# Patient Record
Sex: FEMALE | Race: WHITE | NOT HISPANIC OR LATINO | Employment: OTHER | URBAN - METROPOLITAN AREA
[De-identification: names, ages, dates, MRNs, and addresses within clinical notes are randomized per-mention and may not be internally consistent; named-entity substitution may affect disease eponyms.]

---

## 2017-01-03 ENCOUNTER — ALLSCRIPTS OFFICE VISIT (OUTPATIENT)
Dept: OTHER | Facility: OTHER | Age: 62
End: 2017-01-03

## 2017-01-09 ENCOUNTER — ALLSCRIPTS OFFICE VISIT (OUTPATIENT)
Dept: OTHER | Facility: OTHER | Age: 62
End: 2017-01-09

## 2017-01-20 RX ORDER — SODIUM CHLORIDE 9 MG/ML
20 INJECTION, SOLUTION INTRAVENOUS CONTINUOUS
Status: DISCONTINUED | OUTPATIENT
Start: 2017-01-23 | End: 2017-01-26 | Stop reason: HOSPADM

## 2017-01-20 RX ORDER — ACETAMINOPHEN 325 MG/1
650 TABLET ORAL ONCE
Status: COMPLETED | OUTPATIENT
Start: 2017-01-23 | End: 2017-01-23

## 2017-01-23 ENCOUNTER — HOSPITAL ENCOUNTER (OUTPATIENT)
Dept: INFUSION CENTER | Facility: CLINIC | Age: 62
Discharge: HOME | End: 2017-01-23
Payer: MEDICARE

## 2017-01-23 VITALS
HEART RATE: 68 BPM | TEMPERATURE: 98.3 F | SYSTOLIC BLOOD PRESSURE: 134 MMHG | RESPIRATION RATE: 16 BRPM | DIASTOLIC BLOOD PRESSURE: 63 MMHG

## 2017-01-23 LAB
ALBUMIN SERPL BCP-MCNC: 4.4 G/DL (ref 3.5–5)
ALP SERPL-CCNC: 127 U/L (ref 46–116)
ALT SERPL W P-5'-P-CCNC: 31 U/L (ref 12–78)
ANION GAP SERPL CALCULATED.3IONS-SCNC: 10 MMOL/L (ref 4–13)
AST SERPL W P-5'-P-CCNC: 13 U/L (ref 5–45)
BASOPHILS # BLD AUTO: 0.05 THOUSANDS/ΜL (ref 0–0.1)
BASOPHILS NFR BLD AUTO: 1 % (ref 0–1)
BILIRUB SERPL-MCNC: 0.2 MG/DL (ref 0.2–1)
BUN SERPL-MCNC: 20 MG/DL (ref 5–25)
CALCIUM SERPL-MCNC: 9.2 MG/DL (ref 8.3–10.1)
CHLORIDE SERPL-SCNC: 102 MMOL/L (ref 100–108)
CHOLEST SERPL-MCNC: 201 MG/DL (ref 50–200)
CO2 SERPL-SCNC: 28 MMOL/L (ref 21–32)
CREAT SERPL-MCNC: 0.58 MG/DL (ref 0.6–1.3)
EOSINOPHIL # BLD AUTO: 0.19 THOUSAND/ΜL (ref 0–0.61)
EOSINOPHIL NFR BLD AUTO: 2 % (ref 0–6)
ERYTHROCYTE [DISTWIDTH] IN BLOOD BY AUTOMATED COUNT: 12.1 % (ref 11.6–15.1)
GFR SERPL CREATININE-BSD FRML MDRD: >60 ML/MIN/1.73SQ M
GLUCOSE SERPL-MCNC: 105 MG/DL (ref 65–140)
HCT VFR BLD AUTO: 43.6 % (ref 34.8–46.1)
HDLC SERPL-MCNC: 54 MG/DL (ref 40–60)
HGB BLD-MCNC: 14.8 G/DL (ref 11.5–15.4)
LDLC SERPL CALC-MCNC: 112 MG/DL (ref 0–100)
LYMPHOCYTES # BLD AUTO: 3.3 THOUSANDS/ΜL (ref 0.6–4.47)
LYMPHOCYTES NFR BLD AUTO: 35 % (ref 14–44)
MCH RBC QN AUTO: 29.5 PG (ref 26.8–34.3)
MCHC RBC AUTO-ENTMCNC: 33.9 G/DL (ref 31.4–37.4)
MCV RBC AUTO: 87 FL (ref 82–98)
MONOCYTES # BLD AUTO: 0.56 THOUSAND/ΜL (ref 0.17–1.22)
MONOCYTES NFR BLD AUTO: 6 % (ref 4–12)
NEUTROPHILS # BLD AUTO: 5.31 THOUSANDS/ΜL (ref 1.85–7.62)
NEUTS SEG NFR BLD AUTO: 56 % (ref 43–75)
PLATELET # BLD AUTO: 333 THOUSANDS/UL (ref 149–390)
PMV BLD AUTO: 9.5 FL (ref 8.9–12.7)
POTASSIUM SERPL-SCNC: 4.2 MMOL/L (ref 3.5–5.3)
PROT SERPL-MCNC: 8.4 G/DL (ref 6.4–8.2)
RBC # BLD AUTO: 5.01 MILLION/UL (ref 3.81–5.12)
SODIUM SERPL-SCNC: 140 MMOL/L (ref 136–145)
TRIGL SERPL-MCNC: 175 MG/DL
TSH SERPL DL<=0.05 MIU/L-ACNC: 0.93 UIU/ML (ref 0.36–3.74)
WBC # BLD AUTO: 9.41 THOUSAND/UL (ref 4.31–10.16)

## 2017-01-23 PROCEDURE — 84443 ASSAY THYROID STIM HORMONE: CPT | Performed by: FAMILY MEDICINE

## 2017-01-23 PROCEDURE — 96413 CHEMO IV INFUSION 1 HR: CPT

## 2017-01-23 PROCEDURE — 80061 LIPID PANEL: CPT | Performed by: FAMILY MEDICINE

## 2017-01-23 PROCEDURE — 96367 TX/PROPH/DG ADDL SEQ IV INF: CPT

## 2017-01-23 PROCEDURE — 85025 COMPLETE CBC W/AUTO DIFF WBC: CPT | Performed by: FAMILY MEDICINE

## 2017-01-23 PROCEDURE — 80053 COMPREHEN METABOLIC PANEL: CPT | Performed by: FAMILY MEDICINE

## 2017-01-23 RX ADMIN — DIPHENHYDRAMINE HYDROCHLORIDE 25 MG: 50 INJECTION, SOLUTION INTRAMUSCULAR; INTRAVENOUS at 11:31

## 2017-01-23 RX ADMIN — ACETAMINOPHEN 650 MG: 325 TABLET ORAL at 11:27

## 2017-01-23 RX ADMIN — SODIUM CHLORIDE 20 ML/HR: 0.9 INJECTION, SOLUTION INTRAVENOUS at 11:28

## 2017-01-23 RX ADMIN — NATALIZUMAB 300 MG: 300 INJECTION INTRAVENOUS at 12:04

## 2017-01-23 NOTE — PROGRESS NOTES
Pt here today for Tys[place]ai  Pt is registered with touch program and has reviewed the pre infusion checklist   Pt denies any discomforts

## 2017-01-30 ENCOUNTER — ALLSCRIPTS OFFICE VISIT (OUTPATIENT)
Dept: OTHER | Facility: OTHER | Age: 62
End: 2017-01-30

## 2017-02-02 ENCOUNTER — APPOINTMENT (EMERGENCY)
Dept: RADIOLOGY | Facility: HOSPITAL | Age: 62
End: 2017-02-02
Payer: MEDICARE

## 2017-02-02 ENCOUNTER — HOSPITAL ENCOUNTER (OUTPATIENT)
Facility: HOSPITAL | Age: 62
Setting detail: OBSERVATION
Discharge: HOME | End: 2017-02-04
Attending: EMERGENCY MEDICINE | Admitting: INTERNAL MEDICINE
Payer: MEDICARE

## 2017-02-02 DIAGNOSIS — M79.659 THIGH PAIN: ICD-10-CM

## 2017-02-02 DIAGNOSIS — J18.9 RML PNEUMONIA: Primary | ICD-10-CM

## 2017-02-02 DIAGNOSIS — M79.643 HAND PAIN: ICD-10-CM

## 2017-02-02 DIAGNOSIS — M62.838 MUSCLE SPASM: ICD-10-CM

## 2017-02-02 PROBLEM — E78.5 HYPERLIPIDEMIA: Chronic | Status: ACTIVE | Noted: 2017-02-02

## 2017-02-02 PROBLEM — M79.606 LEG PAIN: Status: ACTIVE | Noted: 2017-02-02

## 2017-02-02 PROBLEM — G35 MULTIPLE SCLEROSIS (HCC): Chronic | Status: ACTIVE | Noted: 2017-02-02

## 2017-02-02 PROBLEM — M81.0 OSTEOPOROSIS: Chronic | Status: ACTIVE | Noted: 2017-02-02

## 2017-02-02 LAB
ALBUMIN SERPL BCP-MCNC: 4.1 G/DL (ref 3.5–5)
ALP SERPL-CCNC: 111 U/L (ref 46–116)
ALT SERPL W P-5'-P-CCNC: 22 U/L (ref 12–78)
ANION GAP SERPL CALCULATED.3IONS-SCNC: 8 MMOL/L (ref 4–13)
AST SERPL W P-5'-P-CCNC: 7 U/L (ref 5–45)
BACTERIA UR QL AUTO: ABNORMAL /HPF
BASOPHILS # BLD AUTO: 0.02 THOUSANDS/ΜL (ref 0–0.1)
BASOPHILS NFR BLD AUTO: 0 % (ref 0–1)
BILIRUB SERPL-MCNC: 0.52 MG/DL (ref 0.2–1)
BILIRUB UR QL STRIP: NEGATIVE
BUN SERPL-MCNC: 17 MG/DL (ref 5–25)
CALCIUM SERPL-MCNC: 9 MG/DL (ref 8.3–10.1)
CHLORIDE SERPL-SCNC: 105 MMOL/L (ref 100–108)
CK SERPL-CCNC: 63 U/L (ref 26–192)
CLARITY UR: CLEAR
CLARITY, POC: CLEAR
CO2 SERPL-SCNC: 26 MMOL/L (ref 21–32)
COLOR UR: YELLOW
COLOR, POC: YELLOW
CREAT SERPL-MCNC: 0.7 MG/DL (ref 0.6–1.3)
EOSINOPHIL # BLD AUTO: 0.01 THOUSAND/ΜL (ref 0–0.61)
EOSINOPHIL NFR BLD AUTO: 0 % (ref 0–6)
ERYTHROCYTE [DISTWIDTH] IN BLOOD BY AUTOMATED COUNT: 12.3 % (ref 11.6–15.1)
GFR SERPL CREATININE-BSD FRML MDRD: >60 ML/MIN/1.73SQ M
GLUCOSE SERPL-MCNC: 144 MG/DL (ref 65–140)
GLUCOSE UR STRIP-MCNC: NEGATIVE MG/DL
HCT VFR BLD AUTO: 41.8 % (ref 34.8–46.1)
HGB BLD-MCNC: 14.4 G/DL (ref 11.5–15.4)
HGB UR QL STRIP.AUTO: ABNORMAL
HYALINE CASTS #/AREA URNS LPF: ABNORMAL /LPF
KETONES UR STRIP-MCNC: NEGATIVE MG/DL
LACTATE SERPL-SCNC: 2.1 MMOL/L (ref 0.5–2)
LEUKOCYTE ESTERASE UR QL STRIP: NEGATIVE
LYMPHOCYTES # BLD AUTO: 1.48 THOUSANDS/ΜL (ref 0.6–4.47)
LYMPHOCYTES NFR BLD AUTO: 9 % (ref 14–44)
MCH RBC QN AUTO: 29.9 PG (ref 26.8–34.3)
MCHC RBC AUTO-ENTMCNC: 34.4 G/DL (ref 31.4–37.4)
MCV RBC AUTO: 87 FL (ref 82–98)
MONOCYTES # BLD AUTO: 0.94 THOUSAND/ΜL (ref 0.17–1.22)
MONOCYTES NFR BLD AUTO: 6 % (ref 4–12)
NEUTROPHILS # BLD AUTO: 14.6 THOUSANDS/ΜL (ref 1.85–7.62)
NEUTS SEG NFR BLD AUTO: 85 % (ref 43–75)
NITRITE UR QL STRIP: NEGATIVE
NON-SQ EPI CELLS URNS QL MICRO: ABNORMAL /HPF
NRBC BLD AUTO-RTO: 0 /100 WBCS
PH UR STRIP.AUTO: 6 [PH] (ref 4.5–8)
PLATELET # BLD AUTO: 288 THOUSANDS/UL (ref 149–390)
PMV BLD AUTO: 10 FL (ref 8.9–12.7)
POTASSIUM SERPL-SCNC: 4 MMOL/L (ref 3.5–5.3)
PROT SERPL-MCNC: 7.6 G/DL (ref 6.4–8.2)
PROT UR STRIP-MCNC: NEGATIVE MG/DL
RBC # BLD AUTO: 4.82 MILLION/UL (ref 3.81–5.12)
RBC #/AREA URNS AUTO: ABNORMAL /HPF
SODIUM SERPL-SCNC: 139 MMOL/L (ref 136–145)
SP GR UR STRIP.AUTO: 1.02 (ref 1–1.03)
UROBILINOGEN UR QL STRIP.AUTO: 0.2 E.U./DL
WBC # BLD AUTO: 17.09 THOUSAND/UL (ref 4.31–10.16)
WBC #/AREA URNS AUTO: ABNORMAL /HPF

## 2017-02-02 PROCEDURE — 99285 EMERGENCY DEPT VISIT HI MDM: CPT

## 2017-02-02 PROCEDURE — 85025 COMPLETE CBC W/AUTO DIFF WBC: CPT | Performed by: EMERGENCY MEDICINE

## 2017-02-02 PROCEDURE — 96365 THER/PROPH/DIAG IV INF INIT: CPT

## 2017-02-02 PROCEDURE — 82550 ASSAY OF CK (CPK): CPT | Performed by: EMERGENCY MEDICINE

## 2017-02-02 PROCEDURE — 87086 URINE CULTURE/COLONY COUNT: CPT

## 2017-02-02 PROCEDURE — 36415 COLL VENOUS BLD VENIPUNCTURE: CPT | Performed by: EMERGENCY MEDICINE

## 2017-02-02 PROCEDURE — 73110 X-RAY EXAM OF WRIST: CPT

## 2017-02-02 PROCEDURE — 81002 URINALYSIS NONAUTO W/O SCOPE: CPT | Performed by: EMERGENCY MEDICINE

## 2017-02-02 PROCEDURE — 93005 ELECTROCARDIOGRAM TRACING: CPT | Performed by: EMERGENCY MEDICINE

## 2017-02-02 PROCEDURE — 80053 COMPREHEN METABOLIC PANEL: CPT | Performed by: EMERGENCY MEDICINE

## 2017-02-02 PROCEDURE — 96376 TX/PRO/DX INJ SAME DRUG ADON: CPT

## 2017-02-02 PROCEDURE — 83605 ASSAY OF LACTIC ACID: CPT | Performed by: EMERGENCY MEDICINE

## 2017-02-02 PROCEDURE — 71020 HB CHEST X-RAY 2VW FRONTAL&LATL: CPT

## 2017-02-02 PROCEDURE — 81001 URINALYSIS AUTO W/SCOPE: CPT

## 2017-02-02 PROCEDURE — 96375 TX/PRO/DX INJ NEW DRUG ADDON: CPT

## 2017-02-02 PROCEDURE — 96361 HYDRATE IV INFUSION ADD-ON: CPT

## 2017-02-02 PROCEDURE — 73502 X-RAY EXAM HIP UNI 2-3 VIEWS: CPT

## 2017-02-02 RX ORDER — MORPHINE SULFATE 10 MG/ML
7 INJECTION, SOLUTION INTRAMUSCULAR; INTRAVENOUS ONCE
Status: COMPLETED | OUTPATIENT
Start: 2017-02-02 | End: 2017-02-02

## 2017-02-02 RX ORDER — SODIUM CHLORIDE 9 MG/ML
125 INJECTION, SOLUTION INTRAVENOUS CONTINUOUS
Status: DISCONTINUED | OUTPATIENT
Start: 2017-02-02 | End: 2017-02-04 | Stop reason: HOSPADM

## 2017-02-02 RX ORDER — BUDESONIDE AND FORMOTEROL FUMARATE DIHYDRATE 80; 4.5 UG/1; UG/1
2 AEROSOL RESPIRATORY (INHALATION) 2 TIMES DAILY
Status: DISCONTINUED | OUTPATIENT
Start: 2017-02-02 | End: 2017-02-04 | Stop reason: HOSPADM

## 2017-02-02 RX ORDER — GABAPENTIN 300 MG/1
300 CAPSULE ORAL
Status: DISCONTINUED | OUTPATIENT
Start: 2017-02-02 | End: 2017-02-04 | Stop reason: HOSPADM

## 2017-02-02 RX ORDER — ASPIRIN 81 MG/1
81 TABLET, CHEWABLE ORAL DAILY
Status: DISCONTINUED | OUTPATIENT
Start: 2017-02-03 | End: 2017-02-04 | Stop reason: HOSPADM

## 2017-02-02 RX ORDER — LIDOCAINE 50 MG/G
2 PATCH TOPICAL DAILY
Status: DISCONTINUED | OUTPATIENT
Start: 2017-02-03 | End: 2017-02-04 | Stop reason: HOSPADM

## 2017-02-02 RX ORDER — B-COMPLEX WITH VITAMIN C
1 TABLET ORAL
COMMUNITY
End: 2018-10-15

## 2017-02-02 RX ORDER — MORPHINE SULFATE 4 MG/ML
4 INJECTION, SOLUTION INTRAMUSCULAR; INTRAVENOUS ONCE
Status: COMPLETED | OUTPATIENT
Start: 2017-02-02 | End: 2017-02-02

## 2017-02-02 RX ORDER — B-COMPLEX WITH VITAMIN C
1 TABLET ORAL
Status: DISCONTINUED | OUTPATIENT
Start: 2017-02-03 | End: 2017-02-04 | Stop reason: HOSPADM

## 2017-02-02 RX ORDER — ATORVASTATIN CALCIUM 40 MG/1
40 TABLET, FILM COATED ORAL
Status: DISCONTINUED | OUTPATIENT
Start: 2017-02-02 | End: 2017-02-04 | Stop reason: HOSPADM

## 2017-02-02 RX ORDER — MORPHINE SULFATE 4 MG/ML
4 INJECTION, SOLUTION INTRAMUSCULAR; INTRAVENOUS ONCE AS NEEDED
Status: COMPLETED | OUTPATIENT
Start: 2017-02-02 | End: 2017-02-02

## 2017-02-02 RX ORDER — MELATONIN
4000 DAILY
Status: DISCONTINUED | OUTPATIENT
Start: 2017-02-03 | End: 2017-02-04 | Stop reason: HOSPADM

## 2017-02-02 RX ORDER — MONTELUKAST SODIUM 10 MG/1
10 TABLET ORAL
COMMUNITY
End: 2018-01-24 | Stop reason: SDUPTHER

## 2017-02-02 RX ORDER — EZETIMIBE 10 MG/1
10 TABLET ORAL DAILY
Status: DISCONTINUED | OUTPATIENT
Start: 2017-02-03 | End: 2017-02-04 | Stop reason: HOSPADM

## 2017-02-02 RX ORDER — AZITHROMYCIN 250 MG/1
500 TABLET, FILM COATED ORAL ONCE
Status: COMPLETED | OUTPATIENT
Start: 2017-02-02 | End: 2017-02-02

## 2017-02-02 RX ORDER — MELATONIN 10 MG
6000 TABLET, SUBLINGUAL SUBLINGUAL DAILY
COMMUNITY
End: 2018-01-24 | Stop reason: SDUPTHER

## 2017-02-02 RX ORDER — ALBUTEROL SULFATE 90 UG/1
2 AEROSOL, METERED RESPIRATORY (INHALATION) EVERY 6 HOURS PRN
Status: DISCONTINUED | OUTPATIENT
Start: 2017-02-02 | End: 2017-02-04 | Stop reason: HOSPADM

## 2017-02-02 RX ORDER — MONTELUKAST SODIUM 10 MG/1
10 TABLET ORAL
Status: DISCONTINUED | OUTPATIENT
Start: 2017-02-02 | End: 2017-02-04 | Stop reason: HOSPADM

## 2017-02-02 RX ORDER — ATORVASTATIN CALCIUM 40 MG/1
40 TABLET, FILM COATED ORAL DAILY
COMMUNITY
End: 2018-01-24 | Stop reason: SDUPTHER

## 2017-02-02 RX ORDER — DIAZEPAM 5 MG/ML
5 INJECTION, SOLUTION INTRAMUSCULAR; INTRAVENOUS ONCE
Status: COMPLETED | OUTPATIENT
Start: 2017-02-02 | End: 2017-02-02

## 2017-02-02 RX ORDER — ALBUTEROL SULFATE 90 UG/1
2 AEROSOL, METERED RESPIRATORY (INHALATION) EVERY 6 HOURS PRN
COMMUNITY
End: 2018-01-24 | Stop reason: SDUPTHER

## 2017-02-02 RX ORDER — SERTRALINE HYDROCHLORIDE 100 MG/1
100 TABLET, FILM COATED ORAL DAILY
Status: DISCONTINUED | OUTPATIENT
Start: 2017-02-03 | End: 2017-02-04 | Stop reason: HOSPADM

## 2017-02-02 RX ORDER — PANTOPRAZOLE SODIUM 40 MG/1
40 TABLET, DELAYED RELEASE ORAL
Status: DISCONTINUED | OUTPATIENT
Start: 2017-02-03 | End: 2017-02-04 | Stop reason: HOSPADM

## 2017-02-02 RX ORDER — DIAZEPAM 5 MG/ML
5 INJECTION, SOLUTION INTRAMUSCULAR; INTRAVENOUS ONCE AS NEEDED
Status: DISCONTINUED | OUTPATIENT
Start: 2017-02-02 | End: 2017-02-02

## 2017-02-02 RX ORDER — GABAPENTIN 300 MG/1
300 CAPSULE ORAL AS NEEDED
Status: DISCONTINUED | OUTPATIENT
Start: 2017-02-02 | End: 2017-02-02

## 2017-02-02 RX ORDER — VANCOMYCIN HYDROCHLORIDE 1 G/200ML
15 INJECTION, SOLUTION INTRAVENOUS EVERY 12 HOURS
Status: DISCONTINUED | OUTPATIENT
Start: 2017-02-02 | End: 2017-02-02

## 2017-02-02 RX ORDER — MAGNESIUM SULFATE HEPTAHYDRATE 40 MG/ML
2 INJECTION, SOLUTION INTRAVENOUS ONCE
Status: COMPLETED | OUTPATIENT
Start: 2017-02-02 | End: 2017-02-02

## 2017-02-02 RX ORDER — FLUTICASONE PROPIONATE 50 MCG
1 SPRAY, SUSPENSION (ML) NASAL DAILY
Status: DISCONTINUED | OUTPATIENT
Start: 2017-02-03 | End: 2017-02-04 | Stop reason: HOSPADM

## 2017-02-02 RX ADMIN — SODIUM CHLORIDE 125 ML/HR: 0.9 INJECTION, SOLUTION INTRAVENOUS at 20:31

## 2017-02-02 RX ADMIN — MAGNESIUM SULFATE HEPTAHYDRATE 2 G: 40 INJECTION, SOLUTION INTRAVENOUS at 11:48

## 2017-02-02 RX ADMIN — CEFTRIAXONE 1000 MG: 1 INJECTION, POWDER, FOR SOLUTION INTRAMUSCULAR; INTRAVENOUS at 20:20

## 2017-02-02 RX ADMIN — SODIUM CHLORIDE 1000 ML: 0.9 INJECTION, SOLUTION INTRAVENOUS at 10:21

## 2017-02-02 RX ADMIN — MONTELUKAST SODIUM 10 MG: 10 TABLET, FILM COATED ORAL at 21:55

## 2017-02-02 RX ADMIN — MORPHINE SULFATE 4 MG: 4 INJECTION, SOLUTION INTRAMUSCULAR; INTRAVENOUS at 16:09

## 2017-02-02 RX ADMIN — MORPHINE SULFATE 7 MG: 10 INJECTION, SOLUTION INTRAMUSCULAR; INTRAVENOUS at 11:13

## 2017-02-02 RX ADMIN — AZITHROMYCIN 500 MG: 250 TABLET, FILM COATED ORAL at 13:29

## 2017-02-02 RX ADMIN — MORPHINE SULFATE 4 MG: 4 INJECTION, SOLUTION INTRAMUSCULAR; INTRAVENOUS at 20:23

## 2017-02-02 RX ADMIN — GABAPENTIN 300 MG: 300 CAPSULE ORAL at 21:55

## 2017-02-02 RX ADMIN — DIAZEPAM 5 MG: 5 INJECTION, SOLUTION INTRAMUSCULAR; INTRAVENOUS at 11:48

## 2017-02-02 RX ADMIN — ATORVASTATIN CALCIUM 40 MG: 40 TABLET, FILM COATED ORAL at 20:20

## 2017-02-02 RX ADMIN — SODIUM CHLORIDE 1000 ML: 0.9 INJECTION, SOLUTION INTRAVENOUS at 13:29

## 2017-02-03 PROBLEM — D72.829 LEUKOCYTOSIS: Status: ACTIVE | Noted: 2017-02-03

## 2017-02-03 LAB
ANION GAP SERPL CALCULATED.3IONS-SCNC: 7 MMOL/L (ref 4–13)
ATRIAL RATE: 65 BPM
BASOPHILS # BLD AUTO: 0.02 THOUSANDS/ΜL (ref 0–0.1)
BASOPHILS NFR BLD AUTO: 0 % (ref 0–1)
BUN SERPL-MCNC: 8 MG/DL (ref 5–25)
CALCIUM SERPL-MCNC: 8.4 MG/DL (ref 8.3–10.1)
CHLORIDE SERPL-SCNC: 105 MMOL/L (ref 100–108)
CO2 SERPL-SCNC: 26 MMOL/L (ref 21–32)
CREAT SERPL-MCNC: 0.69 MG/DL (ref 0.6–1.3)
EOSINOPHIL # BLD AUTO: 0.06 THOUSAND/ΜL (ref 0–0.61)
EOSINOPHIL NFR BLD AUTO: 0 % (ref 0–6)
ERYTHROCYTE [DISTWIDTH] IN BLOOD BY AUTOMATED COUNT: 12.4 % (ref 11.6–15.1)
GFR SERPL CREATININE-BSD FRML MDRD: >60 ML/MIN/1.73SQ M
GLUCOSE SERPL-MCNC: 166 MG/DL (ref 65–140)
HCT VFR BLD AUTO: 36.9 % (ref 34.8–46.1)
HGB BLD-MCNC: 12.8 G/DL (ref 11.5–15.4)
LYMPHOCYTES # BLD AUTO: 2.18 THOUSANDS/ΜL (ref 0.6–4.47)
LYMPHOCYTES NFR BLD AUTO: 16 % (ref 14–44)
MCH RBC QN AUTO: 30.2 PG (ref 26.8–34.3)
MCHC RBC AUTO-ENTMCNC: 34.7 G/DL (ref 31.4–37.4)
MCV RBC AUTO: 87 FL (ref 82–98)
MONOCYTES # BLD AUTO: 1.16 THOUSAND/ΜL (ref 0.17–1.22)
MONOCYTES NFR BLD AUTO: 8 % (ref 4–12)
NEUTROPHILS # BLD AUTO: 10.36 THOUSANDS/ΜL (ref 1.85–7.62)
NEUTS SEG NFR BLD AUTO: 76 % (ref 43–75)
NRBC BLD AUTO-RTO: 0 /100 WBCS
P AXIS: 67 DEGREES
PLATELET # BLD AUTO: 244 THOUSANDS/UL (ref 149–390)
PMV BLD AUTO: 9.5 FL (ref 8.9–12.7)
POTASSIUM SERPL-SCNC: 3.8 MMOL/L (ref 3.5–5.3)
PR INTERVAL: 162 MS
QRS AXIS: 15 DEGREES
QRSD INTERVAL: 80 MS
QT INTERVAL: 394 MS
QTC INTERVAL: 409 MS
RBC # BLD AUTO: 4.24 MILLION/UL (ref 3.81–5.12)
SODIUM SERPL-SCNC: 138 MMOL/L (ref 136–145)
T WAVE AXIS: 76 DEGREES
URATE SERPL-MCNC: 3 MG/DL (ref 2–6.8)
VENTRICULAR RATE: 65 BPM
WBC # BLD AUTO: 13.82 THOUSAND/UL (ref 4.31–10.16)

## 2017-02-03 PROCEDURE — 85025 COMPLETE CBC W/AUTO DIFF WBC: CPT | Performed by: PHYSICIAN ASSISTANT

## 2017-02-03 PROCEDURE — G8988 SELF CARE GOAL STATUS: HCPCS

## 2017-02-03 PROCEDURE — G8987 SELF CARE CURRENT STATUS: HCPCS

## 2017-02-03 PROCEDURE — 97166 OT EVAL MOD COMPLEX 45 MIN: CPT

## 2017-02-03 PROCEDURE — 84550 ASSAY OF BLOOD/URIC ACID: CPT | Performed by: PHYSICIAN ASSISTANT

## 2017-02-03 PROCEDURE — 80048 BASIC METABOLIC PNL TOTAL CA: CPT | Performed by: PHYSICIAN ASSISTANT

## 2017-02-03 RX ORDER — KETOROLAC TROMETHAMINE 30 MG/ML
30 INJECTION, SOLUTION INTRAMUSCULAR; INTRAVENOUS EVERY 6 HOURS SCHEDULED
Status: DISCONTINUED | OUTPATIENT
Start: 2017-02-03 | End: 2017-02-04 | Stop reason: HOSPADM

## 2017-02-03 RX ADMIN — ATORVASTATIN CALCIUM 40 MG: 40 TABLET, FILM COATED ORAL at 17:12

## 2017-02-03 RX ADMIN — SERTRALINE HYDROCHLORIDE 100 MG: 100 TABLET, FILM COATED ORAL at 08:28

## 2017-02-03 RX ADMIN — KETOROLAC TROMETHAMINE 30 MG: 30 INJECTION, SOLUTION INTRAMUSCULAR at 11:35

## 2017-02-03 RX ADMIN — CALCIUM CARBONATE 500 MG (1,250 MG)-VITAMIN D3 200 UNIT TABLET 1 TABLET: at 08:28

## 2017-02-03 RX ADMIN — BUDESONIDE AND FORMOTEROL FUMARATE DIHYDRATE 2 PUFF: 80; 4.5 AEROSOL RESPIRATORY (INHALATION) at 17:14

## 2017-02-03 RX ADMIN — FLUTICASONE PROPIONATE 1 SPRAY: 50 SPRAY, METERED NASAL at 08:29

## 2017-02-03 RX ADMIN — ENOXAPARIN SODIUM 40 MG: 40 INJECTION SUBCUTANEOUS at 08:28

## 2017-02-03 RX ADMIN — VITAMIN D, TAB 1000IU (100/BT) 4000 UNITS: 25 TAB at 08:28

## 2017-02-03 RX ADMIN — KETOROLAC TROMETHAMINE 30 MG: 30 INJECTION, SOLUTION INTRAMUSCULAR at 17:12

## 2017-02-03 RX ADMIN — EZETIMIBE 10 MG: 10 TABLET ORAL at 08:29

## 2017-02-03 RX ADMIN — CEFTRIAXONE 1000 MG: 1 INJECTION, POWDER, FOR SOLUTION INTRAMUSCULAR; INTRAVENOUS at 20:34

## 2017-02-03 RX ADMIN — KETOROLAC TROMETHAMINE 30 MG: 30 INJECTION, SOLUTION INTRAMUSCULAR at 23:39

## 2017-02-03 RX ADMIN — ASPIRIN 81 MG 81 MG: 81 TABLET ORAL at 08:28

## 2017-02-03 RX ADMIN — MONTELUKAST SODIUM 10 MG: 10 TABLET, FILM COATED ORAL at 21:01

## 2017-02-03 RX ADMIN — BUDESONIDE AND FORMOTEROL FUMARATE DIHYDRATE 2 PUFF: 80; 4.5 AEROSOL RESPIRATORY (INHALATION) at 08:29

## 2017-02-03 RX ADMIN — SODIUM CHLORIDE 125 ML/HR: 0.9 INJECTION, SOLUTION INTRAVENOUS at 04:18

## 2017-02-03 RX ADMIN — PANTOPRAZOLE SODIUM 40 MG: 40 TABLET, DELAYED RELEASE ORAL at 05:31

## 2017-02-03 RX ADMIN — GABAPENTIN 300 MG: 300 CAPSULE ORAL at 21:01

## 2017-02-03 RX ADMIN — LIDOCAINE 2 PATCH: 50 PATCH CUTANEOUS at 08:28

## 2017-02-04 ENCOUNTER — APPOINTMENT (OUTPATIENT)
Dept: RADIOLOGY | Facility: HOSPITAL | Age: 62
End: 2017-02-04
Payer: MEDICARE

## 2017-02-04 VITALS
HEART RATE: 61 BPM | BODY MASS INDEX: 24.43 KG/M2 | SYSTOLIC BLOOD PRESSURE: 132 MMHG | WEIGHT: 152 LBS | OXYGEN SATURATION: 97 % | DIASTOLIC BLOOD PRESSURE: 78 MMHG | RESPIRATION RATE: 22 BRPM | HEIGHT: 66 IN | TEMPERATURE: 98.7 F

## 2017-02-04 LAB — BACTERIA UR CULT: NORMAL

## 2017-02-04 PROCEDURE — G8979 MOBILITY GOAL STATUS: HCPCS

## 2017-02-04 PROCEDURE — 93971 EXTREMITY STUDY: CPT

## 2017-02-04 PROCEDURE — 97163 PT EVAL HIGH COMPLEX 45 MIN: CPT

## 2017-02-04 PROCEDURE — G8978 MOBILITY CURRENT STATUS: HCPCS

## 2017-02-04 RX ORDER — LIDOCAINE 50 MG/G
2 PATCH TOPICAL DAILY
Qty: 60 PATCH | Refills: 0 | Status: SHIPPED | OUTPATIENT
Start: 2017-02-04 | End: 2017-04-20 | Stop reason: HOSPADM

## 2017-02-04 RX ORDER — NAPROXEN SODIUM 550 MG/1
550 TABLET ORAL 2 TIMES DAILY WITH MEALS
Qty: 20 TABLET | Refills: 0 | Status: SHIPPED | OUTPATIENT
Start: 2017-02-04 | End: 2017-04-20 | Stop reason: HOSPADM

## 2017-02-04 RX ADMIN — FLUTICASONE PROPIONATE 1 SPRAY: 50 SPRAY, METERED NASAL at 08:48

## 2017-02-04 RX ADMIN — KETOROLAC TROMETHAMINE 30 MG: 30 INJECTION, SOLUTION INTRAMUSCULAR at 11:43

## 2017-02-04 RX ADMIN — VITAMIN D, TAB 1000IU (100/BT) 4000 UNITS: 25 TAB at 08:48

## 2017-02-04 RX ADMIN — ASPIRIN 81 MG 81 MG: 81 TABLET ORAL at 08:49

## 2017-02-04 RX ADMIN — ENOXAPARIN SODIUM 40 MG: 40 INJECTION SUBCUTANEOUS at 08:48

## 2017-02-04 RX ADMIN — KETOROLAC TROMETHAMINE 30 MG: 30 INJECTION, SOLUTION INTRAMUSCULAR at 05:18

## 2017-02-04 RX ADMIN — CALCIUM CARBONATE 500 MG (1,250 MG)-VITAMIN D3 200 UNIT TABLET 1 TABLET: at 08:48

## 2017-02-04 RX ADMIN — PANTOPRAZOLE SODIUM 40 MG: 40 TABLET, DELAYED RELEASE ORAL at 05:18

## 2017-02-04 RX ADMIN — BUDESONIDE AND FORMOTEROL FUMARATE DIHYDRATE 2 PUFF: 80; 4.5 AEROSOL RESPIRATORY (INHALATION) at 08:48

## 2017-02-04 RX ADMIN — SERTRALINE HYDROCHLORIDE 100 MG: 100 TABLET, FILM COATED ORAL at 08:49

## 2017-02-04 RX ADMIN — LIDOCAINE 2 PATCH: 50 PATCH CUTANEOUS at 08:49

## 2017-02-04 RX ADMIN — EZETIMIBE 10 MG: 10 TABLET ORAL at 08:49

## 2017-02-10 ENCOUNTER — APPOINTMENT (OUTPATIENT)
Dept: LAB | Facility: CLINIC | Age: 62
End: 2017-02-10
Payer: MEDICARE

## 2017-02-10 ENCOUNTER — ALLSCRIPTS OFFICE VISIT (OUTPATIENT)
Dept: OTHER | Facility: OTHER | Age: 62
End: 2017-02-10

## 2017-02-10 DIAGNOSIS — M54.32 SCIATICA OF LEFT SIDE: ICD-10-CM

## 2017-02-10 DIAGNOSIS — G35 MULTIPLE SCLEROSIS (HCC): ICD-10-CM

## 2017-02-10 DIAGNOSIS — M79.669 PAIN OF LOWER LEG: ICD-10-CM

## 2017-02-10 DIAGNOSIS — E78.5 HYPERLIPIDEMIA: ICD-10-CM

## 2017-02-10 DIAGNOSIS — M79.609 PAIN IN EXTREMITY: ICD-10-CM

## 2017-02-10 DIAGNOSIS — R29.898 OTHER SYMPTOMS AND SIGNS INVOLVING THE MUSCULOSKELETAL SYSTEM: ICD-10-CM

## 2017-02-10 DIAGNOSIS — J18.1 LOBAR PNEUMONIA (HCC): ICD-10-CM

## 2017-02-10 DIAGNOSIS — M79.643 PAIN OF HAND: ICD-10-CM

## 2017-02-10 DIAGNOSIS — J44.9 CHRONIC OBSTRUCTIVE PULMONARY DISEASE (HCC): ICD-10-CM

## 2017-02-10 LAB
ALBUMIN SERPL BCP-MCNC: 4.1 G/DL (ref 3.5–5)
ALP SERPL-CCNC: 106 U/L (ref 46–116)
ALT SERPL W P-5'-P-CCNC: 29 U/L (ref 12–78)
ANION GAP SERPL CALCULATED.3IONS-SCNC: 6 MMOL/L (ref 4–13)
AST SERPL W P-5'-P-CCNC: 11 U/L (ref 5–45)
BASOPHILS # BLD AUTO: 0.06 THOUSANDS/ΜL (ref 0–0.1)
BASOPHILS NFR BLD AUTO: 1 % (ref 0–1)
BILIRUB SERPL-MCNC: 0.29 MG/DL (ref 0.2–1)
BUN SERPL-MCNC: 17 MG/DL (ref 5–25)
CALCIUM SERPL-MCNC: 9.6 MG/DL (ref 8.3–10.1)
CHLORIDE SERPL-SCNC: 106 MMOL/L (ref 100–108)
CO2 SERPL-SCNC: 28 MMOL/L (ref 21–32)
CREAT SERPL-MCNC: 0.7 MG/DL (ref 0.6–1.3)
CRP SERPL QL: <3 MG/L
EOSINOPHIL # BLD AUTO: 0.21 THOUSAND/ΜL (ref 0–0.61)
EOSINOPHIL NFR BLD AUTO: 2 % (ref 0–6)
ERYTHROCYTE [DISTWIDTH] IN BLOOD BY AUTOMATED COUNT: 12.1 % (ref 11.6–15.1)
ERYTHROCYTE [SEDIMENTATION RATE] IN BLOOD: 23 MM/HOUR (ref 0–20)
GFR SERPL CREATININE-BSD FRML MDRD: >60 ML/MIN/1.73SQ M
GLUCOSE SERPL-MCNC: 99 MG/DL (ref 65–140)
HCT VFR BLD AUTO: 43.4 % (ref 34.8–46.1)
HGB BLD-MCNC: 14.7 G/DL (ref 11.5–15.4)
LYMPHOCYTES # BLD AUTO: 3.39 THOUSANDS/ΜL (ref 0.6–4.47)
LYMPHOCYTES NFR BLD AUTO: 30 % (ref 14–44)
MCH RBC QN AUTO: 29.6 PG (ref 26.8–34.3)
MCHC RBC AUTO-ENTMCNC: 33.9 G/DL (ref 31.4–37.4)
MCV RBC AUTO: 87 FL (ref 82–98)
MONOCYTES # BLD AUTO: 0.7 THOUSAND/ΜL (ref 0.17–1.22)
MONOCYTES NFR BLD AUTO: 6 % (ref 4–12)
NEUTROPHILS # BLD AUTO: 6.93 THOUSANDS/ΜL (ref 1.85–7.62)
NEUTS SEG NFR BLD AUTO: 61 % (ref 43–75)
NRBC BLD AUTO-RTO: 0 /100 WBCS
PLATELET # BLD AUTO: 386 THOUSANDS/UL (ref 149–390)
PMV BLD AUTO: 9.4 FL (ref 8.9–12.7)
POTASSIUM SERPL-SCNC: 4.7 MMOL/L (ref 3.5–5.3)
PROT SERPL-MCNC: 8.1 G/DL (ref 6.4–8.2)
RBC # BLD AUTO: 4.97 MILLION/UL (ref 3.81–5.12)
SODIUM SERPL-SCNC: 140 MMOL/L (ref 136–145)
URATE SERPL-MCNC: 4.6 MG/DL (ref 2–6.8)
WBC # BLD AUTO: 11.32 THOUSAND/UL (ref 4.31–10.16)

## 2017-02-10 PROCEDURE — 84550 ASSAY OF BLOOD/URIC ACID: CPT

## 2017-02-10 PROCEDURE — 85025 COMPLETE CBC W/AUTO DIFF WBC: CPT

## 2017-02-10 PROCEDURE — 86430 RHEUMATOID FACTOR TEST QUAL: CPT

## 2017-02-10 PROCEDURE — 86038 ANTINUCLEAR ANTIBODIES: CPT

## 2017-02-10 PROCEDURE — 85652 RBC SED RATE AUTOMATED: CPT

## 2017-02-10 PROCEDURE — 86140 C-REACTIVE PROTEIN: CPT

## 2017-02-10 PROCEDURE — 80053 COMPREHEN METABOLIC PANEL: CPT

## 2017-02-10 PROCEDURE — 86618 LYME DISEASE ANTIBODY: CPT

## 2017-02-10 PROCEDURE — 36415 COLL VENOUS BLD VENIPUNCTURE: CPT

## 2017-02-13 LAB
B BURGDOR IGG SER IA-ACNC: 0.63
B BURGDOR IGM SER IA-ACNC: 0.17
RHEUMATOID FACT SER QL LA: NEGATIVE
RYE IGE QN: NEGATIVE

## 2017-02-17 RX ORDER — ACETAMINOPHEN 325 MG/1
650 TABLET ORAL ONCE
Status: COMPLETED | OUTPATIENT
Start: 2017-02-20 | End: 2017-02-20

## 2017-02-17 RX ORDER — SODIUM CHLORIDE 9 MG/ML
20 INJECTION, SOLUTION INTRAVENOUS CONTINUOUS
Status: DISCONTINUED | OUTPATIENT
Start: 2017-02-20 | End: 2017-02-23 | Stop reason: HOSPADM

## 2017-02-20 ENCOUNTER — HOSPITAL ENCOUNTER (OUTPATIENT)
Dept: INFUSION CENTER | Facility: CLINIC | Age: 62
Discharge: HOME | End: 2017-02-20
Payer: MEDICARE

## 2017-02-20 VITALS
RESPIRATION RATE: 20 BRPM | HEART RATE: 84 BPM | DIASTOLIC BLOOD PRESSURE: 60 MMHG | TEMPERATURE: 97.9 F | SYSTOLIC BLOOD PRESSURE: 128 MMHG

## 2017-02-20 LAB
ALBUMIN SERPL BCP-MCNC: 3.7 G/DL (ref 3.5–5)
ALP SERPL-CCNC: 95 U/L (ref 46–116)
ALT SERPL W P-5'-P-CCNC: 35 U/L (ref 12–78)
ANION GAP SERPL CALCULATED.3IONS-SCNC: 11 MMOL/L (ref 4–13)
AST SERPL W P-5'-P-CCNC: 14 U/L (ref 5–45)
BASOPHILS # BLD AUTO: 0.04 THOUSANDS/ΜL (ref 0–0.1)
BASOPHILS NFR BLD AUTO: 0 % (ref 0–1)
BILIRUB SERPL-MCNC: 0.3 MG/DL (ref 0.2–1)
BUN SERPL-MCNC: 22 MG/DL (ref 5–25)
CALCIUM SERPL-MCNC: 8.8 MG/DL (ref 8.3–10.1)
CHLORIDE SERPL-SCNC: 104 MMOL/L (ref 100–108)
CO2 SERPL-SCNC: 26 MMOL/L (ref 21–32)
CREAT SERPL-MCNC: 0.74 MG/DL (ref 0.6–1.3)
EOSINOPHIL # BLD AUTO: 0.18 THOUSAND/ΜL (ref 0–0.61)
EOSINOPHIL NFR BLD AUTO: 2 % (ref 0–6)
ERYTHROCYTE [DISTWIDTH] IN BLOOD BY AUTOMATED COUNT: 12.2 % (ref 11.6–15.1)
GFR SERPL CREATININE-BSD FRML MDRD: >60 ML/MIN/1.73SQ M
GLUCOSE SERPL-MCNC: 126 MG/DL (ref 65–140)
HCT VFR BLD AUTO: 38.8 % (ref 34.8–46.1)
HGB BLD-MCNC: 13.1 G/DL (ref 11.5–15.4)
LYMPHOCYTES # BLD AUTO: 3.45 THOUSANDS/ΜL (ref 0.6–4.47)
LYMPHOCYTES NFR BLD AUTO: 38 % (ref 14–44)
MCH RBC QN AUTO: 28.8 PG (ref 26.8–34.3)
MCHC RBC AUTO-ENTMCNC: 33.8 G/DL (ref 31.4–37.4)
MCV RBC AUTO: 85 FL (ref 82–98)
MONOCYTES # BLD AUTO: 0.53 THOUSAND/ΜL (ref 0.17–1.22)
MONOCYTES NFR BLD AUTO: 6 % (ref 4–12)
NEUTROPHILS # BLD AUTO: 4.97 THOUSANDS/ΜL (ref 1.85–7.62)
NEUTS SEG NFR BLD AUTO: 54 % (ref 43–75)
PLATELET # BLD AUTO: 317 THOUSANDS/UL (ref 149–390)
PMV BLD AUTO: 9.4 FL (ref 8.9–12.7)
POTASSIUM SERPL-SCNC: 4.1 MMOL/L (ref 3.5–5.3)
PROT SERPL-MCNC: 6.9 G/DL (ref 6.4–8.2)
RBC # BLD AUTO: 4.55 MILLION/UL (ref 3.81–5.12)
SODIUM SERPL-SCNC: 141 MMOL/L (ref 136–145)
WBC # BLD AUTO: 9.17 THOUSAND/UL (ref 4.31–10.16)

## 2017-02-20 PROCEDURE — 96367 TX/PROPH/DG ADDL SEQ IV INF: CPT

## 2017-02-20 PROCEDURE — 96413 CHEMO IV INFUSION 1 HR: CPT

## 2017-02-20 PROCEDURE — 85025 COMPLETE CBC W/AUTO DIFF WBC: CPT | Performed by: PSYCHIATRY & NEUROLOGY

## 2017-02-20 PROCEDURE — 80053 COMPREHEN METABOLIC PANEL: CPT | Performed by: PSYCHIATRY & NEUROLOGY

## 2017-02-20 RX ADMIN — ACETAMINOPHEN 650 MG: 325 TABLET ORAL at 09:56

## 2017-02-20 RX ADMIN — SODIUM CHLORIDE 20 ML/HR: 900 INJECTION, SOLUTION INTRAVENOUS at 09:53

## 2017-02-20 RX ADMIN — NATALIZUMAB 300 MG: 300 INJECTION INTRAVENOUS at 10:25

## 2017-02-20 RX ADMIN — DIPHENHYDRAMINE HYDROCHLORIDE 25 MG: 50 INJECTION, SOLUTION INTRAMUSCULAR; INTRAVENOUS at 10:03

## 2017-02-20 NOTE — PLAN OF CARE
Problem: Potential for Falls  Goal: Patient will remain free of falls  INTERVENTIONS:  - Assess patient frequently for physical needs  - Identify cognitive and physical deficits and behaviors that affect risk of falls    - [place] fall precautions as indicated by assessment   - Educate patient/family on patient safety including physical limitations  - Instruct patient to call for assistance with activity based on assessment  - Modify environment to reduce risk of injury  - Consider OT/PT consult to assist with strengthening/mobility   Outcome: Progressing    Problem: PAIN - ADULT  Goal: Verbalizes/displays adequate comfort level or baseline comfort level  Interventions:  - Encourage patient to monitor pain and request assistance  - Assess pain using appropriate pain scale  - Administer analgesics based on type and severity of pain and evaluate response  - Implement non-pharmacological measures as appropriate and evaluate response  - Consider cultural and social influences on pain and pain management  - Notify physician/advanced practitioner if interventions unsuccessful or patient reports new pain  Outcome: Progressing    Problem: INFECTION - ADULT  Goal: Absence or prevention of progression during hospitalization  INTERVENTIONS:  - Assess and monitor for signs and symptoms of infection  - Monitor lab/diagnostic results  - Monitor all insertion sites, i e  indwelling lines, tubes, and drains  - Monitor endotracheal (as able) and nasal secretions for changes in amount and color  - [place] appropriate cooling/warming therapies per order  - Administer medications as ordered  - Instruct and encourage patient and family to use good hand hygiene technique  - Identify and instruct in appropriate isolation precautions for identified infection/condition  Outcome: Progressing

## 2017-02-20 NOTE — PROGRESS NOTES
Patient tolerated treatment well, 1 hour after dosing no s/s oralia's noted  Patient offers no c/o and was discharged  Next dosing 3/20/17

## 2017-02-20 NOTE — PROGRESS NOTES
Patient here for tysabri and was recently hospitalized for right hand pain and swelling and also left leg pain/weakness  Patient given toradol in hospital, anaprox at discharge to take for several days  Currently using lidoderm patches as prescribed  Called Dr Kisha Bettencourt office and spoke with her PA Xavi Duron who stated they were aware of patient hospitalization and that we can proceed with treatment today

## 2017-02-21 ENCOUNTER — HOSPITAL ENCOUNTER (OUTPATIENT)
Dept: CT IMAGING | Facility: HOSPITAL | Age: 62
Discharge: HOME | End: 2017-02-21
Payer: MEDICARE

## 2017-02-21 ENCOUNTER — TRANSCRIBE ORDERS (OUTPATIENT)
Dept: ADMINISTRATIVE | Facility: HOSPITAL | Age: 62
End: 2017-02-21

## 2017-02-21 ENCOUNTER — HOSPITAL ENCOUNTER (OUTPATIENT)
Dept: RADIOLOGY | Facility: HOSPITAL | Age: 62
Discharge: HOME | End: 2017-02-21
Payer: MEDICARE

## 2017-02-21 DIAGNOSIS — G35 MULTIPLE SCLEROSIS (HCC): ICD-10-CM

## 2017-02-21 DIAGNOSIS — M54.32 SCIATICA OF LEFT SIDE: ICD-10-CM

## 2017-02-21 DIAGNOSIS — J18.1 LOBAR PNEUMONIA (HCC): ICD-10-CM

## 2017-02-21 DIAGNOSIS — J44.9 CHRONIC OBSTRUCTIVE PULMONARY DISEASE (HCC): ICD-10-CM

## 2017-02-21 PROCEDURE — 72110 X-RAY EXAM L-2 SPINE 4/>VWS: CPT

## 2017-02-21 PROCEDURE — 71250 CT THORAX DX C-: CPT

## 2017-02-24 DIAGNOSIS — K76.0 FATTY (CHANGE OF) LIVER, NOT ELSEWHERE CLASSIFIED: ICD-10-CM

## 2017-02-24 DIAGNOSIS — M72.2 PLANTAR FASCIAL FIBROMATOSIS: ICD-10-CM

## 2017-02-27 ENCOUNTER — ALLSCRIPTS OFFICE VISIT (OUTPATIENT)
Dept: OTHER | Facility: OTHER | Age: 62
End: 2017-02-27

## 2017-02-28 ENCOUNTER — HOSPITAL ENCOUNTER (OUTPATIENT)
Dept: RADIOLOGY | Facility: CLINIC | Age: 62
Discharge: HOME | End: 2017-02-28
Payer: MEDICARE

## 2017-02-28 DIAGNOSIS — M72.2 PLANTAR FASCIAL FIBROMATOSIS: ICD-10-CM

## 2017-02-28 PROCEDURE — 73630 X-RAY EXAM OF FOOT: CPT

## 2017-03-01 ENCOUNTER — HOSPITAL ENCOUNTER (OUTPATIENT)
Dept: NON INVASIVE DIAGNOSTICS | Facility: CLINIC | Age: 62
Discharge: HOME | End: 2017-03-01
Payer: MEDICARE

## 2017-03-01 ENCOUNTER — GENERIC CONVERSION - ENCOUNTER (OUTPATIENT)
Dept: OTHER | Facility: OTHER | Age: 62
End: 2017-03-01

## 2017-03-01 ENCOUNTER — HOSPITAL ENCOUNTER (OUTPATIENT)
Dept: MRI IMAGING | Facility: HOSPITAL | Age: 62
Discharge: HOME | End: 2017-03-01
Payer: MEDICARE

## 2017-03-01 DIAGNOSIS — E78.5 HYPERLIPIDEMIA: ICD-10-CM

## 2017-03-01 DIAGNOSIS — G35 MULTIPLE SCLEROSIS (HCC): ICD-10-CM

## 2017-03-01 DIAGNOSIS — M79.609 PAIN IN EXTREMITY: ICD-10-CM

## 2017-03-01 DIAGNOSIS — M79.669 PAIN OF LOWER LEG: ICD-10-CM

## 2017-03-01 DIAGNOSIS — R29.898 OTHER SYMPTOMS AND SIGNS INVOLVING THE MUSCULOSKELETAL SYSTEM: ICD-10-CM

## 2017-03-01 DIAGNOSIS — M79.643 PAIN OF HAND: ICD-10-CM

## 2017-03-01 PROCEDURE — 70553 MRI BRAIN STEM W/O & W/DYE: CPT

## 2017-03-01 PROCEDURE — 93923 UPR/LXTR ART STDY 3+ LVLS: CPT

## 2017-03-01 PROCEDURE — 93925 LOWER EXTREMITY STUDY: CPT

## 2017-03-01 PROCEDURE — 93930 UPPER EXTREMITY STUDY: CPT

## 2017-03-01 PROCEDURE — 93970 EXTREMITY STUDY: CPT

## 2017-03-01 PROCEDURE — A9585 GADOBUTROL INJECTION: HCPCS | Performed by: PHYSICIAN ASSISTANT

## 2017-03-01 RX ADMIN — GADOBUTROL 6 ML: 604.72 INJECTION INTRAVENOUS at 10:04

## 2017-03-06 ENCOUNTER — ALLSCRIPTS OFFICE VISIT (OUTPATIENT)
Dept: OTHER | Facility: OTHER | Age: 62
End: 2017-03-06

## 2017-03-17 RX ORDER — ACETAMINOPHEN 325 MG/1
650 TABLET ORAL ONCE
Status: COMPLETED | OUTPATIENT
Start: 2017-03-20 | End: 2017-03-20

## 2017-03-17 RX ORDER — SODIUM CHLORIDE 9 MG/ML
20 INJECTION, SOLUTION INTRAVENOUS ONCE
Status: COMPLETED | OUTPATIENT
Start: 2017-03-20 | End: 2017-03-20

## 2017-03-20 ENCOUNTER — HOSPITAL ENCOUNTER (OUTPATIENT)
Dept: INFUSION CENTER | Facility: CLINIC | Age: 62
Discharge: HOME | End: 2017-03-20
Payer: MEDICARE

## 2017-03-20 VITALS
SYSTOLIC BLOOD PRESSURE: 120 MMHG | OXYGEN SATURATION: 93 % | TEMPERATURE: 98.5 F | DIASTOLIC BLOOD PRESSURE: 69 MMHG | HEART RATE: 69 BPM | RESPIRATION RATE: 12 BRPM

## 2017-03-20 LAB
ALBUMIN SERPL BCP-MCNC: 3.7 G/DL (ref 3.5–5)
ALP SERPL-CCNC: 99 U/L (ref 46–116)
ALT SERPL W P-5'-P-CCNC: 23 U/L (ref 12–78)
ANION GAP SERPL CALCULATED.3IONS-SCNC: 11 MMOL/L (ref 4–13)
AST SERPL W P-5'-P-CCNC: 18 U/L (ref 5–45)
BASOPHILS # BLD AUTO: 0.03 THOUSANDS/ΜL (ref 0–0.1)
BASOPHILS NFR BLD AUTO: 0 % (ref 0–1)
BILIRUB SERPL-MCNC: 0.2 MG/DL (ref 0.2–1)
BUN SERPL-MCNC: 13 MG/DL (ref 5–25)
CALCIUM SERPL-MCNC: 8.8 MG/DL (ref 8.3–10.1)
CHLORIDE SERPL-SCNC: 104 MMOL/L (ref 100–108)
CO2 SERPL-SCNC: 25 MMOL/L (ref 21–32)
CREAT SERPL-MCNC: 0.63 MG/DL (ref 0.6–1.3)
EOSINOPHIL # BLD AUTO: 0.21 THOUSAND/ΜL (ref 0–0.61)
EOSINOPHIL NFR BLD AUTO: 2 % (ref 0–6)
ERYTHROCYTE [DISTWIDTH] IN BLOOD BY AUTOMATED COUNT: 12.5 % (ref 11.6–15.1)
GFR SERPL CREATININE-BSD FRML MDRD: >60 ML/MIN/1.73SQ M
GLUCOSE SERPL-MCNC: 120 MG/DL (ref 65–140)
HCT VFR BLD AUTO: 38.9 % (ref 34.8–46.1)
HGB BLD-MCNC: 13.1 G/DL (ref 11.5–15.4)
LYMPHOCYTES # BLD AUTO: 2.89 THOUSANDS/ΜL (ref 0.6–4.47)
LYMPHOCYTES NFR BLD AUTO: 32 % (ref 14–44)
MCH RBC QN AUTO: 28.7 PG (ref 26.8–34.3)
MCHC RBC AUTO-ENTMCNC: 33.7 G/DL (ref 31.4–37.4)
MCV RBC AUTO: 85 FL (ref 82–98)
MONOCYTES # BLD AUTO: 0.59 THOUSAND/ΜL (ref 0.17–1.22)
MONOCYTES NFR BLD AUTO: 7 % (ref 4–12)
NEUTROPHILS # BLD AUTO: 5.22 THOUSANDS/ΜL (ref 1.85–7.62)
NEUTS SEG NFR BLD AUTO: 59 % (ref 43–75)
PLATELET # BLD AUTO: 333 THOUSANDS/UL (ref 149–390)
PMV BLD AUTO: 9.4 FL (ref 8.9–12.7)
POTASSIUM SERPL-SCNC: 4.3 MMOL/L (ref 3.5–5.3)
PROT SERPL-MCNC: 6.8 G/DL (ref 6.4–8.2)
RBC # BLD AUTO: 4.57 MILLION/UL (ref 3.81–5.12)
SODIUM SERPL-SCNC: 140 MMOL/L (ref 136–145)
WBC # BLD AUTO: 8.94 THOUSAND/UL (ref 4.31–10.16)

## 2017-03-20 PROCEDURE — 96413 CHEMO IV INFUSION 1 HR: CPT

## 2017-03-20 PROCEDURE — 85025 COMPLETE CBC W/AUTO DIFF WBC: CPT | Performed by: PSYCHIATRY & NEUROLOGY

## 2017-03-20 PROCEDURE — 96367 TX/PROPH/DG ADDL SEQ IV INF: CPT

## 2017-03-20 PROCEDURE — 80053 COMPREHEN METABOLIC PANEL: CPT | Performed by: PSYCHIATRY & NEUROLOGY

## 2017-03-20 RX ADMIN — SODIUM CHLORIDE 20 ML/HR: 0.9 INJECTION, SOLUTION INTRAVENOUS at 09:16

## 2017-03-20 RX ADMIN — DIPHENHYDRAMINE HYDROCHLORIDE 25 MG: 50 INJECTION, SOLUTION INTRAMUSCULAR; INTRAVENOUS at 09:17

## 2017-03-20 RX ADMIN — NATALIZUMAB 300 MG: 300 INJECTION INTRAVENOUS at 09:45

## 2017-03-20 RX ADMIN — ACETAMINOPHEN 650 MG: 325 TABLET ORAL at 09:16

## 2017-03-20 NOTE — PROGRESS NOTES
Pt tolerated tysabri infusion well without any complaints, pt aware of follow up appointments  Pt is been observed x 1 hr as ordered

## 2017-04-05 ENCOUNTER — ALLSCRIPTS OFFICE VISIT (OUTPATIENT)
Dept: OTHER | Facility: OTHER | Age: 62
End: 2017-04-05

## 2017-04-14 ENCOUNTER — APPOINTMENT (EMERGENCY)
Dept: RADIOLOGY | Facility: HOSPITAL | Age: 62
DRG: 059 | End: 2017-04-14
Payer: MEDICARE

## 2017-04-14 ENCOUNTER — HOSPITAL ENCOUNTER (INPATIENT)
Facility: HOSPITAL | Age: 62
LOS: 4 days | Discharge: HOME | DRG: 059 | End: 2017-04-20
Attending: EMERGENCY MEDICINE | Admitting: INTERNAL MEDICINE
Payer: MEDICARE

## 2017-04-14 DIAGNOSIS — R26.2 AMBULATORY DYSFUNCTION: ICD-10-CM

## 2017-04-14 DIAGNOSIS — G35 MULTIPLE SCLEROSIS (HCC): Primary | Chronic | ICD-10-CM

## 2017-04-14 DIAGNOSIS — R20.0 FACIAL NUMBNESS: ICD-10-CM

## 2017-04-14 LAB
ANION GAP SERPL CALCULATED.3IONS-SCNC: 9 MMOL/L (ref 4–13)
ATRIAL RATE: 69 BPM
BACTERIA UR QL AUTO: ABNORMAL /HPF
BASOPHILS # BLD AUTO: 0.03 THOUSANDS/ΜL (ref 0–0.1)
BASOPHILS NFR BLD AUTO: 0 % (ref 0–1)
BILIRUB UR QL STRIP: NEGATIVE
BUN SERPL-MCNC: 19 MG/DL (ref 5–25)
CALCIUM SERPL-MCNC: 9.1 MG/DL (ref 8.3–10.1)
CHLORIDE SERPL-SCNC: 103 MMOL/L (ref 100–108)
CLARITY UR: CLEAR
CO2 SERPL-SCNC: 25 MMOL/L (ref 21–32)
COLOR UR: YELLOW
CREAT SERPL-MCNC: 0.57 MG/DL (ref 0.6–1.3)
EOSINOPHIL # BLD AUTO: 0.18 THOUSAND/ΜL (ref 0–0.61)
EOSINOPHIL NFR BLD AUTO: 2 % (ref 0–6)
ERYTHROCYTE [DISTWIDTH] IN BLOOD BY AUTOMATED COUNT: 12.8 % (ref 11.6–15.1)
GFR SERPL CREATININE-BSD FRML MDRD: >60 ML/MIN/1.73SQ M
GLUCOSE SERPL-MCNC: 100 MG/DL (ref 65–140)
GLUCOSE UR STRIP-MCNC: NEGATIVE MG/DL
HCT VFR BLD AUTO: 41.6 % (ref 34.8–46.1)
HGB BLD-MCNC: 14.6 G/DL (ref 11.5–15.4)
HGB UR QL STRIP.AUTO: ABNORMAL
HYALINE CASTS #/AREA URNS LPF: ABNORMAL /LPF
KETONES UR STRIP-MCNC: NEGATIVE MG/DL
LEUKOCYTE ESTERASE UR QL STRIP: NEGATIVE
LYMPHOCYTES # BLD AUTO: 3.54 THOUSANDS/ΜL (ref 0.6–4.47)
LYMPHOCYTES NFR BLD AUTO: 32 % (ref 14–44)
MCH RBC QN AUTO: 29.8 PG (ref 26.8–34.3)
MCHC RBC AUTO-ENTMCNC: 35.1 G/DL (ref 31.4–37.4)
MCV RBC AUTO: 85 FL (ref 82–98)
MONOCYTES # BLD AUTO: 0.71 THOUSAND/ΜL (ref 0.17–1.22)
MONOCYTES NFR BLD AUTO: 6 % (ref 4–12)
NEUTROPHILS # BLD AUTO: 6.64 THOUSANDS/ΜL (ref 1.85–7.62)
NEUTS SEG NFR BLD AUTO: 60 % (ref 43–75)
NITRITE UR QL STRIP: NEGATIVE
NON-SQ EPI CELLS URNS QL MICRO: ABNORMAL /HPF
NRBC BLD AUTO-RTO: 0 /100 WBCS
P AXIS: 69 DEGREES
PH UR STRIP.AUTO: 6 [PH] (ref 4.5–8)
PLATELET # BLD AUTO: 322 THOUSANDS/UL (ref 149–390)
PMV BLD AUTO: 10.3 FL (ref 8.9–12.7)
POTASSIUM SERPL-SCNC: 3.6 MMOL/L (ref 3.5–5.3)
PR INTERVAL: 160 MS
PROT UR STRIP-MCNC: NEGATIVE MG/DL
QRS AXIS: -5 DEGREES
QRSD INTERVAL: 80 MS
QT INTERVAL: 382 MS
QTC INTERVAL: 409 MS
RBC # BLD AUTO: 4.9 MILLION/UL (ref 3.81–5.12)
RBC #/AREA URNS AUTO: ABNORMAL /HPF
SODIUM SERPL-SCNC: 137 MMOL/L (ref 136–145)
SP GR UR STRIP.AUTO: 1.01 (ref 1–1.03)
T WAVE AXIS: 51 DEGREES
UROBILINOGEN UR QL STRIP.AUTO: 0.2 E.U./DL
VENTRICULAR RATE: 69 BPM
WBC # BLD AUTO: 11.12 THOUSAND/UL (ref 4.31–10.16)
WBC #/AREA URNS AUTO: ABNORMAL /HPF

## 2017-04-14 PROCEDURE — 87086 URINE CULTURE/COLONY COUNT: CPT | Performed by: EMERGENCY MEDICINE

## 2017-04-14 PROCEDURE — 93005 ELECTROCARDIOGRAM TRACING: CPT

## 2017-04-14 PROCEDURE — 70496 CT ANGIOGRAPHY HEAD: CPT

## 2017-04-14 PROCEDURE — 36415 COLL VENOUS BLD VENIPUNCTURE: CPT | Performed by: EMERGENCY MEDICINE

## 2017-04-14 PROCEDURE — 85025 COMPLETE CBC W/AUTO DIFF WBC: CPT | Performed by: EMERGENCY MEDICINE

## 2017-04-14 PROCEDURE — 70498 CT ANGIOGRAPHY NECK: CPT

## 2017-04-14 PROCEDURE — 71020 HB CHEST X-RAY 2VW FRONTAL&LATL: CPT

## 2017-04-14 PROCEDURE — 80048 BASIC METABOLIC PNL TOTAL CA: CPT | Performed by: EMERGENCY MEDICINE

## 2017-04-14 PROCEDURE — 99285 EMERGENCY DEPT VISIT HI MDM: CPT

## 2017-04-14 PROCEDURE — 81001 URINALYSIS AUTO W/SCOPE: CPT | Performed by: EMERGENCY MEDICINE

## 2017-04-14 RX ADMIN — IOHEXOL 75 ML: 350 INJECTION, SOLUTION INTRAVENOUS at 21:29

## 2017-04-15 PROBLEM — R26.2 AMBULATORY DYSFUNCTION: Status: ACTIVE | Noted: 2017-04-15

## 2017-04-15 PROBLEM — R20.0 FACIAL NUMBNESS: Status: ACTIVE | Noted: 2017-04-15

## 2017-04-15 LAB
ANION GAP SERPL CALCULATED.3IONS-SCNC: 9 MMOL/L (ref 4–13)
BUN SERPL-MCNC: 15 MG/DL (ref 5–25)
CALCIUM SERPL-MCNC: 8.6 MG/DL (ref 8.3–10.1)
CHLORIDE SERPL-SCNC: 105 MMOL/L (ref 100–108)
CO2 SERPL-SCNC: 25 MMOL/L (ref 21–32)
CREAT SERPL-MCNC: 0.54 MG/DL (ref 0.6–1.3)
ERYTHROCYTE [DISTWIDTH] IN BLOOD BY AUTOMATED COUNT: 12.8 % (ref 11.6–15.1)
GFR SERPL CREATININE-BSD FRML MDRD: >60 ML/MIN/1.73SQ M
GLUCOSE SERPL-MCNC: 109 MG/DL (ref 65–140)
HCT VFR BLD AUTO: 38.5 % (ref 34.8–46.1)
HGB BLD-MCNC: 13.3 G/DL (ref 11.5–15.4)
MCH RBC QN AUTO: 29.2 PG (ref 26.8–34.3)
MCHC RBC AUTO-ENTMCNC: 34.5 G/DL (ref 31.4–37.4)
MCV RBC AUTO: 85 FL (ref 82–98)
PLATELET # BLD AUTO: 267 THOUSANDS/UL (ref 149–390)
PMV BLD AUTO: 9.4 FL (ref 8.9–12.7)
POTASSIUM SERPL-SCNC: 3.2 MMOL/L (ref 3.5–5.3)
RBC # BLD AUTO: 4.55 MILLION/UL (ref 3.81–5.12)
SODIUM SERPL-SCNC: 139 MMOL/L (ref 136–145)
WBC # BLD AUTO: 10.03 THOUSAND/UL (ref 4.31–10.16)

## 2017-04-15 PROCEDURE — 94760 N-INVAS EAR/PLS OXIMETRY 1: CPT

## 2017-04-15 PROCEDURE — 85027 COMPLETE CBC AUTOMATED: CPT | Performed by: HOSPITALIST

## 2017-04-15 PROCEDURE — 80048 BASIC METABOLIC PNL TOTAL CA: CPT | Performed by: HOSPITALIST

## 2017-04-15 RX ORDER — NICOTINE 21 MG/24HR
1 PATCH, TRANSDERMAL 24 HOURS TRANSDERMAL DAILY
Status: DISCONTINUED | OUTPATIENT
Start: 2017-04-15 | End: 2017-04-20 | Stop reason: HOSPADM

## 2017-04-15 RX ORDER — POLYETHYLENE GLYCOL 3350 17 G/17G
17 POWDER, FOR SOLUTION ORAL DAILY PRN
Status: DISCONTINUED | OUTPATIENT
Start: 2017-04-14 | End: 2017-04-20 | Stop reason: HOSPADM

## 2017-04-15 RX ORDER — EZETIMIBE 10 MG/1
10 TABLET ORAL DAILY
Status: DISCONTINUED | OUTPATIENT
Start: 2017-04-15 | End: 2017-04-20 | Stop reason: HOSPADM

## 2017-04-15 RX ORDER — SERTRALINE HYDROCHLORIDE 100 MG/1
100 TABLET, FILM COATED ORAL DAILY
Status: DISCONTINUED | OUTPATIENT
Start: 2017-04-15 | End: 2017-04-20 | Stop reason: HOSPADM

## 2017-04-15 RX ORDER — CHOLECALCIFEROL (VITAMIN D3) 10 MCG
1 TABLET ORAL DAILY
Status: DISCONTINUED | OUTPATIENT
Start: 2017-04-15 | End: 2017-04-20 | Stop reason: HOSPADM

## 2017-04-15 RX ORDER — SENNOSIDES 8.6 MG
1 TABLET ORAL DAILY
Status: DISCONTINUED | OUTPATIENT
Start: 2017-04-15 | End: 2017-04-20 | Stop reason: HOSPADM

## 2017-04-15 RX ORDER — ACETAMINOPHEN 325 MG/1
650 TABLET ORAL EVERY 6 HOURS PRN
Status: DISCONTINUED | OUTPATIENT
Start: 2017-04-14 | End: 2017-04-20 | Stop reason: HOSPADM

## 2017-04-15 RX ORDER — FLUTICASONE PROPIONATE 50 MCG
1 SPRAY, SUSPENSION (ML) NASAL
Status: DISCONTINUED | OUTPATIENT
Start: 2017-04-15 | End: 2017-04-20 | Stop reason: HOSPADM

## 2017-04-15 RX ORDER — B-COMPLEX WITH VITAMIN C
1 TABLET ORAL
Status: DISCONTINUED | OUTPATIENT
Start: 2017-04-15 | End: 2017-04-20 | Stop reason: HOSPADM

## 2017-04-15 RX ORDER — SODIUM CHLORIDE 9 MG/ML
75 INJECTION, SOLUTION INTRAVENOUS CONTINUOUS
Status: DISPENSED | OUTPATIENT
Start: 2017-04-15 | End: 2017-04-15

## 2017-04-15 RX ORDER — CHLORAL HYDRATE 500 MG
1000 CAPSULE ORAL DAILY
Status: DISCONTINUED | OUTPATIENT
Start: 2017-04-15 | End: 2017-04-20 | Stop reason: HOSPADM

## 2017-04-15 RX ORDER — GABAPENTIN 300 MG/1
300 CAPSULE ORAL
Status: DISCONTINUED | OUTPATIENT
Start: 2017-04-15 | End: 2017-04-20 | Stop reason: HOSPADM

## 2017-04-15 RX ORDER — BUDESONIDE AND FORMOTEROL FUMARATE DIHYDRATE 80; 4.5 UG/1; UG/1
2 AEROSOL RESPIRATORY (INHALATION) 2 TIMES DAILY
Status: DISCONTINUED | OUTPATIENT
Start: 2017-04-15 | End: 2017-04-20 | Stop reason: HOSPADM

## 2017-04-15 RX ORDER — LEVALBUTEROL 1.25 MG/.5ML
1.25 SOLUTION, CONCENTRATE RESPIRATORY (INHALATION) EVERY 6 HOURS PRN
Status: DISCONTINUED | OUTPATIENT
Start: 2017-04-15 | End: 2017-04-20 | Stop reason: HOSPADM

## 2017-04-15 RX ORDER — POTASSIUM CHLORIDE 20 MEQ/1
40 TABLET, EXTENDED RELEASE ORAL ONCE
Status: COMPLETED | OUTPATIENT
Start: 2017-04-15 | End: 2017-04-15

## 2017-04-15 RX ORDER — SODIUM CHLORIDE FOR INHALATION 0.9 %
3 VIAL, NEBULIZER (ML) INHALATION EVERY 6 HOURS PRN
Status: DISCONTINUED | OUTPATIENT
Start: 2017-04-15 | End: 2017-04-20 | Stop reason: HOSPADM

## 2017-04-15 RX ORDER — MONTELUKAST SODIUM 10 MG/1
10 TABLET ORAL
Status: DISCONTINUED | OUTPATIENT
Start: 2017-04-15 | End: 2017-04-20 | Stop reason: HOSPADM

## 2017-04-15 RX ORDER — ALBUTEROL SULFATE 90 UG/1
2 AEROSOL, METERED RESPIRATORY (INHALATION) EVERY 6 HOURS PRN
Status: DISCONTINUED | OUTPATIENT
Start: 2017-04-14 | End: 2017-04-20 | Stop reason: HOSPADM

## 2017-04-15 RX ORDER — ASPIRIN 81 MG/1
81 TABLET, CHEWABLE ORAL DAILY
Status: DISCONTINUED | OUTPATIENT
Start: 2017-04-15 | End: 2017-04-20 | Stop reason: HOSPADM

## 2017-04-15 RX ORDER — PANTOPRAZOLE SODIUM 20 MG/1
20 TABLET, DELAYED RELEASE ORAL
Status: DISCONTINUED | OUTPATIENT
Start: 2017-04-15 | End: 2017-04-20 | Stop reason: HOSPADM

## 2017-04-15 RX ORDER — ATORVASTATIN CALCIUM 40 MG/1
40 TABLET, FILM COATED ORAL DAILY
Status: DISCONTINUED | OUTPATIENT
Start: 2017-04-15 | End: 2017-04-20 | Stop reason: HOSPADM

## 2017-04-15 RX ORDER — MELATONIN
4000 DAILY
Status: DISCONTINUED | OUTPATIENT
Start: 2017-04-15 | End: 2017-04-20 | Stop reason: HOSPADM

## 2017-04-15 RX ADMIN — SENNOSIDES 8.6 MG: 8.6 TABLET, FILM COATED ORAL at 08:52

## 2017-04-15 RX ADMIN — SERTRALINE HYDROCHLORIDE 100 MG: 100 TABLET, FILM COATED ORAL at 08:52

## 2017-04-15 RX ADMIN — CALCIUM CARBONATE 500 MG (1,250 MG)-VITAMIN D3 200 UNIT TABLET 1 TABLET: at 08:52

## 2017-04-15 RX ADMIN — FLUTICASONE PROPIONATE 1 SPRAY: 50 SPRAY, METERED NASAL at 08:55

## 2017-04-15 RX ADMIN — MONTELUKAST SODIUM 10 MG: 10 TABLET, FILM COATED ORAL at 21:18

## 2017-04-15 RX ADMIN — EZETIMIBE 10 MG: 10 TABLET ORAL at 08:55

## 2017-04-15 RX ADMIN — ATORVASTATIN CALCIUM 40 MG: 40 TABLET, FILM COATED ORAL at 08:52

## 2017-04-15 RX ADMIN — ASPIRIN 81 MG: 81 TABLET, CHEWABLE ORAL at 08:52

## 2017-04-15 RX ADMIN — Medication 1 CAPSULE: at 08:56

## 2017-04-15 RX ADMIN — POTASSIUM CHLORIDE 40 MEQ: 1500 TABLET, EXTENDED RELEASE ORAL at 06:09

## 2017-04-15 RX ADMIN — ENOXAPARIN SODIUM 40 MG: 40 INJECTION SUBCUTANEOUS at 08:52

## 2017-04-15 RX ADMIN — BUDESONIDE AND FORMOTEROL FUMARATE DIHYDRATE 2 PUFF: 80; 4.5 AEROSOL RESPIRATORY (INHALATION) at 08:56

## 2017-04-15 RX ADMIN — BUDESONIDE AND FORMOTEROL FUMARATE DIHYDRATE 2 PUFF: 80; 4.5 AEROSOL RESPIRATORY (INHALATION) at 17:29

## 2017-04-15 RX ADMIN — VITAMIN D, TAB 1000IU (100/BT) 4000 UNITS: 25 TAB at 08:52

## 2017-04-15 RX ADMIN — GABAPENTIN 300 MG: 300 CAPSULE ORAL at 21:18

## 2017-04-15 RX ADMIN — PANTOPRAZOLE SODIUM 20 MG: 20 TABLET, DELAYED RELEASE ORAL at 06:09

## 2017-04-15 RX ADMIN — SODIUM CHLORIDE 75 ML/HR: 0.9 INJECTION, SOLUTION INTRAVENOUS at 00:35

## 2017-04-15 RX ADMIN — Medication 1000 MG: at 08:52

## 2017-04-16 LAB — BACTERIA UR CULT: NORMAL

## 2017-04-16 PROCEDURE — 97166 OT EVAL MOD COMPLEX 45 MIN: CPT

## 2017-04-16 PROCEDURE — G8979 MOBILITY GOAL STATUS: HCPCS | Performed by: PHYSICAL THERAPIST

## 2017-04-16 PROCEDURE — G8988 SELF CARE GOAL STATUS: HCPCS

## 2017-04-16 PROCEDURE — 97162 PT EVAL MOD COMPLEX 30 MIN: CPT | Performed by: PHYSICAL THERAPIST

## 2017-04-16 PROCEDURE — G8987 SELF CARE CURRENT STATUS: HCPCS

## 2017-04-16 PROCEDURE — 94760 N-INVAS EAR/PLS OXIMETRY 1: CPT

## 2017-04-16 PROCEDURE — G8978 MOBILITY CURRENT STATUS: HCPCS | Performed by: PHYSICAL THERAPIST

## 2017-04-16 RX ORDER — MECLIZINE HCL 12.5 MG/1
12.5 TABLET ORAL EVERY 8 HOURS PRN
Status: DISCONTINUED | OUTPATIENT
Start: 2017-04-16 | End: 2017-04-20 | Stop reason: HOSPADM

## 2017-04-16 RX ORDER — LORATADINE 10 MG/1
10 TABLET ORAL DAILY
Status: DISCONTINUED | OUTPATIENT
Start: 2017-04-16 | End: 2017-04-20 | Stop reason: HOSPADM

## 2017-04-16 RX ORDER — POTASSIUM CHLORIDE 20 MEQ/1
40 TABLET, EXTENDED RELEASE ORAL ONCE
Status: COMPLETED | OUTPATIENT
Start: 2017-04-16 | End: 2017-04-16

## 2017-04-16 RX ADMIN — LORATADINE 10 MG: 10 TABLET ORAL at 11:38

## 2017-04-16 RX ADMIN — BUDESONIDE AND FORMOTEROL FUMARATE DIHYDRATE 2 PUFF: 80; 4.5 AEROSOL RESPIRATORY (INHALATION) at 09:16

## 2017-04-16 RX ADMIN — FLUTICASONE PROPIONATE 1 SPRAY: 50 SPRAY, METERED NASAL at 09:16

## 2017-04-16 RX ADMIN — MONTELUKAST SODIUM 10 MG: 10 TABLET, FILM COATED ORAL at 21:55

## 2017-04-16 RX ADMIN — SODIUM CHLORIDE 1000 MG: 0.9 INJECTION, SOLUTION INTRAVENOUS at 11:35

## 2017-04-16 RX ADMIN — VITAMIN D, TAB 1000IU (100/BT) 4000 UNITS: 25 TAB at 09:17

## 2017-04-16 RX ADMIN — SERTRALINE HYDROCHLORIDE 100 MG: 100 TABLET, FILM COATED ORAL at 09:18

## 2017-04-16 RX ADMIN — SODIUM CHLORIDE 500 ML: 0.9 INJECTION, SOLUTION INTRAVENOUS at 11:50

## 2017-04-16 RX ADMIN — BUDESONIDE AND FORMOTEROL FUMARATE DIHYDRATE 2 PUFF: 80; 4.5 AEROSOL RESPIRATORY (INHALATION) at 17:14

## 2017-04-16 RX ADMIN — POTASSIUM CHLORIDE 40 MEQ: 1500 TABLET, EXTENDED RELEASE ORAL at 21:55

## 2017-04-16 RX ADMIN — MECLIZINE HCL 12.5 MG: 12.5 TABLET ORAL at 11:40

## 2017-04-16 RX ADMIN — EZETIMIBE 10 MG: 10 TABLET ORAL at 09:17

## 2017-04-16 RX ADMIN — ASPIRIN 81 MG: 81 TABLET, CHEWABLE ORAL at 09:17

## 2017-04-16 RX ADMIN — SENNOSIDES 8.6 MG: 8.6 TABLET, FILM COATED ORAL at 09:22

## 2017-04-16 RX ADMIN — ENOXAPARIN SODIUM 40 MG: 40 INJECTION SUBCUTANEOUS at 09:21

## 2017-04-16 RX ADMIN — Medication 1 CAPSULE: at 09:17

## 2017-04-16 RX ADMIN — CALCIUM CARBONATE 500 MG (1,250 MG)-VITAMIN D3 200 UNIT TABLET 1 TABLET: at 09:18

## 2017-04-16 RX ADMIN — Medication 1000 MG: at 09:17

## 2017-04-16 RX ADMIN — GABAPENTIN 300 MG: 300 CAPSULE ORAL at 21:55

## 2017-04-16 RX ADMIN — PANTOPRAZOLE SODIUM 20 MG: 20 TABLET, DELAYED RELEASE ORAL at 06:15

## 2017-04-16 RX ADMIN — ATORVASTATIN CALCIUM 40 MG: 40 TABLET, FILM COATED ORAL at 09:17

## 2017-04-16 RX ADMIN — ACETAMINOPHEN 650 MG: 325 TABLET, FILM COATED ORAL at 06:17

## 2017-04-17 ENCOUNTER — APPOINTMENT (INPATIENT)
Dept: PHYSICAL THERAPY | Facility: HOSPITAL | Age: 62
DRG: 059 | End: 2017-04-17
Payer: MEDICARE

## 2017-04-17 ENCOUNTER — APPOINTMENT (INPATIENT)
Dept: RADIOLOGY | Facility: HOSPITAL | Age: 62
DRG: 059 | End: 2017-04-17
Payer: MEDICARE

## 2017-04-17 PROBLEM — J44.9 COPD WITHOUT EXACERBATION (HCC): Chronic | Status: ACTIVE | Noted: 2017-04-17

## 2017-04-17 PROBLEM — R42 DISEQUILIBRIUM: Status: ACTIVE | Noted: 2017-04-17

## 2017-04-17 PROBLEM — E87.6 HYPOKALEMIA: Status: ACTIVE | Noted: 2017-04-17

## 2017-04-17 PROBLEM — G35 MULTIPLE SCLEROSIS (HCC): Status: ACTIVE | Noted: 2017-02-02

## 2017-04-17 LAB
ALBUMIN SERPL BCP-MCNC: 3.7 G/DL (ref 3.5–5)
ALP SERPL-CCNC: 89 U/L (ref 46–116)
ALT SERPL W P-5'-P-CCNC: 22 U/L (ref 12–78)
ANION GAP SERPL CALCULATED.3IONS-SCNC: 8 MMOL/L (ref 4–13)
AST SERPL W P-5'-P-CCNC: <5 U/L (ref 5–45)
BILIRUB SERPL-MCNC: 0.45 MG/DL (ref 0.2–1)
BUN SERPL-MCNC: 13 MG/DL (ref 5–25)
CALCIUM SERPL-MCNC: 9.1 MG/DL (ref 8.3–10.1)
CHLORIDE SERPL-SCNC: 107 MMOL/L (ref 100–108)
CO2 SERPL-SCNC: 25 MMOL/L (ref 21–32)
CREAT SERPL-MCNC: 0.66 MG/DL (ref 0.6–1.3)
ERYTHROCYTE [DISTWIDTH] IN BLOOD BY AUTOMATED COUNT: 12.7 % (ref 11.6–15.1)
GFR SERPL CREATININE-BSD FRML MDRD: >60 ML/MIN/1.73SQ M
GLUCOSE SERPL-MCNC: 151 MG/DL (ref 65–140)
HCT VFR BLD AUTO: 39.3 % (ref 34.8–46.1)
HGB BLD-MCNC: 14 G/DL (ref 11.5–15.4)
MCH RBC QN AUTO: 30 PG (ref 26.8–34.3)
MCHC RBC AUTO-ENTMCNC: 35.6 G/DL (ref 31.4–37.4)
MCV RBC AUTO: 84 FL (ref 82–98)
PLATELET # BLD AUTO: 285 THOUSANDS/UL (ref 149–390)
PMV BLD AUTO: 9.7 FL (ref 8.9–12.7)
POTASSIUM SERPL-SCNC: 4.4 MMOL/L (ref 3.5–5.3)
PROT SERPL-MCNC: 7.1 G/DL (ref 6.4–8.2)
RBC # BLD AUTO: 4.66 MILLION/UL (ref 3.81–5.12)
SODIUM SERPL-SCNC: 140 MMOL/L (ref 136–145)
WBC # BLD AUTO: 12.99 THOUSAND/UL (ref 4.31–10.16)

## 2017-04-17 PROCEDURE — 97110 THERAPEUTIC EXERCISES: CPT

## 2017-04-17 PROCEDURE — 72156 MRI NECK SPINE W/O & W/DYE: CPT

## 2017-04-17 PROCEDURE — 80053 COMPREHEN METABOLIC PANEL: CPT | Performed by: PHYSICIAN ASSISTANT

## 2017-04-17 PROCEDURE — 94760 N-INVAS EAR/PLS OXIMETRY 1: CPT

## 2017-04-17 PROCEDURE — 97116 GAIT TRAINING THERAPY: CPT

## 2017-04-17 PROCEDURE — 97530 THERAPEUTIC ACTIVITIES: CPT

## 2017-04-17 PROCEDURE — 97535 SELF CARE MNGMENT TRAINING: CPT

## 2017-04-17 PROCEDURE — 70553 MRI BRAIN STEM W/O & W/DYE: CPT

## 2017-04-17 PROCEDURE — A9585 GADOBUTROL INJECTION: HCPCS | Performed by: INTERNAL MEDICINE

## 2017-04-17 PROCEDURE — 85027 COMPLETE CBC AUTOMATED: CPT | Performed by: PHYSICIAN ASSISTANT

## 2017-04-17 RX ORDER — ONDANSETRON 2 MG/ML
INJECTION INTRAMUSCULAR; INTRAVENOUS
Status: COMPLETED
Start: 2017-04-17 | End: 2017-04-17

## 2017-04-17 RX ORDER — ONDANSETRON 2 MG/ML
4 INJECTION INTRAMUSCULAR; INTRAVENOUS ONCE
Status: COMPLETED | OUTPATIENT
Start: 2017-04-17 | End: 2017-04-17

## 2017-04-17 RX ADMIN — VITAMIN D, TAB 1000IU (100/BT) 4000 UNITS: 25 TAB at 08:42

## 2017-04-17 RX ADMIN — Medication 1 CAPSULE: at 08:43

## 2017-04-17 RX ADMIN — MONTELUKAST SODIUM 10 MG: 10 TABLET, FILM COATED ORAL at 22:02

## 2017-04-17 RX ADMIN — Medication 1000 MG: at 08:43

## 2017-04-17 RX ADMIN — BUDESONIDE AND FORMOTEROL FUMARATE DIHYDRATE 2 PUFF: 80; 4.5 AEROSOL RESPIRATORY (INHALATION) at 17:52

## 2017-04-17 RX ADMIN — ACETAMINOPHEN 650 MG: 325 TABLET, FILM COATED ORAL at 08:42

## 2017-04-17 RX ADMIN — ONDANSETRON 4 MG: 2 INJECTION INTRAMUSCULAR; INTRAVENOUS at 20:47

## 2017-04-17 RX ADMIN — GADOBUTROL 6 ML: 604.72 INJECTION INTRAVENOUS at 22:01

## 2017-04-17 RX ADMIN — ENOXAPARIN SODIUM 40 MG: 40 INJECTION SUBCUTANEOUS at 08:43

## 2017-04-17 RX ADMIN — FLUTICASONE PROPIONATE 1 SPRAY: 50 SPRAY, METERED NASAL at 08:43

## 2017-04-17 RX ADMIN — BUDESONIDE AND FORMOTEROL FUMARATE DIHYDRATE 2 PUFF: 80; 4.5 AEROSOL RESPIRATORY (INHALATION) at 08:43

## 2017-04-17 RX ADMIN — EZETIMIBE 10 MG: 10 TABLET ORAL at 08:43

## 2017-04-17 RX ADMIN — ACETAMINOPHEN 650 MG: 325 TABLET, FILM COATED ORAL at 23:57

## 2017-04-17 RX ADMIN — SERTRALINE HYDROCHLORIDE 100 MG: 100 TABLET, FILM COATED ORAL at 08:43

## 2017-04-17 RX ADMIN — PANTOPRAZOLE SODIUM 20 MG: 20 TABLET, DELAYED RELEASE ORAL at 04:58

## 2017-04-17 RX ADMIN — SODIUM CHLORIDE 1000 MG: 0.9 INJECTION, SOLUTION INTRAVENOUS at 08:48

## 2017-04-17 RX ADMIN — LORATADINE 10 MG: 10 TABLET ORAL at 08:45

## 2017-04-17 RX ADMIN — ASPIRIN 81 MG: 81 TABLET, CHEWABLE ORAL at 08:42

## 2017-04-17 RX ADMIN — ATORVASTATIN CALCIUM 40 MG: 40 TABLET, FILM COATED ORAL at 08:42

## 2017-04-17 RX ADMIN — GABAPENTIN 300 MG: 300 CAPSULE ORAL at 22:02

## 2017-04-17 RX ADMIN — CALCIUM CARBONATE 500 MG (1,250 MG)-VITAMIN D3 200 UNIT TABLET 1 TABLET: at 08:43

## 2017-04-18 LAB
ANION GAP SERPL CALCULATED.3IONS-SCNC: 11 MMOL/L (ref 4–13)
BUN SERPL-MCNC: 21 MG/DL (ref 5–25)
CALCIUM SERPL-MCNC: 8.6 MG/DL (ref 8.3–10.1)
CHLORIDE SERPL-SCNC: 107 MMOL/L (ref 100–108)
CO2 SERPL-SCNC: 24 MMOL/L (ref 21–32)
CREAT SERPL-MCNC: 0.73 MG/DL (ref 0.6–1.3)
GFR SERPL CREATININE-BSD FRML MDRD: >60 ML/MIN/1.73SQ M
GLUCOSE SERPL-MCNC: 136 MG/DL (ref 65–140)
POTASSIUM SERPL-SCNC: 4.1 MMOL/L (ref 3.5–5.3)
SODIUM SERPL-SCNC: 142 MMOL/L (ref 136–145)

## 2017-04-18 PROCEDURE — 97116 GAIT TRAINING THERAPY: CPT

## 2017-04-18 PROCEDURE — 80048 BASIC METABOLIC PNL TOTAL CA: CPT | Performed by: NURSE PRACTITIONER

## 2017-04-18 RX ADMIN — LORATADINE 10 MG: 10 TABLET ORAL at 07:46

## 2017-04-18 RX ADMIN — Medication 1 CAPSULE: at 07:47

## 2017-04-18 RX ADMIN — EZETIMIBE 10 MG: 10 TABLET ORAL at 07:47

## 2017-04-18 RX ADMIN — ATORVASTATIN CALCIUM 40 MG: 40 TABLET, FILM COATED ORAL at 07:47

## 2017-04-18 RX ADMIN — Medication 1000 MG: at 07:47

## 2017-04-18 RX ADMIN — MONTELUKAST SODIUM 10 MG: 10 TABLET, FILM COATED ORAL at 21:25

## 2017-04-18 RX ADMIN — CALCIUM CARBONATE 500 MG (1,250 MG)-VITAMIN D3 200 UNIT TABLET 1 TABLET: at 07:47

## 2017-04-18 RX ADMIN — FLUTICASONE PROPIONATE 1 SPRAY: 50 SPRAY, METERED NASAL at 07:45

## 2017-04-18 RX ADMIN — BUDESONIDE AND FORMOTEROL FUMARATE DIHYDRATE 2 PUFF: 80; 4.5 AEROSOL RESPIRATORY (INHALATION) at 07:44

## 2017-04-18 RX ADMIN — ASPIRIN 81 MG: 81 TABLET, CHEWABLE ORAL at 07:46

## 2017-04-18 RX ADMIN — PANTOPRAZOLE SODIUM 20 MG: 20 TABLET, DELAYED RELEASE ORAL at 05:22

## 2017-04-18 RX ADMIN — SENNOSIDES 8.6 MG: 8.6 TABLET, FILM COATED ORAL at 07:46

## 2017-04-18 RX ADMIN — VITAMIN D, TAB 1000IU (100/BT) 4000 UNITS: 25 TAB at 07:47

## 2017-04-18 RX ADMIN — ENOXAPARIN SODIUM 40 MG: 40 INJECTION SUBCUTANEOUS at 07:47

## 2017-04-18 RX ADMIN — GABAPENTIN 300 MG: 300 CAPSULE ORAL at 21:25

## 2017-04-18 RX ADMIN — SERTRALINE HYDROCHLORIDE 100 MG: 100 TABLET, FILM COATED ORAL at 07:47

## 2017-04-18 RX ADMIN — BUDESONIDE AND FORMOTEROL FUMARATE DIHYDRATE 2 PUFF: 80; 4.5 AEROSOL RESPIRATORY (INHALATION) at 17:22

## 2017-04-18 RX ADMIN — SODIUM CHLORIDE 1000 MG: 0.9 INJECTION, SOLUTION INTRAVENOUS at 08:53

## 2017-04-19 ENCOUNTER — APPOINTMENT (INPATIENT)
Dept: PHYSICAL THERAPY | Facility: HOSPITAL | Age: 62
DRG: 059 | End: 2017-04-19
Payer: MEDICARE

## 2017-04-19 ENCOUNTER — APPOINTMENT (INPATIENT)
Dept: OCCUPATIONAL THERAPY | Facility: HOSPITAL | Age: 62
DRG: 059 | End: 2017-04-19
Payer: MEDICARE

## 2017-04-19 PROCEDURE — 97116 GAIT TRAINING THERAPY: CPT

## 2017-04-19 PROCEDURE — 97535 SELF CARE MNGMENT TRAINING: CPT

## 2017-04-19 PROCEDURE — 94760 N-INVAS EAR/PLS OXIMETRY 1: CPT

## 2017-04-19 PROCEDURE — 97110 THERAPEUTIC EXERCISES: CPT

## 2017-04-19 PROCEDURE — 97530 THERAPEUTIC ACTIVITIES: CPT

## 2017-04-19 RX ADMIN — Medication 1 CAPSULE: at 07:36

## 2017-04-19 RX ADMIN — CALCIUM CARBONATE 500 MG (1,250 MG)-VITAMIN D3 200 UNIT TABLET 1 TABLET: at 07:36

## 2017-04-19 RX ADMIN — FLUTICASONE PROPIONATE 1 SPRAY: 50 SPRAY, METERED NASAL at 07:37

## 2017-04-19 RX ADMIN — ASPIRIN 81 MG: 81 TABLET, CHEWABLE ORAL at 07:36

## 2017-04-19 RX ADMIN — GABAPENTIN 300 MG: 300 CAPSULE ORAL at 21:17

## 2017-04-19 RX ADMIN — NYSTATIN 500000 UNITS: 100000 SUSPENSION ORAL at 21:17

## 2017-04-19 RX ADMIN — EZETIMIBE 10 MG: 10 TABLET ORAL at 07:36

## 2017-04-19 RX ADMIN — LORATADINE 10 MG: 10 TABLET ORAL at 07:37

## 2017-04-19 RX ADMIN — ENOXAPARIN SODIUM 40 MG: 40 INJECTION SUBCUTANEOUS at 07:37

## 2017-04-19 RX ADMIN — NYSTATIN 500000 UNITS: 100000 SUSPENSION ORAL at 17:19

## 2017-04-19 RX ADMIN — ATORVASTATIN CALCIUM 40 MG: 40 TABLET, FILM COATED ORAL at 07:36

## 2017-04-19 RX ADMIN — BUDESONIDE AND FORMOTEROL FUMARATE DIHYDRATE 2 PUFF: 80; 4.5 AEROSOL RESPIRATORY (INHALATION) at 17:19

## 2017-04-19 RX ADMIN — BUDESONIDE AND FORMOTEROL FUMARATE DIHYDRATE 2 PUFF: 80; 4.5 AEROSOL RESPIRATORY (INHALATION) at 07:37

## 2017-04-19 RX ADMIN — SERTRALINE HYDROCHLORIDE 100 MG: 100 TABLET, FILM COATED ORAL at 07:37

## 2017-04-19 RX ADMIN — Medication 1000 MG: at 07:36

## 2017-04-19 RX ADMIN — SODIUM CHLORIDE 1000 MG: 0.9 INJECTION, SOLUTION INTRAVENOUS at 08:06

## 2017-04-19 RX ADMIN — PANTOPRAZOLE SODIUM 20 MG: 20 TABLET, DELAYED RELEASE ORAL at 05:35

## 2017-04-19 RX ADMIN — MONTELUKAST SODIUM 10 MG: 10 TABLET, FILM COATED ORAL at 21:17

## 2017-04-19 RX ADMIN — VITAMIN D, TAB 1000IU (100/BT) 4000 UNITS: 25 TAB at 07:36

## 2017-04-20 VITALS
HEIGHT: 64 IN | DIASTOLIC BLOOD PRESSURE: 58 MMHG | WEIGHT: 148.81 LBS | TEMPERATURE: 98 F | HEART RATE: 60 BPM | SYSTOLIC BLOOD PRESSURE: 107 MMHG | OXYGEN SATURATION: 97 % | RESPIRATION RATE: 18 BRPM | BODY MASS INDEX: 25.41 KG/M2

## 2017-04-20 DIAGNOSIS — R53.83 OTHER FATIGUE: ICD-10-CM

## 2017-04-20 DIAGNOSIS — M79.672 PAIN OF LEFT FOOT: ICD-10-CM

## 2017-04-20 DIAGNOSIS — E55.9 VITAMIN D DEFICIENCY: ICD-10-CM

## 2017-04-20 DIAGNOSIS — E78.5 HYPERLIPIDEMIA: ICD-10-CM

## 2017-04-20 DIAGNOSIS — G35 MULTIPLE SCLEROSIS (HCC): ICD-10-CM

## 2017-04-20 DIAGNOSIS — M75.100 TEAR OF ROTATOR CUFF: ICD-10-CM

## 2017-04-20 RX ORDER — NICOTINE 21 MG/24HR
1 PATCH, TRANSDERMAL 24 HOURS TRANSDERMAL DAILY
Qty: 14 PATCH | Refills: 0 | Status: SHIPPED | OUTPATIENT
Start: 2017-04-20 | End: 2017-05-04

## 2017-04-20 RX ADMIN — LORATADINE 10 MG: 10 TABLET ORAL at 09:27

## 2017-04-20 RX ADMIN — FLUTICASONE PROPIONATE 1 SPRAY: 50 SPRAY, METERED NASAL at 09:28

## 2017-04-20 RX ADMIN — Medication 1 CAPSULE: at 09:27

## 2017-04-20 RX ADMIN — VITAMIN D, TAB 1000IU (100/BT) 4000 UNITS: 25 TAB at 09:27

## 2017-04-20 RX ADMIN — EZETIMIBE 10 MG: 10 TABLET ORAL at 09:28

## 2017-04-20 RX ADMIN — PANTOPRAZOLE SODIUM 20 MG: 20 TABLET, DELAYED RELEASE ORAL at 04:42

## 2017-04-20 RX ADMIN — SODIUM CHLORIDE 1000 MG: 0.9 INJECTION, SOLUTION INTRAVENOUS at 09:37

## 2017-04-20 RX ADMIN — ATORVASTATIN CALCIUM 40 MG: 40 TABLET, FILM COATED ORAL at 09:27

## 2017-04-20 RX ADMIN — CALCIUM CARBONATE 500 MG (1,250 MG)-VITAMIN D3 200 UNIT TABLET 1 TABLET: at 09:27

## 2017-04-20 RX ADMIN — SERTRALINE HYDROCHLORIDE 100 MG: 100 TABLET, FILM COATED ORAL at 09:27

## 2017-04-20 RX ADMIN — Medication 1000 MG: at 09:27

## 2017-04-20 RX ADMIN — BUDESONIDE AND FORMOTEROL FUMARATE DIHYDRATE 2 PUFF: 80; 4.5 AEROSOL RESPIRATORY (INHALATION) at 09:28

## 2017-04-20 RX ADMIN — ASPIRIN 81 MG: 81 TABLET, CHEWABLE ORAL at 09:27

## 2017-04-20 RX ADMIN — NYSTATIN 500000 UNITS: 100000 SUSPENSION ORAL at 09:27

## 2017-04-21 ENCOUNTER — HOSPITAL ENCOUNTER (OUTPATIENT)
Dept: INFUSION CENTER | Facility: CLINIC | Age: 62
End: 2017-04-21
Payer: MEDICARE

## 2017-04-24 ENCOUNTER — ALLSCRIPTS OFFICE VISIT (OUTPATIENT)
Dept: OTHER | Facility: OTHER | Age: 62
End: 2017-04-24

## 2017-04-24 ENCOUNTER — APPOINTMENT (OUTPATIENT)
Dept: LAB | Facility: CLINIC | Age: 62
End: 2017-04-24
Payer: MEDICARE

## 2017-04-24 DIAGNOSIS — M75.100 TEAR OF ROTATOR CUFF: ICD-10-CM

## 2017-04-24 DIAGNOSIS — E55.9 VITAMIN D DEFICIENCY: ICD-10-CM

## 2017-04-24 DIAGNOSIS — R53.83 OTHER FATIGUE: ICD-10-CM

## 2017-04-24 DIAGNOSIS — G35 MULTIPLE SCLEROSIS (HCC): ICD-10-CM

## 2017-04-24 DIAGNOSIS — E78.5 HYPERLIPIDEMIA: ICD-10-CM

## 2017-04-24 LAB
25(OH)D3 SERPL-MCNC: 21.1 NG/ML (ref 30–100)
ALBUMIN SERPL BCP-MCNC: 3.6 G/DL (ref 3.5–5)
ALP SERPL-CCNC: 76 U/L (ref 46–116)
ALT SERPL W P-5'-P-CCNC: 33 U/L (ref 12–78)
ANION GAP SERPL CALCULATED.3IONS-SCNC: 8 MMOL/L (ref 4–13)
AST SERPL W P-5'-P-CCNC: 7 U/L (ref 5–45)
BILIRUB SERPL-MCNC: 0.46 MG/DL (ref 0.2–1)
BUN SERPL-MCNC: 20 MG/DL (ref 5–25)
CALCIUM SERPL-MCNC: 8.7 MG/DL (ref 8.3–10.1)
CHLORIDE SERPL-SCNC: 102 MMOL/L (ref 100–108)
CHOLEST SERPL-MCNC: 175 MG/DL (ref 50–200)
CO2 SERPL-SCNC: 29 MMOL/L (ref 21–32)
CREAT SERPL-MCNC: 0.63 MG/DL (ref 0.6–1.3)
ERYTHROCYTE [DISTWIDTH] IN BLOOD BY AUTOMATED COUNT: 13.3 % (ref 11.6–15.1)
GFR SERPL CREATININE-BSD FRML MDRD: >60 ML/MIN/1.73SQ M
GLUCOSE P FAST SERPL-MCNC: 98 MG/DL (ref 65–99)
HCT VFR BLD AUTO: 40.5 % (ref 34.8–46.1)
HDLC SERPL-MCNC: 50 MG/DL (ref 40–60)
HGB BLD-MCNC: 13.8 G/DL (ref 11.5–15.4)
LDLC SERPL CALC-MCNC: 88 MG/DL (ref 0–100)
MCH RBC QN AUTO: 29.5 PG (ref 26.8–34.3)
MCHC RBC AUTO-ENTMCNC: 34.1 G/DL (ref 31.4–37.4)
MCV RBC AUTO: 87 FL (ref 82–98)
PLATELET # BLD AUTO: 291 THOUSANDS/UL (ref 149–390)
PMV BLD AUTO: 9.7 FL (ref 8.9–12.7)
POTASSIUM SERPL-SCNC: 4.1 MMOL/L (ref 3.5–5.3)
PROT SERPL-MCNC: 7 G/DL (ref 6.4–8.2)
RBC # BLD AUTO: 4.68 MILLION/UL (ref 3.81–5.12)
SODIUM SERPL-SCNC: 139 MMOL/L (ref 136–145)
TRIGL SERPL-MCNC: 185 MG/DL
TSH SERPL DL<=0.05 MIU/L-ACNC: 0.69 UIU/ML (ref 0.36–3.74)
WBC # BLD AUTO: 12.37 THOUSAND/UL (ref 4.31–10.16)

## 2017-04-24 PROCEDURE — 80061 LIPID PANEL: CPT

## 2017-04-24 PROCEDURE — 84443 ASSAY THYROID STIM HORMONE: CPT

## 2017-04-24 PROCEDURE — 80053 COMPREHEN METABOLIC PANEL: CPT

## 2017-04-24 PROCEDURE — 36415 COLL VENOUS BLD VENIPUNCTURE: CPT

## 2017-04-24 PROCEDURE — 85027 COMPLETE CBC AUTOMATED: CPT

## 2017-04-24 PROCEDURE — 82306 VITAMIN D 25 HYDROXY: CPT

## 2017-04-26 ENCOUNTER — APPOINTMENT (OUTPATIENT)
Dept: PHYSICAL THERAPY | Facility: CLINIC | Age: 62
End: 2017-04-26
Payer: MEDICARE

## 2017-04-26 ENCOUNTER — APPOINTMENT (OUTPATIENT)
Dept: OCCUPATIONAL THERAPY | Facility: CLINIC | Age: 62
End: 2017-04-26
Payer: MEDICARE

## 2017-04-26 ENCOUNTER — LAB CONVERSION - ENCOUNTER (OUTPATIENT)
Dept: OTHER | Facility: OTHER | Age: 62
End: 2017-04-26

## 2017-04-26 LAB
INDEX VALUE (HISTORICAL): 0.11
JCV ANTIBODY (HISTORICAL): NEGATIVE

## 2017-04-26 PROCEDURE — G8985 CARRY GOAL STATUS: HCPCS

## 2017-04-26 PROCEDURE — G8984 CARRY CURRENT STATUS: HCPCS

## 2017-04-26 PROCEDURE — G8978 MOBILITY CURRENT STATUS: HCPCS

## 2017-04-26 PROCEDURE — G8979 MOBILITY GOAL STATUS: HCPCS

## 2017-04-26 PROCEDURE — 97163 PT EVAL HIGH COMPLEX 45 MIN: CPT

## 2017-04-26 PROCEDURE — 97166 OT EVAL MOD COMPLEX 45 MIN: CPT

## 2017-04-27 ENCOUNTER — TRANSCRIBE ORDERS (OUTPATIENT)
Dept: ADMINISTRATIVE | Facility: HOSPITAL | Age: 62
End: 2017-04-27

## 2017-04-27 ENCOUNTER — ALLSCRIPTS OFFICE VISIT (OUTPATIENT)
Dept: OTHER | Facility: OTHER | Age: 62
End: 2017-04-27

## 2017-04-27 DIAGNOSIS — G35 MULTIPLE SCLEROSIS (HCC): Primary | ICD-10-CM

## 2017-04-28 RX ORDER — SODIUM CHLORIDE 9 MG/ML
20 INJECTION, SOLUTION INTRAVENOUS ONCE
Status: COMPLETED | OUTPATIENT
Start: 2017-05-01 | End: 2017-05-01

## 2017-04-28 RX ORDER — ACETAMINOPHEN 325 MG/1
650 TABLET ORAL ONCE
Status: COMPLETED | OUTPATIENT
Start: 2017-05-01 | End: 2017-05-01

## 2017-05-01 ENCOUNTER — HOSPITAL ENCOUNTER (OUTPATIENT)
Dept: INFUSION CENTER | Facility: CLINIC | Age: 62
Discharge: HOME | End: 2017-05-01
Payer: MEDICARE

## 2017-05-01 VITALS
RESPIRATION RATE: 18 BRPM | TEMPERATURE: 98.3 F | DIASTOLIC BLOOD PRESSURE: 64 MMHG | SYSTOLIC BLOOD PRESSURE: 112 MMHG | HEART RATE: 71 BPM

## 2017-05-01 LAB
ALBUMIN SERPL BCP-MCNC: 3.9 G/DL (ref 3.5–5)
ALP SERPL-CCNC: 79 U/L (ref 46–116)
ALT SERPL W P-5'-P-CCNC: 35 U/L (ref 12–78)
ANION GAP SERPL CALCULATED.3IONS-SCNC: 12 MMOL/L (ref 4–13)
AST SERPL W P-5'-P-CCNC: 20 U/L (ref 5–45)
BASOPHILS # BLD AUTO: 0.03 THOUSANDS/ΜL (ref 0–0.1)
BASOPHILS NFR BLD AUTO: 0 % (ref 0–1)
BILIRUB SERPL-MCNC: 0.3 MG/DL (ref 0.2–1)
BUN SERPL-MCNC: 16 MG/DL (ref 5–25)
CALCIUM SERPL-MCNC: 9 MG/DL (ref 8.3–10.1)
CHLORIDE SERPL-SCNC: 104 MMOL/L (ref 100–108)
CO2 SERPL-SCNC: 24 MMOL/L (ref 21–32)
CREAT SERPL-MCNC: 0.68 MG/DL (ref 0.6–1.3)
EOSINOPHIL # BLD AUTO: 0.21 THOUSAND/ΜL (ref 0–0.61)
EOSINOPHIL NFR BLD AUTO: 2 % (ref 0–6)
ERYTHROCYTE [DISTWIDTH] IN BLOOD BY AUTOMATED COUNT: 13.2 % (ref 11.6–15.1)
GFR SERPL CREATININE-BSD FRML MDRD: >60 ML/MIN/1.73SQ M
GLUCOSE SERPL-MCNC: 115 MG/DL (ref 65–140)
HCT VFR BLD AUTO: 39.5 % (ref 34.8–46.1)
HGB BLD-MCNC: 13.6 G/DL (ref 11.5–15.4)
LYMPHOCYTES # BLD AUTO: 3.05 THOUSANDS/ΜL (ref 0.6–4.47)
LYMPHOCYTES NFR BLD AUTO: 30 % (ref 14–44)
MCH RBC QN AUTO: 29.5 PG (ref 26.8–34.3)
MCHC RBC AUTO-ENTMCNC: 34.4 G/DL (ref 31.4–37.4)
MCV RBC AUTO: 86 FL (ref 82–98)
MONOCYTES # BLD AUTO: 0.67 THOUSAND/ΜL (ref 0.17–1.22)
MONOCYTES NFR BLD AUTO: 7 % (ref 4–12)
NEUTROPHILS # BLD AUTO: 6.15 THOUSANDS/ΜL (ref 1.85–7.62)
NEUTS SEG NFR BLD AUTO: 61 % (ref 43–75)
PLATELET # BLD AUTO: 235 THOUSANDS/UL (ref 149–390)
PMV BLD AUTO: 9.4 FL (ref 8.9–12.7)
POTASSIUM SERPL-SCNC: 3.9 MMOL/L (ref 3.5–5.3)
PROT SERPL-MCNC: 7.1 G/DL (ref 6.4–8.2)
RBC # BLD AUTO: 4.61 MILLION/UL (ref 3.81–5.12)
SODIUM SERPL-SCNC: 140 MMOL/L (ref 136–145)
WBC # BLD AUTO: 10.11 THOUSAND/UL (ref 4.31–10.16)

## 2017-05-01 PROCEDURE — 96413 CHEMO IV INFUSION 1 HR: CPT

## 2017-05-01 PROCEDURE — 85025 COMPLETE CBC W/AUTO DIFF WBC: CPT | Performed by: PSYCHIATRY & NEUROLOGY

## 2017-05-01 PROCEDURE — 96367 TX/PROPH/DG ADDL SEQ IV INF: CPT

## 2017-05-01 PROCEDURE — 80053 COMPREHEN METABOLIC PANEL: CPT | Performed by: PSYCHIATRY & NEUROLOGY

## 2017-05-01 RX ADMIN — DIPHENHYDRAMINE HYDROCHLORIDE 25 MG: 50 INJECTION, SOLUTION INTRAMUSCULAR; INTRAVENOUS at 09:57

## 2017-05-01 RX ADMIN — SODIUM CHLORIDE 20 ML/HR: 0.9 INJECTION, SOLUTION INTRAVENOUS at 09:35

## 2017-05-01 RX ADMIN — ACETAMINOPHEN 650 MG: 325 TABLET ORAL at 09:57

## 2017-05-01 RX ADMIN — NATALIZUMAB 300 MG: 300 INJECTION INTRAVENOUS at 10:25

## 2017-05-03 ENCOUNTER — APPOINTMENT (OUTPATIENT)
Dept: OCCUPATIONAL THERAPY | Facility: CLINIC | Age: 62
End: 2017-05-03
Payer: MEDICARE

## 2017-05-03 ENCOUNTER — GENERIC CONVERSION - ENCOUNTER (OUTPATIENT)
Dept: OTHER | Facility: OTHER | Age: 62
End: 2017-05-03

## 2017-05-03 ENCOUNTER — APPOINTMENT (OUTPATIENT)
Dept: SPEECH THERAPY | Facility: CLINIC | Age: 62
End: 2017-05-03
Payer: MEDICARE

## 2017-05-03 ENCOUNTER — APPOINTMENT (OUTPATIENT)
Dept: PHYSICAL THERAPY | Facility: CLINIC | Age: 62
End: 2017-05-03
Payer: MEDICARE

## 2017-05-03 PROCEDURE — G9163 LANG EXPRESS GOAL STATUS: HCPCS

## 2017-05-03 PROCEDURE — G9162 LANG EXPRESS CURRENT STATUS: HCPCS

## 2017-05-03 PROCEDURE — 97112 NEUROMUSCULAR REEDUCATION: CPT

## 2017-05-03 PROCEDURE — 96125 COGNITIVE TEST BY HC PRO: CPT

## 2017-05-03 PROCEDURE — 97110 THERAPEUTIC EXERCISES: CPT

## 2017-05-03 PROCEDURE — 97140 MANUAL THERAPY 1/> REGIONS: CPT

## 2017-05-03 PROCEDURE — 97535 SELF CARE MNGMENT TRAINING: CPT

## 2017-05-05 ENCOUNTER — APPOINTMENT (OUTPATIENT)
Dept: PHYSICAL THERAPY | Facility: CLINIC | Age: 62
End: 2017-05-05
Payer: MEDICARE

## 2017-05-05 ENCOUNTER — ALLSCRIPTS OFFICE VISIT (OUTPATIENT)
Dept: OTHER | Facility: OTHER | Age: 62
End: 2017-05-05

## 2017-05-05 ENCOUNTER — APPOINTMENT (OUTPATIENT)
Dept: OCCUPATIONAL THERAPY | Facility: CLINIC | Age: 62
End: 2017-05-05
Payer: MEDICARE

## 2017-05-05 PROCEDURE — 97112 NEUROMUSCULAR REEDUCATION: CPT

## 2017-05-05 PROCEDURE — 97110 THERAPEUTIC EXERCISES: CPT

## 2017-05-05 PROCEDURE — 97140 MANUAL THERAPY 1/> REGIONS: CPT

## 2017-05-09 ENCOUNTER — APPOINTMENT (OUTPATIENT)
Dept: PHYSICAL THERAPY | Facility: CLINIC | Age: 62
End: 2017-05-09
Payer: MEDICARE

## 2017-05-09 ENCOUNTER — APPOINTMENT (OUTPATIENT)
Dept: SPEECH THERAPY | Facility: CLINIC | Age: 62
End: 2017-05-09
Payer: MEDICARE

## 2017-05-09 ENCOUNTER — APPOINTMENT (OUTPATIENT)
Dept: OCCUPATIONAL THERAPY | Facility: CLINIC | Age: 62
End: 2017-05-09
Payer: MEDICARE

## 2017-05-09 PROCEDURE — 97112 NEUROMUSCULAR REEDUCATION: CPT

## 2017-05-09 PROCEDURE — 92507 TX SP LANG VOICE COMM INDIV: CPT

## 2017-05-09 PROCEDURE — 97535 SELF CARE MNGMENT TRAINING: CPT

## 2017-05-09 PROCEDURE — 97150 GROUP THERAPEUTIC PROCEDURES: CPT

## 2017-05-11 ENCOUNTER — APPOINTMENT (EMERGENCY)
Dept: ULTRASOUND IMAGING | Facility: HOSPITAL | Age: 62
End: 2017-05-11
Payer: MEDICARE

## 2017-05-11 ENCOUNTER — HOSPITAL ENCOUNTER (EMERGENCY)
Facility: HOSPITAL | Age: 62
Discharge: HOME | End: 2017-05-11
Attending: EMERGENCY MEDICINE
Payer: MEDICARE

## 2017-05-11 VITALS
DIASTOLIC BLOOD PRESSURE: 55 MMHG | HEART RATE: 80 BPM | SYSTOLIC BLOOD PRESSURE: 122 MMHG | BODY MASS INDEX: 26.71 KG/M2 | WEIGHT: 155.6 LBS | OXYGEN SATURATION: 97 % | RESPIRATION RATE: 18 BRPM | TEMPERATURE: 98.9 F

## 2017-05-11 DIAGNOSIS — M25.572 ACUTE LEFT ANKLE PAIN: Primary | ICD-10-CM

## 2017-05-11 DIAGNOSIS — M79.672 LEFT FOOT PAIN: ICD-10-CM

## 2017-05-11 PROCEDURE — 93971 EXTREMITY STUDY: CPT

## 2017-05-11 PROCEDURE — 99284 EMERGENCY DEPT VISIT MOD MDM: CPT

## 2017-05-11 RX ORDER — MORPHINE SULFATE 30 MG/1
15 TABLET ORAL EVERY 4 HOURS PRN
Qty: 10 TABLET | Refills: 0 | Status: SHIPPED | OUTPATIENT
Start: 2017-05-11 | End: 2017-07-28

## 2017-05-11 RX ORDER — PREDNISONE 20 MG/1
40 TABLET ORAL ONCE
Status: COMPLETED | OUTPATIENT
Start: 2017-05-11 | End: 2017-05-11

## 2017-05-11 RX ORDER — MORPHINE SULFATE 15 MG/1
30 TABLET ORAL ONCE
Status: COMPLETED | OUTPATIENT
Start: 2017-05-11 | End: 2017-05-11

## 2017-05-11 RX ORDER — PREDNISONE 20 MG/1
20 TABLET ORAL DAILY
Qty: 10 TABLET | Refills: 0 | Status: SHIPPED | OUTPATIENT
Start: 2017-05-11 | End: 2017-05-21

## 2017-05-11 RX ADMIN — PREDNISONE 40 MG: 20 TABLET ORAL at 15:47

## 2017-05-11 RX ADMIN — MORPHINE SULFATE 30 MG: 15 TABLET ORAL at 14:35

## 2017-05-12 ENCOUNTER — APPOINTMENT (OUTPATIENT)
Dept: PHYSICAL THERAPY | Facility: CLINIC | Age: 62
End: 2017-05-12
Payer: MEDICARE

## 2017-05-12 ENCOUNTER — APPOINTMENT (OUTPATIENT)
Dept: OCCUPATIONAL THERAPY | Facility: CLINIC | Age: 62
End: 2017-05-12
Payer: MEDICARE

## 2017-05-12 PROCEDURE — 97535 SELF CARE MNGMENT TRAINING: CPT

## 2017-05-12 PROCEDURE — 97112 NEUROMUSCULAR REEDUCATION: CPT

## 2017-05-12 PROCEDURE — 97150 GROUP THERAPEUTIC PROCEDURES: CPT

## 2017-05-15 ENCOUNTER — APPOINTMENT (OUTPATIENT)
Dept: SPEECH THERAPY | Facility: CLINIC | Age: 62
End: 2017-05-15
Payer: MEDICARE

## 2017-05-15 ENCOUNTER — APPOINTMENT (OUTPATIENT)
Dept: PHYSICAL THERAPY | Facility: CLINIC | Age: 62
End: 2017-05-15
Payer: MEDICARE

## 2017-05-15 ENCOUNTER — APPOINTMENT (OUTPATIENT)
Dept: OCCUPATIONAL THERAPY | Facility: CLINIC | Age: 62
End: 2017-05-15
Payer: MEDICARE

## 2017-05-15 PROCEDURE — 97140 MANUAL THERAPY 1/> REGIONS: CPT

## 2017-05-15 PROCEDURE — 92507 TX SP LANG VOICE COMM INDIV: CPT

## 2017-05-15 PROCEDURE — 97112 NEUROMUSCULAR REEDUCATION: CPT

## 2017-05-15 PROCEDURE — 97535 SELF CARE MNGMENT TRAINING: CPT

## 2017-05-15 PROCEDURE — 97150 GROUP THERAPEUTIC PROCEDURES: CPT

## 2017-05-17 ENCOUNTER — APPOINTMENT (OUTPATIENT)
Dept: SPEECH THERAPY | Facility: CLINIC | Age: 62
End: 2017-05-17
Payer: MEDICARE

## 2017-05-17 ENCOUNTER — APPOINTMENT (OUTPATIENT)
Dept: OCCUPATIONAL THERAPY | Facility: CLINIC | Age: 62
End: 2017-05-17
Payer: MEDICARE

## 2017-05-17 ENCOUNTER — APPOINTMENT (OUTPATIENT)
Dept: PHYSICAL THERAPY | Facility: CLINIC | Age: 62
End: 2017-05-17
Payer: MEDICARE

## 2017-05-17 PROCEDURE — 97112 NEUROMUSCULAR REEDUCATION: CPT

## 2017-05-17 PROCEDURE — 97535 SELF CARE MNGMENT TRAINING: CPT

## 2017-05-17 PROCEDURE — 92507 TX SP LANG VOICE COMM INDIV: CPT

## 2017-05-17 PROCEDURE — 97110 THERAPEUTIC EXERCISES: CPT

## 2017-05-18 ENCOUNTER — ALLSCRIPTS OFFICE VISIT (OUTPATIENT)
Dept: OTHER | Facility: OTHER | Age: 62
End: 2017-05-18

## 2017-05-24 ENCOUNTER — APPOINTMENT (OUTPATIENT)
Dept: PHYSICAL THERAPY | Facility: CLINIC | Age: 62
End: 2017-05-24
Payer: MEDICARE

## 2017-05-24 ENCOUNTER — APPOINTMENT (OUTPATIENT)
Dept: OCCUPATIONAL THERAPY | Facility: CLINIC | Age: 62
End: 2017-05-24
Payer: MEDICARE

## 2017-05-24 ENCOUNTER — GENERIC CONVERSION - ENCOUNTER (OUTPATIENT)
Dept: OTHER | Facility: OTHER | Age: 62
End: 2017-05-24

## 2017-05-24 ENCOUNTER — APPOINTMENT (OUTPATIENT)
Dept: SPEECH THERAPY | Facility: CLINIC | Age: 62
End: 2017-05-24
Payer: MEDICARE

## 2017-05-24 PROCEDURE — G8984 CARRY CURRENT STATUS: HCPCS

## 2017-05-24 PROCEDURE — 97014 ELECTRIC STIMULATION THERAPY: CPT

## 2017-05-24 PROCEDURE — 97110 THERAPEUTIC EXERCISES: CPT

## 2017-05-24 PROCEDURE — 97112 NEUROMUSCULAR REEDUCATION: CPT

## 2017-05-24 PROCEDURE — 97535 SELF CARE MNGMENT TRAINING: CPT

## 2017-05-24 PROCEDURE — G8985 CARRY GOAL STATUS: HCPCS

## 2017-05-24 PROCEDURE — 92507 TX SP LANG VOICE COMM INDIV: CPT

## 2017-05-26 ENCOUNTER — GENERIC CONVERSION - ENCOUNTER (OUTPATIENT)
Dept: OTHER | Facility: OTHER | Age: 62
End: 2017-05-26

## 2017-05-26 ENCOUNTER — APPOINTMENT (OUTPATIENT)
Dept: OCCUPATIONAL THERAPY | Facility: CLINIC | Age: 62
End: 2017-05-26
Payer: MEDICARE

## 2017-05-26 ENCOUNTER — APPOINTMENT (OUTPATIENT)
Dept: PHYSICAL THERAPY | Facility: CLINIC | Age: 62
End: 2017-05-26
Payer: MEDICARE

## 2017-05-26 ENCOUNTER — APPOINTMENT (OUTPATIENT)
Dept: SPEECH THERAPY | Facility: CLINIC | Age: 62
End: 2017-05-26
Payer: MEDICARE

## 2017-05-26 PROCEDURE — 97535 SELF CARE MNGMENT TRAINING: CPT

## 2017-05-26 PROCEDURE — 97110 THERAPEUTIC EXERCISES: CPT

## 2017-05-26 PROCEDURE — 97116 GAIT TRAINING THERAPY: CPT

## 2017-05-26 PROCEDURE — 97112 NEUROMUSCULAR REEDUCATION: CPT

## 2017-05-26 PROCEDURE — 97140 MANUAL THERAPY 1/> REGIONS: CPT

## 2017-05-26 PROCEDURE — 92507 TX SP LANG VOICE COMM INDIV: CPT

## 2017-06-01 ENCOUNTER — APPOINTMENT (OUTPATIENT)
Dept: SPEECH THERAPY | Facility: CLINIC | Age: 62
End: 2017-06-01
Payer: MEDICARE

## 2017-06-01 ENCOUNTER — APPOINTMENT (OUTPATIENT)
Dept: PHYSICAL THERAPY | Facility: CLINIC | Age: 62
End: 2017-06-01
Payer: MEDICARE

## 2017-06-01 ENCOUNTER — APPOINTMENT (OUTPATIENT)
Dept: OCCUPATIONAL THERAPY | Facility: CLINIC | Age: 62
End: 2017-06-01
Payer: MEDICARE

## 2017-06-01 PROCEDURE — 97140 MANUAL THERAPY 1/> REGIONS: CPT

## 2017-06-01 PROCEDURE — 97112 NEUROMUSCULAR REEDUCATION: CPT

## 2017-06-01 PROCEDURE — 92507 TX SP LANG VOICE COMM INDIV: CPT

## 2017-06-01 PROCEDURE — 97110 THERAPEUTIC EXERCISES: CPT

## 2017-06-01 RX ORDER — SODIUM CHLORIDE 9 MG/ML
20 INJECTION, SOLUTION INTRAVENOUS CONTINUOUS
Status: DISCONTINUED | OUTPATIENT
Start: 2017-06-02 | End: 2017-06-05 | Stop reason: HOSPADM

## 2017-06-01 RX ORDER — ACETAMINOPHEN 325 MG/1
650 TABLET ORAL ONCE
Status: COMPLETED | OUTPATIENT
Start: 2017-06-02 | End: 2017-06-02

## 2017-06-02 ENCOUNTER — HOSPITAL ENCOUNTER (OUTPATIENT)
Dept: INFUSION CENTER | Facility: CLINIC | Age: 62
Discharge: HOME | End: 2017-06-02
Payer: MEDICARE

## 2017-06-02 VITALS
TEMPERATURE: 97.5 F | SYSTOLIC BLOOD PRESSURE: 102 MMHG | DIASTOLIC BLOOD PRESSURE: 64 MMHG | HEART RATE: 68 BPM | RESPIRATION RATE: 18 BRPM | OXYGEN SATURATION: 96 %

## 2017-06-02 LAB
ALBUMIN SERPL BCP-MCNC: 4 G/DL (ref 3.5–5)
ALP SERPL-CCNC: 94 U/L (ref 46–116)
ALT SERPL W P-5'-P-CCNC: 38 U/L (ref 12–78)
ANION GAP SERPL CALCULATED.3IONS-SCNC: 9 MMOL/L (ref 4–13)
AST SERPL W P-5'-P-CCNC: 17 U/L (ref 5–45)
BASOPHILS # BLD AUTO: 0.05 THOUSANDS/ΜL (ref 0–0.1)
BASOPHILS NFR BLD AUTO: 1 % (ref 0–1)
BILIRUB SERPL-MCNC: 0.4 MG/DL (ref 0.2–1)
BUN SERPL-MCNC: 15 MG/DL (ref 5–25)
CALCIUM SERPL-MCNC: 9 MG/DL (ref 8.3–10.1)
CHLORIDE SERPL-SCNC: 105 MMOL/L (ref 100–108)
CO2 SERPL-SCNC: 25 MMOL/L (ref 21–32)
CREAT SERPL-MCNC: 0.65 MG/DL (ref 0.6–1.3)
EOSINOPHIL # BLD AUTO: 0.21 THOUSAND/ΜL (ref 0–0.61)
EOSINOPHIL NFR BLD AUTO: 3 % (ref 0–6)
ERYTHROCYTE [DISTWIDTH] IN BLOOD BY AUTOMATED COUNT: 13 % (ref 11.6–15.1)
GFR SERPL CREATININE-BSD FRML MDRD: >60 ML/MIN/1.73SQ M
GLUCOSE SERPL-MCNC: 106 MG/DL (ref 65–140)
HCT VFR BLD AUTO: 40.6 % (ref 34.8–46.1)
HGB BLD-MCNC: 14.1 G/DL (ref 11.5–15.4)
LYMPHOCYTES # BLD AUTO: 2.63 THOUSANDS/ΜL (ref 0.6–4.47)
LYMPHOCYTES NFR BLD AUTO: 32 % (ref 14–44)
MCH RBC QN AUTO: 29.9 PG (ref 26.8–34.3)
MCHC RBC AUTO-ENTMCNC: 34.7 G/DL (ref 31.4–37.4)
MCV RBC AUTO: 86 FL (ref 82–98)
MONOCYTES # BLD AUTO: 0.77 THOUSAND/ΜL (ref 0.17–1.22)
MONOCYTES NFR BLD AUTO: 9 % (ref 4–12)
NEUTROPHILS # BLD AUTO: 4.61 THOUSANDS/ΜL (ref 1.85–7.62)
NEUTS SEG NFR BLD AUTO: 55 % (ref 43–75)
PLATELET # BLD AUTO: 248 THOUSANDS/UL (ref 149–390)
PMV BLD AUTO: 9.7 FL (ref 8.9–12.7)
POTASSIUM SERPL-SCNC: 4 MMOL/L (ref 3.5–5.3)
PROT SERPL-MCNC: 7.2 G/DL (ref 6.4–8.2)
RBC # BLD AUTO: 4.71 MILLION/UL (ref 3.81–5.12)
SODIUM SERPL-SCNC: 139 MMOL/L (ref 136–145)
WBC # BLD AUTO: 8.27 THOUSAND/UL (ref 4.31–10.16)

## 2017-06-02 PROCEDURE — 85025 COMPLETE CBC W/AUTO DIFF WBC: CPT | Performed by: PSYCHIATRY & NEUROLOGY

## 2017-06-02 PROCEDURE — 96367 TX/PROPH/DG ADDL SEQ IV INF: CPT

## 2017-06-02 PROCEDURE — 80053 COMPREHEN METABOLIC PANEL: CPT | Performed by: PSYCHIATRY & NEUROLOGY

## 2017-06-02 PROCEDURE — 96413 CHEMO IV INFUSION 1 HR: CPT

## 2017-06-02 RX ADMIN — NATALIZUMAB 300 MG: 300 INJECTION INTRAVENOUS at 09:08

## 2017-06-02 RX ADMIN — DIPHENHYDRAMINE HYDROCHLORIDE 25 MG: 50 INJECTION, SOLUTION INTRAMUSCULAR; INTRAVENOUS at 08:46

## 2017-06-02 RX ADMIN — ACETAMINOPHEN 650 MG: 325 TABLET, FILM COATED ORAL at 08:44

## 2017-06-02 RX ADMIN — SODIUM CHLORIDE 20 ML/HR: 0.9 INJECTION, SOLUTION INTRAVENOUS at 08:30

## 2017-06-05 ENCOUNTER — APPOINTMENT (OUTPATIENT)
Dept: PHYSICAL THERAPY | Facility: CLINIC | Age: 62
End: 2017-06-05
Payer: MEDICARE

## 2017-06-05 ENCOUNTER — APPOINTMENT (OUTPATIENT)
Dept: OCCUPATIONAL THERAPY | Facility: CLINIC | Age: 62
End: 2017-06-05
Payer: MEDICARE

## 2017-06-05 ENCOUNTER — APPOINTMENT (OUTPATIENT)
Dept: SPEECH THERAPY | Facility: CLINIC | Age: 62
End: 2017-06-05
Payer: MEDICARE

## 2017-06-05 PROCEDURE — 97140 MANUAL THERAPY 1/> REGIONS: CPT

## 2017-06-05 PROCEDURE — G9163 LANG EXPRESS GOAL STATUS: HCPCS

## 2017-06-05 PROCEDURE — 97112 NEUROMUSCULAR REEDUCATION: CPT

## 2017-06-05 PROCEDURE — G9162 LANG EXPRESS CURRENT STATUS: HCPCS

## 2017-06-05 PROCEDURE — 92507 TX SP LANG VOICE COMM INDIV: CPT

## 2017-06-05 PROCEDURE — 97110 THERAPEUTIC EXERCISES: CPT

## 2017-06-05 PROCEDURE — 97535 SELF CARE MNGMENT TRAINING: CPT

## 2017-06-07 ENCOUNTER — APPOINTMENT (OUTPATIENT)
Dept: SPEECH THERAPY | Facility: CLINIC | Age: 62
End: 2017-06-07
Payer: MEDICARE

## 2017-06-07 ENCOUNTER — APPOINTMENT (OUTPATIENT)
Dept: OCCUPATIONAL THERAPY | Facility: CLINIC | Age: 62
End: 2017-06-07
Payer: MEDICARE

## 2017-06-07 ENCOUNTER — APPOINTMENT (OUTPATIENT)
Dept: PHYSICAL THERAPY | Facility: CLINIC | Age: 62
End: 2017-06-07
Payer: MEDICARE

## 2017-06-07 PROCEDURE — 97110 THERAPEUTIC EXERCISES: CPT

## 2017-06-07 PROCEDURE — 97535 SELF CARE MNGMENT TRAINING: CPT

## 2017-06-07 PROCEDURE — 97112 NEUROMUSCULAR REEDUCATION: CPT

## 2017-06-07 PROCEDURE — 92507 TX SP LANG VOICE COMM INDIV: CPT

## 2017-06-12 ENCOUNTER — APPOINTMENT (OUTPATIENT)
Dept: OCCUPATIONAL THERAPY | Facility: CLINIC | Age: 62
End: 2017-06-12
Payer: MEDICARE

## 2017-06-12 ENCOUNTER — APPOINTMENT (OUTPATIENT)
Dept: PHYSICAL THERAPY | Facility: CLINIC | Age: 62
End: 2017-06-12
Payer: MEDICARE

## 2017-06-12 ENCOUNTER — APPOINTMENT (OUTPATIENT)
Dept: SPEECH THERAPY | Facility: CLINIC | Age: 62
End: 2017-06-12
Payer: MEDICARE

## 2017-06-14 ENCOUNTER — APPOINTMENT (OUTPATIENT)
Dept: SPEECH THERAPY | Facility: CLINIC | Age: 62
End: 2017-06-14
Payer: MEDICARE

## 2017-06-14 ENCOUNTER — APPOINTMENT (OUTPATIENT)
Dept: OCCUPATIONAL THERAPY | Facility: CLINIC | Age: 62
End: 2017-06-14
Payer: MEDICARE

## 2017-06-14 ENCOUNTER — APPOINTMENT (OUTPATIENT)
Dept: PHYSICAL THERAPY | Facility: CLINIC | Age: 62
End: 2017-06-14
Payer: MEDICARE

## 2017-06-19 ENCOUNTER — APPOINTMENT (OUTPATIENT)
Dept: SPEECH THERAPY | Facility: CLINIC | Age: 62
End: 2017-06-19
Payer: MEDICARE

## 2017-06-19 ENCOUNTER — APPOINTMENT (OUTPATIENT)
Dept: PHYSICAL THERAPY | Facility: CLINIC | Age: 62
End: 2017-06-19
Payer: MEDICARE

## 2017-06-19 ENCOUNTER — APPOINTMENT (OUTPATIENT)
Dept: OCCUPATIONAL THERAPY | Facility: CLINIC | Age: 62
End: 2017-06-19
Payer: MEDICARE

## 2017-06-21 ENCOUNTER — APPOINTMENT (OUTPATIENT)
Dept: PHYSICAL THERAPY | Facility: CLINIC | Age: 62
End: 2017-06-21
Payer: MEDICARE

## 2017-06-21 ENCOUNTER — APPOINTMENT (OUTPATIENT)
Dept: SPEECH THERAPY | Facility: CLINIC | Age: 62
End: 2017-06-21
Payer: MEDICARE

## 2017-06-21 ENCOUNTER — APPOINTMENT (OUTPATIENT)
Dept: OCCUPATIONAL THERAPY | Facility: CLINIC | Age: 62
End: 2017-06-21
Payer: MEDICARE

## 2017-06-21 PROCEDURE — 92507 TX SP LANG VOICE COMM INDIV: CPT

## 2017-06-21 PROCEDURE — 97112 NEUROMUSCULAR REEDUCATION: CPT

## 2017-06-21 PROCEDURE — 97110 THERAPEUTIC EXERCISES: CPT

## 2017-06-21 PROCEDURE — 97535 SELF CARE MNGMENT TRAINING: CPT

## 2017-06-26 ENCOUNTER — APPOINTMENT (OUTPATIENT)
Dept: PHYSICAL THERAPY | Facility: CLINIC | Age: 62
End: 2017-06-26
Payer: MEDICARE

## 2017-06-26 ENCOUNTER — APPOINTMENT (OUTPATIENT)
Dept: SPEECH THERAPY | Facility: CLINIC | Age: 62
End: 2017-06-26
Payer: MEDICARE

## 2017-06-26 ENCOUNTER — APPOINTMENT (OUTPATIENT)
Dept: OCCUPATIONAL THERAPY | Facility: CLINIC | Age: 62
End: 2017-06-26
Payer: MEDICARE

## 2017-06-26 PROCEDURE — G8984 CARRY CURRENT STATUS: HCPCS

## 2017-06-26 PROCEDURE — 97110 THERAPEUTIC EXERCISES: CPT

## 2017-06-26 PROCEDURE — 97535 SELF CARE MNGMENT TRAINING: CPT

## 2017-06-26 PROCEDURE — G8985 CARRY GOAL STATUS: HCPCS

## 2017-06-26 PROCEDURE — 97112 NEUROMUSCULAR REEDUCATION: CPT

## 2017-06-26 PROCEDURE — 92507 TX SP LANG VOICE COMM INDIV: CPT

## 2017-06-27 ENCOUNTER — GENERIC CONVERSION - ENCOUNTER (OUTPATIENT)
Dept: OTHER | Facility: OTHER | Age: 62
End: 2017-06-27

## 2017-06-28 ENCOUNTER — APPOINTMENT (OUTPATIENT)
Dept: SPEECH THERAPY | Facility: CLINIC | Age: 62
End: 2017-06-28
Payer: MEDICARE

## 2017-06-28 ENCOUNTER — APPOINTMENT (OUTPATIENT)
Dept: PHYSICAL THERAPY | Facility: CLINIC | Age: 62
End: 2017-06-28
Payer: MEDICARE

## 2017-06-28 ENCOUNTER — APPOINTMENT (OUTPATIENT)
Dept: OCCUPATIONAL THERAPY | Facility: CLINIC | Age: 62
End: 2017-06-28
Payer: MEDICARE

## 2017-06-28 ENCOUNTER — ALLSCRIPTS OFFICE VISIT (OUTPATIENT)
Dept: OTHER | Facility: OTHER | Age: 62
End: 2017-06-28

## 2017-06-29 RX ORDER — SODIUM CHLORIDE 9 MG/ML
20 INJECTION, SOLUTION INTRAVENOUS CONTINUOUS
Status: DISCONTINUED | OUTPATIENT
Start: 2017-06-30 | End: 2017-07-03 | Stop reason: HOSPADM

## 2017-06-29 RX ORDER — ACETAMINOPHEN 325 MG/1
650 TABLET ORAL ONCE
Status: COMPLETED | OUTPATIENT
Start: 2017-06-30 | End: 2017-06-30

## 2017-06-30 ENCOUNTER — LAB REQUISITION (OUTPATIENT)
Dept: LAB | Facility: HOSPITAL | Age: 62
End: 2017-06-30
Payer: MEDICARE

## 2017-06-30 ENCOUNTER — HOSPITAL ENCOUNTER (OUTPATIENT)
Dept: INFUSION CENTER | Facility: CLINIC | Age: 62
Discharge: HOME | End: 2017-06-30
Payer: MEDICARE

## 2017-06-30 VITALS
TEMPERATURE: 98.3 F | DIASTOLIC BLOOD PRESSURE: 77 MMHG | SYSTOLIC BLOOD PRESSURE: 114 MMHG | HEART RATE: 64 BPM | RESPIRATION RATE: 18 BRPM

## 2017-06-30 DIAGNOSIS — M79.672 PAIN OF LEFT FOOT: ICD-10-CM

## 2017-06-30 LAB
ALBUMIN SERPL BCP-MCNC: 3.9 G/DL (ref 3.5–5)
ALP SERPL-CCNC: 89 U/L (ref 46–116)
ALT SERPL W P-5'-P-CCNC: 30 U/L (ref 12–78)
ANION GAP SERPL CALCULATED.3IONS-SCNC: 9 MMOL/L (ref 4–13)
AST SERPL W P-5'-P-CCNC: 31 U/L (ref 5–45)
BASOPHILS # BLD AUTO: 0.05 THOUSANDS/ΜL (ref 0–0.1)
BASOPHILS NFR BLD AUTO: 1 % (ref 0–1)
BILIRUB SERPL-MCNC: 0.5 MG/DL (ref 0.2–1)
BUN SERPL-MCNC: 15 MG/DL (ref 5–25)
CALCIUM SERPL-MCNC: 8.9 MG/DL (ref 8.3–10.1)
CHLORIDE SERPL-SCNC: 103 MMOL/L (ref 100–108)
CO2 SERPL-SCNC: 26 MMOL/L (ref 21–32)
CREAT SERPL-MCNC: 0.55 MG/DL (ref 0.6–1.3)
EOSINOPHIL # BLD AUTO: 0.19 THOUSAND/ΜL (ref 0–0.61)
EOSINOPHIL NFR BLD AUTO: 2 % (ref 0–6)
ERYTHROCYTE [DISTWIDTH] IN BLOOD BY AUTOMATED COUNT: 12.9 % (ref 11.6–15.1)
GFR SERPL CREATININE-BSD FRML MDRD: >60 ML/MIN/1.73SQ M
GLUCOSE P FAST SERPL-MCNC: 100 MG/DL (ref 65–99)
GLUCOSE SERPL-MCNC: 100 MG/DL (ref 65–140)
HCT VFR BLD AUTO: 41.1 % (ref 34.8–46.1)
HGB BLD-MCNC: 13.7 G/DL (ref 11.5–15.4)
LYMPHOCYTES # BLD AUTO: 4.36 THOUSANDS/ΜL (ref 0.6–4.47)
LYMPHOCYTES NFR BLD AUTO: 40 % (ref 14–44)
MCH RBC QN AUTO: 28.8 PG (ref 26.8–34.3)
MCHC RBC AUTO-ENTMCNC: 33.3 G/DL (ref 31.4–37.4)
MCV RBC AUTO: 86 FL (ref 82–98)
MONOCYTES # BLD AUTO: 0.72 THOUSAND/ΜL (ref 0.17–1.22)
MONOCYTES NFR BLD AUTO: 7 % (ref 4–12)
NEUTROPHILS # BLD AUTO: 5.69 THOUSANDS/ΜL (ref 1.85–7.62)
NEUTS SEG NFR BLD AUTO: 50 % (ref 43–75)
PLATELET # BLD AUTO: 315 THOUSANDS/UL (ref 149–390)
PMV BLD AUTO: 9.9 FL (ref 8.9–12.7)
POTASSIUM SERPL-SCNC: 4.9 MMOL/L (ref 3.5–5.3)
PROT SERPL-MCNC: 7.4 G/DL (ref 6.4–8.2)
RBC # BLD AUTO: 4.76 MILLION/UL (ref 3.81–5.12)
SODIUM SERPL-SCNC: 138 MMOL/L (ref 136–145)
URATE SERPL-MCNC: 4.8 MG/DL (ref 2–6.8)
WBC # BLD AUTO: 11.01 THOUSAND/UL (ref 4.31–10.16)

## 2017-06-30 PROCEDURE — 96413 CHEMO IV INFUSION 1 HR: CPT

## 2017-06-30 PROCEDURE — 80053 COMPREHEN METABOLIC PANEL: CPT | Performed by: PSYCHIATRY & NEUROLOGY

## 2017-06-30 PROCEDURE — 84550 ASSAY OF BLOOD/URIC ACID: CPT | Performed by: FAMILY MEDICINE

## 2017-06-30 PROCEDURE — 96367 TX/PROPH/DG ADDL SEQ IV INF: CPT

## 2017-06-30 PROCEDURE — 85025 COMPLETE CBC W/AUTO DIFF WBC: CPT | Performed by: PSYCHIATRY & NEUROLOGY

## 2017-06-30 RX ADMIN — ACETAMINOPHEN 650 MG: 325 TABLET ORAL at 08:56

## 2017-06-30 RX ADMIN — NATALIZUMAB 300 MG: 300 INJECTION INTRAVENOUS at 09:46

## 2017-06-30 RX ADMIN — DIPHENHYDRAMINE HYDROCHLORIDE 25 MG: 50 INJECTION, SOLUTION INTRAMUSCULAR; INTRAVENOUS at 09:07

## 2017-06-30 RX ADMIN — SODIUM CHLORIDE 20 ML/HR: 0.9 INJECTION, SOLUTION INTRAVENOUS at 08:56

## 2017-07-03 ENCOUNTER — APPOINTMENT (OUTPATIENT)
Dept: SPEECH THERAPY | Facility: CLINIC | Age: 62
End: 2017-07-03
Payer: MEDICARE

## 2017-07-03 ENCOUNTER — APPOINTMENT (OUTPATIENT)
Dept: PHYSICAL THERAPY | Facility: CLINIC | Age: 62
End: 2017-07-03
Payer: MEDICARE

## 2017-07-03 PROCEDURE — 92507 TX SP LANG VOICE COMM INDIV: CPT

## 2017-07-03 PROCEDURE — 97112 NEUROMUSCULAR REEDUCATION: CPT

## 2017-07-03 PROCEDURE — 97110 THERAPEUTIC EXERCISES: CPT

## 2017-07-07 ENCOUNTER — APPOINTMENT (OUTPATIENT)
Dept: SPEECH THERAPY | Facility: CLINIC | Age: 62
End: 2017-07-07
Payer: MEDICARE

## 2017-07-07 ENCOUNTER — GENERIC CONVERSION - ENCOUNTER (OUTPATIENT)
Dept: OTHER | Facility: OTHER | Age: 62
End: 2017-07-07

## 2017-07-07 ENCOUNTER — APPOINTMENT (OUTPATIENT)
Dept: PHYSICAL THERAPY | Facility: CLINIC | Age: 62
End: 2017-07-07
Payer: MEDICARE

## 2017-07-07 ENCOUNTER — APPOINTMENT (OUTPATIENT)
Dept: OCCUPATIONAL THERAPY | Facility: CLINIC | Age: 62
End: 2017-07-07
Payer: MEDICARE

## 2017-07-07 PROCEDURE — G8978 MOBILITY CURRENT STATUS: HCPCS

## 2017-07-07 PROCEDURE — 97116 GAIT TRAINING THERAPY: CPT

## 2017-07-07 PROCEDURE — 97535 SELF CARE MNGMENT TRAINING: CPT

## 2017-07-07 PROCEDURE — G9163 LANG EXPRESS GOAL STATUS: HCPCS

## 2017-07-07 PROCEDURE — 96125 COGNITIVE TEST BY HC PRO: CPT

## 2017-07-07 PROCEDURE — 97112 NEUROMUSCULAR REEDUCATION: CPT

## 2017-07-07 PROCEDURE — G8979 MOBILITY GOAL STATUS: HCPCS

## 2017-07-07 PROCEDURE — G8980 MOBILITY D/C STATUS: HCPCS

## 2017-07-07 PROCEDURE — G9162 LANG EXPRESS CURRENT STATUS: HCPCS

## 2017-07-10 ENCOUNTER — APPOINTMENT (OUTPATIENT)
Dept: OCCUPATIONAL THERAPY | Facility: CLINIC | Age: 62
End: 2017-07-10
Payer: MEDICARE

## 2017-07-10 ENCOUNTER — APPOINTMENT (OUTPATIENT)
Dept: SPEECH THERAPY | Facility: CLINIC | Age: 62
End: 2017-07-10
Payer: MEDICARE

## 2017-07-10 ENCOUNTER — APPOINTMENT (OUTPATIENT)
Dept: PHYSICAL THERAPY | Facility: CLINIC | Age: 62
End: 2017-07-10
Payer: MEDICARE

## 2017-07-10 PROCEDURE — 97112 NEUROMUSCULAR REEDUCATION: CPT

## 2017-07-10 PROCEDURE — 92507 TX SP LANG VOICE COMM INDIV: CPT

## 2017-07-10 PROCEDURE — 97535 SELF CARE MNGMENT TRAINING: CPT

## 2017-07-11 ENCOUNTER — GENERIC CONVERSION - ENCOUNTER (OUTPATIENT)
Dept: OTHER | Facility: OTHER | Age: 62
End: 2017-07-11

## 2017-07-12 ENCOUNTER — HOSPITAL ENCOUNTER (OUTPATIENT)
Dept: MRI IMAGING | Facility: HOSPITAL | Age: 62
Discharge: HOME | End: 2017-07-12
Payer: MEDICARE

## 2017-07-12 ENCOUNTER — APPOINTMENT (OUTPATIENT)
Dept: PHYSICAL THERAPY | Facility: CLINIC | Age: 62
End: 2017-07-12
Payer: MEDICARE

## 2017-07-12 DIAGNOSIS — G35 MULTIPLE SCLEROSIS (HCC): ICD-10-CM

## 2017-07-12 PROCEDURE — A9585 GADOBUTROL INJECTION: HCPCS | Performed by: PHYSICIAN ASSISTANT

## 2017-07-12 PROCEDURE — 70553 MRI BRAIN STEM W/O & W/DYE: CPT

## 2017-07-12 RX ADMIN — GADOBUTROL 6 ML: 604.72 INJECTION INTRAVENOUS at 09:58

## 2017-07-14 ENCOUNTER — APPOINTMENT (OUTPATIENT)
Dept: SPEECH THERAPY | Facility: CLINIC | Age: 62
End: 2017-07-14
Payer: MEDICARE

## 2017-07-14 ENCOUNTER — APPOINTMENT (OUTPATIENT)
Dept: OCCUPATIONAL THERAPY | Facility: CLINIC | Age: 62
End: 2017-07-14
Payer: MEDICARE

## 2017-07-14 ENCOUNTER — APPOINTMENT (OUTPATIENT)
Dept: PHYSICAL THERAPY | Facility: CLINIC | Age: 62
End: 2017-07-14
Payer: MEDICARE

## 2017-07-14 PROCEDURE — 97112 NEUROMUSCULAR REEDUCATION: CPT

## 2017-07-14 PROCEDURE — 92507 TX SP LANG VOICE COMM INDIV: CPT

## 2017-07-14 PROCEDURE — 97535 SELF CARE MNGMENT TRAINING: CPT

## 2017-07-17 ENCOUNTER — APPOINTMENT (OUTPATIENT)
Dept: PHYSICAL THERAPY | Facility: CLINIC | Age: 62
End: 2017-07-17
Payer: MEDICARE

## 2017-07-17 ENCOUNTER — APPOINTMENT (OUTPATIENT)
Dept: SPEECH THERAPY | Facility: CLINIC | Age: 62
End: 2017-07-17
Payer: MEDICARE

## 2017-07-17 ENCOUNTER — APPOINTMENT (OUTPATIENT)
Dept: OCCUPATIONAL THERAPY | Facility: CLINIC | Age: 62
End: 2017-07-17
Payer: MEDICARE

## 2017-07-17 PROCEDURE — 92507 TX SP LANG VOICE COMM INDIV: CPT

## 2017-07-17 PROCEDURE — 97535 SELF CARE MNGMENT TRAINING: CPT

## 2017-07-19 ENCOUNTER — ALLSCRIPTS OFFICE VISIT (OUTPATIENT)
Dept: OTHER | Facility: OTHER | Age: 62
End: 2017-07-19

## 2017-07-19 ENCOUNTER — APPOINTMENT (OUTPATIENT)
Dept: PHYSICAL THERAPY | Facility: CLINIC | Age: 62
End: 2017-07-19
Payer: MEDICARE

## 2017-07-19 ENCOUNTER — TRANSCRIBE ORDERS (OUTPATIENT)
Dept: ADMINISTRATIVE | Facility: HOSPITAL | Age: 62
End: 2017-07-19

## 2017-07-19 DIAGNOSIS — G35 MULTIPLE SCLEROSIS (HCC): Primary | ICD-10-CM

## 2017-07-20 DIAGNOSIS — G35 MULTIPLE SCLEROSIS (HCC): ICD-10-CM

## 2017-07-20 DIAGNOSIS — E55.9 VITAMIN D DEFICIENCY: ICD-10-CM

## 2017-07-20 DIAGNOSIS — E78.5 HYPERLIPIDEMIA: ICD-10-CM

## 2017-07-20 DIAGNOSIS — R30.0 DYSURIA: ICD-10-CM

## 2017-07-21 ENCOUNTER — APPOINTMENT (OUTPATIENT)
Dept: OCCUPATIONAL THERAPY | Facility: CLINIC | Age: 62
End: 2017-07-21
Payer: MEDICARE

## 2017-07-21 ENCOUNTER — APPOINTMENT (OUTPATIENT)
Dept: PHYSICAL THERAPY | Facility: CLINIC | Age: 62
End: 2017-07-21
Payer: MEDICARE

## 2017-07-21 ENCOUNTER — APPOINTMENT (OUTPATIENT)
Dept: SPEECH THERAPY | Facility: CLINIC | Age: 62
End: 2017-07-21
Payer: MEDICARE

## 2017-07-21 PROCEDURE — 97112 NEUROMUSCULAR REEDUCATION: CPT

## 2017-07-21 PROCEDURE — 92507 TX SP LANG VOICE COMM INDIV: CPT

## 2017-07-24 ENCOUNTER — APPOINTMENT (OUTPATIENT)
Dept: PHYSICAL THERAPY | Facility: CLINIC | Age: 62
End: 2017-07-24
Payer: MEDICARE

## 2017-07-24 ENCOUNTER — GENERIC CONVERSION - ENCOUNTER (OUTPATIENT)
Dept: OTHER | Facility: OTHER | Age: 62
End: 2017-07-24

## 2017-07-24 ENCOUNTER — APPOINTMENT (OUTPATIENT)
Dept: OCCUPATIONAL THERAPY | Facility: CLINIC | Age: 62
End: 2017-07-24
Payer: MEDICARE

## 2017-07-24 ENCOUNTER — APPOINTMENT (OUTPATIENT)
Dept: SPEECH THERAPY | Facility: CLINIC | Age: 62
End: 2017-07-24
Payer: MEDICARE

## 2017-07-24 PROCEDURE — G8985 CARRY GOAL STATUS: HCPCS

## 2017-07-24 PROCEDURE — G9164 LANG EXPRESS D/C STATUS: HCPCS

## 2017-07-24 PROCEDURE — 92507 TX SP LANG VOICE COMM INDIV: CPT

## 2017-07-24 PROCEDURE — 97112 NEUROMUSCULAR REEDUCATION: CPT

## 2017-07-24 PROCEDURE — 97535 SELF CARE MNGMENT TRAINING: CPT

## 2017-07-24 PROCEDURE — G8984 CARRY CURRENT STATUS: HCPCS

## 2017-07-24 PROCEDURE — G9163 LANG EXPRESS GOAL STATUS: HCPCS

## 2017-07-26 ENCOUNTER — APPOINTMENT (OUTPATIENT)
Dept: PHYSICAL THERAPY | Facility: CLINIC | Age: 62
End: 2017-07-26
Payer: MEDICARE

## 2017-07-26 ENCOUNTER — LAB REQUISITION (OUTPATIENT)
Dept: LAB | Facility: HOSPITAL | Age: 62
End: 2017-07-26
Payer: MEDICARE

## 2017-07-26 ENCOUNTER — APPOINTMENT (OUTPATIENT)
Dept: SPEECH THERAPY | Facility: CLINIC | Age: 62
End: 2017-07-26
Payer: MEDICARE

## 2017-07-26 ENCOUNTER — APPOINTMENT (OUTPATIENT)
Dept: OCCUPATIONAL THERAPY | Facility: CLINIC | Age: 62
End: 2017-07-26
Payer: MEDICARE

## 2017-07-26 DIAGNOSIS — Z01.419 ENCOUNTER FOR GYNECOLOGICAL EXAMINATION WITHOUT ABNORMAL FINDING: ICD-10-CM

## 2017-07-26 PROCEDURE — 87624 HPV HI-RISK TYP POOLED RSLT: CPT | Performed by: OBSTETRICS & GYNECOLOGY

## 2017-07-26 PROCEDURE — G0145 SCR C/V CYTO,THINLAYER,RESCR: HCPCS | Performed by: OBSTETRICS & GYNECOLOGY

## 2017-07-26 RX ORDER — SODIUM CHLORIDE 9 MG/ML
20 INJECTION, SOLUTION INTRAVENOUS CONTINUOUS
Status: DISCONTINUED | OUTPATIENT
Start: 2017-07-28 | End: 2017-07-31 | Stop reason: HOSPADM

## 2017-07-26 RX ORDER — ACETAMINOPHEN 325 MG/1
650 TABLET ORAL ONCE
Status: COMPLETED | OUTPATIENT
Start: 2017-07-28 | End: 2017-07-28

## 2017-07-27 ENCOUNTER — GENERIC CONVERSION - ENCOUNTER (OUTPATIENT)
Dept: OTHER | Facility: OTHER | Age: 62
End: 2017-07-27

## 2017-07-28 ENCOUNTER — HOSPITAL ENCOUNTER (OUTPATIENT)
Dept: INFUSION CENTER | Facility: CLINIC | Age: 62
Discharge: HOME | End: 2017-07-28
Payer: MEDICARE

## 2017-07-28 VITALS
OXYGEN SATURATION: 98 % | DIASTOLIC BLOOD PRESSURE: 68 MMHG | HEART RATE: 68 BPM | TEMPERATURE: 97.2 F | SYSTOLIC BLOOD PRESSURE: 122 MMHG | RESPIRATION RATE: 18 BRPM

## 2017-07-28 LAB — HPV RRNA GENITAL QL NAA+PROBE: NORMAL

## 2017-07-28 PROCEDURE — 96367 TX/PROPH/DG ADDL SEQ IV INF: CPT

## 2017-07-28 PROCEDURE — 96413 CHEMO IV INFUSION 1 HR: CPT

## 2017-07-28 RX ADMIN — ACETAMINOPHEN 650 MG: 325 TABLET ORAL at 11:08

## 2017-07-28 RX ADMIN — NATALIZUMAB 300 MG: 300 INJECTION INTRAVENOUS at 11:35

## 2017-07-28 RX ADMIN — DIPHENHYDRAMINE HYDROCHLORIDE 25 MG: 50 INJECTION, SOLUTION INTRAMUSCULAR; INTRAVENOUS at 11:06

## 2017-07-28 RX ADMIN — SODIUM CHLORIDE 20 ML/HR: 0.9 INJECTION, SOLUTION INTRAVENOUS at 11:06

## 2017-07-28 NOTE — PROGRESS NOTES
Pt tolerated infusion well  Observed for 1 hour post infusion  No s/s of reaction  Aware of future appointments    Received AVS

## 2017-07-28 NOTE — PROGRESS NOTES
Pt here for Tysabri infusion  Offers no complaints  Verbalized understanding of medication  TOUCH checklist completed with Seth Fernando RN

## 2017-07-31 LAB
LAB AP GYN PRIMARY INTERPRETATION: NORMAL
Lab: NORMAL

## 2017-08-24 RX ORDER — SODIUM CHLORIDE 9 MG/ML
20 INJECTION, SOLUTION INTRAVENOUS CONTINUOUS
Status: DISCONTINUED | OUTPATIENT
Start: 2017-08-25 | End: 2017-08-28 | Stop reason: HOSPADM

## 2017-08-24 RX ORDER — ACETAMINOPHEN 325 MG/1
650 TABLET ORAL ONCE
Status: COMPLETED | OUTPATIENT
Start: 2017-08-25 | End: 2017-08-25

## 2017-08-25 ENCOUNTER — HOSPITAL ENCOUNTER (OUTPATIENT)
Dept: INFUSION CENTER | Facility: CLINIC | Age: 62
Discharge: HOME | End: 2017-08-25
Payer: MEDICARE

## 2017-08-25 VITALS
RESPIRATION RATE: 18 BRPM | HEART RATE: 80 BPM | TEMPERATURE: 98.1 F | DIASTOLIC BLOOD PRESSURE: 66 MMHG | SYSTOLIC BLOOD PRESSURE: 127 MMHG

## 2017-08-25 LAB
ALBUMIN SERPL BCP-MCNC: 4 G/DL (ref 3.5–5)
ALP SERPL-CCNC: 96 U/L (ref 46–116)
ALT SERPL W P-5'-P-CCNC: 39 U/L (ref 12–78)
ANION GAP SERPL CALCULATED.3IONS-SCNC: 9 MMOL/L (ref 4–13)
AST SERPL W P-5'-P-CCNC: 18 U/L (ref 5–45)
BASOPHILS # BLD AUTO: 0.04 THOUSANDS/ΜL (ref 0–0.1)
BASOPHILS NFR BLD AUTO: 1 % (ref 0–1)
BILIRUB SERPL-MCNC: 0.5 MG/DL (ref 0.2–1)
BUN SERPL-MCNC: 16 MG/DL (ref 5–25)
CALCIUM SERPL-MCNC: 9.1 MG/DL (ref 8.3–10.1)
CHLORIDE SERPL-SCNC: 104 MMOL/L (ref 100–108)
CO2 SERPL-SCNC: 25 MMOL/L (ref 21–32)
CREAT SERPL-MCNC: 0.63 MG/DL (ref 0.6–1.3)
EOSINOPHIL # BLD AUTO: 0.26 THOUSAND/ΜL (ref 0–0.61)
EOSINOPHIL NFR BLD AUTO: 4 % (ref 0–6)
ERYTHROCYTE [DISTWIDTH] IN BLOOD BY AUTOMATED COUNT: 12.7 % (ref 11.6–15.1)
GFR SERPL CREATININE-BSD FRML MDRD: 96 ML/MIN/1.73SQ M
GLUCOSE SERPL-MCNC: 116 MG/DL (ref 65–140)
HCT VFR BLD AUTO: 40.8 % (ref 34.8–46.1)
HGB BLD-MCNC: 14.1 G/DL (ref 11.5–15.4)
LYMPHOCYTES # BLD AUTO: 2.51 THOUSANDS/ΜL (ref 0.6–4.47)
LYMPHOCYTES NFR BLD AUTO: 34 % (ref 14–44)
MCH RBC QN AUTO: 29.6 PG (ref 26.8–34.3)
MCHC RBC AUTO-ENTMCNC: 34.6 G/DL (ref 31.4–37.4)
MCV RBC AUTO: 86 FL (ref 82–98)
MONOCYTES # BLD AUTO: 0.49 THOUSAND/ΜL (ref 0.17–1.22)
MONOCYTES NFR BLD AUTO: 7 % (ref 4–12)
NEUTROPHILS # BLD AUTO: 3.99 THOUSANDS/ΜL (ref 1.85–7.62)
NEUTS SEG NFR BLD AUTO: 54 % (ref 43–75)
PLATELET # BLD AUTO: 275 THOUSANDS/UL (ref 149–390)
PMV BLD AUTO: 9.5 FL (ref 8.9–12.7)
POTASSIUM SERPL-SCNC: 4 MMOL/L (ref 3.5–5.3)
PROT SERPL-MCNC: 7.2 G/DL (ref 6.4–8.2)
RBC # BLD AUTO: 4.76 MILLION/UL (ref 3.81–5.12)
SODIUM SERPL-SCNC: 138 MMOL/L (ref 136–145)
WBC # BLD AUTO: 7.29 THOUSAND/UL (ref 4.31–10.16)

## 2017-08-25 PROCEDURE — 85025 COMPLETE CBC W/AUTO DIFF WBC: CPT | Performed by: PSYCHIATRY & NEUROLOGY

## 2017-08-25 PROCEDURE — 96413 CHEMO IV INFUSION 1 HR: CPT

## 2017-08-25 PROCEDURE — 96367 TX/PROPH/DG ADDL SEQ IV INF: CPT

## 2017-08-25 PROCEDURE — 80053 COMPREHEN METABOLIC PANEL: CPT | Performed by: PSYCHIATRY & NEUROLOGY

## 2017-08-25 RX ADMIN — NATALIZUMAB 300 MG: 300 INJECTION INTRAVENOUS at 10:55

## 2017-08-25 RX ADMIN — DIPHENHYDRAMINE HYDROCHLORIDE 25 MG: 50 INJECTION, SOLUTION INTRAMUSCULAR; INTRAVENOUS at 10:23

## 2017-08-25 RX ADMIN — ACETAMINOPHEN 650 MG: 325 TABLET ORAL at 10:22

## 2017-08-25 RX ADMIN — SODIUM CHLORIDE 20 ML/HR: 0.9 INJECTION, SOLUTION INTRAVENOUS at 10:11

## 2017-08-25 NOTE — PROGRESS NOTES
Pt is here for Tysabri, She offers no complaints at this time  Tysbari checklist completed and she meets parameters for tysabri   Labs drawn peripherally per 's orders

## 2017-08-27 ENCOUNTER — LAB CONVERSION - ENCOUNTER (OUTPATIENT)
Dept: OTHER | Facility: OTHER | Age: 62
End: 2017-08-27

## 2017-08-27 LAB
INDEX VALUE (HISTORICAL): 0.08
JCV ANTIBODY (HISTORICAL): NEGATIVE

## 2017-08-28 ENCOUNTER — GENERIC CONVERSION - ENCOUNTER (OUTPATIENT)
Dept: OTHER | Facility: OTHER | Age: 62
End: 2017-08-28

## 2017-09-08 ENCOUNTER — TRANSCRIBE ORDERS (OUTPATIENT)
Dept: ADMINISTRATIVE | Facility: HOSPITAL | Age: 62
End: 2017-09-08

## 2017-09-08 ENCOUNTER — GENERIC CONVERSION - ENCOUNTER (OUTPATIENT)
Dept: OTHER | Facility: OTHER | Age: 62
End: 2017-09-08

## 2017-09-08 DIAGNOSIS — Z12.31 VISIT FOR SCREENING MAMMOGRAM: Primary | ICD-10-CM

## 2017-09-08 DIAGNOSIS — M79.669 PAIN OF LOWER LEG: ICD-10-CM

## 2017-09-08 DIAGNOSIS — E78.5 HYPERLIPIDEMIA: ICD-10-CM

## 2017-09-08 DIAGNOSIS — E55.9 VITAMIN D DEFICIENCY: ICD-10-CM

## 2017-09-21 RX ORDER — ACETAMINOPHEN 325 MG/1
650 TABLET ORAL ONCE
Status: COMPLETED | OUTPATIENT
Start: 2017-09-22 | End: 2017-09-22

## 2017-09-21 RX ORDER — SODIUM CHLORIDE 9 MG/ML
20 INJECTION, SOLUTION INTRAVENOUS CONTINUOUS
Status: DISCONTINUED | OUTPATIENT
Start: 2017-09-22 | End: 2017-09-25 | Stop reason: HOSPADM

## 2017-09-22 ENCOUNTER — HOSPITAL ENCOUNTER (OUTPATIENT)
Dept: INFUSION CENTER | Facility: CLINIC | Age: 62
Discharge: HOME | End: 2017-09-22
Payer: MEDICARE

## 2017-09-22 ENCOUNTER — LAB REQUISITION (OUTPATIENT)
Dept: LAB | Facility: HOSPITAL | Age: 62
End: 2017-09-22
Payer: MEDICARE

## 2017-09-22 VITALS
RESPIRATION RATE: 20 BRPM | DIASTOLIC BLOOD PRESSURE: 70 MMHG | TEMPERATURE: 97.9 F | SYSTOLIC BLOOD PRESSURE: 120 MMHG | HEART RATE: 63 BPM

## 2017-09-22 DIAGNOSIS — M79.669 PAIN OF LOWER LEG: ICD-10-CM

## 2017-09-22 DIAGNOSIS — E55.9 VITAMIN D DEFICIENCY: ICD-10-CM

## 2017-09-22 DIAGNOSIS — E78.5 HYPERLIPIDEMIA: ICD-10-CM

## 2017-09-22 LAB
25(OH)D3 SERPL-MCNC: 26.2 NG/ML (ref 30–100)
ALBUMIN SERPL BCP-MCNC: 4.1 G/DL (ref 3.5–5)
ALBUMIN SERPL BCP-MCNC: 4.2 G/DL (ref 3.5–5)
ALP SERPL-CCNC: 102 U/L (ref 46–116)
ALP SERPL-CCNC: 109 U/L (ref 46–116)
ALT SERPL W P-5'-P-CCNC: 31 U/L (ref 12–78)
ALT SERPL W P-5'-P-CCNC: 32 U/L (ref 12–78)
ANION GAP SERPL CALCULATED.3IONS-SCNC: 10 MMOL/L (ref 4–13)
ANION GAP SERPL CALCULATED.3IONS-SCNC: 11 MMOL/L (ref 4–13)
AST SERPL W P-5'-P-CCNC: 18 U/L (ref 5–45)
AST SERPL W P-5'-P-CCNC: 27 U/L (ref 5–45)
BASOPHILS # BLD AUTO: 0.04 THOUSANDS/ΜL (ref 0–0.1)
BASOPHILS NFR BLD AUTO: 1 % (ref 0–1)
BILIRUB SERPL-MCNC: 0.5 MG/DL (ref 0.2–1)
BILIRUB SERPL-MCNC: 0.6 MG/DL (ref 0.2–1)
BUN SERPL-MCNC: 16 MG/DL (ref 5–25)
BUN SERPL-MCNC: 18 MG/DL (ref 5–25)
CALCIUM SERPL-MCNC: 8.7 MG/DL (ref 8.3–10.1)
CALCIUM SERPL-MCNC: 9.1 MG/DL (ref 8.3–10.1)
CHLORIDE SERPL-SCNC: 104 MMOL/L (ref 100–108)
CHLORIDE SERPL-SCNC: 104 MMOL/L (ref 100–108)
CHOLEST SERPL-MCNC: 279 MG/DL (ref 50–200)
CK SERPL-CCNC: 102 U/L (ref 26–192)
CO2 SERPL-SCNC: 23 MMOL/L (ref 21–32)
CO2 SERPL-SCNC: 23 MMOL/L (ref 21–32)
CREAT SERPL-MCNC: 0.55 MG/DL (ref 0.6–1.3)
CREAT SERPL-MCNC: 0.63 MG/DL (ref 0.6–1.3)
EOSINOPHIL # BLD AUTO: 0.17 THOUSAND/ΜL (ref 0–0.61)
EOSINOPHIL NFR BLD AUTO: 2 % (ref 0–6)
ERYTHROCYTE [DISTWIDTH] IN BLOOD BY AUTOMATED COUNT: 12.8 % (ref 11.6–15.1)
GFR SERPL CREATININE-BSD FRML MDRD: 101 ML/MIN/1.73SQ M
GFR SERPL CREATININE-BSD FRML MDRD: 96 ML/MIN/1.73SQ M
GLUCOSE P FAST SERPL-MCNC: 105 MG/DL (ref 65–99)
GLUCOSE SERPL-MCNC: 106 MG/DL (ref 65–140)
HCT VFR BLD AUTO: 42.3 % (ref 34.8–46.1)
HDLC SERPL-MCNC: 43 MG/DL (ref 40–60)
HGB BLD-MCNC: 14.8 G/DL (ref 11.5–15.4)
LDLC SERPL CALC-MCNC: 187 MG/DL (ref 0–100)
LYMPHOCYTES # BLD AUTO: 2.87 THOUSANDS/ΜL (ref 0.6–4.47)
LYMPHOCYTES NFR BLD AUTO: 36 % (ref 14–44)
MCH RBC QN AUTO: 29.6 PG (ref 26.8–34.3)
MCHC RBC AUTO-ENTMCNC: 35 G/DL (ref 31.4–37.4)
MCV RBC AUTO: 85 FL (ref 82–98)
MONOCYTES # BLD AUTO: 0.55 THOUSAND/ΜL (ref 0.17–1.22)
MONOCYTES NFR BLD AUTO: 7 % (ref 4–12)
NEUTROPHILS # BLD AUTO: 4.35 THOUSANDS/ΜL (ref 1.85–7.62)
NEUTS SEG NFR BLD AUTO: 54 % (ref 43–75)
PLATELET # BLD AUTO: 278 THOUSANDS/UL (ref 149–390)
PMV BLD AUTO: 9.4 FL (ref 8.9–12.7)
POTASSIUM SERPL-SCNC: 4.4 MMOL/L (ref 3.5–5.3)
POTASSIUM SERPL-SCNC: 4.6 MMOL/L (ref 3.5–5.3)
PROT SERPL-MCNC: 7 G/DL (ref 6.4–8.2)
PROT SERPL-MCNC: 7.1 G/DL (ref 6.4–8.2)
RBC # BLD AUTO: 5 MILLION/UL (ref 3.81–5.12)
SODIUM SERPL-SCNC: 137 MMOL/L (ref 136–145)
SODIUM SERPL-SCNC: 138 MMOL/L (ref 136–145)
TRIGL SERPL-MCNC: 245 MG/DL
TSH SERPL DL<=0.05 MIU/L-ACNC: 0.82 UIU/ML (ref 0.36–3.74)
WBC # BLD AUTO: 7.98 THOUSAND/UL (ref 4.31–10.16)

## 2017-09-22 PROCEDURE — 82550 ASSAY OF CK (CPK): CPT | Performed by: FAMILY MEDICINE

## 2017-09-22 PROCEDURE — 80053 COMPREHEN METABOLIC PANEL: CPT | Performed by: FAMILY MEDICINE

## 2017-09-22 PROCEDURE — 84443 ASSAY THYROID STIM HORMONE: CPT | Performed by: FAMILY MEDICINE

## 2017-09-22 PROCEDURE — 85025 COMPLETE CBC W/AUTO DIFF WBC: CPT | Performed by: PSYCHIATRY & NEUROLOGY

## 2017-09-22 PROCEDURE — 96367 TX/PROPH/DG ADDL SEQ IV INF: CPT

## 2017-09-22 PROCEDURE — 80061 LIPID PANEL: CPT | Performed by: FAMILY MEDICINE

## 2017-09-22 PROCEDURE — 96413 CHEMO IV INFUSION 1 HR: CPT

## 2017-09-22 PROCEDURE — 82306 VITAMIN D 25 HYDROXY: CPT | Performed by: FAMILY MEDICINE

## 2017-09-22 RX ADMIN — NATALIZUMAB 300 MG: 300 INJECTION INTRAVENOUS at 12:22

## 2017-09-22 RX ADMIN — DIPHENHYDRAMINE HYDROCHLORIDE 25 MG: 50 INJECTION, SOLUTION INTRAMUSCULAR; INTRAVENOUS at 12:03

## 2017-09-22 RX ADMIN — ACETAMINOPHEN 650 MG: 325 TABLET ORAL at 12:03

## 2017-09-22 RX ADMIN — SODIUM CHLORIDE 20 ML/HR: 0.9 INJECTION, SOLUTION INTRAVENOUS at 11:58

## 2017-09-22 NOTE — PLAN OF CARE

## 2017-09-22 NOTE — PROGRESS NOTES
Patient here for labs and tysabri and is doing well, no c/o offered  Tolerated lab draw as ordered via IV site  Benadryl infusing at this time pre tysabri

## 2017-09-22 NOTE — PLAN OF CARE
Problem: Potential for Falls  Goal: Patient will remain free of falls  INTERVENTIONS:  - Assess patient frequently for physical needs  -  Identify cognitive and physical deficits and behaviors that affect risk of falls    -  [place] fall precautions as indicated by assessment   - Educate patient/family on patient safety including physical limitations  - Instruct patient to call for assistance with activity based on assessment  - Modify environment to reduce risk of injury  - Consider OT/PT consult to assist with strengthening/mobility   Outcome: Progressing

## 2017-10-02 ENCOUNTER — ALLSCRIPTS OFFICE VISIT (OUTPATIENT)
Dept: OTHER | Facility: OTHER | Age: 62
End: 2017-10-02

## 2017-10-20 ENCOUNTER — HOSPITAL ENCOUNTER (OUTPATIENT)
Dept: MAMMOGRAPHY | Facility: HOSPITAL | Age: 62
Discharge: HOME | End: 2017-10-20
Attending: OBSTETRICS & GYNECOLOGY
Payer: MEDICARE

## 2017-10-20 DIAGNOSIS — Z12.31 VISIT FOR SCREENING MAMMOGRAM: ICD-10-CM

## 2017-10-20 PROCEDURE — G0202 SCR MAMMO BI INCL CAD: HCPCS

## 2017-10-20 PROCEDURE — 77063 BREAST TOMOSYNTHESIS BI: CPT

## 2017-10-20 RX ORDER — ACETAMINOPHEN 325 MG/1
650 TABLET ORAL ONCE
Status: COMPLETED | OUTPATIENT
Start: 2017-10-23 | End: 2017-10-23

## 2017-10-20 RX ORDER — SODIUM CHLORIDE 9 MG/ML
20 INJECTION, SOLUTION INTRAVENOUS CONTINUOUS
Status: DISCONTINUED | OUTPATIENT
Start: 2017-10-23 | End: 2017-10-26 | Stop reason: HOSPADM

## 2017-10-23 ENCOUNTER — HOSPITAL ENCOUNTER (OUTPATIENT)
Dept: INFUSION CENTER | Facility: CLINIC | Age: 62
Discharge: HOME | End: 2017-10-23
Payer: MEDICARE

## 2017-10-23 VITALS
HEART RATE: 70 BPM | TEMPERATURE: 98 F | OXYGEN SATURATION: 96 % | SYSTOLIC BLOOD PRESSURE: 137 MMHG | RESPIRATION RATE: 16 BRPM | DIASTOLIC BLOOD PRESSURE: 67 MMHG

## 2017-10-23 LAB
ALBUMIN SERPL BCP-MCNC: 4 G/DL (ref 3.5–5)
ALP SERPL-CCNC: 99 U/L (ref 46–116)
ALT SERPL W P-5'-P-CCNC: 26 U/L (ref 12–78)
ANION GAP SERPL CALCULATED.3IONS-SCNC: 11 MMOL/L (ref 4–13)
AST SERPL W P-5'-P-CCNC: 12 U/L (ref 5–45)
BASOPHILS # BLD AUTO: 0.05 THOUSANDS/ΜL (ref 0–0.1)
BASOPHILS NFR BLD AUTO: 1 % (ref 0–1)
BILIRUB SERPL-MCNC: 0.3 MG/DL (ref 0.2–1)
BUN SERPL-MCNC: 28 MG/DL (ref 5–25)
CALCIUM SERPL-MCNC: 8.8 MG/DL (ref 8.3–10.1)
CHLORIDE SERPL-SCNC: 106 MMOL/L (ref 100–108)
CO2 SERPL-SCNC: 25 MMOL/L (ref 21–32)
CREAT SERPL-MCNC: 0.67 MG/DL (ref 0.6–1.3)
EOSINOPHIL # BLD AUTO: 0.2 THOUSAND/ΜL (ref 0–0.61)
EOSINOPHIL NFR BLD AUTO: 2 % (ref 0–6)
ERYTHROCYTE [DISTWIDTH] IN BLOOD BY AUTOMATED COUNT: 12.4 % (ref 11.6–15.1)
GFR SERPL CREATININE-BSD FRML MDRD: 94 ML/MIN/1.73SQ M
GLUCOSE SERPL-MCNC: 111 MG/DL (ref 65–140)
HCT VFR BLD AUTO: 40.5 % (ref 34.8–46.1)
HGB BLD-MCNC: 13.8 G/DL (ref 11.5–15.4)
LYMPHOCYTES # BLD AUTO: 2.87 THOUSANDS/ΜL (ref 0.6–4.47)
LYMPHOCYTES NFR BLD AUTO: 32 % (ref 14–44)
MCH RBC QN AUTO: 29.7 PG (ref 26.8–34.3)
MCHC RBC AUTO-ENTMCNC: 34.1 G/DL (ref 31.4–37.4)
MCV RBC AUTO: 87 FL (ref 82–98)
MONOCYTES # BLD AUTO: 0.67 THOUSAND/ΜL (ref 0.17–1.22)
MONOCYTES NFR BLD AUTO: 7 % (ref 4–12)
NEUTROPHILS # BLD AUTO: 5.32 THOUSANDS/ΜL (ref 1.85–7.62)
NEUTS SEG NFR BLD AUTO: 58 % (ref 43–75)
PLATELET # BLD AUTO: 276 THOUSANDS/UL (ref 149–390)
PMV BLD AUTO: 9.7 FL (ref 8.9–12.7)
POTASSIUM SERPL-SCNC: 3.9 MMOL/L (ref 3.5–5.3)
PROT SERPL-MCNC: 7.2 G/DL (ref 6.4–8.2)
RBC # BLD AUTO: 4.65 MILLION/UL (ref 3.81–5.12)
SODIUM SERPL-SCNC: 142 MMOL/L (ref 136–145)
WBC # BLD AUTO: 9.11 THOUSAND/UL (ref 4.31–10.16)

## 2017-10-23 PROCEDURE — 96375 TX/PRO/DX INJ NEW DRUG ADDON: CPT

## 2017-10-23 PROCEDURE — 85025 COMPLETE CBC W/AUTO DIFF WBC: CPT | Performed by: PSYCHIATRY & NEUROLOGY

## 2017-10-23 PROCEDURE — 96413 CHEMO IV INFUSION 1 HR: CPT

## 2017-10-23 PROCEDURE — 80053 COMPREHEN METABOLIC PANEL: CPT | Performed by: PSYCHIATRY & NEUROLOGY

## 2017-10-23 RX ADMIN — SODIUM CHLORIDE 20 ML/HR: 0.9 INJECTION, SOLUTION INTRAVENOUS at 09:50

## 2017-10-23 RX ADMIN — DIPHENHYDRAMINE HYDROCHLORIDE 25 MG: 50 INJECTION, SOLUTION INTRAMUSCULAR; INTRAVENOUS at 09:55

## 2017-10-23 RX ADMIN — NATALIZUMAB 300 MG: 300 INJECTION INTRAVENOUS at 10:26

## 2017-10-23 RX ADMIN — ACETAMINOPHEN 650 MG: 325 TABLET ORAL at 09:52

## 2017-10-27 NOTE — PROGRESS NOTES
Assessment  1  Multiple sclerosis (340) (G35)   2  Fatigue (780 79) (R53 83)   3  Dysarthria (784 51) (R47 1)   4  Vitamin D deficiency (268 9) (E55 9)    Plan   Vitamin D deficiency    · Vitamin D (Ergocalciferol) 58832 UNIT Oral Capsule; 1 CAPSULE WEEKLY FOR  THE NEXT 8 WEEKS   Rx By: Agustina Avery; Dispense: 30 Days ; #:8 Capsule; Refill: 0;For: Vitamin D deficiency; DIEGO = N; Verified Transmission to [address]; Last Updated By: System, [employer]; 10/2/2017 10:12:36 AM    Follow-up visit in 3 months Evaluation and Treatment  Follow-up  Status: Hold For - Scheduling  Requested for: 56XFG1112  Ordered; For: Multiple sclerosis; Ordered By: Agustina Avery  Performed:   Due: 23NWF4796     Discussion/Summary  Discussion Summary:   PT HERE FOR MS FOLLOWUP, DOING WELL ON TYSABRIMUCH BETTER FROM LAST VISITAND COORDINATION MUCH IMPROVEDHAD UPDATED LABS SEPT NL EXCEPT FOR DECREASED VIT DREPLACE WITH ERGOHAD JCV DONE IN AUG, NEGATIVEJUST HAD UPDATED MRI HEAD AND STABLE COMP TO APRIL 2017WOULD BE NEXT DUE IN NOV 2017 (OF NOTE, PT WITH INCREASED NAUSEA WITH THE LANE DYENOT DO LANE ON NEXT STUDY IN NOV)USES ZOFRAN AS A PREMED WHEN NEEDEDWITH OPTHO, WILL OBTAIN RECENT REPORTS  Counseling Documentation With Imm: The patient was counseled regarding diagnostic results,-- instructions for management,-- risk factor reductions,-- prognosis,-- patient and family education,-- risks and benefits of treatment options  total time of encounter was 30 minutes-- and-- >50% minutes was spent counseling  Goals and Barriers: The patient has the current Goals: PT OVERALL DOING [address] UPDATED NON CONTRASTED MRI  Patient's Capacity to Self-Care: Patient is able to Self-Care  Patient Education: Educational resources provided: Rev pml data with tysabrion med for 6 yrs  Medication SE Review and Pt Understands Tx: Possible side effects of new medications were reviewed with the patient/guardian today   The treatment plan was reviewed with the patient/guardian  The patient/guardian understands and agrees with the treatment plan   Patient Guardian understands agrees: The treatment plan was reviewed with the patient/guardian  The patient/guardian understands and agrees with the treatment plan      Chief Complaint  Chief Complaint Free Text Note Form: Patient presents for neurology follow-up for MS      History of Present Illness  HPI: Patient is a 60year old female with PMH of MS diagnosed in 2000, last seen in July 2017  Patient has been on Tysabri since 2011 and has been doing well  She has missed occasional infusions due to illnesses  MRI c and t spine were updated on June 21 2016 and comp to Nov 2015 and stable  Patient had been having increased LBP in Sept 2016, but was off gabapentin  She also had some weakness and was given IV steroids in Sept 2016  She had MRI lumbar spine 9/29/16 with disc disease and arthrosis  Patient was sent to pain management  She had restarted her gabapentin and symptoms completely resolved    Patient saw Dr Stanton Joy in August 2016 and all was stable regarding her vision   Back in February 2017 she was having right hand pain and swelling and left thigh pain  She was seen In the hospital, they did not think any of this was MS related  She was not given any steroids  There was also question of a pneumonia  at that time, labs done included mildly elevated sed rate at 23, normal CK, normal CRP  Neg CASEY, RF, Lyme  The symptoms resolved within 4-5 days  MRI brain was updated 3/1/17 and all stable, no new or enhancing lesions  pt had another hospitalization 4/14/17-4/20/17 c/o some numbness in the cheek  She reported she was being treated for a sinus infection and on Levaquin  No other symptoms reported at that time   however over the next few days and after finishing the course of antibiotics, symptoms did not resolve and she also developed difficulty ambulating, feeling like she was continuously leaning towards the right side, along with the right facial numbness  CT with no acute findings  She was admitted and given a total of 5 days of IV steroids  MRI brain 4/17/17 New enhancing white matter lesion in the right middle cerebellar peduncle, and moderate chronic demyelinating disease throughout the supratentorial brain and to lesser degree posterior fossa t MRI c-spine 4/17/17 Multilevel white matter lesions are stable,  No evidence of progressive or active demyelination in the visualized spinal cord  She was discharged home to continue outpatient therapy  Her Tysabri was held back 2 weeks after steroid completion  She had P  T/OT which she felt was very helpful in combination with the steroids She feels her right side is improving  She has some dysarthria, this had improved as well      last seen in July  Today she is doing well  She remains on Tysabri  Recent JCA from August was negative  She is due for updated imaging in November  no ha or fever or chills  pt wishes to cont with her Tysabri  pt aware of risk of PML  Denies vertigo currently  She was seen by Dr Kerri Rebolledo and according to her the OCT was worse, those notes were unavailable today  She does note floaters on the left, no c/o diplopia  She overall is feeling better, she has some ongoing dysarthria no issues with swallowing  She was seen by her PCP she had labs done, her Vit D was low, she is not on any replacement therapy, LFT were normal  FH, SH, Meds were reviewed      Review of Systems  Neurological ROS:   Constitutional: no fever, no chills, no recent weight gain, no recent weight loss, no complaints of feeling tired, no changes in appetite  HEENT: sinus problems-- and-- feeling congested  Cardiovascular:  no chest pain or pressure, no palpitations present, the heart rate was not rapid or irregular, no swelling in the arms or legs, no poor circulation     Respiratory:  no unusual or persistant cough, no shortness of breath with or without exertion  Gastrointestinal:  no nausea, no vomiting, no diarrhea, no abdominal pain, no changes in bowel habits, no melena, no loss of bowel control  Genitourinary: feelings of urinary urgency  Musculoskeletal: head/neck/back pain  Integumentary  no masses, no rash, no skin lesions, no livedo reticularis  Psychiatric:  no anxiety, no depression, no mood swings, no psychiatric hospitalizations, no sleep problems  Endocrine  no unusual weight loss or gain, no excessive urination, no excessive thirst, no hair loss or gain, no hot or cold intolerance, no menstrual period change or irregularity, no loss of sexual ability or drive, no erection difficulty, no nipple discharge  Hematologic/Lymphatic:  no unusual bleeding, no tendency for easy bruising, no clotting skin or lumps  Neurological General:  no headache, no nausea or vomiting, no lightheadedness, no convulsions, no blackouts, no syncope, no trauma, no photopsia, no increased sleepiness, no trouble falling asleep, no snoring, no awakening at night  Neurological Mental Status: memory problems  Neurological Cranial Nerves:  no blurry or double vision, no loss of vision, no face drooping, no facial numbness or weakness, no taste or smell loss/changes, no hearing loss or ringing, no vertigo or dizziness, no dysphagia, no slurred speech  Neurological Motor findings include:  no tremor, no twitching, no cramping(pre/post exercise), no atrophy  Neurological Coordination: balance difficulties-- and-- clumsiness  Neurological Sensory:  no numbness, no pain, no tingling, does not fall when eyes closed or taking a shower  Neurological Gait:  no difficulty walking, not falling to one side, no sensation of being pushed, has not had falls  Active Problems  1  Abdominal pain (789 00) (R10 9)   2  Abnormal EEG (794 02) (R94 01)   3  Acid reflux disease (530 81) (K21 9)   4  Acute foot pain, left (729 5) (M43 672)   5   Acute sinusitis (461 9) (J01 90)   6  Allergic rhinitis (477 9) (J30 9)   7  Anxiety (300 00) (F41 9)   8  Arm weakness (729 89) (R29 898)   9  Arthralgia (719 40) (M25 50)   10  Arthritis (716 90) (M19 90)   11  Attention and concentration deficit (799 51) (R41 840)   12  Blepharitis, unspecified laterality   13  Blurry vision (368 8) (H53 8)   14  Cervical radiculopathy (723 4) (M54 12)   15  Cervical radiculopathy (723 4) (M54 12)   16  Cervical spinal stenosis (723 0) (M48 02)   17  Cervical spinal stenosis (723 0) (M48 02)   18  Cervicalgia (723 1) (M54 2)   19  Chronic obstructive pulmonary disease (496) (J44 9)   20  Closed Dislocation Of The Patella (836 3)   21  Colitis (558 9) (K52 9)   22  Complaints of memory disturbance (780 93) (R41 3)   23  Contact dermatitis (692 9) (L25 9)   24  Contusion of coccyx (922 32) (S30 0XXA)   25  De Quervain's tenosynovitis, right (727 04) (M65 4)   26  Degeneration of cervical intervertebral disc (722 4) (M50 90)   27  Disc degeneration, lumbar (722 52) (M51 36)   28  Disorder of nose or nasal sinus (478 19) (J34 9)   29  Disturbance Of Coordination (781 99)   30  Dizziness and giddiness (780 4) (R42)   31  Dysarthria (784 51) (R47 1)   32  Dyspepsia (536 8) (K30)   33  Dysuria (788 1) (R30 0)   34  Fatigue (780 79) (R53 83)   35  Fatty liver (571 8) (K76 0)   36  Fecal incontinence (787 60) (R15 9)   37  Gastro-esophageal reflux disease with esophagitis (530 11) (K21 0)   38  Hand pain, not arthralgia (729 5) (M79 643)   39  Hip pain, unspecified laterality   40  Hyperkalemia (276 7) (E87 5)   41  Hyperlipidemia (272 4) (E78 5)   42  Impacted cerumen of right ear (380 4) (H61 21)   43  Insect bite (919 4,E906 4) (W57 XXXA)   44  Intervertebral disc disorders with radiculopathy, lumbar region (724 4) (M51 16)   45  Lack of coordination (781 3) (R27 9)   46  Left sided sciatica (724 3) (M54 32)   47  Leg weakness, bilateral (729 89) (R29 898)   48  Limb pain (729 5) (M79 609)   49   Lower leg pain (729 5) (M79 669)   50  Major depression, recurrent, chronic (296 30) (F33 9)   51  Migraine headache (346 90) (G43 909)   52  Multiple sclerosis (340) (G35)   53  Myofascial pain syndrome (729 1) (M79 1)   54  Neck strain (847 0) (S16 1XXA)   55  Need for 23-polyvalent pneumococcal polysaccharide vaccine (V03 82) (Z23)   56  Need for prophylactic vaccination and inoculation against influenza (V04 81) (Z23)   57  Need for TD vaccine (V06 5) (Z23)   58  Optic neuritis (377 30) (H46 9)   59  Oral thrush (112 0) (B37 0)   60  Osteoporosis (733 00) (M81 0)   61  Pain, joint, shoulder (719 41) (M25 519)   62  Plantar fasciitis (728 71) (M72 2)   63  Pre-op exam (V72 84) (Z01 818)   64  Primary osteoarthritis of left knee (715 16) (M17 12)   65  Raynaud's syndrome without gangrene (443 0) (I73 00)   66  Right knee pain (719 46) (M25 561)   67  RML pneumonia (5) (J18 1)   68  Rotator cuff tear (840 4) (M75 100)   69  Sinus pain (478 19) (J34 89)   70  Spondylosis of cervical region without myelopathy or radiculopathy (721 0) (M47 812)   71  Strain Of Right Biceps Tendon (840 8)   72  Subacromial or subdeltoid bursitis, unspecified laterality   73  Sunburn, blistering (692 76) (L55 1)   74  Tinnitus of right ear (388 30) (H93 11)   75  Urge incontinence of urine (788 31) (N39 41)   76  URI, acute (465 9) (J06 9)   77  Vitamin D deficiency (268 9) (E55 9)    Past Medical History  1  History of Asthma (493 90) (J45 909)   2  History of Depression (311) (F32 9)   3  History of Encounter for screening mammogram for malignant neoplasm of breast   (V76 12) (Z12 31)   4  History of Generalized anxiety disorder (300 02) (F41 1)   5  History of hepatitis (V12 09) (Z86 19)   6  History of herpes zoster (V12 09) (Z86 19)   7  History of hypercholesterolemia (V12 29) (Z86 39)   8  History of Multiple sclerosis (340) (G35)    Surgical History  1  History of Breast Surgery   2  History of Complete Colonoscopy   3   History of Septoplasty   4  History of Shoulder Surgery    Family History  Mother    1  Family history of Arthritis (V17 7)   2  Family history of Family Health Status Siblings 5  Living   3  Family history of Maternal Grandfather Is    4  Family history of Maternal Grandmother Is    5  Family history of Osteoporosis (V17 81)   6  Family history of Paternal Grandfather Is    9  Family history of Paternal Grandmother Is   Father    6  Family history of Chronic Obstructive Pulmonary Disease  Family History    9  Family history of Arthritis (V17 7)   10  Family history of Chronic Obstructive Pulmonary Disease   11  Family history of Emphysema   12  Family history of Family Health Status Of Father -    15  Family history of Family Health Status Of Mother - Alive   15  Family history of Heart Disease (V17 49)   15  Family history of Hyperthyroidism   12  Family history of Multiple Sclerosis   17  Family history of Osteoarthritis (V17 7)    Social History   · Being A Social Drinker   · Current Every Day Smoker (305 1)   · Current Smoker (305 1)   · Denied: History of Drug Use (305 90)   · Eye Doctor - Last Seen   · Marital History - Currently    · Never Drank Alcohol   · Occupation: Retired   · Seeing A Dentist - Last Seen   · Sexually Active    Current Meds   1  Atorvastatin Calcium 40 MG Oral Tablet; take 1 tablet by mouth daily; Therapy: 39DDA1868 to (Evaluate:87Itv0289)  Requested for: 2017; Last   Rx:2017 Ordered   2  B Complex CAPS; Therapy: (Recorded:41Ykr2324) to Recorded   3  Ecotrin Low Strength 81 MG Oral Tablet Delayed Release; TAKE ONE (1) TABLET(S)   ONCE DAILY; Therapy: 47HLB8712 to (Last Rx:91Xnm3666) Ordered   4  Evening Primrose Oil CAPS; Therapy: (Recorded:50Whf3333) to Recorded   5  Ezetimibe 10 MG Oral Tablet; TAKE ONE (1) TABLET(S) DAILY; Therapy: 19WOI3309 to (Last Rx:2017)  Requested for: 2017 Ordered   6   Flaxseed Oil Oral Oil; take 1 capsule daily; Therapy: (QBKCGQKX:41GTD2200) to Recorded   7  Fluticasone Propionate 50 MCG/ACT Nasal Suspension; instill 2 sprays into each nostril   once daily; Therapy: 70GBT9417 to (Evaluate:65Xeq3616)  Requested for: 05Apr2017; Last   Rx:05Apr2017 Ordered   8  Gabapentin 300 MG Oral Capsule; take 1 capsule by mouth twice a day; Therapy: 33XTM1495 to (Evaluate:30Oct2017)  Requested for: 62Kzv2178; Last   Rx:50Pce1937 Ordered   9  Montelukast Sodium 10 MG Oral Tablet; TAKE 1 TABLET EVERY NIGHT AT BEDTIME; Therapy: 97QPP3005 to (Evaluate:86Tuq5856)  Requested for: 56Gnd0944; Last   Rx:47Xuv2109 Ordered   10  Sertraline HCl - 100 MG Oral Tablet; take 1 tablet by mouth once daily; Therapy: 18XXN8396 to (Evaluate:10Nov2017)  Requested for: 85HAQ5750; Last    Rx:35Kpe8404 Ordered   11  Symbicort 160-4 5 MCG/ACT Inhalation Aerosol; USE 2 PUFFS TWICE DAILY; Therapy: 09OHH0715 to (Lsia Hamilton)  Requested for: 09YUD2730; Last    Rx:06Jan2014 Ordered   12  Tysabri CONC; Therapy: (Recorded:12Nji6680) to Recorded   13  Ventolin  (90 Base) MCG/ACT Inhalation Aerosol Solution; 2 PUFFS EVERY 4 TO    6 HOURS AS NEEDED FOR COUGH/WHEEZE;    Therapy: 20JDE6096 to (Last Rx:09Nov2016)  Requested for: 94YML6951 Ordered   14  Vitamin D (Ergocalciferol) 16753 UNIT Oral Capsule; TAKE 1 CAPSULE WEEKLY; Therapy: 47Xsf6742 to (Last Rx:25Apr2017)  Requested for: 00Epn4055 Ordered    Allergies  1  Cefzil TABS   2  Bactrim TABS   3  Betadine SOLN   4  Pamelor CAPS   5  Prempro TABS   6  Provera TABS   7  Zyban TBCR    Vitals  Signs   Recorded: 26ULH3950 09:36AM   Heart Rate: 76  Respiration: 16  Systolic: 999  Diastolic: 64  Height: 5 ft 5 in  Weight: 151 lb   BMI Calculated: 25 13  BSA Calculated: 1 76  O2 Saturation: 99    Physical Exam    Constitutional   General appearance: No acute distress, well appearing and well nourished      Eyes   Ophthalmoscopic examination: Vision is grossly normal  Gross visual field testing by confrontation shows no abnormalities  EOMI in both eyes  Conjunctivae clear  Eyelids normal palpebral fissures equal  Orbits exhibit normal position  No discharge from the eyes  PERRL  Musculoskeletal   Gait and station: Normal gait, stance and balance  -- amb independently  Muscle strength: Normal strength throughout  Motor Strength:  the patient is left hand dominant  Strength examination:   Biceps strength was 5/5 on the right side-- and-- 5/5 on the left side  Triceps strength was 5/5 on the right side-- and-- 5/5 on the left side  Shoulder flexion was 5/5 on the right side-- and-- 5/5 on the left side  Foot Plantar Flexion: 5/5 on the right side and 5/5 on the left side  Foot Dorsiflexion: 5/5 on the right side and 5/5 on the left side  Ankle Inversion: 5/5 on the right side and 5/5 on the left side  Ankle Eversion: 5/5 on the right side and 5/5 on the left side  Hip Flexion: 5/5 on the right side and 5/5 on the left side  Muscle tone: No atrophy, abnormal movements, flaccidity, cogwheeling or spasticity  Involuntary movements: None observed  Neurologic   Orientation to person, place, and time: Normal     Recent and remote memory: Abnormal     Attention span and concentration: Abnormal     Language: Abnormal  -- mild dysarthria  Fund of knowledge: Normal vocabulary with appropriate knowledge of current events and past history  2nd cranial nerve: Normal   Visual Fields: peripheral vision was full to confrontation bilaterally  3rd, 4th, and 6th cranial nerve: Abnormal  -- diminished smooth pursuit in right eye  5th cranial nerve: Normal   normal facial sensation  7th cranial nerve: Normal   normal facial muscle strength  8th cranial nerve: Normal   no hearing loss  Nystagmus no nystagmus seen  11th cranial nerve: Normal   no weakness of shoulder elevation-- and-- no sternocleidomastoid weakness     Sensation: Normal     Reflexes: Normal  Coordination: Abnormal  -- past pointing on right, improved from last visit  Judgment and insight: Normal     Mood and affect: Normal   Mood and Affect: appropriate mood-- and-- appropriate affect  Results/Data  Diagnostic Studies Reviewed: I personally reviewed the films/images/results in the office today  My interpretation follows  (1) VITAMIN D 25-HYDROXY 80Scw2490 04:12PM Zachariah Hopkins     Test Name Result Flag Reference   VIT D 25-HYDROX 26 2 ng/mL L 30 0-100 0   This assay is a certified procedure of the CDC Vitamin D Standardization Certification Program (VDSCP)     Deficiency <20ng/ml   Insufficiency 20-30ng/ml   Sufficient  ng/ml     *Patients undergoing fluorescein dye angiography may retain small amounts of fluorescein in the body for 48-72 hours post procedure  Samples containing fluorescein can produce falsely elevated Vitamin D values  If the patient had this procedure, a specimen should be resubmitted post fluorescein clearance  (1) COMPREHENSIVE METABOLIC PANEL 57CDT8750 66:41PT Zachariah Hopkins     Test Name Result Flag Reference   SODIUM 137 mmol/L  136-145   POTASSIUM 4 6 mmol/L  3 5-5 3   Slightly Hemolyzed; Results May be Affected   CHLORIDE 104 mmol/L  100-108   CARBON DIOXIDE 23 mmol/L  21-32   ANION GAP (CALC) 10 mmol/L  4-13   BLOOD UREA NITROGEN 18 mg/dL  5-25   CREATININE 0 63 mg/dL  0 60-1 30   Standardized to IDMS reference method   CALCIUM 8 7 mg/dL  8 3-10 1   BILI, TOTAL 0 60 mg/dL  0 20-1 00   ALK PHOSPHATAS 102 U/L     ALT (SGPT) 31 U/L  12-78   Specimen collection should occur prior to Sulfasalazine administration due to the potential for falsely depressed results  AST(SGOT) 27 U/L  5-45   Slightly Hemolyzed; Results May be Affected  Specimen collection should occur prior to Sulfasalazine administration due to the potential for falsely depressed results     ALBUMIN 4 1 g/dL  3 5-5 0   TOTAL PROTEIN 7 1 g/dL  6 4-8 2   eGFR 96 ml/min/1 73sq m National Kidney Disease Education Program recommendations are as follows:  GFR calculation is accurate only with a steady state creatinine  Chronic Kidney disease less than 60 ml/min/1 73 sq  meters  Kidney failure less than 15 ml/min/1 73 sq  meters  GLUCOSE FASTING 105 mg/dL H 65-99   Specimen collection should occur prior to Sulfasalazine administration due to the potential for falsely depressed results  Specimen collection should occur prior to Sulfapyridine administration due to the potential for falsely elevated results  (1) CBC/PLT/DIFF 96BBR0642 12:12PM [facility]     Test Name Result Flag Reference   WBC COUNT 7 98 Thousand/uL  4 31-10 16   RBC COUNT 5 00 Million/uL  3 81-5 12   HEMOGLOBIN 14 8 g/dL  11 5-15 4   HEMATOCRIT 42 3 %  34 8-46  1   MCV 85 fL  82-98   MCH 29 6 pg  26 8-34 3   MCHC 35 0 g/dL  31 4-37 4   RDW 12 8 %  11 6-15 1   MPV 9 4 fL  8 9-12 7   PLATELET COUNT 610 Thousands/uL  149-390   NEUTROPHILS RELATIVE PERCENT 54 %  43-75   LYMPHOCYTES RELATIVE PERCENT 36 %  14-44   MONOCYTES RELATIVE PERCENT 7 %  4-12   EOSINOPHILS RELATIVE PERCENT 2 %  0-6   BASOPHILS RELATIVE PERCENT 1 %  0-1   NEUTROPHILS ABSOLUTE COUNT 4 35 Thousands/? ??L  1 85-7 62   LYMPHOCYTES ABSOLUTE COUNT 2 87 Thousands/? ??L  0 60-4 47   MONOCYTES ABSOLUTE COUNT 0 55 Thousand/? ??L  0 17-1 22   EOSINOPHILS ABSOLUTE COUNT 0 17 Thousand/? ??L  0 00-0 61   BASOPHILS ABSOLUTE COUNT 0 04 Thousands/? ??L  0 00-0 10     STRATIFY JCV(TM) AB (WITH INDEX) W/RFL INHIBITION 82Wvw8222 09:47AM [facility]   REPORT COMMENT:  FASTING:NO     Test Name Result Flag Reference   INDEX VALUE 0 08     JCV ANTIBODY NEGATIVE     Index interpretive criteria:       <0 20 negative       0 20-0 40 indeterminate       >0 40 positive     INTERPRETATION       Negative: Antibodies to JCV not detected         Indeterminate: Low level reactivity detected, see                      Inhibition Assay result to follow                      for the final antibody result  Positive: Antibodies to YAIR virus (JCV) detected                 indicating the patient has been exposed                 to JCV at an undetermined time  The STRATIFY JCV Antibody Test is an enzyme-linked  immunosorbent assay (DESI) designed to detect JCV  antibodies to help identify individuals who have  been exposed to the virus  Samples with low level  reactivity in the detection assay are retested in  a confirmation (inhibition) assay to confirm  presence or absence of JCV-specific antibodies  Attending Note  Collaborating Physician Note: Collaborating Note: I agree with the Advanced Practitioner note  Future Appointments    Date/Time Provider Specialty Site   03/09/2018 09:00 AM DON Escobar  Family Medicine [address] [place] [address]   01/24/2018 10:00 AM DON Choe   Neurology [place] [id number]     Signatures   Electronically signed by : Johanny Hoyos; Oct  2 2017  1:28PM EST                       (Author)    Electronically signed by : DON Rodriguez ; Oct  2 2017  4:46PM EST                       (Co-author)

## 2017-11-03 DIAGNOSIS — E78.5 HYPERLIPIDEMIA: ICD-10-CM

## 2017-11-06 ENCOUNTER — HOSPITAL ENCOUNTER (OUTPATIENT)
Dept: MRI IMAGING | Facility: HOSPITAL | Age: 62
Discharge: HOME | End: 2017-11-06
Attending: PSYCHIATRY & NEUROLOGY
Payer: MEDICARE

## 2017-11-06 DIAGNOSIS — G35 MULTIPLE SCLEROSIS (HCC): ICD-10-CM

## 2017-11-06 PROCEDURE — 70551 MRI BRAIN STEM W/O DYE: CPT

## 2017-11-22 RX ORDER — ACETAMINOPHEN 325 MG/1
650 TABLET ORAL ONCE
Status: COMPLETED | OUTPATIENT
Start: 2017-11-24 | End: 2017-11-24

## 2017-11-22 RX ORDER — SODIUM CHLORIDE 9 MG/ML
20 INJECTION, SOLUTION INTRAVENOUS CONTINUOUS
Status: DISCONTINUED | OUTPATIENT
Start: 2017-11-24 | End: 2017-11-27 | Stop reason: HOSPADM

## 2017-11-24 ENCOUNTER — HOSPITAL ENCOUNTER (OUTPATIENT)
Dept: INFUSION CENTER | Facility: CLINIC | Age: 62
Discharge: HOME | End: 2017-11-24
Payer: MEDICARE

## 2017-11-24 VITALS
SYSTOLIC BLOOD PRESSURE: 124 MMHG | HEART RATE: 77 BPM | TEMPERATURE: 98.4 F | OXYGEN SATURATION: 97 % | RESPIRATION RATE: 18 BRPM | DIASTOLIC BLOOD PRESSURE: 82 MMHG

## 2017-11-24 LAB
ALBUMIN SERPL BCP-MCNC: 4.5 G/DL (ref 3.5–5)
ALP SERPL-CCNC: 113 U/L (ref 46–116)
ALT SERPL W P-5'-P-CCNC: 34 U/L (ref 12–78)
ANION GAP SERPL CALCULATED.3IONS-SCNC: 9 MMOL/L (ref 4–13)
AST SERPL W P-5'-P-CCNC: 14 U/L (ref 5–45)
BASOPHILS # BLD AUTO: 0.05 THOUSANDS/ΜL (ref 0–0.1)
BASOPHILS NFR BLD AUTO: 1 % (ref 0–1)
BILIRUB SERPL-MCNC: 0.4 MG/DL (ref 0.2–1)
BUN SERPL-MCNC: 15 MG/DL (ref 5–25)
CALCIUM SERPL-MCNC: 9.8 MG/DL (ref 8.3–10.1)
CHLORIDE SERPL-SCNC: 102 MMOL/L (ref 100–108)
CO2 SERPL-SCNC: 27 MMOL/L (ref 21–32)
CREAT SERPL-MCNC: 0.68 MG/DL (ref 0.6–1.3)
EOSINOPHIL # BLD AUTO: 0.18 THOUSAND/ΜL (ref 0–0.61)
EOSINOPHIL NFR BLD AUTO: 2 % (ref 0–6)
ERYTHROCYTE [DISTWIDTH] IN BLOOD BY AUTOMATED COUNT: 12.1 % (ref 11.6–15.1)
GFR SERPL CREATININE-BSD FRML MDRD: 93 ML/MIN/1.73SQ M
GLUCOSE SERPL-MCNC: 111 MG/DL (ref 65–140)
HCT VFR BLD AUTO: 44.9 % (ref 34.8–46.1)
HGB BLD-MCNC: 15.4 G/DL (ref 11.5–15.4)
LYMPHOCYTES # BLD AUTO: 2.94 THOUSANDS/ΜL (ref 0.6–4.47)
LYMPHOCYTES NFR BLD AUTO: 31 % (ref 14–44)
MCH RBC QN AUTO: 29.3 PG (ref 26.8–34.3)
MCHC RBC AUTO-ENTMCNC: 34.3 G/DL (ref 31.4–37.4)
MCV RBC AUTO: 86 FL (ref 82–98)
MONOCYTES # BLD AUTO: 0.75 THOUSAND/ΜL (ref 0.17–1.22)
MONOCYTES NFR BLD AUTO: 8 % (ref 4–12)
NEUTROPHILS # BLD AUTO: 5.61 THOUSANDS/ΜL (ref 1.85–7.62)
NEUTS SEG NFR BLD AUTO: 58 % (ref 43–75)
PLATELET # BLD AUTO: 295 THOUSANDS/UL (ref 149–390)
PMV BLD AUTO: 9.4 FL (ref 8.9–12.7)
POTASSIUM SERPL-SCNC: 4.1 MMOL/L (ref 3.5–5.3)
PROT SERPL-MCNC: 8.2 G/DL (ref 6.4–8.2)
RBC # BLD AUTO: 5.25 MILLION/UL (ref 3.81–5.12)
SODIUM SERPL-SCNC: 138 MMOL/L (ref 136–145)
WBC # BLD AUTO: 9.53 THOUSAND/UL (ref 4.31–10.16)

## 2017-11-24 PROCEDURE — 80053 COMPREHEN METABOLIC PANEL: CPT | Performed by: PSYCHIATRY & NEUROLOGY

## 2017-11-24 PROCEDURE — 85025 COMPLETE CBC W/AUTO DIFF WBC: CPT | Performed by: PSYCHIATRY & NEUROLOGY

## 2017-11-24 PROCEDURE — 96367 TX/PROPH/DG ADDL SEQ IV INF: CPT

## 2017-11-24 PROCEDURE — 96413 CHEMO IV INFUSION 1 HR: CPT

## 2017-11-24 RX ADMIN — ACETAMINOPHEN 650 MG: 325 TABLET ORAL at 13:44

## 2017-11-24 RX ADMIN — DIPHENHYDRAMINE HYDROCHLORIDE 25 MG: 50 INJECTION, SOLUTION INTRAMUSCULAR; INTRAVENOUS at 13:44

## 2017-11-24 RX ADMIN — NATALIZUMAB 300 MG: 300 INJECTION INTRAVENOUS at 14:18

## 2017-11-24 RX ADMIN — SODIUM CHLORIDE 20 ML/HR: 0.9 INJECTION, SOLUTION INTRAVENOUS at 13:00

## 2017-11-24 NOTE — PROGRESS NOTES
Pt not authorized for tysabri, office notified, authorization obtained, medication guide and pre-infusion checklist completed

## 2017-11-24 NOTE — PROGRESS NOTES
Treatment completed and pr observed for 1 hr post without adverse event, pt to return as scheduled, declined AVS

## 2017-12-21 RX ORDER — ACETAMINOPHEN 325 MG/1
650 TABLET ORAL ONCE
Status: COMPLETED | OUTPATIENT
Start: 2017-12-22 | End: 2017-12-22

## 2017-12-21 RX ORDER — SODIUM CHLORIDE 9 MG/ML
20 INJECTION, SOLUTION INTRAVENOUS CONTINUOUS
Status: DISCONTINUED | OUTPATIENT
Start: 2017-12-22 | End: 2017-12-25 | Stop reason: HOSPADM

## 2017-12-22 ENCOUNTER — HOSPITAL ENCOUNTER (OUTPATIENT)
Dept: INFUSION CENTER | Facility: CLINIC | Age: 62
Discharge: HOME | End: 2017-12-24
Payer: MEDICARE

## 2017-12-22 VITALS
HEART RATE: 80 BPM | SYSTOLIC BLOOD PRESSURE: 134 MMHG | TEMPERATURE: 97.4 F | DIASTOLIC BLOOD PRESSURE: 70 MMHG | RESPIRATION RATE: 16 BRPM

## 2017-12-22 LAB
ALBUMIN SERPL BCP-MCNC: 3.9 G/DL (ref 3.5–5)
ALBUMIN SERPL BCP-MCNC: 4 G/DL (ref 3.5–5)
ALP SERPL-CCNC: 105 U/L (ref 46–116)
ALP SERPL-CCNC: 108 U/L (ref 46–116)
ALT SERPL W P-5'-P-CCNC: 31 U/L (ref 12–78)
ALT SERPL W P-5'-P-CCNC: 34 U/L (ref 12–78)
ANION GAP SERPL CALCULATED.3IONS-SCNC: 9 MMOL/L (ref 4–13)
AST SERPL W P-5'-P-CCNC: 11 U/L (ref 5–45)
AST SERPL W P-5'-P-CCNC: 42 U/L (ref 5–45)
BASOPHILS # BLD AUTO: 0.03 THOUSANDS/ΜL (ref 0–0.1)
BASOPHILS NFR BLD AUTO: 0 % (ref 0–1)
BILIRUB DIRECT SERPL-MCNC: 0.03 MG/DL (ref 0–0.2)
BILIRUB SERPL-MCNC: 0.4 MG/DL (ref 0.2–1)
BILIRUB SERPL-MCNC: 0.6 MG/DL (ref 0.2–1)
BUN SERPL-MCNC: 16 MG/DL (ref 5–25)
CALCIUM SERPL-MCNC: 8.9 MG/DL (ref 8.3–10.1)
CHLORIDE SERPL-SCNC: 104 MMOL/L (ref 100–108)
CHOLEST SERPL-MCNC: 193 MG/DL (ref 50–200)
CO2 SERPL-SCNC: 27 MMOL/L (ref 21–32)
CREAT SERPL-MCNC: 0.68 MG/DL (ref 0.6–1.3)
EOSINOPHIL # BLD AUTO: 0.2 THOUSAND/ΜL (ref 0–0.61)
EOSINOPHIL NFR BLD AUTO: 2 % (ref 0–6)
ERYTHROCYTE [DISTWIDTH] IN BLOOD BY AUTOMATED COUNT: 12.1 % (ref 11.6–15.1)
GFR SERPL CREATININE-BSD FRML MDRD: 93 ML/MIN/1.73SQ M
GLUCOSE SERPL-MCNC: 118 MG/DL (ref 65–140)
HCT VFR BLD AUTO: 40.3 % (ref 34.8–46.1)
HDLC SERPL-MCNC: 52 MG/DL (ref 40–60)
HGB BLD-MCNC: 13.9 G/DL (ref 11.5–15.4)
LDLC SERPL CALC-MCNC: 101 MG/DL (ref 0–100)
LYMPHOCYTES # BLD AUTO: 2.86 THOUSANDS/ΜL (ref 0.6–4.47)
LYMPHOCYTES NFR BLD AUTO: 30 % (ref 14–44)
MCH RBC QN AUTO: 29.9 PG (ref 26.8–34.3)
MCHC RBC AUTO-ENTMCNC: 34.5 G/DL (ref 31.4–37.4)
MCV RBC AUTO: 87 FL (ref 82–98)
MONOCYTES # BLD AUTO: 0.68 THOUSAND/ΜL (ref 0.17–1.22)
MONOCYTES NFR BLD AUTO: 7 % (ref 4–12)
NEUTROPHILS # BLD AUTO: 5.7 THOUSANDS/ΜL (ref 1.85–7.62)
NEUTS SEG NFR BLD AUTO: 61 % (ref 43–75)
PLATELET # BLD AUTO: 273 THOUSANDS/UL (ref 149–390)
PMV BLD AUTO: 9.7 FL (ref 8.9–12.7)
POTASSIUM SERPL-SCNC: 3.9 MMOL/L (ref 3.5–5.3)
PROT SERPL-MCNC: 7 G/DL (ref 6.4–8.2)
PROT SERPL-MCNC: 7.7 G/DL (ref 6.4–8.2)
RBC # BLD AUTO: 4.65 MILLION/UL (ref 3.81–5.12)
SODIUM SERPL-SCNC: 140 MMOL/L (ref 136–145)
TRIGL SERPL-MCNC: 202 MG/DL
WBC # BLD AUTO: 9.47 THOUSAND/UL (ref 4.31–10.16)

## 2017-12-22 PROCEDURE — 80053 COMPREHEN METABOLIC PANEL: CPT | Performed by: PSYCHIATRY & NEUROLOGY

## 2017-12-22 PROCEDURE — 96413 CHEMO IV INFUSION 1 HR: CPT

## 2017-12-22 PROCEDURE — 85025 COMPLETE CBC W/AUTO DIFF WBC: CPT | Performed by: PSYCHIATRY & NEUROLOGY

## 2017-12-22 PROCEDURE — 80076 HEPATIC FUNCTION PANEL: CPT | Performed by: FAMILY MEDICINE

## 2017-12-22 PROCEDURE — 96375 TX/PRO/DX INJ NEW DRUG ADDON: CPT

## 2017-12-22 PROCEDURE — 80061 LIPID PANEL: CPT | Performed by: FAMILY MEDICINE

## 2017-12-22 RX ADMIN — SODIUM CHLORIDE 20 ML/HR: 0.9 INJECTION, SOLUTION INTRAVENOUS at 09:10

## 2017-12-22 RX ADMIN — DIPHENHYDRAMINE HYDROCHLORIDE 25 MG: 50 INJECTION, SOLUTION INTRAMUSCULAR; INTRAVENOUS at 09:19

## 2017-12-22 RX ADMIN — ACETAMINOPHEN 650 MG: 325 TABLET, FILM COATED ORAL at 09:19

## 2017-12-22 RX ADMIN — NATALIZUMAB 300 MG: 300 INJECTION INTRAVENOUS at 09:42

## 2017-12-22 NOTE — PROGRESS NOTES
Pt resting with no complaints  Labs drawn with IV insertion  Vitals stable  Callbell within reach; will continue to monitor

## 2017-12-31 ENCOUNTER — LAB CONVERSION - ENCOUNTER (OUTPATIENT)
Dept: OTHER | Facility: OTHER | Age: 62
End: 2017-12-31

## 2017-12-31 LAB
INDEX VALUE (HISTORICAL): 0.09
JCV ANTIBODY (HISTORICAL): NEGATIVE

## 2018-01-08 ENCOUNTER — GENERIC CONVERSION - ENCOUNTER (OUTPATIENT)
Dept: NEUROLOGY | Facility: CLINIC | Age: 63
End: 2018-01-08

## 2018-01-10 NOTE — MISCELLANEOUS
Assessment    1  RML pneumonia (5) (J18 1)   2  Lower leg pain (729 5) (M79 669)   3  Hand pain, not arthralgia (729 5) (M79 643)   4  Chronic obstructive pulmonary disease (496) (J44 9)   5  Disc degeneration, lumbar (722 52) (M51 36)    Plan  Chronic obstructive pulmonary disease, Multiple sclerosis, RML pneumonia    · * CT CHEST WO CONTRAST; Status:Hold For - Scheduling; Requested for:10Feb2017;    Perform:[facility]; Due:10Feb2018; Ordered; For:Chronic obstructive pulmonary disease, Multiple sclerosis, RML pneumonia; Ordered By:Gail Almeida; Hand pain, not arthralgia, Hyperlipidemia, Multiple sclerosis    · VAS UPPER LIMB ARTERIAL DUPLEX, COMPLETE BILATERAL, GRAFT; SIDE:Bilateral;  Status:Hold For - Scheduling; Requested for:10Feb2017;    Perform:[facility]; Due:10Feb2018; Ordered;  For:Hand pain, not arthralgia, Hyperlipidemia, Multiple sclerosis; Ordered By:Gail Almeida; Hyperlipidemia, Leg weakness, bilateral, Limb pain, Lower leg pain, Multiple sclerosis    · VAS LOWER LIMB VENOUS DUPLEX STUDY, COMPLETE BILATERAL; Status:Hold For -  Scheduling; Requested for:10Feb2017;    Perform:[facility]; Due:10Feb2018; Ordered;  For:Hyperlipidemia, Leg weakness, bilateral, Limb pain, Lower leg pain, Multiple sclerosis; Ordered By:Gail Almeida; Left sided sciatica    · * XR SPINE LUMBAR MINIMUM 4 VIEWS NON INJURY; Status:Active; Requested  for:10Feb2017;    Perform:[facility]; BDP:84KDP0215; Ordered; For:Left sided sciatica; Ordered By:Gail Almeida; Limb Weakness (Paresis), Multiple sclerosis    · (1) CASEY SCREEN W/REFLEX TO TITER/PATTERN; Status:Active; Requested  for:10Feb2017;    Perform:[facility]; OAN:40HGU7977; Ordered; For:Limb Weakness (Paresis), Multiple sclerosis; Ordered By:Gail Almeida;   · (1) CBC/PLT/DIFF; Status:Resulted - Requires Verification;   Done: 62OWU3919 11:38AM   Due:97Vfg8095; Ordered;   For:Limb Weakness (Paresis), Multiple sclerosis; Ordered By:Letha Almeida;   · (1) COMPREHENSIVE METABOLIC PANEL; Status:Resulted - Requires Verification;    Done: 87CHO0009 11:38AM   Due:07Yna0279; Ordered; For:Limb Weakness (Paresis), Multiple sclerosis; Ordered By:Maria E Almeida;   · (1) C-REACTIVE PROTEIN; Status:Resulted - Requires Verification;   Done: 63CFJ7193  11:38AM   Due:02Vit8862; Ordered; For:Limb Weakness (Paresis), Multiple sclerosis; Ordered By:Maria E Almeida;   · (1) LYME ANTIBODY PROFILE W/REFLEX TO WESTERN BLOT; Status:Active; Requested  for:10Feb2017;    Perform:[facility]; LBB:19GNJ1437; Ordered; For:Limb Weakness (Paresis), Multiple sclerosis; Ordered By:Letha Almeida;   · (1) RHEUMATOID FACTOR SCREEN; Status:Active; Requested for:10Feb2017;    Perform:[facility]; SGT:08IWG0873; Ordered; For:Limb Weakness (Paresis), Multiple sclerosis; Ordered By:Letha Almeida;   · (1) SED RATE; Status:Resulted - Requires Verification;   Done: 25RQA9553 11:38AM   Due:47Bnw8870; Ordered; For:Limb Weakness (Paresis), Multiple sclerosis; Ordered By:Maria E Almeida;   · (1) URIC ACID; Status:Resulted - Requires Verification;   Done: 35CCT6988 11:38AM   Due:49Kxg3191; Ordered; For:Limb Weakness (Paresis), Multiple sclerosis; Ordered By:Letha Almeida; Lower leg pain    · VAS LOWER LIMB ARTERIAL DUPLEX, COMPLETE BILATERAL/GRAFTS; SIDE:Bilateral;  Status:Hold For - Scheduling; Requested for:10Feb2017;    Perform:[facility]; Due:10Feb2018; Ordered; For:Lower leg pain; Ordered By:Letha Almeida; Discussion/Summary  Discussion Summary:   Patient presents for follow-up after recent hospitalization  She presented with severe low back pain, left sciatica, left lower extremity weakness and right hand edema  Original concern of right middle lobe pneumonia, not stated in final diagnosis in patient's discharge summary    Nevertheless, given the fact of her heavy tobacco use we will proceed with CT chest for further evaluation  We will contact Dr Tommie Manning regarding recent admission  We need to rule out possibility of PVD in this patient with risk factors including age, menopause, tobacco use, and hyperlipidemia  We will check x-ray of lumbar sacral spine along with arterial and venous Dopplers of lower extremities and arterial Dopplers of upper extremities   We will proceed with blood work as outlined above  Patient's Capacity to Self-Care: Patient is able to Self-Care  Patient agrees and allows to involve family/caregiver in development of care plan:   Medication SE Review and Pt Understands Tx: Possible side effects of new medications were reviewed with the patient/guardian today  The treatment plan was reviewed with the patient/guardian  The patient/guardian understands and agrees with the treatment plan      Chief Complaint  Chief Complaint Free Text Note Form: LIU: PT was admitted to [facility] from 02/02/2017 through 02/04/2017  Dx: Right hand and hip pain  Spoke with pt who reports her pain scale is 2/10 and right hand swelling has decreased  However, her right hand and leg feel weak  Denies N/T  Follow up with pcp scheduled for tomorrow, 02/08/2017 at 9am       History of Present Illness  TCM Communication [facility]: The patient is being contacted for follow-up after hospitalization and 02/07/2017  She was hospitalized at [facility]  The date of admission: 02/02/2017, date of discharge: 02/04/2017  Diagnosis: Right hand and hip pain  She was discharged to home  Medications were not reviewed today  She scheduled a follow up appointment  right hand and hip pain Counseling was provided to the patient  Topics counseled included importance of compliance with treatment     Communication performed and completed by Jie Reid       HPI: Patient presents after recent hospitalization at [facility]   She reportedly woke up with severe pain lower back radiating to left buttock and lower leg /left , severely affecting her ability to walk  Patient reportedly also developed significant dorsal right hand swelling  No other arthralgias, myalgias or edema   Patient was evaluated in the emergency room and was admitted to [facility] [place]  Chest x-ray performed in emergency room raise concern about right middle lobe pneumonia, patient was treated with IV antibiotics but was not discharged home on any oral antibiotics  She is still unfortunately smoking  She denies cough, wheezing, fever or cold symptoms at present time  Carotid doppler - normal as per patient  Orthopedic consult, unremarkable x-rays of left hip and right hand  Neurology consult, no evidence of MS flare  Patient was discharged home without no definitive diagnosis  She is feeling better, she still complaining of difficulty ambulating on the left  Her both calves feel "tight"  Right hand edema have resolved  No unusual headaches or visual changes , patient admits feeling " foggy" overall  No new bladder symptoms  Patient denies neck pain  Patient is compliant with gabapentin and uses it as directed  She was discharged home on the same daily medications  Pending appointment with neurology in early April  Review of Systems  Complete-Female:   Constitutional: feeling tired  Eyes: No complaints of eye pain, no red eyes, no eyesight problems, no discharge, no dry eyes, no itching of eyes  ENT: no complaints of earache, no loss of hearing, no nose bleeds, no nasal discharge, no sore throat, no hoarseness  Cardiovascular: No complaints of slow heart rate, no fast heart rate, no chest pain, no palpitations, no leg claudication, no lower extremity edema  Respiratory: No complaints of shortness of breath, no wheezing, no cough, no SOB on exertion, no orthopnea, no PND  Gastrointestinal: No complaints of abdominal pain, no constipation, no nausea or vomiting, no diarrhea, no bloody stools  Genitourinary: No complaints of dysuria, no incontinence, no pelvic pain, no dysmenorrhea, no vaginal discharge or bleeding  Musculoskeletal: hand pain, localized swelling and limb pain  Neurological: limb weakness and difficulty walking  Psychiatric: Not suicidal, no sleep disturbance, no anxiety or depression, no change in personality, no emotional problems  Endocrine: No complaints of proptosis, no hot flashes, no muscle weakness, no deepening of the voice, no feelings of weakness  Hematologic/Lymphatic: No complaints of swollen glands, no swollen glands in the neck, does not bleed easily, does not bruise easily  Active Problems    1  Abdominal pain (789 00) (R10 9)   2  Abnormal EEG (794 02) (R94 01)   3  Acid reflux disease (530 81) (K21 9)   4  Acute sinusitis (461 9) (J01 90)   5  Allergic rhinitis (477 9) (J30 9)   6  Anxiety (300 00) (F41 9)   7  Arm weakness (729 89) (R29 898)   8  Arthralgia (719 40) (M25 50)   9  Arthritis (716 90) (M19 90)   10  Attention and concentration deficit (799 51) (R41 840)   11  Blepharitis, unspecified laterality   12  Blurry vision (368 8) (H53 8)   13  Cervical radiculopathy (723 4) (M54 12)   14  Cervical radiculopathy (723 4) (M54 12)   15  Cervical spinal stenosis (723 0) (M48 02)   16  Cervical spinal stenosis (723 0) (M48 02)   17  Cervicalgia (723 1) (M54 2)   18  Chronic obstructive pulmonary disease (496) (J44 9)   19  Closed Dislocation Of The Patella (836 3)   20  Colitis (558 9) (K52 9)   21  Complaints of memory disturbance (780 93) (R41 3)   22  Contact dermatitis (692 9) (L25 9)   23  Contusion of coccyx (922 32) (S30 0XXA)   24  Degeneration of cervical intervertebral disc (722 4) (M50 90)   25  Depression with anxiety (300 4) (F41 8)   26  Disc degeneration, lumbar (722 52) (M51 36)   27  Disorder of nose or nasal sinus (478 19) (J34 9)   28  Disturbance Of Coordination (781 99)   29  Dizziness and giddiness (780 4) (R42)   30   Dyspepsia (536  8) (K30)   31  Dysuria (788 1) (R30 0)   32  Fatigue (780 79) (R53 83)   33  Fatty liver (571 8) (K76 0)   34  Fecal incontinence (787 60) (R15 9)   35  Gastro-esophageal reflux disease with esophagitis (530 11) (K21 0)   36  Hip pain, unspecified laterality   37  Hyperkalemia (276 7) (E87 5)   38  Hyperlipidemia (272 4) (E78 5)   39  Impacted cerumen of right ear (380 4) (H61 21)   40  Insect bite (919 4,E906 4) (W57 XXXA)   41  Intervertebral disc disorders with radiculopathy, lumbar region (724 4) (M51 16)   42  Lack of coordination (781 3) (R27 9)   43  Left sided sciatica (724 3) (M54 32)   44  Leg weakness, bilateral (729 89) (R29 898)   45  Limb pain (729 5) (M79 609)   46  Migraine headache (346 90) (G43 909)   47  Multiple sclerosis (340) (G35)   48  Myofascial pain syndrome (729 1) (M79 1)   49  Neck strain (847 0) (S16 1XXA)   50  Need for 23-polyvalent pneumococcal polysaccharide vaccine (V03 82) (Z23)   51  Need for prophylactic vaccination and inoculation against influenza (V04 81) (Z23)   52  Need for TD vaccine (V06 5) (Z23)   53  Optic neuritis (377 30) (H46 9)   54  Osteoporosis (733 00) (M81 0)   55  Pain, joint, shoulder (719 41) (M25 519)   56  Pre-op exam (V72 84) (Z01 818)   57  Primary osteoarthritis of left knee (715 16) (M17 12)   58  Raynaud's syndrome without gangrene (443 0) (I73 00)   59  Right knee pain (719 46) (M25 561)   60  Rotator cuff tear (840 4) (M75 100)   61  Sinus pain (478 19) (J34 89)   62  Spondylosis of cervical region without myelopathy or radiculopathy (721 0) (M47 812)   63  Strain Of Right Biceps Tendon (840 8)   64  Subacromial or subdeltoid bursitis, unspecified laterality   72  Sunburn, blistering (692 76) (L55 1)   66  Urge incontinence of urine (788 31) (N39 41)   67  URI, acute (465 9) (J06 9)   68  Vitamin D deficiency (268 9) (E55 9)    Past Medical History    1  History of Asthma (493 90) (J45 909)   2  History of Depression (311) (F32 9)   3   History of Encounter for screening mammogram for malignant neoplasm of breast   (V76 12) (Z12 31)   4  History of Generalized anxiety disorder (300 02) (F41 1)   5  History of hepatitis (V12 09) (Z86 19)   6  History of herpes zoster (V12 09) (Z86 19)   7  History of hypercholesterolemia (V12 29) (Z86 39)   8  History of Multiple sclerosis (340) (G35)    Surgical History    1  History of Breast Surgery   2  History of Complete Colonoscopy   3  History of Septoplasty   4  History of Shoulder Surgery  Surgical History Reviewed: The surgical history was reviewed and updated today  Family History  Mother    1  Family history of Arthritis (V17 7)   2  Family history of Family Health Status Siblings 5  Living   3  Family history of Maternal Grandfather Is    4  Family history of Maternal Grandmother Is    5  Family history of Osteoporosis (V17 81)   6  Family history of Paternal Grandfather Is    9  Family history of Paternal Grandmother Is   Father    6  Family history of Chronic Obstructive Pulmonary Disease  Family History    9  Family history of Arthritis (V17 7)   10  Family history of Chronic Obstructive Pulmonary Disease   11  Family history of Emphysema   12  Family history of Family Health Status Of Father -    15  Family history of Family Health Status Of Mother - Alive   15  Family history of Heart Disease (V17 49)   15  Family history of Hyperthyroidism   12  Family history of Multiple Sclerosis   17  Family history of Osteoarthritis (V17 7)  Family History Reviewed: The family history was reviewed and updated today  Social History    · Being A Social Drinker   · Current Every Day Smoker (305 1)   · Current Smoker (305 1)   · Denied: History of Drug Use (305 90)   · Eye Doctor - Last Seen   · Marital History - Currently    · Never Drank Alcohol   · Occupation: Retired   · Seeing A Dentist - Last Seen   · Sexually Active  Social History Reviewed:  The social history was reviewed and updated today  Current Meds   1  Atorvastatin Calcium 40 MG Oral Tablet; take 1 tablet by mouth daily; Therapy: 10KMM9056 to (Evaluate:37Eur0419)  Requested for: 72HPL3525; Last   Rx:09Jan2017 Ordered   2  B Complex CAPS; Therapy: (Recorded:82Kqw6821) to Recorded   3  Dexilant 60 MG Oral Capsule Delayed Release; take 1 capsule daily; Therapy: 03Sep2013 to (Evaluate:22Kwc2162)  Requested for: 40Edb3545; Last   Rx:75Kfr0310 Ordered   4  Ecotrin Low Strength 81 MG Oral Tablet Delayed Release; TAKE ONE (1) TABLET(S)   ONCE DAILY; Therapy: 91LSJ1863 to (Last Rx:23Sep2014) Ordered   5  Evening Primrose Oil CAPS; Therapy: (Recorded:52Fqz8469) to Recorded   6  Ezetimibe 10 MG Oral Tablet; TAKE ONE (1) TABLET(S) DAILY; Therapy: 52HVR3652 to (Last Rx:30Jan2017)  Requested for: 30Jan2017 Ordered   7  Flaxseed Oil Oral Oil; take 1 capsule daily; Therapy: (RQOLNZUL:64KVN0379) to Recorded   8  Fluticasone Propionate 50 MCG/ACT Nasal Suspension; instill 2 sprays into each nostril   once daily; Therapy: 41UMF7786 to (Evaluate:19Mar2017)  Requested for: 15RBW8338; Last   Rx:03Jan2017 Ordered   9  Gabapentin 300 MG Oral Capsule; Take 1 capsule by mouth  twice a day; Therapy: 53CSW1838 to (Evaluate:29Jul2017)  Requested for: 77ZCI1966; Last   Rx:30Jan2017 Ordered   10  Montelukast Sodium 10 MG Oral Tablet; TAKE 1 TABLET EVERY DAY; Therapy: 97XGP4895 to (Evaluate:38Eib0166); Last Rx:23Pim9489 Ordered   11  Sertraline HCl - 100 MG Oral Tablet; take 1 tablet by mouth once daily; Therapy: 85HNH8313 to (Evaluate:10Nov2017)  Requested for: 21IFK5913; Last    Rx:15Nov2016 Ordered   12  Symbicort 160-4 5 MCG/ACT Inhalation Aerosol; USE 2 PUFFS TWICE DAILY; Therapy: 03RCA7115 to (Dominique Luis)  Requested for: 30OWK3082; Last    Rx:06Jan2014 Ordered   13  Tysabri CONC; Therapy: (Recorded:46Igt4468) to Recorded   14   Ventolin  (90 Base) MCG/ACT Inhalation Aerosol Solution; 2 PUFFS EVERY 4 TO    6 HOURS AS NEEDED FOR COUGH/WHEEZE;    Therapy: 45TAV1755 to (Last Rx:09Nov2016)  Requested for: 85JOZ0661 Ordered   15  Vitamin D3 2000 UNIT Oral Capsule; take 2 capsule by mouth once daily; Therapy: 23RZE1957 to (Last Rx:30Jan2017) Ordered  Medication List Reviewed: The medication list was reviewed and updated today  Allergies    1  Cefzil TABS   2  Bactrim TABS   3  Betadine SOLN   4  Pamelor CAPS   5  Prempro TABS   6  Provera TABS   7  Zyban TBCR    Vitals  Signs   Recorded: 33JLC1829 10:57AM   Temperature: 98 F  Heart Rate: 72  Systolic: 502  Diastolic: 60  Height: 5 ft 5 in  Weight: 147 lb 6 08 oz  BMI Calculated: 24 53  BSA Calculated: 1 74    Physical Exam    Constitutional   General appearance: Abnormal   well developed and chronically ill  Pulmonary   Respiratory effort: No increased work of breathing or signs of respiratory distress  Auscultation of lungs: Clear to auscultation  Cardiovascular   Auscultation of heart: Normal rate and rhythm, normal S1 and S2, without murmurs  Examination of extremities for edema and/or varicosities: Normal     Carotid pulses: Normal     Musculoskeletal   Gait and station: Abnormal   Gait evaluation demonstrated spasticity bilaterally and limping on the left  Negative straight leg rising bilaterally, paraspinal spasm at L4-L5 and L5-S1 left more than right  Neurologic   Cranial nerves: Cranial nerves 2-12 intact  Psychiatric   Orientation to person, place, and time: Normal     Mood and affect: Normal     Additional Exam:  no hand edema, discoloration ; no calf tenderness or discoloration on exam         Results/Data  (1) CBC/PLT/DIFF 61EUO9615 11:38AM Audra Deluca    Order Number: [id number]     Test Name Result Flag Reference   WBC COUNT 11 32 Thousand/uL H 4 31-10 16   RBC COUNT 4 97 Million/uL  3 81-5 12   HEMOGLOBIN 14 7 g/dL  11 5-15 4   HEMATOCRIT 43 4 %  34 8-46  1   MCV 87 fL  82-98   MCH 29 6 pg 26 8-34 3   MCHC 33 9 g/dL  31 4-37 4   RDW 12 1 %  11 6-15 1   MPV 9 4 fL  8 9-12 7   PLATELET COUNT 113 Thousands/uL  149-390   nRBC AUTOMATED 0 /100 WBCs     NEUTROPHILS RELATIVE PERCENT 61 %  43-75   LYMPHOCYTES RELATIVE PERCENT 30 %  14-44   MONOCYTES RELATIVE PERCENT 6 %  4-12   EOSINOPHILS RELATIVE PERCENT 2 %  0-6   BASOPHILS RELATIVE PERCENT 1 %  0-1   NEUTROPHILS ABSOLUTE COUNT 6 93 Thousands/?L  1 85-7 62   LYMPHOCYTES ABSOLUTE COUNT 3 39 Thousands/?L  0 60-4 47   MONOCYTES ABSOLUTE COUNT 0 70 Thousand/?L  0 17-1 22   EOSINOPHILS ABSOLUTE COUNT 0 21 Thousand/?L  0 00-0 61   BASOPHILS ABSOLUTE COUNT 0 06 Thousands/?L  0 00-0 10   - Patient Instructions: This bloodwork is non-fasting  Please drink two glasses of water morning of bloodwork  - Patient Instructions: This bloodwork is non-fasting  Please drink two glasses of water morning of bloodwork  (1) COMPREHENSIVE METABOLIC PANEL 62WLY7717 33:17XT Elsie Hunt Order Number: [id number]     Test Name Result Flag Reference   GLUCOSE,RANDM 99 mg/dL     If the patient is fasting, the ADA then defines impaired fasting glucose as > 100 mg/dL and diabetes as > or equal to 123 mg/dL     SODIUM 140 mmol/L  136-145   POTASSIUM 4 7 mmol/L  3 5-5 3   CHLORIDE 106 mmol/L  100-108   CARBON DIOXIDE 28 mmol/L  21-32   ANION GAP (CALC) 6 mmol/L  4-13   BLOOD UREA NITROGEN 17 mg/dL  5-25   CREATININE 0 70 mg/dL  0 60-1 30   Standardized to IDMS reference method   CALCIUM 9 6 mg/dL  8 3-10 1   BILI, TOTAL 0 29 mg/dL  0 20-1 00   ALK PHOSPHATAS 106 U/L     ALT (SGPT) 29 U/L  12-78   AST(SGOT) 11 U/L  5-45   ALBUMIN 4 1 g/dL  3 5-5 0   TOTAL PROTEIN 8 1 g/dL  6 4-8 2   eGFR Non-African American      >60 0 ml/min/1 73sq m   [facility] Disease Education Program recommendations are as follows:  GFR calculation is accurate only with a steady state creatinine  Chronic Kidney disease less than 60 ml/min/1 73 sq  meters  Kidney failure less than 15 ml/min/1 73 sq  meters  (1) C-REACTIVE PROTEIN 74YBW4224 11:38AM Lili Saravia    Order Number: [id number]     Test Name Result Flag Reference   C-REACT PROTEIN <3 0 mg/L  <3 0     (1) SED RATE 34PRI4335 11:38AM Sharita Abdul Order Number: [id number]     Test Name Result Flag Reference   SED RATE 23 mm/hour H 0-20     (1) URIC ACID 10IDK7593 11:38AM Sharita Future Domainks Order Number: [id number]     Test Name Result Flag Reference   URIC ACID 4 6 mg/dL  2 0-6 8   Specimen collection should occur prior to Metamizole administration due to the potential for falsely depressed results  Health Management  Health Maintenance   Medicare Annual Wellness Visit; every 1 year; Next Due: 26VJX6636; Active    Future Appointments    Date/Time Provider Specialty Site   05/05/2017 09:30 AM DON Jane  [facility] Medicine [address] [place] [address]   04/04/2017 10:30 AM Chapis Alfred, [facility] Neurology  [address]   07/19/2017 09:30 AM DON Lester  Neurology [address]     Signatures   Electronically signed by :  DON Santos ; Feb 12 2017  2:38PM EST                       (Author)

## 2018-01-10 NOTE — PROGRESS NOTES
Assessment    1  Multiple sclerosis (340) (G35)   2  Vitamin D deficiency (268 9) (E55 9)   3  Urge incontinence of urine (788 31) (N39 41)    Plan  Multiple sclerosis    · Modafinil 200 MG Oral Tablet (Provigil); TAKE 1 TABLET DAILY AS DIRECTED   Rx By: Guadalupe Nunez; Dispense: 90 Days ; #:90 Tablet; Refill: 1; For: Multiple sclerosis; DIEGO = N; Print Rx   · * MRI BRAIN MS WO AND W CONTRAST; Status:Active; Requested for:13Apr2016;    Perform:[facility]; Due:13Apr2017; Last Updated By:Nate Shannon Portal; 4/13/2016 10:35:29 AM;Ordered; For:Multiple sclerosis; Ordered By:Krista Lanza;  Multiple sclerosis, Urge incontinence of urine    · 2 Jean Carlos Prieto MD, Michelle Calvo  (Obstetrics/Gynecology) Physician Referral  Consult  Status: Active   Requested for: 13Apr2016   Ordered; For: Multiple sclerosis, Urge incontinence of urine; Ordered By: Guadalupe Nunez Performed:  Due: 07RVY3681; Last Updated By: Heraclio Barron; 4/13/2016 10:38:03 AM  Care Summary provided  : Yes  Vitamin D deficiency    · (1) VITAMIN D 25-HYDROXY; Status:Active; Requested for:13Apr2016;    Perform:[facility]; Due:13Apr2017;Ordered; For:Vitamin D deficiency; Ordered By:Scarlett Lanza;    Discussion/Summary  Discussion Summary:   Patient here for MS follow up  Remains on Tysabri since 2011  JCV negative in Nov 2015 with index of 0 10   -will update again at this time  Will update MRI brain in May for 6 month surveillance   Check vit D at this time, has been deficient in the past  -currently taking 2,000IU daily  Patient having urge incontinence, will refer to Dr Catalino Simons  Follow up in 4 months  Call for any new symptoms  Medication Side Effects Reviewed: Possible side effects of new medications were reviewed with the patient/guardian today  Patient Guardian understands agrees: The treatment plan was reviewed with the patient/guardian   The patient/guardian understands and agrees with the treatment plan   Counseling Documentation With Imm: The patient was counseled regarding diagnostic results, instructions for management, risk factor reductions, prognosis, patient and family education, impressions, risks and benefits of treatment options, importance of compliance with treatment  total time of encounter was 30 minutes and >50% minutes was spent counseling  Chief Complaint  Chief Complaint Free Text Note Form: patient present for follow up  History of Present Illness  HPI: Patient is a 63year old female with PMH of MS diagnosed in 2000, last seen in October 2015  Patient has been on Tysabri since 2011 and has been doing well  She has missed occasional infusions due to illnesses  Patient tested for JCV antibodies in November 2015, negative at 0 10  Last MRI brain 11/1/2015 numerous T2 and FLAIR hyperintense foci in the supratentorial WM, brainstem and cerebellum  Corpus callosal involvement also noted  Post-contrast imaging reveals a new enhancing lesion measuring 5mm involving the right frontal temporal subcutaneous insular WM  Patient was notified of this, but no steroids were given at the time  MRI c-spine 11/1/2015 stable, scattered cord lesions, no new or enhancing lesions  MRI t-spine 11/2/2015 a few scattered cord lesions are re-demonstrated  No new or enhancing lesions  Since last visit, patient reports she is overall doing well  At last visit she reported some trouble with her bladder  Her PCP referred her to someone in [facility] who could do Botox for her, but she thinks that is too far to travel  She is wondering if I can refer to a urogynecologist that does Botox  I will refer her Cayden Salem, although I told her I am not sure if she does Botox, but will likely be able to help her  She denies any trips or falls, legs are doing ok  She recently did a lot of walking on a trip to [address] [place] [address] and had pain in her feet  She reports this was due to wear poor footwear and bought some running shoes while there   Denies changes in vision, up to date with her eye exam  She denies vertigo or changes in speech or swallowing  Patient admits to fatigue at times, but mostly right before she is due for her Tysabri infusion  She is not taking the Provigil every day, typically will take before the infusion  She occasionally will take it BID if very tired  She has a script to get her JCV testing done this month already  Will update her vitamin D level, is currently taking 2,000IU per month  May not be enough, but will check the level before advising a higher amount  Will also order 6 month surveillance MRI of the brain for May  Review of Systems  Neurological ROS:   Constitutional: fatigue  HEENT: sinus problems and feeling congested  Cardiovascular:  no chest pain or pressure, no palpitations present, the heart rate was not rapid or irregular, no swelling in the arms or legs, no poor circulation  Respiratory:  no unusual or persistant cough, no shortness of breath with or without exertion  Gastrointestinal:  no nausea, no vomiting, no diarrhea, no abdominal pain, no changes in bowel habits, no melena, no loss of bowel control  Genitourinary: incontinence  Musculoskeletal: head/neck/back pain  Integumentary  no masses, no rash, no skin lesions, no livedo reticularis  Psychiatric:  no anxiety, no depression, no mood swings, no psychiatric hospitalizations, no sleep problems  Endocrine  no unusual weight loss or gain, no excessive urination, no excessive thirst, no hair loss or gain, no hot or cold intolerance, no menstrual period change or irregularity, no loss of sexual ability or drive, no erection difficulty, no nipple discharge  Hematologic/Lymphatic:  no unusual bleeding, no tendency for easy bruising, no clotting skin or lumps  Neurological General: lightheadedness  Neurological Mental Status: memory problems     Neurological Cranial Nerves:  no blurry or double vision, no loss of vision, no face drooping, no facial numbness or weakness, no taste or smell loss/changes, no hearing loss or ringing, no vertigo or dizziness, no dysphagia, no slurred speech  Neurological Motor findings include:  no tremor, no twitching, no cramping(pre/post exercise), no atrophy  Neurological Coordination: balance difficulties  Neurological Sensory:  no numbness, no pain, no tingling, does not fall when eyes closed or taking a shower  Neurological Gait:  no difficulty walking, not falling to one side, no sensation of being pushed, has not had falls  ROS Reviewed:   ROS reviewed  Active Problems    1  Abdominal pain (789 00) (R10 9)   2  Abnormal EEG (794 02) (R94 01)   3  Acute sinusitis (461 9) (J01 90)   4  Allergic rhinitis (477 9) (J30 9)   5  Anxiety (300 00) (F41 9)   6  Arthralgia (719 40) (M25 50)   7  Arthritis (716 90) (M19 90)   8  Attention and concentration deficit (799 51) (R41 840)   9  Blepharitis, unspecified laterality (373 00) (H01 009)   10  Blurry vision (368 8) (H53 8)   11  Cervical radiculopathy (723 4) (M54 12)   12  Cervical radiculopathy (723 4) (M54 12)   13  Cervical spinal stenosis (723 0) (M48 02)   14  Cervical spinal stenosis (723 0) (M48 02)   15  Cervicalgia (723 1) (M54 2)   16  Chronic obstructive pulmonary disease (496) (J44 9)   17  Closed Dislocation Of The Patella (836 3)   18  Colitis (558 9) (K52 9)   19  Complaints of memory disturbance (780 93) (R41 3)   20  Degeneration of cervical intervertebral disc (722 4) (M50 90)   21  Depression with anxiety (300 4) (F41 8)   22  Disc degeneration, lumbar (722 52) (M51 36)   23  Disorder of nose or nasal sinus (478 19) (J34 9)   24  Disturbance Of Coordination (781 99)   25  Dizziness and giddiness (780 4) (R42)   26  Dyspepsia (536 8) (K30)   27  Dysuria (788 1) (R30 0)   28  Esophageal reflux (530 81) (K21 9)   29  Fatigue (780 79) (R53 83)   30  Fatty liver (571 8) (K76 0)   31   Gastro-esophageal reflux disease with esophagitis (530 11) (K21 0)   32  Hip pain, unspecified laterality   33  Hyperkalemia (276 7) (E87 5)   34  Hyperlipidemia (272 4) (E78 5)   35  Insect bite (919 4,E906 4) (W57 XXXA)   36  Lack of coordination (781 3) (R27 9)   37  Left sided sciatica (724 3) (M54 32)   38  Limb pain (729 5) (M79 609)   39  Limb Weakness (Paresis) (728 87)   40  Lower back pain (724 2) (M54 5)   41  Migraine headache (346 90) (G43 909)   42  Multiple sclerosis (340) (G35)   43  Myofascial pain syndrome (729 1) (M79 1)   44  Need for 23-polyvalent pneumococcal polysaccharide vaccine (V03 82) (Z23)   45  Need for prophylactic vaccination and inoculation against influenza (V04 81) (Z23)   46  Optic neuritis (377 30) (H46 9)   47  Osteoporosis (733 00) (M81 0)   48  Pain, joint, shoulder (719 41) (M25 519)   49  Pre-op exam (V72 84) (Z01 818)   50  Primary osteoarthritis of left knee (715 16) (M17 12)   51  Raynaud's syndrome without gangrene (443 0) (I73 00)   52  Right knee pain (719 46) (M25 561)   53  Rotator cuff tear (840 4) (M75 100)   54  Sinus pain (478 19) (J34 89)   55  Spondylosis of cervical region without myelopathy or radiculopathy (721 0) (M47 812)   56  Strain Of Right Biceps Tendon (840 8)   57  Subacromial or subdeltoid bursitis, unspecified laterality (726 19) (M75 50)   58  Sunburn, blistering (692 76) (L55 1)   59  Urge incontinence of urine (788 31) (N39 41)   60  URI, acute (465 9) (J06 9)   61  Vitamin D deficiency (268 9) (E55 9)    Past Medical History    1  History of Asthma (493 90) (J45 909)   2  History of Depression (311) (F32 9)   3  History of Encounter for screening mammogram for malignant neoplasm of breast   (V76 12) (Z12 31)   4  History of Generalized anxiety disorder (300 02) (F41 1)   5  History of hepatitis (V12 09) (Z86 19)   6  History of herpes zoster (V12 09) (Z86 19)   7  History of hypercholesterolemia (V12 29) (Z86 39)   8   History of Multiple sclerosis (340) (G35)  Active Problems And Past Medical History Reviewed: The active problems and past medical history were reviewed and updated today  Surgical History    1  History of Breast Surgery   2  History of Complete Colonoscopy   3  History of Septoplasty   4  History of Shoulder Surgery  Surgical History Reviewed: The surgical history was reviewed and updated today  Family History    1  Family history of Arthritis (V17 7)   2  Family history of Family Health Status Siblings 5  Living   3  Family history of Maternal Grandfather Is    4  Family history of Maternal Grandmother Is    5  Family history of Osteoporosis (V17 81)   6  Family history of Paternal Grandfather Is    9  Family history of Paternal Grandmother Is     6  Family history of Chronic Obstructive Pulmonary Disease    9  Family history of Arthritis (V17 7)   10  Family history of Chronic Obstructive Pulmonary Disease   11  Family history of Emphysema   12  Family history of Family Health Status Of Father -    15  Family history of Family Health Status Of Mother - Alive   15  Family history of Heart Disease (V17 49)   15  Family history of Hyperthyroidism   12  Family history of Multiple Sclerosis   17  Family history of Osteoarthritis (V17 7)  Family History Reviewed: The family history was reviewed and updated today  Social History    · Being A Social Drinker   · Current Every Day Smoker (305 1)   · Current Smoker (305 1)   · Denied: History of Drug Use (305 90)   · Eye Doctor - Last Seen   · Marital History - Currently    · Never Drank Alcohol   · Occupation: Retired   · Seeing A Dentist - Last Seen   · Sexually Active  Social History Reviewed: The social history was reviewed and updated today  Current Meds   1  Atorvastatin Calcium 40 MG Oral Tablet; take 1 tablet by mouth once daily; Therapy: 09JWE1604 to (Evaluate:06Ifn8164)  Requested for: 82VRI3902; Last   Rx:2016 Ordered   2  B Complex CAPS;    Therapy: (Recorded:20Wdr5281) to Recorded   3  Benzonatate 100 MG Oral Capsule; TAKE ONE CAPSULE BY MOUTH 3 TIMES A DAY AS   NEEDED FOR COUGH; Therapy: 04EJB6744 to (Last Rx:04Mar2016)  Requested for: 51FGI1748 Ordered   4  Dexilant 60 MG Oral Capsule Delayed Release; take 1 capsule daily; Therapy: 91Zug8923 to (Evaluate:50Tdv5697)  Requested for: 99Cwo3894; Last   Rx:66Ikr4068 Ordered   5  Ecotrin Low Strength 81 MG Oral Tablet Delayed Release; TAKE ONE (1) TABLET(S)   ONCE DAILY; Therapy: 02FRP7084 to (Last Rx:83Zub1896) Ordered   6  Evening Primrose Oil CAPS; Therapy: (Recorded:61Xjj0093) to Recorded   7  Flaxseed Oil Oral Oil Recorded   8  Fluticasone Propionate 50 MCG/ACT Nasal Suspension; TWO (2) SPRAY(S) INTO EACH   NOSTRIL DAILY; Therapy: 39AEP6731 to (Last Rx:02Yxi7383)  Requested for: 94Zfh3238 Ordered   9  Gabapentin 100 MG Oral Capsule; TAKE 1 CAPSULE BY MOUTH AT BEDTIME FOR 5   DAYS, THEN 2 CAPSULES AT BEDTIME FOR 5 DAYS,THEN 3 CAPSULES AT BEDTIME; Therapy: 20WRC1957 to (Evaluate:90Rkx1540)  Requested for: 28Mar2016; Last   Rx:28Mar2016 Ordered   10  Gabapentin 300 MG Oral Capsule; TAKE 1 CAPSULE AT BEDTIME; Therapy: 17NAK2365 to (Dion Trinidad)  Requested for: 52EUL2289; Last    Rx:20Uqy1444 Ordered   11  Modafinil 200 MG Oral Tablet; TAKE 1 TABLET DAILY AS DIRECTED; Therapy: 93RXV6693 to (Evaluate:21Jan2015); Last Rx:59Bix9093 Ordered   12  Montelukast Sodium 10 MG Oral Tablet; TAKE 1 TABLET EVERY DAY; Therapy: 34ALT6876 to (Evaluate:64Cbq1377); Last Rx:43Yqw4849 Ordered   13  ProAir  (90 Base) MCG/ACT Inhalation Aerosol Solution; 2 PUFFS EVERY 4 TO 6    HOURS AS NEEDED FOR COUGH/WHEEZE;    Therapy: 58Otl5172 to (Last Rx:29Sep2015) Ordered   14  Sertraline HCl - 100 MG Oral Tablet; take 1 tablet every day; Therapy: 41DTC2820 to (Evaluate:23Sep2016)  Requested for: 86SCD0458; Last    Rx:29Sep2015 Ordered   15   Symbicort 160-4 5 MCG/ACT Inhalation Aerosol; USE 2 PUFFS TWICE DAILY; Therapy: 62DXP4500 to (Angel Carrillo)  Requested for: 37PIN4826; Last    Rx:06Jan2014 Ordered   16  Tysabri CONC; Therapy: (Recorded:03Eik5195) to Recorded   17  Zetia 10 MG Oral Tablet; take 1 tablet by mouth once daily; Therapy: 01KMY7668 to (Evaluate:22Jun2016)  Requested for: 16BRS5865; Last    Rx:05Ffo4711 Ordered  Medication List Reviewed: The medication list was reviewed and updated today  Allergies    1  Cefzil TABS   2  Bactrim TABS   3  Betadine SOLN   4  Pamelor CAPS   5  Prempro TABS   6  Provera TABS   7  Zyban TBCR    Vitals  Signs [Data Includes: Current Encounter]   Recorded: 13Apr2016 10:16AM   Heart Rate: 76  Respiration: 14  Systolic: 756, RUE, Sitting  Diastolic: 64, RUE, Sitting  Height: 5 ft 4 in  Weight: 151 lb   BMI Calculated: 25 92  BSA Calculated: 1 74    Physical Exam    Constitutional   General appearance: No acute distress, well appearing and well nourished  Musculoskeletal   Gait and station: Normal gait, stance and balance  Muscle strength: Normal strength throughout  Motor Strength:  the patient is left hand dominant  Strength examination:   Biceps strength was 5/5 on the right side and 5/5 on the left side  Triceps strength was 5/5 on the right side and 5/5 on the left side  Shoulder flexion was 5/5 on the right side and 5/5 on the left side  Foot Plantar Flexion: 5/5 on the right side and 5/5 on the left side  Foot Dorsiflexion: 5/5 on the right side and 5/5 on the left side  Ankle Inversion: 5/5 on the right side and 5/5 on the left side  Ankle Eversion: 5/5 on the right side and 5/5 on the left side  Hip Flexion: 5/5 on the right side and 5/5 on the left side  Muscle tone: No atrophy, abnormal movements, flaccidity, cogwheeling or spasticity  Involuntary movements: None observed  Neurologic   Orientation to person, place, and time: Normal     Recent and remote memory: Demonstrates normal memory      Attention span and concentration: Normal thought process and attention span  Language: Names objects, able to repeat phrases and speaks spontaneously  Fund of knowledge: Normal vocabulary with appropriate knowledge of current events and past history  2nd cranial nerve: Normal   Visual Fields: peripheral vision was full to confrontation bilaterally  3rd, 4th, and 6th cranial nerves: Normal   extraocular movements intact   5th cranial nerve: Normal   normal facial sensation  7th cranial nerve: Normal   normal facial muscle strength  8th cranial nerve: Normal   no hearing loss  Nystagmus no nystagmus seen  9th cranial nerve: Normal     11th cranial nerve: Normal   no weakness of shoulder elevation and no sternocleidomastoid weakness  12th cranial nerve: Normal     Sensation: Normal   MILD DECREASED TEMPERATURE LEFT SIDE   Reflexes: Normal     Coordination: Normal   Coordination: no past-pointing  Mood and affect: Normal   Mood and Affect: appropriate mood and appropriate affect  Results/Data  Diagnostic Studies Reviewed: I personally reviewed the films/images/results in the office today  My interpretation follows  Results   (1) CBC/PLT/DIFF 50HGD2993 09:15AM Alyx Reed     Test Name Result Flag Reference   WBC COUNT 10 35 Thousand/uL H 4 31-10 16   RBC COUNT 5 06 Million/uL  3 81-5 12   HEMOGLOBIN 15 0 g/dL  11 5-15 4   HEMATOCRIT 43 4 %  34 8-46  1   MCV 86 fL  82-98   MCH 29 6 pg  26 8-34 3   MCHC 34 6 g/dL  31 4-37 4   RDW 12 1 %  11 6-15 1   MPV 9 5 fL  8 9-12 7   PLATELET COUNT 146 Thousands/uL  149-390   NEUTROPHILS RELATIVE PERCENT 54 %  43-75   LYMPHOCYTES RELATIVE PERCENT 36 %  14-44   MONOCYTES RELATIVE PERCENT 7 %  4-12   EOSINOPHILS RELATIVE PERCENT 3 %  0-6   BASOPHILS RELATIVE PERCENT 0 %  0-1   NEUTROPHILS ABSOLUTE COUNT 5 59 Thousands/µL  1 85-7 62   LYMPHOCYTES ABSOLUTE COUNT 3 74 Thousands/µL  0 60-4 47   MONOCYTES ABSOLUTE COUNT 0 70 Thousand/µL  0 17-1 22   EOSINOPHILS ABSOLUTE COUNT 0 28 Thousand/µL  0 00-0 61   BASOPHILS ABSOLUTE COUNT 0 04 Thousands/µL  0 00-0 10     (1) COMPREHENSIVE METABOLIC PANEL 78GFV4153 14:10TP Dorita Vora   National Kidney Disease Education Program recommendations are as follows:  GFR calculation is accurate only with a steady state creatinine  Chronic Kidney disease less than 60 ml/min/1 73 sq  meters  Kidney failure less than 15 ml/min/1 73 sq  meters  Test Name Result Flag Reference   GLUCOSE,RANDM 101 mg/dL     If the patient is fasting, the ADA then defines impaired fasting glucose as > 100 mg/dL and diabetes as > or equal to 123 mg/dL  SODIUM 138 mmol/L  136-145   POTASSIUM 4 1 mmol/L  3 5-5 3   CHLORIDE 102 mmol/L  100-108   CARBON DIOXIDE 27 mmol/L  21-32   ANION GAP (CALC) 9 mmol/L  4-13   BLOOD UREA NITROGEN 17 mg/dL  5-25   CREATININE 0 69 mg/dL  0 60-1 30   Standardized to IDMS reference method   CALCIUM 9 0 mg/dL  8 3-10 1   BILI, TOTAL 0 49 mg/dL  0 20-1 00   ALK PHOSPHATAS 114 U/L     ALT (SGPT) 35 U/L  12-78   AST(SGOT) 17 U/L  5-45   ALBUMIN 4 1 g/dL  3 5-5 0   TOTAL PROTEIN 7 5 g/dL  6 4-8 2   eGFR Non-African American      >60 0 ml/min/1 73sq m     (1) CBC/PLT/DIFF 63VIF0556 10:26AM Dorita Vora     Test Name Result Flag Reference   WBC COUNT 9 67 Thousand/uL  4 31-10 16   RBC COUNT 4 91 Million/uL  3 81-5 12   HEMOGLOBIN 14 7 g/dL  11 5-15 4   HEMATOCRIT 42 4 %  34 8-46  1   MCV 86 fL  82-98   MCH 29 9 pg  26 8-34 3   MCHC 34 7 g/dL  31 4-37 4   RDW 12 0 %  11 6-15 1   MPV 9 6 fL  8 9-12 7   PLATELET COUNT 303 Thousands/uL  149-390   NEUTROPHILS RELATIVE PERCENT 55 %  43-75   LYMPHOCYTES RELATIVE PERCENT 36 %  14-44   MONOCYTES RELATIVE PERCENT 7 %  4-12   EOSINOPHILS RELATIVE PERCENT 2 %  0-6   BASOPHILS RELATIVE PERCENT 0 %  0-1   NEUTROPHILS ABSOLUTE COUNT 5 29 Thousands/µL  1 85-7 62   LYMPHOCYTES ABSOLUTE COUNT 3 48 Thousands/µL  0 60-4 47   MONOCYTES ABSOLUTE COUNT 0 63 Thousand/µL  0 17-1 22   EOSINOPHILS ABSOLUTE COUNT 0 23 Thousand/µL  0 00-0 61   BASOPHILS ABSOLUTE COUNT 0 04 Thousands/µL  0 00-0 10     (1) COMPREHENSIVE METABOLIC PANEL [id number] 99:16ZI Padmaja, [address] [facility] Kidney Disease Education Program recommendations are as follows:  GFR calculation is accurate only with a steady state creatinine  Chronic Kidney disease less than 60 ml/min/1 73 sq  meters  Kidney failure less than 15 ml/min/1 73 sq  meters  Test Name Result Flag Reference   GLUCOSE,RANDM 101 mg/dL     If the patient is fasting, the ADA then defines impaired fasting glucose as > 100 mg/dL and diabetes as > or equal to 123 mg/dL  SODIUM 138 mmol/L  136-145   POTASSIUM 4 5 mmol/L  3 5-5 3   CHLORIDE 106 mmol/L  100-108   CARBON DIOXIDE 25 mmol/L  21-32   ANION GAP (CALC) 7 mmol/L  4-13   BLOOD UREA NITROGEN 19 mg/dL  5-25   CREATININE 0 65 mg/dL  0 60-1 30   Standardized to IDMS reference method   CALCIUM 8 6 mg/dL  8 3-10 1   BILI, TOTAL 0 54 mg/dL  0 20-1 00   ALK PHOSPHATAS 120 U/L H    ALT (SGPT) 27 U/L  12-78   AST(SGOT) 23 U/L  5-45   ALBUMIN 4 1 g/dL  3 5-5 0   TOTAL PROTEIN 7 5 g/dL  6 4-8 2   eGFR Non-African American      >60 0 ml/min/1 73sq m     (1) COMPREHENSIVE METABOLIC PANEL [id number] 40:80AR Dorita Vora   National Kidney Disease Education Program recommendations are as follows:  GFR calculation is accurate only with a steady state creatinine  Chronic Kidney disease less than 60 ml/min/1 73 sq  meters  Kidney failure less than 15 ml/min/1 73 sq  meters  Test Name Result Flag Reference   GLUCOSE,RANDM 108 mg/dL     If the patient is fasting, the ADA then defines impaired fasting glucose as > 100 mg/dL and diabetes as > or equal to 123 mg/dL     SODIUM 140 mmol/L  136-145   POTASSIUM 4 3 mmol/L  3 5-5 3   CHLORIDE 104 mmol/L  100-108   CARBON DIOXIDE 28 mmol/L  21-32   ANION GAP (CALC) 8 mmol/L  4-13   BLOOD UREA NITROGEN 19 mg/dL  5-25   CREATININE 0 56 mg/dL L 0 60-1 30   Standardized to IDMS reference method CALCIUM 8 7 mg/dL  8 3-10 1   BILI, TOTAL 0 45 mg/dL  0 20-1 00   ALK PHOSPHATAS 112 U/L     ALT (SGPT) 34 U/L  12-78   AST(SGOT) 18 U/L  5-45   ALBUMIN 4 3 g/dL  3 5-5 0   TOTAL PROTEIN 7 7 g/dL  6 4-8 2   eGFR Non-African American      >60 0 ml/min/1 73sq m     (1) LIPID PANEL, FASTING [id number] 11:41AM Coleman Emerson   Triglyceride:         Normal              <150 mg/dl       Borderline High    150-199 mg/dl       High               200-499 mg/dl       Very High          >499 mg/dl  Cholesterol:         Desirable        <200 mg/dl      Borderline High  200-239 mg/dl      High             >239 mg/dl  HDL Cholesterol:        High    >59 mg/dL      Low     <41 mg/dL  LDL CALCULATED:    This screening LDL is a calculated result  It does not have the accuracy of the Direct Measured LDL in the monitoring of patients with hyperlipidemia and/or statin therapy  Direct Measure LDL (UYA493) must be ordered separately in these patients  Test Name Result Flag Reference   CHOLESTEROL 193 mg/dL     HDL,DIRECT 49 mg/dL  40-60   LDL CHOLESTEROL CALCULATED 118 mg/dL H 0-100   TRIGLYCERIDES 131 mg/dL  <=150     (1) CBC/PLT/DIFF [id number] 10:40AM Santana Rivas     Test Name Result Flag Reference   WBC COUNT 8 59 Thousand/uL  4 31-10 16   RBC COUNT 4 69 Million/uL  3 81-5 12   HEMOGLOBIN 14 1 g/dL  11 5-15 4   HEMATOCRIT 40 6 %  34 8-46  1   MCV 86 6 fL  82 0-98 0   MCH 30 1 pg  26 8-34 3   MCHC 34 7 g/dL  31 4-37 4   RDW 11 7 %  11 6-15 1   MPV 9 7 fL  8 9-12 7   PLATELET COUNT 121 Thousands/uL  149-390   NEUTROPHILS RELATIVE PERCENT 58 %  43-75   LYMPHOCYTES RELATIVE PERCENT 32 %  14-44   MONOCYTES RELATIVE PERCENT 7 %  4-12   EOSINOPHILS RELATIVE PERCENT 2 %  0-6   BASOPHILS RELATIVE PERCENT 1 %  0-1   NEUTROPHILS ABSOLUTE COUNT 5 09 Thousands/µL  1 85-7 62   LYMPHOCYTES ABSOLUTE COUNT 2 71 Thousands/µL  0 60-4 47   MONOCYTES ABSOLUTE COUNT 0 56 Thousand/µL  0 17-1 22   EOSINOPHILS ABSOLUTE COUNT 0 17 Thousand/µL  0 00-0 61   BASOPHILS ABSOLUTE COUNT 0 06 Thousands/µL  0 00-0 10     (1) CBC/PLT/DIFF [id number] 09:31AM Ana Gordon     Test Name Result Flag Reference   DIFFERENTIAL METHOD Automated     WBC COUNT 7 88 Thousand/uL  4 31-10 16   RBC COUNT 4 57 Million/uL  3 81-5 12   HEMOGLOBIN 13 8 g/dL  11 5-15 4   HEMATOCRIT 39 9 %  34 8-46  1   MCV 87 fL  82-98   MCH 30 2 pg  26 8-34 3   MCHC 34 6 g/dL  31 4-37 4   RDW 12 0 %  11 6-15 1   MPV 9 5 fL  8 9-12 7   PLATELET COUNT 248 Thousand/uL  149-390   NEUTROPHILS RELATIVE PERCENT 53 %  43-75   LYMPHOCYTES RELATIVE PERCENT 35 %  14-44   MONOCYTES RELATIVE PERCENT 8 %  4-12   EOSINOPHILS RELATIVE PERCENT 3 %  0-6   BASOPHILS RELATIVE PERCENT 1 %  0-1   NEUTROPHILS ABSOLUTE COUNT 4 18 Thousand/uL  1 85-7 62   LYMPHOCYTES ABSOLUTE COUNT 2 76 Thousand/uL  0 60-4 47   MONOCYTES ABSOLUTE COUNT 0 63 Thousand/uL  0 17-1 22   EOSINOPHILS ABSOLUTE COUNT 0 24 Thousand/uL  0 00-0 61   BASOPHILS ABSOLUTE COUNT 0 08 Thousand/uL  0 00-0 10     (1) COMPREHENSIVE METABOLIC PANEL [id number] 06:35TI Ana Gordon     Test Name Result Flag Reference   SODIUM 141 mmol/L  136-145   POTASSIUM 3 9 mmol/L  3 5-5 3   CHLORIDE 107 mmol/L     CARBON DIOXIDE 26 5 mmol/L  21 0-32 0   ANION GAP (CALC) 8 mmol/L  4-13   BILI, TOTAL 0 30 mg/dL  0 20-1 00   TOTAL PROTEIN 7 2 g/dL  6 4-8 2   ALK PHOSPHATAS 111 U/L     ALT (SGPT) 24 U/L  12-78   AST(SGOT) 12 U/L  5-45   GLUCOSE,RANDM 107 mg/dL     ALBUMIN 3 9 g/dL  3 5-5 0   BLOOD UREA NITROGEN 17 mg/dL  5-25   CALCIUM 8 5 mg/dL  8 3-10 1   CREATININE 0 61 mg/dL  0 60-1 30   eGFR African American >60 ml/min/1 73sq m     eGFR Non-African American >60 ml/min/1 73sq m       STRATIFY JCV(TM) AB (WITH INDEX) W/RFL INHIBITION [id number] 10:23AM Ana Gordon   REPORT COMMENT:  FASTING:NO     Test Name Result Flag Reference   INDEX VALUE 0 10     JCV ANTIBODY NEGATIVE     Index interpretive criteria:       <0 20 negative       0 20-0 40 indeterminate       >0 40 positive     INTERPRETATION       Negative: Antibodies to JCV not detected  Indeterminate: Low level reactivity detected,                 see Inhibition Assay result to                 follow for the final antibody                 result  Positive: Antibodies to YAIR virus (JCV)                 detected indicating the patient has                 been exposed to JCV at an                 undetermined time  The STRATIFY JCV Antibody Test is an enzyme-linked  immunosorbent assay (DESI) designed to detect JCV  antibodies to help identify individuals who have  been exposed to the virus  Samples with low level  reactivity in the detection assay are retested in  a confirmation (inhibition) assay to confirm  presence or absence of JCV-specific antibodies  * MRI Spine Thoracic With and Without Contrast [id number] 04:54PM [facility]     Test Name Result Flag Reference   MRI TSpine W/ & W/O (Report)     [address] [place];;[place];[id number]   11/02/2015 1715   11/02/2015 1809   N/A     MRI THORACIC SPINE WITH AND WITHOUT CONTRAST     INDICATION- Bilateral lower extremity numbness and paresthesias  G35    demyelinating disease  COMPARISON- 11/17/2014  TECHNIQUE- Sagittal T1, sagittal T2, sagittal inversion recovery,   axial T2 axial 2D merge  Sagittal and axial T1 postcontrast    7 mL of Gadavist was injected intravenously  Patient became nauseous   post injection without vomiting or evidence of allergic reaction  IMAGE QUALITY- Diagnostic  FINDINGS-     ALIGNMENT- Normal alignment of the thoracic spine  No compression   fracture  No subluxation  No evidence of scoliosis  MARROW SIGNAL- Normal marrow signal is identified within the   visualized bony structures  No discrete marrow lesion       THORACIC CORD- A few small cord lesions are noted which appears   similar to the previous examination, the largest of which is seen anteriorly and centrally within the lower thoracic spine, series 9   image 30  No cord expansion  No abnormal enhancement  No change   compared to the previous examination  PREVERTEBRAL AND PARASPINAL SOFT TISSUES- Prevertebral and paraspinal   soft tissues are unremarkable  THORACIC DEGENERATIVE CHANGE- No disc herniation, canal stenosis or   foraminal narrowing  No degenerative changes  POSTCONTRAST- No abnormal enhancement  IMPRESSION-     Stable MRI of the thoracic spine  Mild chronic demyelinating disease   noted, stable  Transcribed on- [facility], RAD DO   [place] Radiologist- [address] , YOSEPH DO   Electronically [address] [facility], [facility] DO   Released Date Time- 11/03/15 1023   ------------------------------------------------------------------------------   9381^GENE A ALISON   9381^GENE A ALISON     * MRI Brain With and Without Contrast [id number] 01:22PM Matt Lorenz     Test Name Result Flag Reference    W Ave D W/O (Report)     [address] [place];;[place];[id number]   11/01/2015 1340   11/01/2015 1500   N/A     MRI BRAIN WITH AND WITHOUT CONTRAST     INDICATION- 79-year-old female, MS, follow-up   COMPARISON- 5/17/2015 MRI     TECHNIQUE-   Sagittal T1, axial T2, axial FLAIR, axial T1, axial gradient imaging,   axial diffusion  Sagittal, axial and coronal T1 weighted images were   obtained  Exam was performed pre- and postintravenous administration of   6 cc Gadavist      IMAGE QUALITY-  Diagnostic  FINDINGS-     BRAIN PARENCHYMA-    Numerous T2 and FLAIR hyperintense foci involve supratentorial white   matter, brainstem and cerebellum  Corpus callosal involvement is also   present  These findings are stable  There is no discrete mass effect or midline shift  Brainstem and   cerebellum demonstrate normal signal  Diffusion imaging is   unremarkable        VENTRICLES- Normal      POSTCONTRAST IMAGING-    A new enhancing lesion measuring approximately 5 mm has developed   involving the right frontal temporal subcutaneous insular white matter  SELLA AND PITUITARY GLAND- Normal      ORBITS- Normal      PARANASAL SINUSES- The paranasal sinuses are clear  VASCULATURE-    Evaluation of the major intracranial vasculature demonstrates   appropriate flow voids  CALVARIUM AND SKULL BASE- Normal      EXTRACRANIAL SOFT TISSUES- Normal      IMPRESSION-   Numerous MS lesions involving supratentorial white matter, brainstem   and bilateral cerebellar hemispheres which are unchanged     Interval development of new 6 mm right lateral frontal temporal   subinsular white matter enhancing lesion, likely representing active MS   plaque     3 month MRI follow-up recommended due to this interval change unless   clinically indicated sooner      ##sigslh##sigslh                 Transcribed on- [id number]     - YOSEPH Aragon MD   [place] Radiologist- YOSEPH Aragon MD   Electronically Signed- YOSEPH Aragon MD   Released Date Time- 11/02/15 1256   ------------------------------------------------------------------------------   [id number]^JESSICA TELLO   [id number]^JESSICA TELLO     * MRI Spine Cervical With and Without Contrast [id number] 01:22PM Tiff Holloway     Test Name Result Flag Reference   MRI CSpine  W/ & W/O (Report)     [address] [place];;[place];[id number]   11/01/2015 1340   11/01/2015 1500   N/A     MRI CERVICAL SPINE WITH AND WITHOUT CONTRAST     INDICATION- Annual evaluation of multiple sclerosis  COMPARISON- 1/12/2015     TECHNIQUE- Sagittal T1, sagittal T2, sagittal inversion recovery,   axial 2D merge and axial T2  Sagittal T1 and axial T1   postcontrast  6 mL of Gadavist was administered without immediate   consequence  IMAGE QUALITY- Diagnostic  FINDINGS-     ALIGNMENT- Normal alignment of the cervical spine   No compression fracture  No subluxation  No scoliosis  MARROW SIGNAL- Scattered multilevel degenerative endplate changes   redemonstrated  CERVICAL AND VISUALIZED UPPER THORACIC CORD- Scattered cord lesions   redemonstrated  No new signal abnormality  No abnormal enhancement  PREVERTEBRAL AND PARASPINAL SOFT TISSUES- Prevertebral and paraspinal   soft tissues are unremarkable  VISUALIZED POSTERIOR FOSSA- White matter lesions of the visualized   brainstem again noted as well  CERVICAL DISC SPACES-        C2-C3- There is a disc osteophyte complex eccentrically on the left  Mild left neural foraminal narrowing  Central canal and right neural   foramen patent  This level is similar to the prior study  C3-C4- Normal      C4-C5- There is a disc osteophyte complex  There is moderate   tricompartmental narrowing  This level is similar to the prior study  C5-C6- There is a disc osteophyte complex with a superimposed left   neural foraminal protrusion  Mild to moderate central canal narrowing  Moderate to severe left and moderate right neural foraminal narrowing  This level is similar to the prior study  C6-C7- There is a disc osteophyte complex with a superimposed right   paracentral disc protrusion  Mild central canal and right neural   foraminal narrowing  Mild to moderate left neural foraminal narrowing  C7-T1- Normal      UPPER THORACIC DISC SPACES- Normal      POSTCONTRAST IMAGING- Normal      IMPRESSION-       1  Multilevel cord signal abnormality as well as brainstem signal   abnormality similar to the previous study, compatible with   demyelinating disease/multiple sclerosis  No new signal abnormality or   abnormal enhancement to implicate active demyelination  2  Multilevel cervical spondylosis is stable           Transcribed on- YOSEPH Miranda MD   [place] Radiologist- YOSEPH Webb MD   Electronically YOSEPH Chang MD Released Date Time- 11/02/15 1400   ------------------------------------------------------------------------------   [id number]^DAVE Cheek     Attending Note  Collaborating Physician Note: Collaborating Note: I agree with the Advanced Practitioner note  Future Appointments    Date/Time Provider Specialty Site   06/14/2016 11:00 AM DON Hsieh    08/15/2016 09:30 AM DON Murphy  Neurology [facility]     Signatures   Electronically signed by : Poncho Pereira, [facility];  Apr 13 2016  1:27PM EST                       (Author)    Electronically signed by : DON Owens ; Apr 19 2016  6:17AM EST                       (Co-author)

## 2018-01-10 NOTE — RESULT NOTES
Verified Results  * MRI LUMBAR SPINE [address] [id number] 02:32PM Joanna Trent     Test Name Result Flag Reference   MRI LUMBAR SPINE [address] (Report)     This is a summary report  The complete report is available in the patient's medical record  If you cannot access the medical record, please contact the sending organization for a detailed fax or copy  MRI LUMBAR SPINE WITHOUT CONTRAST     INDICATION: Right-sided arm and leg weakness, history of multiple sclerosis and spinal stenosis     COMPARISON: MR 1/26/2013     TECHNIQUE: Sagittal T1, sagittal T2, sagittal inversion recovery, axial T1 and axial T2, coronal T2       IMAGE QUALITY: Diagnostic     FINDINGS:     ALIGNMENT: Degenerative grade 1 L4-L5 anterolisthesis reidentified perhaps progressed slightly since prior study  Very minor right convex L4-L5 apex scoliosis with asymmetric loss of disc height to the left, similar to prior study  Minor rightward    translation of L4  MARROW SIGNAL: Normal marrow signal is identified within the visualized bony structures  No discrete marrow lesion  DISTAL CORD AND CONUS: Distal CORD normal in appearance  No compressive disease  Conus terminates at the L1-L2 level  PARASPINAL SOFT TISSUES: Paraspinal soft tissues are unremarkable  SACRUM: Normal signal within the sacrum  No evidence of insufficiency or stress fracture  LOWER THORACIC DISC SPACES: Normal disc height and signal  No disc herniation, canal stenosis or foraminal narrowing  LUMBAR DISC SPACES:        L1-L2: Minor bilateral facet arthrosis     L2-L3: Minor facet arthrosis, minor endplate changes  L3-L4: Circumferential bulge, minor facet arthrosis     L4-L5: Circumferential bulging of the disc, bilateral facet arthrosis, asymmetric left lateral recess stenosis  Interestingly small left posterolateral disc is diminished with commensurate reduction in the potential mass effect upon the left L5 root       L5-S1: Advanced bilateral facet arthrosis, redundancy ligamenta flava, circumferential bulge and asymmetric right lateral recess stenosis in part, related to what most probably represents a right-sided synovial facet cyst  Correlate for left S1    radiculitis  There is also stenosis on the left but, this is less striking  Distortion of the neural foramen without evidence for L5 root compression  IMPRESSION:     Probable significant stenosis left lateral recess L5-S1  Correlate for left S1 radiculitis  Interval reduction in volume of small left L4-5 protrusion, no residual significant lateral recess stenosis  Distal cord is normal in appearance  Workstation performed: [id number]     Signed by: Kaylie Worthy MD   9/29/16       Plan  Disc degeneration, lumbar    · Gabapentin 100 MG Oral Capsule; TAKE 1 CAPSULE 3 times per day with  breakfast, lunch, and dinner  Disc degeneration, lumbar, Lumbar disc herniation    · 1 Krystina Marquez MD, Damaris PERALTA (Pain Management) Physician Referral  Consult  Status: Active   Requested for: [id number]  Care Summary provided  : Yes    Discussion/Summary   Discussed results with patient  Referral to [address] [place] [address]'s pain management  Patient remains under care of neurology  I did forward copy to Dr Nanci Mckay  Patient did notice some improvement with gabapentin, she uses 300 mg at bedtime  We will increase dose to 100 mg 3 times a day and keep 300 mg at bedtime  Patient understands instructions and agrees  Signatures   Electronically signed by :  DON Colindres ; Sep 29 2016  6:33PM EST                       (Author)

## 2018-01-11 NOTE — RESULT NOTES
Verified Results  STRATIFY JCV(TM) AB (WITH INDEX) W/RFL INHIBITION [id number] 09:47AM Hubert Lobo   REPORT COMMENT:  FASTING:NO     Test Name Result Flag Reference   INDEX VALUE 0 08     JCV ANTIBODY NEGATIVE     Index interpretive criteria:       <0 20 negative       0 20-0 40 indeterminate       >0 40 positive     INTERPRETATION       Negative: Antibodies to JCV not detected  Indeterminate: Low level reactivity detected, see                      Inhibition Assay result to follow                      for the final antibody result  Positive: Antibodies to YAIR virus (JCV) detected                 indicating the patient has been exposed                 to JCV at an undetermined time  The STRATIFY JCV Antibody Test is an enzyme-linked  immunosorbent assay (DESI) designed to detect JCV  antibodies to help identify individuals who have  been exposed to the virus  Samples with low level  reactivity in the detection assay are retested in  a confirmation (inhibition) assay to confirm  presence or absence of JCV-specific antibodies

## 2018-01-12 VITALS
HEIGHT: 65 IN | WEIGHT: 154 LBS | BODY MASS INDEX: 25.66 KG/M2 | SYSTOLIC BLOOD PRESSURE: 128 MMHG | TEMPERATURE: 96.2 F | DIASTOLIC BLOOD PRESSURE: 70 MMHG | HEART RATE: 80 BPM

## 2018-01-12 VITALS
TEMPERATURE: 96.7 F | DIASTOLIC BLOOD PRESSURE: 72 MMHG | HEART RATE: 68 BPM | WEIGHT: 152 LBS | BODY MASS INDEX: 25.33 KG/M2 | HEIGHT: 65 IN | SYSTOLIC BLOOD PRESSURE: 126 MMHG | RESPIRATION RATE: 16 BRPM

## 2018-01-12 VITALS
HEIGHT: 65 IN | DIASTOLIC BLOOD PRESSURE: 72 MMHG | WEIGHT: 149.25 LBS | BODY MASS INDEX: 24.87 KG/M2 | HEART RATE: 89 BPM | SYSTOLIC BLOOD PRESSURE: 123 MMHG

## 2018-01-12 VITALS
HEART RATE: 72 BPM | TEMPERATURE: 98 F | DIASTOLIC BLOOD PRESSURE: 60 MMHG | HEIGHT: 65 IN | BODY MASS INDEX: 24.55 KG/M2 | WEIGHT: 147.38 LBS | SYSTOLIC BLOOD PRESSURE: 118 MMHG

## 2018-01-12 NOTE — RESULT NOTES
Message   let pt know cbc ok except for slighlty elevated wbc of 10 35  any recent infections, fever, uti or cold like sxs? Verified Results  (1) CBC/PLT/DIFF [id number] 09:15AM Ailyn Underwood     Test Name Result Flag Reference   WBC COUNT 10 35 Thousand/uL H 4 31-10 16   RBC COUNT 5 06 Million/uL  3 81-5 12   HEMOGLOBIN 15 0 g/dL  11 5-15 4   HEMATOCRIT 43 4 %  34 8-46  1   MCV 86 fL  82-98   MCH 29 6 pg  26 8-34 3   MCHC 34 6 g/dL  31 4-37 4   RDW 12 1 %  11 6-15 1   MPV 9 5 fL  8 9-12 7   PLATELET COUNT 150 Thousands/uL  149-390   NEUTROPHILS RELATIVE PERCENT 54 %  43-75   LYMPHOCYTES RELATIVE PERCENT 36 %  14-44   MONOCYTES RELATIVE PERCENT 7 %  4-12   EOSINOPHILS RELATIVE PERCENT 3 %  0-6   BASOPHILS RELATIVE PERCENT 0 %  0-1   NEUTROPHILS ABSOLUTE COUNT 5 59 Thousands/µL  1 85-7 62   LYMPHOCYTES ABSOLUTE COUNT 3 74 Thousands/µL  0 60-4 47   MONOCYTES ABSOLUTE COUNT 0 70 Thousand/µL  0 17-1 22   EOSINOPHILS ABSOLUTE COUNT 0 28 Thousand/µL  0 00-0 61   BASOPHILS ABSOLUTE COUNT 0 04 Thousands/µL  0 00-0 10

## 2018-01-13 VITALS
BODY MASS INDEX: 25.03 KG/M2 | SYSTOLIC BLOOD PRESSURE: 132 MMHG | TEMPERATURE: 97.9 F | RESPIRATION RATE: 16 BRPM | WEIGHT: 150.25 LBS | HEART RATE: 78 BPM | DIASTOLIC BLOOD PRESSURE: 68 MMHG | HEIGHT: 65 IN

## 2018-01-13 VITALS
HEIGHT: 65 IN | DIASTOLIC BLOOD PRESSURE: 87 MMHG | WEIGHT: 151.5 LBS | BODY MASS INDEX: 25.24 KG/M2 | SYSTOLIC BLOOD PRESSURE: 124 MMHG | HEART RATE: 73 BPM | RESPIRATION RATE: 14 BRPM

## 2018-01-13 NOTE — MISCELLANEOUS
Message  PT NO SHOWED TODAYS APPOINTMENT ON 10/28/16   Return to work or school:   Earnest Dsouza is under my professional care  She was seen in my office on 10/28/16             Signatures   Electronically signed by :  DON Lynch ; Oct 28 2016  4:50PM EST                       (Author)

## 2018-01-13 NOTE — PROCEDURES
Chief Complaint  Patient is here to have ear wax removed from her ears  Some cloudiness and discomfort in the right ear, some discomfort and left  Patient was diagnosed with cerumen impaction at last office visit  Cold symptoms have improved  Patient completed Cipro      Current Meds   1  Atorvastatin Calcium 40 MG Oral Tablet; take 1 tablet by mouth once daily; Therapy: 11ECC7489 to (Evaluate:15Eqq4623)  Requested for: 86SVN3247; Last   Rx:29Jan2016 Ordered   2  B Complex CAPS; Therapy: (Recorded:78Kpr9009) to Recorded   3  Dexilant 60 MG Oral Capsule Delayed Release; take 1 capsule daily; Therapy: 04Ysw4563 to (Evaluate:76Msz3510)  Requested for: 71Ouy8397; Last   Rx:91Hus3054 Ordered   4  DrRx Tobrex Ophthalmic Solution 0 3% 5 ML; 2 drops 4 times per day to B/L eyesfor 3-5   days; Therapy: 35HLG0311 to (Last Rx:03Jan2017) Ordered   5  Ecotrin Low Strength 81 MG Oral Tablet Delayed Release; TAKE ONE (1) TABLET(S)   ONCE DAILY; Therapy: 66UIM6983 to (Last Rx:23Sep2014) Ordered   6  Evening Primrose Oil CAPS; Therapy: (Recorded:71Wvs9687) to Recorded   7  Flaxseed Oil Oral Oil; take 1 capsule daily; Therapy: (CIGVUFVJ:36HMF1235) to Recorded   8  Fluticasone Propionate 50 MCG/ACT Nasal Suspension; instill 2 sprays into each nostril   once daily; Therapy: 31PZE2866 to (Evaluate:19Mar2017)  Requested for: 69BHY9335; Last   Rx:03Jan2017 Ordered   9  Fluticasone Propionate 50 MCG/ACT Nasal Suspension; TWO (2) SPRAY(S) INTO EACH   NOSTRIL DAILY; Therapy: 75TCB0322 to (Last Rx:20Apr2015)  Requested for: 70Obz2280 Ordered   10  Gabapentin 300 MG Oral Capsule; take 1 capsule by mouth at bedtime; Therapy: 06ZCK8734 to (Bola Leal)  Requested for: 78YDR3709; Last    Rx:04Nov2016 Ordered   11  Montelukast Sodium 10 MG Oral Tablet; TAKE 1 TABLET EVERY DAY; Therapy: 75FMF6924 to (Evaluate:71Frh5696); Last Rx:76Tbf0196 Ordered   12   Sertraline HCl - 100 MG Oral Tablet; take 1 tablet by mouth once daily; Therapy: 41VKI7216 to (Evaluate:10Nov2017)  Requested for: 93GGH9953; Last    Rx:79Jkn5479 Ordered   13  Symbicort 160-4 5 MCG/ACT Inhalation Aerosol; USE 2 PUFFS TWICE DAILY; Therapy: 30SXC1264 to (Jessica Bermeo)  Requested for: 63NJW2746; Last    Rx:06Jan2014 Ordered   14  Tysabri CONC; Therapy: (Recorded:41Bns9679) to Recorded   15  Ventolin  (90 Base) MCG/ACT Inhalation Aerosol Solution; 2 PUFFS EVERY 4 TO    6 HOURS AS NEEDED FOR COUGH/WHEEZE;    Therapy: 78THY5164 to (Last Rx:09Nov2016)  Requested for: 97HNG5302 Ordered   16  Zetia 10 MG Oral Tablet; take 1 tablet by mouth once daily; Therapy: 21VXS1298 to (Evaluate:02Jan2017)  Requested for: 35OWI1775; Last    Rx:73Rmc6718 Ordered    Allergies    1  Cefzil TABS   2  Bactrim TABS   3  Betadine SOLN   4  Pamelor CAPS   5  Prempro TABS   6  Provera TABS   7  Zyban TBCR    Vitals  Signs    Temperature: 96 3 F  Heart Rate: 80  Respiration: 14  Systolic: 793  Diastolic: 72  Height: 5 ft 5 in  Weight: 150 lb 6 08 oz  BMI Calculated: 25 02  BSA Calculated: 1 76    Procedure    Procedure: cerumen removal    Indication: tympanic membrane(s) could not be visualized and cerumen impaction in the right ear  Procedure Note: The procedure was performed by the Provider   The procedure required significant time and effort to remove the cerumen  A otoscope was placed in the ear canal(s) to visualize the ear canal debris  The procedure was successful  Post-Procedure: Follow-up as needed  Assessment    1  Impacted cerumen of right ear (380 4) (H61 21)    Plan  Impacted cerumen of right ear    · Ofloxacin 0 3 % Otic Solution; INSTILL 5  DROPS INTO RIGHT EAR TWICE DAILY    Discussion/Summary    Successful cerumen removal right   Mild pink discoloration of right TM, good light reflex, no effusion; and erythematous ear canal  We'll prescribe Floxin eardrops    Follow-up as needed     Future Appointments    Date/Time Provider Specialty Site   01/30/2017 10:00 AM DON Baum  Family Medicine [address] [place] [address]   06/16/2017 10:30 AM DON Baum  Family Medicine [address] [place] [address]   05/01/2017 08:15 AM [facility] Neurology [address]   07/19/2017 09:30 AM DON Moreno  Neurology [address]     Signatures   Electronically signed by :  DON Heredia ; Jan 9 2017  8:36AM EST                       (Author)

## 2018-01-14 VITALS
WEIGHT: 152.25 LBS | BODY MASS INDEX: 25.37 KG/M2 | TEMPERATURE: 97.4 F | HEIGHT: 65 IN | SYSTOLIC BLOOD PRESSURE: 122 MMHG | DIASTOLIC BLOOD PRESSURE: 64 MMHG | HEART RATE: 74 BPM

## 2018-01-14 VITALS
SYSTOLIC BLOOD PRESSURE: 133 MMHG | RESPIRATION RATE: 14 BRPM | HEART RATE: 71 BPM | WEIGHT: 151.88 LBS | DIASTOLIC BLOOD PRESSURE: 61 MMHG | HEIGHT: 65 IN | BODY MASS INDEX: 25.3 KG/M2

## 2018-01-14 VITALS
DIASTOLIC BLOOD PRESSURE: 70 MMHG | TEMPERATURE: 97.4 F | WEIGHT: 151 LBS | SYSTOLIC BLOOD PRESSURE: 126 MMHG | BODY MASS INDEX: 25.13 KG/M2 | RESPIRATION RATE: 14 BRPM | HEART RATE: 84 BPM

## 2018-01-15 VITALS
HEIGHT: 65 IN | SYSTOLIC BLOOD PRESSURE: 122 MMHG | DIASTOLIC BLOOD PRESSURE: 62 MMHG | WEIGHT: 154.38 LBS | HEART RATE: 84 BPM | TEMPERATURE: 98.3 F | BODY MASS INDEX: 25.72 KG/M2

## 2018-01-15 VITALS
BODY MASS INDEX: 25.16 KG/M2 | WEIGHT: 151 LBS | DIASTOLIC BLOOD PRESSURE: 64 MMHG | SYSTOLIC BLOOD PRESSURE: 122 MMHG | HEIGHT: 65 IN | RESPIRATION RATE: 16 BRPM | OXYGEN SATURATION: 99 % | HEART RATE: 76 BPM

## 2018-01-16 NOTE — MISCELLANEOUS
Assessment    1  Multiple sclerosis (340) (G35)   2  Tinnitus of right ear (388 30) (H93 11)   3  Oral thrush (112 0) (B37 0)    Plan  Oral thrush    · Clotrimazole 10 MG Mouth/Throat Ana; ALLOW 1 ANA TO DISSOLVE  SLOWLY IN MOUTH 4 TIMES DAILY   Rx By: Yuli Lambert; Dispense: 0 Days ; #:40 Ana; Refill: 0; For: Oral thrush; DIEGO = N; [facility] RANJ[facility]YR[facility]ENK[facility]  Tinnitus of right ear    · 3 - [place] DOFidel  (Otolaryngology) Co-Management  *  Status: Hold For -  Scheduling  Requested for: 24Apr2017   Ordered; For: Tinnitus of right ear; Ordered By: Yuli Lambert Performed:  Due: 29Apr2017  are Referring to a non- Preferred Provider : Quality  Care Summary provided  : Yes    Discussion/Summary  Discussion Summary:   Patient presents for follow-up after recent hospitalization for flare up of multiple sclerosis  She reports persistent fatigue, unsteady ambulation, residual right facial and tongue numbness and tingling  Inpatient reports reviewed with patient and  today  Patient has completed 5 day IV steroid therapy, no new medications at present time  She ambulates slowly and steadily with a walker  Pending start a physical therapy and occupational therapy later this week  Pending neurology follow-up, I will discuss with Dr Yoseph Gutierrez-we will discontinue nystatin and treat patient with clotrimazole lozenges  I had long discussion with patient and  today urging them to quit smoking, I did explain significant risks of ongoing tobacco exposure to patient's health  Pending blood work, full lab panel, today  Right-sided tinnitus, subjective hearing loss on the right  Referral to ENT     Chief Complaint  Chief Complaint Free Text Note Form: LIU: Pt was admitted to [facility] from 04/14/2017 through 04/20/2017  Dx: Facial numbness, MS relapsing/remitting   Spoke with pt who reports she still has the facial numbness on right side with difficulty hearing from right ear  Pt denies SOB, CP, Dizziness, HA, N/V  Follow up with pcp scheduled for Mon , 04/24/2017  History of Present Illness  TCM Communication [facility]: The patient is being contacted for follow-up after hospitalization and 04/20/2017  She was hospitalized at [facility]  The date of admission: 04/14/2017, date of discharge: 04/20/2017  Diagnosis: Facial numbness, MS relapsing/remitting  She was discharged to home  Medications were not reviewed today  She scheduled a follow up appointment  Follow-up appointments with other specialists:  Neurology-04/27/2017  right facial numbness Counseling was provided to the patient  Topics counseled included importance of compliance with treatment  Communication performed and completed by Alfonso Tay       HPI: Follow-up after recent hospitalization for flare up of multiple sclerosis  Patient presented to [facility] with right facial and hemicranial numbness   She felt wobbly with ambulation  very tired  felt dizzy   Prior hospitalization in feb 2017, with left leg and right hand pain   prior MRI brain performed on 3/1/17 -stable, no new findings  New MRI brain in April - shows progression of MS , new cerebellum lesions  Patient was hospitalized with flare up of MS on Good Friday   Treated with IV steroids x 5 days, inpatient neurology consult reviewed  D/C home on the same medications   Starting PT, OT this week  Uses walker at home  on Nystatin for thrush , patient is complaining of persistent tongue numbness and pain  She is on a regular diet, no choking episodes  appetite is not so good  no SOB   Patient did not smoke while inpt , back to smoking now  pending labs today, patient will be proceeding with complete panel  Right ear tinnitus and decreased hearing  Discharge summary from [facility] reviewed  Patient was hospitalized for facial numbness, ambulatory dysfunction and relapsing/remitting multiple sclerosis     Diagnostic workup included CT of head and neck on April 14 with no evidence of critical stenosis, dissection or occlusion involving cervical carotid or vertebral segments  Chest x-ray on April 15-no active pulmonary disease  Right middle lobe pulmonary infiltrate that was present in February has now resolved  MRI of brain and cervical spine on April 18 revealed new enhancing white matter lesion in the right middle cerebellar peduncle indicative of an element of active demyelination superimposed on moderate chronic demyelinating disease throughout the supratentorial brain and to a lesser degree the posterior fossa  The lesion correlates with the patient's history of multiple sclerosis  Multilevel white matter lesions are seen in the cervical spine and are stable when compared to prior imaging from June 2016  No new disc herniations and cervical spine  Review of Systems  Complete-Female:   Constitutional: feeling tired  Eyes: No complaints of eye pain, no red eyes, no eyesight problems, no discharge, no dry eyes, no itching of eyes  ENT: Tongue numbness, pain, thrush, but as noted in HPI  Cardiovascular: No complaints of slow heart rate, no fast heart rate, no chest pain, no palpitations, no leg claudication, no lower extremity edema  Respiratory: No complaints of shortness of breath, no wheezing, no cough, no SOB on exertion, no orthopnea, no PND  Gastrointestinal: No complaints of abdominal pain, no constipation, no nausea or vomiting, no diarrhea, no bloody stools  Genitourinary: No complaints of dysuria, no incontinence, no pelvic pain, no dysmenorrhea, no vaginal discharge or bleeding  Musculoskeletal: arthralgias and myalgias  Integumentary: No complaints of skin rash or lesions, no itching, no skin wounds, no breast pain or lump  Neurological: numbness, dizziness and difficulty walking, but as noted in HPI     Psychiatric: Not suicidal, no sleep disturbance, no anxiety or depression, no change in personality, no emotional problems  Endocrine: No complaints of proptosis, no hot flashes, no muscle weakness, no deepening of the voice, no feelings of weakness  Hematologic/Lymphatic: No complaints of swollen glands, no swollen glands in the neck, does not bleed easily, does not bruise easily  Active Problems    1  Abdominal pain (789 00) (R10 9)   2  Abnormal EEG (794 02) (R94 01)   3  Acid reflux disease (530 81) (K21 9)   4  Acute sinusitis (461 9) (J01 90)   5  Allergic rhinitis (477 9) (J30 9)   6  Anxiety (300 00) (F41 9)   7  Arm weakness (729 89) (R29 898)   8  Arthralgia (719 40) (M25 50)   9  Arthritis (716 90) (M19 90)   10  Attention and concentration deficit (799 51) (R41 840)   11  Blepharitis, unspecified laterality   12  Blurry vision (368 8) (H53 8)   13  Cervical radiculopathy (723 4) (M54 12)   14  Cervical radiculopathy (723 4) (M54 12)   15  Cervical spinal stenosis (723 0) (M48 02)   16  Cervical spinal stenosis (723 0) (M48 02)   17  Cervicalgia (723 1) (M54 2)   18  Chronic obstructive pulmonary disease (496) (J44 9)   19  Closed Dislocation Of The Patella (836 3)   20  Colitis (558 9) (K52 9)   21  Complaints of memory disturbance (780 93) (R41 3)   22  Contact dermatitis (692 9) (L25 9)   23  Contusion of coccyx (922 32) (S30 0XXA)   24  Degeneration of cervical intervertebral disc (722 4) (M50 90)   25  Depression with anxiety (300 4) (F41 8)   26  Disc degeneration, lumbar (722 52) (M51 36)   27  Disorder of nose or nasal sinus (478 19) (J34 9)   28  Disturbance Of Coordination (781 99)   29  Dizziness and giddiness (780 4) (R42)   30  Dyspepsia (536 8) (K30)   31  Dysuria (788 1) (R30 0)   32  Fatigue (780 79) (R53 83)   33  Fatty liver (571 8) (K76 0)   34  Fecal incontinence (787 60) (R15 9)   35  Gastro-esophageal reflux disease with esophagitis (530 11) (K21 0)   36  Hand pain, not arthralgia (729 5) (M79 643)   37  Hip pain, unspecified laterality   38  Hyperkalemia (276 7) (E87 5)   39  Hyperlipidemia (272 4) (E78 5)   40  Impacted cerumen of right ear (380 4) (H61 21)   41  Insect bite (919 4,E906 4) (W57 XXXA)   42  Intervertebral disc disorders with radiculopathy, lumbar region (724 4) (M51 16)   43  Lack of coordination (781 3) (R27 9)   44  Left sided sciatica (724 3) (M54 32)   45  Leg weakness, bilateral (729 89) (R29 898)   46  Limb pain (729 5) (M79 609)   47  Lower leg pain (729 5) (M79 669)   48  Migraine headache (346 90) (G43 909)   49  Multiple sclerosis (340) (G35)   50  Myofascial pain syndrome (729 1) (M79 1)   51  Neck strain (847 0) (S16 1XXA)   52  Need for 23-polyvalent pneumococcal polysaccharide vaccine (V03 82) (Z23)   53  Need for prophylactic vaccination and inoculation against influenza (V04 81) (Z23)   54  Need for TD vaccine (V06 5) (Z23)   55  Optic neuritis (377 30) (H46 9)   56  Osteoporosis (733 00) (M81 0)   57  Pain, joint, shoulder (719 41) (M25 519)   58  Plantar fasciitis (728 71) (M72 2)   59  Pre-op exam (V72 84) (Z01 818)   60  Primary osteoarthritis of left knee (715 16) (M17 12)   61  Raynaud's syndrome without gangrene (443 0) (I73 00)   62  Right knee pain (719 46) (M25 561)   63  RML pneumonia (5) (J18 1)   64  Rotator cuff tear (840 4) (M75 100)   65  Sinus pain (478 19) (J34 89)   66  Spondylosis of cervical region without myelopathy or radiculopathy (721 0) (M47 812)   67  Strain Of Right Biceps Tendon (840 8)   68  Subacromial or subdeltoid bursitis, unspecified laterality   71  Sunburn, blistering (692 76) (L55 1)   70  Urge incontinence of urine (788 31) (N39 41)   71  URI, acute (465 9) (J06 9)   72  Vitamin D deficiency (268 9) (E55 9)    Past Medical History    1  History of Asthma (493 90) (J45 909)   2  History of Depression (311) (F32 9)   3  History of Encounter for screening mammogram for malignant neoplasm of breast   (V76 12) (Z12 31)   4  History of Generalized anxiety disorder (300 02) (F41 1)   5   History of hepatitis (V12 09) (Z86 19)   6  History of herpes zoster (V12 09) (Z86 19)   7  History of hypercholesterolemia (V12 29) (Z86 39)   8  History of Multiple sclerosis (340) (G35)    Surgical History    1  History of Breast Surgery   2  History of Complete Colonoscopy   3  History of Septoplasty   4  History of Shoulder Surgery  Surgical History Reviewed: The surgical history was reviewed and updated today  Family History  Mother    1  Family history of Arthritis (V17 7)   2  Family history of Family Health Status Siblings 5  Living   3  Family history of Maternal Grandfather Is    4  Family history of Maternal Grandmother Is    5  Family history of Osteoporosis (V17 81)   6  Family history of Paternal Grandfather Is    9  Family history of Paternal Grandmother Is   Father    6  Family history of Chronic Obstructive Pulmonary Disease  Family History    9  Family history of Arthritis (V17 7)   10  Family history of Chronic Obstructive Pulmonary Disease   11  Family history of Emphysema   12  Family history of Family Health Status Of Father -    15  Family history of Family Health Status Of Mother - Alive   15  Family history of Heart Disease (V17 49)   15  Family history of Hyperthyroidism   12  Family history of Multiple Sclerosis   17  Family history of Osteoarthritis (V17 7)  Family History Reviewed: The family history was reviewed and updated today  Social History    · Being A Social Drinker   · Current Every Day Smoker (305 1)   · Current Smoker (305 1)   · Denied: History of Drug Use (305 90)   · Eye Doctor - Last Seen   · Marital History - Currently    · Never Drank Alcohol   · Occupation: Retired   · Seeing A Dentist - Last Seen   · Sexually Active  Social History Reviewed: The social history was reviewed and updated today  Current Meds   1  Atorvastatin Calcium 40 MG Oral Tablet; take 1 tablet by mouth daily;    Therapy: 63YPY4481 to (Evaluate:97Ejf8053) Requested for: 83QVY1624; Last   Rx:09Jan2017 Ordered   2  B Complex CAPS; Therapy: (Recorded:82Izy3013) to Recorded   3  Ecotrin Low Strength 81 MG Oral Tablet Delayed Release; TAKE ONE (1) TABLET(S)   ONCE DAILY; Therapy: 40VZD5549 to (Last Rx:47Vaw7352) Ordered   4  Evening Primrose Oil CAPS; Therapy: (Recorded:29Eok4398) to Recorded   5  Ezetimibe 10 MG Oral Tablet; TAKE ONE (1) TABLET(S) DAILY; Therapy: 71UNO5023 to (Last Rx:30Jan2017)  Requested for: 30Jan2017 Ordered   6  Flaxseed Oil Oral Oil; take 1 capsule daily; Therapy: (CZNDNTOU:82MJR8315) to Recorded   7  Fluticasone Propionate 50 MCG/ACT Nasal Suspension; instill 2 sprays into each nostril   once daily; Therapy: 25RXA8391 to (Evaluate:63Gud4273)  Requested for: 05Apr2017; Last   Rx:05Apr2017 Ordered   8  Gabapentin 300 MG Oral Capsule; Take 1 capsule by mouth  twice a day; Therapy: 84JIP9457 to (Evaluate:76Tfh9192)  Requested for: 77UIS3657; Last   Rx:30Jan2017 Ordered   9  Montelukast Sodium 10 MG Oral Tablet; TAKE 1 TABLET EVERY NIGHT AT BEDTIME; Therapy: 71UBW7776 to (Evaluate:31Mar2018)  Requested for: 05Apr2017; Last   Rx:05Apr2017 Ordered   10  Nystatin 761563 UNIT/ML Mouth/Throat Suspension; PLACE 1ML TO INSIDE OF EACH    CHEEK 4 TIMES DAILY; Therapy: 24Apr2017 to Recorded   11  Sertraline HCl - 100 MG Oral Tablet; take 1 tablet by mouth once daily; Therapy: 67DZM6214 to (Evaluate:10Nov2017)  Requested for: 58NAG4709; Last    Rx:74Xqx7958 Ordered   12  Symbicort 160-4 5 MCG/ACT Inhalation Aerosol; USE 2 PUFFS TWICE DAILY; Therapy: 68IRS0495 to (Scarlett Martin)  Requested for: 54ONF8038; Last    Rx:06Jan2014 Ordered   13  Tysabri CONC; Therapy: (Recorded:70Bzk4822) to Recorded   14  Ventolin  (90 Base) MCG/ACT Inhalation Aerosol Solution; 2 PUFFS EVERY 4 TO    6 HOURS AS NEEDED FOR COUGH/WHEEZE;    Therapy: 17PSS8137 to (Last Rx:09Nov2016)  Requested for: 58NQV9401 Ordered   15   Vitamin D3 2000 UNIT Oral Capsule; take 2 capsule by mouth once daily; Therapy: 22HOE6198 to (Last Rx:30Jan2017) Ordered  Medication List Reviewed: The medication list was reviewed and updated today  Allergies    1  Cefzil TABS   2  Bactrim TABS   3  Betadine SOLN   4  Pamelor CAPS   5  Prempro TABS   6  Provera TABS   7  Zyban TBCR    Vitals  Signs   Recorded: 24Apr2017 09:28AM   Temperature: 95 9 F  Heart Rate: 72  Respiration: 16  Systolic: 625  Diastolic: 64  Height: 5 ft 5 in  Weight: 152 lb   BMI Calculated: 25 29  BSA Calculated: 1 76    Physical Exam    Constitutional   General appearance: No acute distress, well appearing and well nourished  well developed, chronically ill and appears tired  Ears, Nose, Mouth, and Throat   Otoscopic examination: Tympanic membranes translucent with normal light reflex  Canals patent without erythema  Oropharynx: Abnormal   Tongue, thick white coating consistent with thrush  Pulmonary   Respiratory effort: No increased work of breathing or signs of respiratory distress  Auscultation of lungs: Clear to auscultation  Cardiovascular   Auscultation of heart: Normal rate and rhythm, normal S1 and S2, without murmurs  Examination of extremities for edema and/or varicosities: Normal     Carotid pulses: Normal     Musculoskeletal   Gait and station: Abnormal   Gait evaluation demonstrated antalgia bilaterally and Ambulates slowly with walker  Neurologic   Cranial nerves: Cranial nerves 2-12 intact  Psychiatric   Orientation to person, place, and time: Normal     Mood and affect: Normal          Health Management  Health Maintenance   Medicare Annual Wellness Visit; every 1 year; Next Due: 48TOL0691; Active    Future Appointments    Date/Time Provider Specialty Site   05/05/2017 09:30 AM DON Aguilar   Family Medicine [address] [place] [address]   04/27/2017 08:45 AM Ivy Crawford [facility]   07/19/2017 09:30 AM Oliverio Koo DON Rios  Neurology [address]     Signatures   Electronically signed by :  DON Hearn ; Apr 25 2017  8:52AM EST                       (Author)

## 2018-01-17 NOTE — RESULT NOTES
Message   PLEASE OBTAIN FULL REPORT     Verified Results  [facility] BARIUM Doralee Uriah SPEECH [id number] 08:08AM Issac Ansaridict     Test Name Result Flag Reference   FL BARIUM SWALLOW VIDEO W SPEECH (Report)     2 3 minutes of fluoroscopy time were provided for the Department of Speech Pathology in performing a video barium swallow  Please refer to their report for the official interpretation  A copy of the video tape is on file  Signed by:  Christoph Solis DO   [8/31/2016 10:39:19 AM - BROOKS Aguero]   75   2 5   50

## 2018-01-18 RX ORDER — ACETAMINOPHEN 325 MG/1
650 TABLET ORAL ONCE
Status: COMPLETED | OUTPATIENT
Start: 2018-01-19 | End: 2018-01-19

## 2018-01-18 RX ORDER — SODIUM CHLORIDE 9 MG/ML
20 INJECTION, SOLUTION INTRAVENOUS CONTINUOUS
Status: DISCONTINUED | OUTPATIENT
Start: 2018-01-19 | End: 2018-01-22 | Stop reason: HOSPADM

## 2018-01-19 ENCOUNTER — HOSPITAL ENCOUNTER (OUTPATIENT)
Dept: INFUSION CENTER | Facility: CLINIC | Age: 63
Discharge: HOME | End: 2018-01-19
Payer: MEDICARE

## 2018-01-19 VITALS
OXYGEN SATURATION: 98 % | SYSTOLIC BLOOD PRESSURE: 114 MMHG | RESPIRATION RATE: 20 BRPM | HEART RATE: 73 BPM | DIASTOLIC BLOOD PRESSURE: 74 MMHG | TEMPERATURE: 98.1 F

## 2018-01-19 LAB
ALBUMIN SERPL BCP-MCNC: 3.5 G/DL (ref 3.5–5)
ALP SERPL-CCNC: 96 U/L (ref 46–116)
ALT SERPL W P-5'-P-CCNC: 34 U/L (ref 12–78)
ANION GAP SERPL CALCULATED.3IONS-SCNC: 10 MMOL/L (ref 4–13)
AST SERPL W P-5'-P-CCNC: 27 U/L (ref 5–45)
BASOPHILS # BLD AUTO: 0.03 THOUSANDS/ΜL (ref 0–0.1)
BASOPHILS NFR BLD AUTO: 0 % (ref 0–1)
BILIRUB SERPL-MCNC: 0.3 MG/DL (ref 0.2–1)
BUN SERPL-MCNC: 21 MG/DL (ref 5–25)
CALCIUM SERPL-MCNC: 8.3 MG/DL (ref 8.3–10.1)
CHLORIDE SERPL-SCNC: 107 MMOL/L (ref 100–108)
CO2 SERPL-SCNC: 23 MMOL/L (ref 21–32)
CREAT SERPL-MCNC: 0.63 MG/DL (ref 0.6–1.3)
EOSINOPHIL # BLD AUTO: 0.18 THOUSAND/ΜL (ref 0–0.61)
EOSINOPHIL NFR BLD AUTO: 2 % (ref 0–6)
ERYTHROCYTE [DISTWIDTH] IN BLOOD BY AUTOMATED COUNT: 12.1 % (ref 11.6–15.1)
GFR SERPL CREATININE-BSD FRML MDRD: 96 ML/MIN/1.73SQ M
GLUCOSE SERPL-MCNC: 103 MG/DL (ref 65–140)
HCT VFR BLD AUTO: 38.5 % (ref 34.8–46.1)
HGB BLD-MCNC: 13.5 G/DL (ref 11.5–15.4)
LYMPHOCYTES # BLD AUTO: 2.93 THOUSANDS/ΜL (ref 0.6–4.47)
LYMPHOCYTES NFR BLD AUTO: 34 % (ref 14–44)
MCH RBC QN AUTO: 30.1 PG (ref 26.8–34.3)
MCHC RBC AUTO-ENTMCNC: 35.1 G/DL (ref 31.4–37.4)
MCV RBC AUTO: 86 FL (ref 82–98)
MONOCYTES # BLD AUTO: 0.74 THOUSAND/ΜL (ref 0.17–1.22)
MONOCYTES NFR BLD AUTO: 9 % (ref 4–12)
NEUTROPHILS # BLD AUTO: 4.79 THOUSANDS/ΜL (ref 1.85–7.62)
NEUTS SEG NFR BLD AUTO: 55 % (ref 43–75)
PLATELET # BLD AUTO: 264 THOUSANDS/UL (ref 149–390)
PMV BLD AUTO: 9.4 FL (ref 8.9–12.7)
POTASSIUM SERPL-SCNC: 4.5 MMOL/L (ref 3.5–5.3)
PROT SERPL-MCNC: 6.7 G/DL (ref 6.4–8.2)
RBC # BLD AUTO: 4.49 MILLION/UL (ref 3.81–5.12)
SODIUM SERPL-SCNC: 140 MMOL/L (ref 136–145)
WBC # BLD AUTO: 8.67 THOUSAND/UL (ref 4.31–10.16)

## 2018-01-19 PROCEDURE — 96375 TX/PRO/DX INJ NEW DRUG ADDON: CPT

## 2018-01-19 PROCEDURE — 96413 CHEMO IV INFUSION 1 HR: CPT

## 2018-01-19 PROCEDURE — 80053 COMPREHEN METABOLIC PANEL: CPT | Performed by: PSYCHIATRY & NEUROLOGY

## 2018-01-19 PROCEDURE — 85025 COMPLETE CBC W/AUTO DIFF WBC: CPT | Performed by: PSYCHIATRY & NEUROLOGY

## 2018-01-19 RX ADMIN — DIPHENHYDRAMINE HYDROCHLORIDE 25 MG: 50 INJECTION, SOLUTION INTRAMUSCULAR; INTRAVENOUS at 10:24

## 2018-01-19 RX ADMIN — ACETAMINOPHEN 650 MG: 325 TABLET, FILM COATED ORAL at 10:09

## 2018-01-19 RX ADMIN — SODIUM CHLORIDE 20 ML/HR: 0.9 INJECTION, SOLUTION INTRAVENOUS at 10:09

## 2018-01-19 RX ADMIN — NATALIZUMAB 300 MG: 300 INJECTION INTRAVENOUS at 10:42

## 2018-01-19 NOTE — PROGRESS NOTES
Pt resting with no complaints  Vitals stable  Online Tysabri Touch questionaire answered, insurance authorized and pt appropriate to receive treatment today  Callbell within reach; will continue to monitor

## 2018-01-22 VITALS
SYSTOLIC BLOOD PRESSURE: 102 MMHG | TEMPERATURE: 97.7 F | WEIGHT: 151.25 LBS | RESPIRATION RATE: 16 BRPM | HEART RATE: 76 BPM | DIASTOLIC BLOOD PRESSURE: 60 MMHG | HEIGHT: 65 IN | BODY MASS INDEX: 25.2 KG/M2

## 2018-01-22 VITALS
HEART RATE: 80 BPM | WEIGHT: 150.38 LBS | TEMPERATURE: 96.3 F | DIASTOLIC BLOOD PRESSURE: 72 MMHG | HEIGHT: 65 IN | RESPIRATION RATE: 14 BRPM | BODY MASS INDEX: 25.05 KG/M2 | SYSTOLIC BLOOD PRESSURE: 126 MMHG

## 2018-01-22 VITALS
SYSTOLIC BLOOD PRESSURE: 118 MMHG | HEART RATE: 72 BPM | RESPIRATION RATE: 16 BRPM | WEIGHT: 152 LBS | DIASTOLIC BLOOD PRESSURE: 64 MMHG | BODY MASS INDEX: 25.33 KG/M2 | TEMPERATURE: 95.9 F | HEIGHT: 65 IN

## 2018-01-24 ENCOUNTER — OFFICE VISIT (OUTPATIENT)
Dept: NEUROLOGY | Facility: CLINIC | Age: 63
End: 2018-01-24
Payer: MEDICARE

## 2018-01-24 VITALS
DIASTOLIC BLOOD PRESSURE: 70 MMHG | WEIGHT: 156.6 LBS | HEART RATE: 69 BPM | RESPIRATION RATE: 16 BRPM | BODY MASS INDEX: 25.17 KG/M2 | HEIGHT: 66 IN | SYSTOLIC BLOOD PRESSURE: 130 MMHG

## 2018-01-24 DIAGNOSIS — R26.2 AMBULATORY DYSFUNCTION: ICD-10-CM

## 2018-01-24 DIAGNOSIS — G35 MULTIPLE SCLEROSIS (HCC): Primary | ICD-10-CM

## 2018-01-24 DIAGNOSIS — E55.9 VITAMIN D DEFICIENCY: ICD-10-CM

## 2018-01-24 PROCEDURE — 99214 OFFICE O/P EST MOD 30 MIN: CPT | Performed by: PSYCHIATRY & NEUROLOGY

## 2018-01-24 RX ORDER — ASPIRIN 81 MG/1
1 TABLET ORAL DAILY
COMMUNITY
Start: 2014-09-23

## 2018-01-24 RX ORDER — MONTELUKAST SODIUM 10 MG/1
1 TABLET ORAL
COMMUNITY
Start: 2015-05-12 | End: 2018-09-13 | Stop reason: SDUPTHER

## 2018-01-24 RX ORDER — BUDESONIDE AND FORMOTEROL FUMARATE DIHYDRATE 160; 4.5 UG/1; UG/1
2 AEROSOL RESPIRATORY (INHALATION) 2 TIMES DAILY PRN
COMMUNITY
Start: 2014-01-06 | End: 2018-03-09 | Stop reason: ALTCHOICE

## 2018-01-24 RX ORDER — SERTRALINE HYDROCHLORIDE 100 MG/1
100 TABLET, FILM COATED ORAL DAILY
Refills: 0 | COMMUNITY
Start: 2017-12-10 | End: 2018-01-24 | Stop reason: SDUPTHER

## 2018-01-24 RX ORDER — EZETIMIBE 10 MG/1
1 TABLET ORAL DAILY
COMMUNITY
Start: 2015-05-12 | End: 2018-02-02 | Stop reason: SDUPTHER

## 2018-01-24 RX ORDER — CHOLECALCIFEROL (VITAMIN D3) 1250 MCG
1 CAPSULE ORAL WEEKLY
COMMUNITY
Start: 2017-10-02 | End: 2018-03-09 | Stop reason: ALTCHOICE

## 2018-01-24 RX ORDER — FLUTICASONE PROPIONATE 50 MCG
2 SPRAY, SUSPENSION (ML) NASAL DAILY
COMMUNITY
Start: 2017-01-03 | End: 2018-10-04 | Stop reason: SDUPTHER

## 2018-01-24 RX ORDER — SERTRALINE HYDROCHLORIDE 100 MG/1
100 TABLET, FILM COATED ORAL DAILY
COMMUNITY
Start: 2017-12-10 | End: 2018-09-13 | Stop reason: SDUPTHER

## 2018-01-24 RX ORDER — B-COMPLEX WITH VITAMIN C
CAPSULE ORAL
COMMUNITY
End: 2021-01-19 | Stop reason: ALTCHOICE

## 2018-01-24 RX ORDER — ERGOCALCIFEROL 1.25 MG/1
CAPSULE ORAL
Refills: 0 | COMMUNITY
Start: 2017-10-30 | End: 2018-03-09 | Stop reason: SDUPTHER

## 2018-01-24 RX ORDER — ALBUTEROL SULFATE 90 UG/1
2 AEROSOL, METERED RESPIRATORY (INHALATION)
COMMUNITY
Start: 2016-11-09 | End: 2018-03-09 | Stop reason: SDUPTHER

## 2018-01-24 RX ORDER — ATORVASTATIN CALCIUM 40 MG/1
1 TABLET, FILM COATED ORAL DAILY
COMMUNITY
Start: 2015-02-04 | End: 2018-09-13 | Stop reason: SDUPTHER

## 2018-01-24 RX ORDER — GABAPENTIN 300 MG/1
1 CAPSULE ORAL 2 TIMES DAILY
COMMUNITY
Start: 2016-03-28 | End: 2018-09-13 | Stop reason: SDUPTHER

## 2018-01-24 NOTE — PATIENT INSTRUCTIONS
PT TO RETN IN 4 MONTHS  PT REMAINS ON TYSABRI MONTHLY  PT TO HAVE UPDATED MRI HEAD IN April 2018  PT TO HAVE UPDATED JCV AB DONE IN April 2018  CALL FOR ANY NEW SXS

## 2018-01-24 NOTE — PROGRESS NOTES
Patient ID: Rosalba Pace is a 60 y o  female  Assessment/Plan:        No problem-specific Assessment & Plan notes found for this encounter  Pt here for neuro follow up  Pt last seen in Oct  She remains on Tysabri, going monthly  Pt started med in 2011  Recent JCA from dec 2017 was negative  She had updated  imaging in November Rev updated mri head with pt  Impression 11/6/17:Stable MR appearance of    the brain consistent with demyelinating disease  There is no new pathology  Contrast was not administered  Right brachium pontis lesion may be slightly smaller  Pt is next due for her mri head in  April 2018  Pt next due for jcv in April 2018 as well  pt wishes to cont with her Tysabri  pt aware of risk of PML  Subjective:    HPI  HPI: Patient is a 60year old female with PMH of MS diagnosed in 2000, last seen in July 2017  Patient has been on Tysabri since 2011 and has been doing well  She has missed occasional infusions due to illnesses  MRI c and t spine were updated on June 21 2016 and comp to Nov 2015 and stable  Patient had been having increased LBP in Sept 2016, but was off gabapentin  She also had some weakness and was given IV steroids in Sept 2016  She had MRI lumbar spine 9/29/16 with disc disease and arthrosis  Patient was sent to pain management  She had restarted her gabapentin and symptoms completely resolved    Patient follows with Dr Lg Francis from [facility]  About one yr ago,  February 2017 she was having right hand pain and swelling and left thigh pain  She was seen In the hospital, they did not think any of this was MS related  She was not given any steroids  There was also question of a pneumonia  at that time, labs done included mildly elevated sed rate at 23, normal CK, normal CRP  Neg CASEY, RF, Lyme  The symptoms resolved within 4-5 days  Re rev MRI brain was updated 3/1/17 and all stable, no new or enhancing lesions   pt had another hospitalization 4/14/17-4/20/17 c/o some numbness in the cheek  She reported she was being treated for a sinus infection and on Levaquin  No other symptoms reported at that time  however over the next few days and after finishing the course of antibiotics, symptoms did not resolve and she also developed difficulty ambulating, feeling like she was continuously leaning towards the right side, along with the right facial numbness  CT with no acute findings  She was admitted and given a total of 5 days of IV steroids  MRI brain 4/17/17 New enhancing white matter lesion in the right middle cerebellar peduncle, and moderate chronic demyelinating disease throughout the supratentorial brain and to lesser degree posterior fossa t re rev MRI c-spine 4/17/17 Multilevel white matter lesions are stable,  No evidence of progressive or active demyelination in the visualized spinal cord  She was discharged home to continue outpatient therapy  At that time, her Tysabri was held back 2 weeks after steroid completion  She had P  T/OT which she felt was very helpful in combination with the steroids    last seen in July  Today she is doing well  She remains on Tysabri  Recent JCA from dec 2017 was negative  She had updated  imaging in November  Rev updated mri head with pt  Impression 11/6/17:Stable MR appearance of    the brain consistent with demyelinating disease  There is no new pathology  Contrast was not administered  Right brachium pontis lesion may be               slightly smaller  Study rev at appt with pt  Pt is next due for her mri head in      April 2018  Pt next due for jcv in April 2018 as well  Pt notes she had good     holidays  Unfortunately her mom passed away in dec  Pt also keeping busy     socially as well as with her sewing in her new outdoor enclosed room  no        ha or fever or chills  No recent infections  pt wishes to cont with her        Tysabri  pt aware of risk of PML  Denies vertigo currently  No c/o diplopia   She overall is feeling better, she has some ongoing dysarthria but no issues with swallowing  Mri rev at [facility]   tysabri labs from dec and jan also rev at [facility]  The following portions of the patient's history were reviewed and updated as appropriate: allergies, current medications, past family history, past medical history, past social history, past surgical history and problem list          Objective:    not currently breastfeeding  Physical Exam   Constitutional: She is oriented to person, place, and time  She appears well-developed  Eyes: EOM are normal    Neurological: She is alert and oriented to person, place, and time  She has normal strength and normal reflexes  She displays normal reflexes  No cranial nerve deficit  She exhibits normal muscle tone  Neurological Exam    Mental Status  The patient is alert and oriented to person, place, time, and situation  She recalls 3 of 3 objects immediately and recalls 2 of 3 objects at five minutes  She has no visuospatial neglect  She has dysarthria of mild severity  She has normal attention span and concentration  She follows one step commands  Cranial Nerves    CN II: The patient's visual acuity and visual fields are normal  Funduscopic exam performed  Right: The right disc is normal   Left: The left disc is normal   CN III, IV, VI: The patient's ocular movements are normal   The patient's pupils are equal   CN V: The patient has normal facial sensation  CN VII:  The patient has symmetric facial movement  CN VIII:  The patient's hearing is normal   CN IX, X: The patient has symmetric palate movement and normal gag reflex  CN XI: The patient's shoulder shrug strength is normal   CN XII: The patient's tongue is midline without atrophy or fasciculations  Motor  The patient has normal muscle bulk throughout  Her overall muscle tone is normal throughout  Her strength is 5/5 throughout all four extremities        Reflexes  Deep tendon reflexes are 2+ and symmetric in all four extremities with downgoing toes bilaterally  Gait and Coordination   She has a wide stance  She has abnormal tandem gait  She has abnormal right finger to nose and normal left finger to nose coordination  ROS:    Review of Systems   HENT: Positive for congestion and sinus pressure  Eyes: Negative  Respiratory: Negative  Cardiovascular: Negative  Gastrointestinal: Negative  Endocrine: Negative  Genitourinary: Positive for urgency  Musculoskeletal: Positive for gait problem  Negative for back pain  Clumsiness and balance problems    Neurological: Positive for numbness  Psychiatric/Behavioral: The patient is nervous/anxious

## 2018-02-02 DIAGNOSIS — E78.5 HYPERLIPIDEMIA, UNSPECIFIED HYPERLIPIDEMIA TYPE: Primary | ICD-10-CM

## 2018-02-02 RX ORDER — EZETIMIBE 10 MG/1
TABLET ORAL
Qty: 90 TABLET | Refills: 3 | Status: SHIPPED | OUTPATIENT
Start: 2018-02-02 | End: 2018-09-13 | Stop reason: SDUPTHER

## 2018-02-15 RX ORDER — ACETAMINOPHEN 325 MG/1
650 TABLET ORAL ONCE
Status: COMPLETED | OUTPATIENT
Start: 2018-02-16 | End: 2018-02-16

## 2018-02-15 RX ORDER — SODIUM CHLORIDE 9 MG/ML
20 INJECTION, SOLUTION INTRAVENOUS CONTINUOUS
Status: DISCONTINUED | OUTPATIENT
Start: 2018-02-16 | End: 2018-02-19 | Stop reason: HOSPADM

## 2018-02-16 ENCOUNTER — HOSPITAL ENCOUNTER (OUTPATIENT)
Dept: INFUSION CENTER | Facility: CLINIC | Age: 63
Discharge: HOME | End: 2018-02-16
Payer: MEDICARE

## 2018-02-16 VITALS
HEART RATE: 76 BPM | TEMPERATURE: 98.2 F | SYSTOLIC BLOOD PRESSURE: 144 MMHG | DIASTOLIC BLOOD PRESSURE: 77 MMHG | RESPIRATION RATE: 20 BRPM

## 2018-02-16 LAB
ALBUMIN SERPL BCP-MCNC: 3.8 G/DL (ref 3.5–5)
ALP SERPL-CCNC: 99 U/L (ref 46–116)
ALT SERPL W P-5'-P-CCNC: 27 U/L (ref 12–78)
ANION GAP SERPL CALCULATED.3IONS-SCNC: 11 MMOL/L (ref 4–13)
AST SERPL W P-5'-P-CCNC: 14 U/L (ref 5–45)
BASOPHILS # BLD AUTO: 0.02 THOUSANDS/ΜL (ref 0–0.1)
BASOPHILS NFR BLD AUTO: 0 % (ref 0–1)
BILIRUB SERPL-MCNC: 0.5 MG/DL (ref 0.2–1)
BUN SERPL-MCNC: 18 MG/DL (ref 5–25)
CALCIUM SERPL-MCNC: 9.1 MG/DL (ref 8.3–10.1)
CHLORIDE SERPL-SCNC: 105 MMOL/L (ref 100–108)
CO2 SERPL-SCNC: 24 MMOL/L (ref 21–32)
CREAT SERPL-MCNC: 0.71 MG/DL (ref 0.6–1.3)
EOSINOPHIL # BLD AUTO: 0.2 THOUSAND/ΜL (ref 0–0.61)
EOSINOPHIL NFR BLD AUTO: 2 % (ref 0–6)
ERYTHROCYTE [DISTWIDTH] IN BLOOD BY AUTOMATED COUNT: 12.1 % (ref 11.6–15.1)
GFR SERPL CREATININE-BSD FRML MDRD: 91 ML/MIN/1.73SQ M
GLUCOSE SERPL-MCNC: 113 MG/DL (ref 65–140)
HCT VFR BLD AUTO: 38.6 % (ref 34.8–46.1)
HGB BLD-MCNC: 13.6 G/DL (ref 11.5–15.4)
LYMPHOCYTES # BLD AUTO: 3.13 THOUSANDS/ΜL (ref 0.6–4.47)
LYMPHOCYTES NFR BLD AUTO: 33 % (ref 14–44)
MCH RBC QN AUTO: 30 PG (ref 26.8–34.3)
MCHC RBC AUTO-ENTMCNC: 35.2 G/DL (ref 31.4–37.4)
MCV RBC AUTO: 85 FL (ref 82–98)
MONOCYTES # BLD AUTO: 0.72 THOUSAND/ΜL (ref 0.17–1.22)
MONOCYTES NFR BLD AUTO: 8 % (ref 4–12)
NEUTROPHILS # BLD AUTO: 5.29 THOUSANDS/ΜL (ref 1.85–7.62)
NEUTS SEG NFR BLD AUTO: 57 % (ref 43–75)
PLATELET # BLD AUTO: 255 THOUSANDS/UL (ref 149–390)
PMV BLD AUTO: 9.6 FL (ref 8.9–12.7)
POTASSIUM SERPL-SCNC: 4.1 MMOL/L (ref 3.5–5.3)
PROT SERPL-MCNC: 7 G/DL (ref 6.4–8.2)
RBC # BLD AUTO: 4.54 MILLION/UL (ref 3.81–5.12)
SODIUM SERPL-SCNC: 140 MMOL/L (ref 136–145)
WBC # BLD AUTO: 9.36 THOUSAND/UL (ref 4.31–10.16)

## 2018-02-16 PROCEDURE — 96413 CHEMO IV INFUSION 1 HR: CPT

## 2018-02-16 PROCEDURE — 85025 COMPLETE CBC W/AUTO DIFF WBC: CPT | Performed by: PSYCHIATRY & NEUROLOGY

## 2018-02-16 PROCEDURE — 80053 COMPREHEN METABOLIC PANEL: CPT | Performed by: PSYCHIATRY & NEUROLOGY

## 2018-02-16 PROCEDURE — 96367 TX/PROPH/DG ADDL SEQ IV INF: CPT

## 2018-02-16 RX ADMIN — ACETAMINOPHEN 650 MG: 325 TABLET, FILM COATED ORAL at 09:19

## 2018-02-16 RX ADMIN — DIPHENHYDRAMINE HYDROCHLORIDE 25 MG: 50 INJECTION, SOLUTION INTRAMUSCULAR; INTRAVENOUS at 09:21

## 2018-02-16 RX ADMIN — SODIUM CHLORIDE 20 ML/HR: 0.9 INJECTION, SOLUTION INTRAVENOUS at 09:00

## 2018-02-16 RX ADMIN — NATALIZUMAB 300 MG: 300 INJECTION INTRAVENOUS at 09:48

## 2018-02-16 NOTE — PLAN OF CARE
Problem: Potential for Falls  Goal: Patient will remain free of falls  INTERVENTIONS:  - Assess patient frequently for physical needs  -  Identify cognitive and physical deficits and behaviors that affect risk of falls    -  [place] fall precautions as indicated by assessment   - Educate patient/family on patient safety including physical limitations  - Instruct patient to call for assistance with activity based on assessment  - Modify environment to reduce risk of injury  - Consider OT/PT consult to assist with strengthening/mobility   Outcome: Progressing

## 2018-02-16 NOTE — PROGRESS NOTES
Pt  Tolerated treatment & observed x 1 hr post infusion w/out adverse reaction  Confirmed pts  Next appt   Pt  Declined AVS

## 2018-03-09 ENCOUNTER — OFFICE VISIT (OUTPATIENT)
Dept: FAMILY MEDICINE CLINIC | Facility: CLINIC | Age: 63
End: 2018-03-09
Payer: MEDICARE

## 2018-03-09 ENCOUNTER — TELEPHONE (OUTPATIENT)
Dept: NEUROLOGY | Facility: CLINIC | Age: 63
End: 2018-03-09

## 2018-03-09 ENCOUNTER — TELEPHONE (OUTPATIENT)
Dept: FAMILY MEDICINE CLINIC | Facility: CLINIC | Age: 63
End: 2018-03-09

## 2018-03-09 VITALS
WEIGHT: 154 LBS | RESPIRATION RATE: 16 BRPM | SYSTOLIC BLOOD PRESSURE: 130 MMHG | HEIGHT: 64 IN | HEART RATE: 76 BPM | DIASTOLIC BLOOD PRESSURE: 60 MMHG | BODY MASS INDEX: 26.29 KG/M2 | TEMPERATURE: 96.1 F

## 2018-03-09 DIAGNOSIS — E55.9 VITAMIN D DEFICIENCY: ICD-10-CM

## 2018-03-09 DIAGNOSIS — Z00.00 ENCOUNTER FOR MEDICARE ANNUAL WELLNESS EXAM: ICD-10-CM

## 2018-03-09 DIAGNOSIS — E78.5 HYPERLIPIDEMIA, UNSPECIFIED HYPERLIPIDEMIA TYPE: Chronic | ICD-10-CM

## 2018-03-09 DIAGNOSIS — G35 MULTIPLE SCLEROSIS (HCC): ICD-10-CM

## 2018-03-09 DIAGNOSIS — J30.9 CHRONIC ALLERGIC RHINITIS, UNSPECIFIED SEASONALITY, UNSPECIFIED TRIGGER: ICD-10-CM

## 2018-03-09 DIAGNOSIS — F33.9 MAJOR DEPRESSION, RECURRENT, CHRONIC (HCC): ICD-10-CM

## 2018-03-09 DIAGNOSIS — G35 MULTIPLE SCLEROSIS (HCC): Primary | ICD-10-CM

## 2018-03-09 DIAGNOSIS — J44.9 COPD WITHOUT EXACERBATION (HCC): Primary | Chronic | ICD-10-CM

## 2018-03-09 PROBLEM — M79.643 HAND PAIN: Status: RESOLVED | Noted: 2017-02-02 | Resolved: 2018-03-09

## 2018-03-09 PROBLEM — R42 DISEQUILIBRIUM: Status: RESOLVED | Noted: 2017-04-17 | Resolved: 2018-03-09

## 2018-03-09 PROBLEM — R20.0 FACIAL NUMBNESS: Status: RESOLVED | Noted: 2017-04-15 | Resolved: 2018-03-09

## 2018-03-09 PROBLEM — M79.659 THIGH PAIN: Status: RESOLVED | Noted: 2017-02-02 | Resolved: 2018-03-09

## 2018-03-09 PROBLEM — E87.6 HYPOKALEMIA: Status: RESOLVED | Noted: 2017-04-17 | Resolved: 2018-03-09

## 2018-03-09 PROBLEM — D72.829 LEUKOCYTOSIS: Status: RESOLVED | Noted: 2017-02-03 | Resolved: 2018-03-09

## 2018-03-09 PROCEDURE — 99214 OFFICE O/P EST MOD 30 MIN: CPT | Performed by: FAMILY MEDICINE

## 2018-03-09 PROCEDURE — G0439 PPPS, SUBSEQ VISIT: HCPCS | Performed by: FAMILY MEDICINE

## 2018-03-09 RX ORDER — FLUTICASONE FUROATE AND VILANTEROL 100; 25 UG/1; UG/1
1 POWDER RESPIRATORY (INHALATION) DAILY
Qty: 1 EACH | Refills: 11 | Status: SHIPPED | OUTPATIENT
Start: 2018-03-09 | End: 2018-10-15

## 2018-03-09 RX ORDER — ALBUTEROL SULFATE 90 UG/1
2 AEROSOL, METERED RESPIRATORY (INHALATION) 2 TIMES DAILY
Qty: 1 INHALER | Refills: 2 | Status: CANCELLED | OUTPATIENT
Start: 2018-03-09

## 2018-03-09 RX ORDER — ALBUTEROL SULFATE 90 UG/1
2 AEROSOL, METERED RESPIRATORY (INHALATION) EVERY 4 HOURS PRN
Qty: 1 INHALER | Refills: 3 | Status: SHIPPED | OUTPATIENT
Start: 2018-03-09 | End: 2018-03-12 | Stop reason: CLARIF

## 2018-03-09 NOTE — PROGRESS NOTES
HPI:  Dixie Navarrete is a 60 y o  female here for her Subsequent Wellness Visit      Patient Active Problem List   Diagnosis    Multiple sclerosis, relapsing/remitting    Muscle spasm    Osteoporosis    Ambulatory dysfunction    COPD without exacerbation ([facility] [address]    Vitamin D deficiency    Disc degeneration, lumbar    Major depression, recurrent, chronic (HCC)    Allergic rhinitis     Past Medical History:   Diagnosis Date    Asthma     Degeneration of intervertebral disc at L5-S1 level     Depression     Generalized anxiety disorder     Hepatitis     at age 4 yrs   Earna Lesia Herpes zoster     last assessed: 6/20/13    Hypercholesterolemia     Hyperlipidemia     MS (multiple sclerosis) ([facility] [address] )     Osteoporosis     Seasonal allergies     Tumor of breast     benign, right breast     Past Surgical History:   Procedure Laterality Date    BREAST SURGERY      right breast fibroid tumor resection    COLONOSCOPY      11/2013 - due in 2018 - [address]      Bilateral    ROTATOR CUFF REPAIR Bilateral     SEPTOPLASTY       Family History   Problem Relation Age of Onset    Arthritis Mother     Osteoporosis Mother     COPD Father     Arthritis Family     COPD Family     Emphysema Family     Heart disease Family     Hyperthyroidism Family     Multiple sclerosis Family     Osteoarthritis Family      History   Smoking Status    Current Every Day Smoker    Packs/day: 1 00    Years: 40 00    Types: Cigarettes   Smokeless Tobacco    Never Used     History   Alcohol Use    Yes     Comment: occasional; Allscripts also states Never drank alcohol      History   Drug Use No     /60   Pulse 76   Temp (!) 96 1 °F (35 6 °C) (Tympanic)   Resp 16   Ht 5' 4 25" (1 632 m)   Wt 69 9 kg (154 lb)   LMP  (LMP Unknown)   BMI 26 23 kg/m²       Current Outpatient Prescriptions   Medication Sig Dispense Refill    albuterol (VENTOLIN HFA) 90 mcg/act inhaler Inhale 2 puffs every 4 (four) hours as needed for wheezing or shortness of breath 1 Inhaler 3    aspirin (ECOTRIN LOW STRENGTH) 81 mg EC tablet Take 1 tablet by mouth daily      atorvastatin (LIPITOR) 40 mg tablet Take 1 tablet by mouth daily      B Complex-C CAPS Take by mouth      BL EVENING PRIMROSE OIL PO Take by mouth      calcium carbonate-vitamin D (OSCAL-D) 500 mg-200 units per tablet Take 1 tablet by mouth daily with breakfast      ezetimibe (ZETIA) 10 mg tablet take 1 tablet by mouth once daily 90 tablet 3    Flaxseed, Linseed, (FLAXSEED OIL PO) Take 2 capsules by mouth daily        fluticasone (FLONASE) 50 mcg/act nasal spray 2 sprays into each nostril daily      gabapentin (NEURONTIN) 300 mg capsule Take 1 capsule by mouth 2 (two) times a day      montelukast (SINGULAIR) 10 mg tablet Take 1 tablet by mouth      natalizumab (TYSABRI) 300 mg/15 mL Infuse into a venous catheter      sertraline (ZOLOFT) 100 mg tablet Take 100 mg by mouth daily        fluticasone furoate-vilanterol (BREO ELLIPTA) Inhale 1 puff daily 1 each 11     No current facility-administered medications for this visit        Allergies   Allergen Reactions    Bactrim [Sulfamethoxazole-Trimethoprim] Other (See Comments)     Reaction Date: 11Aug2011;     Betadine [Povidone Iodine]     Cefzil [Cefprozil]     Gadobutrol GI Intolerance    Medroxyprogesterone Other (See Comments)     Reaction Date: 11Aug2011;     Nortriptyline Other (See Comments)    Pamelor [Nortriptyline Hcl]     Prempro [Conj Estrog-Medroxyprogest Ace] Other (See Comments)    Provera [Medroxyprogesterone Acetate]     Zyban [Bupropion]      Immunization History   Administered Date(s) Administered    Influenza 09/26/2011    Influenza Quadrivalent Preservative Free 3 years and older IM 09/29/2015, 09/22/2016, 09/08/2017    Influenza TIV (IM) 01/17/2013, 09/03/2013, 09/23/2014    Pneumococcal Polysaccharide PPV23 10/01/2007, 09/29/2015    Td (adult), adsorbed 06/02/2016       Patient Care Team:  Yashira Patton MD as PCP - General (Family Medicine)  Yashira Patton MD as PCP - DO Ivette Redman MD Durwood Dingwall, MD Lem Blacksmith, CRNP Monico Simple, MD Pearletha Check, MD Lavern Jury, MD    Medicare Screening Tests and Risk Assessments:  AWV Clinical     ISAR:   Previous hospitalizations?:  Yes   How many hospitalizations have you had in the last year?:  1-2   Additional Comments:  April 2017       Once in a Lifetime Medicare Screening:   EKG performed?:  No    AAA screening performed? (if performed, please add date to Health Maintenance):  No       Medicare Screening Tests and Risk Assessment:   AAA Risk Assessment     Family history of AAA:  No   Osteoporosis Risk Assessment     Female:  Yes   :  Yes :  No   Age over 48:  Yes Low body weight (<127lbs):  No   Tobacco use:   Yes Alcohol use:  Yes   Low calcium diet:  No PMHX of fractures:  Yes   FHX of fractures:  Yes    HIV Risk Assessment    None indicated:  Yes        Drug and Alcohol Use:   Tobacco use    Cigarettes:  current smoker    Smokeless:  never used smokeless tobacco    Tobacco use duration    Years:  30    Packs per day:  1    Tobacco Cessation Readiness    Readiness to quit:  not ready    Alcohol use    Alcohol use:  occasional use    Concern about alcohol use:  No    Alcohol Treatment Readiness   Illicit Drug Use    Drug use:  never    Drug type:  no sedative use       Diet & Exercise:   Diet   How many servings a day of the following:   Fruits and Vegetables:  3-4 Meat:  1-2   Whole Grains:  2 Simple Carbs:  1   Dairy:  2 Soda:  0   Coffee:  2 Tea:  0   Exercise    Do you currently exercise?:  yes    Frequency:  occasional    Minutes per day:  30    Type of exercise:  walking       Cognitive Impairment Screening:   Depression screening preformed:  Yes     PHQ-9 Depression scale score:  0   Depression screening results:  negative for symptoms   Cognitive Impairment Screening Do you have difficulty learning or retaining new information?:  No Do you have difficulty handling new tasks?:  No   Do you have difficulty with reasoning?:  No Do you have difficulty with spatial ability and orientation?:  No   Do you have difficulty with language?:  No Do you have difficulty with behavior?:  No       Functional Ability/Level of Safety:   Hearing    Hearing difficulties:  No Bilateral:  normal   Hearing Impairment Assessment    Hearing status:  No impairment   Current Activities    Status:  unlimited ADL's, unlimited IADL's, unlimited social activities, unlimited driving   Help needed with the folllowing:    Using the phone:  No Transportation:  No   Shopping:  No Preparing Meals:  No   Doing Housework:  No Doing Laundry:  No   Managing Medications:  No Managing Money:  No   ADL    Feeding:  Independant   Oral hygiene and Facial grooming:  Independant   Bathing:  Independant   Upper Body Dressing:  Independant   Lower Body Dressing:  Independant   Toileting:  Independant   Bed Mobility:  Independant   Fall Risk   Have you fallen in the last 12 months?:  Yes Are you unsteady on your feet?:  Yes   How many times?:  2 Are you taking any medications that may cause fatigue or dizziness?:  Yes   Do you have any chronic conditions that may contribute to a fall?:  Neurological diseases, Visual Disturbances Do you rush to the bathroom potentially risking a fall?:  No   Injury History   Polypharmacy:  Yes Antidepressant Use:  Yes   Sedative Use:  No Antihypertensive Use:  No   Previous Fall:  Yes Alcohol Use:  Yes   Deconditioning:  Yes Visual Impairment:  Yes   Cogitive Impairment:  No Mmobility Impairment:  Yes   Postural Hypotension:  No Urinary Incontinence:  Yes       Home Safety:   Are there hazards in your environment?:  No   Home Safety Risk Factors   Unfamilar with surroundings:  No Uneven floors:  No   Stairs or handrail saftey risk:  No Loose rugs:  No   Household clutter:  No Poor household lighting:  No   No grab bars in bathroom:  No Further evaluation needed:  No       Advanced Directives:   Advanced Directives    Living Will:  No Durable POA for healthcare:  No   Advanced directive:  No    Patient's End of Life Decisions    End of life decisions comments:  Papers given and explained to pt  Urinary Incontinence:   Do you have urinary incontinence?:  Yes        Glaucoma:            Provider Screening     Preventative Screening/Counseling:   Cardiovascular Screening/Counseling:   (Labs Q5 years, EKG optional one-time)   General:  Risks and Benefits Discussed Counseling:  Healthy Diet, Healthy Weight          Diabetes Screening/Counseling:   (2 tests/year if Pre-Diabetes or 1 test/year if no Diabetes)   General:  Risks and Benefits Discussed, Screening Not Indicated           Colorectal Cancer Screening/Counseling:   (FOBT Q1 yr; Flex Sig Q4 yrs or Q10 yrs after Screening Colonoscopy; Screening Colonoscpy Q2 yrs High Risk or Q10 yrs Low Risk; Barium Enema Q2 yrs High Risk or Q4 yrs Low Risk)   General:  Screening Current           Prostate Cancer Screening/Counseling:   (Annual)          Breast Cancer Screening/Counseling:   (Baseline Age 28 - 43; Annual Age 36+)   General:  Risks and Benefits Discussed, Screening Current          Cervical Cancer Screening/Counseling:   (Annual for High Risk or Childbearing Age with Abnormal Pap in Last 3 yrs; Every 2 all others)   General:  Risks and Benefits Discussed, Screening Current           Osteoporosis Screening/Counseling:   (Every 2 Yrs if at risk or more if medically necessary)   General:  Screening Current Counseling:  Calcium and Vitamin D Intake, Regular Weightbearing Exercise          AAA Screening/Counseling:   (Once per Lifetime with risk factors)    Family History of AAA:  No           Glaucoma Screening/Counseling:   (Annual)   General:  Screening Current          HIV Screening/Counseling:   (Voluntary;  Once annually for high risk OR 3 times for Pregnancy at diagnosis of IUP; 3rd trimester; and at Labor         Hepatitis C Screening:             Immunizations:   Influenza (annual): Influenza Recommended Annually, Influenza UTD This Year   Pneumococcal (Once in a Lifetime):  Risks & Benefits Discussed       Other Preventative Couseling (Non-Medicare Wellness Visit Required):   nutrition counseling performed       Referrals (Non-Medicare Wellness Visit Required):   neurology consult       Medical Equipment/Suppliers:           No exam data present    Physical Exam :  Physical Exam    Reviewed Updated [facility] Prior Wellness Visits:   Last Medicare wellness visit information was reviewed, patient interviewed , no change since last AWVyes  Last Medicare wellness visit information was reviewed, patient interviewed and updates made to the record today yes  Ready to quit: No  Counseling given: Yes    Assessment and Plan:  1  COPD without exacerbation (HCC)  albuterol (VENTOLIN HFA) 90 mcg/act inhaler    fluticasone furoate-vilanterol (BREO ELLIPTA)   2  Vitamin D deficiency  Vitamin D 25 hydroxy   3  Hyperlipidemia, unspecified hyperlipidemia type  Lipid panel    TSH, 3rd generation   4  Multiple sclerosis, relapsing/remitting  TSH, 3rd generation   5  Major depression, recurrent, chronic ([facility] [address] )     6  Encounter for Medicare annual wellness exam     7   Chronic allergic rhinitis, unspecified seasonality, unspecified trigger         Health Maintenance Due   Topic Date Due    HIV SCREENING  1955    Hepatitis C Screening  1955    SLP PLAN OF CARE  1955    COLONOSCOPY  1955    Depression Screening PHQ-9  09/28/1966    Lung Cancer Screening  09/28/2009    DTaP,Tdap,and Td Vaccines (1 - Tdap) 06/03/2016

## 2018-03-09 NOTE — PROGRESS NOTES
FAMILY PRACTICE OFFICE VISIT       NAME: En Kaufman  AGE: 60 y o  SEX: female       : 1955        MRN: [id number]    DATE: 3/9/2018  TIME: 8:52 PM    Assessment and Plan     Problem List Items Addressed This Visit     Multiple sclerosis, relapsing/remitting    Relevant Orders    TSH, 3rd generation    RESOLVED: Hyperlipidemia (Chronic)    Relevant Orders    Lipid panel    TSH, 3rd generation    COPD without exacerbation (HCC) - Primary (Chronic)    Relevant Medications    albuterol (VENTOLIN HFA) 90 mcg/act inhaler    fluticasone furoate-vilanterol (BREO ELLIPTA)    Vitamin D deficiency    Relevant Orders    Vitamin D 25 hydroxy    Major depression, recurrent, chronic (HCC)    Allergic rhinitis      Other Visit Diagnoses     Encounter for Medicare annual wellness exam           Patient presents for follow-up of chronic medical conditions  Multiple sclerosis-she remains on Tysabri infusions, she is under care of [facility], symptoms are stable  Hyperlipidemia-well controlled, continue atorvastatin and Zetia  CBC and CMP are followed on a monthly basis by Neurology  Will proceed with additional blood work including lipid panel, TSH and vitamin-D   COPD  I strongly advised patient to quit tobacco   She acknowledges significant health risks but is not ready to quit yet  I advised her to start daily control inhaler, Breo, 100/5, 1 puff daily  She will use albuterol on a as needed basis  Lung exam is clear today  Depression  Generalized anxiety disorder  Symptoms are well controlled on Zoloft 100 mg daily  Health maintenance-patient is up-to-date with routine immunizations, mammography and gynecological exam   She will be due for colonoscopy in 2018  Follow up pending labs, updates and in 6 months  There are no Patient Instructions on file for this visit          Chief Complaint     Chief Complaint   Patient presents with    Follow-up    Medicare Wellness Visit Subsequent       History of Present Illness     Patient presents for follow-up  She remains under care of [facility], Dr Yoseph Courtney, for treatment of multiple sclerosis  Hyperlipidemia-she is compliant with Lipitor and Zetia  Vitamin-D deficiency-patient is compliant with vitamin D3 5000 units daily  UTD with DR Stallworth  and [employer] -  Due 11/2018   UTD with immunizations  On vitamin D 5000   COPD  Allergic rhinitis  Patient is still unfortunately smoking, less than pack daily  She complains of intermittent episodes of wheezing  She denies any cold symptoms of fever  She has been using albuterol as needed  She is not on any daily inhalers as she ran out of samples of Symbicort  Review of Systems   Review of Systems   Constitutional: Negative  HENT: Negative  Respiratory: Positive for wheezing  Cardiovascular: Negative  Gastrointestinal: Negative  Endocrine: Negative  Genitourinary: Negative  Musculoskeletal: Positive for arthralgias and myalgias  Allergic/Immunologic: Negative  Neurological: Negative for numbness  As noted in HPI   Hematological: Negative  Psychiatric/Behavioral: The patient is nervous/anxious          Active Problem List     Patient Active Problem List   Diagnosis    Multiple sclerosis, relapsing/remitting    Muscle spasm    Osteoporosis    Ambulatory dysfunction    COPD without exacerbation ([facility] [address]    Vitamin D deficiency    Disc degeneration, lumbar    Major depression, recurrent, chronic (HCC)    Allergic rhinitis       Past Medical History:  Past Medical History:   Diagnosis Date    Asthma     Degeneration of intervertebral disc at L5-S1 level     Depression     Generalized anxiety disorder     Hepatitis     at age 4 yrs    Herpes zoster     last assessed: 6/20/13    Hypercholesterolemia     Hyperlipidemia     MS (multiple sclerosis) ([address] )     Osteoporosis     Seasonal allergies     Tumor of breast     benign, right breast       Past Surgical History:  Past Surgical History:   Procedure Laterality Date    BREAST SURGERY      right breast fibroid tumor resection    COLONOSCOPY      11/2013 - due in 2018 - Dr Crisostomo    ROTATOR CUFF REPAIR      Bilateral    ROTATOR CUFF REPAIR Bilateral     SEPTOPLASTY         Family History:  Family History   Problem Relation Age of Onset    Arthritis Mother     Osteoporosis Mother     COPD Father     Arthritis Family     COPD Family     Emphysema Family     Heart disease Family     Hyperthyroidism Family     Multiple sclerosis Family     Osteoarthritis Family        Social History:  Social History     Social History    Marital status: /Civil Union     Spouse name: N/A    Number of children: N/A    Years of education: N/A     Occupational History    Retired      Social History Main Topics    Smoking status: Current Every Day Smoker     Packs/day: 1 00     Years: 40 00     Types: Cigarettes    Smokeless tobacco: Never Used    Alcohol use Yes      Comment: occasional; Allscripts also states Never drank alcohol    Drug use: No    Sexual activity: Yes     Partners: Male      Comment: denied any risk of AIDS     Other Topics Concern    Not on file     Social History Narrative    Eye Doctor - Last Seen: resolved: Aug 2012    Seeing a dentist - last seen: resolved 4/8/12               Objective     Vitals:    03/09/18 0915   BP: 130/60   Pulse: 76   Resp: 16   Temp: (!) 96 1 °F (35 6 °C)     Wt Readings from Last 3 Encounters:   03/09/18 69 9 kg (154 lb)   01/24/18 71 kg (156 lb 9 6 oz)   10/02/17 68 5 kg (151 lb)       Physical Exam   Constitutional: She is oriented to person, place, and time  She appears well-developed and well-nourished  HENT:   Head: Normocephalic and atraumatic  Eyes: Conjunctivae are normal    Neck: Neck supple  Carotid bruit is not present  No thyromegaly present  Cardiovascular: Normal rate, regular rhythm and normal heart sounds      No murmur heard  Pulmonary/Chest: Effort normal and breath sounds normal  No respiratory distress  She has no wheezes  She has no rales  Musculoskeletal: Normal range of motion  Neurological: She is alert and oriented to person, place, and time  No cranial nerve deficit  Psychiatric: She has a normal mood and affect  Her behavior is normal    Nursing note and vitals reviewed        Pertinent Laboratory/Diagnostic Studies:  Lab Results   Component Value Date    GLUCOSE 113 02/16/2018    BUN 18 02/16/2018    CREATININE 0 71 02/16/2018    CALCIUM 9 1 02/16/2018     02/16/2018    K 4 1 02/16/2018    CO2 24 02/16/2018     02/16/2018     Lab Results   Component Value Date    ALT 27 02/16/2018    AST 14 02/16/2018    ALKPHOS 99 02/16/2018    BILITOT 0 50 02/16/2018       Lab Results   Component Value Date    WBC 9 36 02/16/2018    HGB 13 6 02/16/2018    HCT 38 6 02/16/2018    MCV 85 02/16/2018     02/16/2018       No results found for: TSH    Lab Results   Component Value Date    CHOL 193 12/22/2017     Lab Results   Component Value Date    TRIG 202 (H) 12/22/2017     Lab Results   Component Value Date    HDL 52 12/22/2017     Lab Results   Component Value Date    LDLCALC 101 (H) 12/22/2017     Lab Results   Component Value Date    HGBA1C 5 8 (H) 10/20/2015       Results for orders placed or performed during the hospital encounter of 02/16/18   CBC and differential   Result Value Ref Range    WBC 9 36 4 31 - 10 16 Thousand/uL    RBC 4 54 3 81 - 5 12 Million/uL    Hemoglobin 13 6 11 5 - 15 4 g/dL    Hematocrit 38 6 34 8 - 46 1 %    MCV 85 82 - 98 fL    MCH 30 0 26 8 - 34 3 pg    MCHC 35 2 31 4 - 37 4 g/dL    RDW 12 1 11 6 - 15 1 %    MPV 9 6 8 9 - 12 7 fL    Platelets 752 375 - 358 Thousands/uL    Neutrophils Relative 57 43 - 75 %    Lymphocytes Relative 33 14 - 44 %    Monocytes Relative 8 4 - 12 %    Eosinophils Relative 2 0 - 6 %    Basophils Relative 0 0 - 1 %    Neutrophils Absolute 5 29 1 85 - 7 62 Thousands/µL    Lymphocytes Absolute 3 13 0 60 - 4 47 Thousands/µL    Monocytes Absolute 0 72 0 17 - 1 22 Thousand/µL    Eosinophils Absolute 0 20 0 00 - 0 61 Thousand/µL    Basophils Absolute 0 02 0 00 - 0 10 Thousands/µL   Comprehensive metabolic panel   Result Value Ref Range    Sodium 140 136 - 145 mmol/L    Potassium 4 1 3 5 - 5 3 mmol/L    Chloride 105 100 - 108 mmol/L    CO2 24 21 - 32 mmol/L    Anion Gap 11 4 - 13 mmol/L    BUN 18 5 - 25 mg/dL    Creatinine 0 71 0 60 - 1 30 mg/dL    Glucose 113 65 - 140 mg/dL    Calcium 9 1 8 3 - 10 1 mg/dL    AST 14 5 - 45 U/L    ALT 27 12 - 78 U/L    Alkaline Phosphatase 99 46 - 116 U/L    Total Protein 7 0 6 4 - 8 2 g/dL    Albumin 3 8 3 5 - 5 0 g/dL    Total Bilirubin 0 50 0 20 - 1 00 mg/dL    eGFR 91 ml/min/1 73sq m       Orders Placed This Encounter   Procedures    Lipid panel    Vitamin D 25 hydroxy    TSH, 3rd generation       ALLERGIES:  Allergies   Allergen Reactions    Bactrim [Sulfamethoxazole-Trimethoprim] Other (See Comments)     Reaction Date: 11Aug2011;     Betadine [Povidone Iodine]     Cefzil [Cefprozil]     Gadobutrol GI Intolerance    Medroxyprogesterone Other (See Comments)     Reaction Date: 11Aug2011;     Nortriptyline Other (See Comments)    Pamelor [Nortriptyline Hcl]     Prempro [Conj Estrog-Medroxyprogest Ace] Other (See Comments)    Provera [Medroxyprogesterone Acetate]     Zyban [Bupropion]        Current Medications     Current Outpatient Prescriptions   Medication Sig Dispense Refill    albuterol (VENTOLIN HFA) 90 mcg/act inhaler Inhale 2 puffs every 4 (four) hours as needed for wheezing or shortness of breath 1 Inhaler 3    aspirin (ECOTRIN LOW STRENGTH) 81 mg EC tablet Take 1 tablet by mouth daily      atorvastatin (LIPITOR) 40 mg tablet Take 1 tablet by mouth daily      B Complex-C CAPS Take by mouth      BL EVENING PRIMROSE OIL PO Take by mouth      calcium carbonate-vitamin D (OSCAL-D) 500 mg-200 units per tablet Take 1 tablet by mouth daily with breakfast      ezetimibe (ZETIA) 10 mg tablet take 1 tablet by mouth once daily 90 tablet 3    Flaxseed, Linseed, (FLAXSEED OIL PO) Take 2 capsules by mouth daily        fluticasone (FLONASE) 50 mcg/act nasal spray 2 sprays into each nostril daily      gabapentin (NEURONTIN) 300 mg capsule Take 1 capsule by mouth 2 (two) times a day      montelukast (SINGULAIR) 10 mg tablet Take 1 tablet by mouth      natalizumab (TYSABRI) 300 mg/15 mL Infuse into a venous catheter      sertraline (ZOLOFT) 100 mg tablet Take 100 mg by mouth daily        fluticasone furoate-vilanterol (BREO ELLIPTA) Inhale 1 puff daily 1 each 11     No current facility-administered medications for this visit            Health Maintenance     Health Maintenance   Topic Date Due    HIV SCREENING  1955    Hepatitis C Screening  1955    SLP PLAN OF CARE  1955    COLONOSCOPY  1955    Depression Screening PHQ-9  09/28/1966    Lung Cancer Screening  09/28/2009    DTaP,Tdap,and Td Vaccines (1 - Tdap) 06/03/2016    INFLUENZA VACCINE  Completed     Immunization History   Administered Date(s) Administered    Influenza 09/26/2011    Influenza Quadrivalent Preservative Free 3 years and older IM 09/29/2015, 09/22/2016, 09/08/2017    Influenza TIV (IM) 01/17/2013, 09/03/2013, 09/23/2014    Pneumococcal Polysaccharide PPV23 10/01/2007, 09/29/2015    Td (adult), adsorbed 06/02/2016       Elin Felix MD

## 2018-03-09 NOTE — TELEPHONE ENCOUNTER
[address] on [place]  Called stating patient's Ventolin HFA inhaler is not covered by patient's insurance, he wants to know if we can prescribe ProAir Repik  Please  send another electronic Rx

## 2018-03-12 DIAGNOSIS — J44.9 CHRONIC OBSTRUCTIVE PULMONARY DISEASE, UNSPECIFIED COPD TYPE (HCC): Primary | ICD-10-CM

## 2018-03-12 RX ORDER — ALBUTEROL SULFATE 90 UG/1
2 AEROSOL, METERED RESPIRATORY (INHALATION) EVERY 4 HOURS PRN
Qty: 3 INHALER | Refills: 1 | Status: SHIPPED | OUTPATIENT
Start: 2018-03-12 | End: 2020-11-11 | Stop reason: SDUPTHER

## 2018-03-14 NOTE — TELEPHONE ENCOUNTER
Violet Memos from central scheduling calls to clarify MRI order, do you want w & w/out contrast or just w/out- there is an order for both        Please advise    Nursing can update order w/central scheduling @[phone]

## 2018-03-15 RX ORDER — ACETAMINOPHEN 325 MG/1
650 TABLET ORAL ONCE
Status: COMPLETED | OUTPATIENT
Start: 2018-03-16 | End: 2018-03-16

## 2018-03-15 RX ORDER — SODIUM CHLORIDE 9 MG/ML
20 INJECTION, SOLUTION INTRAVENOUS CONTINUOUS
Status: DISCONTINUED | OUTPATIENT
Start: 2018-03-16 | End: 2018-03-19 | Stop reason: HOSPADM

## 2018-03-16 ENCOUNTER — HOSPITAL ENCOUNTER (OUTPATIENT)
Dept: INFUSION CENTER | Facility: CLINIC | Age: 63
Discharge: HOME | End: 2018-03-16
Payer: MEDICARE

## 2018-03-16 VITALS
SYSTOLIC BLOOD PRESSURE: 116 MMHG | HEART RATE: 76 BPM | DIASTOLIC BLOOD PRESSURE: 76 MMHG | RESPIRATION RATE: 18 BRPM | TEMPERATURE: 98 F

## 2018-03-16 LAB
ALBUMIN SERPL BCP-MCNC: 4.1 G/DL (ref 3.5–5)
ALP SERPL-CCNC: 102 U/L (ref 46–116)
ALT SERPL W P-5'-P-CCNC: 33 U/L (ref 12–78)
ANION GAP SERPL CALCULATED.3IONS-SCNC: 9 MMOL/L (ref 4–13)
AST SERPL W P-5'-P-CCNC: 20 U/L (ref 5–45)
BASOPHILS # BLD AUTO: 0.03 THOUSANDS/ΜL (ref 0–0.1)
BASOPHILS NFR BLD AUTO: 0 % (ref 0–1)
BILIRUB SERPL-MCNC: 0.4 MG/DL (ref 0.2–1)
BUN SERPL-MCNC: 15 MG/DL (ref 5–25)
CALCIUM SERPL-MCNC: 9.4 MG/DL (ref 8.3–10.1)
CHLORIDE SERPL-SCNC: 103 MMOL/L (ref 100–108)
CO2 SERPL-SCNC: 27 MMOL/L (ref 21–32)
CREAT SERPL-MCNC: 0.72 MG/DL (ref 0.6–1.3)
EOSINOPHIL # BLD AUTO: 0.22 THOUSAND/ΜL (ref 0–0.61)
EOSINOPHIL NFR BLD AUTO: 2 % (ref 0–6)
ERYTHROCYTE [DISTWIDTH] IN BLOOD BY AUTOMATED COUNT: 12.3 % (ref 11.6–15.1)
GFR SERPL CREATININE-BSD FRML MDRD: 89 ML/MIN/1.73SQ M
GLUCOSE SERPL-MCNC: 104 MG/DL (ref 65–140)
HCT VFR BLD AUTO: 41 % (ref 34.8–46.1)
HGB BLD-MCNC: 14.1 G/DL (ref 11.5–15.4)
LYMPHOCYTES # BLD AUTO: 3.59 THOUSANDS/ΜL (ref 0.6–4.47)
LYMPHOCYTES NFR BLD AUTO: 35 % (ref 14–44)
MCH RBC QN AUTO: 29.4 PG (ref 26.8–34.3)
MCHC RBC AUTO-ENTMCNC: 34.4 G/DL (ref 31.4–37.4)
MCV RBC AUTO: 85 FL (ref 82–98)
MONOCYTES # BLD AUTO: 0.71 THOUSAND/ΜL (ref 0.17–1.22)
MONOCYTES NFR BLD AUTO: 7 % (ref 4–12)
NEUTROPHILS # BLD AUTO: 5.64 THOUSANDS/ΜL (ref 1.85–7.62)
NEUTS SEG NFR BLD AUTO: 56 % (ref 43–75)
PLATELET # BLD AUTO: 267 THOUSANDS/UL (ref 149–390)
PMV BLD AUTO: 9.3 FL (ref 8.9–12.7)
POTASSIUM SERPL-SCNC: 4.4 MMOL/L (ref 3.5–5.3)
PROT SERPL-MCNC: 7.4 G/DL (ref 6.4–8.2)
RBC # BLD AUTO: 4.8 MILLION/UL (ref 3.81–5.12)
SODIUM SERPL-SCNC: 139 MMOL/L (ref 136–145)
WBC # BLD AUTO: 10.19 THOUSAND/UL (ref 4.31–10.16)

## 2018-03-16 PROCEDURE — 80053 COMPREHEN METABOLIC PANEL: CPT | Performed by: PSYCHIATRY & NEUROLOGY

## 2018-03-16 PROCEDURE — 96367 TX/PROPH/DG ADDL SEQ IV INF: CPT

## 2018-03-16 PROCEDURE — 85025 COMPLETE CBC W/AUTO DIFF WBC: CPT | Performed by: PSYCHIATRY & NEUROLOGY

## 2018-03-16 PROCEDURE — 96413 CHEMO IV INFUSION 1 HR: CPT

## 2018-03-16 RX ADMIN — DIPHENHYDRAMINE HYDROCHLORIDE 25 MG: 50 INJECTION, SOLUTION INTRAMUSCULAR; INTRAVENOUS at 08:32

## 2018-03-16 RX ADMIN — NATALIZUMAB 300 MG: 300 INJECTION INTRAVENOUS at 09:09

## 2018-03-16 RX ADMIN — ACETAMINOPHEN 650 MG: 325 TABLET, FILM COATED ORAL at 08:28

## 2018-03-16 RX ADMIN — SODIUM CHLORIDE 20 ML/HR: 0.9 INJECTION, SOLUTION INTRAVENOUS at 08:23

## 2018-04-04 ENCOUNTER — HOSPITAL ENCOUNTER (OUTPATIENT)
Dept: MRI IMAGING | Facility: HOSPITAL | Age: 63
Discharge: HOME | End: 2018-04-04
Attending: PSYCHIATRY & NEUROLOGY
Payer: MEDICARE

## 2018-04-04 DIAGNOSIS — G35 MULTIPLE SCLEROSIS (HCC): ICD-10-CM

## 2018-04-04 PROCEDURE — A9585 GADOBUTROL INJECTION: HCPCS | Performed by: PSYCHIATRY & NEUROLOGY

## 2018-04-04 PROCEDURE — 70553 MRI BRAIN STEM W/O & W/DYE: CPT

## 2018-04-04 RX ADMIN — GADOBUTROL 6 ML: 604.72 INJECTION INTRAVENOUS at 10:00

## 2018-04-12 RX ORDER — ACETAMINOPHEN 325 MG/1
650 TABLET ORAL ONCE
Status: COMPLETED | OUTPATIENT
Start: 2018-04-13 | End: 2018-04-13

## 2018-04-12 RX ORDER — SODIUM CHLORIDE 9 MG/ML
20 INJECTION, SOLUTION INTRAVENOUS CONTINUOUS
Status: DISCONTINUED | OUTPATIENT
Start: 2018-04-13 | End: 2018-04-16 | Stop reason: HOSPADM

## 2018-04-13 ENCOUNTER — HOSPITAL ENCOUNTER (OUTPATIENT)
Dept: INFUSION CENTER | Facility: CLINIC | Age: 63
Discharge: HOME | End: 2018-04-13
Payer: MEDICARE

## 2018-04-13 ENCOUNTER — LAB REQUISITION (OUTPATIENT)
Dept: LAB | Facility: HOSPITAL | Age: 63
End: 2018-04-13
Payer: MEDICARE

## 2018-04-13 VITALS
RESPIRATION RATE: 18 BRPM | SYSTOLIC BLOOD PRESSURE: 114 MMHG | TEMPERATURE: 97.8 F | HEART RATE: 74 BPM | OXYGEN SATURATION: 96 % | DIASTOLIC BLOOD PRESSURE: 65 MMHG

## 2018-04-13 DIAGNOSIS — G35 MULTIPLE SCLEROSIS (HCC): ICD-10-CM

## 2018-04-13 DIAGNOSIS — E78.5 HYPERLIPIDEMIA: ICD-10-CM

## 2018-04-13 DIAGNOSIS — E55.9 VITAMIN D DEFICIENCY: ICD-10-CM

## 2018-04-13 LAB
25(OH)D3 SERPL-MCNC: 13.8 NG/ML (ref 30–100)
ALBUMIN SERPL BCP-MCNC: 3.8 G/DL (ref 3.5–5)
ALP SERPL-CCNC: 96 U/L (ref 46–116)
ALT SERPL W P-5'-P-CCNC: 39 U/L (ref 12–78)
ANION GAP SERPL CALCULATED.3IONS-SCNC: 12 MMOL/L (ref 4–13)
AST SERPL W P-5'-P-CCNC: 16 U/L (ref 5–45)
BASOPHILS # BLD AUTO: 0.02 THOUSANDS/ΜL (ref 0–0.1)
BASOPHILS NFR BLD AUTO: 0 % (ref 0–1)
BILIRUB SERPL-MCNC: 0.3 MG/DL (ref 0.2–1)
BUN SERPL-MCNC: 17 MG/DL (ref 5–25)
CALCIUM SERPL-MCNC: 9 MG/DL
CHLORIDE SERPL-SCNC: 105 MMOL/L (ref 100–108)
CHOLEST SERPL-MCNC: 224 MG/DL (ref 50–200)
CO2 SERPL-SCNC: 24 MMOL/L (ref 21–32)
CREAT SERPL-MCNC: 0.63 MG/DL (ref 0.6–1.3)
EOSINOPHIL # BLD AUTO: 0.18 THOUSAND/ΜL (ref 0–0.61)
EOSINOPHIL NFR BLD AUTO: 2 % (ref 0–6)
ERYTHROCYTE [DISTWIDTH] IN BLOOD BY AUTOMATED COUNT: 12.5 % (ref 11.6–15.1)
GFR SERPL CREATININE-BSD FRML MDRD: 96 ML/MIN/1.73SQ M
GLUCOSE P FAST SERPL-MCNC: 108 MG/DL (ref 65–99)
GLUCOSE SERPL-MCNC: 108 MG/DL (ref 65–140)
HCT VFR BLD AUTO: 40.3 % (ref 34.8–46.1)
HDLC SERPL-MCNC: 47 MG/DL (ref 40–60)
HGB BLD-MCNC: 13.8 G/DL (ref 11.5–15.4)
LDLC SERPL CALC-MCNC: 130 MG/DL (ref 0–100)
LYMPHOCYTES # BLD AUTO: 2.88 THOUSANDS/ΜL (ref 0.6–4.47)
LYMPHOCYTES NFR BLD AUTO: 36 % (ref 14–44)
MCH RBC QN AUTO: 29.3 PG (ref 26.8–34.3)
MCHC RBC AUTO-ENTMCNC: 34.2 G/DL (ref 31.4–37.4)
MCV RBC AUTO: 86 FL (ref 82–98)
MONOCYTES # BLD AUTO: 0.56 THOUSAND/ΜL (ref 0.17–1.22)
MONOCYTES NFR BLD AUTO: 7 % (ref 4–12)
NEUTROPHILS # BLD AUTO: 4.37 THOUSANDS/ΜL (ref 1.85–7.62)
NEUTS SEG NFR BLD AUTO: 55 % (ref 43–75)
NONHDLC SERPL-MCNC: 177 MG/DL
PLATELET # BLD AUTO: 275 THOUSANDS/UL (ref 149–390)
PMV BLD AUTO: 9.4 FL (ref 8.9–12.7)
POTASSIUM SERPL-SCNC: 4 MMOL/L (ref 3.5–5.3)
PROT SERPL-MCNC: 6.9 G/DL (ref 6.4–8.2)
RBC # BLD AUTO: 4.71 MILLION/UL (ref 3.81–5.12)
SODIUM SERPL-SCNC: 141 MMOL/L (ref 136–145)
TRIGL SERPL-MCNC: 237 MG/DL
TSH SERPL DL<=0.05 MIU/L-ACNC: 1.13 UIU/ML (ref 0.36–3.74)
WBC # BLD AUTO: 8.01 THOUSAND/UL (ref 4.31–10.16)

## 2018-04-13 PROCEDURE — 80053 COMPREHEN METABOLIC PANEL: CPT | Performed by: PSYCHIATRY & NEUROLOGY

## 2018-04-13 PROCEDURE — 96367 TX/PROPH/DG ADDL SEQ IV INF: CPT

## 2018-04-13 PROCEDURE — 84443 ASSAY THYROID STIM HORMONE: CPT | Performed by: FAMILY MEDICINE

## 2018-04-13 PROCEDURE — 85025 COMPLETE CBC W/AUTO DIFF WBC: CPT | Performed by: PSYCHIATRY & NEUROLOGY

## 2018-04-13 PROCEDURE — 80061 LIPID PANEL: CPT | Performed by: FAMILY MEDICINE

## 2018-04-13 PROCEDURE — 96413 CHEMO IV INFUSION 1 HR: CPT

## 2018-04-13 PROCEDURE — 82306 VITAMIN D 25 HYDROXY: CPT | Performed by: FAMILY MEDICINE

## 2018-04-13 RX ADMIN — NATALIZUMAB 300 MG: 300 INJECTION INTRAVENOUS at 10:16

## 2018-04-13 RX ADMIN — SODIUM CHLORIDE 20 ML/HR: 0.9 INJECTION, SOLUTION INTRAVENOUS at 09:47

## 2018-04-13 RX ADMIN — DIPHENHYDRAMINE HYDROCHLORIDE 25 MG: 50 INJECTION, SOLUTION INTRAMUSCULAR; INTRAVENOUS at 09:45

## 2018-04-13 RX ADMIN — ACETAMINOPHEN 650 MG: 325 TABLET, FILM COATED ORAL at 09:45

## 2018-04-15 ENCOUNTER — TELEPHONE (OUTPATIENT)
Dept: FAMILY MEDICINE CLINIC | Facility: CLINIC | Age: 63
End: 2018-04-15

## 2018-04-15 DIAGNOSIS — E55.9 VITAMIN D DEFICIENCY: Primary | ICD-10-CM

## 2018-04-15 RX ORDER — ERGOCALCIFEROL 1.25 MG/1
50000 CAPSULE ORAL WEEKLY
Qty: 12 CAPSULE | Refills: 0 | Status: SHIPPED | OUTPATIENT
Start: 2018-04-15 | End: 2018-09-13 | Stop reason: ALTCHOICE

## 2018-04-16 NOTE — TELEPHONE ENCOUNTER
Please contact patient  I reviewed her blood work  Her cholesterol is slightly higher than before but overall acceptable  Please remind her to take her medications daily as directed  Vitamin-D level is very low at 13 8  I advised her to start vitamin D2 once a week for 12 weeks  Will repeat blood work in 3 months  Orders placed, medication send to the local pharmacy    Thank you

## 2018-04-21 LAB
JCPYV AB SERPL QL IA: NEGATIVE
SL AMB INDEX VALUE: 0.08

## 2018-05-10 ENCOUNTER — TELEPHONE (OUTPATIENT)
Dept: NEUROLOGY | Facility: CLINIC | Age: 63
End: 2018-05-10

## 2018-05-10 RX ORDER — SODIUM CHLORIDE 9 MG/ML
20 INJECTION, SOLUTION INTRAVENOUS CONTINUOUS
Status: DISCONTINUED | OUTPATIENT
Start: 2018-05-11 | End: 2018-05-14 | Stop reason: HOSPADM

## 2018-05-10 RX ORDER — ACETAMINOPHEN 325 MG/1
650 TABLET ORAL ONCE
Status: COMPLETED | OUTPATIENT
Start: 2018-05-11 | End: 2018-05-11

## 2018-05-11 ENCOUNTER — HOSPITAL ENCOUNTER (OUTPATIENT)
Dept: INFUSION CENTER | Facility: CLINIC | Age: 63
Discharge: HOME | End: 2018-05-11
Payer: MEDICARE

## 2018-05-11 VITALS
RESPIRATION RATE: 80 BRPM | SYSTOLIC BLOOD PRESSURE: 139 MMHG | HEART RATE: 80 BPM | DIASTOLIC BLOOD PRESSURE: 67 MMHG | TEMPERATURE: 98.4 F

## 2018-05-11 LAB
ALBUMIN SERPL BCP-MCNC: 4 G/DL (ref 3.5–5)
ALP SERPL-CCNC: 103 U/L (ref 46–116)
ALT SERPL W P-5'-P-CCNC: 30 U/L (ref 12–78)
ANION GAP SERPL CALCULATED.3IONS-SCNC: 9 MMOL/L (ref 4–13)
AST SERPL W P-5'-P-CCNC: 13 U/L (ref 5–45)
BASOPHILS # BLD AUTO: 0.04 THOUSANDS/ΜL (ref 0–0.1)
BASOPHILS NFR BLD AUTO: 1 % (ref 0–1)
BILIRUB SERPL-MCNC: 0.4 MG/DL (ref 0.2–1)
BUN SERPL-MCNC: 17 MG/DL (ref 5–25)
CALCIUM SERPL-MCNC: 9.3 MG/DL (ref 8.3–10.1)
CHLORIDE SERPL-SCNC: 106 MMOL/L (ref 100–108)
CO2 SERPL-SCNC: 26 MMOL/L (ref 21–32)
CREAT SERPL-MCNC: 0.64 MG/DL (ref 0.6–1.3)
EOSINOPHIL # BLD AUTO: 0.2 THOUSAND/ΜL (ref 0–0.61)
EOSINOPHIL NFR BLD AUTO: 3 % (ref 0–6)
ERYTHROCYTE [DISTWIDTH] IN BLOOD BY AUTOMATED COUNT: 12.6 % (ref 11.6–15.1)
GFR SERPL CREATININE-BSD FRML MDRD: 95 ML/MIN/1.73SQ M
GLUCOSE SERPL-MCNC: 116 MG/DL (ref 65–140)
HCT VFR BLD AUTO: 40.7 % (ref 34.8–46.1)
HGB BLD-MCNC: 14.3 G/DL (ref 11.5–15.4)
LYMPHOCYTES # BLD AUTO: 3.07 THOUSANDS/ΜL (ref 0.6–4.47)
LYMPHOCYTES NFR BLD AUTO: 39 % (ref 14–44)
MCH RBC QN AUTO: 29.8 PG (ref 26.8–34.3)
MCHC RBC AUTO-ENTMCNC: 35.1 G/DL (ref 31.4–37.4)
MCV RBC AUTO: 85 FL (ref 82–98)
MONOCYTES # BLD AUTO: 0.55 THOUSAND/ΜL (ref 0.17–1.22)
MONOCYTES NFR BLD AUTO: 7 % (ref 4–12)
NEUTROPHILS # BLD AUTO: 4.05 THOUSANDS/ΜL (ref 1.85–7.62)
NEUTS SEG NFR BLD AUTO: 50 % (ref 43–75)
PLATELET # BLD AUTO: 309 THOUSANDS/UL (ref 149–390)
PMV BLD AUTO: 9.3 FL (ref 8.9–12.7)
POTASSIUM SERPL-SCNC: 4.1 MMOL/L (ref 3.5–5.3)
PROT SERPL-MCNC: 7.4 G/DL (ref 6.4–8.2)
RBC # BLD AUTO: 4.8 MILLION/UL (ref 3.81–5.12)
SODIUM SERPL-SCNC: 141 MMOL/L (ref 136–145)
WBC # BLD AUTO: 7.91 THOUSAND/UL (ref 4.31–10.16)

## 2018-05-11 PROCEDURE — 96367 TX/PROPH/DG ADDL SEQ IV INF: CPT

## 2018-05-11 PROCEDURE — 80053 COMPREHEN METABOLIC PANEL: CPT | Performed by: PSYCHIATRY & NEUROLOGY

## 2018-05-11 PROCEDURE — 85025 COMPLETE CBC W/AUTO DIFF WBC: CPT | Performed by: PSYCHIATRY & NEUROLOGY

## 2018-05-11 PROCEDURE — 96413 CHEMO IV INFUSION 1 HR: CPT

## 2018-05-11 RX ADMIN — SODIUM CHLORIDE 20 ML/HR: 0.9 INJECTION, SOLUTION INTRAVENOUS at 09:10

## 2018-05-11 RX ADMIN — NATALIZUMAB 300 MG: 300 INJECTION INTRAVENOUS at 09:56

## 2018-05-11 RX ADMIN — DIPHENHYDRAMINE HYDROCHLORIDE 25 MG: 50 INJECTION, SOLUTION INTRAMUSCULAR; INTRAVENOUS at 09:17

## 2018-05-11 RX ADMIN — ACETAMINOPHEN 650 MG: 325 TABLET, FILM COATED ORAL at 09:17

## 2018-05-11 NOTE — PLAN OF CARE
Problem: Potential for Falls  Goal: Patient will remain free of falls  INTERVENTIONS:  - Assess patient frequently for physical needs  -  Identify cognitive and physical deficits and behaviors that affect risk of falls    -  [place] fall precautions as indicated by assessment   - Educate patient/family on patient safety including physical limitations  - Instruct patient to call for assistance with activity based on assessment  - Modify environment to reduce risk of injury  - Consider OT/PT consult to assist with strengthening/mobility   Outcome: Progressing

## 2018-05-24 ENCOUNTER — OFFICE VISIT (OUTPATIENT)
Dept: NEUROLOGY | Facility: CLINIC | Age: 63
End: 2018-05-24
Payer: MEDICARE

## 2018-05-24 VITALS
RESPIRATION RATE: 15 BRPM | BODY MASS INDEX: 26.78 KG/M2 | SYSTOLIC BLOOD PRESSURE: 110 MMHG | HEART RATE: 76 BPM | DIASTOLIC BLOOD PRESSURE: 70 MMHG | WEIGHT: 156 LBS

## 2018-05-24 DIAGNOSIS — R41.3 AMNESIA/MEMORY DISORDER: ICD-10-CM

## 2018-05-24 DIAGNOSIS — G35 MULTIPLE SCLEROSIS (HCC): Primary | ICD-10-CM

## 2018-05-24 PROCEDURE — 99214 OFFICE O/P EST MOD 30 MIN: CPT | Performed by: PSYCHIATRY & NEUROLOGY

## 2018-05-24 NOTE — PATIENT INSTRUCTIONS
Multiple sclerosis, relapsing/remitting  Pt here for neuro follow up  Overall pt is doing well  Pt with stress at home due to cancer diagnosis with her father in law  Pt cont on monthly tysabri and kasandra med well  Pt has had a few bouts of bladder incontinence  Pt notes increased urgency of urination and of bowels  Pt had updated mri head done in April 2018 and stable  Due to bowel and bladder changes, pt needs updated mri c and t spine without contrast  Pt had updated labs in May and nl lfts and cbc with diff  Pt has had GI sensitivity to the gadolinium in the past and we will not do mary with current study   Pt had updated mri head and jcv done in April  jcv titer negative at 0 08  Pt on tysabri since 2011  Pt did do trial of every 6 weeks and had exacerbation of ms and has remained now on monthly status  Due to urinary sxs, will also update urinanalysis   Pt will be next due for mri head and jcv in august 2018

## 2018-05-24 NOTE — PROGRESS NOTES
Patient ID: Shimon Neumann is a 60 y o  female  Assessment/Plan:    Multiple sclerosis, relapsing/remitting  Pt here for neuro follow up  Overall pt is doing well  Pt with stress at home due to cancer diagnosis with her father in law  Pt cont on monthly tysabri and kasandra med well  Pt has had a few bouts of bladder incontinence  Pt notes increased urgency of urination and of bowels  Pt had updated mri head done in April 2018 and stable  Due to bowel and bladder changes, pt needs updated mri c and t spine without contrast  Pt had updated labs in May and nl lfts and cbc with diff  Pt has had GI sensitivity to the gadolinium in the past and we will not do mary with current study   Pt had updated mri head and jcv done in April  jcv titer negative at 0 08  Pt on tysabri since 2011  Pt did do trial of every 6 weeks and had exacerbation of ms and has remained now on monthly status  Due to urinary sxs, will also update urinanalysis   Pt will be next due for mri head and jcv in august 2018       Diagnoses and all orders for this visit:    Multiple sclerosis ([facility] [address] )  -     MRI cervical spine wo contrast; Future  -     MRI thoracic spine wo contrast; Future  -     MRI brain MS wo contrast; Future  -     UA/M w/rflx Culture, Comp    Amnesia/memory disorder  -     Vitamin B12; Future  -     Methylmalonic acid, serum; Future           Subjective:    HPI    HPI: Patient is a 60year old female with PMH of MS diagnosed in 2000, last seen in Jan 2018  Patient has been on Tysabri since 2011 and has been doing well  She has missed occasional infusions due to illnesses  Re rev last  MRI c and t spine were updated on June 21 2016 and comp to Nov 2015 and stable  Patient had been having increased LBP in Sept 2016, but was off gabapentin  She also had some weakness and was given IV steroids in Sept 2016  She had MRI lumbar spine 9/29/16 with disc disease and arthrosis  Patient was sent to pain management    Patient follows with [facility] Angel Moreira [facility]   pt had  hospitalization 4/14/17-4/20/17 c/o some numbness in the cheek  She reported she was being treated for a sinus infection and on Levaquin  No other symptoms reported at that time  however over the next few days and after finishing the course of antibiotics, symptoms did not resolve and she also developed difficulty ambulating, feeling like she was continuously leaning towards the right side, along with the right facial numbness  CT with no acute findings  She was admitted and given a total of 5 days of IV steroids  MRI brain 4/17/17 New enhancing white matter lesion in the right middle cerebellar peduncle, and moderate chronic demyelinating disease throughout the supratentorial brain and to lesser degree posterior fossa t re rev MRI c-spine 4/17/17 Multilevel white matter lesions are stable,  No evidence of progressive or active demyelination in the visualized spinal cord  She was discharged home to continue outpatient therapy  At that time, her Tysabri was held back 2 weeks after steroid completion  She had P  T/OT which she felt was very helpful in combination with the steroids     Today she is doing well  She remains on Tysabri  Pt with stress at home due to cancer diagnosis with her father in law  Pt cont on monthly tysabri and kasandra med well  Pt has had a few bouts of bladder incontinence  Pt notes increased urgency of urination and of bowels  Pt had updated mri head done in April 2018 and stable  Due to bowel and bladder changes, pt needs updated mri c and t spine without contrast   Pt had updated labs in May and nl lfts and cbc with diff  Pt had bladder accident yesterday while running into the grocery store  Pt has had GI sensitivity to the gadolinium in the past and we will not do mary with current study   Pt had updated mri head and jcv done in April 2018   jcv titer negative at 0 08  Pt on tysabri since 2011    Pt did do trial of every 6 weeks and had exacerbation of ms and has remained now on monthly status  Due to urinary sxs, will also update urinanalysis   Pt will be next due for mri head and jcv in august 2018  No other new sxs  No falls or trips  No change in vision  No change in speech or swallowing  No recent infections  No vertigo or n or v     pt wishes to cont with her Tysabri  pt aware of risk of PML             The following portions of the patient's history were reviewed and updated as appropriate: allergies, current medications, past family history, past medical history, past social history, past surgical history and problem list          Objective:    Blood pressure 110/70, pulse 76, resp  rate 15, weight 70 8 kg (156 lb), not currently breastfeeding  Physical Exam   Eyes: EOM are normal  Pupils are equal, round, and reactive to light  Neurological: She has normal reflexes  Psychiatric: Her speech is normal        Neurological Exam    Mental Status  The patient is alert and oriented to person, place, time, and situation  Her speech is normal  Her language is fluent with no aphasia  She has normal attention span and concentration  She has a normal fund of knowledge  Cranial Nerves    CN II: The patient's visual acuity and visual fields are normal   CN III, IV, VI: The patient's pupils are equally round and reactive to light and ocular movements are normal   CN V: The patient has normal facial sensation  CN VII:  The patient has symmetric facial movement  CN VIII:  The patient's hearing is normal   CN IX, X: The patient has symmetric palate movement and normal gag reflex  CN XI: The patient's shoulder shrug strength is normal   CN XII: The patient's tongue is midline without atrophy or fasciculations  Motor  The patient has normal muscle bulk throughout  Her overall muscle tone is normal throughout  Her strength is 5/5 in all four extremities except as noted    Left iliopsoas 4+/5     Sensory    Dec vib levy lowers     Reflexes  Deep tendon reflexes are 2+ and symmetric in all four extremities with downgoing toes bilaterally  Gait and Coordination   She has a wide stance  She has normal right finger to nose and normal left finger to nose coordination  No pronator drift           ROS:    Review of Systems   Constitutional: Positive for fatigue  Negative for appetite change and fever  HENT: Positive for congestion, sinus pain and sinus pressure  Negative for hearing loss, tinnitus, trouble swallowing and voice change  Eyes: Negative  Negative for photophobia and pain  Respiratory: Negative  Negative for shortness of breath  Cardiovascular: Negative  Negative for palpitations  Gastrointestinal: Negative  Negative for nausea and vomiting  Loss of bowel control   Endocrine: Negative  Negative for cold intolerance and heat intolerance  Genitourinary: Positive for urgency  Negative for dysuria and frequency  Loss of bladder control    Musculoskeletal: Positive for back pain and gait problem (balance problems and clumsiness)  Negative for myalgias and neck pain  Skin: Negative  Negative for rash  Neurological: Negative for dizziness, tremors, seizures, syncope, facial asymmetry, speech difficulty, weakness, light-headedness, numbness and headaches  Hematological: Negative  Does not bruise/bleed easily  Psychiatric/Behavioral: Negative for confusion, hallucinations and sleep disturbance  The patient is nervous/anxious

## 2018-05-24 NOTE — ASSESSMENT & PLAN NOTE
Pt here for neuro follow up  Overall pt is doing well  Pt with stress at home due to cancer diagnosis with her father in law  Pt cont on monthly tysabri and kasandra med well  Pt has had a few bouts of bladder incontinence  Pt notes increased urgency of urination and of bowels  Pt had updated mri head done in April 2018 and stable  Due to bowel and bladder changes, pt needs updated mri c and t spine without contrast  Pt had updated labs in May and nl lfts and cbc with diff  Pt has had GI sensitivity to the gadolinium in the past and we will not do mary with current study   Pt had updated mri head and jcv done in April  jcv titer negative at 0 08  Pt on tysabri since 2011  Pt did do trial of every 6 weeks and had exacerbation of ms and has remained now on monthly status  Due to urinary sxs, will also update urinanalysis   Pt will be next due for mri head and jcv in august 2018

## 2018-06-07 RX ORDER — ACETAMINOPHEN 325 MG/1
650 TABLET ORAL ONCE
Status: COMPLETED | OUTPATIENT
Start: 2018-06-08 | End: 2018-06-08

## 2018-06-07 RX ORDER — SODIUM CHLORIDE 9 MG/ML
20 INJECTION, SOLUTION INTRAVENOUS CONTINUOUS
Status: DISCONTINUED | OUTPATIENT
Start: 2018-06-08 | End: 2018-06-11 | Stop reason: HOSPADM

## 2018-06-08 ENCOUNTER — HOSPITAL ENCOUNTER (OUTPATIENT)
Dept: INFUSION CENTER | Facility: CLINIC | Age: 63
Discharge: HOME | End: 2018-06-08
Payer: MEDICARE

## 2018-06-08 VITALS
RESPIRATION RATE: 18 BRPM | OXYGEN SATURATION: 97 % | HEART RATE: 75 BPM | DIASTOLIC BLOOD PRESSURE: 60 MMHG | SYSTOLIC BLOOD PRESSURE: 102 MMHG | TEMPERATURE: 97.2 F

## 2018-06-08 LAB
ALBUMIN SERPL BCP-MCNC: 4.1 G/DL (ref 3.5–5)
ALP SERPL-CCNC: 102 U/L (ref 46–116)
ALT SERPL W P-5'-P-CCNC: 38 U/L (ref 12–78)
ANION GAP SERPL CALCULATED.3IONS-SCNC: 7 MMOL/L (ref 4–13)
AST SERPL W P-5'-P-CCNC: 20 U/L (ref 5–45)
BASOPHILS # BLD AUTO: 0.03 THOUSANDS/ΜL (ref 0–0.1)
BASOPHILS NFR BLD AUTO: 0 % (ref 0–1)
BILIRUB SERPL-MCNC: 0.4 MG/DL (ref 0.2–1)
BUN SERPL-MCNC: 16 MG/DL (ref 5–25)
CALCIUM SERPL-MCNC: 9.1 MG/DL (ref 8.3–10.1)
CHLORIDE SERPL-SCNC: 106 MMOL/L (ref 100–108)
CO2 SERPL-SCNC: 27 MMOL/L (ref 21–32)
CREAT SERPL-MCNC: 0.62 MG/DL (ref 0.6–1.3)
EOSINOPHIL # BLD AUTO: 0.25 THOUSAND/ΜL (ref 0–0.61)
EOSINOPHIL NFR BLD AUTO: 3 % (ref 0–6)
ERYTHROCYTE [DISTWIDTH] IN BLOOD BY AUTOMATED COUNT: 12.7 % (ref 11.6–15.1)
GFR SERPL CREATININE-BSD FRML MDRD: 96 ML/MIN/1.73SQ M
GLUCOSE SERPL-MCNC: 115 MG/DL (ref 65–140)
HCT VFR BLD AUTO: 40.3 % (ref 34.8–46.1)
HGB BLD-MCNC: 14 G/DL (ref 11.5–15.4)
LYMPHOCYTES # BLD AUTO: 2.82 THOUSANDS/ΜL (ref 0.6–4.47)
LYMPHOCYTES NFR BLD AUTO: 31 % (ref 14–44)
MCH RBC QN AUTO: 29.8 PG (ref 26.8–34.3)
MCHC RBC AUTO-ENTMCNC: 34.7 G/DL (ref 31.4–37.4)
MCV RBC AUTO: 86 FL (ref 82–98)
MONOCYTES # BLD AUTO: 0.57 THOUSAND/ΜL (ref 0.17–1.22)
MONOCYTES NFR BLD AUTO: 6 % (ref 4–12)
NEUTROPHILS # BLD AUTO: 5.46 THOUSANDS/ΜL (ref 1.85–7.62)
NEUTS SEG NFR BLD AUTO: 60 % (ref 43–75)
PLATELET # BLD AUTO: 290 THOUSANDS/UL (ref 149–390)
PMV BLD AUTO: 9.5 FL (ref 8.9–12.7)
POTASSIUM SERPL-SCNC: 4.1 MMOL/L (ref 3.5–5.3)
PROT SERPL-MCNC: 7.4 G/DL (ref 6.4–8.2)
RBC # BLD AUTO: 4.7 MILLION/UL (ref 3.81–5.12)
SODIUM SERPL-SCNC: 140 MMOL/L (ref 136–145)
VIT B12 SERPL-MCNC: 639 PG/ML (ref 100–900)
WBC # BLD AUTO: 9.13 THOUSAND/UL (ref 4.31–10.16)

## 2018-06-08 PROCEDURE — 85025 COMPLETE CBC W/AUTO DIFF WBC: CPT | Performed by: PSYCHIATRY & NEUROLOGY

## 2018-06-08 PROCEDURE — 80053 COMPREHEN METABOLIC PANEL: CPT | Performed by: PSYCHIATRY & NEUROLOGY

## 2018-06-08 PROCEDURE — 96413 CHEMO IV INFUSION 1 HR: CPT

## 2018-06-08 PROCEDURE — 96367 TX/PROPH/DG ADDL SEQ IV INF: CPT

## 2018-06-08 PROCEDURE — 83918 ORGANIC ACIDS TOTAL QUANT: CPT | Performed by: PSYCHIATRY & NEUROLOGY

## 2018-06-08 PROCEDURE — 82607 VITAMIN B-12: CPT | Performed by: PSYCHIATRY & NEUROLOGY

## 2018-06-08 PROCEDURE — 96415 CHEMO IV INFUSION ADDL HR: CPT

## 2018-06-08 RX ADMIN — NATALIZUMAB 300 MG: 300 INJECTION INTRAVENOUS at 09:46

## 2018-06-08 RX ADMIN — ACETAMINOPHEN 650 MG: 325 TABLET ORAL at 09:11

## 2018-06-08 RX ADMIN — SODIUM CHLORIDE 20 ML/HR: 0.9 INJECTION, SOLUTION INTRAVENOUS at 09:02

## 2018-06-08 RX ADMIN — DIPHENHYDRAMINE HYDROCHLORIDE 25 MG: 50 INJECTION, SOLUTION INTRAMUSCULAR; INTRAVENOUS at 09:12

## 2018-06-08 NOTE — PROGRESS NOTES
Pt  tolerated treatment without incident  No negative effects noted after 60 minute observation  AVS declined, next appt  confirmed

## 2018-06-12 LAB
METHYLMALONATE SERPL-SCNC: 139 NMOL/L (ref 0–378)
SL AMB DISCLAIMER: NORMAL

## 2018-06-27 ENCOUNTER — DOCUMENTATION (OUTPATIENT)
Dept: NEUROLOGY | Facility: CLINIC | Age: 63
End: 2018-06-27

## 2018-06-28 ENCOUNTER — HOSPITAL ENCOUNTER (OUTPATIENT)
Dept: MRI IMAGING | Facility: HOSPITAL | Age: 63
Discharge: HOME | End: 2018-06-28
Attending: PSYCHIATRY & NEUROLOGY
Payer: MEDICARE

## 2018-06-28 ENCOUNTER — TELEPHONE (OUTPATIENT)
Dept: NEUROLOGY | Facility: CLINIC | Age: 63
End: 2018-06-28

## 2018-06-28 DIAGNOSIS — G35 MULTIPLE SCLEROSIS (HCC): ICD-10-CM

## 2018-06-28 PROCEDURE — 72141 MRI NECK SPINE W/O DYE: CPT

## 2018-06-28 PROCEDURE — 72146 MRI CHEST SPINE W/O DYE: CPT

## 2018-06-28 NOTE — TELEPHONE ENCOUNTER
Doing tysabri orders  Remind pt she needs both mri head in aug ( already sched) but also needs updated jcv in aug  Please mail her out quest jcv form    thanks

## 2018-07-05 RX ORDER — ACETAMINOPHEN 325 MG/1
650 TABLET ORAL ONCE
Status: COMPLETED | OUTPATIENT
Start: 2018-07-06 | End: 2018-07-06

## 2018-07-05 RX ORDER — SODIUM CHLORIDE 9 MG/ML
20 INJECTION, SOLUTION INTRAVENOUS CONTINUOUS
Status: DISCONTINUED | OUTPATIENT
Start: 2018-07-06 | End: 2018-07-09 | Stop reason: HOSPADM

## 2018-07-06 ENCOUNTER — HOSPITAL ENCOUNTER (OUTPATIENT)
Dept: INFUSION CENTER | Facility: CLINIC | Age: 63
Discharge: HOME | End: 2018-07-06
Payer: MEDICARE

## 2018-07-06 VITALS
RESPIRATION RATE: 18 BRPM | TEMPERATURE: 97.9 F | SYSTOLIC BLOOD PRESSURE: 140 MMHG | HEART RATE: 66 BPM | DIASTOLIC BLOOD PRESSURE: 63 MMHG

## 2018-07-06 LAB
ALBUMIN SERPL BCP-MCNC: 3.8 G/DL (ref 3.5–5)
ALP SERPL-CCNC: 88 U/L (ref 46–116)
ALT SERPL W P-5'-P-CCNC: 28 U/L (ref 12–78)
ANION GAP SERPL CALCULATED.3IONS-SCNC: 6 MMOL/L (ref 4–13)
AST SERPL W P-5'-P-CCNC: 12 U/L (ref 5–45)
BASOPHILS # BLD AUTO: 0.04 THOUSANDS/ΜL (ref 0–0.1)
BASOPHILS NFR BLD AUTO: 1 % (ref 0–1)
BILIRUB SERPL-MCNC: 0.3 MG/DL (ref 0.2–1)
BUN SERPL-MCNC: 17 MG/DL (ref 5–25)
CALCIUM SERPL-MCNC: 8.6 MG/DL (ref 8.3–10.1)
CHLORIDE SERPL-SCNC: 105 MMOL/L (ref 100–108)
CO2 SERPL-SCNC: 29 MMOL/L (ref 21–32)
CREAT SERPL-MCNC: 0.61 MG/DL (ref 0.6–1.3)
EOSINOPHIL # BLD AUTO: 0.19 THOUSAND/ΜL (ref 0–0.61)
EOSINOPHIL NFR BLD AUTO: 2 % (ref 0–6)
ERYTHROCYTE [DISTWIDTH] IN BLOOD BY AUTOMATED COUNT: 12.7 % (ref 11.6–15.1)
GFR SERPL CREATININE-BSD FRML MDRD: 97 ML/MIN/1.73SQ M
GLUCOSE SERPL-MCNC: 110 MG/DL (ref 65–140)
HCT VFR BLD AUTO: 38.5 % (ref 34.8–46.1)
HGB BLD-MCNC: 13.4 G/DL (ref 11.5–15.4)
LYMPHOCYTES # BLD AUTO: 2.99 THOUSANDS/ΜL (ref 0.6–4.47)
LYMPHOCYTES NFR BLD AUTO: 36 % (ref 14–44)
MCH RBC QN AUTO: 30.4 PG (ref 26.8–34.3)
MCHC RBC AUTO-ENTMCNC: 34.8 G/DL (ref 31.4–37.4)
MCV RBC AUTO: 87 FL (ref 82–98)
MONOCYTES # BLD AUTO: 0.56 THOUSAND/ΜL (ref 0.17–1.22)
MONOCYTES NFR BLD AUTO: 7 % (ref 4–12)
NEUTROPHILS # BLD AUTO: 4.65 THOUSANDS/ΜL (ref 1.85–7.62)
NEUTS SEG NFR BLD AUTO: 55 % (ref 43–75)
PLATELET # BLD AUTO: 271 THOUSANDS/UL (ref 149–390)
PMV BLD AUTO: 9.7 FL (ref 8.9–12.7)
POTASSIUM SERPL-SCNC: 3.9 MMOL/L (ref 3.5–5.3)
PROT SERPL-MCNC: 7 G/DL (ref 6.4–8.2)
RBC # BLD AUTO: 4.41 MILLION/UL (ref 3.81–5.12)
SODIUM SERPL-SCNC: 140 MMOL/L (ref 136–145)
WBC # BLD AUTO: 8.43 THOUSAND/UL (ref 4.31–10.16)

## 2018-07-06 PROCEDURE — 96375 TX/PRO/DX INJ NEW DRUG ADDON: CPT

## 2018-07-06 PROCEDURE — 80053 COMPREHEN METABOLIC PANEL: CPT | Performed by: PSYCHIATRY & NEUROLOGY

## 2018-07-06 PROCEDURE — 85025 COMPLETE CBC W/AUTO DIFF WBC: CPT | Performed by: PSYCHIATRY & NEUROLOGY

## 2018-07-06 PROCEDURE — 96413 CHEMO IV INFUSION 1 HR: CPT

## 2018-07-06 RX ADMIN — SODIUM CHLORIDE 20 ML/HR: 0.9 INJECTION, SOLUTION INTRAVENOUS at 08:57

## 2018-07-06 RX ADMIN — NATALIZUMAB 300 MG: 300 INJECTION INTRAVENOUS at 09:26

## 2018-07-06 RX ADMIN — ACETAMINOPHEN 650 MG: 325 TABLET, FILM COATED ORAL at 08:50

## 2018-07-06 RX ADMIN — DIPHENHYDRAMINE HYDROCHLORIDE 25 MG: 50 INJECTION, SOLUTION INTRAMUSCULAR; INTRAVENOUS at 09:02

## 2018-07-06 NOTE — PROGRESS NOTES
IV accessed for lab testing & Tysabri infusion  Observed 1 hr post tx w/out any adverse effects   Offers no complaints

## 2018-07-17 ENCOUNTER — DOCUMENTATION (OUTPATIENT)
Dept: NEUROLOGY | Facility: CLINIC | Age: 63
End: 2018-07-17

## 2018-07-19 ENCOUNTER — TELEPHONE (OUTPATIENT)
Dept: NEUROLOGY | Facility: CLINIC | Age: 63
End: 2018-07-19

## 2018-08-02 RX ORDER — SODIUM CHLORIDE 9 MG/ML
20 INJECTION, SOLUTION INTRAVENOUS CONTINUOUS
Status: DISCONTINUED | OUTPATIENT
Start: 2018-08-03 | End: 2018-08-06 | Stop reason: HOSPADM

## 2018-08-02 RX ORDER — ACETAMINOPHEN 325 MG/1
650 TABLET ORAL ONCE
Status: COMPLETED | OUTPATIENT
Start: 2018-08-03 | End: 2018-08-03

## 2018-08-03 ENCOUNTER — HOSPITAL ENCOUNTER (OUTPATIENT)
Dept: INFUSION CENTER | Facility: CLINIC | Age: 63
Discharge: HOME | End: 2018-08-03
Payer: MEDICARE

## 2018-08-03 VITALS
TEMPERATURE: 97.7 F | RESPIRATION RATE: 16 BRPM | HEART RATE: 68 BPM | DIASTOLIC BLOOD PRESSURE: 63 MMHG | SYSTOLIC BLOOD PRESSURE: 132 MMHG

## 2018-08-03 LAB
ALBUMIN SERPL BCP-MCNC: 3.9 G/DL (ref 3.5–5)
ALP SERPL-CCNC: 102 U/L (ref 46–116)
ALT SERPL W P-5'-P-CCNC: 34 U/L (ref 12–78)
ANION GAP SERPL CALCULATED.3IONS-SCNC: 11 MMOL/L (ref 4–13)
AST SERPL W P-5'-P-CCNC: 15 U/L (ref 5–45)
BASOPHILS # BLD AUTO: 0.05 THOUSANDS/ΜL (ref 0–0.1)
BASOPHILS NFR BLD AUTO: 1 % (ref 0–1)
BILIRUB SERPL-MCNC: 0.3 MG/DL (ref 0.2–1)
BUN SERPL-MCNC: 17 MG/DL (ref 5–25)
CALCIUM SERPL-MCNC: 8.9 MG/DL (ref 8.3–10.1)
CHLORIDE SERPL-SCNC: 106 MMOL/L (ref 100–108)
CO2 SERPL-SCNC: 22 MMOL/L (ref 21–32)
CREAT SERPL-MCNC: 0.69 MG/DL (ref 0.6–1.3)
EOSINOPHIL # BLD AUTO: 0.25 THOUSAND/ΜL (ref 0–0.61)
EOSINOPHIL NFR BLD AUTO: 3 % (ref 0–6)
ERYTHROCYTE [DISTWIDTH] IN BLOOD BY AUTOMATED COUNT: 12 % (ref 11.6–15.1)
GFR SERPL CREATININE-BSD FRML MDRD: 93 ML/MIN/1.73SQ M
GLUCOSE SERPL-MCNC: 112 MG/DL (ref 65–140)
HCT VFR BLD AUTO: 39.7 % (ref 34.8–46.1)
HGB BLD-MCNC: 14 G/DL (ref 11.5–15.4)
IMM GRANULOCYTES # BLD AUTO: 0.02 THOUSAND/UL (ref 0–0.2)
IMM GRANULOCYTES NFR BLD AUTO: 0 % (ref 0–2)
LYMPHOCYTES # BLD AUTO: 2.89 THOUSANDS/ΜL (ref 0.6–4.47)
LYMPHOCYTES NFR BLD AUTO: 34 % (ref 14–44)
MCH RBC QN AUTO: 30.9 PG (ref 26.8–34.3)
MCHC RBC AUTO-ENTMCNC: 35.3 G/DL (ref 31.4–37.4)
MCV RBC AUTO: 88 FL (ref 82–98)
MONOCYTES # BLD AUTO: 0.54 THOUSAND/ΜL (ref 0.17–1.22)
MONOCYTES NFR BLD AUTO: 6 % (ref 4–12)
NEUTROPHILS # BLD AUTO: 4.7 THOUSANDS/ΜL (ref 1.85–7.62)
NEUTS SEG NFR BLD AUTO: 56 % (ref 43–75)
NRBC BLD AUTO-RTO: 0 /100 WBCS
PLATELET # BLD AUTO: 259 THOUSANDS/UL (ref 149–390)
PMV BLD AUTO: 9.3 FL (ref 8.9–12.7)
POTASSIUM SERPL-SCNC: 4.1 MMOL/L (ref 3.5–5.3)
PROT SERPL-MCNC: 7.1 G/DL (ref 6.4–8.2)
RBC # BLD AUTO: 4.53 MILLION/UL (ref 3.81–5.12)
SODIUM SERPL-SCNC: 139 MMOL/L (ref 136–145)
WBC # BLD AUTO: 8.45 THOUSAND/UL (ref 4.31–10.16)

## 2018-08-03 PROCEDURE — 85025 COMPLETE CBC W/AUTO DIFF WBC: CPT | Performed by: PSYCHIATRY & NEUROLOGY

## 2018-08-03 PROCEDURE — 96375 TX/PRO/DX INJ NEW DRUG ADDON: CPT

## 2018-08-03 PROCEDURE — 96413 CHEMO IV INFUSION 1 HR: CPT

## 2018-08-03 PROCEDURE — 80053 COMPREHEN METABOLIC PANEL: CPT | Performed by: PSYCHIATRY & NEUROLOGY

## 2018-08-03 RX ADMIN — SODIUM CHLORIDE 20 ML/HR: 0.9 INJECTION, SOLUTION INTRAVENOUS at 08:45

## 2018-08-03 RX ADMIN — ACETAMINOPHEN 650 MG: 325 TABLET ORAL at 09:05

## 2018-08-03 RX ADMIN — NATALIZUMAB 300 MG: 300 INJECTION INTRAVENOUS at 09:22

## 2018-08-03 RX ADMIN — DIPHENHYDRAMINE HYDROCHLORIDE 25 MG: 50 INJECTION, SOLUTION INTRAMUSCULAR; INTRAVENOUS at 09:05

## 2018-08-03 NOTE — PROGRESS NOTES
Pt tolerated treatment well  Pt declined AVS  Online Tysabri checklist completed  Aware of next appt

## 2018-08-06 ENCOUNTER — HOSPITAL ENCOUNTER (OUTPATIENT)
Dept: MRI IMAGING | Facility: HOSPITAL | Age: 63
Discharge: HOME | End: 2018-08-06
Attending: PSYCHIATRY & NEUROLOGY
Payer: MEDICARE

## 2018-08-06 DIAGNOSIS — G35 MULTIPLE SCLEROSIS (HCC): ICD-10-CM

## 2018-08-06 PROCEDURE — 70551 MRI BRAIN STEM W/O DYE: CPT

## 2018-08-07 ENCOUNTER — TELEPHONE (OUTPATIENT)
Dept: NEUROLOGY | Facility: CLINIC | Age: 63
End: 2018-08-07

## 2018-08-07 DIAGNOSIS — G35 MULTIPLE SCLEROSIS (HCC): Primary | ICD-10-CM

## 2018-08-07 NOTE — TELEPHONE ENCOUNTER
Need mri head with contrast asap  Pt just had study done without contrast and new lesion on tysabri  Pt on tysabri since 2011  Pt needs study done asap  Please help to expedite if any pre auth needed  [facility] please direct scheduling and pre auth that this is a priority pt  Wan Hung, please coordinate with [facility] the orders for csf dna jcv pcr  Please see assay number on desk for study to be done at [facility]  May need to enter as miscellaneous study  Wanrosa Hung I also want to order tysabri antibodies in serum   I cannot locate thru epic  Can you see how we can order these as well

## 2018-08-08 ENCOUNTER — PREP FOR PROCEDURE (OUTPATIENT)
Dept: NEUROLOGY | Facility: CLINIC | Age: 63
End: 2018-08-08

## 2018-08-08 DIAGNOSIS — G35 MULTIPLE SCLEROSIS (HCC): Primary | ICD-10-CM

## 2018-08-08 NOTE — TELEPHONE ENCOUNTER
I called and spoke to pt and scheduled LP for 8/14 @ 1pm arrival at 11:30am at [facility]  Pt will bring lab slip for Tysabri antibodies to LP to have drawn same day   Lab slip mailed home

## 2018-08-08 NOTE — TELEPHONE ENCOUNTER
I entered the Tysabri antibody test under misc  Lab test   LP ordered (in a separate encounter) with specific instructions to send JCV-DNA PCR to [facility]  Please schedule patient for LP, prefer within a week  If any issues, please let Dr Marissa Carranza know, as Dr Jeannine Hermosillo from [facility] is aware as well and could maybe help if there is an issue with scheduling her in a timely manner

## 2018-08-08 NOTE — TELEPHONE ENCOUNTER
Pls see notes  Scheduled for MRI with contrast tomorrow (8/9) and scheduled for LP on 8/14    Will be having Tysabri antibodies serum drawn on day of LP

## 2018-08-08 NOTE — TELEPHONE ENCOUNTER
Called pt and conferenced central scheduling, scheduled MRI for 8/9/18 @ 10:00am  No PA is needed since she has Medicare  Please place LP orders and I will contact pt to schedule ASA  Please enter order for csf dna JCV PCR as miscellaneous test with comments to send to [facility]  Tysabri antibodies in serum should also be ordered as a misc  Test as well

## 2018-08-09 ENCOUNTER — HOSPITAL ENCOUNTER (OUTPATIENT)
Dept: RADIOLOGY | Facility: HOSPITAL | Age: 63
Discharge: HOME | End: 2018-08-09
Attending: PSYCHIATRY & NEUROLOGY
Payer: MEDICARE

## 2018-08-09 ENCOUNTER — TELEPHONE (OUTPATIENT)
Dept: NEUROLOGY | Facility: CLINIC | Age: 63
End: 2018-08-09

## 2018-08-09 DIAGNOSIS — G35 MULTIPLE SCLEROSIS (HCC): ICD-10-CM

## 2018-08-09 PROCEDURE — A9585 GADOBUTROL INJECTION: HCPCS | Performed by: PSYCHIATRY & NEUROLOGY

## 2018-08-09 PROCEDURE — 70553 MRI BRAIN STEM W/O & W/DYE: CPT

## 2018-08-09 RX ADMIN — GADOBUTROL 5 ML: 604.72 INJECTION INTRAVENOUS at 11:19

## 2018-08-09 NOTE — TELEPHONE ENCOUNTER
Leopoldo Zavala called [facility] today and spoke with dr Scott Esquivel  Pt had mri head with contrast this am   It was not read yet ,so I asked him to look at it  Prelim review, the new lesion is enhancing and therefore not likely to be pml;  but rather new ms lesion  Please let pt know  We will need her jcv as well as the tysabri ab testing done  Please have her get the tysabri ab studies this week  Make sure no new sxs  I would cancel the lp as this looks like breakthru ms lesion arising from the ventricular/ ependymal surface  If jcv negative, we will continue with tysabri until we get the tysabri ab results  If tysabri ab positive we may need to come up with new plan for her

## 2018-08-10 ENCOUNTER — TELEPHONE (OUTPATIENT)
Dept: NEUROLOGY | Facility: CLINIC | Age: 63
End: 2018-08-10

## 2018-08-10 NOTE — TELEPHONE ENCOUNTER
Spoke with patient  She got JCV done yesterday at [facility]   She has not had the Tysabri antibodies done yet, as they were just going to draw that at her LP next week  I let her know we are going to cancel her LP, so now she will have to go to the lab and get the Tysabri antibody test done  She will go to [facility] to get this done    She does not need a script, as the order is in the system under MISCELLANEOUS TEST

## 2018-08-10 NOTE — TELEPHONE ENCOUNTER
Stephany De Jesus, can you please call and CANCEL this patient's LP that is scheduled for next week on 8/14  We no longer need to do this  Please also ask if I need to somehow cancel the order in EPIC as well    Thanks!!

## 2018-08-13 LAB
JCPYV AB SERPL QL IA: NEGATIVE
SL AMB INDEX VALUE: 0.12

## 2018-08-14 ENCOUNTER — APPOINTMENT (OUTPATIENT)
Dept: LAB | Facility: CLINIC | Age: 63
End: 2018-08-14
Payer: MEDICARE

## 2018-08-14 DIAGNOSIS — G35 MULTIPLE SCLEROSIS (HCC): ICD-10-CM

## 2018-08-14 PROCEDURE — 83516 IMMUNOASSAY NONANTIBODY: CPT

## 2018-08-14 PROCEDURE — 36415 COLL VENOUS BLD VENIPUNCTURE: CPT

## 2018-08-20 ENCOUNTER — DOCUMENTATION (OUTPATIENT)
Dept: NEUROLOGY | Facility: CLINIC | Age: 63
End: 2018-08-20

## 2018-08-21 ENCOUNTER — TELEPHONE (OUTPATIENT)
Dept: NEUROLOGY | Facility: CLINIC | Age: 63
End: 2018-08-21

## 2018-08-21 DIAGNOSIS — G35 MULTIPLE SCLEROSIS (HCC): Primary | ICD-10-CM

## 2018-08-21 DIAGNOSIS — G31.84 MILD COGNITIVE IMPAIRMENT: ICD-10-CM

## 2018-08-21 NOTE — TELEPHONE ENCOUNTER
[facility], I have tysabri orders to sign  I am still awaiting tysabri ab labs  These are showing up still in process  [facility], please call lab to see what is the delay  Need these labs to be reviewed prior to signing orders for her 8/31 tysabri infusion  Holding on signing until we get labs    Shiraz March    Please be on look out as well  jude Akbar

## 2018-08-21 NOTE — TELEPHONE ENCOUNTER
Spoke to [facility]  States Tysabri antibody takes over a week  Advised to call back tomorrow and they should have a result

## 2018-08-28 LAB — MISCELLANEOUS LAB TEST RESULT: NORMAL

## 2018-08-28 NOTE — TELEPHONE ENCOUNTER
Oretha Kehr  Please let pt know about her positive ab  This may be the cause of her breakthru lesion despite her tysabri  I would let her go for this months infusion  I would recommend an appt down at [facility] for her to see about other options as she has done very well with this med to date  Please make sure she has an updated jcv as well    See if we can get her into [facility] over the next 2 months

## 2018-08-28 NOTE — TELEPHONE ENCOUNTER
Spoke with Oneil Florez at [facility] lab regarding pending result  States she will have to look further into it since it has been 2 weeks  Will call me back at [phone] with more information

## 2018-08-28 NOTE — TELEPHONE ENCOUNTER
Alice Grey from [facility] called asking for tysabri infusion order  Scheduled on Friday 8/31/18  She was made aware of the below             Fax [phone]

## 2018-08-28 NOTE — TELEPHONE ENCOUNTER
Pt calls back [facility]  Please give pt a call when you are able      [phone]

## 2018-08-28 NOTE — TELEPHONE ENCOUNTER
Kelby Craft from [facility] called back, results have been submitted into EPIC  Please review  Patient has Tysabri scheduled for 8/31/18

## 2018-08-29 ENCOUNTER — TRANSCRIBE ORDERS (OUTPATIENT)
Dept: ADMINISTRATIVE | Facility: HOSPITAL | Age: 63
End: 2018-08-29

## 2018-08-29 DIAGNOSIS — Z12.31 ENCOUNTER FOR SCREENING MAMMOGRAM FOR MALIGNANT NEOPLASM OF BREAST: Primary | ICD-10-CM

## 2018-08-29 NOTE — TELEPHONE ENCOUNTER
Great! Hopefully she can make the appt  I placed the referral   I had to place an "internal referral" because if I choose "external referral", it makes me choose a provider before signing the referral and this doctor is not on this list (and it is interesting because most of the doctors listed for external referral are with [facility])  So I wrote his name in the comments

## 2018-08-29 NOTE — TELEPHONE ENCOUNTER
Spoke with patient  Made her aware of positive antibodies  She will go for her infusion this week  She just had JCV updated 8/8/18 and negative with index 0 12  She is requesting a referral to speech therapy to help with cognition and word finding difficulty  This has helped her in the past   Referral placed and will mail home  [place]--can you see if patient can be seen at [facility] with one of the [facility] specialists down there? Patient says she would be agreeable to see someone there    Thanks

## 2018-08-29 NOTE — TELEPHONE ENCOUNTER
Referral and clinical information faxed  Awaiting call back from patient to confirm appointment date and time  Wednesday Sept 26 @ 2pm with Dr Dasha Newton at [facility] AT [place] second floor [address] [place] [address] blvd  Needs to bring insurance card, photo ID and discs to office    Arrive at 1:45pm

## 2018-08-29 NOTE — TELEPHONE ENCOUNTER
[facility] department of neurology [phone]  There was a cancellation for this month, able to schedule for Wednesday Sept 26 @ 2pm with Dr Daniel Donald at [facility] AT [place] second floor [address]  Needs to bring insurance card, photo ID and discs to office  Arrive at 1:45pm   LMOM for patient to call back to provide appointment time information  Please place referral and then I can fax along with office notes, labs, and imaging      Fax: [phone]

## 2018-08-30 RX ORDER — SODIUM CHLORIDE 9 MG/ML
20 INJECTION, SOLUTION INTRAVENOUS CONTINUOUS
Status: DISCONTINUED | OUTPATIENT
Start: 2018-08-31 | End: 2018-09-03 | Stop reason: HOSPADM

## 2018-08-30 RX ORDER — ACETAMINOPHEN 325 MG/1
650 TABLET ORAL ONCE
Status: COMPLETED | OUTPATIENT
Start: 2018-08-31 | End: 2018-08-31

## 2018-08-30 NOTE — TELEPHONE ENCOUNTER
Spoke with patient, made her aware of date and time of appointment as well as what she will need to bring with her  Patient verbalizes understanding and will get her discs tomorrow

## 2018-08-31 ENCOUNTER — LAB REQUISITION (OUTPATIENT)
Dept: LAB | Facility: HOSPITAL | Age: 63
End: 2018-08-31
Payer: MEDICARE

## 2018-08-31 ENCOUNTER — HOSPITAL ENCOUNTER (OUTPATIENT)
Dept: INFUSION CENTER | Facility: CLINIC | Age: 63
Discharge: HOME | End: 2018-08-31
Payer: MEDICARE

## 2018-08-31 VITALS
WEIGHT: 158.31 LBS | DIASTOLIC BLOOD PRESSURE: 65 MMHG | TEMPERATURE: 97.7 F | HEART RATE: 65 BPM | RESPIRATION RATE: 16 BRPM | BODY MASS INDEX: 25.94 KG/M2 | SYSTOLIC BLOOD PRESSURE: 129 MMHG

## 2018-08-31 DIAGNOSIS — E55.9 VITAMIN D DEFICIENCY: ICD-10-CM

## 2018-08-31 LAB
25(OH)D3 SERPL-MCNC: 21.5 NG/ML (ref 30–100)
ALBUMIN SERPL BCP-MCNC: 3.8 G/DL (ref 3.5–5)
ALP SERPL-CCNC: 91 U/L (ref 46–116)
ALT SERPL W P-5'-P-CCNC: 30 U/L (ref 12–78)
ANION GAP SERPL CALCULATED.3IONS-SCNC: 11 MMOL/L (ref 4–13)
AST SERPL W P-5'-P-CCNC: 14 U/L (ref 5–45)
BASOPHILS # BLD AUTO: 0.03 THOUSANDS/ΜL (ref 0–0.1)
BASOPHILS NFR BLD AUTO: 0 % (ref 0–1)
BILIRUB SERPL-MCNC: 0.4 MG/DL (ref 0.2–1)
BUN SERPL-MCNC: 16 MG/DL (ref 5–25)
CALCIUM SERPL-MCNC: 8.8 MG/DL (ref 8.3–10.1)
CHLORIDE SERPL-SCNC: 106 MMOL/L (ref 100–108)
CO2 SERPL-SCNC: 26 MMOL/L (ref 21–32)
CREAT SERPL-MCNC: 0.67 MG/DL (ref 0.6–1.3)
EOSINOPHIL # BLD AUTO: 0.24 THOUSAND/ΜL (ref 0–0.61)
EOSINOPHIL NFR BLD AUTO: 3 % (ref 0–6)
ERYTHROCYTE [DISTWIDTH] IN BLOOD BY AUTOMATED COUNT: 11.9 % (ref 11.6–15.1)
GFR SERPL CREATININE-BSD FRML MDRD: 94 ML/MIN/1.73SQ M
GLUCOSE SERPL-MCNC: 113 MG/DL (ref 65–140)
HCT VFR BLD AUTO: 40.3 % (ref 34.8–46.1)
HGB BLD-MCNC: 14 G/DL (ref 11.5–15.4)
IMM GRANULOCYTES # BLD AUTO: 0.02 THOUSAND/UL (ref 0–0.2)
IMM GRANULOCYTES NFR BLD AUTO: 0 % (ref 0–2)
LYMPHOCYTES # BLD AUTO: 2.69 THOUSANDS/ΜL (ref 0.6–4.47)
LYMPHOCYTES NFR BLD AUTO: 34 % (ref 14–44)
MCH RBC QN AUTO: 30.4 PG (ref 26.8–34.3)
MCHC RBC AUTO-ENTMCNC: 34.7 G/DL (ref 31.4–37.4)
MCV RBC AUTO: 88 FL (ref 82–98)
MONOCYTES # BLD AUTO: 0.54 THOUSAND/ΜL (ref 0.17–1.22)
MONOCYTES NFR BLD AUTO: 7 % (ref 4–12)
NEUTROPHILS # BLD AUTO: 4.44 THOUSANDS/ΜL (ref 1.85–7.62)
NEUTS SEG NFR BLD AUTO: 56 % (ref 43–75)
NRBC BLD AUTO-RTO: 0 /100 WBCS
PLATELET # BLD AUTO: 271 THOUSANDS/UL (ref 149–390)
PMV BLD AUTO: 9.2 FL (ref 8.9–12.7)
POTASSIUM SERPL-SCNC: 4.1 MMOL/L (ref 3.5–5.3)
PROT SERPL-MCNC: 7 G/DL (ref 6.4–8.2)
RBC # BLD AUTO: 4.6 MILLION/UL (ref 3.81–5.12)
SODIUM SERPL-SCNC: 143 MMOL/L (ref 136–145)
WBC # BLD AUTO: 7.96 THOUSAND/UL (ref 4.31–10.16)

## 2018-08-31 PROCEDURE — 96413 CHEMO IV INFUSION 1 HR: CPT

## 2018-08-31 PROCEDURE — 82306 VITAMIN D 25 HYDROXY: CPT | Performed by: FAMILY MEDICINE

## 2018-08-31 PROCEDURE — 96375 TX/PRO/DX INJ NEW DRUG ADDON: CPT

## 2018-08-31 PROCEDURE — 85025 COMPLETE CBC W/AUTO DIFF WBC: CPT | Performed by: PSYCHIATRY & NEUROLOGY

## 2018-08-31 PROCEDURE — 80053 COMPREHEN METABOLIC PANEL: CPT | Performed by: PSYCHIATRY & NEUROLOGY

## 2018-08-31 RX ADMIN — SODIUM CHLORIDE 20 ML/HR: 0.9 INJECTION, SOLUTION INTRAVENOUS at 08:47

## 2018-08-31 RX ADMIN — NATALIZUMAB 300 MG: 300 INJECTION INTRAVENOUS at 09:30

## 2018-08-31 RX ADMIN — DIPHENHYDRAMINE HYDROCHLORIDE 25 MG: 50 INJECTION, SOLUTION INTRAMUSCULAR; INTRAVENOUS at 08:56

## 2018-08-31 RX ADMIN — ACETAMINOPHEN 650 MG: 325 TABLET, FILM COATED ORAL at 08:55

## 2018-08-31 NOTE — PLAN OF CARE
Problem: Potential for Falls  Goal: Patient will remain free of falls  INTERVENTIONS:  - Assess patient frequently for physical needs  -  Identify cognitive and physical deficits and behaviors that affect risk of falls    -  [place] fall precautions as indicated by assessment   - Educate patient/family on patient safety including physical limitations  - Instruct patient to call for assistance with activity based on assessment  - Modify environment to reduce risk of injury  - Consider OT/PT consult to assist with strengthening/mobility   Outcome: Progressing

## 2018-08-31 NOTE — PROGRESS NOTES
LATE NOTE:  Tysabri infused without incident  Pt observed x1 hour and released from clinic  Touch Program input completed

## 2018-08-31 NOTE — PROGRESS NOTES
To clinic for IV tysabri for MS  Pt states that this may be her last dose because she has a new lesion  She will be seeing a Doctor at [facility] in September to determine her next course of treatment  But double confirmed with patient that Dr Cynthia Swartz wants her to continue with today's Tysabri as ordered

## 2018-09-10 ENCOUNTER — EVALUATION (OUTPATIENT)
Dept: SPEECH THERAPY | Facility: CLINIC | Age: 63
End: 2018-09-10
Payer: MEDICARE

## 2018-09-10 DIAGNOSIS — G35 MULTIPLE SCLEROSIS (HCC): ICD-10-CM

## 2018-09-10 DIAGNOSIS — R48.8 OTHER SYMBOLIC DYSFUNCTIONS: Primary | ICD-10-CM

## 2018-09-10 PROCEDURE — 96125 COGNITIVE TEST BY HC PRO: CPT

## 2018-09-10 PROCEDURE — G9166 ATTEN GOAL STATUS: HCPCS

## 2018-09-10 PROCEDURE — G9165 ATTEN CURRENT STATUS: HCPCS

## 2018-09-10 NOTE — PROGRESS NOTES
Speech-Language Pathology Initial Evaluation    Today's date: 9/10/2018  Patients name: Yanet Gomez  : 1955  MRN: [id number]  Safety measures: MS, fall risk  Referring provider: Beverly English PA-C    Subjective comments: "My mother in law will be moving in and I'm having a hard time getting the house organized" " I get spacey" "I have a tendency to phase out"    Patient's goal(s): "Make talking better"     Reason for referral: Change in cognitive status and Decreased language skills  Prior functional status: Communication effective and appropriate in all situations  Clinically complex situations: N/A    History: Patient is a 60year old female with MS who was referred for speech threapy due to reported difficulty with cognition and word finding  Recent MRI results from 2018 are as follows: Long-standing demyelinating disease  New, 14 mm left periventricular white matter lesion, described on recent MRI does enhance following contrast administration, corroborating acute demyelination  Patient is known to this department as she was treated for similar concerns last year- results of her testing scores are compared below  Upon her arrival today she reports difficulty with word finding, language processing, recall, and multi-tasking  She also reports getting overwhelmed with organization tasks and occasionally present with slurred speech  She does admit to not remembering the strategies which were previously taught to her when she was seen last year  She denies dysphagia, weight loss, and recent PNAs  She does report losing her mother last December and her father in law on  of this year- which she reports is what she attributed her difficulties to, but now feels it may in fact be a true decline as there has been no improvement since her mothers death or difference with her father in laws death in addition to her recent MRI results   She reports hesitations and breakdowns during conversation is her primary concern  She also reports some concern for falls/decreased balance  Hearing: WFL ( not formally assessed)  Vision: WFL +glasses (no formally assessed)    Home environment/lifestyle: Lives home with her - mother in law will be moving in  Highest level of education: Certification for [employer]  Vocational status: retired     Mental status: Alert  Behavior status: Cooperative  Communication modalities: Verbal  Rehabilitation prognosis: Excellent rehab potential to reach the established goals    Assessments    The Repeatable Battery for the Assessment of Neuropsychological Status (RBANS) is a brief, individually-administered assessment which measures attention, language, visuospatial/constructional abilities, and immediate & delayed memory  The RBANS is intended for use with adolescents to adults, ages 15 to 80 years  The following results were obtained during the administration of the assessment  Form: d    Cognitive Domain/Subtest: Index Score: Percentile Rank: Classification:   IMMEDIATE MEMORY 76 6%ile Borderline        1  List Learning (21/40)  Previous score 22- no significant change        2  Story Memory ( 14/24) Previous score 10- slight improvement       VISUOSPATIAL/  CONSTRUCTIONAL 96 38%ile Average        3  Figure Copy (16/20) Previous score 16- no change        4  Line Orientation (19/20) Previous score 18- no significant change       LANGUAGE 85 16%ile Low Average        5  Picture Naming (10/10) Previous score 10- no change        6  Semantic Fluency (12/40) Previous score 14- Decline       ATTENTION 72 4%ile Borderline        7  Digit Span (8/16) Previous score 8- no change        8  Coding (27/89) Previous score 43- Significant decline       DELAYED MEMORY 92 27%ile Average        9  List Recall (3/10) Previous score- 6- Decline        10  List Recognition (20/20) Previous score 19- No significant change        11   Story Recall (5/12) Previous score 6- No significant change        12  Figure Recall (8/20) Previous score 14- Decline         Sum of Index Scores:  421   Total Score:  80   Percentile: 9%ile   Classification: Borderline     Previous Score 440 with total score of 83 and percentage of 13 and previous classification of low average indicates an overall decline in function  *Previous scores information from last POC is indicated    Informal assessment of spoken language during conversational tasks revealed mild-moderate word finding difficulty in conversation which is notable to the listener as well as decreased language processing/thought organization for speech negatively impacting fluency and speech clarity at times  The some burden is placed on the listener but the patient shares the burden of communication  Goals  Short-term goals:  1  Patient will be educated on word finding strategies (i e , circumlocution) for improved generative naming and verbal expression skills, to be achieved in 4-6 weeks    2  Patient will complete concrete and abstract categorization tasks to 80% accuracy to facilitate improved generative naming skills and working memory, to be achieved in 4-6 weeks  3  Patient will define idioms with 80% accuracy to facilitate improved semantic language skills, to be achieved in 4-6 weeks  4  Patient will demonstrate alternating attention by being able to shift focus of attention between two tasks with min cues in a distracting environment with 80% accuracy, to be achieved in 4-6 weeks  5  Patient will demonstrate divided attention by responding to multiple tasks or details within tasks at the same time with min cues in a distracting environment with 80% accuracy, to be achieved in 4-6 weeks      6  Patient will be educated on the use of internal and external memory aids and compensatory strategies with 80% accuracy to facilitate increased recall of routine, personal information, and recent events, to be achieved in 4-6 weeks     7  Patient will demonstrate functional use of external memory across 3 sessions with 80% accuracy to facilitate carryover of strategies in functional living environment, to be achieved in 4-6 weeks  Long-term goals:    1  Patient will complete higher-level expressive language tasks (e g , word definitions, idioms, synonym/antonyms, etc) with 80% accuracy to improve functional communication skills by discharge  2   Patient will demonstrate cognitive-communication skills consistent with age and education given use of compensatory strategies when needed to resume baseline activities and responsibilities   in home, community, and work/school settings by discharge  Functional Limitations Reporting (G-codes):   Flowsheet Rows      Most Recent Value   SLP G-Codes   FOTO information reviewed  N/A   Assessment Type  Evaluation   Functional Limitations  Spoken language expressive   Attention Current Status ()  CJ   Attention Goal Status ()  CH            Impressions/Recommendations    Impressions: Patient presents with mild-moderate cognitive-linguistic deficits characterized by decreased language processing, word finding, thought organization, attention, and delayed recall  Suspect new onset of anomic aphasia as well- although formal assessment was not completed in this area today  She also presents with mild neurogenic stuttering and decreased speech clarity which appear to be a result of decreased language processing, word finding, and thought organization       Recommendations:  -Patient would benefit from outpatient skilled Speech Therapy services : Cognitive-Linguistic therapy    -Frequency: 2x weekly  -Duration: 4-6 weeks    -Intervention certification from: 6/81/6801  -Intervention certification to: 51/47/5797    -Intervention comments: Test Administration completed from 10am- 11am; Scoring and Interpretation completed from 12pm-1pm    Visit Tracking:  -Referring provider: Epic  -Billing guidelines: CMS  -Visit #1/10   -Medicare   [address] due 10/10/2018

## 2018-09-12 ENCOUNTER — EVALUATION (OUTPATIENT)
Dept: PHYSICAL THERAPY | Facility: CLINIC | Age: 63
End: 2018-09-12
Payer: MEDICARE

## 2018-09-12 DIAGNOSIS — G35 MULTIPLE SCLEROSIS (HCC): Primary | ICD-10-CM

## 2018-09-12 PROCEDURE — 97162 PT EVAL MOD COMPLEX 30 MIN: CPT | Performed by: PHYSICAL THERAPIST

## 2018-09-12 PROCEDURE — G8978 MOBILITY CURRENT STATUS: HCPCS | Performed by: PHYSICAL THERAPIST

## 2018-09-12 PROCEDURE — G8979 MOBILITY GOAL STATUS: HCPCS | Performed by: PHYSICAL THERAPIST

## 2018-09-12 NOTE — PROGRESS NOTES
PT Evaluation     Today's date: 2018  Patient name: Regan Colunga  : 1955  MRN: [id number]  Referring provider: Juan Francisco Julio PA-C  Dx: No diagnosis found  Assessment    Assessment details: Pt presets to physical therapy with a diagnosis of multiple sclerosis with difficulty with balance  At this time patient has decreased lower extremity strength in bilateral hips, decreased balance per suarez blance testing nad 6 mwt test, and decreased endurance and gait efficiency per 6 mwt with some reports of increased time required and increased rest needed when completing IADLs and community activity  Goals  STG:  Pt will complete 5x sit to  11 seconds within 4 weeks  Pt will complete 6 MWT in 1550 ft within 4 weeks  Pt will be independent with HEP wihtin 4 weeks    LTG:  Pt will repor tno balance or walking difficulty within 6 weeks    Plan  Patient would benefit from: skilled physical therapy  Planned therapy interventions: home exercise program, strengthening, neuromuscular re-education and balance  Frequency: 2x week  Duration in weeks: 4  Plan of Care beginning date: 2018  Plan of Care expiration date: 2018  Treatment plan discussed with: patient        Subjective Evaluation    History of Present Illness  Mechanism of injury: Patient reports getting a new lesion on the left side of her brain  Lately she has been having more difficulty with thinking but felt like getting screened by physical therapy would be helpful  She has been working hard to clean and organize her house lately so she hasn't been doing exercise  Pt denies dizziness, no loss of sensation  Pt reports "wooziness" when she first goes to stand up     Pain  Current pain ratin  At best pain ratin  At worst pain ratin  Location: low back  Relieving factors: medications    Social Support  Lives in: multiple-level home  Lives with: parents and spouse    Employment status: not working  Exercise history: none      Diagnostic Tests  MRI studies: abnormal        Objective    Balance Test    6 Minute Walk Test (ft): 1425 ft no ad   Sales Balance: 53/56       5x Sit To Stand (s): 13 sec                  Coordination Left Right   Heel To Shin wnl wnl       Sensation Left Right   Kinesthesia WNL WNL    Light Touch WNL WNL       Muscle Tone Left Right   Modified Tonya Scale     Hamstring wnl wnl   Gastroc clonus wnl   Quad wnl wnl       Manual Muscle Testing - Hip Left Right   Flexion 4 4   Extension 4 4   Abduction 4 4   External Rotation 4 4     Manual Muscle Testing - Knee Left Right   Flexion 4 4   Extension 4 4     Manual Muscle Testing - Ankle Left Right   Doriflexion 5 55   Plantarflexion 4 4        Transfers    Sit To Stand Independent   W/C To Bed Independent   Sit To Supine Independent   Roll Independent         Gait Assessment:        PHYSICAL FINDINGS:  Oculomotor ROM :WNL  Resting nystagmus: No  Gaze holding nystagmus No   Smooth pursuit Normal    Vertical Saccades:Normal  Horizontal Saccades:Normal  Convergence: Normal      Head thrust (room light): Normal        Precautions: COPD    Daily Treatment Diary     Manual                                                                                   Exercise Diary  9/12            Sit to stand 2x10            SL heel raise 20x ea            Standing hp abduction 2x10 ea                                                                                                                                                                                                                                             Modalities

## 2018-09-13 ENCOUNTER — OFFICE VISIT (OUTPATIENT)
Dept: FAMILY MEDICINE CLINIC | Facility: CLINIC | Age: 63
End: 2018-09-13
Payer: MEDICARE

## 2018-09-13 VITALS
HEART RATE: 72 BPM | TEMPERATURE: 98.1 F | DIASTOLIC BLOOD PRESSURE: 82 MMHG | BODY MASS INDEX: 26.16 KG/M2 | HEIGHT: 65 IN | RESPIRATION RATE: 16 BRPM | WEIGHT: 157 LBS | SYSTOLIC BLOOD PRESSURE: 134 MMHG

## 2018-09-13 DIAGNOSIS — J30.9 ALLERGIC RHINITIS, UNSPECIFIED SEASONALITY, UNSPECIFIED TRIGGER: ICD-10-CM

## 2018-09-13 DIAGNOSIS — G35 MULTIPLE SCLEROSIS (HCC): ICD-10-CM

## 2018-09-13 DIAGNOSIS — J44.9 COPD WITHOUT EXACERBATION (HCC): Chronic | ICD-10-CM

## 2018-09-13 DIAGNOSIS — R30.0 DYSURIA: ICD-10-CM

## 2018-09-13 DIAGNOSIS — Z23 NEED FOR INFLUENZA VACCINATION: Primary | ICD-10-CM

## 2018-09-13 DIAGNOSIS — F33.9 MAJOR DEPRESSION, RECURRENT, CHRONIC (HCC): ICD-10-CM

## 2018-09-13 DIAGNOSIS — E78.5 HYPERLIPIDEMIA, UNSPECIFIED HYPERLIPIDEMIA TYPE: Chronic | ICD-10-CM

## 2018-09-13 PROCEDURE — 90682 RIV4 VACC RECOMBINANT DNA IM: CPT

## 2018-09-13 PROCEDURE — 99214 OFFICE O/P EST MOD 30 MIN: CPT | Performed by: FAMILY MEDICINE

## 2018-09-13 PROCEDURE — G0008 ADMIN INFLUENZA VIRUS VAC: HCPCS

## 2018-09-13 RX ORDER — GABAPENTIN 300 MG/1
300 CAPSULE ORAL 2 TIMES DAILY
Qty: 180 CAPSULE | Refills: 3 | Status: SHIPPED | OUTPATIENT
Start: 2018-09-13 | End: 2020-03-16 | Stop reason: SDUPTHER

## 2018-09-13 RX ORDER — SERTRALINE HYDROCHLORIDE 100 MG/1
100 TABLET, FILM COATED ORAL DAILY
Qty: 90 TABLET | Refills: 3 | Status: SHIPPED | OUTPATIENT
Start: 2018-09-13 | End: 2019-06-26 | Stop reason: SDUPTHER

## 2018-09-13 RX ORDER — MONTELUKAST SODIUM 10 MG/1
10 TABLET ORAL
Qty: 90 TABLET | Refills: 3 | Status: SHIPPED | OUTPATIENT
Start: 2018-09-13 | End: 2019-06-26 | Stop reason: SDUPTHER

## 2018-09-13 RX ORDER — EZETIMIBE 10 MG/1
10 TABLET ORAL DAILY
Qty: 90 TABLET | Refills: 3 | Status: SHIPPED | OUTPATIENT
Start: 2018-09-13 | End: 2020-09-22 | Stop reason: ALTCHOICE

## 2018-09-13 RX ORDER — ATORVASTATIN CALCIUM 40 MG/1
40 TABLET, FILM COATED ORAL DAILY
Qty: 90 TABLET | Refills: 3 | Status: SHIPPED | OUTPATIENT
Start: 2018-09-13 | End: 2020-01-22 | Stop reason: SDUPTHER

## 2018-09-13 NOTE — PROGRESS NOTES
FAMILY PRACTICE OFFICE VISIT       NAME: Elmer Kaufman  AGE: 60 y o  SEX: female       : 1955        MRN: [id number]        Assessment and Plan     Problem List Items Addressed This Visit     Multiple sclerosis, relapsing/remitting    Relevant Medications    gabapentin (NEURONTIN) 300 mg capsule    Other Relevant Orders    Lipid panel    Hyperlipidemia (Chronic)    Relevant Medications    atorvastatin (LIPITOR) 40 mg tablet    ezetimibe (ZETIA) 10 mg tablet    Other Relevant Orders    Lipid panel    TSH, 3rd generation    COPD without exacerbation (HCC) (Chronic)    Relevant Medications    montelukast (SINGULAIR) 10 mg tablet    Other Relevant Orders    CT chest wo contrast    Major depression, recurrent, chronic (HCC)    Relevant Medications    sertraline (ZOLOFT) 100 mg tablet    Allergic rhinitis    Relevant Medications    montelukast (SINGULAIR) 10 mg tablet      Other Visit Diagnoses     Need for influenza vaccination    -  Primary    Relevant Orders    influenza vaccine, [id number], quadrivalent, recombinant, PF, 0 5 mL, for patients 18 yr+ (FLUBLOK) (Completed)    Dysuria        Relevant Orders    Urinalysis with microscopic    Urine culture      Multiple sclerosis  Patient remains under care of [facility]'s Neurology, Dr Silvia Rowe, most recent Tysabri infusion 2018  MRI 2018 reveals progressive lesions of multiple sclerosis  Patient is proceeding with consultation at [address] [place] [address]  Patient reports increased short-term memory and trouble word finding  She remains in speech therapy which helps  Chronic urinary leakage  Patient is concerned of occasional symptoms of urinary frequency and urgency  Will proceed with urine culture and UA  COPD  Seasonal allergies  Patient is still unfortunately smoking  She is well aware of significant health risks  I strongly advised her to proceed with CT chest/screening protocol  Depression  Anxiety    Zoloft 100 mg daily works well  Hyperlipidemia:  Continue Lipitor and Zetia  Patient remains on ecotrin  81 mg daily Pending lipid panel  Vitamin-D deficiency  Patient should start vitamin D3 5000 units daily  Health maintenance:  Flu vaccine administered today  Patient is under care of gyn, she is up-to-date with mammography, Severino Mcallister follows  Most recent colonoscopy was performed in November 2013 by Sandhya Batista, finding of few colon polyps  Patient Instructions   Please start vitamin D3 5000 IU daily    Please proceed with additional  Blood work            Chief Complaint     Chief Complaint   Patient presents with    Follow-up     Pt is here for 6 month follow up        History of Present Illness     Patient presents for followup of chronic medical conditions  She remains under care of [facility]rology for treatment of multiple sclerosis  Patient and her  report  worsening of memory   lately  Trouble word finding     Short term memory trouble  Last Tysabri  infusion late August   Recent MRI brain 8/2018:  Long-standing demyelinating disease    New, 14 mm left periventricular white matter lesion, described on recent MR does enhance following contrast administration, corroborating acute demyelination  Patient  Is scheduled  to see neurologist at [facility] as per DR Giang's recommendation  In speech   thearpy   Patient still smokes- she has never proceeded with CT chest that I have recommended in the past     [facility] is pending - Dr Stallworth  follows   GERD s/o are well controled- off Rx  -  Used papaya and follows healthy diet  Patient is experiencing occasional urinary frequency and dysuria  Occasional leakage  She denies any fever, flank pain or hematuria  Hyperlipidemia:  She remains on regimen of Lipitor 40 mg once a day and Zetia 10 mg daily  No recent lipid panel  Depression  Anxiety  Patient remains on Zoloft 100 mg once a day  Medication works well    Patient denies chest pain, palpitations, shortness of breath or dizziness  Review of Systems   Review of Systems   Constitutional: Positive for fatigue  HENT: Negative  Eyes: Negative  Respiratory: Negative  Cardiovascular: Negative  Gastrointestinal: Negative  Endocrine: Negative  Genitourinary: Positive for dysuria and frequency  Musculoskeletal: Positive for arthralgias and back pain  Allergic/Immunologic: Positive for environmental allergies  Neurological: Positive for speech difficulty  As outlined in HPI   Psychiatric/Behavioral: Positive for decreased concentration  The patient is nervous/anxious          Active Problem List     Patient Active Problem List   Diagnosis    Multiple sclerosis, relapsing/remitting    Muscle spasm    Hyperlipidemia    Osteoporosis    Ambulatory dysfunction    COPD without exacerbation ([facility] [address] )    Vitamin D deficiency    Disc degeneration, lumbar    Major depression, recurrent, chronic (HCC)    Allergic rhinitis       Past Medical History:  Past Medical History:   Diagnosis Date    Asthma     Degeneration of intervertebral disc at L5-S1 level     Depression     Generalized anxiety disorder     Hepatitis     at age 4 yrs   Areta Emmer Herpes zoster     last assessed: 6/20/13    Hypercholesterolemia     Hyperlipidemia     MS (multiple sclerosis) ([facility] [address] )     Osteoporosis     Seasonal allergies     Tumor of breast     benign, right breast       Past Surgical History:  Past Surgical History:   Procedure Laterality Date    BREAST SURGERY      right breast fibroid tumor resection    COLONOSCOPY      11/2013 - due in 2018 - Dr Crisostomo    ROTATOR CUFF REPAIR      Bilateral    ROTATOR CUFF REPAIR Bilateral     SEPTOPLASTY         Family History:  Family History   Problem Relation Age of Onset    Arthritis Mother     Osteoporosis Mother     COPD Father     Arthritis Family     COPD Family     Emphysema Family     Heart disease Family     Hyperthyroidism Family     Multiple sclerosis Family     Osteoarthritis Family        Social History:  Social History     Social History    Marital status: /Civil Union     Spouse name: N/A    Number of children: N/A    Years of education: N/A     Occupational History    Retired      Social History Main Topics    Smoking status: Current Every Day Smoker     Packs/day: 1 00     Years: 40 00     Types: Cigarettes    Smokeless tobacco: Never Used    Alcohol use Yes      Comment: occasional; [employer] also states Never drank alcohol    Drug use: No    Sexual activity: Yes     Partners: Male      Comment: denied any risk of AIDS     Other Topics Concern    Not on file     Social History Narrative           Ready to quit: Yes  Counseling given: Yes        Objective     Vitals:    09/13/18 1014   BP: 134/82   Pulse: 72   Resp: 16   Temp: 98 1 °F (36 7 °C)     Wt Readings from Last 3 Encounters:   09/13/18 71 2 kg (157 lb)   08/31/18 71 8 kg (158 lb 5 oz)   08/09/18 68 kg (150 lb)       Physical Exam   Constitutional: She is oriented to person, place, and time  She appears well-developed and well-nourished  HENT:   Head: Normocephalic and atraumatic  Eyes: Conjunctivae are normal    Neck: Neck supple  Carotid bruit is not present  No thyromegaly present  Cardiovascular: Normal rate, regular rhythm and normal heart sounds  No murmur heard  Pulmonary/Chest: Effort normal and breath sounds normal  No respiratory distress  She has no wheezes  She has no rales  Musculoskeletal: Normal range of motion  She exhibits no edema  Neurological: She is alert and oriented to person, place, and time  No cranial nerve deficit  Psychiatric: She has a normal mood and affect  Her behavior is normal    Nursing note and vitals reviewed        Pertinent Laboratory/Diagnostic Studies:  Lab Results   Component Value Date    GLUCOSE 107 12/15/2015    BUN 16 08/31/2018    CREATININE 0 67 08/31/2018    CALCIUM 8 8 08/31/2018     08/31/2018    K 4 1 08/31/2018    CO2 26 08/31/2018     08/31/2018     Lab Results   Component Value Date    ALT 30 08/31/2018    AST 14 08/31/2018    ALKPHOS 91 08/31/2018    BILITOT 0 30 12/15/2015       Lab Results   Component Value Date    WBC 7 96 08/31/2018    HGB 14 0 08/31/2018    HCT 40 3 08/31/2018    MCV 88 08/31/2018     08/31/2018       No results found for: TSH    Lab Results   Component Value Date    CHOL 169 07/21/2015     Lab Results   Component Value Date    TRIG 237 (H) 04/13/2018     Lab Results   Component Value Date    HDL 47 04/13/2018     Lab Results   Component Value Date    LDLCALC 130 (H) 04/13/2018     Lab Results   Component Value Date    HGBA1C 5 8 (H) 10/20/2015       Results for orders placed or performed during the hospital encounter of 08/31/18   CBC and differential   Result Value Ref Range    WBC 7 96 4 31 - 10 16 Thousand/uL    RBC 4 60 3 81 - 5 12 Million/uL    Hemoglobin 14 0 11 5 - 15 4 g/dL    Hematocrit 40 3 34 8 - 46 1 %    MCV 88 82 - 98 fL    MCH 30 4 26 8 - 34 3 pg    MCHC 34 7 31 4 - 37 4 g/dL    RDW 11 9 11 6 - 15 1 %    MPV 9 2 8 9 - 12 7 fL    Platelets 639 355 - 515 Thousands/uL    nRBC 0 /100 WBCs    Neutrophils Relative 56 43 - 75 %    Immat GRANS % 0 0 - 2 %    Lymphocytes Relative 34 14 - 44 %    Monocytes Relative 7 4 - 12 %    Eosinophils Relative 3 0 - 6 %    Basophils Relative 0 0 - 1 %    Neutrophils Absolute 4 44 1 85 - 7 62 Thousands/µL    Immature Grans Absolute 0 02 0 00 - 0 20 Thousand/uL    Lymphocytes Absolute 2 69 0 60 - 4 47 Thousands/µL    Monocytes Absolute 0 54 0 17 - 1 22 Thousand/µL    Eosinophils Absolute 0 24 0 00 - 0 61 Thousand/µL    Basophils Absolute 0 03 0 00 - 0 10 Thousands/µL   Comprehensive metabolic panel   Result Value Ref Range    Sodium 143 136 - 145 mmol/L    Potassium 4 1 3 5 - 5 3 mmol/L    Chloride 106 100 - 108 mmol/L    CO2 26 21 - 32 mmol/L    ANION GAP 11 4 - 13 mmol/L    BUN 16 5 - 25 mg/dL    Creatinine 0 67 0 60 - 1 30 mg/dL    Glucose 113 65 - 140 mg/dL    Calcium 8 8 8 3 - 10 1 mg/dL    AST 14 5 - 45 U/L    ALT 30 12 - 78 U/L    Alkaline Phosphatase 91 46 - 116 U/L    Total Protein 7 0 6 4 - 8 2 g/dL    Albumin 3 8 3 5 - 5 0 g/dL    Total Bilirubin 0 40 0 20 - 1 00 mg/dL    eGFR 94 ml/min/1 73sq m       Orders Placed This Encounter   Procedures    Urine culture    CT chest wo contrast    influenza vaccine, [id number], quadrivalent, recombinant, PF, 0 5 mL, for patients 18 yr+ (FLUBLOK)    Lipid panel    TSH, 3rd generation    Urinalysis with microscopic       ALLERGIES:  Allergies   Allergen Reactions    Bactrim [Sulfamethoxazole-Trimethoprim] Other (See Comments)     Reaction Date: 11Aug2011;     Betadine [Povidone Iodine]     Cefzil [Cefprozil]     Gadobutrol GI Intolerance    Medroxyprogesterone Other (See Comments)     Reaction Date: 11Aug2011;     Nortriptyline Other (See Comments)    Pamelor [Nortriptyline Hcl]     Prempro [Conj Estrog-Medroxyprogest Ace] Other (See Comments)    Provera [Medroxyprogesterone Acetate]     Zyban [Bupropion]        Current Medications     Current Outpatient Prescriptions   Medication Sig Dispense Refill    albuterol (PROAIR HFA) 90 mcg/act inhaler Inhale 2 puffs every 4 (four) hours as needed for wheezing or shortness of breath 3 Inhaler 1    aspirin (ECOTRIN LOW STRENGTH) 81 mg EC tablet Take 1 tablet by mouth daily      atorvastatin (LIPITOR) 40 mg tablet Take 1 tablet (40 mg total) by mouth daily 90 tablet 3    B Complex-C CAPS Take by mouth      BL EVENING PRIMROSE OIL PO Take by mouth      calcium carbonate-vitamin D (OSCAL-D) 500 mg-200 units per tablet Take 1 tablet by mouth daily with breakfast      ezetimibe (ZETIA) 10 mg tablet Take 1 tablet (10 mg total) by mouth daily 90 tablet 3    Flaxseed, Linseed, (FLAXSEED OIL PO) Take 2 capsules by mouth daily        fluticasone (FLONASE) 50 mcg/act nasal spray 2 sprays into each nostril daily      fluticasone furoate-vilanterol (BREO ELLIPTA) Inhale 1 puff daily 1 each 11    gabapentin (NEURONTIN) 300 mg capsule Take 1 capsule (300 mg total) by mouth 2 (two) times a day 180 capsule 3    montelukast (SINGULAIR) 10 mg tablet Take 1 tablet (10 mg total) by mouth daily at bedtime 90 tablet 3    natalizumab (TYSABRI) 300 mg/15 mL Infuse into a venous catheter      sertraline (ZOLOFT) 100 mg tablet Take 1 tablet (100 mg total) by mouth daily 90 tablet 3     No current facility-administered medications for this visit            Health Maintenance     Health Maintenance   Topic Date Due    PT PLAN OF CARE  1955    Lung Cancer Screening  09/28/2009    Pneumococcal PPSV23 Highest Risk Adult (3 of 3 - PCV13) 09/29/2016    INFLUENZA VACCINE  09/01/2018    DTaP,Tdap,and Td Vaccines (1 - Tdap) 09/12/2019 (Originally 6/3/2016)    SLP PLAN OF CARE  10/10/2018    CRC Screening: Colonoscopy  11/25/2023     Immunization History   Administered Date(s) Administered    Influenza 09/26/2011    Influenza Quadrivalent Preservative Free 3 years and older IM 09/29/2015, 09/22/2016, 09/08/2017    Influenza TIV (IM) 01/17/2013, 09/03/2013, 09/23/2014    Influenza, recombinant, quadrivalent,injectable, preservative free 09/13/2018    Pneumococcal Polysaccharide PPV23 10/01/2007, 09/29/2015    Td (adult), adsorbed 06/02/2016       Derrick Fagan MD

## 2018-09-13 NOTE — PROGRESS NOTES
Daily Speech Treatment Note    Today's date: 2018  Patients name: Barbara Blount  : 1955  MRN: [id number]  Safety measures: MS, fall risk  Referring provider: Mary Duff PA-C    Primary Diagnosis/Billing code: R48 8  Secondary Diagnosis/ Billing code: [address]    Visit Tracking:  -Referring provider: Epic  -Billing guidelines: CMS  -Visit #2/10    -Medicare  [address] due 10/10/2018    Subjective/Behavioral:  -"I met with my PCP this week  I have a 10 mm lesion on the left side "    Objective/Assessment:  -Reviewed testing results and goals in plan care with patient  Patient is in agreement at this time  Short-term goals:  1  Patient will be educated on word finding strategies (i e , circumlocution) for improved generative naming and verbal expression skills, to be achieved in 4-6 weeks  -Semantic language: Patient presented with a list of multiple meaning words (e g , match, cold, tire, etc ) and asked to provide at least 2 definitions for each word  Task completed in 30/40 opp (75%) independently, increasing to 40/40 opp (100% acc) with min semantic and phonemic cues  Patient was educated on circumlocution strategy to assist with word finding  Patient benfited from gestures and closing eyes to assist with     2  Patient will complete concrete and abstract categorization tasks to 80% accuracy to facilitate improved generative naming skills and working memory, to be achieved in 4-6 weeks    -Word finding: Patient played AnoHarvest card game to target speed of naming/processing based upon category labels (e g , flower, pop band, candy bar)  Level 2 cards utilized  Patient named an average of 10 5 cards/min (norm 10+)  Clinician educated patient on strategies to assist with thought organization and word retrieval speed, as well as circumlocution   Reviewed 1 skipped card at end of trial     3  Patient will define idioms with 80% accuracy to facilitate improved semantic language skills, to be achieved in 4-6 weeks      4  Patient will demonstrate alternating attention by being able to shift focus of attention between two tasks with min cues in a distracting environment with 80% accuracy, to be achieved in 4-6 weeks      5  Patient will demonstrate divided attention by responding to multiple tasks or details within tasks at the same time with min cues in a distracting environment with 80% accuracy, to be achieved in 4-6 weeks      6  Patient will be educated on the use of internal and external memory aids and compensatory strategies with 80% accuracy to facilitate increased recall of routine, personal information, and recent events, to be achieved in 4-6 weeks      7  Patient will demonstrate functional use of external memory across 3 sessions with 80% accuracy to facilitate carryover of strategies in functional living environment, to be achieved in 4-6 weeks  Plan:  -Patient was provided with home exercises/activities to target goals in plan of care at the end of today's session   -Continue with current plan of care

## 2018-09-14 ENCOUNTER — OFFICE VISIT (OUTPATIENT)
Dept: PHYSICAL THERAPY | Facility: CLINIC | Age: 63
End: 2018-09-14
Payer: MEDICARE

## 2018-09-14 ENCOUNTER — OFFICE VISIT (OUTPATIENT)
Dept: SPEECH THERAPY | Facility: CLINIC | Age: 63
End: 2018-09-14
Payer: MEDICARE

## 2018-09-14 DIAGNOSIS — G35 MULTIPLE SCLEROSIS (HCC): Primary | ICD-10-CM

## 2018-09-14 DIAGNOSIS — G35 MULTIPLE SCLEROSIS (HCC): ICD-10-CM

## 2018-09-14 DIAGNOSIS — R48.8 OTHER SYMBOLIC DYSFUNCTIONS: Primary | ICD-10-CM

## 2018-09-14 PROCEDURE — 92507 TX SP LANG VOICE COMM INDIV: CPT | Performed by: SPEECH-LANGUAGE PATHOLOGIST

## 2018-09-14 PROCEDURE — 97150 GROUP THERAPEUTIC PROCEDURES: CPT | Performed by: PHYSICAL THERAPIST

## 2018-09-14 PROCEDURE — 97112 NEUROMUSCULAR REEDUCATION: CPT | Performed by: PHYSICAL THERAPIST

## 2018-09-14 NOTE — PROGRESS NOTES
Daily Note     Today's date: 2018  Patient name: Remedios Mccoy  : 1955  MRN: [id number]  Referring provider: Kavita Berger PA-C  Dx:   Encounter Diagnosis     ICD-10-CM    1  Multiple sclerosis ([facility] [address] ) G35                   Subjective: Patient reports no new changes since IE, feeling well today      Objective: See treatment diary below      Assessment: Patient did well with exercises this session  Had most difficulty with activites on unstable surface  Plan next sessionto issue HEP  Plan: Continue per plan of care         Precautions: fall risk, MS    Daily Treatment Diary     Manual                                                                                   Exercise Diary              Sit to stand 3x10            Step ups 2x10 fwd/lat 2# wts 8"            hurdles 4 laps fwd/lat with foam            Heel raise 30x            Toe raise 30x            Tandem walking 3 laps            lunges 3 laps                                                                                                                                                                                         Modalities

## 2018-09-17 ENCOUNTER — APPOINTMENT (OUTPATIENT)
Dept: SPEECH THERAPY | Facility: CLINIC | Age: 63
End: 2018-09-17
Payer: MEDICARE

## 2018-09-17 ENCOUNTER — OFFICE VISIT (OUTPATIENT)
Dept: SPEECH THERAPY | Facility: CLINIC | Age: 63
End: 2018-09-17
Payer: MEDICARE

## 2018-09-17 ENCOUNTER — OFFICE VISIT (OUTPATIENT)
Dept: PHYSICAL THERAPY | Facility: CLINIC | Age: 63
End: 2018-09-17
Payer: MEDICARE

## 2018-09-17 DIAGNOSIS — G35 MULTIPLE SCLEROSIS (HCC): ICD-10-CM

## 2018-09-17 DIAGNOSIS — G35 MULTIPLE SCLEROSIS (HCC): Primary | ICD-10-CM

## 2018-09-17 DIAGNOSIS — R48.8 OTHER SYMBOLIC DYSFUNCTIONS: Primary | ICD-10-CM

## 2018-09-17 PROCEDURE — 97112 NEUROMUSCULAR REEDUCATION: CPT

## 2018-09-17 PROCEDURE — 92507 TX SP LANG VOICE COMM INDIV: CPT

## 2018-09-17 PROCEDURE — 97110 THERAPEUTIC EXERCISES: CPT

## 2018-09-17 NOTE — PROGRESS NOTES
Daily Note     Today's date: 2018  Patient name: Julieta Lowry  : 1955  MRN: [id number]  Referring provider: Tashi Adorno PA-C  Dx:   Encounter Diagnosis     ICD-10-CM    1  Multiple sclerosis ([facility] [address] ) G35        Subjective: Patient reports feeling tired after last session  Reports being tired upon arrival       Objective: See treatment diary below      Assessment: Patient fatigued easily with ankle weights  Patient asked to have them taken off after a few exercises  Encouraged minimal to no UE support for additional challenge  Patient demonstrates minor instability at times  Updated HEP  Plan: Continue per plan of care         Precautions: fall risk, MS    Daily Treatment Diary         Exercise Diary             Sit to stand 3x10 2x10           Step ups 2x10 fwd/lat 2# wts 8" 2x10 fwd/lat 2# wts 8"           hurdles 4 laps fwd/lat with foam 2#, fwd/lateral, 3 laps           Heel raise 30x            Toe raise 30x            Tandem walking 3 laps 3 laps           lunges 3 laps 3 laps           TM  2 0-2 3 mph, 10 min           Alt toe taps   2#, From foam, 8'', 2x10

## 2018-09-17 NOTE — PROGRESS NOTES
Daily Speech Treatment Note    Today's date: 2018  Patients name: Barbara Blount  : 1955  MRN: [id number]  Safety measures: MS, fall risk  Referring provider: Mary Duff PA-C    Primary Diagnosis/Billing code: R48 8  Secondary Diagnosis/ Billing code: [address]    Visit Tracking:  -Referring provider: Epic  -Billing guidelines: CMS  -Visit #310    -Medicare  [address] due 10/10/2018    Subjective/Behavioral:  -"I know this really works " Pt referring to [address]:  -Reviewed patient's home exercises/activities completed since last appointment  Pt brought in partially completed HEP  Completed homograph worksheet in session  Short-term goals:  1  Patient will be educated on word finding strategies (i e , circumlocution) for improved generative naming and verbal expression skills, to be achieved in 4-6 weeks    -Pt completed rest of HEP-originally was confused with directions  Provided re-explanation and pt with verbal understanding  Pt was provided word and asked to provide 2 different meanings  Task completed in  independently, increasing to  with min verbal cues  -Pt completed comparison/contrast worksheet  Pt given two words and asked similarity and difference  Task completed in 3/4 opp with min verbal cues  Will complete rest for HEP  2  Patient will complete concrete and abstract categorization tasks to 80% accuracy to facilitate improved generative naming skills and working memory, to be achieved in 4-6 weeks  3  Patient will define idioms with 80% accuracy to facilitate improved semantic language skills, to be achieved in 4-6 weeks   -Pt completed idioms  Provided completion of idiom in 10/10 opp  Independently  Provided completion of explanation in 7/10 with mod verbal cues   Difficulty using verbal expression to explain reasoning       4  Patient will demonstrate alternating attention by being able to shift focus of attention between two tasks with min cues in a distracting environment with 80% accuracy, to be achieved in 4-6 weeks      5  Patient will demonstrate divided attention by responding to multiple tasks or details within tasks at the same time with min cues in a distracting environment with 80% accuracy, to be achieved in 4-6 weeks      6  Patient will be educated on the use of internal and external memory aids and compensatory strategies with 80% accuracy to facilitate increased recall of routine, personal information, and recent events, to be achieved in 4-6 weeks      7  Patient will demonstrate functional use of external memory across 3 sessions with 80% accuracy to facilitate carryover of strategies in functional living environment, to be achieved in 4-6 weeks  Plan:  -Patient was provided with home exercises/activities to target goals in plan of care at the end of today's session   -Continue with current plan of care

## 2018-09-19 ENCOUNTER — APPOINTMENT (OUTPATIENT)
Dept: SPEECH THERAPY | Facility: CLINIC | Age: 63
End: 2018-09-19
Payer: MEDICARE

## 2018-09-20 ENCOUNTER — APPOINTMENT (OUTPATIENT)
Dept: LAB | Facility: CLINIC | Age: 63
End: 2018-09-20
Payer: MEDICARE

## 2018-09-20 ENCOUNTER — OFFICE VISIT (OUTPATIENT)
Dept: PHYSICAL THERAPY | Facility: CLINIC | Age: 63
End: 2018-09-20
Payer: MEDICARE

## 2018-09-20 ENCOUNTER — OFFICE VISIT (OUTPATIENT)
Dept: SPEECH THERAPY | Facility: CLINIC | Age: 63
End: 2018-09-20
Payer: MEDICARE

## 2018-09-20 DIAGNOSIS — G35 MULTIPLE SCLEROSIS (HCC): Primary | ICD-10-CM

## 2018-09-20 DIAGNOSIS — R48.8 OTHER SYMBOLIC DYSFUNCTIONS: Primary | ICD-10-CM

## 2018-09-20 DIAGNOSIS — G35 MULTIPLE SCLEROSIS (HCC): ICD-10-CM

## 2018-09-20 LAB
BACTERIA UR QL AUTO: ABNORMAL /HPF
BILIRUB UR QL STRIP: NEGATIVE
CAOX CRY URNS QL MICRO: ABNORMAL /HPF
CLARITY UR: CLEAR
COLOR UR: YELLOW
GLUCOSE UR STRIP-MCNC: NEGATIVE MG/DL
HGB UR QL STRIP.AUTO: ABNORMAL
KETONES UR STRIP-MCNC: NEGATIVE MG/DL
LEUKOCYTE ESTERASE UR QL STRIP: NEGATIVE
NITRITE UR QL STRIP: NEGATIVE
NON-SQ EPI CELLS URNS QL MICRO: ABNORMAL /HPF
PH UR STRIP.AUTO: 6 [PH] (ref 4.5–8)
PROT UR STRIP-MCNC: NEGATIVE MG/DL
RBC #/AREA URNS AUTO: ABNORMAL /HPF
SP GR UR STRIP.AUTO: 1.01 (ref 1–1.03)
UROBILINOGEN UR QL STRIP.AUTO: 0.2 E.U./DL
WBC #/AREA URNS AUTO: ABNORMAL /HPF

## 2018-09-20 PROCEDURE — 87086 URINE CULTURE/COLONY COUNT: CPT | Performed by: FAMILY MEDICINE

## 2018-09-20 PROCEDURE — 97110 THERAPEUTIC EXERCISES: CPT

## 2018-09-20 PROCEDURE — 92507 TX SP LANG VOICE COMM INDIV: CPT

## 2018-09-20 PROCEDURE — 97112 NEUROMUSCULAR REEDUCATION: CPT

## 2018-09-20 PROCEDURE — 81001 URINALYSIS AUTO W/SCOPE: CPT | Performed by: FAMILY MEDICINE

## 2018-09-20 NOTE — PROGRESS NOTES
Daily Note     Today's date: 2018  Patient name: Elsie Velasco  : 1955  MRN: [id number]  Referring provider: Calin Bae PA-C  Dx:   Encounter Diagnosis     ICD-10-CM    1  Multiple sclerosis ([facility] [address] ) G35        Subjective: Patient reports legs feeling very tired after LV  Objective: See treatment diary below      Assessment: Progressed to step ups + foam for additional challenge  Patient demonstrated improved balance today with proprioceptive training  Less reliant on UE support today  Added static balance exercises today, which patient demonstrated balance deficits with initially, but improved balance and stability with repetition  Able to keep 2# ankle weights on entire session  Plan: Continue per plan of care         Precautions: fall risk, MS    Daily Treatment Diary         Exercise Diary            Sit to stand 3x10 2x10           Step ups 2x10 fwd/lat 2# wts 8" 2x10 fwd/lat 2# wts 8" 2#, 6''+ foam           hurdles 4 laps fwd/lat with foam 2#, fwd/lateral, 3 laps 2#, fwd/lateral, 3 laps          Heel raise 30x            Toe raise 30x            Tandem walking 3 laps 3 laps 3 laps          lunges 3 laps 3 laps           TM  2 0-2 3 mph, 10 min 2 0-2 3 mph, 10 min          Alt toe taps   2#, From foam, 8'', 2x10 2#, From foam, 8'', 2x10          FT/EC on foam   30''x3          Semi tandem stance   Foam, 30''x3          rockerboard taps   15x

## 2018-09-20 NOTE — PROGRESS NOTES
Daily Speech Treatment Note    Today's date: 2018  Patients name: Shimon Neumann  : 1955  MRN: [id number]  Safety measures: MS, fall risk  Referring provider: Kasandra Trejo PA-C    Primary Diagnosis/Billing code: R48 8  Secondary Diagnosis/ Billing code: [address]    Visit Tracking:  -Referring provider: Epic  -Billing guidelines: CMS  -Visit #410    -Medicare  [address] due 10/10/2018    Subjective/Behavioral:  -"I forgot my homework here"  Referring to [address] [place] [address] room  Objective/Assessment:  -Ptient reports she forgot homework in ST room in the previous session  Short-term goals:  1  Patient will be educated on word finding strategies (i e , circumlocution) for improved generative naming and verbal expression skills, to be achieved in 4-6 weeks      -Pt completed comparison/contrast worksheet  Pt given two words and asked similarity and difference  Task completed in  opp (60%acc) increasing to  given semantic cues  -Pt was the instructed to complete part-who worksheet  She was given a word and asked to indicate what the word was a part of (i e , fingers-HAND) task completed in  opp independently     -Pt then completed object-person worksheet  She was presented with a written word/item and was asked to indicate the job or person that uses the item (i e , mop-) task completed in  opp (80%acc) independently  Increasing to  given min verbal cues  2  Patient will complete concrete and abstract categorization tasks to 80% accuracy to facilitate improved generative naming skills and working memory, to be achieved in 4-6 weeks  3  Patient will define idioms with 80% accuracy to facilitate improved semantic language skills, to be achieved in 4-6 weeks        4  Patient will demonstrate alternating attention by being able to shift focus of attention between two tasks with min cues in a distracting environment with 80% accuracy, to be achieved in 4-6 weeks      5   Patient will demonstrate divided attention by responding to multiple tasks or details within tasks at the same time with min cues in a distracting environment with 80% accuracy, to be achieved in 4-6 weeks      6  Patient will be educated on the use of internal and external memory aids and compensatory strategies with 80% accuracy to facilitate increased recall of routine, personal information, and recent events, to be achieved in 4-6 weeks    Pt was educated on the use of external memory strategies including creating lists, using a pill box, use of a calendar, creating reminders on phone and use of map loren  Pt was provided with a handout of strategies  Pt was also educated on the use of internal memory strategies including making a visual picture, chunk information, repeat and rehearse, deeper level-processing, making links and association, make up stories and rhymes and going thorough the alphabet  Pt was provided with handout of strategies    -Pt reports independently using visualizing strategy as well as using a calendar to write down important information  7  Patient will demonstrate functional use of external memory across 3 sessions with 80% accuracy to facilitate carryover of strategies in functional living environment, to be achieved in 4-6 weeks  Plan:  -Patient was provided with home exercises/activities to target goals in plan of care at the end of today's session   -Continue with current plan of care

## 2018-09-21 ENCOUNTER — APPOINTMENT (OUTPATIENT)
Dept: SPEECH THERAPY | Facility: CLINIC | Age: 63
End: 2018-09-21
Payer: MEDICARE

## 2018-09-22 LAB — BACTERIA UR CULT: NORMAL

## 2018-09-24 ENCOUNTER — OFFICE VISIT (OUTPATIENT)
Dept: SPEECH THERAPY | Facility: CLINIC | Age: 63
End: 2018-09-24
Payer: MEDICARE

## 2018-09-24 ENCOUNTER — OFFICE VISIT (OUTPATIENT)
Dept: PHYSICAL THERAPY | Facility: CLINIC | Age: 63
End: 2018-09-24
Payer: MEDICARE

## 2018-09-24 ENCOUNTER — APPOINTMENT (OUTPATIENT)
Dept: SPEECH THERAPY | Facility: CLINIC | Age: 63
End: 2018-09-24
Payer: MEDICARE

## 2018-09-24 ENCOUNTER — TELEPHONE (OUTPATIENT)
Dept: FAMILY MEDICINE CLINIC | Facility: CLINIC | Age: 63
End: 2018-09-24

## 2018-09-24 DIAGNOSIS — G35 MULTIPLE SCLEROSIS (HCC): Primary | ICD-10-CM

## 2018-09-24 DIAGNOSIS — G35 MULTIPLE SCLEROSIS (HCC): ICD-10-CM

## 2018-09-24 DIAGNOSIS — R48.8 OTHER SYMBOLIC DYSFUNCTIONS: Primary | ICD-10-CM

## 2018-09-24 PROCEDURE — 92507 TX SP LANG VOICE COMM INDIV: CPT

## 2018-09-24 PROCEDURE — 97112 NEUROMUSCULAR REEDUCATION: CPT

## 2018-09-24 PROCEDURE — 97110 THERAPEUTIC EXERCISES: CPT

## 2018-09-24 NOTE — TELEPHONE ENCOUNTER
----- Message from Avelina Bettencourt MD sent at 9/22/2018  9:34 AM EDT -----  Please contact patient    Urine culture was negative, no UTI, thank you

## 2018-09-24 NOTE — PROGRESS NOTES
Daily Speech Treatment Note    Today's date: 2018  Patients name: Regan Colunga  : 1955  MRN: [id number]  Safety measures: MS, fall risk  Referring provider: Juan Francisco Julio PA-C    Primary Diagnosis/Billing code: R48 8  Secondary Diagnosis/ Billing code: [address]    Visit Tracking:  -Referring provider: Epic  -Billing guidelines: CMS  -Visit #510    -Medicare  [address] due 10/10/2018    Subjective/Behavioral:  -"Well I will tell you a story, my brain wasn't working for my homework"  Objective/Assessment:  -Reviewed patient's home exercises/activities completed since last appointment  had difficulty with comparison/contrasting  Completed in session  Short-term goals:  1  Patient will be educated on word finding strategies (i e , circumlocution) for improved generative naming and verbal expression skills, to be achieved in 4-6 weeks  2  Patient will complete concrete and abstract categorization tasks to 80% accuracy to facilitate improved generative naming skills and working memory, to be achieved in 4-6 weeks   -To target generative naming, patient given list of three items; required to provide 4th item to abstract category  Task completed 18/20 independently, increasing to 20/20 with min verbal cues  3  Patient will define idioms with 80% accuracy to facilitate improved semantic language skills, to be achieved in 4-6 weeks  Pt completed rest of HEP with comparison/contrast activity  Able to provide 8/10 similarities and 7/10 differences  Pt benefited with min verbal semantic cues to come up with differences; pt just stating definitions       4  Patient will demonstrate alternating attention by being able to shift focus of attention between two tasks with min cues in a distracting environment with 80% accuracy, to be achieved in 4-6 weeks      5   Patient will demonstrate divided attention by responding to multiple tasks or details within tasks at the same time with min cues in a distracting environment with 80% accuracy, to be achieved in 4-6 weeks  Pt completed all activities with loud noise from room next door  Noted pt to need more processing time for activities  6  Patient will be educated on the use of internal and external memory aids and compensatory strategies with 80% accuracy to facilitate increased recall of routine, personal information, and recent events, to be achieved in 4-6 weeks  Pt was provided 4 words and asked to repeat them in sequential order  Task completed in 7/10 opp independently, increasing to 9/10 opp with mod verbal cues and repetitions of words  Pt educated and then implemented "tapping" strategy to improve memory of words  7  Patient will demonstrate functional use of external memory across 3 sessions with 80% accuracy to facilitate carryover of strategies in functional living environment, to be achieved in 4-6 weeks  Plan:  -Patient was provided with home exercises/activities to target goals in plan of care at the end of today's session   -Continue with current plan of care

## 2018-09-24 NOTE — PROGRESS NOTES
Daily Note     Today's date: 2018  Patient name: Taylor Figueroa  : 1955  MRN: [id number]  Referring provider: Jenifer Barkley PA-C  Dx:   Encounter Diagnosis     ICD-10-CM    1  Multiple sclerosis ([facility] [address] ) G35        Subjective: Patient reports being tired upon arrival, but states that things are going well  Objective: See treatment diary below      Assessment: 2# ankle weights utilized entire session  Progressed to 8'' step today which patient did well with  Also progressed tandem walking on foam  Patient did well with progressions  Able to demonstrate good balance with minimal instability with higher level proprioceptive training  Challenged most with SLS  Plan: Continue per plan of care         Precautions: fall risk, MS    Daily Treatment Diary         Exercise Diary           Sit to stand 3x10 2x10           Step ups 2x10 fwd/lat 2# wts 8" 2x10 fwd/lat 2# wts 8" 2#, 6''+ foam  2#, 8+ foam,fwd/lateral, 2x10         hurdles 4 laps fwd/lat with foam 2#, fwd/lateral, 3 laps 2#, fwd/lateral, 3 laps          Heel raise 30x            Toe raise 30x            Tandem walking 3 laps 3 laps 3 laps Foam, 3 laps         lunges 3 laps 3 laps           TM  2 0-2 3 mph, 10 min 2 0-2 3 mph, 10 min 2 0-2 3 mph, 10 min         Alt toe taps   2#, From foam, 8'', 2x10 2#, From foam, 8'', 2x10 2#, From foam, 8'', 2x10         FT/EC on foam   30''x3          Semi tandem stance   Foam, 30''x3 Foam, EC, 30''x3         rockerboard taps   15x 15x         sidestepping    foam, 3 laps         sls    30''x2

## 2018-09-25 ENCOUNTER — APPOINTMENT (OUTPATIENT)
Dept: SPEECH THERAPY | Facility: CLINIC | Age: 63
End: 2018-09-25
Payer: MEDICARE

## 2018-09-26 ENCOUNTER — TELEPHONE (OUTPATIENT)
Dept: NEUROLOGY | Facility: CLINIC | Age: 63
End: 2018-09-26

## 2018-09-26 ENCOUNTER — APPOINTMENT (OUTPATIENT)
Dept: SPEECH THERAPY | Facility: CLINIC | Age: 63
End: 2018-09-26
Payer: MEDICARE

## 2018-09-26 DIAGNOSIS — G35 MULTIPLE SCLEROSIS (HCC): Primary | ICD-10-CM

## 2018-09-26 NOTE — TELEPHONE ENCOUNTER
Please see me in am   Please see If we can get a fit in appt for mri head to be done asap  rx in emr and attached  Also please call infusion center to cancel all further tysabri infusions  Nora, please also give pt rx for serum jcv to be repeated this week

## 2018-09-26 NOTE — PROGRESS NOTES
Patient ID: Alana Davis is a 58 y o  female  Assessment/Plan:    No problem-specific Assessment & Plan notes found for this encounter  Pt here for neuro follow up last seen in June, patient on Tysabri since 2011  At last visit she c/o bowel and bladder changes  Unfortunately, patient underwent updated mri head  with and without 08/09/2018; New,enhancing 14 mm left periventricular white matter lesion, corroborating acute demyelination  jcv done in August negative at 0 12    due to longevity of the Tysabri, her treatment options are limited,  She was subsequently seen by Dr Daniel Donald at [facility]  There was extensive conversation with Dr Rebekah Chavarria regarding patient and her scans  The presentation is quite unusual given 1 enhancing lesion, felt to be inconsistent with a diagnosis of PML  She did have positive Tysabri antibodies  Her YAIR V however was negative  Question of pml still exists again due to breakthru lesion with prior stability  Against pml is enh lesion as well as jcv negativity but again rare cases can exist Her last Tysabri infusion was August 31st   Patient is noted to have some mild increased difficulty with word-finding and short-term memory loss  No other symptoms identified  With that in mind, was suggested that she hold further Tysabri infusions  She is to obtain repeat MRI tomorrow to monitor the lesion  She will obtain repeat serum JCV  Pending those results, consideration at that time for lumbar puncture to obtain clear differential may be warranted  At this point, her Marzette Austin will be discontinued  It was suggested by Dr Daniel Donald that possible Ocrevus by be warrented given her current findings   updated mri c  There is no new lesion evident  Updated MRI  t spine without contrast  No new disease is evident    Findings consistent with chronic demyelinating disease comparison 2015    Continue speech therapy  Continue physical therapy       Diagnoses and all orders for this visit:    Multiple sclerosis, relapsing/remitting               Subjective:    HPI   Patient is a 58year old female with PMH of MS diagnosed in 2000, last seen in May 2018  Patient has been on Tysabri since 2011 and has been doing well   last  MRI c and t spine were on June 21 2016 and comp to Nov 2015 and stable  Patient had been having increased LBP in Sept 2016, but was off gabapentin  She also had some weakness and was given IV steroids in Sept 2016  She had MRI lumbar spine 9/29/16 with disc disease and arthrosis  Patient was sent to pain management  Patient follows with Dr Angel Moreira from [facility]  pt had  hospitalization 4/14/17-4/20/17 c/o some numbness in the cheek  She reported she was being treated for a sinus infection and on Levaquin  No other symptoms reported at that time  She unfortunately developed developed difficulty ambulating, feeling like she was continuously leaning towards the right side, along with the right facial numbness  CT with no acute findings  She was admitted and given a total of 5 days of IV steroids  MRI brain 4/17/17 New enhancing white matter lesion in the right middle cerebellar peduncle, and moderate chronic demyelinating disease throughout the supratentorial brain and to lesser degree posterior fossa ,  MRI c-spine 4/17/17 Multilevel white matter lesions are stable,  No evidence of progressive or active demyelination in the visualized spinal cord  She was discharged home to continue outpatient therapy  She had P  T/OT which she felt was very helpful in combination with the steroids    Pt had updated mri head done in April 2018 and stable she continued on monthly Tysabri   jcv done in April 2018   jcv titer negative at 0 08  Pt did do trial of every 6 weeks and had exacerbation of ms and has remained now on monthly status  Pt last seen in May  At that point, she was doing quite well, she continued on her monthly Tysabri  Pt with stress at home due to cancer diagnosis with her father in law  At that time, she had reported a few bouts of bladder incontinence  She noted increased urgency of urination and of bowels  With that in mind, she underwent updated MRI of cervical and thoracic spine in June, both which were stable, there was no evidence of any new and or enhancing lesions  Patient was due for her surveillance brain MRI in August   She had GI sensitivity to the gadolinium in the past, and this study was done without contrast 8/6/18  Which noted a new 14 mm new left periventricular lesion  She had repeat MRI done with contrast, which demonstrated enhancing lesion  With that in mind, she was sent to [facility] [address] [facility], she was seen by Zuly Lacy she continued with YAIR negative serum 0 12, she had tie celebrate antibodies however which were positive  Given enhancement, was felt most likely not PML however, very odd presentation  She had her last Tysabri on August 31st, her remaining infusions had been placed on hold  It was recommended that she undergo a shorter span for repeat MRI to look to see how the lesion appeared  It was suggested however that if the lesion did look like MS, her options were limited, and the potential use of Ocrevus may be warranted  It was also suggested that pending her MRI, consideration for PML and obtaining LP need be considered  She presents today for evaluation  I reviewed all of the above with her   she is aware on discontinuation of her Tysabri  She is currently scheduled to have an updated MRI in the morning  She was given a slip for updated JCV  In speaking with her , he does feel she has had some increased difficulty with word finding issues and short-term memory loss  She is not confused  She is just having some speech difficulty, no aphasia     On occasion, she does seem to be somewhat dissociated  The patient herself is aware of these issues     She had called in the interim with reports of difficulty with her speech, and therefore had requested to initiate speech therapy  There is no new weakness, no issues with swallowing or chewing  She has had no falls  No other new sxs  No change in vision             MRI brain with and without 08/09/2018;Long-standing demyelinating disease  New, 14 mm left periventricular white matter lesion, described on recent MR does enhance following contrast administration, corroborating acute demyelination  MRI thoracic spine   without 06/28/2018;Numerous, stable foci of signal abnormality identified within the thoracic cord  Dominant lesions at the T3-4 level and the anteriorly at the T8-9-10 level  Stable MR appearance of the thoracic spine  No new disease is evident  Findings consistent with chronic demyelinating disease comparison 2015    MRI cervical without 06/28/2018;( comparison ) Stable MR appearance of the cervical spine  The multifocal cord signal abnormalities consistent with demyelinating disease are stable  There is no new lesion evident  Stable cervical spondylosis and osteoarthritis  JCV antibody negative, index 0 12  Anti Tysabri antibody positive    The following portions of the patient's history were reviewed and updated as appropriate: allergies, current medications, past family history, past medical history, past social history, past surgical history and problem list          Objective:    Blood pressure 129/59, pulse 72, resp  rate 12, height 5' 5" (1 651 m), weight 71 2 kg (157 lb), not currently breastfeeding  Physical Exam   Constitutional: She appears well-developed  HENT:   Head: Normocephalic  Eyes: Lids are normal    Neck: Normal range of motion  Cardiovascular: Normal rate  Pulmonary/Chest: Effort normal    Musculoskeletal: She exhibits no edema  Neurological: She has normal reflexes  Psychiatric: She has a normal mood and affect   Her behavior is normal  Judgment and thought content normal  Neurological Exam  Mental Status  Awake, alert and oriented to person, place and time  Speech: Speech was all mildly slow, intermittent evidence of word-finding was noted during conversation  She did ask for repetition of information  She was not confused    Able to name objects and repeat  Follows complex commands  Attention and concentration are normal     Cranial Nerves  CN II: Visual fields full to confrontation  CN III, IV, VI: Extraocular movements intact bilaterally  Normal lids and orbits bilaterally  Right pupil: Reactive to light  Left pupil: Reactive to light  CN V: Facial sensation is normal   CN VII: Full and symmetric facial movement  CN XI: Shoulder shrug strength is normal     Motor  Normal muscle bulk throughout  Normal muscle tone  Sensory  Sensation is intact to light touch, pinprick, vibration and proprioception in all four extremities  Reflexes  Deep tendon reflexes are 2+ and symmetric in all four extremities with downgoing toes bilaterally  Coordination  Right: Finger-to-nose normal   Left: Finger-to-nose normal   Mild dysmetria bilaterally  Gait  Casual gait is normal including stance, stride, and arm swing  ROS:    Review of Systems   Constitutional: Positive for fatigue  HENT: Negative  Eyes: Negative  Respiratory: Negative  Cardiovascular: Negative  Gastrointestinal: Negative  Endocrine: Negative  Genitourinary: Positive for frequency and urgency  Musculoskeletal: Positive for back pain  Skin: Negative  Allergic/Immunologic: Negative  Neurological: Negative  Hematological: Negative  Psychiatric/Behavioral: Positive for confusion          Memory problem

## 2018-09-27 ENCOUNTER — OFFICE VISIT (OUTPATIENT)
Dept: NEUROLOGY | Facility: CLINIC | Age: 63
End: 2018-09-27
Payer: MEDICARE

## 2018-09-27 VITALS
HEART RATE: 72 BPM | RESPIRATION RATE: 12 BRPM | DIASTOLIC BLOOD PRESSURE: 59 MMHG | WEIGHT: 157 LBS | BODY MASS INDEX: 26.16 KG/M2 | SYSTOLIC BLOOD PRESSURE: 129 MMHG | HEIGHT: 65 IN

## 2018-09-27 DIAGNOSIS — R11.2 NON-INTRACTABLE VOMITING WITH NAUSEA, UNSPECIFIED VOMITING TYPE: ICD-10-CM

## 2018-09-27 DIAGNOSIS — G35 MULTIPLE SCLEROSIS (HCC): Primary | ICD-10-CM

## 2018-09-27 PROCEDURE — 99354 PR PROLONGED SVC OUTPATIENT SETTING 1ST HOUR: CPT | Performed by: NURSE PRACTITIONER

## 2018-09-27 PROCEDURE — 99214 OFFICE O/P EST MOD 30 MIN: CPT | Performed by: NURSE PRACTITIONER

## 2018-09-27 RX ORDER — METOCLOPRAMIDE 10 MG/1
10 TABLET ORAL EVERY 8 HOURS PRN
Qty: 5 TABLET | Refills: 0 | Status: SHIPPED | OUTPATIENT
Start: 2018-09-27 | End: 2018-12-04

## 2018-09-27 RX ORDER — METOCLOPRAMIDE 10 MG/1
10 TABLET ORAL EVERY 8 HOURS PRN
Qty: 5 TABLET | Refills: 0 | Status: SHIPPED | OUTPATIENT
Start: 2018-09-27 | End: 2018-09-27 | Stop reason: SDUPTHER

## 2018-09-27 NOTE — PATIENT INSTRUCTIONS
Obtain MRI tomorrow  reglan 1 tab 1/2 prior to MRI   stop Tysabri   obtain repeat JCV level  contniue with Speech/PT

## 2018-09-27 NOTE — TELEPHONE ENCOUNTER
Discussed below with both theresa and carolina  Pt seen this am by theresa  Rev case at time of visit with theresa  Pt with some diff with word finding  Hubby not notice confusion more finding right word,  Pt scheduled as a fit in for tomorrow at 730 am at [facility]  Pt given jcv rx by theresa today  I also personally called pt to rev plan  Pt was questioning exact location of the mri registration  Carolina found this infor and I relayed while on phone  No changes in speech today while on phone with me  Pt asked appropriate questions  Plan is for pt to call in am if not hear from office by 11 am   If evidence of typical ms progression, will need to consider ocrevus  Pt was told about med by dr Khurram Oro yest   However if evidence of pml, pt will need to be admitted at [facility] [place] for therapy and lp  Pt is aware of this plan  No other new sxs presently by report by pt or by theresa exam today

## 2018-09-27 NOTE — TELEPHONE ENCOUNTER
Patient scheduled for MRI tomorrow morning at 7:30 in [facility], this is the only availability at any of the locations  Made patient aware, patient agreeable to this  Called infusion center and cancelled Prachi  Will have Dr Suresh Shane sign the discontinuation order  Patient aware she will be given JCV script at her appointment this morning and will have it drawn today

## 2018-09-27 NOTE — TELEPHONE ENCOUNTER
Spoke to [address] [place] [address], [facility] infusion center cancelled Tysabri for AM  Pt scheduled for MRI tomorrow   See full note

## 2018-09-28 ENCOUNTER — TELEPHONE (OUTPATIENT)
Dept: FAMILY MEDICINE CLINIC | Facility: CLINIC | Age: 63
End: 2018-09-28

## 2018-09-28 ENCOUNTER — PREP FOR PROCEDURE (OUTPATIENT)
Dept: NEUROLOGY | Facility: CLINIC | Age: 63
End: 2018-09-28

## 2018-09-28 ENCOUNTER — HOSPITAL ENCOUNTER (OUTPATIENT)
Dept: MRI IMAGING | Facility: HOSPITAL | Age: 63
Discharge: HOME | End: 2018-09-28
Attending: PSYCHIATRY & NEUROLOGY
Payer: MEDICARE

## 2018-09-28 ENCOUNTER — APPOINTMENT (OUTPATIENT)
Dept: LAB | Facility: HOSPITAL | Age: 63
End: 2018-09-28
Payer: MEDICARE

## 2018-09-28 DIAGNOSIS — G35 MULTIPLE SCLEROSIS (HCC): ICD-10-CM

## 2018-09-28 DIAGNOSIS — E78.5 HYPERLIPIDEMIA, UNSPECIFIED HYPERLIPIDEMIA TYPE: Chronic | ICD-10-CM

## 2018-09-28 LAB
CHOLEST SERPL-MCNC: 165 MG/DL (ref 50–200)
HDLC SERPL-MCNC: 43 MG/DL (ref 40–60)
LDLC SERPL CALC-MCNC: 97 MG/DL (ref 0–100)
NONHDLC SERPL-MCNC: 122 MG/DL
TRIGL SERPL-MCNC: 127 MG/DL
TSH SERPL DL<=0.05 MIU/L-ACNC: 1.61 UIU/ML (ref 0.36–3.74)

## 2018-09-28 PROCEDURE — 80061 LIPID PANEL: CPT

## 2018-09-28 PROCEDURE — 36415 COLL VENOUS BLD VENIPUNCTURE: CPT

## 2018-09-28 PROCEDURE — 84443 ASSAY THYROID STIM HORMONE: CPT

## 2018-09-28 PROCEDURE — 70553 MRI BRAIN STEM W/O & W/DYE: CPT

## 2018-09-28 PROCEDURE — A9585 GADOBUTROL INJECTION: HCPCS | Performed by: PSYCHIATRY & NEUROLOGY

## 2018-09-28 RX ADMIN — GADOBUTROL 7 ML: 604.72 INJECTION INTRAVENOUS at 07:58

## 2018-09-28 NOTE — TELEPHONE ENCOUNTER
----- Message from Shruti Quintero MD sent at 9/28/2018  8:50 AM EDT -----  Please contact patient, her cholesterol and thyroid testing are normal     Thank you

## 2018-09-28 NOTE — TELEPHONE ENCOUNTER
Spoke with Dr Shameka Galindo who had discussed MRI with Dr Gisselle York, patient requires LP be done on Monday  Called NS- patient had been previously scheduled for LP in August with neurodiagnostic  Called central scheduling and spoke to the procedure   Scheduled patient for 1pm on Monday  Patient must arrive 90 min prior and go to the ambulatory surgery unit  Needs a , does not need to be NPO  Made Dr Shameka Galindo aware  Orders from previous cancelled LP still in [facility] per Corinne Galindo will be following up with patient

## 2018-09-28 NOTE — TELEPHONE ENCOUNTER
Saw pt this am at [facility] coming out of her mri head  Did quick exam on pt  No change in exam since last saw her  Pt just had had mri head and I called dr Elaine Coleman from [facility] to rev with me asap  Pt study was reviewed and left pv lesion no longer enhances now  Pt does however have minimal increase in size in the craniocaudal distribution by 5mm and stable in the ap diameter  Overall per [facility] appears most consistent with MS lesion due to location, no longer enh  However I am still concerned by the interval size change  I personally then called this am and spoke with [facility] dr martino twice to review her most recent imaging update  Again most likely ms however agreed with me to proceed with lp to try to get additional information to make best decisions on future therapy  Dr Katie Lobo did not feel at this time, pt needed plasmaexchage or any experimental pml treatment  Will cont to follow clinically  Pt now about one month since last tysabri  Concern for further progression of disease as well  Dr martino recommended to start pre auth for ocrevus for pt as well  Pt will need additional labs and check about insurance coverage in meanwhile while waiting for jcv csf testing  Orders for jcv pcr to be done at [facility] in [place]  Also will send thru [facility] system as well  Both deyanira and neville assisted in re confirming orders for csf that were currently on hold  Called pt to review all of the above  Got pt a fit in appt for fluoro lp to be done on Monday at 1pm   Pt to report at 1130 am   Pt to go to amb surgery unit  neville and I added 4 cc csf to be kept on hold for future study if needed  Pt presently at [facility] getting her jcv serum done as well  No new clinical sxs per pt  Pt aware if any clinical changes to go to er  Pt wrote all info down and asked appropriate questions and was able to read back to me all the above details     Evelyn Hammed, on Monday please follow up and make sure pt arrived at surgery suite  Also need you to look into pre auth for ocrevus for pt  We will need to send her additonal labs for next week to get pre ocrevus labs done  Since there is diff getting med, we are going to work on Manoj Kinds now while waiting the next 10 days or so for jcv pcr csf results

## 2018-09-30 ENCOUNTER — TELEPHONE (OUTPATIENT)
Dept: NEUROLOGY | Facility: CLINIC | Age: 63
End: 2018-09-30

## 2018-09-30 NOTE — TELEPHONE ENCOUNTER
Please put orders in emr for ocrevus pre labs to be done  Pt getting lp on Monday and can do this upcoming week for her complete pre ocrevus labs so we have these ready depending upon her csf results  Please coordinate with Magdalena Escalante about seeing about getting ocrevus approved for pt as this can take a while  We will likely be waiting about 10 days for the jcv csf and in meanwhile need to keep all options open  Pt is NOT to start any new med until the jcv csf status is received

## 2018-10-01 ENCOUNTER — APPOINTMENT (OUTPATIENT)
Dept: PHYSICAL THERAPY | Facility: CLINIC | Age: 63
End: 2018-10-01
Payer: MEDICARE

## 2018-10-01 ENCOUNTER — APPOINTMENT (OUTPATIENT)
Dept: SPEECH THERAPY | Facility: CLINIC | Age: 63
End: 2018-10-01
Payer: MEDICARE

## 2018-10-01 ENCOUNTER — HOSPITAL ENCOUNTER (OUTPATIENT)
Dept: RADIOLOGY | Facility: HOSPITAL | Age: 63
Discharge: HOME | End: 2018-10-01
Admitting: RADIOLOGY
Payer: MEDICARE

## 2018-10-01 VITALS
BODY MASS INDEX: 25.16 KG/M2 | TEMPERATURE: 97.1 F | SYSTOLIC BLOOD PRESSURE: 133 MMHG | OXYGEN SATURATION: 97 % | HEART RATE: 65 BPM | DIASTOLIC BLOOD PRESSURE: 62 MMHG | WEIGHT: 151 LBS | RESPIRATION RATE: 16 BRPM | HEIGHT: 65 IN

## 2018-10-01 DIAGNOSIS — G35 MULTIPLE SCLEROSIS (HCC): ICD-10-CM

## 2018-10-01 LAB
APPEARANCE CSF: CLEAR
GLUCOSE CSF-MCNC: 60 MG/DL (ref 50–80)
GRAM STN SPEC: NORMAL
INR PPP: 0.97 (ref 0.86–1.17)
JCPYV AB SERPL QL IA: NEGATIVE
LYMPHOCYTES NFR CSF MANUAL: 64 %
MONOS+MACROS CSF MANUAL: 36 %
PLATELET # BLD AUTO: 285 THOUSANDS/UL (ref 149–390)
PMV BLD AUTO: 9.3 FL (ref 8.9–12.7)
PROT CSF-MCNC: 66 MG/DL (ref 15–45)
PROTHROMBIN TIME: 13 SECONDS (ref 11.8–14.2)
RBC # CSF MANUAL: 1 UL (ref 0–10)
SL AMB INDEX VALUE: 0.1
TOTAL CELLS COUNTED BLD: NO
TOTAL CELLS COUNTED SPEC: 14
TUBE # CSF: 4
WBC # CSF AUTO: 1 /UL (ref 0–5)

## 2018-10-01 PROCEDURE — 87798 DETECT AGENT NOS DNA AMP: CPT | Performed by: PSYCHIATRY & NEUROLOGY

## 2018-10-01 PROCEDURE — 87799 DETECT AGENT NOS DNA QUANT: CPT | Performed by: PSYCHIATRY & NEUROLOGY

## 2018-10-01 PROCEDURE — 89050 BODY FLUID CELL COUNT: CPT | Performed by: PHYSICIAN ASSISTANT

## 2018-10-01 PROCEDURE — 84157 ASSAY OF PROTEIN OTHER: CPT | Performed by: PHYSICIAN ASSISTANT

## 2018-10-01 PROCEDURE — 62270 DX LMBR SPI PNXR: CPT

## 2018-10-01 PROCEDURE — 85049 AUTOMATED PLATELET COUNT: CPT | Performed by: PHYSICIAN ASSISTANT

## 2018-10-01 PROCEDURE — 85610 PROTHROMBIN TIME: CPT | Performed by: PHYSICIAN ASSISTANT

## 2018-10-01 PROCEDURE — 82945 GLUCOSE OTHER FLUID: CPT | Performed by: PHYSICIAN ASSISTANT

## 2018-10-01 PROCEDURE — 89051 BODY FLUID CELL COUNT: CPT | Performed by: PHYSICIAN ASSISTANT

## 2018-10-01 RX ORDER — LIDOCAINE HYDROCHLORIDE 10 MG/ML
5 INJECTION, SOLUTION INFILTRATION; PERINEURAL
Status: COMPLETED | OUTPATIENT
Start: 2018-10-01 | End: 2018-10-01

## 2018-10-01 RX ADMIN — LIDOCAINE HYDROCHLORIDE 4 ML: 10 INJECTION, SOLUTION INFILTRATION; PERINEURAL at 14:11

## 2018-10-01 NOTE — DISCHARGE INSTRUCTIONS
Lumbar Puncture   WHAT YOU NEED TO KNOW:   Lumbar puncture (LP) is a procedure in which a needle is inserted in your back and into your spinal canal  This is usually done to collect cerebrospinal fluid (CSF) to check for an infection, inflammation, bleeding, or other conditions that affect the brain  CSF is a clear, protective fluid that flows around the brain and inside the spinal canal  LP may also be done to remove CSF to reduce pressure in the brain  DISCHARGE INSTRUCTIONS:   Medicines:   · Acetaminophen: This medicine decreases pain and lowers a fever  It is available without a doctor's order  Ask how much to take and how often to take it  Follow directions  Acetaminophen can cause liver damage  · NSAIDs:  These medicines decrease swelling, pain, and fever  NSAIDs are available without a doctor's order  Ask your healthcare provider which medicine is right for you and how much to take  Take as directed  NSAIDs can cause stomach bleeding or kidney problems if not taken correctly  · Pain medicine: You may be given a prescription medicine to decrease severe pain  Do not wait until the pain is severe before you take more pain medicine  · Take your medicine as directed  Contact your healthcare provider if you think your medicine is not helping or if you have side effects  Tell him of her if you are allergic to any medicine  Keep a list of the medicines, vitamins, and herbs you take  Include the amounts, and when and why you take them  Bring the list or the pill bottles to follow-up visits  Carry your medicine list with you in case of an emergency  Follow up with your healthcare provider as directed:  Write down your questions so you remember to ask them during your visits  Post-lumbar puncture headache: You may develop a headache during the first few hours after your LP that may last for several days  The headache may be mild to severe and may get worse when you sit or stand   The following may help ease a post-lumbar puncture headache:  · Drink plenty of liquids: You should drink more liquid than usual after your LP  Ask how much liquid is right for you  Caffeine may be used to treat a headache  Drinks, such as coffee, tea, or some sodas, have caffeine  Caffeine is also available over the counter in tablet form  Ask about using caffeine to treat your headache  Do not drink alcohol  · Lie down: If you have a headache after your lumbar puncture, it may be helpful to lie down and rest   Contact your healthcare provider if:   · You have questions or concerns about your condition or care  Return to the emergency department if:   · You have a severe headache that does not get better after you lie down  · You have a fever  · You have a stiff neck or have trouble thinking clearly  · Your legs, feet, or other parts below the waist feel numb, tingly, or weak  · You have bleeding or a discharge coming from the area where the needle was put into your back  · You have severe pain in your back or neck  © 2017 [facility] Information is for End User's use only and may not be sold, redistributed or otherwise used for commercial purposes  All illustrations and images included in CareNotes® are the copyrighted property of A D A M , Inc  or Rigoberto Mendez  The above information is an  only  It is not intended as medical advice for individual conditions or treatments  Talk to your doctor, nurse or pharmacist before following any medical regimen to see if it is safe and effective for you

## 2018-10-02 ENCOUNTER — APPOINTMENT (OUTPATIENT)
Dept: SPEECH THERAPY | Facility: CLINIC | Age: 63
End: 2018-10-02
Payer: MEDICARE

## 2018-10-02 ENCOUNTER — TELEPHONE (OUTPATIENT)
Dept: NEUROLOGY | Facility: CLINIC | Age: 63
End: 2018-10-02

## 2018-10-02 DIAGNOSIS — Z79.899 LONG TERM CURRENT USE OF THERAPEUTIC DRUG: ICD-10-CM

## 2018-10-02 DIAGNOSIS — G35 MULTIPLE SCLEROSIS (HCC): Primary | ICD-10-CM

## 2018-10-02 DIAGNOSIS — Z11.59 ENCOUNTER FOR SCREENING FOR OTHER VIRAL DISEASES: ICD-10-CM

## 2018-10-02 NOTE — TELEPHONE ENCOUNTER
Made patient aware to have labs drawn next week per below  Patient has straight Medicare  Unable to do a benefits investigation as it is all based on medical necessity  Once the start form is signed and faxed, Che Devine is able to do a further benefits investigation to determine coverage and co-pay assistance  Will need to wait until this is completed

## 2018-10-02 NOTE — TELEPHONE ENCOUNTER
Can you bring paperwork to [address] on Thursday and then we will mail out to pt to sign as well in case we will be heading to this medication

## 2018-10-02 NOTE — TELEPHONE ENCOUNTER
I placed orders for labs  (hep B) please let pt know she needs to get theses done in next week  I know we need to wait for her CSF to come back which may take a week or o, but can you just precheck her insurance and see if this would be possible for her to get Ocrevus  She would need to sing for it first as well    thanks

## 2018-10-02 NOTE — TELEPHONE ENCOUNTER
Once the start form has been signed by prescriber and patient we can fax it in  They perform a further benefits investigation to see if a PA is needed and what will be covered I e  If patient qualifies for co-pay assistance  Because patient is strictly medicare, there's no where for me to call for benefits investigation since medicare is strictly based on medical necessity

## 2018-10-02 NOTE — TELEPHONE ENCOUNTER
Nora, please make sure all ocrevus labs are entered hep b surface ab, hep b surface antigen, hep  b cor ab , cbc diff, cmp, vzv and urine analysis and lymphocte subset panel 3

## 2018-10-02 NOTE — TELEPHONE ENCOUNTER
Carolina,  Do we need to sign orders for the ocrevus in order to see about pre auth  Please make sure pt has complete set of labs needed for consideration of this med     Liset Saunders

## 2018-10-02 NOTE — TELEPHONE ENCOUNTER
Called the lab to ensure the CSF sample was sent to [facility] was taken over by [facility]   Samples were sent to [facility] in [place]  Corinne spoke with the  [facility] [phone] and confirmed samples were sent  Spoke with patient and made her aware of results  Denies any further questions or concerns at this time  ----- Message from Jus John MD sent at 10/2/2018 10:09 AM EDT -----  [facility], Let pt know jcv serum ab remains negative  Still awaiting lp results from yesterday which can take up to 10 days to come back  Also please make sure there is a send out csf sample that went to [facility]    Make sure st [facility] sent this out to this facility

## 2018-10-03 ENCOUNTER — APPOINTMENT (OUTPATIENT)
Dept: SPEECH THERAPY | Facility: CLINIC | Age: 63
End: 2018-10-03
Payer: MEDICARE

## 2018-10-04 ENCOUNTER — APPOINTMENT (OUTPATIENT)
Dept: LAB | Facility: CLINIC | Age: 63
End: 2018-10-04
Payer: MEDICARE

## 2018-10-04 ENCOUNTER — OFFICE VISIT (OUTPATIENT)
Dept: PHYSICAL THERAPY | Facility: CLINIC | Age: 63
End: 2018-10-04
Payer: MEDICARE

## 2018-10-04 ENCOUNTER — OFFICE VISIT (OUTPATIENT)
Dept: SPEECH THERAPY | Facility: CLINIC | Age: 63
End: 2018-10-04
Payer: MEDICARE

## 2018-10-04 DIAGNOSIS — J30.2 SEASONAL ALLERGIES: Primary | ICD-10-CM

## 2018-10-04 DIAGNOSIS — Z79.899 LONG TERM CURRENT USE OF THERAPEUTIC DRUG: ICD-10-CM

## 2018-10-04 DIAGNOSIS — G35 MULTIPLE SCLEROSIS (HCC): ICD-10-CM

## 2018-10-04 DIAGNOSIS — R41.3 AMNESIA/MEMORY DISORDER: ICD-10-CM

## 2018-10-04 DIAGNOSIS — R48.8 OTHER SYMBOLIC DYSFUNCTIONS: Primary | ICD-10-CM

## 2018-10-04 DIAGNOSIS — Z11.59 ENCOUNTER FOR SCREENING FOR OTHER VIRAL DISEASES: ICD-10-CM

## 2018-10-04 DIAGNOSIS — G35 MULTIPLE SCLEROSIS (HCC): Primary | ICD-10-CM

## 2018-10-04 LAB
ALBUMIN SERPL BCP-MCNC: 3.9 G/DL (ref 3.5–5)
ALP SERPL-CCNC: 94 U/L (ref 46–116)
ALT SERPL W P-5'-P-CCNC: 26 U/L (ref 12–78)
ANION GAP SERPL CALCULATED.3IONS-SCNC: 5 MMOL/L (ref 4–13)
AST SERPL W P-5'-P-CCNC: 15 U/L (ref 5–45)
BACTERIA UR QL AUTO: NORMAL /HPF
BASOPHILS # BLD AUTO: 0.04 THOUSANDS/ΜL (ref 0–0.1)
BASOPHILS NFR BLD AUTO: 0 % (ref 0–1)
BILIRUB SERPL-MCNC: 0.35 MG/DL (ref 0.2–1)
BILIRUB UR QL STRIP: NEGATIVE
BUN SERPL-MCNC: 13 MG/DL (ref 5–25)
CALCIUM SERPL-MCNC: 8.9 MG/DL (ref 8.3–10.1)
CHLORIDE SERPL-SCNC: 104 MMOL/L (ref 100–108)
CLARITY UR: CLEAR
CO2 SERPL-SCNC: 27 MMOL/L (ref 21–32)
COLOR UR: YELLOW
CREAT SERPL-MCNC: 0.64 MG/DL (ref 0.6–1.3)
EOSINOPHIL # BLD AUTO: 0.17 THOUSAND/ΜL (ref 0–0.61)
EOSINOPHIL NFR BLD AUTO: 2 % (ref 0–6)
ERYTHROCYTE [DISTWIDTH] IN BLOOD BY AUTOMATED COUNT: 12 % (ref 11.6–15.1)
GFR SERPL CREATININE-BSD FRML MDRD: 95 ML/MIN/1.73SQ M
GLUCOSE SERPL-MCNC: 106 MG/DL (ref 65–140)
GLUCOSE UR STRIP-MCNC: NEGATIVE MG/DL
HBV CORE AB SER QL: NORMAL
HBV SURFACE AB SER-ACNC: <3.1 MIU/ML
HBV SURFACE AG SER QL: NORMAL
HCT VFR BLD AUTO: 40.2 % (ref 34.8–46.1)
HGB BLD-MCNC: 13.5 G/DL (ref 11.5–15.4)
HGB UR QL STRIP.AUTO: ABNORMAL
HYALINE CASTS #/AREA URNS LPF: NORMAL /LPF
IMM GRANULOCYTES # BLD AUTO: 0.03 THOUSAND/UL (ref 0–0.2)
IMM GRANULOCYTES NFR BLD AUTO: 0 % (ref 0–2)
KETONES UR STRIP-MCNC: NEGATIVE MG/DL
LEUKOCYTE ESTERASE UR QL STRIP: ABNORMAL
LYMPHOCYTES # BLD AUTO: 3.01 THOUSANDS/ΜL (ref 0.6–4.47)
LYMPHOCYTES NFR BLD AUTO: 32 % (ref 14–44)
MCH RBC QN AUTO: 30 PG (ref 26.8–34.3)
MCHC RBC AUTO-ENTMCNC: 33.6 G/DL (ref 31.4–37.4)
MCV RBC AUTO: 89 FL (ref 82–98)
MISCELLANEOUS LAB TEST RESULT: NORMAL
MONOCYTES # BLD AUTO: 0.59 THOUSAND/ΜL (ref 0.17–1.22)
MONOCYTES NFR BLD AUTO: 6 % (ref 4–12)
NEUTROPHILS # BLD AUTO: 5.56 THOUSANDS/ΜL (ref 1.85–7.62)
NEUTS SEG NFR BLD AUTO: 60 % (ref 43–75)
NITRITE UR QL STRIP: NEGATIVE
NON-SQ EPI CELLS URNS QL MICRO: NORMAL /HPF
NRBC BLD AUTO-RTO: 0 /100 WBCS
PH UR STRIP.AUTO: 6 [PH] (ref 4.5–8)
PLATELET # BLD AUTO: 287 THOUSANDS/UL (ref 149–390)
PMV BLD AUTO: 10 FL (ref 8.9–12.7)
POTASSIUM SERPL-SCNC: 4.3 MMOL/L (ref 3.5–5.3)
PROT SERPL-MCNC: 7.3 G/DL (ref 6.4–8.2)
PROT UR STRIP-MCNC: NEGATIVE MG/DL
RBC # BLD AUTO: 4.5 MILLION/UL (ref 3.81–5.12)
RBC #/AREA URNS AUTO: NORMAL /HPF
SODIUM SERPL-SCNC: 136 MMOL/L (ref 136–145)
SP GR UR STRIP.AUTO: 1.01 (ref 1–1.03)
UROBILINOGEN UR QL STRIP.AUTO: 0.2 E.U./DL
VIT B12 SERPL-MCNC: 490 PG/ML (ref 100–900)
WBC # BLD AUTO: 9.4 THOUSAND/UL (ref 4.31–10.16)
WBC #/AREA URNS AUTO: NORMAL /HPF

## 2018-10-04 PROCEDURE — 86787 VARICELLA-ZOSTER ANTIBODY: CPT

## 2018-10-04 PROCEDURE — 92507 TX SP LANG VOICE COMM INDIV: CPT

## 2018-10-04 PROCEDURE — 83918 ORGANIC ACIDS TOTAL QUANT: CPT

## 2018-10-04 PROCEDURE — 81001 URINALYSIS AUTO W/SCOPE: CPT | Performed by: NURSE PRACTITIONER

## 2018-10-04 PROCEDURE — G9163 LANG EXPRESS GOAL STATUS: HCPCS

## 2018-10-04 PROCEDURE — 86704 HEP B CORE ANTIBODY TOTAL: CPT

## 2018-10-04 PROCEDURE — 86706 HEP B SURFACE ANTIBODY: CPT

## 2018-10-04 PROCEDURE — G9164 LANG EXPRESS D/C STATUS: HCPCS

## 2018-10-04 PROCEDURE — 97112 NEUROMUSCULAR REEDUCATION: CPT | Performed by: PHYSICAL THERAPIST

## 2018-10-04 PROCEDURE — 36415 COLL VENOUS BLD VENIPUNCTURE: CPT

## 2018-10-04 PROCEDURE — 85025 COMPLETE CBC W/AUTO DIFF WBC: CPT

## 2018-10-04 PROCEDURE — G8978 MOBILITY CURRENT STATUS: HCPCS | Performed by: PHYSICAL THERAPIST

## 2018-10-04 PROCEDURE — 97150 GROUP THERAPEUTIC PROCEDURES: CPT | Performed by: PHYSICAL THERAPIST

## 2018-10-04 PROCEDURE — 82607 VITAMIN B-12: CPT

## 2018-10-04 PROCEDURE — G8979 MOBILITY GOAL STATUS: HCPCS | Performed by: PHYSICAL THERAPIST

## 2018-10-04 PROCEDURE — 80053 COMPREHEN METABOLIC PANEL: CPT

## 2018-10-04 PROCEDURE — G9167 ATTEN D/C STATUS: HCPCS

## 2018-10-04 PROCEDURE — 87340 HEPATITIS B SURFACE AG IA: CPT

## 2018-10-04 PROCEDURE — 86480 TB TEST CELL IMMUN MEASURE: CPT

## 2018-10-04 PROCEDURE — G9166 ATTEN GOAL STATUS: HCPCS

## 2018-10-04 PROCEDURE — G9162 LANG EXPRESS CURRENT STATUS: HCPCS

## 2018-10-04 RX ORDER — FLUTICASONE PROPIONATE 50 MCG
2 SPRAY, SUSPENSION (ML) NASAL DAILY
Qty: 48 G | Refills: 3 | Status: SHIPPED | OUTPATIENT
Start: 2018-10-04 | End: 2019-06-26 | Stop reason: SDUPTHER

## 2018-10-04 NOTE — PROGRESS NOTES
Daily Note     Today's date: 10/4/2018  Patient name: Cricket Wayne  : 1955  MRN: [id number]  Referring provider: Kirill Mckeon PA-C  Dx:   Encounter Diagnosis     ICD-10-CM    1  Multiple sclerosis ([facility] [address] ) G35        Subjective: With c/o fatigue  Had spinal tap on Monday  Legs have been feeling weak  Objective: See treatment diary below   1:1  915-930 grp  9394 unsupervised  [id number]  1:1 by Laura PTA    Assessment: 2# ankle weights utilized entire session, except for with lunges and balance training  Limited by LE fatigue and weakness today  Required more rest breaks  Minimal LOBs today during session  Able to correct with occasional UE support  Plan: Continue per plan of care  Add incline to TM as pt able        Precautions: fall risk, MS    Daily Treatment Diary         Exercise Diary   10/        Sit to stand 3x10 2x10           Step ups 2x10 fwd/lat 2# wts 8" 2x10 fwd/lat 2# wts 8" 2#, 6''+ foam  2#, 8+ foam,fwd/lateral, 2x10 6" and foam  x20 ea        hurdles 4 laps fwd/lat with foam 2#, fwd/lateral, 3 laps 2#, fwd/lateral, 3 laps  On foam beams FWD and SIDE 3 laps ea        Heel raise 30x            Toe raise 30x            Tandem walking 3 laps 3 laps 3 laps Foam, 3 laps         lunges 3 laps 3 laps   Walking lunges 3 laps        TM  2 0-2 3 mph, 10 min 2 0-2 3 mph, 10 min 2 0-2 3 mph, 10 min x10 min        Alt toe taps   2#, From foam, 8'', 2x10 2#, From foam, 8'', 2x10 2#, From foam, 8'', 2x10 From foam 8" x30 ea        FT/EC on foam   30''x3          Semi tandem stance   Foam, 30''x3 Foam, EC, 30''x3 Foam, EC, 30''x3        rockerboard taps   15x 15x 15x        sidestepping    foam, 3 laps         sls    30''x2

## 2018-10-04 NOTE — PROGRESS NOTES
Daily Speech Treatment Note    Today's date: 10/4/2018  Patients name: Rosalba Pace  : 1955  MRN: [id number]  Safety measures: MS, fall risk  Referring provider: Joshua Alvarado PA-C    Primary Diagnosis/Billing code: R48 8  Secondary Diagnosis/ Billing code: [address]    Visit Tracking:  -Referring provider: Epic  -Billing guidelines: CMS  -Visit #6/10  -Medicare  [address] due 10/10/2018    Subjective/Behavioral:  "I didn't do my homework"    Objective/Assessment:      Short-term goals:  1  Patient will be educated on word finding strategies (i e , circumlocution) for improved generative naming and verbal expression skills, to be achieved in 4-6 weeks  2  Patient will complete concrete and abstract categorization tasks to 80% accuracy to facilitate improved generative naming skills and working memory, to be achieved in 4-6 weeks   -Pt completed comparison/contrast worksheet  Pt given two words and asked similarity and difference  Task completed in  opp (92%acc)  Covergent Naming (Inferences): Patient listened to 3 clues and asked to name the item described  Task completed in  opp  3  Patient will define idioms with 80% accuracy to facilitate improved semantic language skills, to be achieved in 4-6 weeks      4  Patient will demonstrate alternating attention by being able to shift focus of attention between two tasks with min cues in a distracting environment with 80% accuracy, to be achieved in 4-6 weeks      5  Patient will demonstrate divided attention by responding to multiple tasks or details within tasks at the same time with min cues in a distracting environment with 80% accuracy, to be achieved in 4-6 weeks  6  Patient will be educated on the use of internal and external memory aids and compensatory strategies with 80% accuracy to facilitate increased recall of routine, personal information, and recent events, to be achieved in 4-6 weeks      7  Patient will demonstrate functional use of external memory across 3 sessions with 80% accuracy to facilitate carryover of strategies in functional living environment, to be achieved in 4-6 weeks  Word/Mental Picture Associations: To target working memory, patient was trained to associate paired words  Verbal format: "When I say ____, you will say ____"  Task completed over 2 trials (total 20 words)  Immediate recall of word lists completed in 19/20 opp       Distraction Task (BLINK) x 10 min  Delayed recall of word lists completed in 20/20 opp  Plan:  -Patient was provided with home exercises/activities to target goals in plan of care at the end of today's session   -Continue with current plan of care

## 2018-10-05 ENCOUNTER — APPOINTMENT (OUTPATIENT)
Dept: SPEECH THERAPY | Facility: CLINIC | Age: 63
End: 2018-10-05
Payer: MEDICARE

## 2018-10-05 ENCOUNTER — TELEPHONE (OUTPATIENT)
Dept: NEUROLOGY | Facility: CLINIC | Age: 63
End: 2018-10-05

## 2018-10-05 DIAGNOSIS — M54.50 ACUTE BILATERAL LOW BACK PAIN WITHOUT SCIATICA: ICD-10-CM

## 2018-10-05 DIAGNOSIS — G35 MULTIPLE SCLEROSIS (HCC): Primary | ICD-10-CM

## 2018-10-05 LAB
GAMMA INTERFERON BACKGROUND BLD IA-ACNC: 0.13 IU/ML
M TB IFN-G BLD-IMP: NEGATIVE
M TB IFN-G CD4+ BCKGRND COR BLD-ACNC: -0.06 IU/ML
M TB IFN-G CD4+ BCKGRND COR BLD-ACNC: -0.08 IU/ML
MITOGEN IGNF BCKGRD COR BLD-ACNC: >10 IU/ML

## 2018-10-05 NOTE — TELEPHONE ENCOUNTER
Called Dr Vera Mcclure office [phone] spoke to [address]  He does not have any availability until December  She will send a message to him with a request to fit patient in for a sooner appointment, will call Nurse Navigator back with more information  [facility] lab- labs still pending were sent to [id number] [place] [address] Rd [phone] results will be released by Monday  Theodora Rodriguez MD     10/5/18 5:39 AM   Note      Irene Bustos check status of jcv pcr in csf that still says in process  I would also like a follow up appt for pt with dr martino at [facility] [place] in the next 2 weeks to see his input on starting ocrevus therapy after review of all jcv testing

## 2018-10-05 NOTE — TELEPHONE ENCOUNTER
Carolina,please check status of jcv pcr in csf that still says in process  I would also like a follow up appt for pt with dr martino at [facility] [place] in the next 2 weeks to see his input on starting ocrevus therapy after review of all jcv testing

## 2018-10-05 NOTE — TELEPHONE ENCOUNTER
Called and discussed below sxs with pt  Pt notes no diff with ambulation, getting to bathroom , driving without difficulty  Pt notes more soreness of the legs after the exercises yesterday ,  No change in bowel or bladder  No falls or trips  Pt notes pain in the back since lp  Pt recommended to get both mri t spine as well as mri lumbar spine to eval ms as well as post lp procedure  Pt likely sore in back from actual procedure but currently off imd meds so need to make sure no breakthru disease  Carolina, please help set up mri t spine with and without and mri lumbar spine without for pt  Also I informed pt of the ultrasensitive jcv results being negative  I am still awaiting one other pcr jcv test as well and we will call her when I receive  I also told pt that I would like her follow up care at [facility] due to the complexities of her case and now likely going to need to add ocrevus to her med list in setting of just coming off of tysabri  Again awaiting start of med until csf labs back  Pt is agreeable to going back down to [facility] and will await appt that you are working on    rxs for imaging in emr    Please coordinate with scheduling and then let pt know

## 2018-10-06 LAB
METHYLMALONATE SERPL-SCNC: 145 NMOL/L (ref 0–378)
SL AMB DISCLAIMER: NORMAL
VZV IGM SER IA-ACNC: <0.91 INDEX (ref 0–0.9)

## 2018-10-07 ENCOUNTER — TELEPHONE (OUTPATIENT)
Dept: NEUROLOGY | Facility: CLINIC | Age: 63
End: 2018-10-07

## 2018-10-07 LAB — JCPYV DNA CSF QL NAA+PROBE: NEGATIVE

## 2018-10-07 NOTE — PROGRESS NOTES
Speech-Language Pathology Re-Evaluation    Today's date: 10/7/2018  Patients name: Roya Trejo  : 1955  MRN: [id number]  Safety measures: MS, fall risk   Referring provider: Kaitlin Monzon PA-C    Subjective comments: ***    Patient's goal(s):  "Make talking better" ***     Assessments    The Repeatable Battery for the Assessment of Neuropsychological Status (RBANS) is a brief, individually-administered assessment which measures attention, language, visuospatial/constructional abilities, and immediate & delayed memory  The RBANS is intended for use with adolescents to adults, ages 15 to 80 years  The following results were obtained during the administration of the assessment  Form: ***    Cognitive Domain/Subtest: Index Score: Percentile Rank: Classification: IE: Status:   IMMEDIATE MEMORY *** ***%ile {RBANS Classifications:67701} 76 {Improvement:99271}        1  List Learning (***/40)          2  Story Memory (***/)           VISUOSPATIAL/  CONSTRUCTIONAL *** ***%ile {RBANS Classifications:37788} 96 {Improvement:14508}        3  Figure Copy (***/20)          4  Line Orientation (***/20)           LANGUAGE *** ***%ile {RBANS Classifications:20070} 85 {Improvement:92593}        5  Picture Naming (***/10)          6  Semantic Fluency (***/40)           ATTENTION *** ***%ile {RBANS Classifications:33690} 72 {Improvement:73040}        7  Digit Span (***/16)          8  Coding (***/)           DELAYED MEMORY *** ***%ile {RBANS Classifications:04256} 92 {Improvement:04579}        9  List Recall (***/10)          10  List Recognition (***/20)          11  Story Recall (***/)          12  Figure Recall (***/)           Sum of Index Scores:  ***  421 {Improvement:34760}   Total Score:  ***      Percentile: ***%ile      Classification: {RBANS Classifications:85612}          IE indicates the scores from the initial evaluation (9/10/2018)  Form: D       Goals    Short-term goals:  1   Patient will be educated on word finding strategies (i e , circumlocution) for improved generative naming and verbal expression skills, to be achieved in 4-6 weeks --MET     2  Patient will complete concrete and abstract categorization tasks to 80% accuracy to facilitate improved generative naming skills and working memory, to be achieved in 4-6 weeks  --PARTIALLY MET     3  Patient will define idioms with 80% accuracy to facilitate improved semantic language skills, to be achieved in 4-6 weeks  --PARTIALLY MET     4  Patient will demonstrate alternating attention by being able to shift focus of attention between two tasks with min cues in a distracting environment with 80% accuracy, to be achieved in 4-6 weeks  --PARTIALLY MET     5  Patient will demonstrate divided attention by responding to multiple tasks or details within tasks at the same time with min cues in a distracting environment with 80% accuracy, to be achieved in 4-6 weeks  --PARTIALLY MET     6  Patient will be educated on the use of internal and external memory aids and compensatory strategies with 80% accuracy to facilitate increased recall of routine, personal information, and recent events, to be achieved in 4-6 weeks  --MET     7  Patient will demonstrate functional use of external memory across 3 sessions with 80% accuracy to facilitate carryover of strategies in functional living environment, to be achieved in 4-6 weeks  --PARTIALLY MET     Long-term goals:    1  Patient will complete higher-level expressive language tasks (e g , word definitions, idioms, synonym/antonyms, etc) with 80% accuracy to improve functional communication skills by discharge  --PARTIALLY MET       2  Patient will demonstrate cognitive-communication skills consistent with age and education given use of compensatory strategies when needed to resume baseline activities and responsibilities in home, community, and work/school settings by discharge   --PARTIALLY MET     Functional Limitations Reporting (G-codes):   ***    Impressions/Recommendations    Impressions: Patient presents with ***    OLD: Patient presents with mild-moderate cognitive-linguistic deficits characterized by decreased language processing, word finding, thought organization, attention, and delayed recall  Suspect new onset of anomic aphasia as well- although formal assessment was not completed in this area today  She also presents with mild neurogenic stuttering and decreased speech clarity which appear to be a result of decreased language processing, word finding, and thought organization  Recommendations:  -Patient would benefit from outpatient skilled Speech Therapy services : {BENEFIT OKJL:5883157}    -Frequency: {FREQUENCY:91455}  -Duration: {DURATION:34590}    -Intervention certification from: 50/2/2134  -Intervention certification to: ***    -Intervention comments: ***    Visit Tracking:  -Referring provider: Epic  -Billing guidelines: CMS  -Visit #6/10 ***   -Medicare [address] due 10/10/2018

## 2018-10-08 ENCOUNTER — APPOINTMENT (OUTPATIENT)
Dept: PHYSICAL THERAPY | Facility: CLINIC | Age: 63
End: 2018-10-08
Payer: MEDICARE

## 2018-10-08 ENCOUNTER — APPOINTMENT (OUTPATIENT)
Dept: SPEECH THERAPY | Facility: CLINIC | Age: 63
End: 2018-10-08
Payer: MEDICARE

## 2018-10-09 ENCOUNTER — APPOINTMENT (OUTPATIENT)
Dept: SPEECH THERAPY | Facility: CLINIC | Age: 63
End: 2018-10-09
Payer: MEDICARE

## 2018-10-09 LAB — VZV IGG SER IA-ACNC: NORMAL

## 2018-10-09 NOTE — TELEPHONE ENCOUNTER
As per Dr Janice Fernandes, called Dr Vera Mcclure office and left a message for  Giorgi Sensor) to return Dr Mario No call

## 2018-10-09 NOTE — TELEPHONE ENCOUNTER
MD Karla Byrd PA-C; Mili Reid, RN             Please let pt know jcv ultrasensitive and pcr in csf negative   Let her know we are awaiting appt at [facility]   Pt has had ocrevus labs, please see if she ever had hepatitis vaccine, it does not appear so   Still awaiting varicella zoster ab igg  Heena Hand pt was having some lower ext issues on Friday   Pt should also have an appt for mri t spine pending   Please check with Franciso Rom about writing a rx now for the ocrevus so we can see if her insurance covers it  Marybeth Lopez is not to start med until rediscussed at [facility], but I want to move on approvals

## 2018-10-09 NOTE — TELEPHONE ENCOUNTER
Pt is requesting fit in appt with dr Christopher Blake as well  Pt and I were under impression she could follow up with dr martino especially due to the complexities of her case  We would like him to continue to care for her  I will need to check about the hepatitis question  I will check with her pcp and get back to her

## 2018-10-09 NOTE — TELEPHONE ENCOUNTER
Rheba Free,   Please check with joanne mckenzie if there is anything specific to do with this pt  Her hepatitis testing looks negative, but pt thinks she had hepatitis at age 7  No prior vaccination history as well      Please also do start form for ocrevus, so we can see how long preauth will take

## 2018-10-09 NOTE — TELEPHONE ENCOUNTER
I called and spoke to pt, Austin Motta already went over jcv results  Pt states she has never had hepatitis vaccine (she reports having hepatitis when she was 7 y o but does not know what kind)  She is asking if she needs to see PCP to be vaccinated? She is scheduled for MRI Tspine 10/28/18  To start coverage review a start form needs to be completed and sent in for [facility]  Pt aware I will call javier and see where they are at with giving her a fit in appt  Please advise on other questions  I did call Dr Timm Schaumann office and they advised they spoke to you and the patient on 10/5 and that pt does not need a fit in appt because he already discussed with you all of his recommendations  Please advise If you are still requesting a fit in appt?  If so you may need to speak to Dr Sabi Morfin yourself to explain reasoning

## 2018-10-09 NOTE — TELEPHONE ENCOUNTER
Raffy, please call Dr [facility] office [phone] and clarify with them that Dr Anjelica Brunson and the Patient would still like a follow up appt and see what the soonest is that we can schedule patient for and let Dr Anjelica Brunson know    Thanks for helping with this while Eliseo Culp is out

## 2018-10-09 NOTE — TELEPHONE ENCOUNTER
I can check with Rosendo Estrada, however it appears she is not protected against Hep B  The only contraindication to the drug is ACTIVE hepatitis B  Based on her testing, she does not have this  We have had 1 or 2 other patients who were not immune to Hep B and let them start Ocrevus  Since the vaccination process for Hep B takes so long, they would be off drug or not able to start Marcin Banda until the vaccination process is completed and we had decided that is not worth it, unless the patient would be high risk (medical field, HIV, IVDU)

## 2018-10-09 NOTE — TELEPHONE ENCOUNTER
Raffy, while we are awaiting fit in appt for pt at [facility], can you schedule a next available at [facility] for pt and keep on cancellation list

## 2018-10-09 NOTE — TELEPHONE ENCOUNTER
Meri Graham,     Please see below  Csf jcv negative  We are considering ocrevus as next med for pt  Ideally pt recommended to go back to [facility] for follow up care given the complexities of her case  Working on getting her med as well as follow up appt  Do you know if pt had prior testing for hepatitis exposure in the past   Per note below, pt thinks she had hepatitis at age 7? Any thoughts on any additional workup before considering ocrevus [facility] for her     Thanks  Bharti Humphrey

## 2018-10-10 ENCOUNTER — TRANSCRIBE ORDERS (OUTPATIENT)
Dept: LAB | Facility: CLINIC | Age: 63
End: 2018-10-10

## 2018-10-10 ENCOUNTER — APPOINTMENT (OUTPATIENT)
Dept: LAB | Facility: CLINIC | Age: 63
End: 2018-10-10
Payer: MEDICARE

## 2018-10-10 ENCOUNTER — TELEPHONE (OUTPATIENT)
Dept: NEUROLOGY | Facility: CLINIC | Age: 63
End: 2018-10-10

## 2018-10-10 ENCOUNTER — APPOINTMENT (OUTPATIENT)
Dept: SPEECH THERAPY | Facility: CLINIC | Age: 63
End: 2018-10-10
Payer: MEDICARE

## 2018-10-10 DIAGNOSIS — G35 MULTIPLE SCLEROSIS (HCC): Primary | ICD-10-CM

## 2018-10-10 DIAGNOSIS — G35 MULTIPLE SCLEROSIS (HCC): ICD-10-CM

## 2018-10-10 LAB
HBV CORE AB SER QL: NORMAL
HBV CORE IGM SER QL: NORMAL
HBV SURFACE AG SER QL: NORMAL
HCV AB SER QL: NORMAL

## 2018-10-10 PROCEDURE — 86803 HEPATITIS C AB TEST: CPT

## 2018-10-10 PROCEDURE — 36415 COLL VENOUS BLD VENIPUNCTURE: CPT

## 2018-10-10 PROCEDURE — 86705 HEP B CORE ANTIBODY IGM: CPT

## 2018-10-10 PROCEDURE — 86704 HEP B CORE ANTIBODY TOTAL: CPT

## 2018-10-10 PROCEDURE — 87340 HEPATITIS B SURFACE AG IA: CPT

## 2018-10-10 NOTE — TELEPHONE ENCOUNTER
Tc to Dr Juan Francisco Arreguin office, spoke with Carin Espinoza  Pt has been scheduled Nov 14th at 3:30pm     Tc to pt, spoke with pt  I informed pt of the scheduled appointment with Dr Enrique Chowdhury  Pt verified date and time and agreed with appointment date

## 2018-10-10 NOTE — TELEPHONE ENCOUNTER
Called Dr Umer Matthews office and spoke with Yung Sethi  He is currently seeing patients and will send him a message  She said he will likely call me back today or tomorrow AM     Patient would have to sign a start form for Quin Bush in order for it to be submitted    I can fill out the start form and then tomorrow when Maria E Dumont is back she can reach out to the patient to get her to sign it

## 2018-10-10 NOTE — TELEPHONE ENCOUNTER
Jose Elias Garcia  Discussed case with dr Lydia Miramontes this am as well  Holding at this time on vaccination for pneumonia and hepatitis B  Presently no active hep B  However, pt recalls hepatitis in youth   pcp checking if any other type in the past ie hep A or C  Pt will have labs done this week  At present holding on vaccinations due to timing of ocrevus  Ideally we want to start ocrebus as soon as possible, but awaiting info from [facility] regarding transition from tysabri to ocrevus due to total of 6 cases of pml to date, one from transition from [facility] and others from transition from [place]  Do not want to delay giving med due to vaccination unless overall benefit  Awaiting input from dr Nieto Client calling office today for input and then we will coordinate with pt and update pcp  Also in process of authorization  Nursing team helping as well    Pt now with follow up appt at [facility] in nov as arranged this am

## 2018-10-10 NOTE — TELEPHONE ENCOUNTER
Isabel Miller, please call dr martino office today to see if we can speak to his nurse or someone clinical from his team   I would like guidance on timeframe of start of ocrevus for pt  Pt last tysabri at end of aug  Please make sure 's office has received or has access to pt's csf results ie jcv studies as well as her most recent updated mri head  Please see if there is guidance on when to start pt on the ocrevus  We are still in the approval phase  Make sure that you submitted the actual rx for her  Jay Brown is still out today and just want to make sure we are moving on the authorization  HannahPiximjohnny Whoisus would also help us if needed today  Just want guidance due to most recent ie 6th case of pml just recent reported as number 5 from tysabri to ocrevus  Does javier have any other guide lines for this particular pt in light of imaging and csf as well as last tysabri dosing

## 2018-10-10 NOTE — TELEPHONE ENCOUNTER
Silvino Resendiz,    Hepatitis panel was drawn today  Last immunization given and our office was flu vaccine on September 13th    No other vaccines given today till cleared by Neurology! I strongly advised patient to proceed with screening CT chest that I have ordered on multiple occasions, I believe it is important since she is still smoking heavily especially prior to the start of the new therapy  This CT will be done without contrast so it should not be a problem  I spoke with Valencia Tracy today, she appears in her usual state of health and cognition    No red flags    THANK YOU

## 2018-10-10 NOTE — TELEPHONE ENCOUNTER
Sharda Sheppard, last vaccination was sept 13 for the flu vaccination  Please give this date to [place] team to again help with best  timing of first ocrevus infusion

## 2018-10-10 NOTE — TELEPHONE ENCOUNTER
Dr Huy Christianson called me back  He says there really is no good answer for how long patients have to wait to go from [facility] to [address] [place] [address], and no specific timeframe they are using there  He says pretty much they are going right from one to another  For Robyn Renteria, he says really no washout should be needed since she has the Tysabri autoantibodies and her immune system is basically doing the washout  He was just concerned about the PML initially until he heard about her autoantibodies and that her JCV in the CSF is negative and that her MRI is "behaving" like MS and not PML  He says we should not hesitate to get her started on it  I made him aware of the flu shot on 9/13 and he said 4-6 weeks after flu shot is ok to start Ocrevus and shouldn't really worry about the flu shot  If other vaccines are needed, as per package insert, should be done 6 weeks before Ocrevus initiation

## 2018-10-11 NOTE — TELEPHONE ENCOUNTER
Pastor Wu and Collins Jimenez  As we discussed this am as a team, pt needs to come in to sign her ocrevus orders that are ready for her  Pt has completed most of the pre ocrevus labs,  Awaiting hep a and c testing from pcp  Need for her to sign in person at a visit with us to review risk/ benefit of new med ocrevus janie coming off of [facility]   Need to review no clear time frame of best way to transition, as discussed with javier as well  Also need to do time line for med if pt agreeable and aware of risk of pml  The time line will need to take into account flu vaccine and see if pt wants to get her pneumo vaccine  Any pneumonia history  If pt wants pneumo vax, then it will be 4-6 weeks till start of med  Oct 25 will be 6 weeks from the flu vaccine  Also need to remind pt of appt at [place] on nov 14th as well as dr martino recommendations from yesterday  Pt accepted appt to come into sign her ocrevus on Monday 10/15  at 945 am   I also have an email out to joanne mckenzie from [employer] for any other vaccination recommendations  I rev the power point he sent earlier this week  Ulysees Field, is there any specific medical concern for this pt based on her age and history of need for pneumonia vaccine this year,  before we start drug therapy?

## 2018-10-12 ENCOUNTER — APPOINTMENT (OUTPATIENT)
Dept: SPEECH THERAPY | Facility: CLINIC | Age: 63
End: 2018-10-12
Payer: MEDICARE

## 2018-10-12 NOTE — TELEPHONE ENCOUNTER
Good evening,    1  Hepatitis C and B testing was negative  I do not believe there is a reason to do immunity testing for hepatitis A, it is food/water born self-limited infection  Most likely that what patient had as a child  Her LFTs have been normal   No signs of hepatitis  2    Patient  has received 2 Pneumovax vaccinations already, latest in 2015  I wanted to give her Prevnar 15 which is and extra  " bonus" protection  I do not believe she had pneumonia  Ssince start of this therapy for MS is so important, I am comfortable holding off  Prevnar 13, I can administer it to patient within next 6-9 months, once cleared by you, no problem    3  Patient is a longstanding smoker  I did ask her again to proceed with screening CT lung protocol that is recommended for all smokers over 55 with 1ppd h/o       Thanks!!

## 2018-10-12 NOTE — TELEPHONE ENCOUNTER
Please see email from [address] from this am   We can discuss with pt pros/ cons of getting the pneumonia vaccination prior to start of medication on Monday when she comes for visit and then further discuss with pcp once we update pt on all information  Also need to try to get best timing of authorization for the ocrevus which could help guide this decision as well

## 2018-10-12 NOTE — TELEPHONE ENCOUNTER
Collins Jimenez,  See below  Please check with joanne if better to do the pneumo prevnar 13 vaccination before ocrevus start for better efficacy  We can then discuss with pt on Monday her time line options when she comes in for visit  We will need to check with insurance timeline for approval as well which may also help with this decision

## 2018-10-15 ENCOUNTER — DOCUMENTATION (OUTPATIENT)
Dept: NEUROLOGY | Facility: CLINIC | Age: 63
End: 2018-10-15

## 2018-10-15 ENCOUNTER — APPOINTMENT (OUTPATIENT)
Dept: PHYSICAL THERAPY | Facility: CLINIC | Age: 63
End: 2018-10-15
Payer: MEDICARE

## 2018-10-15 ENCOUNTER — TELEPHONE (OUTPATIENT)
Dept: FAMILY MEDICINE CLINIC | Facility: CLINIC | Age: 63
End: 2018-10-15

## 2018-10-15 ENCOUNTER — APPOINTMENT (OUTPATIENT)
Dept: SPEECH THERAPY | Facility: CLINIC | Age: 63
End: 2018-10-15
Payer: MEDICARE

## 2018-10-15 ENCOUNTER — OFFICE VISIT (OUTPATIENT)
Dept: FAMILY MEDICINE CLINIC | Facility: CLINIC | Age: 63
End: 2018-10-15
Payer: MEDICARE

## 2018-10-15 ENCOUNTER — TELEPHONE (OUTPATIENT)
Dept: NEUROLOGY | Facility: CLINIC | Age: 63
End: 2018-10-15

## 2018-10-15 ENCOUNTER — OFFICE VISIT (OUTPATIENT)
Dept: NEUROLOGY | Facility: CLINIC | Age: 63
End: 2018-10-15
Payer: MEDICARE

## 2018-10-15 VITALS
HEART RATE: 72 BPM | BODY MASS INDEX: 25.49 KG/M2 | DIASTOLIC BLOOD PRESSURE: 64 MMHG | SYSTOLIC BLOOD PRESSURE: 115 MMHG | HEIGHT: 65 IN | WEIGHT: 153 LBS

## 2018-10-15 VITALS
HEIGHT: 65 IN | TEMPERATURE: 96.5 F | HEART RATE: 72 BPM | SYSTOLIC BLOOD PRESSURE: 120 MMHG | RESPIRATION RATE: 16 BRPM | WEIGHT: 154.4 LBS | BODY MASS INDEX: 25.72 KG/M2 | DIASTOLIC BLOOD PRESSURE: 68 MMHG

## 2018-10-15 DIAGNOSIS — J06.9 ACUTE URI: Primary | ICD-10-CM

## 2018-10-15 DIAGNOSIS — J44.9 COPD WITHOUT EXACERBATION (HCC): Chronic | ICD-10-CM

## 2018-10-15 DIAGNOSIS — J30.2 SEASONAL ALLERGIES: ICD-10-CM

## 2018-10-15 DIAGNOSIS — G35 MULTIPLE SCLEROSIS (HCC): Primary | ICD-10-CM

## 2018-10-15 DIAGNOSIS — J44.9 COPD WITHOUT EXACERBATION (HCC): Primary | Chronic | ICD-10-CM

## 2018-10-15 PROCEDURE — 99215 OFFICE O/P EST HI 40 MIN: CPT | Performed by: PHYSICIAN ASSISTANT

## 2018-10-15 PROCEDURE — 99213 OFFICE O/P EST LOW 20 MIN: CPT | Performed by: FAMILY MEDICINE

## 2018-10-15 RX ORDER — LEVOFLOXACIN 500 MG/1
500 TABLET, FILM COATED ORAL DAILY
Qty: 7 TABLET | Refills: 0 | Status: SHIPPED | OUTPATIENT
Start: 2018-10-15 | End: 2018-10-23

## 2018-10-15 RX ORDER — BUDESONIDE 0.5 MG/2ML
INHALANT ORAL
Qty: 30 VIAL | Refills: 0 | Status: SHIPPED | OUTPATIENT
Start: 2018-10-15 | End: 2018-10-15 | Stop reason: CLARIF

## 2018-10-15 RX ORDER — FLUTICASONE FUROATE AND VILANTEROL 100; 25 UG/1; UG/1
POWDER RESPIRATORY (INHALATION)
Qty: 1 INHALER | Refills: 4 | Status: SHIPPED | OUTPATIENT
Start: 2018-10-15 | End: 2018-10-15 | Stop reason: SDUPTHER

## 2018-10-15 RX ORDER — FLUTICASONE FUROATE AND VILANTEROL 100; 25 UG/1; UG/1
POWDER RESPIRATORY (INHALATION)
Qty: 1 INHALER | Refills: 3 | Status: SHIPPED | OUTPATIENT
Start: 2018-10-15 | End: 2019-05-21

## 2018-10-15 NOTE — TELEPHONE ENCOUNTER
Rheba Free,     Please check with pt if any malignancy( ie female cancers, derm,etc)history for her or her family  Please see last time at gyn , derm and last mamogram  She should have appt with gyn prior to her start of the ocrevus as we are awaiting preauth  Also get a lymphoctye subset 3 panel done prior to have for future reference  Pt getting ct chest from pcp as well this week

## 2018-10-15 NOTE — ASSESSMENT & PLAN NOTE
Patient had MRI brain in April 2018 and stable  She then had another MRI brain WO contrast on 8/6/18 for surveillance that showed a new 14 mm focus of FLAIR abnormality with corresponding diffusion abnormality in the left periventricular white matter suggestive of active demyelination  She was put back in with contrast on 8/9/18 and this lesion demonstrated enhancement  Patient has been on Tysabri since 2011, on monthly  Due to a new lesion, Tysabri antibodies were checked  These came back positive on 8/14/18  Patient did get her Tysbari infusion on 8/31/18  Patient was seen at [facility] by Dr Baldo Rosado on 9/26/18  He agreed that since she has Tysabri autoantibodies, that she should not continue the med  He agreed with updated MRI and LP for JCV in CSF  Patient had updated MRI brain 9/28/18  The new lesion in the left PV region no longer was enhancing  It did appear larger than prior, although this was though to represent new MS lesion rather than PML  LP completed 10/1/18  Patient negative for JCV PCR and ultrasensitive JCV in CSF  Serum JCV negative on 8/8/18 and 9/28/18  Patient had pre-labs done for Ocrevus  No indication of active Hep B or C, however no immunity detected for Hep B  Patient noted she may have had hepatitis as a child, although unsure of the type  She did not recall specific treatment  After discussion with PCP, thought to be Hep A possibly  Many discussions had between our team, Dr Baldo Rosado at [facility] the MSL at [facility] and PCP  She has appt with Dr Baldo Rosado at [facility] again on 9/14  I did speak with him last week and he felt it was ok to proceed with Ocrevus  No timeline specifically for a washout between Tysabri and Ocrevus  Patient likely already washing out the Tysabri due to the antibodies  PCP mentioned giving her Prevnar 15  Patient had flu shot on 9/13/18    Patient needs to have all vaccines 6 weeks prior to starting Ocrevus  Patient,  and I discussed all of this information today  Would not suggest Hep B vaccine  This vaccine is a 3 shot series over 6 months and patient not high risk for Hep B  If getting Prevnar, which is fine, she would need to wait 6 weeks to start [address] [place] [address]  She sees PCP later today  Would be great to get the vaccine at this time so we would not further delay the start of Ocrevus  If getting today, would be able to start Ocrevus the week of Nov 26th  Discussed the 6 cases of PML with patients on Ocrevus, with crossover from Tysabri (5 cases) and Damari (1 case)  Patient aware there is still a remote chance of the new lesion seen in August being PML, although much higher suspicion for new MS lesion given her JCV negativity in CSF x 2 and serum, as well as Tysbari autoantibodies  Patient understands and agreeable to Ocrevus  Re-reviewed this medication including safety, side effects, monitoring  If we start her on Ocrevus the week of Nov 26th, that would be 3 months from her last Tysabri infusion  Start form signed for [address] [place] [address] today  Will get this submitted  Patient will return in 2 months  She was advised to call for any new or worsening symptoms

## 2018-10-15 NOTE — TELEPHONE ENCOUNTER
I just saw George Rhoades  She does have cold symptoms and  some wheezing in her lungs  We will  treating her with Levaquin and she remains on albuterol /  Budesonide via nebulizer  I did schedule Prevnar 13 with the nurse in 1 week  By the way, she has scheduled CT lungs this coming Thursday, I will keep you posted on those results as well      Thank you!!

## 2018-10-15 NOTE — TELEPHONE ENCOUNTER
Called and spoke to pt pcp  Pt is currently in her schedule for possible uri  pcp wll address and see if ok to give pneumonia vaccine depending upon any acute sxs  pcp will be in touch about timing of vaccination after visit

## 2018-10-15 NOTE — TELEPHONE ENCOUNTER
Kevin Myers,    I just saw [facility] this AM   We have decided that she can go ahead with the Prevnar 13 vaccine for added protection  She would then have to wait 6 weeks after the vaccine to start the [address] [place] [address] says she has an appt with you later this afternoon  It would be great to get the vaccine done today so that we do not delay the start of Ocrevus any longer  If it is done today, then she can get the [address] [place] [address] starting the week of Nov 26th  We are submitting the Rx for the [address] [place] [address] for authorization now  Thanks so much!   Please let me know if you have any questions     -Bob Oneal

## 2018-10-15 NOTE — PROGRESS NOTES
Patient ID: Nicholas Temple is a 58 y o  female  Assessment/Plan:    Multiple sclerosis, relapsing/remitting  Patient had MRI brain in April 2018 and stable  She then had another MRI brain WO contrast on 8/6/18 for surveillance that showed a new 14 mm focus of FLAIR abnormality with corresponding diffusion abnormality in the left periventricular white matter suggestive of active demyelination  She was put back in with contrast on 8/9/18 and this lesion demonstrated enhancement  Patient has been on Tysabri since 2011, on monthly  Due to a new lesion, Tysabri antibodies were checked  These came back positive on 8/14/18  Patient did get her Tysbari infusion on 8/31/18  Patient was seen at [place] by Dr Dorene Perez on 9/26/18  He agreed that since she has Tysabri autoantibodies, that she should not continue the med  He agreed with updated MRI and LP for JCV in CSF  Patient had updated MRI brain 9/28/18  The new lesion in the left PV region no longer was enhancing  It did appear larger than prior, although this was though to represent new MS lesion rather than PML  LP completed 10/1/18  Patient negative for JCV PCR and ultrasensitive JCV in CSF  Serum JCV negative on 8/8/18 and 9/28/18  Patient had pre-labs done for Ocrevus  No indication of active Hep B or C, however no immunity detected for Hep B  Patient noted she may have had hepatitis as a child, although unsure of the type  She did not recall specific treatment  After discussion with PCP, thought to be Hep A possibly  Many discussions had between our team, Dr Dorene Perez at [facility] the MSL at [facility] and PCP  She has appt with Dr Dorene Perez at [place] again on 9/14  I did speak with him last week and he felt it was ok to proceed with Ocrevus  No timeline specifically for a washout between Tysabri and Ocrevus  Patient likely already washing out the Tysabri due to the antibodies  PCP mentioned giving her Prevnar 15  Patient had flu shot on 9/13/18  Patient needs to have all vaccines 6 weeks prior to starting Ocrevus  Patient,  and I discussed all of this information today  Would not suggest Hep B vaccine  This vaccine is a 3 shot series over 6 months and patient not high risk for Hep B  If getting Prevnar, which is fine, she would need to wait 6 weeks to start [address]  She sees PCP later today  Would be great to get the vaccine at this time so we would not further delay the start of Ocrevus  If getting today, would be able to start Ocrevus the week of Nov 26th  Discussed the 6 cases of PML with patients on Ocrevus, with crossover from Tysabri (5 cases) and Gilenya (1 case)  Patient aware there is still a remote chance of the new lesion seen in August being PML, although much higher suspicion for new MS lesion given her JCV negativity in CSF x 2 and serum, as well as Tysbari autoantibodies  Patient understands and agreeable to Ocrevus  Re-reviewed this medication including safety, side effects, monitoring  If we start her on Ocrevus the week of Nov 26th, that would be 3 months from her last Tysabri infusion  Start form signed for [address] [facility] today  Will get this submitted  Patient will return in 2 months  She was advised to call for any new or worsening symptoms  Diagnoses and all orders for this visit:    Multiple sclerosis, relapsing/remitting           Subjective:    HPI    Patient is a 58year old female with PMH of MS diagnosed in 2000, last seen in Sept 2018  Patient has been on Tysabri since 2011  She had missed occasional infusions due to illnesses  Patient had been having increased LBP in Sept 2016, but was off gabapentin  She also had some weakness and was given IV steroids in Sept 2016  She had MRI lumbar spine 9/29/16 with disc disease and arthrosis  Patient was sent to pain management  Patient follows with Dr Yvonne Oneil [facility]    Patient had hospitalization 4/14/17-4/20/17 c/o some numbness in the cheek  She reported she was being treated for a sinus infection and on Levaquin  No other symptoms reported at that time  However over the next few days and after finishing the course of antibiotics, symptoms did not resolve and she also developed difficulty ambulating, feeling like she was continuously leaning towards the right side, along with the right facial numbness  CT with no acute findings  She was admitted and given a total of 5 days of IV steroids  MRI brain 4/17/17 New enhancing white matter lesion in the right middle cerebellar peduncle, and moderate chronic demyelinating disease throughout the supratentorial brain and to lesser degree posterior fossa  MRI c-spine 4/17/17 Multilevel white matter lesions are stable  No evidence of progressive or active demyelination in the visualized spinal cord  She was discharged home to continue outpatient therapy  At that time, her Tysabri was held back 2 weeks after steroid completion  She had PT/OT which she felt was very helpful in combination with the steroids      Patient had updated MRI brain in April 2018 and stable  Patient then had updated MRI brain 8/6/18 done for surveillance while on Tysabri  This was done without contrast initially  This demonstrated a new 14 mm focus of FLAIR abnormality with corresponding diffusion abnormality in the left periventricular white matter suggestive of active demyelination  Otherwise, moderate burden of subacute and chronic plaque is identified elsewhere in the supratentorial brain as well as brainstem  Patient was called and then put back in for MRI brain with contrast on 8/9/18  The 14mm left periventricular lesion, described on recent MR does enhance following contrast administration, corroborating acute demyelination  JCV updated 8/8/18 and negative with index 0 12  Patient had Tysabri autoantibodies checked and these came back positive on 8/14/18    She did get her last Tysabri infusion on 8/31/18  Patient went to see Dr Aye Franz at [facility] on 9/26/18  He felt that in the setting of Tysabri antibodies, the lesion was likely MS breakthrough lesion, although there was some concern for PML  He agreed with repeat MRI brain and LP for JCV in CSF and if negative, would proceed with change to Ocrevus  MRI brain was updated 9/28/18 and noted that the recently detected left periventricular lesion no longer enhances  It did minimally increase in size however  Per rads, most consistent with new MS lesion  Patient had LP on 10/1/18  Two different tests for JCV in CSF were completed; JCV PRC and ultrasensitive JCV  Both of these tests were negative  Patient then had all her pre-labs done for Ocrevus  Hepatitis testing for C and B were negative  Patient mentioned to us that she may have had hepatitis when she was a child, around age 7  No specific treatment and did not know what kind  We reached out to her PCP about this and it was felt that likely patient had Hep A, maybe food-bourne  It does not appear she has immunity against Hep B  She may have had the vaccine in the past, but no longer has immunity  Varicella was immune  Many conversations had between Charles Hi, MSL for [facility], Dr Aye Franz from [facility], PCP  PCP wanting to give her Prevnar vaccine  Patient had the flu shot on 9/13/18  Today, patient reports she is feeling well  She denies any new symptoms at this time   agrees she has been stable  The following portions of the patient's history were reviewed and updated as appropriate: allergies, past family history, past medical history, past social history, past surgical history and problem list          Objective:    Blood pressure 115/64, pulse 72, height 5' 5" (1 651 m), weight 69 4 kg (153 lb)    Physical Exam   Constitutional: She appears well-developed and well-nourished  HENT:   Head: Normocephalic and atraumatic     Eyes: Pupils are equal, round, and reactive to light  EOM are normal    Cardiovascular: Intact distal pulses  Neurological: She has normal strength and normal reflexes  Coordination normal    Skin: Skin is warm and dry  Psychiatric: She has a normal mood and affect  Her speech is normal        Neurological Exam  Mental Status   Oriented to person, place, time and situation  Recent and remote memory are intact  Speech is normal  Language is fluent with no aphasia  Attention and concentration are normal     Cranial Nerves  CN II: Visual fields full to confrontation  CN III, IV, VI: Extraocular movements intact bilaterally  Pupils equal round and reactive to light bilaterally  CN V: Facial sensation is normal   CN VII: Full and symmetric facial movement  CN VIII: Hearing is normal   CN IX, X: Palate elevates symmetrically  Normal gag reflex  CN XI: Shoulder shrug strength is normal   CN XII: Tongue midline without atrophy or fasciculations  Motor   Normal muscle tone  Strength is 5/5 throughout all four extremities  Sensory  Decreased vibratory sensation in lower extremities bilaterally   Reflexes  Deep tendon reflexes are 2+ and symmetric in all four extremities with downgoing toes bilaterally  Coordination  Finger-to-nose, rapid alternating movements and heel-to-shin normal bilaterally without dysmetria  ROS:    Review of Systems   Constitutional: Positive for fatigue  Negative for appetite change and fever  HENT: Positive for sinus pressure  Negative for hearing loss, tinnitus, trouble swallowing and voice change  Eyes: Negative  Negative for photophobia and pain  Respiratory: Positive for shortness of breath  Cardiovascular: Negative  Negative for palpitations  Gastrointestinal: Negative  Negative for nausea and vomiting  Endocrine: Negative  Negative for cold intolerance and heat intolerance  Genitourinary: Positive for frequency  Negative for dysuria and urgency  Musculoskeletal: Negative  Negative for myalgias and neck pain  Muscle pain   Skin: Negative  Negative for rash  Allergic/Immunologic: Negative  Neurological: Positive for weakness  Negative for dizziness, tremors, seizures, syncope, facial asymmetry, speech difficulty, light-headedness, numbness and headaches  Balance problems, pain while walking   Hematological: Negative  Does not bruise/bleed easily  Psychiatric/Behavioral: Negative  Negative for confusion, hallucinations and sleep disturbance       I personally reviewed the ROS

## 2018-10-15 NOTE — PATIENT INSTRUCTIONS
Please use albuterol and budesonide in the nebulizer together twice a day for the next 7-10 days      Once he feels better, you can discontinue nebulizer     will schedule pneumonia vaccine with the nurse in 1 week

## 2018-10-15 NOTE — PROGRESS NOTES
FAMILY PRACTICE OFFICE VISIT       NAME: Shanice Kaufman  AGE: 58 y o  SEX: female       : 1955        MRN: [id number]        Assessment and Plan     Problem List Items Addressed This Visit     COPD without exacerbation (HCC) (Chronic)    Relevant Medications    budesonide (PULMICORT) 0 5 mg/2 mL nebulizer solution      Other Visit Diagnoses     Acute URI    -  Primary    Relevant Medications    levofloxacin (LEVAQUIN) 500 mg tablet       patient presents for evaluation of upper respiratory infection / COPD exacerbation  She will continue on albuterol nebulizer twice a day with addition of budesonide 0 5 mg b i d       Will start her on Levaquin 500 mg daily for 7 days  Patient will contact me in a few days if her symptoms are not improving significantly  She remains on regimen of Allegra and Flonase  Pending CT chest later this week  Will schedule Prevnar 13 with approval by Neurology in 1 week  Patient Instructions    Please use albuterol and budesonide in the nebulizer together twice a day for the next 7-10 days  Once he feels better, you can discontinue nebulizer     will schedule pneumonia vaccine with the nurse in 1 week          Chief Complaint     Chief Complaint   Patient presents with    Cold Like Symptoms     pt is here for possible upper respitory infection  1 + wk       History of Present Illness     Dry cough  X 1 week    No fever    Chest tightness     Some wheezing-  Worse at night    Sleeps OK    No ST    Sinus pressure    Patient felt  Worse today  - especially with rain / weather changes    Allegra, Flonase, Albuterol via nebulizer  Patient is still unfortunately smoking, CT chest is scheduled later this week  Review of Systems   Review of Systems   Constitutional: Positive for fatigue  Negative for fever  HENT: Positive for congestion and postnasal drip  Negative for sore throat  Eyes: Negative      Respiratory: Positive for cough, chest tightness and wheezing  Negative for shortness of breath  Cardiovascular: Negative  Neurological: Negative  Psychiatric/Behavioral: Negative          Active Problem List     Patient Active Problem List   Diagnosis    Multiple sclerosis, relapsing/remitting    Muscle spasm    Hyperlipidemia    Osteoporosis    Ambulatory dysfunction    COPD without exacerbation ([facility] )    Vitamin D deficiency    Disc degeneration, lumbar    Major depression, recurrent, chronic (HCC)    Allergic rhinitis       Past Medical History:  Past Medical History:   Diagnosis Date    Asthma     Degeneration of intervertebral disc at L5-S1 level     Depression     Generalized anxiety disorder     Hepatitis     at age 4 yrs   Raul Polio Herpes zoster     last assessed: 6/20/13    Hypercholesterolemia     Hyperlipidemia     MS (multiple sclerosis) ([facility] )     Osteoporosis     Seasonal allergies     Tumor of breast     benign, right breast       Past Surgical History:  Past Surgical History:   Procedure Laterality Date    BREAST SURGERY      right breast fibroid tumor resection    COLONOSCOPY      11/2013 - due in 2018 - Dr Crisostomo    FL LUMBAR PUNCTURE  10/1/2018    ROTATOR CUFF REPAIR      Bilateral    ROTATOR CUFF REPAIR Bilateral     SEPTOPLASTY         Family History:  Family History   Problem Relation Age of Onset    Arthritis Mother     Osteoporosis Mother     COPD Father     Arthritis Family     COPD Family     Emphysema Family     Heart disease Family     Hyperthyroidism Family     Multiple sclerosis Family     Osteoarthritis Family        Social History:  Social History     Social History    Marital status: /Civil Union     Spouse name: N/A    Number of children: N/A    Years of education: N/A     Occupational History    Retired      Social History Main Topics    Smoking status: Current Every Day Smoker     Packs/day: 1 00     Years: 40 00     Types: Cigarettes    Smokeless tobacco: Never Used    Alcohol use Yes      Comment: occasional; [employer] also states Never drank alcohol    Drug use: No    Sexual activity: Yes     Partners: Male      Comment: denied any risk of AIDS     Other Topics Concern    Not on file     Social History Narrative               Objective     Vitals:    10/15/18 1256   BP: 120/68   Pulse: 72   Resp: 16   Temp: (!) 96 5 °F (35 8 °C)   TempSrc: Tympanic   Weight: 70 kg (154 lb 6 4 oz)   Height: 5' 5" (1 651 m)     Wt Readings from Last 3 Encounters:   10/15/18 70 kg (154 lb 6 4 oz)   10/15/18 69 4 kg (153 lb)   10/01/18 68 5 kg (151 lb)       Physical Exam   Constitutional: She is oriented to person, place, and time  She appears well-developed and well-nourished  HENT:   Head: Normocephalic and atraumatic  Right Ear: Tympanic membrane normal    Left Ear: Tympanic membrane normal    Nose: Mucosal edema present  Mouth/Throat: Posterior oropharyngeal erythema present  No oropharyngeal exudate (  Mild erythema of oropharynx and uvula, postnasal drip)  Eyes: Conjunctivae are normal    Neck: Neck supple  Cardiovascular: Normal rate, regular rhythm and normal heart sounds  No murmur heard  Pulmonary/Chest: Effort normal  No respiratory distress  She has wheezes ( few scattered and expiratory wheezes right base more than left and anterior chest wall)  She has no rales  Good air entry bilaterally   Neurological: She is alert and oriented to person, place, and time  She has normal reflexes  No cranial nerve deficit  Psychiatric: She has a normal mood and affect  Her behavior is normal    Nursing note and vitals reviewed        Pertinent Laboratory/Diagnostic Studies:  Lab Results   Component Value Date    GLUCOSE 107 12/15/2015    BUN 13 10/04/2018    CREATININE 0 64 10/04/2018    CALCIUM 8 9 10/04/2018     10/04/2018    K 4 3 10/04/2018    CO2 27 10/04/2018     10/04/2018     Lab Results   Component Value Date    ALT 26 10/04/2018    AST 15 10/04/2018    ALKPHOS 94 10/04/2018    BILITOT 0 30 12/15/2015       Lab Results   Component Value Date    WBC 9 40 10/04/2018    HGB 13 5 10/04/2018    HCT 40 2 10/04/2018    MCV 89 10/04/2018     10/04/2018       No results found for: TSH    Lab Results   Component Value Date    CHOL 169 07/21/2015     Lab Results   Component Value Date    TRIG 127 09/28/2018     Lab Results   Component Value Date    HDL 43 09/28/2018     Lab Results   Component Value Date    LDLCALC 97 09/28/2018     Lab Results   Component Value Date    HGBA1C 5 8 (H) 10/20/2015       Results for orders placed or performed in visit on 10/10/18   Chronic Hepatitis Panel   Result Value Ref Range    Hepatitis B Surface Ag Non-reactive Non-reactive, NonReactive - Confirmed    Hepatitis C Ab Non-reactive Non-reactive    Hep B C IgM Non-reactive Non-reactive    Hep B Core Total Ab Non-reactive Non-reactive       No orders of the defined types were placed in this encounter        ALLERGIES:  Allergies   Allergen Reactions    Bactrim [Sulfamethoxazole-Trimethoprim] Other (See Comments)     Reaction Date: 11Aug2011;     Betadine [Povidone Iodine]     Cefzil [Cefprozil]     Gadobutrol GI Intolerance    Medroxyprogesterone Other (See Comments)     Reaction Date: 11Aug2011;     Nortriptyline Other (See Comments)    Pamelor [Nortriptyline Hcl]     Prempro [Conj Estrog-Medroxyprogest Ace] Other (See Comments)    Provera [Medroxyprogesterone Acetate]     Zyban [Bupropion]        Current Medications     Current Outpatient Prescriptions   Medication Sig Dispense Refill    albuterol (PROAIR HFA) 90 mcg/act inhaler Inhale 2 puffs every 4 (four) hours as needed for wheezing or shortness of breath 3 Inhaler 1    aspirin (ECOTRIN LOW STRENGTH) 81 mg EC tablet Take 1 tablet by mouth daily      atorvastatin (LIPITOR) 40 mg tablet Take 1 tablet (40 mg total) by mouth daily 90 tablet 3    B Complex-C CAPS Take by mouth      BL EVENING PRIMROSE OIL PO Take by mouth      ezetimibe (ZETIA) 10 mg tablet Take 1 tablet (10 mg total) by mouth daily 90 tablet 3    Flaxseed, Linseed, (FLAXSEED OIL PO) Take 2 capsules by mouth daily        fluticasone (FLONASE) 50 mcg/act nasal spray 2 sprays into each nostril daily 48 g 3    gabapentin (NEURONTIN) 300 mg capsule Take 1 capsule (300 mg total) by mouth 2 (two) times a day 180 capsule 3    metoclopramide (REGLAN) 10 mg tablet Take 1 tablet (10 mg total) by mouth every 8 (eight) hours as needed (nausea) 5 tablet 0    montelukast (SINGULAIR) 10 mg tablet Take 1 tablet (10 mg total) by mouth daily at bedtime 90 tablet 3    sertraline (ZOLOFT) 100 mg tablet Take 1 tablet (100 mg total) by mouth daily 90 tablet 3    budesonide (PULMICORT) 0 5 mg/2 mL nebulizer solution Take 1 vial twice a day via nebulizer 30 vial 0    levofloxacin (LEVAQUIN) 500 mg tablet Take 1 tablet (500 mg total) by mouth daily for 7 days 7 tablet 0     No current facility-administered medications for this visit            Health Maintenance     Health Maintenance   Topic Date Due    Lung Cancer Screening  09/28/2009    Pneumococcal PPSV23 Highest Risk Adult (3 of 3 - PCV13) 09/29/2016    SLP PLAN OF CARE  10/10/2018    DTaP,Tdap,and Td Vaccines (1 - Tdap) 09/12/2019 (Originally 6/3/2016)    PAP SMEAR  07/26/2020    CRC Screening: Colonoscopy  11/25/2023    INFLUENZA VACCINE  Completed     Immunization History   Administered Date(s) Administered    Influenza 09/26/2011    Influenza Quadrivalent Preservative Free 3 years and older IM 09/29/2015, 09/22/2016, 09/08/2017    Influenza TIV (IM) 01/17/2013, 09/03/2013, 09/23/2014    Influenza, recombinant, quadrivalent,injectable, preservative free 09/13/2018    Pneumococcal Polysaccharide PPV23 10/01/2007, 09/29/2015    Td (adult), adsorbed 06/02/2016       An Collins MD

## 2018-10-15 NOTE — TELEPHONE ENCOUNTER
I will send prescription for Breo, 1 puff daily    Thank you Patient calling regarding: if she can obtain a sample of the Advair medication.    Patient said that she would like the sample of Advair until the mail order has been approved and mailed to her.     Patient asked if she could  a sample at the [facility] location.    Patient said that she can be reached at [phone].

## 2018-10-15 NOTE — PATIENT INSTRUCTIONS
Will sign paperwork today for Ocrevus and get it submitted  If you get the pneumonia vaccine today, then we would wait 6 weeks for the first dose of Ocrevus  Please keep appt with Dr Jeremy Hernandez on Nov 14th  You will need labs done 2 days prior to the Ocrevus infusion, which we will give you once its scheduled   Please get the MRI t-spine and l-spine on 10/28 as scheduled  Follow up in 2 months or sooner if needed    Call for any new or worsening symptoms

## 2018-10-15 NOTE — TELEPHONE ENCOUNTER
Patient called and stated that her insurance does not cover her pulmicort,  Is there something else that can be sent over to the pharmacy?   Please call to advise

## 2018-10-16 NOTE — TELEPHONE ENCOUNTER
Carolina,Please see below  Please let me know when pt is pre auth for her ocrevus  Hailee Infield will be checking about malignancy history as well as gyn eval in meanwhile with pt and any need for any further testing  Silvino Euceda, We will need exact date of prevnar vaccination and then plan when pt can get her first 2 ocrevus injections from there  Remind pt to call us when she gets her vaccination  Also remind her of appt with [facility] martino [facility] [place] in nov

## 2018-10-17 NOTE — TELEPHONE ENCOUNTER
She is scheduled for Prevnar on October 22nd, this coming Monday, I will confirm once vaccine is done  Thank you!

## 2018-10-17 NOTE — TELEPHONE ENCOUNTER
No PA is needed as patient has medicare and it is based on medical necessity  Received confirmation of this from benefit investigation done by [employer] that no PA required as coverage is determined by medical necessity and medication can be [employer]  Called [place] Villanueva and advised her to call office after vaccine has been given and also reminded her of her appointment with Dr Imra Martienz

## 2018-10-17 NOTE — TELEPHONE ENCOUNTER
Great job, Carolina  We will then need to coordinate the 2 dates ie 2 weeks apart for her ocrevus once we know when next week she is going for vaccination  Letha Bazzi, let us know when you give the prevnar and we will set up the ocrevus based on that date     Thanks so much

## 2018-10-18 ENCOUNTER — APPOINTMENT (OUTPATIENT)
Dept: SPEECH THERAPY | Facility: CLINIC | Age: 63
End: 2018-10-18
Payer: MEDICARE

## 2018-10-18 ENCOUNTER — HOSPITAL ENCOUNTER (OUTPATIENT)
Dept: CT IMAGING | Facility: HOSPITAL | Age: 63
Discharge: HOME | End: 2018-10-18
Payer: MEDICARE

## 2018-10-18 ENCOUNTER — TELEPHONE (OUTPATIENT)
Dept: FAMILY MEDICINE CLINIC | Facility: CLINIC | Age: 63
End: 2018-10-18

## 2018-10-18 ENCOUNTER — TELEPHONE (OUTPATIENT)
Dept: NEUROLOGY | Facility: CLINIC | Age: 63
End: 2018-10-18

## 2018-10-18 ENCOUNTER — APPOINTMENT (OUTPATIENT)
Dept: PHYSICAL THERAPY | Facility: CLINIC | Age: 63
End: 2018-10-18
Payer: MEDICARE

## 2018-10-18 DIAGNOSIS — J44.9 COPD WITHOUT EXACERBATION (HCC): Chronic | ICD-10-CM

## 2018-10-18 DIAGNOSIS — G35 MULTIPLE SCLEROSIS (HCC): Primary | ICD-10-CM

## 2018-10-18 DIAGNOSIS — R41.3 AMNESIA/MEMORY DISORDER: ICD-10-CM

## 2018-10-18 PROCEDURE — 71250 CT THORAX DX C-: CPT

## 2018-10-18 NOTE — TELEPHONE ENCOUNTER
Pt reports L/arm & shoulder pain, also some back pain  Pain started tuesday  Described as achy   Pain 7/10  Pt taking advil w/good relief  Pt feels that her thinking is off, "feels sad, does not understand her feelings", denies depression; ongoing for months, not getting worse, "i just feel distant"    Pt denies CP/SOB  No N/T  Denies injury    Current meds:   None @this time    Last tysabri inf 8/31, pt feels her emotions have been off since then  Pt getting pneumonia shot on mon 10/22, she will call us once completed    Pt aware that if she starts to feels worse (mentally) or not ok, she needs to let somebody know, pt not on antidepressant  Please advise re symptoms

## 2018-10-18 NOTE — TELEPHONE ENCOUNTER
Pls see note from nursing on Barbara Mendoza  I saw her on Monday, as well as PCP, and she seemed ok to me at the time  Likely the shoulder and back pain are unrelated  Apparently having mood changes "ongoing for months, not getting worse"

## 2018-10-18 NOTE — PROGRESS NOTES
Discharge: Patient's medication has changed, and she has not been feeling herself  She reports she gets injections in 6 weeks that should help, and she prefers to wait until then to resume therapy  Patient will be discharged at this time from speech therapy, and she was instructed to obtain a new script to return when she is back on a proper medication regimen

## 2018-10-18 NOTE — TELEPHONE ENCOUNTER
The arm sxs are not ms related  Tell her to call if any further changes  Pt just seen this week as well as by her pcp as well  Please tell her to call us early next week as well to see how she is doing mood wise  We could update an eeg on her as well  Please make sure she checks her temperature for any fever    Also check if any urinary infection sxs

## 2018-10-18 NOTE — TELEPHONE ENCOUNTER
pls let patient know Dr So Lackey response    I wanted to run it by her since we have been keeping a close eye on Daniela Dunn

## 2018-10-22 ENCOUNTER — CLINICAL SUPPORT (OUTPATIENT)
Dept: FAMILY MEDICINE CLINIC | Facility: CLINIC | Age: 63
End: 2018-10-22
Payer: MEDICARE

## 2018-10-22 ENCOUNTER — APPOINTMENT (OUTPATIENT)
Dept: SPEECH THERAPY | Facility: CLINIC | Age: 63
End: 2018-10-22
Payer: MEDICARE

## 2018-10-22 ENCOUNTER — APPOINTMENT (OUTPATIENT)
Dept: PHYSICAL THERAPY | Facility: CLINIC | Age: 63
End: 2018-10-22
Payer: MEDICARE

## 2018-10-22 VITALS — TEMPERATURE: 96.3 F

## 2018-10-22 DIAGNOSIS — Z23 NEED FOR PNEUMOCOCCAL VACCINE: Primary | ICD-10-CM

## 2018-10-22 PROCEDURE — G0009 ADMIN PNEUMOCOCCAL VACCINE: HCPCS

## 2018-10-22 PROCEDURE — 90670 PCV13 VACCINE IM: CPT

## 2018-10-23 ENCOUNTER — DOCUMENTATION (OUTPATIENT)
Dept: NEUROLOGY | Facility: CLINIC | Age: 63
End: 2018-10-23

## 2018-10-23 ENCOUNTER — OFFICE VISIT (OUTPATIENT)
Dept: FAMILY MEDICINE CLINIC | Facility: CLINIC | Age: 63
End: 2018-10-23
Payer: MEDICARE

## 2018-10-23 ENCOUNTER — TELEPHONE (OUTPATIENT)
Dept: FAMILY MEDICINE CLINIC | Facility: CLINIC | Age: 63
End: 2018-10-23

## 2018-10-23 VITALS
SYSTOLIC BLOOD PRESSURE: 110 MMHG | HEART RATE: 72 BPM | WEIGHT: 153.6 LBS | DIASTOLIC BLOOD PRESSURE: 66 MMHG | RESPIRATION RATE: 14 BRPM | BODY MASS INDEX: 25.59 KG/M2 | TEMPERATURE: 97.8 F | HEIGHT: 65 IN

## 2018-10-23 DIAGNOSIS — G35 MULTIPLE SCLEROSIS (HCC): ICD-10-CM

## 2018-10-23 DIAGNOSIS — M25.512 ACUTE PAIN OF LEFT SHOULDER: Primary | ICD-10-CM

## 2018-10-23 DIAGNOSIS — E78.5 HYPERLIPIDEMIA, UNSPECIFIED HYPERLIPIDEMIA TYPE: Chronic | ICD-10-CM

## 2018-10-23 PROCEDURE — 99213 OFFICE O/P EST LOW 20 MIN: CPT | Performed by: FAMILY MEDICINE

## 2018-10-23 RX ORDER — MELOXICAM 15 MG/1
TABLET ORAL
Qty: 30 TABLET | Refills: 1 | Status: SHIPPED | OUTPATIENT
Start: 2018-10-23 | End: 2018-11-08 | Stop reason: ALTCHOICE

## 2018-10-23 RX ORDER — KETOROLAC TROMETHAMINE 30 MG/ML
30 INJECTION, SOLUTION INTRAMUSCULAR; INTRAVENOUS ONCE
Status: COMPLETED | OUTPATIENT
Start: 2018-10-23 | End: 2018-10-23

## 2018-10-23 RX ORDER — BUDESONIDE 0.5 MG/2ML
INHALANT ORAL
Refills: 0 | COMMUNITY
Start: 2018-10-18 | End: 2021-07-06

## 2018-10-23 RX ADMIN — KETOROLAC TROMETHAMINE 30 MG: 30 INJECTION, SOLUTION INTRAMUSCULAR; INTRAVENOUS at 20:12

## 2018-10-23 NOTE — PROGRESS NOTES
FAMILY PRACTICE OFFICE VISIT       NAME: Margarito Kaufman  AGE: 58 y o  SEX: female       : 1955        MRN: [id number]        Assessment and Plan     Problem List Items Addressed This Visit     Multiple sclerosis, relapsing/remitting    Relevant Medications    ketorolac (TORADOL) injection 30 mg (Completed)    Other Relevant Orders    POCT ECG    Hyperlipidemia (Chronic)      Other Visit Diagnoses     Acute pain of left shoulder    -  Primary    Relevant Medications    ketorolac (TORADOL) injection 30 mg (Completed)    meloxicam (MOBIC) 15 mg tablet    Other Relevant Orders    POCT ECG       Patient presents for evaluation of left shoulder pain  EKG performed in the office today is normal   She denies chest pain, dyspnea, exam is considered with decreased range of motion of left shoulder  Her symptoms likely represent bursitis/possible frozen shoulder  Injection of Toradol administered today  Patient will start meloxicam 15 mg daily  I strongly advised her to contact me in a few days if her symptoms are not improving  I assured patient that her symptoms do not represent symptoms of multiple sclerosis and she will continue with treatment as per [facility] otherwise  COPD  I strongly advised patient to quit smoking  Results of recent CT chest reviewed  Will be planning on repeating study in 6 months  Chief Complaint     Chief Complaint   Patient presents with    Shoulder Pain     Patient is here c/o left shoulder and upper pain and weakness  History of Present Illness     Patient presents for evaluation of left shoulder and left upper arm pain that have started 3-4 days ago  Reportedly, patient has noticed pain after sewing on Saturday night  No injury or fall  Pain radiates to the left upper arm and left shoulder blade  Patient denies any neck pain or radiation down to the left forearm or hand  No numbness, tingling or paresthesias    Patient denies chest pain or dyspnea  Patient also experienced transient discomfort in her lower back that has resolved  She is concerned that her symptoms could represent worsening of multiple sclerosis  Patient states that shoulder hurts with any range of motion  She was not able to sleep on the left shoulder at night due to pain  Patient has not used any medications  Results of recent CT chest discussed with patient    Impression:No evidence of COPD/emphysema   Ami Reap are a few very small nodular opacities in the right lung as detailed above  A couple of these appear to have been present on the study from 2/21/2017 and a couple may be new  It would be reasonable to obtain another CT of the chest in one year is   continued surveillance  Shoulder Pain          Review of Systems   Review of Systems   Constitutional: Negative  HENT: Negative  Respiratory: Negative  Cardiovascular: Negative  Musculoskeletal: Positive for arthralgias ( left shoulder pain) and back pain (Resolved low back pain)  Negative for neck pain  Neurological: Negative  Psychiatric/Behavioral: The patient is nervous/anxious          Active Problem List     Patient Active Problem List   Diagnosis    Multiple sclerosis, relapsing/remitting    Muscle spasm    Hyperlipidemia    Osteoporosis    Ambulatory dysfunction    COPD without exacerbation ([address] )    Vitamin D deficiency    Disc degeneration, lumbar    Major depression, recurrent, chronic (HCC)    Allergic rhinitis       Past Medical History:  Past Medical History:   Diagnosis Date    Asthma     Degeneration of intervertebral disc at L5-S1 level     Depression     Generalized anxiety disorder     Hepatitis     at age 4 yrs    Herpes zoster     last assessed: 6/20/13    Hypercholesterolemia     Hyperlipidemia     MS (multiple sclerosis) ([address] )     Osteoporosis     Seasonal allergies     Tumor of breast     benign, right breast       Past Surgical History:  Past Surgical History:   Procedure Laterality Date    BREAST SURGERY      right breast fibroid tumor resection    COLONOSCOPY      11/2013 - due in 2018 - Dr Crisostomo    FL LUMBAR PUNCTURE  10/1/2018    ROTATOR CUFF REPAIR      Bilateral    ROTATOR CUFF REPAIR Bilateral     SEPTOPLASTY         Family History:  Family History   Problem Relation Age of Onset    Arthritis Mother     Osteoporosis Mother     COPD Father     Arthritis Family     COPD Family     Emphysema Family     Heart disease Family     Hyperthyroidism Family     Multiple sclerosis Family     Osteoarthritis Family        Social History:  Social History     Social History    Marital status: /Civil Union     Spouse name: N/A    Number of children: N/A    Years of education: N/A     Occupational History    Retired      Social History Main Topics    Smoking status: Current Every Day Smoker     Packs/day: 1 00     Years: 40 00     Types: Cigarettes    Smokeless tobacco: Never Used    Alcohol use Yes      Comment: occasional; [employer] also states Never drank alcohol    Drug use: No    Sexual activity: Yes     Partners: Male      Comment: denied any risk of AIDS     Other Topics Concern    Not on file     Social History Narrative           Ready to quit: Yes  Counseling given: Yes        Objective     Vitals:    10/23/18 1142   BP: 110/66   Pulse: 72   Resp: 14   Temp: 97 8 °F (36 6 °C)   TempSrc: Tympanic   Weight: 69 7 kg (153 lb 9 6 oz)   Height: 5' 5" (1 651 m)     Wt Readings from Last 3 Encounters:   10/23/18 69 7 kg (153 lb 9 6 oz)   10/15/18 70 kg (154 lb 6 4 oz)   10/15/18 69 4 kg (153 lb)       Physical Exam   Constitutional: She is oriented to person, place, and time  She appears well-developed and well-nourished  HENT:   Head: Normocephalic and atraumatic  Eyes: Conjunctivae are normal    Neck: Neck supple  Carotid bruit is not present  No thyromegaly present     Cardiovascular: Normal rate, regular rhythm and normal heart sounds  No murmur heard  Pulmonary/Chest: Effort normal and breath sounds normal  No respiratory distress  She has no wheezes  Abdominal: She exhibits no abdominal bruit  Musculoskeletal: She exhibits no edema  Left shoulder:  Patient is able to abduct and extend for almost 180° with mild hesitancy  Significantly decreased overhead and behind back range of motion with reproducible left shoulder pain  Neck:  Full range of motion, mild paraspinal spasm   Neurological: She is alert and oriented to person, place, and time  No cranial nerve deficit  Coordination normal    Psychiatric: She has a normal mood and affect  Her behavior is normal    Nursing note and vitals reviewed      EKG:  Normal sinus rhythm, no ischemic changes  Pertinent Laboratory/Diagnostic Studies:  Lab Results   Component Value Date    GLUCOSE 107 12/15/2015    BUN 13 10/04/2018    CREATININE 0 64 10/04/2018    CALCIUM 8 9 10/04/2018     10/04/2018    K 4 3 10/04/2018    CO2 27 10/04/2018     10/04/2018     Lab Results   Component Value Date    ALT 26 10/04/2018    AST 15 10/04/2018    ALKPHOS 94 10/04/2018    BILITOT 0 30 12/15/2015       Lab Results   Component Value Date    WBC 9 40 10/04/2018    HGB 13 5 10/04/2018    HCT 40 2 10/04/2018    MCV 89 10/04/2018     10/04/2018       No results found for: TSH    Lab Results   Component Value Date    CHOL 169 07/21/2015     Lab Results   Component Value Date    TRIG 127 09/28/2018     Lab Results   Component Value Date    HDL 43 09/28/2018     Lab Results   Component Value Date    LDLCALC 97 09/28/2018     Lab Results   Component Value Date    HGBA1C 5 8 (H) 10/20/2015       Results for orders placed or performed in visit on 10/10/18   Chronic Hepatitis Panel   Result Value Ref Range    Hepatitis B Surface Ag Non-reactive Non-reactive, NonReactive - Confirmed    Hepatitis C Ab Non-reactive Non-reactive    Hep B C IgM Non-reactive Non-reactive    Hep B Core Total Ab Non-reactive Non-reactive       Orders Placed This Encounter   Procedures    POCT ECG       ALLERGIES:  Allergies   Allergen Reactions    Bactrim [Sulfamethoxazole-Trimethoprim] Other (See Comments)     Reaction Date: 11Aug2011;     Betadine [Povidone Iodine]     Cefzil [Cefprozil]     Gadobutrol GI Intolerance    Medroxyprogesterone Other (See Comments)     Reaction Date: 11Aug2011;     Nortriptyline Other (See Comments)    Pamelor [Nortriptyline Hcl]     Prempro [Conj Estrog-Medroxyprogest Ace] Other (See Comments)    Provera [Medroxyprogesterone Acetate]     Zyban [Bupropion]        Current Medications     Current Outpatient Prescriptions   Medication Sig Dispense Refill    albuterol (PROAIR HFA) 90 mcg/act inhaler Inhale 2 puffs every 4 (four) hours as needed for wheezing or shortness of breath 3 Inhaler 1    aspirin (ECOTRIN LOW STRENGTH) 81 mg EC tablet Take 1 tablet by mouth daily      atorvastatin (LIPITOR) 40 mg tablet Take 1 tablet (40 mg total) by mouth daily 90 tablet 3    B Complex-C CAPS Take by mouth      BL EVENING PRIMROSE OIL PO Take by mouth      budesonide (PULMICORT) 0 5 mg/2 mL nebulizer solution inhale contents of 1 vial in nebulizer twice a day  0    ezetimibe (ZETIA) 10 mg tablet Take 1 tablet (10 mg total) by mouth daily 90 tablet 3    Flaxseed, Linseed, (FLAXSEED OIL PO) Take 2 capsules by mouth daily        fluticasone (FLONASE) 50 mcg/act nasal spray 2 sprays into each nostril daily 48 g 3    fluticasone-vilanterol (BREO ELLIPTA) 100-25 mcg/inh inhaler One puff once a day, rinse mouth after use   1 Inhaler 3    gabapentin (NEURONTIN) 300 mg capsule Take 1 capsule (300 mg total) by mouth 2 (two) times a day 180 capsule 3    metoclopramide (REGLAN) 10 mg tablet Take 1 tablet (10 mg total) by mouth every 8 (eight) hours as needed (nausea) 5 tablet 0    montelukast (SINGULAIR) 10 mg tablet Take 1 tablet (10 mg total) by mouth daily at bedtime 90 tablet 3    sertraline (ZOLOFT) 100 mg tablet Take 1 tablet (100 mg total) by mouth daily 90 tablet 3    meloxicam (MOBIC) 15 mg tablet Take 1 tablet once a day after food as needed for shoulder pain 30 tablet 1     No current facility-administered medications for this visit            Health Maintenance     Health Maintenance   Topic Date Due    Lung Cancer Screening  09/28/2009    SLP PLAN OF CARE  10/10/2018    DTaP,Tdap,and Td Vaccines (1 - Tdap) 09/12/2019 (Originally 6/3/2016)    PAP SMEAR  07/26/2020    CRC Screening: Colonoscopy  11/25/2023    INFLUENZA VACCINE  Completed    Pneumococcal PPSV23 Highest Risk Adult  Completed     Immunization History   Administered Date(s) Administered    Influenza 09/26/2011    Influenza Quadrivalent Preservative Free 3 years and older IM 09/29/2015, 09/22/2016, 09/08/2017    Influenza TIV (IM) 01/17/2013, 09/03/2013, 09/23/2014    Influenza, recombinant, quadrivalent,injectable, preservative free 09/13/2018    Pneumococcal Conjugate 13-Valent 10/22/2018    Pneumococcal Polysaccharide PPV23 10/01/2007, 09/29/2015    Td (adult), adsorbed 06/02/2016       Theresa Diaz MD

## 2018-10-23 NOTE — TELEPHONE ENCOUNTER
Please contact patient  I spoke with her neurologist, Dr Lucas White  Will start her on anti-inflammatory medication for shoulder pain  I did send prescription for medication Mobic 15 mg once daily  Patient can started tomorrow  She should use 1 tablet once a day after food for the next 5-10 days  Once shoulder pain resolve-she can stop medication  If her shoulder pain will not improve by next week-please advise patient to contact me with an update    Thank you

## 2018-10-24 ENCOUNTER — TELEPHONE (OUTPATIENT)
Dept: NEUROLOGY | Facility: CLINIC | Age: 63
End: 2018-10-24

## 2018-10-24 ENCOUNTER — HOSPITAL ENCOUNTER (OUTPATIENT)
Dept: MAMMOGRAPHY | Facility: HOSPITAL | Age: 63
Discharge: HOME | End: 2018-10-24
Attending: OBSTETRICS & GYNECOLOGY
Payer: MEDICARE

## 2018-10-24 DIAGNOSIS — Z12.31 ENCOUNTER FOR SCREENING MAMMOGRAM FOR MALIGNANT NEOPLASM OF BREAST: ICD-10-CM

## 2018-10-24 DIAGNOSIS — G35 MULTIPLE SCLEROSIS (HCC): Primary | ICD-10-CM

## 2018-10-24 PROCEDURE — 77067 SCR MAMMO BI INCL CAD: CPT

## 2018-10-24 PROCEDURE — 77063 BREAST TOMOSYNTHESIS BI: CPT

## 2018-10-24 NOTE — TELEPHONE ENCOUNTER
Called infusion center and scheduled patient for Dec  6 and Dec  20 @ 7:30am   Made patient aware of dates and times for infusion  She will have her bloodwork done 3 days prior to each infusion  Labs submitted     ----- Message from Beryl Barron MD sent at 10/23/2018  4:32 PM EDT -----  JUST SPOKE TO PCP  PT WAS THERE TODAY FOR PAIN IN SHOULDER AND BACK  PT GIVEN TORADOL INJECTION  PCP GAVE VACCINATION YESTERDAY  PCP HOLDING ON MEDROL AND WILL TRY NSAIDS TO SEE IF HELP WITH  HER PAIN  PLEASE SEE BELOW TO GET OCREVUS SCHEDULED IN 6 WEEKS    MD Jeni Aleln, RN             PLEASE CHECK WITH PT IF SHE GOT HER PNEUMONIA VACCINE ON Monday   PLEASE THAN CALCULATE ABOUT 6 WEEKS FROM THAT DATE TO GET HER FIRST SPLIT INFUSION FOR OCREVUS SET UP AND THEN SECOND ONE 2 WEEKS LATER   LET ME KNOW WHEN SCHEDULED

## 2018-10-24 NOTE — TELEPHONE ENCOUNTER
Patient seen by PCP yesterday, had a toradol inj, denies any sxs of UTI, no fevers  She is aware her arm sxs are not ms related  She is requesting we place an order for EEG  Requesting a call back once ordered so she can schedule

## 2018-10-25 ENCOUNTER — APPOINTMENT (OUTPATIENT)
Dept: SPEECH THERAPY | Facility: CLINIC | Age: 63
End: 2018-10-25
Payer: MEDICARE

## 2018-10-25 ENCOUNTER — APPOINTMENT (OUTPATIENT)
Dept: PHYSICAL THERAPY | Facility: CLINIC | Age: 63
End: 2018-10-25
Payer: MEDICARE

## 2018-10-25 PROCEDURE — 93000 ELECTROCARDIOGRAM COMPLETE: CPT | Performed by: FAMILY MEDICINE

## 2018-10-28 ENCOUNTER — HOSPITAL ENCOUNTER (OUTPATIENT)
Dept: MRI IMAGING | Facility: HOSPITAL | Age: 63
Discharge: HOME | End: 2018-10-28
Attending: PSYCHIATRY & NEUROLOGY
Payer: MEDICARE

## 2018-10-28 DIAGNOSIS — M54.50 ACUTE BILATERAL LOW BACK PAIN WITHOUT SCIATICA: ICD-10-CM

## 2018-10-28 DIAGNOSIS — G35 MULTIPLE SCLEROSIS (HCC): ICD-10-CM

## 2018-10-28 PROCEDURE — 72148 MRI LUMBAR SPINE W/O DYE: CPT

## 2018-10-28 PROCEDURE — 72157 MRI CHEST SPINE W/O & W/DYE: CPT

## 2018-10-28 PROCEDURE — A9585 GADOBUTROL INJECTION: HCPCS | Performed by: PSYCHIATRY & NEUROLOGY

## 2018-10-28 RX ADMIN — GADOBUTROL 6 ML: 604.72 INJECTION INTRAVENOUS at 11:28

## 2018-10-29 ENCOUNTER — APPOINTMENT (OUTPATIENT)
Dept: PHYSICAL THERAPY | Facility: CLINIC | Age: 63
End: 2018-10-29
Payer: MEDICARE

## 2018-10-29 ENCOUNTER — APPOINTMENT (OUTPATIENT)
Dept: SPEECH THERAPY | Facility: CLINIC | Age: 63
End: 2018-10-29
Payer: MEDICARE

## 2018-11-08 ENCOUNTER — TRANSCRIBE ORDERS (OUTPATIENT)
Dept: RADIOLOGY | Facility: HOSPITAL | Age: 63
End: 2018-11-08

## 2018-11-08 ENCOUNTER — TELEPHONE (OUTPATIENT)
Dept: FAMILY MEDICINE CLINIC | Facility: CLINIC | Age: 63
End: 2018-11-08

## 2018-11-08 ENCOUNTER — OFFICE VISIT (OUTPATIENT)
Dept: FAMILY MEDICINE CLINIC | Facility: CLINIC | Age: 63
End: 2018-11-08
Payer: MEDICARE

## 2018-11-08 ENCOUNTER — HOSPITAL ENCOUNTER (OUTPATIENT)
Dept: RADIOLOGY | Facility: HOSPITAL | Age: 63
Discharge: HOME | End: 2018-11-08
Payer: MEDICARE

## 2018-11-08 VITALS
BODY MASS INDEX: 25.76 KG/M2 | WEIGHT: 154.6 LBS | HEIGHT: 65 IN | DIASTOLIC BLOOD PRESSURE: 60 MMHG | TEMPERATURE: 97.7 F | RESPIRATION RATE: 16 BRPM | HEART RATE: 68 BPM | SYSTOLIC BLOOD PRESSURE: 122 MMHG

## 2018-11-08 DIAGNOSIS — M75.42 SHOULDER IMPINGEMENT SYNDROME, LEFT: Primary | ICD-10-CM

## 2018-11-08 DIAGNOSIS — M75.42 SHOULDER IMPINGEMENT SYNDROME, LEFT: ICD-10-CM

## 2018-11-08 DIAGNOSIS — G35 MULTIPLE SCLEROSIS (HCC): ICD-10-CM

## 2018-11-08 PROCEDURE — 99213 OFFICE O/P EST LOW 20 MIN: CPT | Performed by: FAMILY MEDICINE

## 2018-11-08 PROCEDURE — 73030 X-RAY EXAM OF SHOULDER: CPT

## 2018-11-08 PROCEDURE — 73050 X-RAY EXAM OF SHOULDERS: CPT

## 2018-11-08 RX ORDER — KETOROLAC TROMETHAMINE 30 MG/ML
30 INJECTION, SOLUTION INTRAMUSCULAR; INTRAVENOUS ONCE
Status: COMPLETED | OUTPATIENT
Start: 2018-11-08 | End: 2018-11-08

## 2018-11-08 RX ADMIN — KETOROLAC TROMETHAMINE 30 MG: 30 INJECTION, SOLUTION INTRAMUSCULAR; INTRAVENOUS at 13:36

## 2018-11-08 NOTE — PROGRESS NOTES
FAMILY PRACTICE OFFICE VISIT       NAME: Declan Kaufman  AGE: 58 y o  SEX: female       : 1955        MRN: [id number]        Assessment and Plan     Problem List Items Addressed This Visit     Multiple sclerosis, relapsing/remitting      Other Visit Diagnoses     Shoulder impingement syndrome, left    -  Primary    Relevant Medications    ketorolac (TORADOL) injection 30 mg (Completed)    Other Relevant Orders    XR shoulder 2+ vw left (Completed)    XR acromioclavicular joints bilateral w wo weights (Completed)    Ambulatory referral to Orthopedic Surgery       Patient presents for re-evaluation of persistent left shoulder pain  Her exam is concerning for possible left impingement syndrome, possible rotator cuff tear/strain  Will proceed with x-rays of left shoulder and AC joint  Referral to [facility] surgery for further evaluation  I spoke with patient's neurologist, Dr Monica Bettencourt  Pending start of Ocrevus infusions  There is no objection for corticosteroid shoulder injection if needed  Patient will be contacting [facility] group to set up consultation  Injection of Toradol was administered today  Will discontinue meloxicam due to lack of efficacy    There are no Patient Instructions on file for this visit  M*Experts 911 software was used to dictate this note  It may contain errors with dictating incorrect words/spelling  Please contact provider directly with any questions  Chief Complaint     Chief Complaint   Patient presents with    Arm Pain     Pt is here w/ c/o left arm pain        History of Present Illness     Patient presents for re-evaluation of persistent left shoulder pain  She was seen with similar complaint 2 weeks ago  Patient denies any unusual neck pain or left upper extremity radiculopathy symptoms  She is complaining of pain in the left shoulder radiating down to the left mid upper arm    She has been using meloxicam 15 mg daily to relieve her symptoms without success  Patient states the Toradol injection that was administered 2 weeks ago was very helpful  Patient recalls having rotator cuff surgery done a while ago  Chronic neck pain has not changed  MRI of cervical spine was performed in June of 2018  Stable multilevel disc disease  Patient is in the process of starting new therapy for multiple sclerosis, 1st infusion is scheduled for December 6  Patient's neurologist, Dr Yovany Alarcon, prefers to avoid any oral steroids if possible  Arm Pain          Review of Systems   Review of Systems   Constitutional: Negative  HENT: Negative  Respiratory: Negative  Cardiovascular: Negative  Gastrointestinal: Negative  Musculoskeletal: Positive for arthralgias (Left shoulder pain)  Psychiatric/Behavioral: Negative          Active Problem List     Patient Active Problem List   Diagnosis    Multiple sclerosis, relapsing/remitting    Muscle spasm    Hyperlipidemia    Osteoporosis    Ambulatory dysfunction    COPD without exacerbation ([address] )    Vitamin D deficiency    Disc degeneration, lumbar    Major depression, recurrent, chronic (HCC)    Allergic rhinitis       Past Medical History:  Past Medical History:   Diagnosis Date    Asthma     Degeneration of intervertebral disc at L5-S1 level     Depression     Generalized anxiety disorder     Hepatitis     at age 4 yrs    Herpes zoster     last assessed: 6/20/13    Hypercholesterolemia     Hyperlipidemia     MS (multiple sclerosis) ([address] )     Osteoporosis     Seasonal allergies     Tumor of breast     benign, right breast       Past Surgical History:  Past Surgical History:   Procedure Laterality Date    BREAST SURGERY      right breast fibroid tumor resection    COLONOSCOPY      11/2013 - due in 2018 - Dr Crisostomo    FL LUMBAR PUNCTURE  10/1/2018    ROTATOR CUFF REPAIR      Bilateral    ROTATOR CUFF REPAIR Bilateral     SEPTOPLASTY         Family History:  Family History   Problem Relation Age of Onset    Arthritis Mother     Osteoporosis Mother     COPD Father     Arthritis Family     COPD Family     Emphysema Family     Heart disease Family     Hyperthyroidism Family     Multiple sclerosis Family     Osteoarthritis Family        Social History:  Social History     Social History    Marital status: /Civil Union     Spouse name: N/A    Number of children: N/A    Years of education: N/A     Occupational History    Retired      Social History Main Topics    Smoking status: Current Every Day Smoker     Packs/day: 1 00     Years: 40 00     Types: Cigarettes    Smokeless tobacco: Never Used    Alcohol use Yes      Comment: occasional; Allscripts also states Never drank alcohol    Drug use: No    Sexual activity: Yes     Partners: Male      Comment: denied any risk of AIDS     Other Topics Concern    Not on file     Social History Narrative               Objective     Vitals:    11/08/18 1205   BP: 122/60   BP Location: Left arm   Patient Position: Sitting   Cuff Size: Adult   Pulse: 68   Resp: 16   Temp: 97 7 °F (36 5 °C)   TempSrc: Tympanic   Weight: 70 1 kg (154 lb 9 6 oz)   Height: 5' 5" (1 651 m)     Wt Readings from Last 3 Encounters:   11/08/18 70 1 kg (154 lb 9 6 oz)   10/23/18 69 7 kg (153 lb 9 6 oz)   10/15/18 70 kg (154 lb 6 4 oz)       Physical Exam   Constitutional: She is oriented to person, place, and time  She appears well-developed and well-nourished  Cardiovascular: Normal rate  No murmur heard  Pulmonary/Chest: Effort normal and breath sounds normal  No respiratory distress  She has no wheezes  She has no rales  Musculoskeletal:   Neck:  Mild paraspinal spasm, slightly decreased range of motion all directions, no pain  Left shoulder:  Decreased range of motion due to pain  Patient is able to extend and abduct up to  degrees, further range of motion is quite painful  Significantly decreased overhead and behind back range of motion  Neurological: She is alert and oriented to person, place, and time  No cranial nerve deficit  Psychiatric: She has a normal mood and affect  Her behavior is normal    Nursing note and vitals reviewed        Pertinent Laboratory/Diagnostic Studies:  Lab Results   Component Value Date    GLUCOSE 107 12/15/2015    BUN 13 10/04/2018    CREATININE 0 64 10/04/2018    CALCIUM 8 9 10/04/2018     12/15/2015    K 4 3 10/04/2018    CO2 27 10/04/2018     10/04/2018     Lab Results   Component Value Date    ALT 26 10/04/2018    AST 15 10/04/2018    ALKPHOS 94 10/04/2018    BILITOT 0 30 12/15/2015       Lab Results   Component Value Date    WBC 9 40 10/04/2018    HGB 13 5 10/04/2018    HCT 40 2 10/04/2018    MCV 89 10/04/2018     10/04/2018       No results found for: TSH    Lab Results   Component Value Date    CHOL 169 07/21/2015     Lab Results   Component Value Date    TRIG 127 09/28/2018     Lab Results   Component Value Date    HDL 43 09/28/2018     Lab Results   Component Value Date    LDLCALC 97 09/28/2018     Lab Results   Component Value Date    HGBA1C 5 8 (H) 10/20/2015       Results for orders placed or performed in visit on 10/10/18   Chronic Hepatitis Panel   Result Value Ref Range    Hepatitis B Surface Ag Non-reactive Non-reactive, NonReactive - Confirmed    Hepatitis C Ab Non-reactive Non-reactive    Hep B C IgM Non-reactive Non-reactive    Hep B Core Total Ab Non-reactive Non-reactive       Orders Placed This Encounter   Procedures    XR shoulder 2+ vw left    XR acromioclavicular joints bilateral w wo weights    Ambulatory referral to Orthopedic Surgery       ALLERGIES:  Allergies   Allergen Reactions    Bactrim [Sulfamethoxazole-Trimethoprim] Other (See Comments)     Reaction Date: 11Aug2011;     Betadine [Povidone Iodine]     Cefzil [Cefprozil]     Gadobutrol GI Intolerance    Medroxyprogesterone Other (See Comments)     Reaction Date: 11Aug2011;     Nortriptyline Other (See Comments)    Pamelor [Nortriptyline Hcl]     Prempro [Conj Estrog-Medroxyprogest Ace] Other (See Comments)    Provera [Medroxyprogesterone Acetate]     Zyban [Bupropion]        Current Medications     Current Outpatient Prescriptions   Medication Sig Dispense Refill    albuterol (PROAIR HFA) 90 mcg/act inhaler Inhale 2 puffs every 4 (four) hours as needed for wheezing or shortness of breath 3 Inhaler 1    aspirin (ECOTRIN LOW STRENGTH) 81 mg EC tablet Take 1 tablet by mouth daily      atorvastatin (LIPITOR) 40 mg tablet Take 1 tablet (40 mg total) by mouth daily 90 tablet 3    B Complex-C CAPS Take by mouth      BL EVENING PRIMROSE OIL PO Take by mouth      budesonide (PULMICORT) 0 5 mg/2 mL nebulizer solution inhale contents of 1 vial in nebulizer twice a day  0    ezetimibe (ZETIA) 10 mg tablet Take 1 tablet (10 mg total) by mouth daily 90 tablet 3    Flaxseed, Linseed, (FLAXSEED OIL PO) Take 2 capsules by mouth daily        fluticasone (FLONASE) 50 mcg/act nasal spray 2 sprays into each nostril daily 48 g 3    fluticasone-vilanterol (BREO ELLIPTA) 100-25 mcg/inh inhaler One puff once a day, rinse mouth after use  1 Inhaler 3    gabapentin (NEURONTIN) 300 mg capsule Take 1 capsule (300 mg total) by mouth 2 (two) times a day 180 capsule 3    metoclopramide (REGLAN) 10 mg tablet Take 1 tablet (10 mg total) by mouth every 8 (eight) hours as needed (nausea) 5 tablet 0    montelukast (SINGULAIR) 10 mg tablet Take 1 tablet (10 mg total) by mouth daily at bedtime 90 tablet 3    sertraline (ZOLOFT) 100 mg tablet Take 1 tablet (100 mg total) by mouth daily 90 tablet 3     No current facility-administered medications for this visit            Health Maintenance     Health Maintenance   Topic Date Due    SLP PLAN OF CARE  10/10/2018    DTaP,Tdap,and Td Vaccines (1 - Tdap) 09/12/2019 (Originally 6/3/2016)    Lung Cancer Screening  10/18/2019    PAP SMEAR  07/26/2020    CRC Screening: Colonoscopy 11/25/2023    INFLUENZA VACCINE  Completed    Pneumococcal PPSV23 Highest Risk Adult  Completed     Immunization History   Administered Date(s) Administered    Influenza 09/26/2011    Influenza Quadrivalent Preservative Free 3 years and older IM 09/29/2015, 09/22/2016, 09/08/2017    Influenza TIV (IM) 01/17/2013, 09/03/2013, 09/23/2014    Influenza, recombinant, quadrivalent,injectable, preservative free 09/13/2018    Pneumococcal Conjugate 13-Valent 10/22/2018    Pneumococcal Polysaccharide PPV23 10/01/2007, 09/29/2015    Td (adult), adsorbed 06/02/2016       Fernando Bolivar MD

## 2018-11-08 NOTE — TELEPHONE ENCOUNTER
----- Message from Crystal Glass MD sent at 11/8/2018  5:44 PM EST -----  Please contact patient  I received results of her shoulder x-ray  It reveals arthritic changes and changes of tendinitis and associated with rotator cuff  Please let her know that I spoke with her neurologist, Dr Derik Syed  She has no objection for steroid injection in her left shoulder  Please let patient know that I will contact Dr Edwards and will  Discuss it with him   Please ask patient to contact his office for an appointment tomorrow    Thank you

## 2018-11-09 ENCOUNTER — TELEPHONE (OUTPATIENT)
Dept: FAMILY MEDICINE CLINIC | Facility: CLINIC | Age: 63
End: 2018-11-09

## 2018-11-09 NOTE — TELEPHONE ENCOUNTER
Please contact [facility] at [facility]  I referred this patient to be evaluated by Dr Tamara Maldonado for left shoulder pain, I am concerned about rotator cuff tear  This patient is being treated actively for multiple sclerosis with a new medication  infusion scheduled in early December  Her neurologist would prefer to avoid any oral corticosteroids but has no objection for shoulder corticosteroid injection if needed  Given her complex medical history and pending MS treatment in early December it would be prudent to take care of the shoulder ASAP  I would appreciate if they could facilitate appointment with Dr Tamara Maldonado at earliest possibility    Thank you

## 2018-11-09 NOTE — TELEPHONE ENCOUNTER
Called [facility] and scheduled appt for 11/14/2018   Spoke w/ pt and she is unable make that day but will call back to r/s today

## 2018-11-13 ENCOUNTER — HOSPITAL ENCOUNTER (OUTPATIENT)
Dept: NEUROLOGY | Facility: AMBULATORY SURGERY CENTER | Age: 63
Discharge: HOME | End: 2018-11-13
Payer: MEDICARE

## 2018-11-13 DIAGNOSIS — G35 MULTIPLE SCLEROSIS (HCC): ICD-10-CM

## 2018-11-13 DIAGNOSIS — R41.3 AMNESIA/MEMORY DISORDER: ICD-10-CM

## 2018-11-13 PROCEDURE — 95816 EEG AWAKE AND DROWSY: CPT

## 2018-11-14 ENCOUNTER — TELEPHONE (OUTPATIENT)
Dept: NEUROLOGY | Facility: CLINIC | Age: 63
End: 2018-11-14

## 2018-11-14 DIAGNOSIS — G35 MULTIPLE SCLEROSIS (HCC): Primary | ICD-10-CM

## 2018-11-14 PROCEDURE — 95813 EEG EXTND MNTR 61-119 MIN: CPT | Performed by: PSYCHIATRY & NEUROLOGY

## 2018-11-14 NOTE — PROGRESS NOTES
G-codes required for month of October since pt has been on 30 day hold since this visit  G-codes determined based off of clinical judgement of pt's last session and having minimal LOBs that she was able to self-correct

## 2018-11-14 NOTE — PROCEDURES
Procedures by WAQAR Ghotra at 8/30/2016   9:00 AM      Author:  WAQAR Ghotra Service:  (none) Author Type:  Speech and Language Pathologist     Filed:  8/30/2016  9:59 AM Date of Service:  8/30/2016  9:00 AM Status:  Signed     :  WAQAR Thornton (Speech and Language Pathologist)         Pre-procedure Diagnoses:       1  Multiple sclerosis [G35]       2  Pharyngeal dysphagia [R13 13]                Procedures:       1  FL BARIUM Venus Tracy SPEECH [KWV564 (Custom)]                                                      Video Swallow Study      Patient Name: Kendall MENDEZ Date: 8/30/2016  par  par  Past Medical History  Past Medical History      Diagnosis   Date    Degeneration of intervertebral disc at L5-S1 level      Hyperlipidemia      MS (multiple sclerosis)      Osteoporosis      Seasonal allergies      Tumor of breast       benign, right breast      par  Past Surgical History  Past Surgical History       Procedure   Laterality Date    Rotator cuff repair        Left           General Information:    64 yo female referred to [facility]  for a VBS by Dr Kenroy Stovall for dysphagia w/  c/o food getting stuck in throat, most recently this past Sunday  Pt attributes symptoms to heat  when her MS symptoms are also worse  Cognition: cooperative, interactive  Speech/Swallow Mech: Oral motor movements appeared  WNL; Dentition was  adequate  Respiratory Status: WFL on room air;   Current diet: regular w/ thin liquids  Prior VBS none    Pt was seen in radiology for a Video Barium Swallow Study, seated in the upright position and viewed laterally with the following consistencies: puree, soft, solid, HTL, NTL, thin liquids and whole pill w/ water  Results are as follows:           Oral Stage:   Bolus retrieval, mastication, manipulation and transfer appear WNL  No oral residue noted  Good oral control of liquids by cup and straw               Pharyngeal Stage:   Delayed Rosemary Oconnor Frailing   2018    S: pt very uncomfortable with contractions.  Right > left side    O:/62  Pulse 100  Temp 100.8  F (38.2  C) (Temporal)  Resp 16  Ht 1.524 m (5')  Wt 88.5 kg (195 lb)  SpO2 97%  BMI 38.08 kg/m2   Pitocin off, previously at 12 units  Previously had IUPC that was removed due to inadequate read.  Cervix 9/100/0  Tried pushing with contraction and no descent or reduction of cervix    A/p: at 39w6d   Arrest of dilation, failure to progress  Plan for PLTCS. R/b/a were reviewed including bleeding, infection, and injury to surrounding structures.  All questions were answered.    Geoffrey Trujillo      swallow initiation w/ bolus reaching pyriform sinuses prior to swallow initiation  However epiglottic inversion, airway closure and pharyngeal constriction were WNL w/ no pharyngeal retention  Whole pill passed through phayrnx w/o difficulty  Esophageal Stage:   briefly assessed, no overt abnormality  Assessment Summary:   Mild pharyngeal dysphagia due to delayed swallow initiation, but no evidence of penetration or aspiration  No pharyngeal retention noted       Diagnosis/Prognosis:            Recommendations:   cont regular diet w/ thin liquids   no f/u tx warranted                              Received for:Provider  [facility]   Aug 30 2016  9[facility]59AM [facility]

## 2018-11-14 NOTE — TELEPHONE ENCOUNTER
----- Message from Rosangela Patel PA-C sent at 11/14/2018 11:31 AM EST -----  Please let patient know there were some rare sharp waves on her routine EEG  Dr Jen Archer and I would like to get a 48hr EEG to better evaluate this over a longer time period    Order in chart

## 2018-11-16 ENCOUNTER — OFFICE VISIT (OUTPATIENT)
Dept: OBGYN CLINIC | Facility: CLINIC | Age: 63
End: 2018-11-16
Payer: MEDICARE

## 2018-11-16 VITALS
HEIGHT: 64 IN | SYSTOLIC BLOOD PRESSURE: 129 MMHG | HEART RATE: 76 BPM | DIASTOLIC BLOOD PRESSURE: 80 MMHG | BODY MASS INDEX: 26.8 KG/M2 | WEIGHT: 157 LBS

## 2018-11-16 DIAGNOSIS — M75.42 SHOULDER IMPINGEMENT SYNDROME, LEFT: ICD-10-CM

## 2018-11-16 PROCEDURE — 99214 OFFICE O/P EST MOD 30 MIN: CPT | Performed by: ORTHOPAEDIC SURGERY

## 2018-11-16 PROCEDURE — 20610 DRAIN/INJ JOINT/BURSA W/O US: CPT | Performed by: ORTHOPAEDIC SURGERY

## 2018-11-16 RX ORDER — BUPIVACAINE HYDROCHLORIDE 2.5 MG/ML
2 INJECTION, SOLUTION INFILTRATION; PERINEURAL
Status: COMPLETED | OUTPATIENT
Start: 2018-11-16 | End: 2018-11-16

## 2018-11-16 RX ORDER — LIDOCAINE HYDROCHLORIDE 10 MG/ML
1 INJECTION, SOLUTION INFILTRATION; PERINEURAL
Status: COMPLETED | OUTPATIENT
Start: 2018-11-16 | End: 2018-11-16

## 2018-11-16 RX ORDER — MELOXICAM 7.5 MG/1
7.5 TABLET ORAL DAILY
Qty: 60 TABLET | Refills: 0 | Status: SHIPPED | OUTPATIENT
Start: 2018-11-16 | End: 2018-11-16 | Stop reason: SDUPTHER

## 2018-11-16 RX ORDER — BETAMETHASONE SODIUM PHOSPHATE AND BETAMETHASONE ACETATE 3; 3 MG/ML; MG/ML
6 INJECTION, SUSPENSION INTRA-ARTICULAR; INTRALESIONAL; INTRAMUSCULAR; SOFT TISSUE
Status: COMPLETED | OUTPATIENT
Start: 2018-11-16 | End: 2018-11-16

## 2018-11-16 RX ORDER — MELOXICAM 7.5 MG/1
7.5 TABLET ORAL DAILY
Qty: 60 TABLET | Refills: 0 | Status: SHIPPED | OUTPATIENT
Start: 2018-11-16 | End: 2018-12-04

## 2018-11-16 RX ADMIN — BETAMETHASONE SODIUM PHOSPHATE AND BETAMETHASONE ACETATE 6 MG: 3; 3 INJECTION, SUSPENSION INTRA-ARTICULAR; INTRALESIONAL; INTRAMUSCULAR; SOFT TISSUE at 14:23

## 2018-11-16 RX ADMIN — BUPIVACAINE HYDROCHLORIDE 2 ML: 2.5 INJECTION, SOLUTION INFILTRATION; PERINEURAL at 14:23

## 2018-11-16 RX ADMIN — LIDOCAINE HYDROCHLORIDE 1 ML: 10 INJECTION, SOLUTION INFILTRATION; PERINEURAL at 14:23

## 2018-11-16 NOTE — PROGRESS NOTES
Assessment:  1  Shoulder impingement syndrome, left  Ambulatory referral to Orthopedic Surgery     Patient Active Problem List   Diagnosis    Multiple sclerosis, relapsing/remitting    Muscle spasm    Hyperlipidemia    Osteoporosis    Ambulatory dysfunction    COPD without exacerbation ([facility] [address] )    Vitamin D deficiency    Disc degeneration, lumbar    Major depression, recurrent, chronic (HCC)    Allergic rhinitis    Amnesia/memory disorder           Plan       injection into the subacromial space today  Follow up next week for injection in the acromioclavicular joint  Follow-up in 2 weeks to see if she needs an injection in the biceps or the glenohumeral joint the seem to be less irritated of the area and may actually improve with other localized injections  We will also refer her to physical therapy for a program that she may be able to do on her own  Subjective:     Patient ID:    Chief Complaint:Machelle OCHOA Gusdavid 58 y o  female      HPI    Patient comes in today with regards to Left shoulder  The patient reports that the pain is in the  Posterior aspect of the shoulder as well as lateral aspect of the upper arm and has been going on for  4 weeks  The pain is rated at2 at its best and10 at its worst   The pain is described as  achy  It is worsened with  Internal rotation reaching behind her back and lying on the left side, and is made better with  uncertain  The patient has taken  Ibuprofen, Advil for treatment no help  No previous injury  No neck pain  No paresthesias  Patient does report that at the time 4 weeks ago she was walking into [facility]  And pulled on a door and had pain ever since then it has been aggravated         The following portions of the patient's history were reviewed and updated as appropriate: allergies, current medications, past family history, past social history, past surgical history and problem list         Social History     Social History    Marital status: /Civil [employer]     Spouse name: N/A    Number of children: N/A    Years of education: N/A     Occupational History    Retired      Social History Main Topics    Smoking status: Current Every Day Smoker     Packs/day: 1 00     Years: 40 00     Types: Cigarettes    Smokeless tobacco: Never Used    Alcohol use Yes      Comment: occasional; Allscripts also states Never drank alcohol    Drug use: No    Sexual activity: Yes     Partners: Male      Comment: denied any risk of AIDS     Other Topics Concern    Not on file     Social History Narrative             Past Medical History:   Diagnosis Date    Asthma     Degeneration of intervertebral disc at L5-S1 level     Depression     Generalized anxiety disorder     Hepatitis     at age 4 yrs    Herpes zoster     last assessed: 6/20/13    Hypercholesterolemia     Hyperlipidemia     MS (multiple sclerosis) [facility] [address] [facility]     Osteoporosis     Seasonal allergies     Tumor of breast     benign, right breast     Past Surgical History:   Procedure Laterality Date    BREAST SURGERY      right breast fibroid tumor resection    COLONOSCOPY      11/2013 - due in 2018 - Dr Crisostomo    FL LUMBAR PUNCTURE  10/1/2018    ROTATOR CUFF REPAIR      Bilateral    ROTATOR CUFF REPAIR Bilateral     SEPTOPLASTY       Allergies   Allergen Reactions    Bactrim [Sulfamethoxazole-Trimethoprim] Other (See Comments)     Reaction Date: 11Aug2011;     Betadine [Povidone Iodine]     Cefzil [Cefprozil]     Gadobutrol GI Intolerance    Medroxyprogesterone Other (See Comments)     Reaction Date: 11Aug2011;     Nortriptyline Other (See Comments)    Pamelor [Nortriptyline Hcl]     Prempro [Conj Estrog-Medroxyprogest Ace] Other (See Comments)    Provera [Medroxyprogesterone Acetate]     Zyban [Bupropion]      Current Outpatient Prescriptions on File Prior to Visit   Medication Sig Dispense Refill    albuterol (PROAIR HFA) 90 mcg/act inhaler Inhale 2 puffs every 4 (four) hours as needed for wheezing or shortness of breath 3 Inhaler 1    aspirin (ECOTRIN LOW STRENGTH) 81 mg EC tablet Take 1 tablet by mouth daily      atorvastatin (LIPITOR) 40 mg tablet Take 1 tablet (40 mg total) by mouth daily 90 tablet 3    B Complex-C CAPS Take by mouth      BL EVENING PRIMROSE OIL PO Take by mouth      budesonide (PULMICORT) 0 5 mg/2 mL nebulizer solution inhale contents of 1 vial in nebulizer twice a day  0    ezetimibe (ZETIA) 10 mg tablet Take 1 tablet (10 mg total) by mouth daily 90 tablet 3    Flaxseed, Linseed, (FLAXSEED OIL PO) Take 2 capsules by mouth daily        fluticasone (FLONASE) 50 mcg/act nasal spray 2 sprays into each nostril daily 48 g 3    fluticasone-vilanterol (BREO ELLIPTA) 100-25 mcg/inh inhaler One puff once a day, rinse mouth after use  1 Inhaler 3    gabapentin (NEURONTIN) 300 mg capsule Take 1 capsule (300 mg total) by mouth 2 (two) times a day 180 capsule 3    metoclopramide (REGLAN) 10 mg tablet Take 1 tablet (10 mg total) by mouth every 8 (eight) hours as needed (nausea) 5 tablet 0    montelukast (SINGULAIR) 10 mg tablet Take 1 tablet (10 mg total) by mouth daily at bedtime 90 tablet 3    sertraline (ZOLOFT) 100 mg tablet Take 1 tablet (100 mg total) by mouth daily 90 tablet 3     No current facility-administered medications on file prior to visit  Objective:    Review of Systems   Constitutional: Negative  HENT: Negative  Eyes: Negative  Respiratory: Negative  Cardiovascular: Negative  Gastrointestinal: Negative  Endocrine: Negative  Genitourinary: Negative  Musculoskeletal:        See HPI   Skin: Negative  Allergic/Immunologic: Negative  Neurological: Negative  Hematological: Negative  Psychiatric/Behavioral: Positive for decreased concentration  Right Shoulder Exam   Right shoulder exam is normal     Range of Motion   The patient has normal right shoulder ROM      Muscle Strength   The patient has normal right shoulder strength  Left Shoulder Exam     Range of Motion   The patient has normal left shoulder ROM  Active Abduction: normal     Muscle Strength   Left shoulder normal muscle strength: Flexion 4/5  Abduction: 5/5   Internal Rotation: 5/5   External Rotation: 4/5   Supraspinatus: 4/5   Subscapularis: 5/5   Biceps: 5/5     Tests   Apprehension: negative  Cross Arm: positive  Drop Arm: negative  Hawkin's test: positive  Impingement: positive    Other   Sensation: normal  Pulse: present     Comments:    Tenderness to palpation over the biceps tendon the grea  Greater tuberosity and [facility]            Physical Exam   Constitutional: She is oriented to person, place, and time  She appears well-developed  HENT:   Head: Normocephalic  Eyes: Pupils are equal, round, and reactive to light  Neck: Normal range of motion  Cardiovascular: Normal rate  Pulmonary/Chest: Effort normal    Abdominal: She exhibits no distension  Neurological: She is alert and oriented to person, place, and time  Skin: Skin is warm  Psychiatric: She has a normal mood and affect  Nursing note and vitals reviewed  Large joint arthrocentesis  Date/Time: 11/16/2018 2:23 PM  Supporting Documentation  Indications: pain   Procedure Details  Location: shoulder - L subacromial bursa  Needle size: 22 G  Approach: posterior  Medications administered: 2 mL bupivacaine 0 25 %; 1 mL lidocaine 1 %; 6 mg betamethasone acetate-betamethasone sodium phosphate 6 (3-3) mg/mL    Patient tolerance: patient tolerated the procedure well with no immediate complications               I have personally reviewed pertinent films in PACS and my interpretation is X-ray show subchondral cyst that is consistent with previous rotator cuff surgery  mild arthritis in the glenohumeral joint and moderate arthritis in the acromioclavicular joint        Portions of the record may have been created with voice recognition software   Occasional wrong word or "sound a like" substitutions may have occurred due to the inherent limitations of voice recognition software   Read the chart carefully and recognize, using context, where substitutions have occurred

## 2018-11-25 ENCOUNTER — TELEPHONE (OUTPATIENT)
Dept: NEUROLOGY | Facility: CLINIC | Age: 63
End: 2018-11-25

## 2018-11-25 NOTE — TELEPHONE ENCOUNTER
[place], please send mri head from sept and jcv testing from sept to dr salena tomas at [facility] [place] for him to have for his reference

## 2018-11-26 ENCOUNTER — TELEPHONE (OUTPATIENT)
Dept: NEUROLOGY | Facility: CLINIC | Age: 63
End: 2018-11-26

## 2018-11-26 ENCOUNTER — OFFICE VISIT (OUTPATIENT)
Dept: OBGYN CLINIC | Facility: CLINIC | Age: 63
End: 2018-11-26
Payer: MEDICARE

## 2018-11-26 VITALS
HEIGHT: 64 IN | SYSTOLIC BLOOD PRESSURE: 116 MMHG | DIASTOLIC BLOOD PRESSURE: 73 MMHG | HEART RATE: 85 BPM | BODY MASS INDEX: 26.95 KG/M2

## 2018-11-26 DIAGNOSIS — G35 MULTIPLE SCLEROSIS (HCC): Primary | ICD-10-CM

## 2018-11-26 DIAGNOSIS — M19.012 ARTHRITIS OF LEFT ACROMIOCLAVICULAR JOINT: Primary | ICD-10-CM

## 2018-11-26 DIAGNOSIS — M75.42 SHOULDER IMPINGEMENT SYNDROME, LEFT: ICD-10-CM

## 2018-11-26 DIAGNOSIS — M25.512 LEFT SHOULDER PAIN, UNSPECIFIED CHRONICITY: ICD-10-CM

## 2018-11-26 PROCEDURE — 99213 OFFICE O/P EST LOW 20 MIN: CPT | Performed by: PHYSICIAN ASSISTANT

## 2018-11-26 PROCEDURE — 20605 DRAIN/INJ JOINT/BURSA W/O US: CPT | Performed by: PHYSICIAN ASSISTANT

## 2018-11-26 RX ORDER — BETAMETHASONE SODIUM PHOSPHATE AND BETAMETHASONE ACETATE 3; 3 MG/ML; MG/ML
6 INJECTION, SUSPENSION INTRA-ARTICULAR; INTRALESIONAL; INTRAMUSCULAR; SOFT TISSUE
Status: COMPLETED | OUTPATIENT
Start: 2018-11-26 | End: 2018-11-26

## 2018-11-26 RX ORDER — LIDOCAINE HYDROCHLORIDE 10 MG/ML
1 INJECTION, SOLUTION INFILTRATION; PERINEURAL
Status: COMPLETED | OUTPATIENT
Start: 2018-11-26 | End: 2018-11-26

## 2018-11-26 RX ADMIN — BETAMETHASONE SODIUM PHOSPHATE AND BETAMETHASONE ACETATE 6 MG: 3; 3 INJECTION, SUSPENSION INTRA-ARTICULAR; INTRALESIONAL; INTRAMUSCULAR; SOFT TISSUE at 11:42

## 2018-11-26 RX ADMIN — LIDOCAINE HYDROCHLORIDE 1 ML: 10 INJECTION, SOLUTION INFILTRATION; PERINEURAL at 11:42

## 2018-11-26 NOTE — TELEPHONE ENCOUNTER
Patient going for first Ocrevus infusion on 12/6/18  Do you want to order the B&T Lymphocytes as well as CBC and CMP?

## 2018-11-26 NOTE — TELEPHONE ENCOUNTER
Patient called back and states she had a cortisone shot on Nov 16 and today for her shoulder  States she had been having pain in her arm and saw Dr Lawson Black who states the pain is due to her shoulder  Is asking if she can have a third shot on Dec  3   Her ocrevus infusion is on 12/6  Is this ok?

## 2018-11-26 NOTE — TELEPHONE ENCOUNTER
LMOM for patient to call back  Reminding patient to have her labwork completed for her first Ocrevus infusion on 12/3/18 (no sooner)

## 2018-11-26 NOTE — PROGRESS NOTES
Assessment/Plan:  1  Arthritis of left acromioclavicular joint  Medium joint arthrocentesis   2  Shoulder impingement syndrome, left     3  Left shoulder pain, unspecified chronicity  Medium joint arthrocentesis     Patient Active Problem List   Diagnosis    Multiple sclerosis, relapsing/remitting    Muscle spasm    Hyperlipidemia    Osteoporosis    Ambulatory dysfunction    COPD without exacerbation ([facility] [address]    Vitamin D deficiency    Disc degeneration, lumbar    Major depression, recurrent, chronic (HCC)    Allergic rhinitis    Amnesia/memory disorder    Shoulder impingement syndrome, left       Discussion/Summary:    58 y o  female  With left shoulder pain secondary to subacromial impingement, acromioclavicular arthritis, likely biceps tendinitis  She received corticosteroid injection of the [facility] joint today  She will discuss with her neurologist if continuing corticosteroid  Injections will interfere with her  Infusion therapy for her MS  She will continue with therapy exercises at home  She will follow up here in 1 week, we might consider  Biceps tendon sheath injection or glenohumeral injection  The assessment and plan were formulated by Dr Patricia Gillespie and I assisted in carrying it out  Follow Up:  1  week(s)      Subjective:   Patient ID: Noemy Estrella is a 58 y o  female   HPI    Patient presents to the office for follow up of   Left shoulder pain with subacromial impingement AC arthritis and biceps tendinitis  Since the last visit, the patient reports  Significant improvement in pain and range of motion  She says the injection in subacromial space has worked very well and  No issues with the injections  Itself  She did go to physical therapy and is now able to perform better range of motion  Still has some pain left shoulder radiates into the biceps region  Patient  denies any new injuries to the  Left shoulder since the last visit       The following portions of the patient's history were reviewed and updated as appropriate: allergies, current medications, past family history, past social history, past surgical history and problem list     Social History     Social History    Marital status: /Civil Union     Spouse name: N/A    Number of children: N/A    Years of education: N/A     Occupational History    Retired      Social History Main Topics    Smoking status: Current Every Day Smoker     Packs/day: 1 00     Years: 40 00     Types: Cigarettes    Smokeless tobacco: Never Used    Alcohol use Yes      Comment: occasional; Allscripts also states Never drank alcohol    Drug use: No    Sexual activity: Yes     Partners: Male      Comment: denied any risk of AIDS     Other Topics Concern    Not on file     Social History Narrative             Past Medical History:   Diagnosis Date    Asthma     Degeneration of intervertebral disc at L5-S1 level     Depression     Generalized anxiety disorder     Hepatitis     at age 4 yrs    Herpes zoster     last assessed: 6/20/13    Hypercholesterolemia     Hyperlipidemia     MS (multiple sclerosis) ([facility] [address] [facility]     Osteoporosis     Seasonal allergies     Tumor of breast     benign, right breast     Past Surgical History:   Procedure Laterality Date    BREAST SURGERY      right breast fibroid tumor resection    COLONOSCOPY      11/2013 - due in 2018 - Dr Crisostomo    FL LUMBAR PUNCTURE  10/1/2018    ROTATOR CUFF REPAIR      Bilateral    ROTATOR CUFF REPAIR Bilateral     SEPTOPLASTY       Allergies   Allergen Reactions    Bactrim [Sulfamethoxazole-Trimethoprim] Other (See Comments)     Reaction Date: 11Aug2011;     Betadine [Povidone Iodine]     Cefzil [Cefprozil]     Gadobutrol GI Intolerance    Medroxyprogesterone Other (See Comments)     Reaction Date: 11Aug2011;     Nortriptyline Other (See Comments)    Pamelor [Nortriptyline Hcl]     Prempro [Conj Estrog-Medroxyprogest Ace] Other (See Comments)    Provera [Medroxyprogesterone Acetate]     Zyban [Bupropion]      Current Outpatient Prescriptions on File Prior to Visit   Medication Sig Dispense Refill    albuterol (PROAIR HFA) 90 mcg/act inhaler Inhale 2 puffs every 4 (four) hours as needed for wheezing or shortness of breath 3 Inhaler 1    aspirin (ECOTRIN LOW STRENGTH) 81 mg EC tablet Take 1 tablet by mouth daily      atorvastatin (LIPITOR) 40 mg tablet Take 1 tablet (40 mg total) by mouth daily 90 tablet 3    B Complex-C CAPS Take by mouth      BL EVENING PRIMROSE OIL PO Take by mouth      budesonide (PULMICORT) 0 5 mg/2 mL nebulizer solution inhale contents of 1 vial in nebulizer twice a day  0    ezetimibe (ZETIA) 10 mg tablet Take 1 tablet (10 mg total) by mouth daily 90 tablet 3    Flaxseed, Linseed, (FLAXSEED OIL PO) Take 2 capsules by mouth daily        fluticasone (FLONASE) 50 mcg/act nasal spray 2 sprays into each nostril daily 48 g 3    fluticasone-vilanterol (BREO ELLIPTA) 100-25 mcg/inh inhaler One puff once a day, rinse mouth after use  1 Inhaler 3    gabapentin (NEURONTIN) 300 mg capsule Take 1 capsule (300 mg total) by mouth 2 (two) times a day 180 capsule 3    meloxicam (MOBIC) 7 5 mg tablet Take 1 tablet (7 5 mg total) by mouth daily 60 tablet 0    metoclopramide (REGLAN) 10 mg tablet Take 1 tablet (10 mg total) by mouth every 8 (eight) hours as needed (nausea) 5 tablet 0    montelukast (SINGULAIR) 10 mg tablet Take 1 tablet (10 mg total) by mouth daily at bedtime 90 tablet 3    sertraline (ZOLOFT) 100 mg tablet Take 1 tablet (100 mg total) by mouth daily 90 tablet 3     No current facility-administered medications on file prior to visit  Review of Systems - Negative except as noted in HPI        Objective:    Vitals:    11/26/18 1102   BP: 116/73   Pulse: 85       Physical Exam   Constitutional: She is oriented to person, place, and time  She appears well-developed and well-nourished  HENT:   Head: Normocephalic and atraumatic     Eyes: Conjunctivae are normal  No scleral icterus  Neck: Neck supple  Pulmonary/Chest: Effort normal  No stridor  No respiratory distress  Abdominal: Soft  She exhibits no distension  Neurological: She is alert and oriented to person, place, and time  Skin: Skin is warm and dry  No erythema  Psychiatric: She has a normal mood and affect  Her behavior is normal        Left Shoulder Exam     Tenderness   The patient is experiencing tenderness in the acromioclavicular joint and biceps tendon  Range of Motion   Active Abduction: normal   Passive Abduction: normal   Extension: normal   Forward Flexion: normal   External Rotation: normal   Internal Rotation 0 degrees:  Mid thoracic abnormal     Tests   Cross Arm: positive    Other   Erythema: absent  Scars: absent  Left shoulder sensation:  sensation to light touch intact in axillary, musculocutaneous, radial, median, and ulnar nerve distribution  Left shoulder pulse absent: 2+ Radial pulse  Comments:    Positive Speed's test              I have personally reviewed pertinent films in PACS      Medium joint arthrocentesis  Date/Time: 11/26/2018 11:42 AM  Consent given by: patient  Site marked: site marked  Timeout: Immediately prior to procedure a time out was called to verify the correct patient, procedure, equipment, support staff and site/side marked as required   Supporting Documentation  Indications: pain   Procedure Details  Location: shoulder - L acromioclavicular  Preparation: Patient was prepped and draped in the usual sterile fashion  Needle size: 25 G  Medications administered: 1 mL lidocaine 1 %; 6 mg betamethasone acetate-betamethasone sodium phosphate 6 (3-3) mg/mL    Patient tolerance: patient tolerated the procedure well with no immediate complications  Dressing:  Sterile dressing applied           Portions of the record may have been created with voice recognition software   Occasional wrong word or "sound a like" substitutions may have occurred due to the inherent limitations of voice recognition software   Read the chart carefully and recognize, using context, where substitutions have occurred

## 2018-11-26 NOTE — TELEPHONE ENCOUNTER
Made patient aware that cortisone shot on 12/3 would be too close to her infusion  Verbalizes clear understanding        Made patient aware to have labs drawn on Monday Dec  3 for infusion on Dec  6

## 2018-12-03 ENCOUNTER — APPOINTMENT (OUTPATIENT)
Dept: LAB | Facility: CLINIC | Age: 63
End: 2018-12-03
Payer: MEDICARE

## 2018-12-03 ENCOUNTER — TELEPHONE (OUTPATIENT)
Dept: NEUROLOGY | Facility: CLINIC | Age: 63
End: 2018-12-03

## 2018-12-03 DIAGNOSIS — G35 MULTIPLE SCLEROSIS (HCC): ICD-10-CM

## 2018-12-03 LAB
ALBUMIN SERPL BCP-MCNC: 4 G/DL (ref 3.5–5)
ALP SERPL-CCNC: 99 U/L (ref 46–116)
ALT SERPL W P-5'-P-CCNC: 33 U/L (ref 12–78)
ANION GAP SERPL CALCULATED.3IONS-SCNC: 11 MMOL/L (ref 4–13)
AST SERPL W P-5'-P-CCNC: 14 U/L (ref 5–45)
BACTERIA UR QL AUTO: ABNORMAL /HPF
BASOPHILS # BLD AUTO: 0.02 THOUSANDS/ΜL (ref 0–0.1)
BASOPHILS NFR BLD AUTO: 0 % (ref 0–1)
BILIRUB SERPL-MCNC: 0.5 MG/DL (ref 0.2–1)
BILIRUB UR QL STRIP: NEGATIVE
BUN SERPL-MCNC: 17 MG/DL (ref 5–25)
CALCIUM SERPL-MCNC: 9.5 MG/DL (ref 8.3–10.1)
CHLORIDE SERPL-SCNC: 102 MMOL/L (ref 100–108)
CLARITY UR: CLEAR
CO2 SERPL-SCNC: 25 MMOL/L (ref 21–32)
COLOR UR: YELLOW
CREAT SERPL-MCNC: 0.69 MG/DL (ref 0.6–1.3)
EOSINOPHIL # BLD AUTO: 0.17 THOUSAND/ΜL (ref 0–0.61)
EOSINOPHIL NFR BLD AUTO: 2 % (ref 0–6)
ERYTHROCYTE [DISTWIDTH] IN BLOOD BY AUTOMATED COUNT: 11.7 % (ref 11.6–15.1)
GFR SERPL CREATININE-BSD FRML MDRD: 92 ML/MIN/1.73SQ M
GLUCOSE SERPL-MCNC: 105 MG/DL (ref 65–140)
GLUCOSE UR STRIP-MCNC: NEGATIVE MG/DL
HCT VFR BLD AUTO: 41.2 % (ref 34.8–46.1)
HGB BLD-MCNC: 14.2 G/DL (ref 11.5–15.4)
HGB UR QL STRIP.AUTO: ABNORMAL
IMM GRANULOCYTES # BLD AUTO: 0.02 THOUSAND/UL (ref 0–0.2)
IMM GRANULOCYTES NFR BLD AUTO: 0 % (ref 0–2)
KETONES UR STRIP-MCNC: NEGATIVE MG/DL
LEUKOCYTE ESTERASE UR QL STRIP: NEGATIVE
LYMPHOCYTES # BLD AUTO: 3.18 THOUSANDS/ΜL (ref 0.6–4.47)
LYMPHOCYTES NFR BLD AUTO: 29 % (ref 14–44)
MCH RBC QN AUTO: 30.1 PG (ref 26.8–34.3)
MCHC RBC AUTO-ENTMCNC: 34.5 G/DL (ref 31.4–37.4)
MCV RBC AUTO: 88 FL (ref 82–98)
MONOCYTES # BLD AUTO: 0.54 THOUSAND/ΜL (ref 0.17–1.22)
MONOCYTES NFR BLD AUTO: 5 % (ref 4–12)
NEUTROPHILS # BLD AUTO: 6.92 THOUSANDS/ΜL (ref 1.85–7.62)
NEUTS SEG NFR BLD AUTO: 64 % (ref 43–75)
NITRITE UR QL STRIP: NEGATIVE
NON-SQ EPI CELLS URNS QL MICRO: ABNORMAL /HPF
NRBC BLD AUTO-RTO: 0 /100 WBCS
PH UR STRIP.AUTO: 6 [PH] (ref 4.5–8)
PLATELET # BLD AUTO: 295 THOUSANDS/UL (ref 149–390)
PMV BLD AUTO: 9.3 FL (ref 8.9–12.7)
POTASSIUM SERPL-SCNC: 3.8 MMOL/L (ref 3.5–5.3)
PROT SERPL-MCNC: 7.4 G/DL (ref 6.4–8.2)
PROT UR STRIP-MCNC: NEGATIVE MG/DL
RBC # BLD AUTO: 4.71 MILLION/UL (ref 3.81–5.12)
RBC #/AREA URNS AUTO: ABNORMAL /HPF
SODIUM SERPL-SCNC: 138 MMOL/L (ref 136–145)
SP GR UR STRIP.AUTO: <=1.005 (ref 1–1.03)
UROBILINOGEN UR QL STRIP.AUTO: 0.2 E.U./DL
WBC # BLD AUTO: 10.85 THOUSAND/UL (ref 4.31–10.16)
WBC #/AREA URNS AUTO: ABNORMAL /HPF

## 2018-12-03 PROCEDURE — 81001 URINALYSIS AUTO W/SCOPE: CPT

## 2018-12-03 PROCEDURE — 86355 B CELLS TOTAL COUNT: CPT

## 2018-12-03 PROCEDURE — 86359 T CELLS TOTAL COUNT: CPT

## 2018-12-03 PROCEDURE — 86360 T CELL ABSOLUTE COUNT/RATIO: CPT

## 2018-12-03 PROCEDURE — 85025 COMPLETE CBC W/AUTO DIFF WBC: CPT

## 2018-12-03 PROCEDURE — 36415 COLL VENOUS BLD VENIPUNCTURE: CPT

## 2018-12-03 PROCEDURE — 80053 COMPREHEN METABOLIC PANEL: CPT

## 2018-12-03 NOTE — TELEPHONE ENCOUNTER
Patient is scheduled with our nurse practitioner tomorrow at 8:30 a m    I will be out of the office till Wednesday morning, thank you

## 2018-12-03 NOTE — TELEPHONE ENCOUNTER
Spoke with patient  States she has been, "fighting a cold for the past week or so "  Denies any fever or chills  No pain with urination  Post nasal drip which, "makes coughing worse"- improves after taking Allegra  Mucous is clear  Only other complaint is shoulder pain which she has received 2 cortisone shots for on Nov 16 and 26 (discussed in previous encounter on 11/26/18 )  Patient calling PCP today to be evaluated for potential sinus infection vs allergies  [address] [place] [address] scheduled for 12/6/18  Will call patient back tomorrow to see status of PCP visit for recommendations     ----- Message from Marissa Art MD sent at 12/3/2018  3:44 PM EST -----  Inocencio Hdz, Let pt know small amount of blood in her urine analysis and also minimally elevated wbc in her blood  Please check if any recent infections, fever or chills,etc  When is pt going for her first dose of ocrevus?   MER, I also cced her pcp on labs as well

## 2018-12-04 ENCOUNTER — OFFICE VISIT (OUTPATIENT)
Dept: FAMILY MEDICINE CLINIC | Facility: CLINIC | Age: 63
End: 2018-12-04
Payer: MEDICARE

## 2018-12-04 VITALS
WEIGHT: 154.6 LBS | HEART RATE: 64 BPM | BODY MASS INDEX: 26.4 KG/M2 | DIASTOLIC BLOOD PRESSURE: 62 MMHG | SYSTOLIC BLOOD PRESSURE: 110 MMHG | RESPIRATION RATE: 16 BRPM | HEIGHT: 64 IN | TEMPERATURE: 95.9 F

## 2018-12-04 DIAGNOSIS — J30.89 ALLERGIC RHINITIS DUE TO FUNGAL SPORES, UNSPECIFIED SEASONALITY: Primary | ICD-10-CM

## 2018-12-04 DIAGNOSIS — H61.21 IMPACTED CERUMEN OF RIGHT EAR: ICD-10-CM

## 2018-12-04 DIAGNOSIS — R31.29 MICROSCOPIC HEMATURIA: ICD-10-CM

## 2018-12-04 LAB
BASOPHILS # BLD AUTO: 0 X10E3/UL (ref 0–0.2)
BASOPHILS NFR BLD AUTO: 0 %
CD19 CELLS # BLD: 657 /UL (ref 12–645)
CD19 CELLS NFR BLD: 21.9 % (ref 3.3–25.4)
CD3 CELLS # BLD: 2193 /UL (ref 622–2402)
CD3 CELLS NFR BLD: 73.1 % (ref 57.5–86.2)
CD3+CD4+ CELLS # BLD: 1896 /UL (ref 359–1519)
CD3+CD4+ CELLS NFR BLD: 63.2 % (ref 30.8–58.5)
CD3+CD4+ CELLS/CD3+CD8+ CLL BLD: 6.26 % (ref 0.92–3.72)
CD3+CD8+ CELLS # BLD: 303 /UL (ref 109–897)
CD3+CD8+ CELLS NFR BLD: 10.1 % (ref 12–35.5)
EOSINOPHIL # BLD AUTO: 0.2 X10E3/UL (ref 0–0.4)
EOSINOPHIL NFR BLD AUTO: 2 %
ERYTHROCYTE [DISTWIDTH] IN BLOOD BY AUTOMATED COUNT: 13.3 % (ref 12.3–15.4)
HCT VFR BLD AUTO: 41.7 % (ref 34–46.6)
HGB BLD-MCNC: 14.5 G/DL (ref 11.1–15.9)
IMM GRANULOCYTES # BLD: 0 X10E3/UL (ref 0–0.1)
IMM GRANULOCYTES NFR BLD: 0 %
LYMPHOCYTES # BLD AUTO: 3 X10E3/UL (ref 0.7–3.1)
LYMPHOCYTES NFR BLD AUTO: 28 %
MCH RBC QN AUTO: 31 PG (ref 26.6–33)
MCHC RBC AUTO-ENTMCNC: 34.8 G/DL (ref 31.5–35.7)
MCV RBC AUTO: 89 FL (ref 79–97)
MONOCYTES # BLD AUTO: 0.6 X10E3/UL (ref 0.1–0.9)
MONOCYTES NFR BLD AUTO: 6 %
NEUTROPHILS # BLD AUTO: 7 X10E3/UL (ref 1.4–7)
NEUTROPHILS NFR BLD AUTO: 64 %
PLATELET # BLD AUTO: 270 X10E3/UL (ref 150–379)
RBC # BLD AUTO: 4.67 X10E6/UL (ref 3.77–5.28)
SL AMB  POCT GLUCOSE, UA: ABNORMAL
SL AMB LEUKOCYTE ESTERASE,UA: ABNORMAL
SL AMB POCT BILIRUBIN,UA: ABNORMAL
SL AMB POCT BLOOD,UA: ABNORMAL
SL AMB POCT CLARITY,UA: CLEAR
SL AMB POCT COLOR,UA: YELLOW
SL AMB POCT KETONES,UA: ABNORMAL
SL AMB POCT NITRITE,UA: ABNORMAL
SL AMB POCT PH,UA: 5
SL AMB POCT SPECIFIC GRAVITY,UA: 1.1
SL AMB POCT URINE PROTEIN: ABNORMAL
SL AMB POCT UROBILINOGEN: 0.2
WBC # BLD AUTO: 10.9 X10E3/UL (ref 3.4–10.8)

## 2018-12-04 PROCEDURE — 81002 URINALYSIS NONAUTO W/O SCOPE: CPT | Performed by: NURSE PRACTITIONER

## 2018-12-04 PROCEDURE — 81001 URINALYSIS AUTO W/SCOPE: CPT | Performed by: NURSE PRACTITIONER

## 2018-12-04 PROCEDURE — 87086 URINE CULTURE/COLONY COUNT: CPT | Performed by: NURSE PRACTITIONER

## 2018-12-04 PROCEDURE — 99213 OFFICE O/P EST LOW 20 MIN: CPT | Performed by: NURSE PRACTITIONER

## 2018-12-04 RX ORDER — FEXOFENADINE HCL 180 MG/1
180 TABLET ORAL DAILY
COMMUNITY
End: 2019-09-16

## 2018-12-04 NOTE — TELEPHONE ENCOUNTER
Office note available in [facility]  Patient had urine culture completed today, results still pending  Please advise on Ocrevus infusion for Thursday  Assume we are waiting for urine culture results?

## 2018-12-04 NOTE — TELEPHONE ENCOUNTER
Rev note from family practice  Appears allergy related sxs  I would say plan for infusion, but ideally need to wait for repeat urine results    Please let pt know we will be on look out and to also call us if pcp office calls about her urine as well

## 2018-12-04 NOTE — PROGRESS NOTES
FAMILY PRACTICE OFFICE VISIT       NAME: Reynolds Boeck Romig  AGE: 58 y o  SEX: female       : 1955        MRN: [id number]    DATE: 2018  TIME: 10:37 AM    Assessment and Plan     Problem List Items Addressed This Visit     Allergic rhinitis - Primary      Other Visit Diagnoses     Microscopic hematuria        Relevant Orders    POCT urine dip auto non-scope (Completed)    UA (URINE) with reflex to Microscopic    Urine culture    Impacted cerumen of right ear              1  Allergic rhinitis due to fungal spores, unspecified seasonality     2  Microscopic hematuria  POCT urine dip auto non-scope    UA (URINE) with reflex to Microscopic    Urine culture   3  Impacted cerumen of right ear       Unknown Jose presents today for evaluation and review of lab work prior to starting Ocrevus infusions on  for MS  Current symptoms of post nasal drip and occasional cough, appear to be allergic in origin  Exam reveals no signs of infection  She denies feeling ill or fatigued  She has been taking Allegra and Flonase nasal spray for the past 4-5 days, which has improved sneezing and rhinorrhea  Some post nasal drip and occasional cough are still present  Recommend continuing Allegra and Flonase  Blood work is normal except for mildly elevated WBC, at 10 85, differential is normal  Urinalysis with mircroscopic is positive for small amount of blood  Will repeat urinalysis and will complete a culture, to be sure she does not have a UTI  Appears to be in stable in health for beginning [address], pending urinalysis and culture  Right ear cerumen impaction  She will use Debrox ear drops to soften cerumen for 3-4 days, prior to scheduling an appointment for irrigation  Chief Complaint     Chief Complaint   Patient presents with    Follow-up     Pt is here to f/u on blood work and urine       History of Present Illness     Kyung Garcia is a 58year old female presenting today for review of blood work and urinalysis  She is scheduled to start Ocrevus infusions for MS on 12/6  She has been having intermittent sneezing, runny nose, and post nasal drip, which is common for her  She has been allergy tested in the past, and is allergic to mold  Symptoms typically resolve with Allegra and Flonase  She just restarted these medications 4-5 days ago  Currently, denies sneezing, and rhinorrhea  Still has some post nasal drip and occasional cough  She does not feel fatigued  She does not feel ill  No fevers, chills, or sweats  Urinalysis shows small amount of blood, with microscopic showing 1-2 RBC's, and 0-1 WBC's  She denies dysuria, nausea, vomiting, and flank pain  She does have urinary frequency, as she drinks a lot of fluids , this is unchanged from baseline  She does have urinary hesitancy at times, which is not abnormal for her  Review of Systems   Review of Systems   Constitutional: Negative for chills, diaphoresis, fatigue and fever  HENT: Positive for postnasal drip  Negative for congestion, ear pain, nosebleeds, rhinorrhea, sinus pain, sinus pressure, sneezing, sore throat and voice change  Respiratory: Positive for cough (occasional)  Negative for chest tightness, shortness of breath and wheezing  Cardiovascular: Negative for chest pain, palpitations and leg swelling  Gastrointestinal: Negative for abdominal pain, constipation, diarrhea, nausea and vomiting  Genitourinary: Positive for frequency (As noted in HPI)  Negative for difficulty urinating, dysuria, flank pain, hematuria, pelvic pain and urgency  Skin: Negative for rash  Neurological: Negative for dizziness and headaches         Active Problem List     Patient Active Problem List   Diagnosis    Multiple sclerosis, relapsing/remitting    Muscle spasm    Hyperlipidemia    Osteoporosis    Ambulatory dysfunction    COPD without exacerbation [facility]    Vitamin D deficiency    Disc degeneration, lumbar    Major depression, recurrent, chronic ([facility] )    Allergic rhinitis    Amnesia/memory disorder    Shoulder impingement syndrome, left       Past Medical History:  Past Medical History:   Diagnosis Date    Asthma     Degeneration of intervertebral disc at L5-S1 level     Depression     Generalized anxiety disorder     Hepatitis     at age 4 yrs    Herpes zoster     last assessed: 6/20/13    Hypercholesterolemia     Hyperlipidemia     [facility]     Osteoporosis     Seasonal allergies     Tumor of breast     benign, right breast       Past Surgical History:  Past Surgical History:   Procedure Laterality Date    BREAST SURGERY      right breast fibroid tumor resection    COLONOSCOPY      11/2013 - due in 2018 - Dr Crisostomo    FL LUMBAR PUNCTURE  10/1/2018    ROTATOR CUFF REPAIR      Bilateral    ROTATOR CUFF REPAIR Bilateral     SEPTOPLASTY         Family History:  Family History   Problem Relation Age of Onset    Arthritis Mother     Osteoporosis Mother     COPD Father     Arthritis Family     COPD Family     Emphysema Family     Heart disease Family     Hyperthyroidism Family     Multiple sclerosis Family     Osteoarthritis Family        Social History:  Social History     Social History    Marital status: /Civil Union     Spouse name: N/A    Number of children: N/A    Years of education: N/A     Occupational History    Retired      Social History Main Topics    Smoking status: Current Every Day Smoker     Packs/day: 1 00     Years: 40 00     Types: Cigarettes    Smokeless tobacco: Never Used    Alcohol use Yes      Comment: occasional; [employer] also states Never drank alcohol    Drug use: No    Sexual activity: Yes     Partners: Male      Comment: denied any risk of AIDS     Other Topics Concern    Not on file     Social History Narrative               I have reviewed the patient's medical history in detail; there are no changes to the history as noted in the electronic medical record  Objective     Vitals:    12/04/18 0834   BP: 110/62   Pulse: 64   Resp: 16   Temp: (!) 95 9 °F (35 5 °C)   TempSrc: Tympanic   Weight: 70 1 kg (154 lb 9 6 oz)   Height: 5' 4" (1 626 m)     Wt Readings from Last 3 Encounters:   12/04/18 70 1 kg (154 lb 9 6 oz)   11/16/18 71 2 kg (157 lb)   11/08/18 70 1 kg (154 lb 9 6 oz)     Body mass index is 26 54 kg/m²  PHQ-9 Depression Screening    PHQ-9:    Frequency of the following problems over the past two weeks:            Physical Exam   Constitutional: She is oriented to person, place, and time  She appears well-developed and well-nourished  She does not appear ill  No distress  HENT:   Head: Normocephalic and atraumatic  Left Ear: Tympanic membrane and ear canal normal    Nose: Nose normal    Mouth/Throat: No oropharyngeal exudate, posterior oropharyngeal edema or posterior oropharyngeal erythema ( Small amount of clear postnasal drip)  Unable to visualize right TM secondary to cerumen impaction   Eyes: Conjunctivae are normal    Neck: Normal range of motion  Neck supple  Cardiovascular: Normal rate, regular rhythm and normal heart sounds  No murmur heard  Pulmonary/Chest: Effort normal and breath sounds normal  She has no wheezes  She has no rales  Abdominal: Soft  Bowel sounds are normal  Tenderness: Mild epigastric tenderness  There is no guarding and no CVA tenderness  Musculoskeletal: She exhibits no edema  Lymphadenopathy:     She has no cervical adenopathy  Neurological: She is alert and oriented to person, place, and time  Skin: Skin is warm and dry  No rash noted  Psychiatric: She has a normal mood and affect  Nursing note and vitals reviewed        ALLERGIES:  Allergies   Allergen Reactions    Bactrim [Sulfamethoxazole-Trimethoprim] Other (See Comments)     Reaction Date: 11Aug2011;     Betadine [Povidone Iodine]     Cefzil [Cefprozil]     Gadobutrol GI Intolerance    Medroxyprogesterone Other (See Comments) Reaction Date: 11Aug2011;     Nortriptyline Other (See Comments)    Pamelor [Nortriptyline Hcl]     Prempro [Conj Estrog-Medroxyprogest Ace] Other (See Comments)    Provera [Medroxyprogesterone Acetate]     Zyban [Bupropion]        Current Medications     Current Outpatient Prescriptions   Medication Sig Dispense Refill    albuterol (PROAIR HFA) 90 mcg/act inhaler Inhale 2 puffs every 4 (four) hours as needed for wheezing or shortness of breath 3 Inhaler 1    aspirin (ECOTRIN LOW STRENGTH) 81 mg EC tablet Take 1 tablet by mouth daily      atorvastatin (LIPITOR) 40 mg tablet Take 1 tablet (40 mg total) by mouth daily 90 tablet 3    B Complex-C CAPS Take by mouth      BL EVENING PRIMROSE OIL PO Take by mouth      budesonide (PULMICORT) 0 5 mg/2 mL nebulizer solution inhale contents of 1 vial in nebulizer twice a day  0    ezetimibe (ZETIA) 10 mg tablet Take 1 tablet (10 mg total) by mouth daily 90 tablet 3    fexofenadine (ALLEGRA) 180 MG tablet Take 180 mg by mouth daily      fluticasone (FLONASE) 50 mcg/act nasal spray 2 sprays into each nostril daily 48 g 3    fluticasone-vilanterol (BREO ELLIPTA) 100-25 mcg/inh inhaler One puff once a day, rinse mouth after use  1 Inhaler 3    gabapentin (NEURONTIN) 300 mg capsule Take 1 capsule (300 mg total) by mouth 2 (two) times a day 180 capsule 3    montelukast (SINGULAIR) 10 mg tablet Take 1 tablet (10 mg total) by mouth daily at bedtime 90 tablet 3    sertraline (ZOLOFT) 100 mg tablet Take 1 tablet (100 mg total) by mouth daily 90 tablet 3     No current facility-administered medications for this visit            Health Maintenance     Health Maintenance   Topic Date Due    PT PLAN OF CARE  1955    SLP PLAN OF CARE  10/10/2018    DTaP,Tdap,and Td Vaccines (1 - Tdap) 09/12/2019 (Originally 6/3/2016)    Lung Cancer Screening  10/18/2019    MAMMOGRAM  10/24/2019    PAP SMEAR  07/26/2020    CRC Screening: Colonoscopy  11/25/2023    Hepatitis C Screening  Completed    INFLUENZA VACCINE  Completed    Pneumococcal PPSV23 Highest Risk Adult  Completed     Immunization History   Administered Date(s) Administered    Influenza 09/26/2011    Influenza Quadrivalent Preservative Free 3 years and older IM 09/29/2015, 09/22/2016, 09/08/2017    Influenza TIV (IM) 01/17/2013, 09/03/2013, 09/23/2014    Influenza, recombinant, quadrivalent,injectable, preservative free 09/13/2018    Pneumococcal Conjugate 13-Valent 10/22/2018    Pneumococcal Polysaccharide PPV23 10/01/2007, 09/29/2015    Td (adult), adsorbed 06/02/2016       CHATO OatesNP

## 2018-12-05 ENCOUNTER — TELEPHONE (OUTPATIENT)
Dept: FAMILY MEDICINE CLINIC | Facility: CLINIC | Age: 63
End: 2018-12-05

## 2018-12-05 LAB
BACTERIA UR QL AUTO: NORMAL /HPF
BILIRUB UR QL STRIP: NEGATIVE
CLARITY UR: ABNORMAL
COLOR UR: YELLOW
GLUCOSE UR STRIP-MCNC: NEGATIVE MG/DL
HGB UR QL STRIP.AUTO: ABNORMAL
HYALINE CASTS #/AREA URNS LPF: NORMAL /LPF
KETONES UR STRIP-MCNC: NEGATIVE MG/DL
LEUKOCYTE ESTERASE UR QL STRIP: ABNORMAL
NITRITE UR QL STRIP: NEGATIVE
NON-SQ EPI CELLS URNS QL MICRO: NORMAL /HPF
PH UR STRIP.AUTO: 5.5 [PH] (ref 4.5–8)
PROT UR STRIP-MCNC: NEGATIVE MG/DL
RBC #/AREA URNS AUTO: NORMAL /HPF
SP GR UR STRIP.AUTO: 1.01 (ref 1–1.03)
UROBILINOGEN UR QL STRIP.AUTO: 0.2 E.U./DL
WBC #/AREA URNS AUTO: NORMAL /HPF

## 2018-12-05 RX ORDER — LORATADINE 10 MG/1
10 TABLET ORAL ONCE
Status: COMPLETED | OUTPATIENT
Start: 2018-12-06 | End: 2018-12-06

## 2018-12-05 RX ORDER — METHYLPREDNISOLONE SODIUM SUCCINATE 125 MG/2ML
125 INJECTION, POWDER, LYOPHILIZED, FOR SOLUTION INTRAMUSCULAR; INTRAVENOUS ONCE
Status: COMPLETED | OUTPATIENT
Start: 2018-12-06 | End: 2018-12-06

## 2018-12-05 RX ORDER — SODIUM CHLORIDE 9 MG/ML
20 INJECTION, SOLUTION INTRAVENOUS ONCE
Status: COMPLETED | OUTPATIENT
Start: 2018-12-06 | End: 2018-12-06

## 2018-12-05 RX ORDER — ACETAMINOPHEN 325 MG/1
650 TABLET ORAL ONCE
Status: COMPLETED | OUTPATIENT
Start: 2018-12-06 | End: 2018-12-06

## 2018-12-05 NOTE — PROGRESS NOTES
Please let patient know her urinalysis sent yesterday showed trace amounts of blood and white blood cells, but the microscopic test, we discussed yesterday, is clear, showing no blood and no bacteria in her urine  Her urine culture is still pending  Will call when those results are available

## 2018-12-05 NOTE — TELEPHONE ENCOUNTER
Called lab, will not have a preliminary result for urine culture until 11pm tonight  States the turn around time is 24 hours from when it was received in the lab  Lab received specimen at 10:47pm last night  All other labs have been resulted and located in [facility]

## 2018-12-05 NOTE — TELEPHONE ENCOUNTER
Is pt going tomorrow for ocrevus? Repeat urine with trace leukoctyes  At this juncture, we need to make decision for pt to get her med  I do not want to delay her therapy any longer and set up for these infusion very time sensitive  Candice Quiros for pt to get infusion, provided no fever or any acute sxs overnight   Let pt know she needs to follow up with her pcp for the urine culture results in case any need for abx or further work up

## 2018-12-05 NOTE — TELEPHONE ENCOUNTER
----- [id number] at 12/5/2018  9:03 AM EST -----  Please let patient know her urinalysis sent yesterday showed trace amounts of blood and white blood cells, but the microscopic test, we discussed yesterday, is clear, showing no blood and no bacteria in her urine  Her urine culture is still pending  Will call when those results are available

## 2018-12-05 NOTE — TELEPHONE ENCOUNTER
Patient going for her infusion tomorrow  Denies any fever or other new/worsening symptoms  Will call tomorrow morning if there are any changes  Will fax order to infusion center

## 2018-12-06 ENCOUNTER — HOSPITAL ENCOUNTER (OUTPATIENT)
Dept: INFUSION CENTER | Facility: HOSPITAL | Age: 63
Discharge: HOME | End: 2018-12-06
Payer: MEDICARE

## 2018-12-06 ENCOUNTER — TELEPHONE (OUTPATIENT)
Dept: NEUROLOGY | Facility: CLINIC | Age: 63
End: 2018-12-06

## 2018-12-06 VITALS
SYSTOLIC BLOOD PRESSURE: 113 MMHG | HEART RATE: 69 BPM | TEMPERATURE: 97.8 F | RESPIRATION RATE: 18 BRPM | DIASTOLIC BLOOD PRESSURE: 55 MMHG

## 2018-12-06 LAB — BACTERIA UR CULT: NORMAL

## 2018-12-06 PROCEDURE — 96367 TX/PROPH/DG ADDL SEQ IV INF: CPT

## 2018-12-06 PROCEDURE — 96415 CHEMO IV INFUSION ADDL HR: CPT

## 2018-12-06 PROCEDURE — 96413 CHEMO IV INFUSION 1 HR: CPT

## 2018-12-06 PROCEDURE — 96375 TX/PRO/DX INJ NEW DRUG ADDON: CPT

## 2018-12-06 RX ADMIN — SODIUM CHLORIDE 20 ML/HR: 0.9 INJECTION, SOLUTION INTRAVENOUS at 08:53

## 2018-12-06 RX ADMIN — ACETAMINOPHEN 650 MG: 325 TABLET, FILM COATED ORAL at 09:46

## 2018-12-06 RX ADMIN — METHYLPREDNISOLONE SODIUM SUCCINATE 125 MG: 125 INJECTION, POWDER, FOR SOLUTION INTRAMUSCULAR; INTRAVENOUS at 09:45

## 2018-12-06 RX ADMIN — DIPHENHYDRAMINE HYDROCHLORIDE 50 MG: 50 INJECTION, SOLUTION INTRAMUSCULAR; INTRAVENOUS at 09:46

## 2018-12-06 RX ADMIN — OCRELIZUMAB 300 MG: 300 INJECTION INTRAVENOUS at 10:39

## 2018-12-06 RX ADMIN — LORATADINE 10 MG: 10 TABLET ORAL at 08:53

## 2018-12-06 RX ADMIN — FAMOTIDINE 20 MG: 10 INJECTION, SOLUTION INTRAVENOUS at 08:53

## 2018-12-06 RX ADMIN — HYDROCORTISONE SODIUM SUCCINATE 100 MG: 100 INJECTION, POWDER, FOR SOLUTION INTRAMUSCULAR; INTRAVENOUS at 09:45

## 2018-12-06 NOTE — TELEPHONE ENCOUNTER
Loulou from [facility] infusion is calling to state that pt is there for her first Ocrevus infusion and has a cold sore in her mouth  Made Carolina aware she will get in touch with Sallie aC or Dr Yovany Alarcon and call infusion back

## 2018-12-06 NOTE — PROGRESS NOTES
Received call from manuel Gonzalez to proceed as long as pt does not have any other symptoms of infection

## 2018-12-06 NOTE — PROGRESS NOTES
Pt here for 1st time Ocrevus, stated that she woke up this am with a cold sore  Called and spoke to Valeri Alejandro, [facility] call me back after speaking with Dr Mulu Lira and call me back

## 2018-12-06 NOTE — TELEPHONE ENCOUNTER
Spoke with Dr Anjelica Brunson, ok to proceed with Kody Ruiz as long as no other symptoms  Called infusion center and spoke with Ace Hung, patient afebrile no other complaints, more of a canker sore inside of her mouth  Based on Dr Willis Ludwig recommendations, ok to proceed with infusion

## 2018-12-06 NOTE — PLAN OF CARE
Problem: SAFETY ADULT  Goal: Patient will remain free of falls  INTERVENTIONS:  - Assess patient frequently for physical needs  -  Identify cognitive and physical deficits and behaviors that affect risk of falls    -  [place] fall precautions as indicated by assessment   - Educate patient/family on patient safety including physical limitations  - Instruct patient to call for assistance with activity based on assessment  - Modify environment to reduce risk of injury  - Consider OT/PT consult to assist with strengthening/mobility  Outcome: Progressing

## 2018-12-06 NOTE — PROGRESS NOTES
Pt tolerated treatment today without incident  Pt observed 1 hour post infusion, vital signs stable    Pt declined AVS but is aware of future appts

## 2018-12-07 ENCOUNTER — TELEPHONE (OUTPATIENT)
Dept: FAMILY MEDICINE CLINIC | Facility: CLINIC | Age: 63
End: 2018-12-07

## 2018-12-07 NOTE — PROGRESS NOTES
Please let patient know her urine culture is negative for infection  No need to repeat, unless she develops any urinary symptoms

## 2018-12-07 NOTE — TELEPHONE ENCOUNTER
----- Message from [id number] [facility] sent at 12/6/2018 10:09 PM EST -----  Please let patient know her urine culture is negative for infection  No need to repeat, unless she develops any urinary symptoms

## 2018-12-10 ENCOUNTER — TELEPHONE (OUTPATIENT)
Dept: NEUROLOGY | Facility: CLINIC | Age: 63
End: 2018-12-10

## 2018-12-10 NOTE — TELEPHONE ENCOUNTER
Infusion order completed for second Ocrevus scheduled on 12/20  Will have Dr Yoseph Courtney sign on Thursday in [facility]  Will call patient on Monday to remind her to have blood work completed

## 2018-12-16 NOTE — PROGRESS NOTES
Patient ID: Rashad Quevedo is a 58 y o  female  Assessment/Plan:    Multiple sclerosis, relapsing/remitting  Patient had MRI brain in April 2018 and stable  She then had another MRI brain  in August for surveillance that showed a new 14 mm focus of FLAIR abnormality with enhancement in the left periventricular white matter suggestive of active demyelination  Patient had been on Tysabri since 2011 monthly Tysabri antibodies were checked and these came back positive on 8/14/18  Last infusion was on 8/31/18    Patient was seen at [facility] by Dr Ander Hinojosa on 9/26/18  He agreed that since she has Tysabri autoantibodies, that she should not continue the med       Patient had updated MRI brain 9/28/18  The new lesion in the left PV region no longer was enhancing  It did appear larger than prior, although this was though to represent new MS lesion rather than PML     LP completed 10/1/18,  negative for JCV PCR and ultrasensitive JCV in CSF  Serum JCV negative on 8/8/18 and 9/28/18      Decision was made to initiate Belkis Duque,, reviewed medication safety, side effects, monitoring  there was no washout  Patient had pre-labs done for Ocrevus  Patient had flu shot on 9/13/18  She had her Previnar 13 on  10/25  Patient had her 1st Ocrevus infusion on December 6th  she tolerated the infusion well, no reported side effects  She is due to undergo her 2nd half on December 20th      Patient states since initiating Ocrevus, overall she feels much better, her speech is better, her ambulation is better  --patient instructed to obtain pre 0crevus infusion labs today  --we will obtain updated MRI in March  -- patient aware of the 6 cases of PML with patients on Ocrevus, with crossover from Tysabri (5 cases) and Gilenya (1 case)  Patient understands and agreeable to Ocrevus  Re-reviewed this medication including safety, side effects, monitoring  Patient with prior episodes of dissociation, difficulty with speech, balance     She underwent routine EEG  There is evidence of rare sharp activity emanating from the left temporal region were predominantly in the drowsy state with phase reversal noted at T3  Hugo Barber No prior history of seizures  --she is due to undergo a extended 48 hr ambulatory EEG to further characterized potential seizure  --overall, these episodes seem to also have improved with herOcrevus         No problem-specific Assessment & Plan notes found for this encounter  Diagnoses and all orders for this visit:    Multiple sclerosis, relapsing/remitting  -     CBC and differential; Future  -     Comprehensive metabolic panel; Future  -     MRI brain MS wo and w contrast; Future    Amnesia/memory disorder           Subjective:    HPI   Patient is a 58year old female with PMH of MS diagnosed in 2000, last seen in October 2018  Patient has been on Tysabri since 2011  She had missed occasional infusions due to illnesses  Patient had been having increased LBP in Sept 2016, but was off gabapentin  She also had some weakness and was given IV steroids in Sept 2016  She had MRI lumbar spine 9/29/16 with disc disease and arthrosis  Patient was sent to pain management  Patient follows with Dr Ruth Hallman [facility] [place]  Patient had hospitalization 4/14/17-4/20/17 c/o some numbness in the cheek, she was being treated for a sinus infection and on Levaquin  However symptoms did not resolve and she also developed difficulty ambulating, feeling like she was continuously leaning towards the right side, along with the right facial numbness  Hugo Barber She was admitted and given a total of 5 days of IV steroids  MRI brain 4/17/17 New enhancing white matter lesion in the right middle cerebellar peduncle, and moderate chronic demyelinating disease throughout the supratentorial brain and to lesser degree posterior fossa  MRI c-spine 4/17/17 Multilevel white matter lesions are stable  At that time, her Tysabri was held back 2 weeks after steroid completion   She had PT/OT which she felt was very helpful in combination with the steroids       Patient had updated MRI brain in April 2018 and stable  Patient then had updated MRI brain 8/6/18 done for surveillance while on Tysabri  which  demonstrated a new  Enhancing 14 mm focus of FLAIR abnormality in the left periventricular white matter suggestive of active demyelination  Otherwise, moderate burden of subacute and chronic plaque is identified elsewhere in the supratentorial brain as well as brainstem  JCV updated 8/8/18 and negative with index 0 12  Patient had Tysabri autoantibodies checked which were positive on 8/14/18  She did get her last Tysabri infusion on 8/31/18  Patient went to see Dr Chris Contreras at [place] [id number] on 9/26/18  He felt that in the setting of Tysabri antibodies, the lesion was likely MS breakthrough lesion, although there was some concern for PML  It was felt she should however stop her Tysabri  MRI brain was updated 9/28/18  prior detected left periventricular lesion no longer enhanced  It did minimally increase in size however  Per rads, most consistent with new MS lesion  Patient had LP on 10/1/18  JCV PRC and ultrasensitive JCV  both negative  Patient then had all her pre-labs done for Ocrevus  Hepatitis testing for C and B were negative      There was a question that she may have had hep A as a child  It does not appear she has immunity against Hep B  She may have had the vaccine in the past, but no longer has immunity  Varicella was immune      It was felt that the have vaccine at this point, given it would take several months to complete vaccinations would most likely be of little benefit  Many conversations had between KALYAN Ardon for [facility], Dr Chris Contreras from [place] [id number], PCP  PCP wanting to give her Prevnar vaccine  Patient had the flu shot on 9/13/18        Patient presents today for follow-up  She did receive her Pneumovax on October 25th      Given completion of all of her pre [facility] labs, she received her 1st infusion on December 6th  She tolerated it well, no side effects other than some mild sleepiness, most likely secondary to the Benadryl      In having her 1st infusion, she states that she noticed improvement right away  she is not dragging her feet as much  She was having some issues with her shoulder which have since subsided as well  Cognitively, she feels she is more alert, is having less problems with her speech, left dissociated   She did undergo recent routine EEG  There is evidence of rare sharp activity emanating from the left temporal region were predominantly in the drowsy state with phase reversal noted at T3  At this point, she is due to have a ambulatory 48 hr EEG  As she also underwent updated MRI of the thoracic spine, no new or enhancing lesions were noted  MRI lumbar showed  Spondylosis and osteoarthritis with grade 1 anterolisthesis of L5-S1   Patient currently denies any radicular complaints  She had been complaining of some left shoulder pain,  She was seen by PCP given Toradol , She was then seen by ortho DX with subacromial impingement AC arthritis, bicep tendinitis, she underwent cortisone injections  11/16 and again 11/26  At present, this also seems to have dissipated      She denies any new weakness, no issues with swallowing or chewing, no changes in bowel or bladder  Overall, she is feeling much better, no reported falls  She is due to have her 2nd half infusion on December 20th  As instructed, and per protocol, she is to have her pre infusion lab work done today  The following portions of the patient's history were reviewed and updated as appropriate: allergies, current medications, past family history, past medical history, past social history, past surgical history and problem list          Objective:    Blood pressure 130/62, pulse 72, resp   rate 12, height 5' 4" (1 626 m), weight 70 8 kg (156 lb), not currently breastfeeding  Physical Exam   Constitutional: She appears well-developed  HENT:   Head: Normocephalic  Eyes: Pupils are equal, round, and reactive to light  Neck: Normal range of motion  Cardiovascular: Normal rate  Pulmonary/Chest: Effort normal    Musculoskeletal: She exhibits no edema  Neurological: She has normal strength and normal reflexes  Psychiatric: She has a normal mood and affect  Her speech is normal and behavior is normal  Judgment and thought content normal        Neurological Exam  Mental Status  Awake, alert and oriented to person, place and time  Speech is normal  Language is fluent with no aphasia  Language: Occasional subtle word-finding difficulty noted  Cranial Nerves  CN II: Visual fields full to confrontation  CN III, IV, VI: Extraocular movements intact bilaterally  Pupils equal round and reactive to light bilaterally  CN V: Facial sensation is normal   CN VII: Full and symmetric facial movement  CN XI: Shoulder shrug strength is normal  Subtle left decreased SCM strength  Motor  Normal muscle bulk throughout  Normal muscle tone  Strength is 5/5 throughout all four extremities  No drift  Sensory  Sensation is intact to light touch, pinprick, vibration and proprioception in all four extremities  Reflexes  Deep tendon reflexes are 2+ and symmetric in all four extremities with downgoing toes bilaterally  Coordination  Right: Finger-to-nose normal   Left: Finger-to-nose normal   Slower on left  Gait  Casual gait:  Ambulates independently, mild imbalance noted  ROS:    Review of Systems   Constitutional: Negative for appetite change and fever  HENT: Negative for hearing loss, tinnitus, trouble swallowing and voice change  Sinus problem     Eyes: Negative for visual disturbance  Respiratory: Negative for shortness of breath  Cardiovascular: Negative for palpitations  Gastrointestinal: Negative for nausea and vomiting     Endocrine: Negative  Genitourinary: Positive for urgency  Negative for frequency  Musculoskeletal: Negative for arthralgias, gait problem, myalgias and neck pain  Skin: Negative for rash  Allergic/Immunologic: Negative  Neurological: Negative for dizziness, tremors, seizures, syncope, facial asymmetry, speech difficulty, weakness, light-headedness, numbness and headaches  Hematological: Negative  Psychiatric/Behavioral: Negative for confusion and sleep disturbance  The patient is not nervous/anxious  Memory problem           EEG routine 11/13/18 1  There is evidence of rare sharp activity emanating from the left temporal region were predominantly in the drowsy state with phase reversal noted at T3  This could suggest a potential neuronal dysfunction  2  There is evidence of bilateral theta slowing seen predominantly in the temporal regions during the drowsy state  3  Otherwise the background is normal     MRI thoracic 10/28/18:   Stable MR appearance of the thoracic spine consistent with demyelinating disease  No new lesions or enhancement to suggest acute inflammation    MRI [facility] 10/28/18:Distal cord is normal, no indication of demyelinating disease  Spondylosis and osteoarthritis with grade 1 anterolisthesis of L5-S1 and right greater than left lateral recess stenosis  Correlate for right S1 radiculitis      Labs 12/3/18: , CD19 Dbb=351,CD4 YBW=3828, CD4%=63 2, SY0Onxjk%=10 1, WBC=10 85, MP normal

## 2018-12-17 ENCOUNTER — OFFICE VISIT (OUTPATIENT)
Dept: NEUROLOGY | Facility: CLINIC | Age: 63
End: 2018-12-17
Payer: MEDICARE

## 2018-12-17 ENCOUNTER — HOSPITAL ENCOUNTER (OUTPATIENT)
Dept: NEUROLOGY | Facility: AMBULATORY SURGERY CENTER | Age: 63
Discharge: HOME | End: 2018-12-17

## 2018-12-17 VITALS
DIASTOLIC BLOOD PRESSURE: 62 MMHG | BODY MASS INDEX: 26.63 KG/M2 | RESPIRATION RATE: 12 BRPM | WEIGHT: 156 LBS | SYSTOLIC BLOOD PRESSURE: 130 MMHG | HEIGHT: 64 IN | HEART RATE: 72 BPM

## 2018-12-17 DIAGNOSIS — G35 MULTIPLE SCLEROSIS (HCC): ICD-10-CM

## 2018-12-17 DIAGNOSIS — R41.3 AMNESIA/MEMORY DISORDER: ICD-10-CM

## 2018-12-17 DIAGNOSIS — G35 MULTIPLE SCLEROSIS (HCC): Primary | ICD-10-CM

## 2018-12-17 PROCEDURE — 99354 PR PROLONGED SVC OUTPATIENT SETTING 1ST HOUR: CPT | Performed by: NURSE PRACTITIONER

## 2018-12-17 PROCEDURE — 99215 OFFICE O/P EST HI 40 MIN: CPT | Performed by: NURSE PRACTITIONER

## 2018-12-17 NOTE — PATIENT INSTRUCTIONS
Doing well on Ocrevus, next infusion 12/20   need labs done today  Continue following with PCP  MRI Brain in March  For 48hr EEG

## 2018-12-18 ENCOUNTER — TRANSCRIBE ORDERS (OUTPATIENT)
Dept: LAB | Facility: CLINIC | Age: 63
End: 2018-12-18

## 2018-12-18 ENCOUNTER — APPOINTMENT (OUTPATIENT)
Dept: LAB | Facility: CLINIC | Age: 63
End: 2018-12-18
Payer: MEDICARE

## 2018-12-18 ENCOUNTER — HOSPITAL ENCOUNTER (OUTPATIENT)
Dept: NEUROLOGY | Facility: AMBULATORY SURGERY CENTER | Age: 63
Discharge: HOME | End: 2018-12-18
Payer: MEDICARE

## 2018-12-18 DIAGNOSIS — G35 MULTIPLE SCLEROSIS (HCC): ICD-10-CM

## 2018-12-18 LAB
ALBUMIN SERPL BCP-MCNC: 4.1 G/DL (ref 3.5–5)
ALP SERPL-CCNC: 103 U/L (ref 46–116)
ALT SERPL W P-5'-P-CCNC: 40 U/L (ref 12–78)
ANION GAP SERPL CALCULATED.3IONS-SCNC: 6 MMOL/L (ref 4–13)
AST SERPL W P-5'-P-CCNC: 19 U/L (ref 5–45)
BASOPHILS # BLD AUTO: 0.05 THOUSANDS/ΜL (ref 0–0.1)
BASOPHILS NFR BLD AUTO: 1 % (ref 0–1)
BILIRUB SERPL-MCNC: 0.4 MG/DL (ref 0.2–1)
BUN SERPL-MCNC: 12 MG/DL (ref 5–25)
CALCIUM SERPL-MCNC: 9.1 MG/DL (ref 8.3–10.1)
CHLORIDE SERPL-SCNC: 104 MMOL/L (ref 100–108)
CO2 SERPL-SCNC: 31 MMOL/L (ref 21–32)
CREAT SERPL-MCNC: 0.65 MG/DL (ref 0.6–1.3)
EOSINOPHIL # BLD AUTO: 0.2 THOUSAND/ΜL (ref 0–0.61)
EOSINOPHIL NFR BLD AUTO: 2 % (ref 0–6)
ERYTHROCYTE [DISTWIDTH] IN BLOOD BY AUTOMATED COUNT: 12 % (ref 11.6–15.1)
GFR SERPL CREATININE-BSD FRML MDRD: 94 ML/MIN/1.73SQ M
GLUCOSE P FAST SERPL-MCNC: 91 MG/DL (ref 65–99)
HCT VFR BLD AUTO: 43.3 % (ref 34.8–46.1)
HGB BLD-MCNC: 14.6 G/DL (ref 11.5–15.4)
IMM GRANULOCYTES # BLD AUTO: 0.03 THOUSAND/UL (ref 0–0.2)
IMM GRANULOCYTES NFR BLD AUTO: 0 % (ref 0–2)
LYMPHOCYTES # BLD AUTO: 2.39 THOUSANDS/ΜL (ref 0.6–4.47)
LYMPHOCYTES NFR BLD AUTO: 25 % (ref 14–44)
MCH RBC QN AUTO: 30.2 PG (ref 26.8–34.3)
MCHC RBC AUTO-ENTMCNC: 33.7 G/DL (ref 31.4–37.4)
MCV RBC AUTO: 90 FL (ref 82–98)
MONOCYTES # BLD AUTO: 0.62 THOUSAND/ΜL (ref 0.17–1.22)
MONOCYTES NFR BLD AUTO: 6 % (ref 4–12)
NEUTROPHILS # BLD AUTO: 6.43 THOUSANDS/ΜL (ref 1.85–7.62)
NEUTS SEG NFR BLD AUTO: 66 % (ref 43–75)
NRBC BLD AUTO-RTO: 0 /100 WBCS
PLATELET # BLD AUTO: 316 THOUSANDS/UL (ref 149–390)
PMV BLD AUTO: 9.9 FL (ref 8.9–12.7)
POTASSIUM SERPL-SCNC: 3.8 MMOL/L (ref 3.5–5.3)
PROT SERPL-MCNC: 7.5 G/DL (ref 6.4–8.2)
RBC # BLD AUTO: 4.83 MILLION/UL (ref 3.81–5.12)
SODIUM SERPL-SCNC: 141 MMOL/L (ref 136–145)
WBC # BLD AUTO: 9.72 THOUSAND/UL (ref 4.31–10.16)

## 2018-12-18 PROCEDURE — 95953 HB EEG MONITORING/COMPUTER: CPT

## 2018-12-18 PROCEDURE — 36415 COLL VENOUS BLD VENIPUNCTURE: CPT

## 2018-12-18 PROCEDURE — 95953 PR EEG MONITORING/COMPUTER, EA 24 HOURS, UNATTENDED: CPT | Performed by: PSYCHIATRY & NEUROLOGY

## 2018-12-18 PROCEDURE — 80053 COMPREHEN METABOLIC PANEL: CPT

## 2018-12-18 PROCEDURE — 85025 COMPLETE CBC W/AUTO DIFF WBC: CPT

## 2018-12-19 ENCOUNTER — HOSPITAL ENCOUNTER (OUTPATIENT)
Dept: NEUROLOGY | Facility: AMBULATORY SURGERY CENTER | Age: 63
Discharge: HOME | End: 2018-12-19

## 2018-12-19 ENCOUNTER — HOSPITAL ENCOUNTER (OUTPATIENT)
Dept: NEUROLOGY | Facility: AMBULATORY SURGERY CENTER | Age: 63
Discharge: HOME | End: 2018-12-19
Payer: MEDICARE

## 2018-12-19 ENCOUNTER — TELEPHONE (OUTPATIENT)
Dept: NEUROLOGY | Facility: CLINIC | Age: 63
End: 2018-12-19

## 2018-12-19 DIAGNOSIS — G35 MULTIPLE SCLEROSIS (HCC): ICD-10-CM

## 2018-12-19 PROCEDURE — 95953 PR EEG MONITORING/COMPUTER, EA 24 HOURS, UNATTENDED: CPT | Performed by: PSYCHIATRY & NEUROLOGY

## 2018-12-19 PROCEDURE — 95953 HB EEG MONITORING/COMPUTER: CPT

## 2018-12-19 RX ORDER — SODIUM CHLORIDE 9 MG/ML
20 INJECTION, SOLUTION INTRAVENOUS ONCE
Status: COMPLETED | OUTPATIENT
Start: 2018-12-20 | End: 2018-12-20

## 2018-12-19 RX ORDER — METHYLPREDNISOLONE SODIUM SUCCINATE 125 MG/2ML
125 INJECTION, POWDER, LYOPHILIZED, FOR SOLUTION INTRAMUSCULAR; INTRAVENOUS ONCE
Status: COMPLETED | OUTPATIENT
Start: 2018-12-20 | End: 2018-12-20

## 2018-12-19 RX ORDER — LORATADINE 10 MG/1
10 TABLET ORAL ONCE
Status: COMPLETED | OUTPATIENT
Start: 2018-12-20 | End: 2018-12-20

## 2018-12-19 RX ORDER — ACETAMINOPHEN 325 MG/1
650 TABLET ORAL ONCE
Status: COMPLETED | OUTPATIENT
Start: 2018-12-20 | End: 2018-12-20

## 2018-12-19 NOTE — TELEPHONE ENCOUNTER
CBC/CMP results in EPIC for your review  Patient scheduled for second Ocrevus infusion tomorrow  Ok to fax signed order?

## 2018-12-20 ENCOUNTER — TELEPHONE (OUTPATIENT)
Dept: NEUROLOGY | Facility: CLINIC | Age: 63
End: 2018-12-20

## 2018-12-20 ENCOUNTER — HOSPITAL ENCOUNTER (OUTPATIENT)
Dept: INFUSION CENTER | Facility: HOSPITAL | Age: 63
Discharge: HOME | End: 2018-12-20
Payer: MEDICARE

## 2018-12-20 VITALS
TEMPERATURE: 98.7 F | RESPIRATION RATE: 18 BRPM | SYSTOLIC BLOOD PRESSURE: 115 MMHG | HEART RATE: 86 BPM | DIASTOLIC BLOOD PRESSURE: 79 MMHG

## 2018-12-20 PROCEDURE — 96413 CHEMO IV INFUSION 1 HR: CPT

## 2018-12-20 PROCEDURE — 96415 CHEMO IV INFUSION ADDL HR: CPT

## 2018-12-20 PROCEDURE — 96367 TX/PROPH/DG ADDL SEQ IV INF: CPT

## 2018-12-20 PROCEDURE — 96375 TX/PRO/DX INJ NEW DRUG ADDON: CPT

## 2018-12-20 RX ADMIN — HYDROCORTISONE SODIUM SUCCINATE 100 MG: 100 INJECTION, POWDER, FOR SOLUTION INTRAMUSCULAR; INTRAVENOUS at 08:26

## 2018-12-20 RX ADMIN — METHYLPREDNISOLONE SODIUM SUCCINATE 125 MG: 125 INJECTION, POWDER, FOR SOLUTION INTRAMUSCULAR; INTRAVENOUS at 08:24

## 2018-12-20 RX ADMIN — ACETAMINOPHEN 650 MG: 325 TABLET, FILM COATED ORAL at 08:22

## 2018-12-20 RX ADMIN — SODIUM CHLORIDE 20 ML/HR: 0.9 INJECTION, SOLUTION INTRAVENOUS at 07:51

## 2018-12-20 RX ADMIN — LORATADINE 10 MG: 10 TABLET ORAL at 07:51

## 2018-12-20 RX ADMIN — FAMOTIDINE 20 MG: 10 INJECTION, SOLUTION INTRAVENOUS at 07:52

## 2018-12-20 RX ADMIN — OCRELIZUMAB 300 MG: 300 INJECTION INTRAVENOUS at 09:07

## 2018-12-20 RX ADMIN — DIPHENHYDRAMINE HYDROCHLORIDE 50 MG: 50 INJECTION, SOLUTION INTRAMUSCULAR; INTRAVENOUS at 08:23

## 2018-12-20 NOTE — PLAN OF CARE
Problem: Potential for Falls  Goal: Patient will remain free of falls  INTERVENTIONS:  - Assess patient frequently for physical needs  -  Identify cognitive and physical deficits and behaviors that affect risk of falls    -  [place] fall precautions as indicated by assessment   - Educate patient/family on patient safety including physical limitations  - Instruct patient to call for assistance with activity based on assessment  - Modify environment to reduce risk of injury  - Consider OT/PT consult to assist with strengthening/mobility   Outcome: Progressing

## 2018-12-20 NOTE — TELEPHONE ENCOUNTER
Sallie Ca,     I put in call to dr cortez at [place] to discuss aeeg  He is in with pt and will call back  I also just tried to get pt on phone to see how she is feeling  She also was getting her second infusion of ocrevus today as well  Please let her know her results of eeg and that we are awaiting discussion with one of the sz docs re possible continuous video recording  I called to see if any new clinical issues  Nora just saw her and overall pt was feeling better

## 2018-12-20 NOTE — TELEPHONE ENCOUNTER
Patient returned call stating she was to speak with Spencer Johnston regarding EEG results  I spoke with Spencer Johnston who asked that I relay to the patient that her most recent EEG was still abnormal and was still showing some abnormal waves and that they have a call out to one of the epileptologist to discuss if any further testing should be done  Patient made aware, verbalized clear instruction  Patient also reports she is feeling "great" and has no complaints after receiving second dose of ocrevus today  I informed Adrian Akhtar that we would follow up with her regarding any further recommendations

## 2018-12-20 NOTE — TELEPHONE ENCOUNTER
[facility], please let pt know I spoke with dr cortez and the prolonged study is not overtly eleptogenic  It could be related to some of her long standing cognitive issues  Without discrete episodes of zoning or passing out, hard to characterize further  He went through both days of study with me  For now, we will hold on continuous eeg unless any new sxs  Tell her to call for any thing new  Glad infusion went well today

## 2019-01-21 ENCOUNTER — OFFICE VISIT (OUTPATIENT)
Dept: FAMILY MEDICINE CLINIC | Facility: CLINIC | Age: 64
End: 2019-01-21
Payer: MEDICARE

## 2019-01-21 VITALS
WEIGHT: 156.8 LBS | TEMPERATURE: 95 F | HEIGHT: 64 IN | DIASTOLIC BLOOD PRESSURE: 78 MMHG | RESPIRATION RATE: 16 BRPM | HEART RATE: 72 BPM | BODY MASS INDEX: 26.77 KG/M2 | SYSTOLIC BLOOD PRESSURE: 130 MMHG

## 2019-01-21 DIAGNOSIS — H10.31 ACUTE BACTERIAL CONJUNCTIVITIS OF RIGHT EYE: Primary | ICD-10-CM

## 2019-01-21 PROCEDURE — 99213 OFFICE O/P EST LOW 20 MIN: CPT | Performed by: NURSE PRACTITIONER

## 2019-01-21 RX ORDER — TOBRAMYCIN 3 MG/ML
1 SOLUTION/ DROPS OPHTHALMIC
Qty: 1 BOTTLE | Refills: 0 | Status: SHIPPED | OUTPATIENT
Start: 2019-01-21 | End: 2019-01-26

## 2019-01-21 NOTE — PATIENT INSTRUCTIONS

## 2019-01-22 NOTE — PROGRESS NOTES
FAMILY PRACTICE OFFICE VISIT       NAME: Isaebl Kaufman  AGE: 58 y o  SEX: female       : 1955        MRN: [id number]    DATE: 2019    Assessment and Plan     Problem List Items Addressed This Visit     None      Visit Diagnoses     Acute bacterial conjunctivitis of right eye    -  Primary    Relevant Medications    tobramycin (TOBREX) 0 3 % SOLN            Patient Instructions     Conjunctivitis   WHAT YOU SHOULD KNOW:   Conjunctivitis, or pink eye, is inflammation of your conjunctiva  The conjunctiva is a thin tissue that covers the front of your eye and the back of your eyelids  The conjunctiva helps protect your eye and keep it moist         INSTRUCTIONS:   Medicines:   · Allergy medicine: This medicine helps decrease itchy, red, swollen eyes caused by allergies  It may be given as a pill, eye drops, or nasal spray  · Antibiotics:  You will need antibiotics if your conjunctivitis is caused by bacteria  This medicine may be given as eye drops or eye ointment  · Steroid medicine: This medicine helps decrease inflammation  It may be given as a pill, eye drops, or nasal spray  · Take your medicine as directed  Call your healthcare provider if you think your medicine is not helping or if you have side effects  Tell him if you are allergic to any medicine  Keep a list of the medicines, vitamins, and herbs you take  Include the amounts, and when and why you take them  Bring the list or the pill bottles to follow-up visits  Carry your medicine list with you in case of an emergency  Follow up with your primary healthcare provider as directed: You may need to return for more tests on your eyes  These will help your primary healthcare provider check for eye damage  Write down your questions so you remember to ask them during your visits  Avoid the spread of conjunctivitis:   · Wash your hands often:  Wash your hands before you touch your eyes   Also wash your hands before you prepare or eat food and after you use the bathroom or change a diaper  · Avoid allergens:  Try to avoid the things that cause your allergies, such as pets, dust, or grass  · Avoid contact:  Do not share towels or washcloths  Try to stay away from others as much as possible  Ask when you can return to work or school  · Throw away eye makeup:  Throw away mascara and other eye makeup  Manage your symptoms:  · Apply a cool compress:  Wet a washcloth with cold water and place it on your eye  This will help decrease swelling  · Use eye drops:  Eye drops, or artificial tears, can be bought without a doctor's order  They help keep your eye moist     · Do not wear contact lenses: They can irritate your eye  Throw away the pair you are using and ask when you can wear them again  Use a new pair of lenses when your primary healthcare provider says it is okay  · Flush your eye:  You may need to flush your eye with saline to help decrease your symptoms  Ask for more information on how to flush your eye  Contact your primary healthcare provider if:   · Your eyesight becomes blurry  · You have tiny bumps or spots of blood on your eye  · You have questions or concerns about your condition or care  Return to the emergency department if:   · The swelling in your eye gets worse, even after treatment  · Your vision suddenly becomes worse or you cannot see at all  · Your eye begins to bleed  © 2014 [address] is for End User's use only and may not be sold, redistributed or otherwise used for commercial purposes  All illustrations and images included in CareNotes® are the copyrighted property of [employer] , [employer]  or Rigoberto Mendez  The above information is an  only  It is not intended as medical advice for individual conditions or treatments   Talk to your doctor, nurse or pharmacist before following any medical regimen to see if it is safe and effective for you         1  Acute bacterial conjunctivitis of right eye  tobramycin (TOBREX) 0 3 % SOLN     This 76-year-old female presents today with bacterial conjunctivitis of the right eye  Will treat with tobramycin ophthalmic solution 0 3%, 1 drop to right eye every 4 hr while awake  Continue this for 5 days  Warm compresses 15-20 minutes, 4 times per day  She will call if symptoms are not significantly improved over the next 24 hr   Reviewed prevention of spread of infection  Chief Complaint     Chief Complaint   Patient presents with    Conjunctivitis     Pt is here for right eye irritation 3 + days       History of Present Illness     Yanick Carbone is a 58year old female presenting today for right eye redness, drainage, and irritation starting on 1/19  No visual changes, no eye pain  Eye feels itchy and irritated  Draining yellow drainage  Crusted shut in the morning  No recent cold or allergy symptoms  Review of Systems   Review of Systems   Constitutional: Negative  HENT: Negative  Eyes: Positive for discharge, redness and itching  Negative for photophobia, pain and visual disturbance  Respiratory: Negative  Cardiovascular: Negative          Active Problem List     Patient Active Problem List   Diagnosis    Multiple sclerosis, relapsing/remitting    Muscle spasm    Hyperlipidemia    Osteoporosis    Ambulatory dysfunction    COPD without exacerbation ([address] )    Vitamin D deficiency    Disc degeneration, lumbar    Major depression, recurrent, chronic (HCC)    Allergic rhinitis    Amnesia/memory disorder    Shoulder impingement syndrome, left       Past Medical History:  Past Medical History:   Diagnosis Date    Asthma     Degeneration of intervertebral disc at L5-S1 level     Depression     Generalized anxiety disorder     Hepatitis     at age 4 yrs    Herpes zoster     last assessed: 6/20/13    Hypercholesterolemia     Hyperlipidemia     MS (multiple sclerosis) ([address] )     Osteoporosis     Seasonal allergies     Tumor of breast     benign, right breast       Past Surgical History:  Past Surgical History:   Procedure Laterality Date    BREAST SURGERY      right breast fibroid tumor resection    COLONOSCOPY      11/2013 - due in 2018 - Dr Crsiostomo    FL LUMBAR PUNCTURE  10/1/2018    ROTATOR CUFF REPAIR      Bilateral    ROTATOR CUFF REPAIR Bilateral     SEPTOPLASTY         Family History:  Family History   Problem Relation Age of Onset    Arthritis Mother     Osteoporosis Mother     COPD Father     Arthritis Family     COPD Family     Emphysema Family     Heart disease Family     Hyperthyroidism Family     Multiple sclerosis Family     Osteoarthritis Family        Social History:  Social History     Social History    Marital status: /Civil Union     Spouse name: N/A    Number of children: N/A    Years of education: N/A     Occupational History    Retired      Social History Main Topics    Smoking status: Current Every Day Smoker     Packs/day: 1 00     Years: 40 00     Types: Cigarettes    Smokeless tobacco: Never Used    Alcohol use Yes      Comment: occasional; Allscripts also states Never drank alcohol    Drug use: No    Sexual activity: Yes     Partners: Male      Comment: denied any risk of AIDS     Other Topics Concern    Not on file     Social History Narrative               I have reviewed the patient's medical history in detail; there are no changes to the history as noted in the electronic medical record  Objective     Vitals:    01/21/19 1057   BP: 130/78   Pulse: 72   Resp: 16   Temp: (!) 95 °F (35 °C)   TempSrc: Tympanic   Weight: 71 1 kg (156 lb 12 8 oz)   Height: 5' 4" (1 626 m)     Wt Readings from Last 3 Encounters:   01/21/19 71 1 kg (156 lb 12 8 oz)   12/17/18 70 8 kg (156 lb)   12/04/18 70 1 kg (154 lb 9 6 oz)     Body mass index is 26 91 kg/m²    PHQ-9 Depression Screening    PHQ-9:    Frequency of the following problems over the past two weeks:            Physical Exam   Constitutional: She appears well-developed and well-nourished  No distress  HENT:   Head: Normocephalic and atraumatic  Eyes: Pupils are equal, round, and reactive to light  Right eye exhibits discharge (Right upper and lower eyelids mildly edematous with mild erythema)  Left eye exhibits no discharge  Right conjunctiva is injected  Left conjunctiva is not injected  Cardiovascular: Normal rate and regular rhythm  Pulmonary/Chest: Effort normal and breath sounds normal    Psychiatric: She has a normal mood and affect  Nursing note and vitals reviewed        ALLERGIES:  Allergies   Allergen Reactions    Bactrim [Sulfamethoxazole-Trimethoprim] Other (See Comments)     Reaction Date: 11Aug2011;     Betadine [Povidone Iodine]     Cefzil [Cefprozil]     Gadobutrol GI Intolerance    Medroxyprogesterone Other (See Comments)     Reaction Date: 11Aug2011;     Nortriptyline Other (See Comments)    Pamelor [Nortriptyline Hcl]     Prempro [Conj Estrog-Medroxyprogest Ace] Other (See Comments)    Provera [Medroxyprogesterone Acetate]     Zyban [Bupropion]        Current Medications     Current Outpatient Prescriptions   Medication Sig Dispense Refill    albuterol (PROAIR HFA) 90 mcg/act inhaler Inhale 2 puffs every 4 (four) hours as needed for wheezing or shortness of breath 3 Inhaler 1    aspirin (ECOTRIN LOW STRENGTH) 81 mg EC tablet Take 1 tablet by mouth daily      atorvastatin (LIPITOR) 40 mg tablet Take 1 tablet (40 mg total) by mouth daily 90 tablet 3    B Complex-C CAPS Take by mouth      BL EVENING PRIMROSE OIL PO Take by mouth      budesonide (PULMICORT) 0 5 mg/2 mL nebulizer solution inhale contents of 1 vial in nebulizer twice a day  0    ezetimibe (ZETIA) 10 mg tablet Take 1 tablet (10 mg total) by mouth daily 90 tablet 3    fexofenadine (ALLEGRA) 180 MG tablet Take 180 mg by mouth daily      fluticasone (FLONASE) 50 mcg/act nasal spray 2 sprays into each nostril daily 48 g 3    fluticasone-vilanterol (BREO ELLIPTA) 100-25 mcg/inh inhaler One puff once a day, rinse mouth after use  1 Inhaler 3    gabapentin (NEURONTIN) 300 mg capsule Take 1 capsule (300 mg total) by mouth 2 (two) times a day 180 capsule 3    montelukast (SINGULAIR) 10 mg tablet Take 1 tablet (10 mg total) by mouth daily at bedtime 90 tablet 3    sertraline (ZOLOFT) 100 mg tablet Take 1 tablet (100 mg total) by mouth daily 90 tablet 3    tobramycin (TOBREX) 0 3 % SOLN Administer 1 drop to both eyes every 4 (four) hours while awake for 5 days 1 Bottle 0     No current facility-administered medications for this visit            Health Maintenance     Health Maintenance   Topic Date Due    Medicare Annual Wellness Visit (AWV)  1955    SLP PLAN OF CARE  10/10/2018    DTaP,Tdap,and Td Vaccines (1 - Tdap) 09/12/2019 (Originally 6/3/2016)    Lung Cancer Screening  10/18/2019    MAMMOGRAM  10/24/2019    PAP SMEAR  07/26/2020    CRC Screening: Colonoscopy  11/25/2023    Hepatitis C Screening  Completed    INFLUENZA VACCINE  Completed    Pneumococcal PPSV23 Highest Risk Adult  Completed     Immunization History   Administered Date(s) Administered    Influenza 09/26/2011    Influenza Quadrivalent Preservative Free 3 years and older IM 09/29/2015, 09/22/2016, 09/08/2017    Influenza TIV (IM) 01/17/2013, 09/03/2013, 09/23/2014    Influenza, recombinant, quadrivalent,injectable, preservative free 09/13/2018    Pneumococcal Conjugate 13-Valent 10/22/2018    Pneumococcal Polysaccharide PPV23 10/01/2007, 09/29/2015    Td (adult), adsorbed 06/02/2016       JUSTINE Daniel

## 2019-02-18 ENCOUNTER — TRANSCRIBE ORDERS (OUTPATIENT)
Dept: LAB | Facility: CLINIC | Age: 64
End: 2019-02-18

## 2019-02-18 ENCOUNTER — APPOINTMENT (OUTPATIENT)
Dept: LAB | Facility: CLINIC | Age: 64
End: 2019-02-18
Payer: MEDICARE

## 2019-02-18 ENCOUNTER — TELEPHONE (OUTPATIENT)
Dept: NEUROLOGY | Facility: CLINIC | Age: 64
End: 2019-02-18

## 2019-02-18 DIAGNOSIS — G35 MULTIPLE SCLEROSIS (HCC): Primary | ICD-10-CM

## 2019-02-18 DIAGNOSIS — G35 MULTIPLE SCLEROSIS (HCC): ICD-10-CM

## 2019-02-18 LAB
BUN SERPL-MCNC: 16 MG/DL (ref 5–25)
CREAT SERPL-MCNC: 0.66 MG/DL (ref 0.6–1.3)
GFR SERPL CREATININE-BSD FRML MDRD: 94 ML/MIN/1.73SQ M

## 2019-02-18 PROCEDURE — 82565 ASSAY OF CREATININE: CPT

## 2019-02-18 PROCEDURE — 84520 ASSAY OF UREA NITROGEN: CPT

## 2019-02-18 PROCEDURE — 36415 COLL VENOUS BLD VENIPUNCTURE: CPT

## 2019-03-06 ENCOUNTER — HOSPITAL ENCOUNTER (OUTPATIENT)
Dept: MRI IMAGING | Facility: HOSPITAL | Age: 64
Discharge: HOME | End: 2019-03-06
Payer: MEDICARE

## 2019-03-06 DIAGNOSIS — G35 MULTIPLE SCLEROSIS (HCC): ICD-10-CM

## 2019-03-06 PROCEDURE — A9585 GADOBUTROL INJECTION: HCPCS | Performed by: NURSE PRACTITIONER

## 2019-03-06 PROCEDURE — 70553 MRI BRAIN STEM W/O & W/DYE: CPT

## 2019-03-06 RX ADMIN — GADOBUTROL 7 ML: 604.72 INJECTION INTRAVENOUS at 09:47

## 2019-03-07 ENCOUNTER — TELEPHONE (OUTPATIENT)
Dept: NEUROLOGY | Facility: CLINIC | Age: 64
End: 2019-03-07

## 2019-03-07 NOTE — TELEPHONE ENCOUNTER
----- Message from Melissa Strickland RN sent at 3/7/2019  2:16 PM EST -----      ----- Message -----  From: Janelle Mullins  Sent: 3/7/2019   2:08 PM  To: [facility]    Please let pt know her MRI is stable, no new or enh lesions   will continue to monitor,

## 2019-03-13 RX ORDER — B-COMPLEX WITH VITAMIN C
1 TABLET ORAL
COMMUNITY
End: 2019-08-21 | Stop reason: ALTCHOICE

## 2019-03-14 ENCOUNTER — OFFICE VISIT (OUTPATIENT)
Dept: FAMILY MEDICINE CLINIC | Facility: CLINIC | Age: 64
End: 2019-03-14
Payer: MEDICARE

## 2019-03-14 VITALS
RESPIRATION RATE: 16 BRPM | DIASTOLIC BLOOD PRESSURE: 72 MMHG | TEMPERATURE: 98.5 F | WEIGHT: 158.6 LBS | SYSTOLIC BLOOD PRESSURE: 130 MMHG | BODY MASS INDEX: 27.08 KG/M2 | HEIGHT: 64 IN | HEART RATE: 84 BPM

## 2019-03-14 DIAGNOSIS — G35 MULTIPLE SCLEROSIS (HCC): Primary | ICD-10-CM

## 2019-03-14 DIAGNOSIS — Z12.11 SCREENING FOR COLON CANCER: ICD-10-CM

## 2019-03-14 DIAGNOSIS — E55.9 VITAMIN D DEFICIENCY: ICD-10-CM

## 2019-03-14 DIAGNOSIS — F33.9 MAJOR DEPRESSION, RECURRENT, CHRONIC (HCC): ICD-10-CM

## 2019-03-14 DIAGNOSIS — J44.9 COPD WITHOUT EXACERBATION (HCC): ICD-10-CM

## 2019-03-14 DIAGNOSIS — R93.89 ABNORMAL CT OF THE CHEST: ICD-10-CM

## 2019-03-14 DIAGNOSIS — E78.5 HYPERLIPIDEMIA, UNSPECIFIED HYPERLIPIDEMIA TYPE: Chronic | ICD-10-CM

## 2019-03-14 DIAGNOSIS — Z00.00 ENCOUNTER FOR MEDICARE ANNUAL WELLNESS EXAM: ICD-10-CM

## 2019-03-14 DIAGNOSIS — R41.3 AMNESIA/MEMORY DISORDER: ICD-10-CM

## 2019-03-14 PROCEDURE — 99214 OFFICE O/P EST MOD 30 MIN: CPT | Performed by: FAMILY MEDICINE

## 2019-03-14 PROCEDURE — G0439 PPPS, SUBSEQ VISIT: HCPCS | Performed by: FAMILY MEDICINE

## 2019-03-14 NOTE — PROGRESS NOTES
FAMILY PRACTICE OFFICE VISIT       NAME: Shanice Kaufman  AGE: 58 y o  SEX: female       : 1955        MRN: [id number]        Assessment and Plan     Problem List Items Addressed This Visit        Respiratory    COPD without exacerbation (HCC) (Chronic)       Nervous and Auditory    Multiple sclerosis, relapsing/remitting - Primary    Relevant Orders    CBC and differential    Lipid panel    TSH, 3rd generation    Vitamin D 25 hydroxy       Other    Hyperlipidemia (Chronic)    Relevant Orders    Comprehensive metabolic panel    Lipid panel    TSH, 3rd generation    Vitamin D deficiency    Relevant Orders    Vitamin D 25 hydroxy    Major depression, recurrent, chronic (HCC)     Continue Zoloft 100 mg daily         Amnesia/memory disorder    Relevant Orders    TSH, 3rd generation      Other Visit Diagnoses     Screening for colon cancer        Relevant Orders    Ambulatory referral to Colorectal Surgery         Patient presents for follow-up of chronic medical conditions  She remains under ongoing care with [facility] for treatment of multiple sclerosis  She started her first Ocrevus infusion in 2018  So far her symptoms have been stable  Patient is still unfortunately smoking, 1 pack daily  Results of recent CT chest discussed  Will repeat study on 6 months oliver  Hyperlipidemia:  Continue Lipitor and Zetia  Depression anxiety:  Symptoms are well controlled  Patient remains under care of Ophthalmology, Dr Katie Diane  Referral for colonoscopy  Routine follow-up 6 months  There are no Patient Instructions on file for this visit  [employer] software was used to dictate this note  It may contain errors with dictating incorrect words/spelling  Please contact provider directly with any questions  Chief Complaint     Chief Complaint   Patient presents with   [facility] Wellness Visit     subsequent    Follow-up       History of Present Illness     Patient presents for follow-up    She is accompanied by her   She is under care of [facility] for treatment of multiple sclerosis  She is receiving new therapy with Ocrevus Infusions every  6 months, next infusion is due in    Blood work is pending  Medications updated  Patient is still unfortunately smoking 1 pack daily  Results of recent CT chest performed in  discussed with patient and   Will repeat study in 6 months due to nodular opacities described in right lung, overall stable as per Radiology  Patient is   UTD with gynecological exam, Dr Stallworth, and mammo    Last colonoscopy in , patient was supposed to repeated in  due to finding of adenoma   Ongoing smokin ppd   Symptoms of depression and anxiety are well controlled on Zoloft 100 mg daily  Patient remains on Lipitor and Zetia for hyperlipidemia  Review of Systems   Review of Systems   Constitutional: Positive for fatigue  HENT: Negative  Respiratory: Negative  Cardiovascular: Negative  Gastrointestinal: Negative  Endocrine: Negative  Genitourinary: Negative  Musculoskeletal: Positive for arthralgias and back pain  Allergic/Immunologic: Negative  Neurological: Negative for dizziness and headaches  As outlined in HPI   Hematological: Negative  Psychiatric/Behavioral: Positive for decreased concentration  The patient is nervous/anxious          Active Problem List     Patient Active Problem List   Diagnosis    Multiple sclerosis, relapsing/remitting    Muscle spasm    Hyperlipidemia    Osteoporosis    Ambulatory dysfunction    COPD without exacerbation [address] [address]    Vitamin D deficiency    Disc degeneration, lumbar    Major depression, recurrent, chronic (HCC)    Allergic rhinitis    Amnesia/memory disorder    Shoulder impingement syndrome, left       Past Medical History:  Past Medical History:   Diagnosis Date    Asthma     Degeneration of intervertebral disc at L5-S1 level     Depression  Generalized anxiety disorder     Hepatitis     at age 4 yrs   Primitivo Villafuerte Herpes zoster     last assessed: 6/20/13    Hypercholesterolemia     Hyperlipidemia     MS (multiple sclerosis) [address] [address]     Osteoporosis     Seasonal allergies     Tumor of breast     benign, right breast       Past Surgical History:  Past Surgical History:   Procedure Laterality Date    BREAST SURGERY      right breast fibroid tumor resection    COLONOSCOPY      11/2013 - due in 2018 - Dr Crisostomo    FL LUMBAR PUNCTURE  10/1/2018    ROTATOR CUFF REPAIR      Bilateral    ROTATOR CUFF REPAIR Bilateral     SEPTOPLASTY         Family History:  Family History   Problem Relation Age of Onset    Arthritis Mother     Osteoporosis Mother     COPD Father     Arthritis Family     COPD Family     Emphysema Family     Heart disease Family     Hyperthyroidism Family     Multiple sclerosis Family     Osteoarthritis Family        Social History:  Social History     Socioeconomic History    Marital status: /Civil Union     Spouse name: Not on file    Number of children: Not on file    Years of education: Not on file    Highest education level: Not on file   Occupational History    Occupation: Retired   Social Needs    Financial resource strain: Not on file    Food insecurity:     Worry: Not on file     Inability: Not on file   [employer] needs:     Medical: Not on file     Non-medical: Not on file   Tobacco Use    Smoking status: Current Every Day Smoker     Packs/day: 1 00     Years: 40 00     Pack years: 40 00     Types: Cigarettes    Smokeless tobacco: Never Used   Substance and Sexual Activity    Alcohol use: Yes     Comment: occasional; Allscripts also states Never drank alcohol    Drug use: No    Sexual activity: Yes     Partners: Male     Comment: denied any risk of AIDS   Lifestyle    Physical activity:     Days per week: Not on file     Minutes per session: Not on file    Stress: Not on file   Relationships    Social connections:     Talks on phone: Not on file     Gets together: Not on file     Attends [employer] service: Not on file     Active member of club or organization: Not on file     Attends meetings of clubs or organizations: Not on file     Relationship status: Not on file    Intimate partner violence:     Fear of current or ex partner: Not on file     Emotionally abused: Not on file     Physically abused: Not on file     Forced sexual activity: Not on file   Other Topics Concern    Not on file   Social History Narrative           Objective     Vitals:    03/14/19 1011   BP: 130/72   Pulse: 84   Resp: 16   Temp: 98 5 °F (36 9 °C)   TempSrc: Tympanic   Weight: 71 9 kg (158 lb 9 6 oz)   Height: 5' 4" (1 626 m)     Wt Readings from Last 3 Encounters:   03/14/19 71 9 kg (158 lb 9 6 oz)   01/21/19 71 1 kg (156 lb 12 8 oz)   12/17/18 70 8 kg (156 lb)       Physical Exam   Constitutional: She is oriented to person, place, and time  She appears well-developed and well-nourished  HENT:   Head: Normocephalic and atraumatic  Eyes: Conjunctivae are normal    Neck: Neck supple  No JVD present  Carotid bruit is not present  Cardiovascular: Normal rate, regular rhythm and normal heart sounds  No murmur heard  Pulmonary/Chest: Effort normal and breath sounds normal  No respiratory distress  She has no wheezes  She has no rales  Abdominal: She exhibits no abdominal bruit  Musculoskeletal: Normal range of motion  She exhibits no edema  Neurological: She is alert and oriented to person, place, and time  No cranial nerve deficit  Coordination normal    Psychiatric: She has a normal mood and affect  Her behavior is normal    Nursing note and vitals reviewed        Pertinent Laboratory/Diagnostic Studies:  Lab Results   Component Value Date    GLUCOSE 107 12/15/2015    BUN 16 02/18/2019    CREATININE 0 66 02/18/2019    CALCIUM 9 1 12/18/2018     12/15/2015    K 3 8 12/18/2018    CO2 31 12/18/2018     12/18/2018     Lab Results   Component Value Date    ALT 40 12/18/2018    AST 19 12/18/2018    ALKPHOS 103 12/18/2018    BILITOT 0 30 12/15/2015       Lab Results   Component Value Date    WBC 9 72 12/18/2018    HGB 14 6 12/18/2018    HCT 43 3 12/18/2018    MCV 90 12/18/2018     12/18/2018       No results found for: TSH    Lab Results   Component Value Date    CHOL 169 07/21/2015     Lab Results   Component Value Date    TRIG 127 09/28/2018     Lab Results   Component Value Date    HDL 43 09/28/2018     Lab Results   Component Value Date    LDLCALC 97 09/28/2018     Lab Results   Component Value Date    HGBA1C 5 8 (H) 10/20/2015       Results for orders placed or performed in visit on 02/18/19   BUN   Result Value Ref Range    BUN 16 5 - 25 mg/dL   Creatinine, serum   Result Value Ref Range    Creatinine 0 66 0 60 - 1 30 mg/dL    eGFR 94 ml/min/1 73sq m       Orders Placed This Encounter   Procedures    CBC and differential    Comprehensive metabolic panel    Lipid panel    TSH, 3rd generation    Vitamin D 25 hydroxy    Ambulatory referral to Colorectal Surgery       ALLERGIES:  Allergies   Allergen Reactions    Bactrim [Sulfamethoxazole-Trimethoprim] Other (See Comments)     Reaction Date: 11Aug2011;     Betadine [Povidone Iodine]     Cefzil [Cefprozil]     Zyban [Bupropion]     Gadobutrol GI Intolerance    Medroxyprogesterone Other (See Comments)     Reaction Date: 11Aug2011;     Nortriptyline Other (See Comments)    Pamelor [Nortriptyline Hcl]     Prempro [Conj Estrog-Medroxyprogest Ace] Other (See Comments)    Provera [Medroxyprogesterone Acetate]        Current Medications     Current Outpatient Medications   Medication Sig Dispense Refill    albuterol (PROAIR HFA) 90 mcg/act inhaler Inhale 2 puffs every 4 (four) hours as needed for wheezing or shortness of breath 3 Inhaler 1    aspirin (ECOTRIN LOW STRENGTH) 81 mg EC tablet Take 1 tablet by mouth daily      atorvastatin (LIPITOR) 40 mg tablet Take 1 tablet (40 mg total) by mouth daily 90 tablet 3    B Complex-C CAPS Take by mouth      BL EVENING PRIMROSE OIL PO Take by mouth      budesonide (PULMICORT) 0 5 mg/2 mL nebulizer solution inhale contents of 1 vial in nebulizer twice a day  0    calcium carbonate-vitamin D (OSCAL-D) 500 mg-200 units per tablet Take 1 tablet by mouth      ezetimibe (ZETIA) 10 mg tablet Take 1 tablet (10 mg total) by mouth daily 90 tablet 3    fexofenadine (ALLEGRA) 180 MG tablet Take 180 mg by mouth daily      fluticasone (FLONASE) 50 mcg/act nasal spray 2 sprays into each nostril daily 48 g 3    fluticasone-vilanterol (BREO ELLIPTA) 100-25 mcg/inh inhaler One puff once a day, rinse mouth after use  1 Inhaler 3    gabapentin (NEURONTIN) 300 mg capsule Take 1 capsule (300 mg total) by mouth 2 (two) times a day 180 capsule 3    montelukast (SINGULAIR) 10 mg tablet Take 1 tablet (10 mg total) by mouth daily at bedtime 90 tablet 3    sertraline (ZOLOFT) 100 mg tablet Take 1 tablet (100 mg total) by mouth daily 90 tablet 3     No current facility-administered medications for this visit            Health Maintenance     Health Maintenance   Topic Date Due    Medicare Annual Wellness Visit (AWV)  1955    BMI: Followup Plan  09/28/1972    [facility] PLAN OF CARE  10/10/2018    DTaP,Tdap,and Td Vaccines (1 - Tdap) 09/12/2019 (Originally 6/3/2016)    MAMMOGRAM  10/24/2019    BMI: Adult  03/14/2020    PAP SMEAR  07/26/2020    CRC Screening: Colonoscopy  11/25/2023    Hepatitis C Screening  Completed    INFLUENZA VACCINE  Completed    Pneumococcal PPSV23 Highest Risk Adult  Completed    HEPATITIS B VACCINES  Aged Out     Immunization History   Administered Date(s) Administered    INFLUENZA 09/26/2011    Influenza Quadrivalent Preservative Free 3 years and older IM 09/29/2015, 09/22/2016, 09/08/2017    Influenza TIV (IM) 01/17/2013, 09/03/2013, 09/23/2014    Influenza, recombinant, quadrivalent,injectable, preservative free 09/13/2018    Pneumococcal Conjugate 13-Valent 10/22/2018    Pneumococcal Polysaccharide PPV23 10/01/2007, 09/29/2015    Td (adult), adsorbed 06/02/2016       Malissa Bishop MD

## 2019-03-14 NOTE — PROGRESS NOTES
Assessment and Plan:    Problem List Items Addressed This Visit        Respiratory    COPD without exacerbation (HCC) (Chronic)       Nervous and Auditory    Multiple sclerosis, relapsing/remitting - Primary    Relevant Orders    CBC and differential    Lipid panel    TSH, 3rd generation    Vitamin D 25 hydroxy       Other    Hyperlipidemia (Chronic)    Relevant Orders    Comprehensive metabolic panel    Lipid panel    TSH, 3rd generation    Vitamin D deficiency    Relevant Orders    Vitamin D 25 hydroxy    Major depression, recurrent, chronic (HCC)    Amnesia/memory disorder    Relevant Orders    TSH, 3rd generation      Other Visit Diagnoses     Screening for colon cancer        Relevant Orders    Ambulatory referral to Colorectal Surgery       Tobacco Cessation Counseling: Tobacco cessation counseling and education was provided  The patient is sincerely urged to quit consumption of tobacco  She is ready to quit tobacco  The numerous health risks of tobacco consumption were discussed  If she decides to quit, there are  anumber of helpful adjunctive aids, and she can see me to discuss nicotine replacement therapy, chantix, or bupropion anytime in the future  Health Maintenance Due   Topic Date Due    Medicare Annual Wellness Visit (AWV)  1955    BMI: Followup Plan  09/28/1972    [facility] PLAN OF CARE  10/10/2018         HPI:  Zamzam Chand is a 58 y o  female here for her Subsequent Wellness Visit      Patient Active Problem List   Diagnosis    Multiple sclerosis, relapsing/remitting    Muscle spasm    Hyperlipidemia    Osteoporosis    Ambulatory dysfunction    COPD without exacerbation ([facility] [address]    Vitamin D deficiency    Disc degeneration, lumbar    Major depression, recurrent, chronic (HCC)    Allergic rhinitis    Amnesia/memory disorder    Shoulder impingement syndrome, left     Past Medical History:   Diagnosis Date    Asthma     Degeneration of intervertebral disc at L5-S1 level     Depression     Generalized anxiety disorder     Hepatitis     at age 4 yrs   Christine Nuno Herpes zoster     last assessed: 6/20/13    Hypercholesterolemia     Hyperlipidemia     MS (multiple sclerosis) ([facility] [address] )     Osteoporosis     Seasonal allergies     Tumor of breast     benign, right breast     Past Surgical History:   Procedure Laterality Date    BREAST SURGERY      right breast fibroid tumor resection    COLONOSCOPY      11/2013 - due in 2018 - Dr Crisostomo    FL LUMBAR PUNCTURE  10/1/2018    ROTATOR CUFF REPAIR      Bilateral    ROTATOR CUFF REPAIR Bilateral     SEPTOPLASTY       Family History   Problem Relation Age of Onset    Arthritis Mother     Osteoporosis Mother     COPD Father     Arthritis Family     COPD Family     Emphysema Family     Heart disease Family     Hyperthyroidism Family     Multiple sclerosis Family     Osteoarthritis Family      Social History     Tobacco Use   Smoking Status Current Every Day Smoker    Packs/day: 1 00    Years: 40 00    Pack years: 40 00    Types: Cigarettes   Smokeless Tobacco Never Used     Social History     Substance and Sexual Activity   Alcohol Use Yes    Comment: occasional; [employer] also states Never drank alcohol      Social History     Substance and Sexual Activity   Drug Use No       Current Outpatient Medications   Medication Sig Dispense Refill    albuterol (PROAIR HFA) 90 mcg/act inhaler Inhale 2 puffs every 4 (four) hours as needed for wheezing or shortness of breath 3 Inhaler 1    aspirin (ECOTRIN LOW STRENGTH) 81 mg EC tablet Take 1 tablet by mouth daily      atorvastatin (LIPITOR) 40 mg tablet Take 1 tablet (40 mg total) by mouth daily 90 tablet 3    B Complex-C CAPS Take by mouth      BL EVENING PRIMROSE OIL PO Take by mouth      budesonide (PULMICORT) 0 5 mg/2 mL nebulizer solution inhale contents of 1 vial in nebulizer twice a day  0    calcium carbonate-vitamin D (OSCAL-D) 500 mg-200 units per tablet Take 1 tablet by mouth  ezetimibe (ZETIA) 10 mg tablet Take 1 tablet (10 mg total) by mouth daily 90 tablet 3    fexofenadine (ALLEGRA) 180 MG tablet Take 180 mg by mouth daily      fluticasone (FLONASE) 50 mcg/act nasal spray 2 sprays into each nostril daily 48 g 3    fluticasone-vilanterol (BREO ELLIPTA) 100-25 mcg/inh inhaler One puff once a day, rinse mouth after use  1 Inhaler 3    gabapentin (NEURONTIN) 300 mg capsule Take 1 capsule (300 mg total) by mouth 2 (two) times a day 180 capsule 3    montelukast (SINGULAIR) 10 mg tablet Take 1 tablet (10 mg total) by mouth daily at bedtime 90 tablet 3    sertraline (ZOLOFT) 100 mg tablet Take 1 tablet (100 mg total) by mouth daily 90 tablet 3     No current facility-administered medications for this visit        Allergies   Allergen Reactions    Bactrim [Sulfamethoxazole-Trimethoprim] Other (See Comments)     Reaction Date: 11Aug2011;     Betadine [Povidone Iodine]     Cefzil [Cefprozil]     Zyban [Bupropion]     Gadobutrol GI Intolerance    Medroxyprogesterone Other (See Comments)     Reaction Date: 11Aug2011;     Nortriptyline Other (See Comments)    Pamelor [Nortriptyline Hcl]     Prempro [Conj Estrog-Medroxyprogest Ace] Other (See Comments)    Provera [Medroxyprogesterone Acetate]      Immunization History   Administered Date(s) Administered    INFLUENZA 09/26/2011    Influenza Quadrivalent Preservative Free 3 years and older IM 09/29/2015, 09/22/2016, 09/08/2017    Influenza TIV (IM) 01/17/2013, 09/03/2013, 09/23/2014    Influenza, recombinant, quadrivalent,injectable, preservative free 09/13/2018    Pneumococcal Conjugate 13-Valent 10/22/2018    Pneumococcal Polysaccharide PPV23 10/01/2007, 09/29/2015    Td (adult), adsorbed 06/02/2016       Patient Care Team:  Apurva Lowe MD as PCP - General (Family Medicine)  Apurva Lowe MD as PCP - General  Inocente Gaudy, DO Clayburn Cumins, MD Michelina Dick, MD Ivory Asai, CRNP Sherley Kanner, MD Marisel Ramirez MD    Medicare Screening Tests and Risk Assessments:  Barbara Mendoza is here for her Subsequent Wellness visit  Last Medicare Wellness visit information reviewed, patient interviewed and updates made to the record today  Health Risk Assessment:  Patient rates overall health as good  Patient feels that their physical health rating is Much better  Eyesight was rated as Same  Hearing was rated as Same  Patient feels that their emotional and mental health rating is Much better  Pain experienced by patient in the last 7 days has been Some  Patient's pain rating has been 3/10  Emotional/Mental Health:  Patient has been feeling nervous/anxious  PHQ-9 Depression Screening:    Frequency of the following problems over the past two weeks:      1  Little interest or pleasure in doing things: 0 - not at all      2  Feeling down, depressed, or hopeless: 0 - not at all  PHQ-2 Score: 0          Broken Bones/Falls: Fall Risk Assessment:    In the past year, patient has experienced: No history of falling in past year          Bladder/Bowel:  Patient has leaked urine accidently in the last six months  Patient reports loss of bowel control  Immunizations:  Patient has had a flu vaccination within the last year  Patient has received a pneumonia shot  Patient has not received a shingles shot  Patient has received tetanus/diphtheria shot  Date of tetanus/diphtheria shot: 6/2/2016    Home Safety:  Patient does not have trouble with stairs inside or outside of their home  Patient currently reports that there are no safety hazards present in home, working smoke alarms, working carbon monoxide detectors        Preventative Screenings:   Breast cancer screening performed, 10/24/2018  colon cancer screen completed, 11/25/2013  cholesterol screen completed, 9/28/2018  no glaucoma eye exam completed    Nutrition:  Current diet: Regular and Limited junk food with servings of the following:    Medications:  Patient is currently taking over-the-counter supplements  List of OTC medications includes: See updated med list  Patient is able to manage medications  Lifestyle Choices:  Patient reports current tobacco use  Patient reports alcohol use  Alcohol use per week: 2-6 per month  Patient drives a vehicle  Patient wears seat belt  Activities of Daily Living:  Can get out of bed by his or her self, able to dress self, able to make own meals, able to do own shopping, able to bathe self, can do own laundry/housekeeping, can manage own money, pay bills and track expenses    Previous Hospitalizations:  No hospitalization or ED visit in past 12 months        Advanced Directives:  Patient has not decided on power of   Patient has not completed advanced directive  Preventative Screening/Counseling:      Cardiovascular:      General: Screening Current          Diabetes:      General: Screening Current          Colorectal Cancer:      Due for studies: Colonoscopy - Low Risk      Comments: Advised patient to schedule colonoscopy        Breast Cancer:      General: Screening Current          Cervical Cancer:      General: Screening Current          Osteoporosis:      Counseling: Calcium and Vitamin D Intake and Regular Weightbearing Exercise          AAA:      General: Screening Not Indicated          Glaucoma:      General: Screening Current          HIV:      General: Screening Current          Hepatitis C:      General: Screening Current        Advanced Directives:   Patient has no living will for healthcare, does not have durable POA for healthcare, patient does not have an advanced directive  Immunizations:      Influenza: Influenza UTD This Year and Influenza Recommended Annually      Pneumococcal: Lifetime Vaccine Completed      TD: Td Vaccine UTD      Other Preventative Counseling (Non-Medicare):   Increase physical activity and Weight reduction discussed      Referrals:  Referral(s) to: Neurology

## 2019-03-17 ENCOUNTER — TELEPHONE (OUTPATIENT)
Dept: FAMILY MEDICINE CLINIC | Facility: CLINIC | Age: 64
End: 2019-03-17

## 2019-03-17 PROBLEM — R93.89 ABNORMAL CT OF THE CHEST: Status: ACTIVE | Noted: 2019-03-17

## 2019-03-17 PROBLEM — R93.89 ABNORMAL CT OF THE CHEST: Chronic | Status: ACTIVE | Noted: 2019-03-17

## 2019-03-17 PROBLEM — F33.9 MAJOR DEPRESSION, RECURRENT, CHRONIC (HCC): Chronic | Status: ACTIVE | Noted: 2017-09-12

## 2019-03-17 NOTE — TELEPHONE ENCOUNTER
Please contact patient  I reviewed her original CT chest again and I would like her to proceed with repeat CT chest in mid April ( 6 months after original study)  I would feel better if we have  CT chest rechecked prior to her next Ocrevus infusion for multiple sclerosis  Please advise her to proceed    Thank you

## 2019-03-20 ENCOUNTER — OFFICE VISIT (OUTPATIENT)
Dept: NEUROLOGY | Facility: CLINIC | Age: 64
End: 2019-03-20
Payer: MEDICARE

## 2019-03-20 VITALS
BODY MASS INDEX: 26.98 KG/M2 | HEIGHT: 64 IN | DIASTOLIC BLOOD PRESSURE: 62 MMHG | HEART RATE: 74 BPM | SYSTOLIC BLOOD PRESSURE: 127 MMHG | WEIGHT: 158 LBS

## 2019-03-20 DIAGNOSIS — M62.838 MUSCLE SPASM: ICD-10-CM

## 2019-03-20 DIAGNOSIS — G35 MULTIPLE SCLEROSIS (HCC): Primary | Chronic | ICD-10-CM

## 2019-03-20 PROCEDURE — 99214 OFFICE O/P EST MOD 30 MIN: CPT | Performed by: NURSE PRACTITIONER

## 2019-03-20 RX ORDER — BACLOFEN 10 MG/1
10 TABLET ORAL 2 TIMES DAILY PRN
Qty: 60 TABLET | Refills: 1 | Status: SHIPPED | OUTPATIENT
Start: 2019-03-20 | End: 2019-09-16

## 2019-03-22 ENCOUNTER — OFFICE VISIT (OUTPATIENT)
Dept: FAMILY MEDICINE CLINIC | Facility: CLINIC | Age: 64
End: 2019-03-22
Payer: MEDICARE

## 2019-03-22 VITALS
BODY MASS INDEX: 26.73 KG/M2 | TEMPERATURE: 96.7 F | SYSTOLIC BLOOD PRESSURE: 130 MMHG | HEART RATE: 68 BPM | DIASTOLIC BLOOD PRESSURE: 70 MMHG | RESPIRATION RATE: 16 BRPM | WEIGHT: 156.6 LBS | HEIGHT: 64 IN

## 2019-03-22 DIAGNOSIS — J32.0 MAXILLARY SINUSITIS, UNSPECIFIED CHRONICITY: Primary | ICD-10-CM

## 2019-03-22 PROCEDURE — 99213 OFFICE O/P EST LOW 20 MIN: CPT | Performed by: FAMILY MEDICINE

## 2019-03-22 RX ORDER — AMOXICILLIN AND CLAVULANATE POTASSIUM 875; 125 MG/1; MG/1
1 TABLET, FILM COATED ORAL EVERY 12 HOURS SCHEDULED
Qty: 20 TABLET | Refills: 0 | Status: SHIPPED | OUTPATIENT
Start: 2019-03-22 | End: 2019-04-01

## 2019-03-22 NOTE — PROGRESS NOTES
FAMILY PRACTICE OFFICE VISIT       NAME: Ashleigh Kaufman  AGE: 58 y o  SEX: female       : 1955        MRN: [id number]    DATE: 3/22/2019  TIME: 1:40 PM    Assessment and Plan     Problem List Items Addressed This Visit        Respiratory    Maxillary sinusitis - Primary    Relevant Medications    amoxicillin-clavulanate (AUGMENTIN) 875-125 mg per tablet        Patient given prescription for Augmentin 875 mg to use 1 b i d  With food for 10 days  Patient will continue with her current use of Allegra and Flonase  She will call if symptoms persist after medication completed    There are no Patient Instructions on file for this visit  Chief Complaint     Chief Complaint   Patient presents with   Wiliam Fried Like Symptoms     Pt is here cough, sinus congestion  1 + wk       History of Present Illness     Patient describes one-week history of significant congestion and left facial pressure  She denies any significant coughing or wheezing  She smokes 1 pack of cigarettes per day  She denies any documented fevers      Review of Systems   Review of Systems   Constitutional: Positive for fatigue  Negative for fever  HENT: Positive for congestion, postnasal drip, sinus pressure and sore throat  Respiratory: Negative  Cardiovascular: Negative  Gastrointestinal: Negative          Active Problem List     Patient Active Problem List   Diagnosis    Multiple sclerosis, relapsing/remitting    Muscle spasm    Hyperlipidemia    Osteoporosis    Ambulatory dysfunction    COPD without exacerbation [facility] [address] [facility]    Vitamin D deficiency    Disc degeneration, lumbar    Major depression, recurrent, chronic (HCC)    Allergic rhinitis    Amnesia/memory disorder    Shoulder impingement syndrome, left    Abnormal CT of the chest    Maxillary sinusitis       Past Medical History:  Past Medical History:   Diagnosis Date    Asthma     Degeneration of intervertebral disc at L5-S1 level     Depression     Generalized anxiety disorder     Hepatitis     at age 4 yrs   Alanis Mare Herpes zoster     last assessed: 6/20/13    Hypercholesterolemia     Hyperlipidemia     MS [facility] sclerosis) ([facility] [address] [facility]     Osteoporosis     Seasonal allergies     Tumor of breast     benign, right breast       Past Surgical History:  Past Surgical History:   Procedure Laterality Date    BREAST SURGERY      right breast fibroid tumor resection    COLONOSCOPY      11/2013 - due in 2018 - Dr Crisostomo    FL LUMBAR PUNCTURE  10/1/2018    ROTATOR CUFF REPAIR      Bilateral    ROTATOR CUFF REPAIR Bilateral     SEPTOPLASTY         Family History:  Family History   Problem Relation Age of Onset    Arthritis Mother     Osteoporosis Mother     COPD Father     Arthritis Family     COPD Family     Emphysema Family     Heart disease Family     Hyperthyroidism Family     Multiple sclerosis Family     Osteoarthritis Family        Social History:  Social History     Socioeconomic History    Marital status: /Civil Union     Spouse name: Not on file    Number of children: Not on file    Years of education: Not on file    Highest education level: Not on file   Occupational History    Occupation: Retired   Social Needs    Financial resource strain: Not on file    Food insecurity:     Worry: Not on file     Inability: Not on file   [employer] needs:     Medical: Not on file     Non-medical: Not on file   Tobacco Use    Smoking status: Current Every Day Smoker     Packs/day: 1 00     Years: 40 00     Pack years: 40 00     Types: Cigarettes    Smokeless tobacco: Never Used   Substance and Sexual Activity    Alcohol use: Yes     Comment: occasional; Allscripts also states Never drank alcohol    Drug use: No    Sexual activity: Yes     Partners: Male     Comment: denied any risk of AIDS   Lifestyle    Physical activity:     Days per week: Not on file     Minutes per session: Not on file    Stress: Not on file   Relationships    Social connections:     Talks on phone: Not on file     Gets together: Not on file     Attends [employer] service: Not on file     Active member of club or organization: Not on file     Attends meetings of clubs or organizations: Not on file     Relationship status: Not on file    Intimate partner violence:     Fear of current or ex partner: Not on file     Emotionally abused: Not on file     Physically abused: Not on file     Forced sexual activity: Not on file   Other Topics Concern    Not on file   Social History Narrative           Objective     Vitals:    03/22/19 1320   BP: 130/70   Pulse: 68   Resp: 16   Temp: (!) 96 7 °F (35 9 °C)     Wt Readings from Last 3 Encounters:   03/22/19 71 kg (156 lb 9 6 oz)   03/20/19 71 7 kg (158 lb)   03/14/19 71 9 kg (158 lb 9 6 oz)       Physical Exam   Constitutional: No distress  HENT:   Right Ear: External ear normal    Left Ear: External ear normal    Mouth/Throat: Oropharynx is clear and moist  No oropharyngeal exudate  Tympanic membranes within normal limits bilaterally  Increased mucosal swelling bilateral nasal passages   Cardiovascular: Normal heart sounds  Regular rate and rhythm with no murmurs   Pulmonary/Chest: Breath sounds normal    Lungs are clear to auscultation without wheezes,rales, or rhonchi   Lymphadenopathy:     She has no cervical adenopathy  Skin: No rash noted         Pertinent Laboratory/Diagnostic Studies:  Lab Results   Component Value Date    GLUCOSE 107 12/15/2015    BUN 16 02/18/2019    CREATININE 0 66 02/18/2019    CALCIUM 9 1 12/18/2018     12/15/2015    K 3 8 12/18/2018    CO2 31 12/18/2018     12/18/2018     Lab Results   Component Value Date    ALT 40 12/18/2018    AST 19 12/18/2018    ALKPHOS 103 12/18/2018    BILITOT 0 30 12/15/2015       Lab Results   Component Value Date    WBC 9 72 12/18/2018    HGB 14 6 12/18/2018    HCT 43 3 12/18/2018    MCV 90 12/18/2018     12/18/2018       No results found for: TSH    Lab Results   Component Value Date    CHOL 169 07/21/2015     Lab Results   Component Value Date    TRIG 127 09/28/2018     Lab Results   Component Value Date    HDL 43 09/28/2018     Lab Results   Component Value Date    LDLCALC 97 09/28/2018     Lab Results   Component Value Date    HGBA1C 5 8 (H) 10/20/2015       Results for orders placed or performed in visit on 02/18/19   BUN   Result Value Ref Range    BUN 16 5 - 25 mg/dL   Creatinine, serum   Result Value Ref Range    Creatinine 0 66 0 60 - 1 30 mg/dL    eGFR 94 ml/min/1 73sq m       No orders of the defined types were placed in this encounter        ALLERGIES:  Allergies   Allergen Reactions    Bactrim [Sulfamethoxazole-Trimethoprim] Other (See Comments)     Reaction Date: 11Aug2011;     Betadine [Povidone Iodine]     Cefzil [Cefprozil]     Prempro [Conj Estrog-Medroxyprogest Ace] Other (See Comments)     Other      Zyban [Bupropion]     Gadobutrol GI Intolerance    Medroxyprogesterone Other (See Comments)     Reaction Date: 11Aug2011;     Nortriptyline Other (See Comments)     n/a    Pamelor [Nortriptyline Hcl]     Provera [Medroxyprogesterone Acetate]        Current Medications     Current Outpatient Medications   Medication Sig Dispense Refill    albuterol (PROAIR HFA) 90 mcg/act inhaler Inhale 2 puffs every 4 (four) hours as needed for wheezing or shortness of breath 3 Inhaler 1    aspirin (ECOTRIN LOW STRENGTH) 81 mg EC tablet Take 1 tablet by mouth daily      atorvastatin (LIPITOR) 40 mg tablet Take 1 tablet (40 mg total) by mouth daily 90 tablet 3    B Complex-C CAPS Take by mouth      baclofen 10 mg tablet Take 1 tablet (10 mg total) by mouth 2 (two) times a day as needed for muscle spasms 60 tablet 1    BL EVENING PRIMROSE OIL PO Take by mouth      budesonide (PULMICORT) 0 5 mg/2 mL nebulizer solution inhale contents of 1 vial in nebulizer twice a day  0    ezetimibe (ZETIA) 10 mg tablet Take 1 tablet (10 mg total) by mouth daily 90 tablet 3    fexofenadine (ALLEGRA) 180 MG tablet Take 180 mg by mouth daily      fluticasone (FLONASE) 50 mcg/act nasal spray 2 sprays into each nostril daily 48 g 3    fluticasone-vilanterol (BREO ELLIPTA) 100-25 mcg/inh inhaler One puff once a day, rinse mouth after use  1 Inhaler 3    gabapentin (NEURONTIN) 300 mg capsule Take 1 capsule (300 mg total) by mouth 2 (two) times a day 180 capsule 3    montelukast (SINGULAIR) 10 mg tablet Take 1 tablet (10 mg total) by mouth daily at bedtime 90 tablet 3    sertraline (ZOLOFT) 100 mg tablet Take 1 tablet (100 mg total) by mouth daily 90 tablet 3    amoxicillin-clavulanate (AUGMENTIN) 875-125 mg per tablet Take 1 tablet by mouth every 12 (twelve) hours for 10 days 20 tablet 0    calcium carbonate-vitamin D (OSCAL-D) 500 mg-200 units per tablet Take 1 tablet by mouth       No current facility-administered medications for this visit            Health Maintenance     Health Maintenance   Topic Date Due    BMI: Followup Plan  09/28/1972    [facility] PLAN OF CARE  10/10/2018    DTaP,Tdap,and Td Vaccines (1 - Tdap) 09/12/2019 (Originally 6/3/2016)    MAMMOGRAM  10/24/2019    Medicare Annual Wellness Visit (AWV)  03/14/2020    BMI: Adult  03/20/2020    PAP SMEAR  07/26/2020    CRC Screening: Colonoscopy  11/25/2023    Hepatitis C Screening  Completed    INFLUENZA VACCINE  Completed    Pneumococcal PPSV23 Highest Risk Adult  Completed    HEPATITIS B VACCINES  Aged Out     Immunization History   Administered Date(s) Administered    INFLUENZA 09/26/2011    Influenza Quadrivalent Preservative Free 3 years and older IM 09/29/2015, 09/22/2016, 09/08/2017    Influenza TIV (IM) 01/17/2013, 09/03/2013, 09/23/2014    Influenza, recombinant, quadrivalent,injectable, preservative free 09/13/2018    Pneumococcal Conjugate 13-Valent 10/22/2018    Pneumococcal Polysaccharide PPV23 10/01/2007, 09/29/2015    Td (adult), adsorbed 60/78/6643       John Phillips, MD

## 2019-03-27 PROBLEM — Z80.0 FAMILY HISTORY OF COLON CANCER: Status: ACTIVE | Noted: 2019-03-27

## 2019-03-27 PROBLEM — Z12.11 SPECIAL SCREENING FOR MALIGNANT NEOPLASMS, COLON: Status: ACTIVE | Noted: 2019-03-27

## 2019-03-29 ENCOUNTER — TELEPHONE (OUTPATIENT)
Dept: NEUROLOGY | Facility: CLINIC | Age: 64
End: 2019-03-29

## 2019-03-29 NOTE — TELEPHONE ENCOUNTER
Theresa/ kellen,  Looks like pt was recently seen by theresa  on 3/20  Theresa just checking on who signed your note? Olga Man was looking ahead at upcoming ocrevus patients for preauths  I was looking at mri head from march and overall looks stable, although  there is odd mention in body of mri report of questionable minimal peripheral enh about the left post periventricular lesion adj to the occipital horn  Per dr Kay Al this does not appear new or significantly changed in size or signal intensity  Odd that this has question of enhancement  Please call dr Kay Al next week and have him review with us  I am in office on monday  The rest of study shows some slighlty smaller lesions as well  Due to the question of enh, I would like mri head to be repeated in early June to make sure no further change prior to next dose of ocrevus  Also pt need b and t cell panel in May as well  Also some concern noted on eeg ie janie the 48 hour recording with left greater than right temporal polymorphic delta and sharply contoured theta over the left temp region  The sharply appearing waveforms over f7-t3 are also noted  I think pt should see dr Monik Ramirez for further eval and in meanwhile get pt a continuous video eeg study  Please check with dr Monik Ramirez how to set that up for her in meanwhile  Please make sure Pleasant Loser is notified about pt likely getting ocrevus if labs and mri ok in may and June  Carlos Rodarte will need to look about pre auth status  Majestic I am sending to you as it looks like theresa is out till 4/1 when I originally tried to send to her

## 2019-03-31 NOTE — TELEPHONE ENCOUNTER
Dr Oliverio Koo, please see telephone encounter from 12/20/18  Looks like you spoke with Dr Zahraa Holly about her EEG abnormality and he said study was not overtly epileptogenic and you agreed to hold on video EEG  Not sure if you saw that or recalled that?

## 2019-04-01 ENCOUNTER — APPOINTMENT (OUTPATIENT)
Dept: LAB | Facility: CLINIC | Age: 64
End: 2019-04-01
Payer: MEDICARE

## 2019-04-01 ENCOUNTER — TELEPHONE (OUTPATIENT)
Dept: NEUROLOGY | Facility: CLINIC | Age: 64
End: 2019-04-01

## 2019-04-01 DIAGNOSIS — G35 MULTIPLE SCLEROSIS (HCC): ICD-10-CM

## 2019-04-01 DIAGNOSIS — R41.3 AMNESIA/MEMORY DISORDER: ICD-10-CM

## 2019-04-01 DIAGNOSIS — E78.5 HYPERLIPIDEMIA, UNSPECIFIED HYPERLIPIDEMIA TYPE: Chronic | ICD-10-CM

## 2019-04-01 DIAGNOSIS — G35 MULTIPLE SCLEROSIS (HCC): Primary | ICD-10-CM

## 2019-04-01 DIAGNOSIS — E55.9 VITAMIN D DEFICIENCY: ICD-10-CM

## 2019-04-01 LAB
25(OH)D3 SERPL-MCNC: 23 NG/ML (ref 30–100)
ALBUMIN SERPL BCP-MCNC: 4 G/DL (ref 3.5–5)
ALP SERPL-CCNC: 96 U/L (ref 46–116)
ALT SERPL W P-5'-P-CCNC: 26 U/L (ref 12–78)
ANION GAP SERPL CALCULATED.3IONS-SCNC: 4 MMOL/L (ref 4–13)
AST SERPL W P-5'-P-CCNC: 13 U/L (ref 5–45)
BASOPHILS # BLD AUTO: 0.06 THOUSANDS/ΜL (ref 0–0.1)
BASOPHILS NFR BLD AUTO: 1 % (ref 0–1)
BILIRUB SERPL-MCNC: 0.44 MG/DL (ref 0.2–1)
BUN SERPL-MCNC: 15 MG/DL (ref 5–25)
CALCIUM SERPL-MCNC: 8.9 MG/DL (ref 8.3–10.1)
CHLORIDE SERPL-SCNC: 105 MMOL/L (ref 100–108)
CHOLEST SERPL-MCNC: 230 MG/DL (ref 50–200)
CO2 SERPL-SCNC: 29 MMOL/L (ref 21–32)
CREAT SERPL-MCNC: 0.71 MG/DL (ref 0.6–1.3)
EOSINOPHIL # BLD AUTO: 0.2 THOUSAND/ΜL (ref 0–0.61)
EOSINOPHIL NFR BLD AUTO: 2 % (ref 0–6)
ERYTHROCYTE [DISTWIDTH] IN BLOOD BY AUTOMATED COUNT: 11.9 % (ref 11.6–15.1)
GFR SERPL CREATININE-BSD FRML MDRD: 90 ML/MIN/1.73SQ M
GLUCOSE P FAST SERPL-MCNC: 110 MG/DL (ref 65–99)
HCT VFR BLD AUTO: 42.7 % (ref 34.8–46.1)
HDLC SERPL-MCNC: 42 MG/DL (ref 40–60)
HGB BLD-MCNC: 14.1 G/DL (ref 11.5–15.4)
IMM GRANULOCYTES # BLD AUTO: 0.02 THOUSAND/UL (ref 0–0.2)
IMM GRANULOCYTES NFR BLD AUTO: 0 % (ref 0–2)
LDLC SERPL CALC-MCNC: 136 MG/DL (ref 0–100)
LYMPHOCYTES # BLD AUTO: 1.91 THOUSANDS/ΜL (ref 0.6–4.47)
LYMPHOCYTES NFR BLD AUTO: 22 % (ref 14–44)
MCH RBC QN AUTO: 30.1 PG (ref 26.8–34.3)
MCHC RBC AUTO-ENTMCNC: 33 G/DL (ref 31.4–37.4)
MCV RBC AUTO: 91 FL (ref 82–98)
MONOCYTES # BLD AUTO: 0.74 THOUSAND/ΜL (ref 0.17–1.22)
MONOCYTES NFR BLD AUTO: 9 % (ref 4–12)
NEUTROPHILS # BLD AUTO: 5.65 THOUSANDS/ΜL (ref 1.85–7.62)
NEUTS SEG NFR BLD AUTO: 66 % (ref 43–75)
NONHDLC SERPL-MCNC: 188 MG/DL
NRBC BLD AUTO-RTO: 0 /100 WBCS
PLATELET # BLD AUTO: 277 THOUSANDS/UL (ref 149–390)
PMV BLD AUTO: 10.1 FL (ref 8.9–12.7)
POTASSIUM SERPL-SCNC: 3.8 MMOL/L (ref 3.5–5.3)
PROT SERPL-MCNC: 7.2 G/DL (ref 6.4–8.2)
RBC # BLD AUTO: 4.68 MILLION/UL (ref 3.81–5.12)
SODIUM SERPL-SCNC: 138 MMOL/L (ref 136–145)
TRIGL SERPL-MCNC: 258 MG/DL
TSH SERPL DL<=0.05 MIU/L-ACNC: 0.89 UIU/ML (ref 0.36–3.74)
WBC # BLD AUTO: 8.58 THOUSAND/UL (ref 4.31–10.16)

## 2019-04-01 PROCEDURE — 36415 COLL VENOUS BLD VENIPUNCTURE: CPT

## 2019-04-01 PROCEDURE — 80061 LIPID PANEL: CPT

## 2019-04-01 PROCEDURE — 82306 VITAMIN D 25 HYDROXY: CPT

## 2019-04-01 PROCEDURE — 84443 ASSAY THYROID STIM HORMONE: CPT

## 2019-04-01 PROCEDURE — 80053 COMPREHEN METABOLIC PANEL: CPT

## 2019-04-01 PROCEDURE — 85025 COMPLETE CBC W/AUTO DIFF WBC: CPT

## 2019-04-01 NOTE — TELEPHONE ENCOUNTER
Left message on machine to inform patient of need to have MRI completed in June prior to occur venous, as well as lab work ordered     If she has questions she should contact the office

## 2019-04-01 NOTE — TELEPHONE ENCOUNTER
Nora, please see me in am to address the other below items ie followup today with rads on march reading of periph enh, to consider repeat mri head prior to next duose of ocrevus ie early June  Also need to make Jerica Gray is cced on ocrevus pts to help with auth for next probable infusion ie in June   Also rec updated jcv in serum now and check t and b cells in may

## 2019-04-01 NOTE — TELEPHONE ENCOUNTER
MRI reviewed with Radiology, previous peripheral enhancement noted, not evident with out contrast, unchanged  No evidence of PML  Does not need repeat MRI unless new symptoms  Lab slips mailed to patient    Patient aware

## 2019-04-02 ENCOUNTER — TELEPHONE (OUTPATIENT)
Dept: NEUROLOGY | Facility: CLINIC | Age: 64
End: 2019-04-02

## 2019-04-03 ENCOUNTER — TELEPHONE (OUTPATIENT)
Dept: FAMILY MEDICINE CLINIC | Facility: CLINIC | Age: 64
End: 2019-04-03

## 2019-04-04 ENCOUNTER — TELEPHONE (OUTPATIENT)
Dept: UROLOGY | Facility: CLINIC | Age: 64
End: 2019-04-04

## 2019-04-04 ENCOUNTER — OFFICE VISIT (OUTPATIENT)
Dept: UROLOGY | Facility: MEDICAL CENTER | Age: 64
End: 2019-04-04
Payer: MEDICARE

## 2019-04-04 VITALS
SYSTOLIC BLOOD PRESSURE: 120 MMHG | DIASTOLIC BLOOD PRESSURE: 70 MMHG | BODY MASS INDEX: 26.98 KG/M2 | HEART RATE: 82 BPM | WEIGHT: 158 LBS | HEIGHT: 64 IN

## 2019-04-04 DIAGNOSIS — R35.0 URINARY FREQUENCY: ICD-10-CM

## 2019-04-04 DIAGNOSIS — N31.9 NEUROGENIC BLADDER: Primary | ICD-10-CM

## 2019-04-04 DIAGNOSIS — R15.2 INCONTINENCE OF FECES WITH FECAL URGENCY: ICD-10-CM

## 2019-04-04 DIAGNOSIS — R15.9 INCONTINENCE OF FECES WITH FECAL URGENCY: ICD-10-CM

## 2019-04-04 DIAGNOSIS — G35 MULTIPLE SCLEROSIS (HCC): Chronic | ICD-10-CM

## 2019-04-04 DIAGNOSIS — N39.41 URGE INCONTINENCE: ICD-10-CM

## 2019-04-04 PROCEDURE — 99204 OFFICE O/P NEW MOD 45 MIN: CPT | Performed by: UROLOGY

## 2019-04-08 ENCOUNTER — APPOINTMENT (OUTPATIENT)
Dept: LAB | Facility: CLINIC | Age: 64
End: 2019-04-08
Payer: MEDICARE

## 2019-04-08 LAB
BACTERIA UR QL AUTO: ABNORMAL /HPF
BILIRUB UR QL STRIP: NEGATIVE
CLARITY UR: CLEAR
COLOR UR: YELLOW
GLUCOSE UR STRIP-MCNC: NEGATIVE MG/DL
HGB UR QL STRIP.AUTO: ABNORMAL
KETONES UR STRIP-MCNC: NEGATIVE MG/DL
LEUKOCYTE ESTERASE UR QL STRIP: ABNORMAL
NITRITE UR QL STRIP: NEGATIVE
NON-SQ EPI CELLS URNS QL MICRO: ABNORMAL /HPF
PH UR STRIP.AUTO: 6 [PH]
PROT UR STRIP-MCNC: NEGATIVE MG/DL
RBC #/AREA URNS AUTO: ABNORMAL /HPF
SP GR UR STRIP.AUTO: <=1.005 (ref 1–1.03)
UROBILINOGEN UR QL STRIP.AUTO: 0.2 E.U./DL
WBC #/AREA URNS AUTO: ABNORMAL /HPF

## 2019-04-08 PROCEDURE — 81001 URINALYSIS AUTO W/SCOPE: CPT | Performed by: UROLOGY

## 2019-04-10 ENCOUNTER — TELEPHONE (OUTPATIENT)
Dept: UROLOGY | Facility: MEDICAL CENTER | Age: 64
End: 2019-04-10

## 2019-04-11 ENCOUNTER — PROCEDURE VISIT (OUTPATIENT)
Dept: UROLOGY | Facility: MEDICAL CENTER | Age: 64
End: 2019-04-11
Payer: MEDICARE

## 2019-04-11 VITALS — HEART RATE: 71 BPM | WEIGHT: 158 LBS | HEIGHT: 64 IN | BODY MASS INDEX: 26.98 KG/M2 | TEMPERATURE: 98.2 F

## 2019-04-11 DIAGNOSIS — N30.90 CYSTITIS: Primary | ICD-10-CM

## 2019-04-11 LAB
SL AMB  POCT GLUCOSE, UA: NEGATIVE
SL AMB LEUKOCYTE ESTERASE,UA: ABNORMAL
SL AMB POCT BILIRUBIN,UA: NEGATIVE
SL AMB POCT BLOOD,UA: ABNORMAL
SL AMB POCT CLARITY,UA: CLEAR
SL AMB POCT COLOR,UA: ABNORMAL
SL AMB POCT KETONES,UA: NEGATIVE
SL AMB POCT NITRITE,UA: NEGATIVE
SL AMB POCT PH,UA: 6
SL AMB POCT SPECIFIC GRAVITY,UA: <=1.005
SL AMB POCT URINE PROTEIN: NEGATIVE
SL AMB POCT UROBILINOGEN: ABNORMAL

## 2019-04-11 PROCEDURE — 87077 CULTURE AEROBIC IDENTIFY: CPT | Performed by: UROLOGY

## 2019-04-11 PROCEDURE — 81003 URINALYSIS AUTO W/O SCOPE: CPT | Performed by: UROLOGY

## 2019-04-11 PROCEDURE — 87086 URINE CULTURE/COLONY COUNT: CPT | Performed by: UROLOGY

## 2019-04-11 PROCEDURE — 87186 SC STD MICRODIL/AGAR DIL: CPT | Performed by: UROLOGY

## 2019-04-11 PROCEDURE — 99211 OFF/OP EST MAY X REQ PHY/QHP: CPT | Performed by: UROLOGY

## 2019-04-11 PROCEDURE — 51701 INSERT BLADDER CATHETER: CPT | Performed by: UROLOGY

## 2019-04-11 RX ORDER — CIPROFLOXACIN 500 MG/1
500 TABLET, FILM COATED ORAL EVERY 12 HOURS SCHEDULED
Qty: 14 TABLET | Refills: 0 | Status: SHIPPED | OUTPATIENT
Start: 2019-04-11 | End: 2019-04-18

## 2019-04-13 LAB — BACTERIA UR CULT: ABNORMAL

## 2019-04-17 ENCOUNTER — HOSPITAL ENCOUNTER (OUTPATIENT)
Dept: CT IMAGING | Facility: HOSPITAL | Age: 64
Discharge: HOME | End: 2019-04-17
Payer: MEDICARE

## 2019-04-17 DIAGNOSIS — R93.89 ABNORMAL CT OF THE CHEST: ICD-10-CM

## 2019-04-17 DIAGNOSIS — G35 MULTIPLE SCLEROSIS (HCC): ICD-10-CM

## 2019-04-17 DIAGNOSIS — J44.9 COPD WITHOUT EXACERBATION (HCC): ICD-10-CM

## 2019-04-17 PROCEDURE — 71250 CT THORAX DX C-: CPT

## 2019-04-18 ENCOUNTER — OFFICE VISIT (OUTPATIENT)
Dept: FAMILY MEDICINE CLINIC | Facility: CLINIC | Age: 64
End: 2019-04-18
Payer: MEDICARE

## 2019-04-18 VITALS
HEART RATE: 84 BPM | DIASTOLIC BLOOD PRESSURE: 80 MMHG | HEIGHT: 64 IN | RESPIRATION RATE: 16 BRPM | BODY MASS INDEX: 27.18 KG/M2 | TEMPERATURE: 96.9 F | SYSTOLIC BLOOD PRESSURE: 122 MMHG | WEIGHT: 159.2 LBS

## 2019-04-18 DIAGNOSIS — J44.9 COPD WITHOUT EXACERBATION (HCC): Chronic | ICD-10-CM

## 2019-04-18 DIAGNOSIS — E78.5 HYPERLIPIDEMIA, UNSPECIFIED HYPERLIPIDEMIA TYPE: Chronic | ICD-10-CM

## 2019-04-18 DIAGNOSIS — R93.89 ABNORMAL CT OF THE CHEST: Chronic | ICD-10-CM

## 2019-04-18 DIAGNOSIS — J01.91 ACUTE RECURRENT SINUSITIS, UNSPECIFIED LOCATION: Primary | ICD-10-CM

## 2019-04-18 PROCEDURE — 99213 OFFICE O/P EST LOW 20 MIN: CPT | Performed by: FAMILY MEDICINE

## 2019-04-18 RX ORDER — METHYLPREDNISOLONE 4 MG/1
TABLET ORAL
Qty: 21 EACH | Refills: 0 | Status: SHIPPED | OUTPATIENT
Start: 2019-04-18 | End: 2019-06-14 | Stop reason: ALTCHOICE

## 2019-04-18 RX ORDER — BENZONATATE 100 MG/1
100 CAPSULE ORAL 3 TIMES DAILY PRN
Qty: 30 CAPSULE | Refills: 0 | Status: SHIPPED | OUTPATIENT
Start: 2019-04-18 | End: 2019-06-14 | Stop reason: ALTCHOICE

## 2019-04-19 ENCOUNTER — HOSPITAL ENCOUNTER (OUTPATIENT)
Dept: ULTRASOUND IMAGING | Facility: HOSPITAL | Age: 64
Discharge: HOME | End: 2019-04-19
Attending: UROLOGY
Payer: MEDICARE

## 2019-04-19 DIAGNOSIS — N31.9 NEUROGENIC BLADDER: ICD-10-CM

## 2019-04-19 PROCEDURE — 76770 US EXAM ABDO BACK WALL COMP: CPT

## 2019-04-23 ENCOUNTER — TELEPHONE (OUTPATIENT)
Dept: UROLOGY | Facility: MEDICAL CENTER | Age: 64
End: 2019-04-23

## 2019-04-24 ENCOUNTER — TELEPHONE (OUTPATIENT)
Dept: FAMILY MEDICINE CLINIC | Facility: CLINIC | Age: 64
End: 2019-04-24

## 2019-04-24 DIAGNOSIS — N18.30 CRI (CHRONIC RENAL INSUFFICIENCY), STAGE 3 (MODERATE) (HCC): Primary | ICD-10-CM

## 2019-04-24 DIAGNOSIS — E78.2 MIXED HYPERLIPIDEMIA: Primary | Chronic | ICD-10-CM

## 2019-04-24 DIAGNOSIS — J44.9 COPD WITHOUT EXACERBATION (HCC): Chronic | ICD-10-CM

## 2019-04-24 DIAGNOSIS — R93.89 ABNORMAL CT OF THE CHEST: Primary | ICD-10-CM

## 2019-04-24 RX ORDER — LEVOFLOXACIN 500 MG/1
500 TABLET, FILM COATED ORAL DAILY
Qty: 7 TABLET | Refills: 0 | Status: SHIPPED | OUTPATIENT
Start: 2019-04-24 | End: 2019-05-01

## 2019-04-25 ENCOUNTER — TELEPHONE (OUTPATIENT)
Dept: FAMILY MEDICINE CLINIC | Facility: CLINIC | Age: 64
End: 2019-04-25

## 2019-04-25 ENCOUNTER — TELEPHONE (OUTPATIENT)
Dept: NEUROLOGY | Facility: CLINIC | Age: 64
End: 2019-04-25

## 2019-04-25 DIAGNOSIS — G35 MULTIPLE SCLEROSIS (HCC): Primary | ICD-10-CM

## 2019-04-25 DIAGNOSIS — J44.9 COPD WITHOUT EXACERBATION (HCC): Primary | Chronic | ICD-10-CM

## 2019-05-01 ENCOUNTER — TELEPHONE (OUTPATIENT)
Dept: NEUROLOGY | Facility: CLINIC | Age: 64
End: 2019-05-01

## 2019-05-02 ENCOUNTER — PROCEDURE VISIT (OUTPATIENT)
Dept: UROLOGY | Facility: CLINIC | Age: 64
End: 2019-05-02
Payer: MEDICARE

## 2019-05-02 DIAGNOSIS — N32.81 DETRUSOR INSTABILITY: ICD-10-CM

## 2019-05-02 DIAGNOSIS — R31.9 HEMATURIA, UNSPECIFIED TYPE: ICD-10-CM

## 2019-05-02 DIAGNOSIS — J44.9 COPD WITHOUT EXACERBATION (HCC): Primary | Chronic | ICD-10-CM

## 2019-05-02 DIAGNOSIS — N39.41 URGE INCONTINENCE OF URINE: Primary | ICD-10-CM

## 2019-05-02 LAB
SL AMB  POCT GLUCOSE, UA: NEGATIVE
SL AMB LEUKOCYTE ESTERASE,UA: NEGATIVE
SL AMB POCT BILIRUBIN,UA: NEGATIVE
SL AMB POCT BLOOD,UA: ABNORMAL
SL AMB POCT CLARITY,UA: CLEAR
SL AMB POCT COLOR,UA: YELLOW
SL AMB POCT KETONES,UA: NEGATIVE
SL AMB POCT NITRITE,UA: NEGATIVE
SL AMB POCT PH,UA: 5
SL AMB POCT SPECIFIC GRAVITY,UA: 1.01
SL AMB POCT URINE PROTEIN: NEGATIVE
SL AMB POCT UROBILINOGEN: NEGATIVE

## 2019-05-02 PROCEDURE — 81002 URINALYSIS NONAUTO W/O SCOPE: CPT

## 2019-05-02 PROCEDURE — 51741 ELECTRO-UROFLOWMETRY FIRST: CPT | Performed by: UROLOGY

## 2019-05-02 PROCEDURE — 51728 CYSTOMETROGRAM W/VP: CPT

## 2019-05-02 PROCEDURE — 51784 ANAL/URINARY MUSCLE STUDY: CPT | Performed by: UROLOGY

## 2019-05-02 PROCEDURE — 51797 INTRAABDOMINAL PRESSURE TEST: CPT | Performed by: UROLOGY

## 2019-05-02 RX ORDER — DOXYCYCLINE HYCLATE 100 MG
100 TABLET ORAL 2 TIMES DAILY
Qty: 20 TABLET | Refills: 0 | Status: SHIPPED | OUTPATIENT
Start: 2019-05-02 | End: 2019-05-12

## 2019-05-02 RX ORDER — ALBUTEROL SULFATE 2.5 MG/3ML
2.5 SOLUTION RESPIRATORY (INHALATION) EVERY 4 HOURS PRN
Qty: 120 VIAL | Refills: 2 | Status: SHIPPED | OUTPATIENT
Start: 2019-05-02 | End: 2021-07-06 | Stop reason: SDUPTHER

## 2019-05-10 ENCOUNTER — TELEPHONE (OUTPATIENT)
Dept: NEUROLOGY | Facility: CLINIC | Age: 64
End: 2019-05-10

## 2019-05-13 ENCOUNTER — TELEPHONE (OUTPATIENT)
Dept: UROLOGY | Facility: MEDICAL CENTER | Age: 64
End: 2019-05-13

## 2019-05-13 ENCOUNTER — OFFICE VISIT (OUTPATIENT)
Dept: UROLOGY | Facility: MEDICAL CENTER | Age: 64
End: 2019-05-13
Payer: MEDICARE

## 2019-05-13 VITALS
HEIGHT: 64 IN | SYSTOLIC BLOOD PRESSURE: 120 MMHG | DIASTOLIC BLOOD PRESSURE: 76 MMHG | BODY MASS INDEX: 26.29 KG/M2 | HEART RATE: 69 BPM | WEIGHT: 154 LBS

## 2019-05-13 DIAGNOSIS — N39.41 URGE INCONTINENCE OF URINE: Primary | ICD-10-CM

## 2019-05-13 DIAGNOSIS — M62.89 PELVIC FLOOR DYSFUNCTION: ICD-10-CM

## 2019-05-13 DIAGNOSIS — N30.90 CYSTITIS: ICD-10-CM

## 2019-05-13 DIAGNOSIS — N36.44 DETRUSOR AND SPHINCTER DYSSYNERGIA: ICD-10-CM

## 2019-05-13 DIAGNOSIS — R31.29 MICROSCOPIC HEMATURIA: ICD-10-CM

## 2019-05-13 LAB
SL AMB  POCT GLUCOSE, UA: ABNORMAL
SL AMB LEUKOCYTE ESTERASE,UA: ABNORMAL
SL AMB POCT BILIRUBIN,UA: ABNORMAL
SL AMB POCT BLOOD,UA: ABNORMAL
SL AMB POCT CLARITY,UA: CLEAR
SL AMB POCT COLOR,UA: YELLOW
SL AMB POCT KETONES,UA: ABNORMAL
SL AMB POCT NITRITE,UA: ABNORMAL
SL AMB POCT PH,UA: ABNORMAL
SL AMB POCT SPECIFIC GRAVITY,UA: 1
SL AMB POCT URINE PROTEIN: ABNORMAL
SL AMB POCT UROBILINOGEN: 0.2

## 2019-05-13 PROCEDURE — 81003 URINALYSIS AUTO W/O SCOPE: CPT | Performed by: UROLOGY

## 2019-05-13 PROCEDURE — 99214 OFFICE O/P EST MOD 30 MIN: CPT | Performed by: UROLOGY

## 2019-05-14 ENCOUNTER — TELEPHONE (OUTPATIENT)
Dept: NEUROLOGY | Facility: CLINIC | Age: 64
End: 2019-05-14

## 2019-05-15 ENCOUNTER — TELEPHONE (OUTPATIENT)
Dept: NEUROLOGY | Facility: CLINIC | Age: 64
End: 2019-05-15

## 2019-05-15 DIAGNOSIS — G35 MULTIPLE SCLEROSIS (HCC): ICD-10-CM

## 2019-05-15 DIAGNOSIS — G35 MULTIPLE SCLEROSIS (HCC): Primary | ICD-10-CM

## 2019-05-15 RX ORDER — ACETAMINOPHEN 325 MG/1
650 TABLET ORAL ONCE
Status: CANCELLED | OUTPATIENT
Start: 2019-06-20

## 2019-05-15 RX ORDER — SODIUM CHLORIDE 9 MG/ML
20 INJECTION, SOLUTION INTRAVENOUS ONCE
Status: CANCELLED | OUTPATIENT
Start: 2019-06-20

## 2019-05-20 ENCOUNTER — APPOINTMENT (OUTPATIENT)
Dept: LAB | Facility: CLINIC | Age: 64
End: 2019-05-20
Payer: MEDICARE

## 2019-05-20 DIAGNOSIS — G35 MULTIPLE SCLEROSIS (HCC): ICD-10-CM

## 2019-05-20 LAB
JCPYV AB SERPL QL IA: NEGATIVE
SL AMB INDEX VALUE: 0.11

## 2019-05-20 PROCEDURE — 86360 T CELL ABSOLUTE COUNT/RATIO: CPT

## 2019-05-20 PROCEDURE — 86355 B CELLS TOTAL COUNT: CPT

## 2019-05-20 PROCEDURE — 86359 T CELLS TOTAL COUNT: CPT

## 2019-05-21 ENCOUNTER — OFFICE VISIT (OUTPATIENT)
Dept: PULMONOLOGY | Facility: CLINIC | Age: 64
End: 2019-05-21
Payer: MEDICARE

## 2019-05-21 VITALS
TEMPERATURE: 97 F | SYSTOLIC BLOOD PRESSURE: 118 MMHG | BODY MASS INDEX: 26.87 KG/M2 | HEIGHT: 64 IN | HEART RATE: 85 BPM | WEIGHT: 157.4 LBS | DIASTOLIC BLOOD PRESSURE: 50 MMHG | RESPIRATION RATE: 18 BRPM | OXYGEN SATURATION: 96 %

## 2019-05-21 DIAGNOSIS — R93.89 ABNORMAL CT OF THE CHEST: Primary | ICD-10-CM

## 2019-05-21 DIAGNOSIS — J44.9 COPD WITHOUT EXACERBATION (HCC): Chronic | ICD-10-CM

## 2019-05-21 DIAGNOSIS — Z72.0 TOBACCO ABUSE: ICD-10-CM

## 2019-05-21 LAB
BASOPHILS # BLD AUTO: 0 X10E3/UL (ref 0–0.2)
BASOPHILS NFR BLD AUTO: 0 %
CD19 CELLS # BLD: 0 /UL (ref 12–645)
CD19 CELLS NFR BLD: 0 % (ref 3.3–25.4)
CD3 CELLS # BLD: 1542 /UL (ref 622–2402)
CD3 CELLS NFR BLD: 90.7 % (ref 57.5–86.2)
CD3+CD4+ CELLS # BLD: 1312 /UL (ref 359–1519)
CD3+CD4+ CELLS NFR BLD: 77.2 % (ref 30.8–58.5)
CD3+CD4+ CELLS/CD3+CD8+ CLL BLD: 5.76 % (ref 0.92–3.72)
CD3+CD8+ CELLS # BLD: 228 /UL (ref 109–897)
CD3+CD8+ CELLS NFR BLD: 13.4 % (ref 12–35.5)
EOSINOPHIL # BLD AUTO: 0.2 X10E3/UL (ref 0–0.4)
EOSINOPHIL NFR BLD AUTO: 3 %
ERYTHROCYTE [DISTWIDTH] IN BLOOD BY AUTOMATED COUNT: 13.6 % (ref 12.3–15.4)
HCT VFR BLD AUTO: 41 % (ref 34–46.6)
HGB BLD-MCNC: 13.9 G/DL (ref 11.1–15.9)
IMM GRANULOCYTES # BLD: 0 X10E3/UL (ref 0–0.1)
IMM GRANULOCYTES NFR BLD: 0 %
LYMPHOCYTES # BLD AUTO: 1.7 X10E3/UL (ref 0.7–3.1)
LYMPHOCYTES NFR BLD AUTO: 26 %
MCH RBC QN AUTO: 29.8 PG (ref 26.6–33)
MCHC RBC AUTO-ENTMCNC: 33.9 G/DL (ref 31.5–35.7)
MCV RBC AUTO: 88 FL (ref 79–97)
MONOCYTES # BLD AUTO: 0.6 X10E3/UL (ref 0.1–0.9)
MONOCYTES NFR BLD AUTO: 9 %
NEUTROPHILS # BLD AUTO: 4.2 X10E3/UL (ref 1.4–7)
NEUTROPHILS NFR BLD AUTO: 62 %
PLATELET # BLD AUTO: 254 X10E3/UL (ref 150–450)
RBC # BLD AUTO: 4.67 X10E6/UL (ref 3.77–5.28)
WBC # BLD AUTO: 6.8 X10E3/UL (ref 3.4–10.8)

## 2019-05-21 PROCEDURE — 99205 OFFICE O/P NEW HI 60 MIN: CPT | Performed by: INTERNAL MEDICINE

## 2019-05-21 PROCEDURE — 94010 BREATHING CAPACITY TEST: CPT | Performed by: INTERNAL MEDICINE

## 2019-05-22 ENCOUNTER — TELEPHONE (OUTPATIENT)
Dept: NEUROLOGY | Facility: CLINIC | Age: 64
End: 2019-05-22

## 2019-05-24 ENCOUNTER — APPOINTMENT (OUTPATIENT)
Dept: LAB | Facility: CLINIC | Age: 64
End: 2019-05-24
Payer: MEDICARE

## 2019-05-24 ENCOUNTER — TRANSCRIBE ORDERS (OUTPATIENT)
Dept: LAB | Facility: CLINIC | Age: 64
End: 2019-05-24

## 2019-05-24 DIAGNOSIS — N18.30 CRI (CHRONIC RENAL INSUFFICIENCY), STAGE 3 (MODERATE) (HCC): ICD-10-CM

## 2019-05-24 LAB
ANION GAP SERPL CALCULATED.3IONS-SCNC: 6 MMOL/L (ref 4–13)
BUN SERPL-MCNC: 13 MG/DL (ref 5–25)
CALCIUM SERPL-MCNC: 8.7 MG/DL (ref 8.3–10.1)
CHLORIDE SERPL-SCNC: 108 MMOL/L (ref 100–108)
CO2 SERPL-SCNC: 26 MMOL/L (ref 21–32)
CREAT SERPL-MCNC: 0.68 MG/DL (ref 0.6–1.3)
GFR SERPL CREATININE-BSD FRML MDRD: 93 ML/MIN/1.73SQ M
GLUCOSE P FAST SERPL-MCNC: 109 MG/DL (ref 65–99)
POTASSIUM SERPL-SCNC: 3.9 MMOL/L (ref 3.5–5.3)
SODIUM SERPL-SCNC: 140 MMOL/L (ref 136–145)

## 2019-05-24 PROCEDURE — 36415 COLL VENOUS BLD VENIPUNCTURE: CPT

## 2019-05-24 PROCEDURE — 80048 BASIC METABOLIC PNL TOTAL CA: CPT

## 2019-06-07 ENCOUNTER — HOSPITAL ENCOUNTER (OUTPATIENT)
Dept: MRI IMAGING | Facility: HOSPITAL | Age: 64
Discharge: HOME | End: 2019-06-07
Payer: MEDICARE

## 2019-06-07 DIAGNOSIS — G35 MULTIPLE SCLEROSIS (HCC): ICD-10-CM

## 2019-06-07 PROCEDURE — 70553 MRI BRAIN STEM W/O & W/DYE: CPT

## 2019-06-07 PROCEDURE — A9585 GADOBUTROL INJECTION: HCPCS | Performed by: NURSE PRACTITIONER

## 2019-06-07 RX ADMIN — GADOBUTROL 7 ML: 604.72 INJECTION INTRAVENOUS at 09:22

## 2019-06-10 ENCOUNTER — TELEPHONE (OUTPATIENT)
Dept: NEUROLOGY | Facility: CLINIC | Age: 64
End: 2019-06-10

## 2019-06-10 DIAGNOSIS — G35 MULTIPLE SCLEROSIS (HCC): Primary | ICD-10-CM

## 2019-06-11 ENCOUNTER — TELEPHONE (OUTPATIENT)
Dept: NEUROLOGY | Facility: CLINIC | Age: 64
End: 2019-06-11

## 2019-06-12 ENCOUNTER — ANESTHESIA EVENT (OUTPATIENT)
Dept: GASTROENTEROLOGY | Facility: HOSPITAL | Age: 64
End: 2019-06-12

## 2019-06-12 ENCOUNTER — ANESTHESIA (OUTPATIENT)
Dept: GASTROENTEROLOGY | Facility: HOSPITAL | Age: 64
End: 2019-06-12

## 2019-06-12 ENCOUNTER — HOSPITAL ENCOUNTER (OUTPATIENT)
Dept: GASTROENTEROLOGY | Facility: HOSPITAL | Age: 64
Setting detail: OUTPATIENT SURGERY
Discharge: HOME | End: 2019-06-12
Attending: COLON & RECTAL SURGERY | Admitting: COLON & RECTAL SURGERY
Payer: MEDICARE

## 2019-06-12 VITALS
SYSTOLIC BLOOD PRESSURE: 151 MMHG | RESPIRATION RATE: 16 BRPM | BODY MASS INDEX: 26.12 KG/M2 | OXYGEN SATURATION: 97 % | HEIGHT: 64 IN | DIASTOLIC BLOOD PRESSURE: 68 MMHG | TEMPERATURE: 97.3 F | WEIGHT: 153 LBS | HEART RATE: 72 BPM

## 2019-06-12 DIAGNOSIS — Z12.11 COLON CANCER SCREENING: ICD-10-CM

## 2019-06-12 PROCEDURE — 88305 TISSUE EXAM BY PATHOLOGIST: CPT | Performed by: PATHOLOGY

## 2019-06-12 PROCEDURE — 45385 COLONOSCOPY W/LESION REMOVAL: CPT | Performed by: COLON & RECTAL SURGERY

## 2019-06-12 PROCEDURE — 99203 OFFICE O/P NEW LOW 30 MIN: CPT | Performed by: COLON & RECTAL SURGERY

## 2019-06-12 RX ORDER — SODIUM CHLORIDE 9 MG/ML
INJECTION, SOLUTION INTRAVENOUS CONTINUOUS PRN
Status: DISCONTINUED | OUTPATIENT
Start: 2019-06-12 | End: 2019-06-12 | Stop reason: SURG

## 2019-06-12 RX ORDER — LIDOCAINE HYDROCHLORIDE 10 MG/ML
INJECTION, SOLUTION INFILTRATION; PERINEURAL AS NEEDED
Status: DISCONTINUED | OUTPATIENT
Start: 2019-06-12 | End: 2019-06-12 | Stop reason: SURG

## 2019-06-12 RX ORDER — PROPOFOL 10 MG/ML
INJECTION, EMULSION INTRAVENOUS AS NEEDED
Status: DISCONTINUED | OUTPATIENT
Start: 2019-06-12 | End: 2019-06-12 | Stop reason: SURG

## 2019-06-12 RX ADMIN — PROPOFOL 50 MG: 10 INJECTION, EMULSION INTRAVENOUS at 09:37

## 2019-06-12 RX ADMIN — LIDOCAINE HYDROCHLORIDE 50 MG: 10 INJECTION, SOLUTION INFILTRATION; PERINEURAL at 09:24

## 2019-06-12 RX ADMIN — PROPOFOL 50 MG: 10 INJECTION, EMULSION INTRAVENOUS at 09:32

## 2019-06-12 RX ADMIN — PROPOFOL 100 MG: 10 INJECTION, EMULSION INTRAVENOUS at 09:24

## 2019-06-12 RX ADMIN — SODIUM CHLORIDE: 0.9 INJECTION, SOLUTION INTRAVENOUS at 09:10

## 2019-06-12 RX ADMIN — PROPOFOL 40 MG: 10 INJECTION, EMULSION INTRAVENOUS at 09:28

## 2019-06-12 RX ADMIN — PROPOFOL 50 MG: 10 INJECTION, EMULSION INTRAVENOUS at 09:48

## 2019-06-13 ENCOUNTER — TELEPHONE (OUTPATIENT)
Dept: NEUROLOGY | Facility: CLINIC | Age: 64
End: 2019-06-13

## 2019-06-14 ENCOUNTER — PROCEDURE VISIT (OUTPATIENT)
Dept: UROLOGY | Facility: MEDICAL CENTER | Age: 64
End: 2019-06-14
Payer: MEDICARE

## 2019-06-14 VITALS — BODY MASS INDEX: 26.12 KG/M2 | WEIGHT: 153 LBS | HEIGHT: 64 IN

## 2019-06-14 DIAGNOSIS — N36.44 DETRUSOR AND SPHINCTER DYSSYNERGIA: ICD-10-CM

## 2019-06-14 DIAGNOSIS — M62.89 PELVIC FLOOR DYSFUNCTION: ICD-10-CM

## 2019-06-14 DIAGNOSIS — N31.9 NEUROGENIC BLADDER: ICD-10-CM

## 2019-06-14 DIAGNOSIS — N30.90 CYSTITIS: ICD-10-CM

## 2019-06-14 DIAGNOSIS — R31.29 MICROSCOPIC HEMATURIA: ICD-10-CM

## 2019-06-14 DIAGNOSIS — N39.41 URGE INCONTINENCE OF URINE: Primary | ICD-10-CM

## 2019-06-14 LAB
SL AMB  POCT GLUCOSE, UA: ABNORMAL
SL AMB LEUKOCYTE ESTERASE,UA: ABNORMAL
SL AMB POCT BILIRUBIN,UA: ABNORMAL
SL AMB POCT BLOOD,UA: ABNORMAL
SL AMB POCT CLARITY,UA: CLEAR
SL AMB POCT COLOR,UA: YELLOW
SL AMB POCT KETONES,UA: ABNORMAL
SL AMB POCT NITRITE,UA: ABNORMAL
SL AMB POCT PH,UA: 5
SL AMB POCT SPECIFIC GRAVITY,UA: 1.01
SL AMB POCT URINE PROTEIN: ABNORMAL
SL AMB POCT UROBILINOGEN: 0.2

## 2019-06-14 PROCEDURE — 81003 URINALYSIS AUTO W/O SCOPE: CPT | Performed by: UROLOGY

## 2019-06-14 PROCEDURE — 99213 OFFICE O/P EST LOW 20 MIN: CPT | Performed by: UROLOGY

## 2019-06-14 PROCEDURE — 52000 CYSTOURETHROSCOPY: CPT | Performed by: UROLOGY

## 2019-06-14 RX ORDER — NITROFURANTOIN 25; 75 MG/1; MG/1
100 CAPSULE ORAL ONCE
Qty: 1 CAPSULE | Refills: 0 | Status: SHIPPED | OUTPATIENT
Start: 2019-06-14 | End: 2019-06-14

## 2019-06-15 ENCOUNTER — APPOINTMENT (OUTPATIENT)
Dept: LAB | Facility: CLINIC | Age: 64
End: 2019-06-15
Payer: MEDICARE

## 2019-06-15 ENCOUNTER — TRANSCRIBE ORDERS (OUTPATIENT)
Dept: LAB | Facility: CLINIC | Age: 64
End: 2019-06-15

## 2019-06-15 DIAGNOSIS — G35 MULTIPLE SCLEROSIS (HCC): ICD-10-CM

## 2019-06-15 LAB
BUN SERPL-MCNC: 20 MG/DL (ref 5–25)
CREAT SERPL-MCNC: 0.68 MG/DL (ref 0.6–1.3)
GFR SERPL CREATININE-BSD FRML MDRD: 93 ML/MIN/1.73SQ M

## 2019-06-15 PROCEDURE — 82565 ASSAY OF CREATININE: CPT

## 2019-06-15 PROCEDURE — 84520 ASSAY OF UREA NITROGEN: CPT

## 2019-06-15 PROCEDURE — 36415 COLL VENOUS BLD VENIPUNCTURE: CPT

## 2019-06-17 ENCOUNTER — APPOINTMENT (OUTPATIENT)
Dept: LAB | Facility: CLINIC | Age: 64
End: 2019-06-17
Payer: MEDICARE

## 2019-06-17 DIAGNOSIS — G35 MULTIPLE SCLEROSIS (HCC): ICD-10-CM

## 2019-06-17 LAB
ALBUMIN SERPL BCP-MCNC: 4.1 G/DL (ref 3.5–5)
ALP SERPL-CCNC: 82 U/L (ref 46–116)
ALT SERPL W P-5'-P-CCNC: 28 U/L (ref 12–78)
ANION GAP SERPL CALCULATED.3IONS-SCNC: 4 MMOL/L (ref 4–13)
AST SERPL W P-5'-P-CCNC: 13 U/L (ref 5–45)
BASOPHILS # BLD AUTO: 0.05 THOUSANDS/ΜL (ref 0–0.1)
BASOPHILS NFR BLD AUTO: 1 % (ref 0–1)
BILIRUB SERPL-MCNC: 0.53 MG/DL (ref 0.2–1)
BUN SERPL-MCNC: 18 MG/DL (ref 5–25)
CALCIUM SERPL-MCNC: 9.1 MG/DL (ref 8.3–10.1)
CHLORIDE SERPL-SCNC: 110 MMOL/L (ref 100–108)
CO2 SERPL-SCNC: 26 MMOL/L (ref 21–32)
CREAT SERPL-MCNC: 0.6 MG/DL (ref 0.6–1.3)
EOSINOPHIL # BLD AUTO: 0.18 THOUSAND/ΜL (ref 0–0.61)
EOSINOPHIL NFR BLD AUTO: 2 % (ref 0–6)
ERYTHROCYTE [DISTWIDTH] IN BLOOD BY AUTOMATED COUNT: 12.4 % (ref 11.6–15.1)
GFR SERPL CREATININE-BSD FRML MDRD: 97 ML/MIN/1.73SQ M
GLUCOSE SERPL-MCNC: 111 MG/DL (ref 65–140)
HCT VFR BLD AUTO: 39.7 % (ref 34.8–46.1)
HGB BLD-MCNC: 13.5 G/DL (ref 11.5–15.4)
IMM GRANULOCYTES # BLD AUTO: 0.02 THOUSAND/UL (ref 0–0.2)
IMM GRANULOCYTES NFR BLD AUTO: 0 % (ref 0–2)
LYMPHOCYTES # BLD AUTO: 1.8 THOUSANDS/ΜL (ref 0.6–4.47)
LYMPHOCYTES NFR BLD AUTO: 24 % (ref 14–44)
MCH RBC QN AUTO: 30.4 PG (ref 26.8–34.3)
MCHC RBC AUTO-ENTMCNC: 34 G/DL (ref 31.4–37.4)
MCV RBC AUTO: 89 FL (ref 82–98)
MONOCYTES # BLD AUTO: 0.59 THOUSAND/ΜL (ref 0.17–1.22)
MONOCYTES NFR BLD AUTO: 8 % (ref 4–12)
NEUTROPHILS # BLD AUTO: 4.74 THOUSANDS/ΜL (ref 1.85–7.62)
NEUTS SEG NFR BLD AUTO: 65 % (ref 43–75)
NRBC BLD AUTO-RTO: 0 /100 WBCS
PLATELET # BLD AUTO: 268 THOUSANDS/UL (ref 149–390)
PMV BLD AUTO: 10.5 FL (ref 8.9–12.7)
POTASSIUM SERPL-SCNC: 4 MMOL/L (ref 3.5–5.3)
PROT SERPL-MCNC: 7.1 G/DL (ref 6.4–8.2)
RBC # BLD AUTO: 4.44 MILLION/UL (ref 3.81–5.12)
SODIUM SERPL-SCNC: 140 MMOL/L (ref 136–145)
WBC # BLD AUTO: 7.38 THOUSAND/UL (ref 4.31–10.16)

## 2019-06-17 PROCEDURE — 86360 T CELL ABSOLUTE COUNT/RATIO: CPT

## 2019-06-17 PROCEDURE — 86480 TB TEST CELL IMMUN MEASURE: CPT

## 2019-06-17 PROCEDURE — 80053 COMPREHEN METABOLIC PANEL: CPT

## 2019-06-17 PROCEDURE — 85025 COMPLETE CBC W/AUTO DIFF WBC: CPT

## 2019-06-17 PROCEDURE — 86355 B CELLS TOTAL COUNT: CPT

## 2019-06-17 PROCEDURE — 86359 T CELLS TOTAL COUNT: CPT

## 2019-06-17 PROCEDURE — 36415 COLL VENOUS BLD VENIPUNCTURE: CPT

## 2019-06-18 LAB
BASOPHILS # BLD AUTO: 0 X10E3/UL (ref 0–0.2)
BASOPHILS NFR BLD AUTO: 1 %
CD19 CELLS # BLD: 10 /UL (ref 12–645)
CD19 CELLS NFR BLD: 0.5 % (ref 3.3–25.4)
CD3 CELLS # BLD: 1740 /UL (ref 622–2402)
CD3 CELLS NFR BLD: 91.6 % (ref 57.5–86.2)
CD3+CD4+ CELLS # BLD: 1495 /UL (ref 359–1519)
CD3+CD4+ CELLS NFR BLD: 78.7 % (ref 30.8–58.5)
CD3+CD4+ CELLS/CD3+CD8+ CLL BLD: 5.96 % (ref 0.92–3.72)
CD3+CD8+ CELLS # BLD: 251 /UL (ref 109–897)
CD3+CD8+ CELLS NFR BLD: 13.2 % (ref 12–35.5)
EOSINOPHIL # BLD AUTO: 0.2 X10E3/UL (ref 0–0.4)
EOSINOPHIL NFR BLD AUTO: 2 %
ERYTHROCYTE [DISTWIDTH] IN BLOOD BY AUTOMATED COUNT: 13.8 % (ref 12.3–15.4)
HCT VFR BLD AUTO: 40.6 % (ref 34–46.6)
HGB BLD-MCNC: 13.9 G/DL (ref 11.1–15.9)
IMM GRANULOCYTES # BLD: 0 X10E3/UL (ref 0–0.1)
IMM GRANULOCYTES NFR BLD: 0 %
LYMPHOCYTES # BLD AUTO: 1.9 X10E3/UL (ref 0.7–3.1)
LYMPHOCYTES NFR BLD AUTO: 26 %
MCH RBC QN AUTO: 30.2 PG (ref 26.6–33)
MCHC RBC AUTO-ENTMCNC: 34.2 G/DL (ref 31.5–35.7)
MCV RBC AUTO: 88 FL (ref 79–97)
MONOCYTES # BLD AUTO: 0.7 X10E3/UL (ref 0.1–0.9)
MONOCYTES NFR BLD AUTO: 10 %
NEUTROPHILS # BLD AUTO: 4.6 X10E3/UL (ref 1.4–7)
NEUTROPHILS NFR BLD AUTO: 61 %
PLATELET # BLD AUTO: 252 X10E3/UL (ref 150–450)
RBC # BLD AUTO: 4.61 X10E6/UL (ref 3.77–5.28)
WBC # BLD AUTO: 7.4 X10E3/UL (ref 3.4–10.8)

## 2019-06-18 RX ORDER — SODIUM CHLORIDE 9 MG/ML
20 INJECTION, SOLUTION INTRAVENOUS ONCE
Status: CANCELLED | OUTPATIENT
Start: 2019-06-20

## 2019-06-18 RX ORDER — ACETAMINOPHEN 325 MG/1
650 TABLET ORAL ONCE
Status: CANCELLED | OUTPATIENT
Start: 2019-06-20

## 2019-06-19 ENCOUNTER — TELEPHONE (OUTPATIENT)
Dept: NEUROLOGY | Facility: CLINIC | Age: 64
End: 2019-06-19

## 2019-06-20 ENCOUNTER — TELEPHONE (OUTPATIENT)
Dept: NEUROLOGY | Facility: CLINIC | Age: 64
End: 2019-06-20

## 2019-06-20 ENCOUNTER — HOSPITAL ENCOUNTER (OUTPATIENT)
Dept: INFUSION CENTER | Facility: HOSPITAL | Age: 64
Discharge: HOME | End: 2019-06-20
Payer: MEDICARE

## 2019-06-20 VITALS
DIASTOLIC BLOOD PRESSURE: 68 MMHG | HEART RATE: 67 BPM | SYSTOLIC BLOOD PRESSURE: 133 MMHG | RESPIRATION RATE: 16 BRPM | TEMPERATURE: 97.3 F

## 2019-06-20 DIAGNOSIS — G35 MULTIPLE SCLEROSIS (HCC): ICD-10-CM

## 2019-06-20 DIAGNOSIS — G35 MULTIPLE SCLEROSIS (HCC): Primary | ICD-10-CM

## 2019-06-20 LAB
GAMMA INTERFERON BACKGROUND BLD IA-ACNC: 0.02 IU/ML
M TB IFN-G BLD-IMP: NEGATIVE
M TB IFN-G CD4+ BCKGRND COR BLD-ACNC: 0 IU/ML
M TB IFN-G CD4+ BCKGRND COR BLD-ACNC: 0 IU/ML
MITOGEN IGNF BCKGRD COR BLD-ACNC: >10 IU/ML

## 2019-06-20 PROCEDURE — 96367 TX/PROPH/DG ADDL SEQ IV INF: CPT

## 2019-06-20 PROCEDURE — 96415 CHEMO IV INFUSION ADDL HR: CPT

## 2019-06-20 PROCEDURE — 96413 CHEMO IV INFUSION 1 HR: CPT

## 2019-06-20 RX ORDER — ACETAMINOPHEN 325 MG/1
650 TABLET ORAL ONCE
Status: COMPLETED | OUTPATIENT
Start: 2019-06-20 | End: 2019-06-20

## 2019-06-20 RX ORDER — SODIUM CHLORIDE 9 MG/ML
20 INJECTION, SOLUTION INTRAVENOUS ONCE
Status: COMPLETED | OUTPATIENT
Start: 2019-06-20 | End: 2019-06-20

## 2019-06-20 RX ORDER — ACETAMINOPHEN 325 MG/1
650 TABLET ORAL ONCE
Status: CANCELLED | OUTPATIENT
Start: 2019-12-17

## 2019-06-20 RX ORDER — SODIUM CHLORIDE 9 MG/ML
20 INJECTION, SOLUTION INTRAVENOUS ONCE
Status: CANCELLED | OUTPATIENT
Start: 2019-12-17

## 2019-06-20 RX ADMIN — FAMOTIDINE 20 MG: 10 INJECTION INTRAVENOUS at 08:25

## 2019-06-20 RX ADMIN — SODIUM CHLORIDE 20 ML/HR: 0.9 INJECTION, SOLUTION INTRAVENOUS at 08:15

## 2019-06-20 RX ADMIN — DIPHENHYDRAMINE HYDROCHLORIDE 50 MG: 50 INJECTION, SOLUTION INTRAMUSCULAR; INTRAVENOUS at 08:47

## 2019-06-20 RX ADMIN — SODIUM CHLORIDE 100 MG: 0.9 INJECTION, SOLUTION INTRAVENOUS at 09:17

## 2019-06-20 RX ADMIN — OCRELIZUMAB 600 MG: 300 INJECTION INTRAVENOUS at 10:29

## 2019-06-20 RX ADMIN — ACETAMINOPHEN 650 MG: 325 TABLET ORAL at 09:54

## 2019-06-20 NOTE — PROGRESS NOTES
Pt tolerated treatment today without incident  Pt observed 1 hour post infusion, VSS, D/C home    Declined AVS

## 2019-06-20 NOTE — PLAN OF CARE
Problem: Potential for Falls  Goal: Patient will remain free of falls  Description  INTERVENTIONS:  - Assess patient frequently for physical needs  -  Identify cognitive and physical deficits and behaviors that affect risk of falls    -  Burt fall precautions as indicated by assessment   - Educate patient/family on patient safety including physical limitations  - Instruct patient to call for assistance with activity based on assessment  - Modify environment to reduce risk of injury  - Consider OT/PT consult to assist with strengthening/mobility  Outcome: Progressing

## 2019-06-26 DIAGNOSIS — J44.9 COPD WITHOUT EXACERBATION (HCC): Chronic | ICD-10-CM

## 2019-06-26 DIAGNOSIS — J30.9 ALLERGIC RHINITIS, UNSPECIFIED SEASONALITY, UNSPECIFIED TRIGGER: ICD-10-CM

## 2019-06-26 DIAGNOSIS — F33.9 MAJOR DEPRESSION, RECURRENT, CHRONIC (HCC): ICD-10-CM

## 2019-06-26 DIAGNOSIS — J30.2 SEASONAL ALLERGIES: ICD-10-CM

## 2019-06-26 RX ORDER — SERTRALINE HYDROCHLORIDE 100 MG/1
TABLET, FILM COATED ORAL
Qty: 90 TABLET | Refills: 1 | Status: SHIPPED | OUTPATIENT
Start: 2019-06-26 | End: 2020-05-06

## 2019-06-26 RX ORDER — FLUTICASONE PROPIONATE 50 MCG
SPRAY, SUSPENSION (ML) NASAL
Qty: 48 G | Refills: 1 | Status: SHIPPED | OUTPATIENT
Start: 2019-06-26 | End: 2020-03-16 | Stop reason: SDUPTHER

## 2019-06-26 RX ORDER — MONTELUKAST SODIUM 10 MG/1
TABLET ORAL
Qty: 90 TABLET | Refills: 1 | Status: SHIPPED | OUTPATIENT
Start: 2019-06-26 | End: 2020-07-27

## 2019-06-26 RX ORDER — FLUTICASONE FUROATE AND VILANTEROL 100; 25 UG/1; UG/1
POWDER RESPIRATORY (INHALATION)
Qty: 120 EACH | Refills: 1 | Status: SHIPPED | OUTPATIENT
Start: 2019-06-26 | End: 2019-08-21

## 2019-07-03 ENCOUNTER — APPOINTMENT (OUTPATIENT)
Dept: LAB | Facility: CLINIC | Age: 64
End: 2019-07-03
Payer: MEDICARE

## 2019-07-03 ENCOUNTER — TELEPHONE (OUTPATIENT)
Dept: FAMILY MEDICINE CLINIC | Facility: CLINIC | Age: 64
End: 2019-07-03

## 2019-07-03 DIAGNOSIS — E78.2 MIXED HYPERLIPIDEMIA: Chronic | ICD-10-CM

## 2019-07-03 LAB
ALBUMIN SERPL BCP-MCNC: 4 G/DL (ref 3.5–5)
ALP SERPL-CCNC: 105 U/L (ref 46–116)
ALT SERPL W P-5'-P-CCNC: 30 U/L (ref 12–78)
ANION GAP SERPL CALCULATED.3IONS-SCNC: 10 MMOL/L (ref 4–13)
AST SERPL W P-5'-P-CCNC: 18 U/L (ref 5–45)
BILIRUB SERPL-MCNC: 0.4 MG/DL (ref 0.2–1)
BUN SERPL-MCNC: 16 MG/DL (ref 5–25)
CALCIUM SERPL-MCNC: 9 MG/DL (ref 8.3–10.1)
CHLORIDE SERPL-SCNC: 105 MMOL/L (ref 100–108)
CHOLEST SERPL-MCNC: 180 MG/DL (ref 50–200)
CO2 SERPL-SCNC: 26 MMOL/L (ref 21–32)
CREAT SERPL-MCNC: 0.67 MG/DL (ref 0.6–1.3)
GFR SERPL CREATININE-BSD FRML MDRD: 93 ML/MIN/1.73SQ M
GLUCOSE P FAST SERPL-MCNC: 114 MG/DL (ref 65–99)
HDLC SERPL-MCNC: 42 MG/DL (ref 40–60)
LDLC SERPL CALC-MCNC: 100 MG/DL (ref 0–100)
NONHDLC SERPL-MCNC: 138 MG/DL
POTASSIUM SERPL-SCNC: 4 MMOL/L (ref 3.5–5.3)
PROT SERPL-MCNC: 7.3 G/DL (ref 6.4–8.2)
SODIUM SERPL-SCNC: 141 MMOL/L (ref 136–145)
TRIGL SERPL-MCNC: 191 MG/DL

## 2019-07-03 PROCEDURE — 80053 COMPREHEN METABOLIC PANEL: CPT

## 2019-07-03 PROCEDURE — 80061 LIPID PANEL: CPT

## 2019-07-03 PROCEDURE — 36415 COLL VENOUS BLD VENIPUNCTURE: CPT

## 2019-07-03 NOTE — TELEPHONE ENCOUNTER
----- Message from Chitra Tao MD sent at 7/3/2019 12:36 PM EDT -----  Please contact patient    Her blood work was normal   Cholesterol has improved significantly, currently at 180 with LDL of 100  Blood sugar was on the higher side of normal, please advise her  follow low-carbohydrate diet

## 2019-07-22 ENCOUNTER — HOSPITAL ENCOUNTER (OUTPATIENT)
Dept: RADIOLOGY | Facility: HOSPITAL | Age: 64
Discharge: HOME | End: 2019-07-22
Attending: INTERNAL MEDICINE
Payer: MEDICARE

## 2019-07-22 DIAGNOSIS — R93.89 ABNORMAL CT OF THE CHEST: ICD-10-CM

## 2019-07-22 PROCEDURE — 71250 CT THORAX DX C-: CPT

## 2019-07-25 ENCOUNTER — TELEPHONE (OUTPATIENT)
Dept: PULMONOLOGY | Facility: CLINIC | Age: 64
End: 2019-07-25

## 2019-08-01 ENCOUNTER — OFFICE VISIT (OUTPATIENT)
Dept: NEUROLOGY | Facility: CLINIC | Age: 64
End: 2019-08-01
Payer: MEDICARE

## 2019-08-01 VITALS
SYSTOLIC BLOOD PRESSURE: 123 MMHG | WEIGHT: 160 LBS | DIASTOLIC BLOOD PRESSURE: 72 MMHG | HEIGHT: 64 IN | HEART RATE: 76 BPM | BODY MASS INDEX: 27.31 KG/M2

## 2019-08-01 DIAGNOSIS — R26.89 BALANCE DISORDER: ICD-10-CM

## 2019-08-01 DIAGNOSIS — R41.3 AMNESIA/MEMORY DISORDER: ICD-10-CM

## 2019-08-01 DIAGNOSIS — R32 URINARY INCONTINENCE, UNSPECIFIED TYPE: ICD-10-CM

## 2019-08-01 DIAGNOSIS — G35 MULTIPLE SCLEROSIS (HCC): Primary | Chronic | ICD-10-CM

## 2019-08-01 DIAGNOSIS — R26.2 AMBULATORY DYSFUNCTION: ICD-10-CM

## 2019-08-01 PROCEDURE — 99215 OFFICE O/P EST HI 40 MIN: CPT | Performed by: PSYCHIATRY & NEUROLOGY

## 2019-08-01 NOTE — PROGRESS NOTES
[facility] MULTIPLE SCLEROSIS CENTER  PATIENT:  Akhil Maradiaga  MRN:  [id number]  :  1955  DATE OF SERVICE:  2019    Assessment/Plan:     Problem List Items Addressed This Visit        Nervous and Auditory    Multiple sclerosis, relapsing/remitting - Primary (Chronic)    Relevant Orders    Ambulatory referral to Speech Therapy    Ambulatory referral to Physical Therapy       Other    Ambulatory dysfunction    Amnesia/memory disorder    Relevant Orders    Ambulatory referral to Speech Therapy    Urinary incontinence    Balance disorder    Relevant Orders    Ambulatory referral to Physical Therapy            Mrs Jose Kovacs has MS and she has stable clinical findings with no radiographic progression of her demyelination in her brain parenchyma  Patient will continue Luretha Adrien at this point with total 2 years therapy will be advised  Mammogram 2019  is pending  MRI brain and spine will be in 2020 - stable findings has been described  Patient has been working with Urology team for bladder urgency  She has concern for bowel dysfunction as well  Memory dysfunction is multifactorial including MDD and underlying MS with no temporal mesial lobe atrophy has been noted  Cognitive therapy was advised again, and based on outcomes, Aricept 5 mg HS or Provigil 100 mg am might be considered  Patient fell due to she is tripted over, with no head injury reported  2 days memory loss was described as confusional state, and no complete TGA or memory issues  EEG was completed with no epileptiform discharges noted and indicated left more than right temporal focal cerebral dysfunction  May consider Epilespy team evaluation  At the age of 39 yo patient had ON- no other vision dysfunction reported today  Patient is to continue follow up with Rafi  Every 4 months - she is to follow with Stepan Blount  Subjective: MS and related issues    HPI History of Present Illness    Mrs Hewitt is a 59 yo female who presented as LIU from Dr Keri Zuñiga  Patient was last seen Mrs Shannan Pettit on 3/20/2019  Patirnt was diagnosed in 2008, with Tysabri since 2011  Patient was started on Ocrevus in December 2018 June 2019 was last Ocrevus infusion  Recent EEG was consistent with right temporal focal cerebral dysfunction, possibly structural in origin  The sharply appearing waveforms over F7-T3 are likely a reflection of underlying focal cerebral dysfunction  Urology follow up on scheduled bases  Patient has increased spasticity and baclofen was been started  MRI 3/2019- 6/2019: Stable MR appearance of the brain consistent with long-standing demyelinating disease  No new lesions and no pathologic enhancement to suggest acute inflammation  Although disease burden is considered moderate, there is parenchymal volume loss  Patient has urinary frequency,with urology team follows regularly  Patient has difficulties with thinking and she describing having delays in her cognitive judgement and   The following portions of the patient's history were reviewed and updated as appropriate:   She  has a past medical history of Asthma, Degeneration of intervertebral disc at L5-S1 level, Depression, Full incontinence of feces, Generalized anxiety disorder, Hepatitis, Herpes zoster, History of colon polyps, Hypercholesterolemia, Hyperlipidemia, MS (multiple sclerosis) ([address] ), Osteoporosis, Seasonal allergies, Tumor of breast, and Urinary incontinence    She   Patient Active Problem List    Diagnosis Date Noted    Urinary incontinence 08/01/2019    Balance disorder 08/01/2019    Special screening for malignant neoplasms, colon 03/27/2019    Family history of colon cancer 03/27/2019    Maxillary sinusitis 03/22/2019    Abnormal CT of the chest 03/17/2019    Shoulder impingement syndrome, left 11/16/2018    Amnesia/memory disorder     Vitamin D deficiency 01/24/2018    Major depression, recurrent, chronic ([address] ) 09/12/2017  COPD without exacerbation ([facility] [address] ) 04/17/2017    Ambulatory dysfunction 04/15/2017    Multiple sclerosis, relapsing/remitting 02/02/2017    Muscle spasm 02/02/2017    Hyperlipidemia 02/02/2017    Osteoporosis 02/02/2017    Disc degeneration, lumbar 01/20/2013    Allergic rhinitis 09/11/2012     She  has a past surgical history that includes Rotator cuff repair; Rotator cuff repair (Bilateral); Breast surgery; Colonoscopy; Septoplasty; and [place] lumbar puncture (10/1/2018)  Her family history includes Arthritis in her family and mother; COPD in her family and father; Emphysema in her family; Heart disease in her family; Hyperthyroidism in her family; Multiple sclerosis in her family; Osteoarthritis in her family; Osteoporosis in her mother  She  reports that she has been smoking cigarettes  She has a 40 00 pack-year smoking history  She has never used smokeless tobacco  She reports that she drinks alcohol  She reports that she does not use drugs    Current Outpatient Medications   Medication Sig Dispense Refill    albuterol (2 5 mg/3 mL) 0 083 % nebulizer solution Take 1 vial (2 5 mg total) by nebulization every 4 (four) hours as needed for wheezing or shortness of breath (cough) 120 vial 2    albuterol (PROAIR HFA) 90 mcg/act inhaler Inhale 2 puffs every 4 (four) hours as needed for wheezing or shortness of breath 3 Inhaler 1    aspirin (ECOTRIN LOW STRENGTH) 81 mg EC tablet Take 1 tablet by mouth daily      atorvastatin (LIPITOR) 40 mg tablet Take 1 tablet (40 mg total) by mouth daily 90 tablet 3    B Complex-C CAPS Take by mouth      baclofen 10 mg tablet Take 1 tablet (10 mg total) by mouth 2 (two) times a day as needed for muscle spasms 60 tablet 1    BL EVENING PRIMROSE OIL PO Take by mouth      budesonide (PULMICORT) 0 5 mg/2 mL nebulizer solution inhale contents of 1 vial in nebulizer twice a day  0    calcium carbonate-vitamin D (OSCAL-D) 500 mg-200 units per tablet Take 1 tablet by mouth  ezetimibe (ZETIA) 10 mg tablet Take 1 tablet (10 mg total) by mouth daily 90 tablet 3    fexofenadine (ALLEGRA) 180 MG tablet Take 180 mg by mouth daily      Flaxseed, Linseed, (FLAXSEED OIL PO) Take by mouth      fluticasone (FLONASE) 50 mcg/act nasal spray USE 2 SPRAYS IN EACH  NOSTRIL DAILY 48 g 1    fluticasone-vilanterol (BREO ELLIPTA) 100-25 mcg/inh inhaler USE 1 INHALATION ONCE A  DAY, RINSE MOUTH AFTER USE  120 each 1    gabapentin (NEURONTIN) 300 mg capsule Take 1 capsule (300 mg total) by mouth 2 (two) times a day 180 capsule 3    montelukast (SINGULAIR) 10 mg tablet TAKE 1 TABLET BY MOUTH  DAILY AT BEDTIME 90 tablet 1    Ocrelizumab (OCREVUS IV) Infuse into a venous catheter      sertraline (ZOLOFT) 100 mg tablet TAKE 1 TABLET BY MOUTH  DAILY 90 tablet 1     No current facility-administered medications for this visit        Current Outpatient Medications on File Prior to Visit   Medication Sig    albuterol (2 5 mg/3 mL) 0 083 % nebulizer solution Take 1 vial (2 5 mg total) by nebulization every 4 (four) hours as needed for wheezing or shortness of breath (cough)    albuterol (PROAIR HFA) 90 mcg/act inhaler Inhale 2 puffs every 4 (four) hours as needed for wheezing or shortness of breath    aspirin (ECOTRIN LOW STRENGTH) 81 mg EC tablet Take 1 tablet by mouth daily    atorvastatin (LIPITOR) 40 mg tablet Take 1 tablet (40 mg total) by mouth daily    B Complex-C CAPS Take by mouth    baclofen 10 mg tablet Take 1 tablet (10 mg total) by mouth 2 (two) times a day as needed for muscle spasms    BL EVENING PRIMROSE OIL PO Take by mouth    budesonide (PULMICORT) 0 5 mg/2 mL nebulizer solution inhale contents of 1 vial in nebulizer twice a day    calcium carbonate-vitamin D (OSCAL-D) 500 mg-200 units per tablet Take 1 tablet by mouth    ezetimibe (ZETIA) 10 mg tablet Take 1 tablet (10 mg total) by mouth daily    fexofenadine (ALLEGRA) 180 MG tablet Take 180 mg by mouth daily    Flaxseed, Linseed, (FLAXSEED OIL PO) Take by mouth    fluticasone (FLONASE) 50 mcg/act nasal spray USE 2 SPRAYS IN EACH  NOSTRIL DAILY    fluticasone-vilanterol (BREO ELLIPTA) 100-25 mcg/inh inhaler USE 1 INHALATION ONCE A  DAY, RINSE MOUTH AFTER USE   gabapentin (NEURONTIN) 300 mg capsule Take 1 capsule (300 mg total) by mouth 2 (two) times a day    montelukast (SINGULAIR) 10 mg tablet TAKE 1 TABLET BY MOUTH  DAILY AT BEDTIME    Ocrelizumab (OCREVUS IV) Infuse into a venous catheter    sertraline (ZOLOFT) 100 mg tablet TAKE 1 TABLET BY MOUTH  DAILY     No current facility-administered medications on file prior to visit  She is allergic to bactrim [sulfamethoxazole-trimethoprim]; betadine [povidone iodine]; cefzil [cefprozil]; prempro [conj estrog-medroxyprogest ace]; zyban [bupropion]; levaquin [levofloxacin]; gadobutrol; medroxyprogesterone; nortriptyline; pamelor [nortriptyline hcl]; and provera [medroxyprogesterone acetate]            Objective:    Blood pressure 123/72, pulse 76, height 5' 4" (1 626 m), weight 72 6 kg (160 lb), not currently breastfeeding  Physical Exam/Neurological Exam  CONSTITUTIONAL: NAD, pleasant  NECK: supple, no lymphadenopathy, no thyromegaly, no JVD  CARDIOVASCULAR: RRR, normal S1S2, no murmurs, no rubs  RESP: clear to auscultation bilaterally, no wheezes/rhonchi/rales  ABDOMEN: soft, non tender, non distended  SKIN: no rash or skin lesions  EXTREMITIES: no edema, pulses 2+bilaterally  PSYCH: appropriate mood and affect  NEUROLOGIC COMPREHENSIVE EXAM: Patient is oriented to person, place and time, NAD; appropriate affect  CN II, III, IV, V, VI, VII,VIII,IX,X,XI-XII intact with EOMI, PERRLA, OKN intact, VF grossly intact, fundi poorly visualized secondary to pupillary constriction; symmetric face noted  Motor: 5/5 UE/LE bilateral symmetric; Sensory: intact to light touch and pinprick bilaterally; normal vibration sensation feet bilaterally;  Coordination within normal limits on FTN and XANDER testing; DTR: 2/4 through, no Babinski, no clonus  Tandem gait is intact  Romberg: negative  ROS:  12 points of review of system was reviewed with the patient and was unremarkable with exception: see HPI  Review of Systems   Musculoskeletal: Positive for gait problem and neck stiffness  Neurological: Positive for speech difficulty, light-headedness and numbness  Psychiatric/Behavioral: Positive for agitation and confusion  The patient is nervous/anxious  All other systems reviewed and are negative

## 2019-08-07 ENCOUNTER — ANNUAL EXAM (OUTPATIENT)
Dept: OBGYN CLINIC | Facility: CLINIC | Age: 64
End: 2019-08-07
Payer: MEDICARE

## 2019-08-07 VITALS
SYSTOLIC BLOOD PRESSURE: 122 MMHG | HEIGHT: 65 IN | DIASTOLIC BLOOD PRESSURE: 80 MMHG | WEIGHT: 158 LBS | BODY MASS INDEX: 26.33 KG/M2

## 2019-08-07 DIAGNOSIS — Z78.0 POSTMENOPAUSAL: ICD-10-CM

## 2019-08-07 DIAGNOSIS — Z01.419 ENCOUNTER FOR WELL WOMAN EXAM: Primary | ICD-10-CM

## 2019-08-07 DIAGNOSIS — Z12.39 BREAST SCREENING, UNSPECIFIED: ICD-10-CM

## 2019-08-07 DIAGNOSIS — Z12.39 BREAST SCREENING: ICD-10-CM

## 2019-08-07 PROCEDURE — G0101 CA SCREEN;PELVIC/BREAST EXAM: HCPCS | Performed by: PHYSICIAN ASSISTANT

## 2019-08-07 NOTE — PROGRESS NOTES
Assessment/Plan:    No problem-specific Assessment & Plan notes found for this encounter  Diagnoses and all orders for this visit:    Encounter for well woman exam    Breast screening, unspecified  -     Mammo screening bilateral w cad; Future    Postmenopausal          Subjective:      Patient ID: Raina Prajapati is a 58 y o  female  Pt presents for her annual exam today--  She has no complaints  She has no bleeding or pelvic pain--postmeno  Bowel and bladder are regular  Colonoscopy--utd  See uro regularly for neurogenic bladder  No breast concerns today  Last mammo--10/18    No pap today  rx mammo      The following portions of the patient's history were reviewed and updated as appropriate: allergies, current medications, past family history, past medical history, past social history, past surgical history and problem list     Review of Systems   Constitutional: Negative for chills, fever and unexpected weight change  Gastrointestinal: Negative for abdominal pain, blood in stool, constipation and diarrhea  Genitourinary: Negative  Objective:      /80 (BP Location: Right arm, Patient Position: Sitting, Cuff Size: Standard)   Ht 5' 5" (1 651 m)   Wt 71 7 kg (158 lb)   LMP  (LMP Unknown)   BMI 26 29 kg/m²          Physical Exam   Constitutional: She appears well-developed and well-nourished  HENT:   Head: Normocephalic and atraumatic  Neck: Normal range of motion  Pulmonary/Chest: Right breast exhibits no inverted nipple, no mass, no nipple discharge and no skin change  Left breast exhibits no inverted nipple, no mass, no nipple discharge and no skin change  Abdominal: Soft  Genitourinary: Vagina normal and uterus normal  Pelvic exam was performed with patient supine  There is no rash, tenderness or lesion on the right labia  There is no rash, tenderness or lesion on the left labia  Cervix exhibits no motion tenderness, no discharge and no friability   Right adnexum displays no mass, no tenderness and no fullness  Left adnexum displays no mass, no tenderness and no fullness  Genitourinary Comments: atrophy   Lymphadenopathy: No inguinal adenopathy noted on the right or left side  Nursing note and vitals reviewed

## 2019-08-07 NOTE — PROGRESS NOTES
Yuliana is here for yearly exam  Yuliana is feeling well, no bleeding or pelvic pain, no breast concerns  See Urology for overactive bladder due to M S     7/26/17 Normal Pap, Negative HPV  4/15/15 Normal Pap, Negative HPV  4/9/14 Normal Pap  10/24/18 Normal Mammo

## 2019-08-13 NOTE — PROGRESS NOTES
PT Evaluation     Today's date: 2019  Patient name: Juany Herman  : 1955  MRN: [id number]  Referring provider: Luis Rutledge MD  Dx:   Encounter Diagnosis     ICD-10-CM    1  Pelvic floor weakness in female N81 89    2  Pelvic floor dysfunction M62 89 Ambulatory referral to Physical Therapy   3  Urge incontinence of urine N39 41 Ambulatory referral to Physical Therapy   4  Detrusor and sphincter dyssynergia N36 44 Ambulatory referral to Physical Therapy                  Assessment  Assessment details: Juany Herman is a 58 y o  female who presents with concerns of urinary urgency and incontinence  Patient presents with the below outlined deficits and is appropriate for skilled physical therapy in order to address deficits and ultimately meet goal of independent self management of condition  Therapeutic activities performed upon examination included education regarding pelvic floor anatomy, explanation of exam technique, explanation of exam findings and discussion of treatment plan as well as expectations of the patient to emphasize the importance of compliance and adherence to physical therapy visits  Impairments: abnormal coordination, abnormal muscle tone, activity intolerance, impaired physical strength and lacks appropriate home exercise program  Understanding of Dx/Px/POC: good   Prognosis: good    Goals  STGs to be met in 4 weeks:  * Patient will be compliant with introductory HEP as prescribed  * Presents with improved understanding of bladder irritants in diet and makes concerted effort to reduce them by at least 50-75%  LTGs to be met by discharge:  * Patient will present with a 30 % improvement on FOTO score by discharge to indicate improved symptom management  * Patient reports that she is able to successfully suppress an urge to empty her bladder for 20' without leakage  * Normalize sEMG findings to indicate strength average > 12uV and resting average < 2 5uV     * Presents with improved fluid intake to avoid concentrated and irritating urine by discharge  * Patient implements urge suppression strategies throughout the day and reports that her average voiding interval is 2+ hours upon discharge  * Patient will be compliant with comprehensive home exercise program for self management of condition  Plan  Patient would benefit from: skilled physical therapy  Referral necessary: No  Planned modality interventions: biofeedback  Planned therapy interventions: neuromuscular re-education, patient education, therapeutic activities, strengthening, therapeutic exercise and home exercise program  Frequency: 1x week  Duration in weeks: 12  Plan of Care beginning date: 8/14/2019  Plan of Care expiration date: 11/6/2019  Treatment plan discussed with: patient        PT Pelvic Floor Subjective:   History of Present Illness:   Patient reports that she has been experiencing a flare of her R/R MS symptoms (dx in 2000)  She presents for PFPT for mixed incontinence of urine as well urinary and fecal urgency  "I wear a pad all the time " She reports that she has leakage a few times a week  "It's worse in the hot weather "         Recurrent probem    Quality of life: good    Social Support:     Lives in:  [address] [place] [address] with:  Spouse    Work status: unemployed  Bladder Function:     Voiding Difficulties positive for: urgency, frequent urination, hesitancy and incomplete emptying      Voiding Difficulties comments:     Voiding frequency: every 31-60 minutes    Urinary leakage: urine leakage    Urinary leakage aggravated by: coughing, sneezing, walking to the bathroom, post-void dribble and anxiety    Nocturia (episodes per night): 1    Painful urination: Yes (burning with urination )      Intake (ounces):      Intake (ounces) comment: Coffee - 3-4 cups  Iced tea - 2 cups  Water - minimal  Cranberry - 1 cup    Incontinence Management:     Pads/Diaper Use:  24 hours  Bowel Function: Voiding DIfficulties: urgency      Bowel Function comments:  Stool is softer in warmer weather    Bowel frequency: daily    [place] Stool Scale: type 6, type 5 and type 4    Stool softener use: no stool softeners    Enema use: no enema    Uses "squatty potty": no Squatty Potty  Sexual Function:     Sexually Active:  Non-contributory    Patient wishes to return to having intercourse: currently unable to have intercourse but wants to  Pain:     Current pain ratin    At best pain ratin    At worst pain rating:  10    Location:  Burning with urination    Onset:  More than 2 years ago    Quality:  Burning    Aggravating factors: Bowel movements and urination    Duration of symptoms:  Brief    Progression:  No change  Diagnostic Tests:     None    Treatments:     None    Patient Goals:     Patient goals for therapy:  Improved pain management, improved quality of life, improved comfort and decreased pain      Objective   Pelvic Floor Exam     Skin inspection:   no scars present  Visual Inspection of Perineum:   Excursion of perineal body in cephalad direction with contraction of pelvic floor muscles (PFM): delayed  and weak  Excursion of perineal body in caudal direction with relaxation of pelvic floor muscles (PFM): delayed   Involuntary contraction with coughing: yes  Involuntary relaxation with bearing down: no  Cotton swab test: non-tender  Cough reflex: cough reflex  Sphincter Tone Resting: normal  Sphincter Tone Squeeze: normal  Sensation: intact    Pelvic Floor Muscle Exam     Muscle Contraction: overflow  Breathing pattern with contraction: holding breath  Pelvic floor muscle relaxation is delayed and incomplete     75% pelvic floor relaxation    PERFECT Score   Power right: 2/5  Power left: 2/5  Endurance (seconds to max): 3  Repetitions (before fatigue): 4    SMEG Biofeedback   to be assessed next treatment        Flowsheet Rows      Most Recent Value   PT/OT G-Codes   Current Score  44   Projected Score  54   FOTO information reviewed  Yes             Precautions: MS, DDD, osteoporosis, depression      Manual  8/14            Education re: fluid intake nv                                                                    Exercise Diary  8/14            NMR via biofeedback nv                         PFM contract/relax 5:5x10, 3x/day

## 2019-08-14 ENCOUNTER — EVALUATION (OUTPATIENT)
Dept: PHYSICAL THERAPY | Facility: REHABILITATION | Age: 64
End: 2019-08-14
Payer: MEDICARE

## 2019-08-14 DIAGNOSIS — M62.89 PELVIC FLOOR DYSFUNCTION: ICD-10-CM

## 2019-08-14 DIAGNOSIS — N81.89 PELVIC FLOOR WEAKNESS IN FEMALE: Primary | ICD-10-CM

## 2019-08-14 DIAGNOSIS — N36.44 DETRUSOR AND SPHINCTER DYSSYNERGIA: ICD-10-CM

## 2019-08-14 DIAGNOSIS — N39.41 URGE INCONTINENCE OF URINE: ICD-10-CM

## 2019-08-14 PROCEDURE — 97530 THERAPEUTIC ACTIVITIES: CPT | Performed by: PHYSICAL THERAPIST

## 2019-08-14 PROCEDURE — 97162 PT EVAL MOD COMPLEX 30 MIN: CPT | Performed by: PHYSICAL THERAPIST

## 2019-08-21 ENCOUNTER — TELEPHONE (OUTPATIENT)
Dept: PULMONOLOGY | Facility: CLINIC | Age: 64
End: 2019-08-21

## 2019-08-21 ENCOUNTER — OFFICE VISIT (OUTPATIENT)
Dept: PULMONOLOGY | Facility: CLINIC | Age: 64
End: 2019-08-21
Payer: MEDICARE

## 2019-08-21 VITALS
OXYGEN SATURATION: 97 % | TEMPERATURE: 97.6 F | DIASTOLIC BLOOD PRESSURE: 70 MMHG | BODY MASS INDEX: 26.98 KG/M2 | SYSTOLIC BLOOD PRESSURE: 118 MMHG | HEART RATE: 74 BPM | WEIGHT: 158 LBS | HEIGHT: 64 IN | RESPIRATION RATE: 14 BRPM

## 2019-08-21 DIAGNOSIS — Z72.0 TOBACCO USE: ICD-10-CM

## 2019-08-21 DIAGNOSIS — J30.9 ALLERGIC RHINITIS: ICD-10-CM

## 2019-08-21 DIAGNOSIS — R93.89 ABNORMAL CT OF THE CHEST: Primary | ICD-10-CM

## 2019-08-21 DIAGNOSIS — J44.9 COPD (CHRONIC OBSTRUCTIVE PULMONARY DISEASE) (HCC): ICD-10-CM

## 2019-08-21 PROCEDURE — 99214 OFFICE O/P EST MOD 30 MIN: CPT | Performed by: INTERNAL MEDICINE

## 2019-08-21 PROCEDURE — 1124F ACP DISCUSS-NO DSCNMKR DOCD: CPT | Performed by: INTERNAL MEDICINE

## 2019-08-21 RX ORDER — FLUTICASONE FUROATE AND VILANTEROL 200; 25 UG/1; UG/1
1 POWDER RESPIRATORY (INHALATION) DAILY
Qty: 3 INHALER | Refills: 3 | Status: SHIPPED | OUTPATIENT
Start: 2019-08-21 | End: 2020-12-14

## 2019-08-21 RX ORDER — MELATONIN
1000 DAILY
COMMUNITY
End: 2019-10-27

## 2019-08-21 NOTE — PROGRESS NOTES
Office Progress Note - Pulmonary    Radene Flavors 58 y o  female MRN: [id number]    Encounter: [id number]      Assessment:   Abnormal CT scan of the chest with tiny filling defects in the trachea and right upper lobe bronchus most likely mucus   Chronic obstructive pulmonary disease without acute exacerbation   Ongoing tobacco use   Allergic rhinitis  Plan:    Breo 200/25, 1 inhalation once a day   Albuterol rescue inhaler 4 times a day as needed   Fluticasone nasal spray 2 sprays to each nostril once a day   Tobacco cessation   A bronchoscopy   Follow-up in 6 months  Discussion:   The patient is ground-glass focal opacities have resolved  She has tiny filling defects in the trachea and right upper lobe bronchus  These are most likely mucus  However given the patient's high risk for lung cancer I have scheduled her for a bronchoscopy to inspect the airways  Her COPD is in remission  I have increased the dose of Breo to 200/25, 1 inhalation once a day  She will use the albuterol rescue inhaler 4 times a day as needed  Her allergic rhinitis is well treated  She will continue the fluticasone nasal spray 2 sprays to each nostril once a day  We talked about her smoking habit however the patient stated that she is still under a lot of stress and she is not considering quitting smoking until the new year  I will see her in 6 months in a follow-up visit  Subjective: The patient is here for follow-up visit  She has occasional cough with sputum production  She denies wheezing or chest tightness  Denies chest pain  She is using Breo 100/25, 1 inhalation once a day  Occasionally she uses her rescue inhaler  She has no nocturnal symptoms  Unfortunately she still smoking daily  Review of systems:  A 12 point system review is done and aside from what is stated above the rest of the review of systems is negative  Family history and social history are reviewed      Medications list is reviewed  Vitals: Blood pressure 118/70, pulse 74, temperature 97 6 °F (36 4 °C), resp  rate 14, height 5' 4" (1 626 m), weight 71 7 kg (158 lb), SpO2 97 %, not currently breastfeeding ,     Physical Exam  Gen: Awake, alert, oriented x 3, no acute distress  HEENT: Mucous membranes moist, no oral lesions, no thrush  NECK: No accessory muscle use, JVP not elevated  Cardiac: Regular, single S1, single S2, no murmurs, no rubs, no gallops  Lungs: Clear breath sounds  Abdomen: normoactive bowel sounds, soft nontender, nondistended, no rebound or rigidity, no guarding  Extremities: no cyanosis, no clubbing, no edema  Neuro:  Grossly nonfocal   Skin:  No rash  CT scan of the chest is reviewed on [facility] and compared to the study from April  The focal areas of ground-glass opacities have resolved  She has tiny filling defect in the trachea as well as right upper lobe bronchus        Answers for HPI/ROS submitted by the patient on 8/14/2019   Primary symptoms  Have you had a change in appetite?: No  Do you have chest pain?: No  Do you have shortness of breath that occurs with effort or exertion?: No  Do you have ear congestion?: No  Do you have ear pain?: No  Do you have a fever?: No  Do you have headaches?: No  Do you have heartburn?: No  Do you have fatigue?: No  Do you have muscle pain?: No  Do you have nasal congestion?: Yes  Do you have shortness of breath when lying flat?: No  Do you have shortness of breath when you wake up?: No  Do you have post-nasal drip?: Yes  Do you have a runny nose?: No  Do you have sneezing?: Yes  Do you have a sore throat?: No  Do you have sweats?: No  Do you have trouble swallowing?: No  Have you experienced weight loss?: No  Risk factors for lung disease: smoking/tobacco exposure

## 2019-08-21 NOTE — TELEPHONE ENCOUNTER
Called patient to inform her that her Bronch is scheduled for 9/10 @ 11am in the [facility]   Patient was told to be NPO after midnight and to arrive 1 hour prior to scheduled appointment

## 2019-08-28 NOTE — PROGRESS NOTES
Daily Note     Today's date: 2019  Patient name: Junay Herman  : 1955  MRN: [id number]  Referring provider: Luis Rutledge MD  Dx:   Encounter Diagnosis     ICD-10-CM    1  Pelvic floor weakness in female N81 89    2  Pelvic floor dysfunction M62 89    3  Urge incontinence of urine N39 41    4  Detrusor and sphincter dyssynergia N36 44        From evaluation: Patient reports that she has been experiencing a flare of her R/R MS symptoms (dx in )  She presents for PFPT for mixed incontinence of urine as well urinary and fecal urgency  "I wear a pad all the time " She reports that she has leakage a few times a week  "It's worse in the hot weather "                Subjective: Patient reports that when the weather has been cooler these past few days she has not had any accidents  Objective: See treatment diary below    Biofeedback performed in supine hooklying  Patient presents with weak tone  Performed 5 second active PFM contractions followed by 10 seconds of rest for 10 repetitions  Muscle activity during work phase measured 4 9 uV on average with the goal being > 12 0uV and muscle activity during rest phase measured 1 4 uV on average with the goal being < 2 5uV  Issued HEP of 5 second holds, 10 seconds of rest to perform 10 times in supine position with emphasis on work phase  This is to be performed 3 times per day  Followed by quick flicks of 2:4 x 10, 3/day  Assessment: Tolerated treatment well  Patient demonstrated fatigue post treatment and would benefit from continued PT  We discussed avoiding JIC as patient cannot suppress an urge for more than 2-3 minutes  She was able to defer emptying bladder for duration of session despite voiding 80 minutes prior  Plan: Continue per plan of care        Precautions: MS, DDD, osteoporosis, depression      Manual              Education re: fluid intake nv                                                                    Exercise Diary 8/14 8/29           NMR via biofeedback nv 55' see above                        PFM contract/relax 5:5x10, 3x/day

## 2019-08-29 ENCOUNTER — OFFICE VISIT (OUTPATIENT)
Dept: PHYSICAL THERAPY | Facility: REHABILITATION | Age: 64
End: 2019-08-29
Payer: MEDICARE

## 2019-08-29 DIAGNOSIS — N81.89 PELVIC FLOOR WEAKNESS IN FEMALE: Primary | ICD-10-CM

## 2019-08-29 DIAGNOSIS — M62.89 PELVIC FLOOR DYSFUNCTION: ICD-10-CM

## 2019-08-29 DIAGNOSIS — N36.44 DETRUSOR AND SPHINCTER DYSSYNERGIA: ICD-10-CM

## 2019-08-29 DIAGNOSIS — N39.41 URGE INCONTINENCE OF URINE: ICD-10-CM

## 2019-08-29 PROCEDURE — 97112 NEUROMUSCULAR REEDUCATION: CPT | Performed by: PHYSICAL THERAPIST

## 2019-09-04 ENCOUNTER — TRANSCRIBE ORDERS (OUTPATIENT)
Dept: LAB | Facility: CLINIC | Age: 64
End: 2019-09-04

## 2019-09-04 NOTE — PROGRESS NOTES
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        From evaluation: Patient reports that she has been experiencing a flare of her R/R MS symptoms (dx in )  She presents for PFPT for mixed incontinence of urine as well urinary and fecal urgency  "I wear a pad all the time " She reports that she has leakage a few times a week  "It's worse in the hot weather "                Subjective: Patient reports that she has had days this week when she has been fully dry  She has reduced her iced tea and coffee which has helped a lot  She is also working hard not to void "just in case " She reports that she has been semi-compliant with her HEP  She continues to leak with coughing or sneezing  Objective: See treatment diary below      Assessment: Tolerated treatment well  Patient demonstrated fatigue post treatment and would benefit from continued PT  Patient did well with progression of therapeutic exercises with updated HEP provided  Plan: Continue per plan of care        Precautions: MS, DDD, osteoporosis, depression      Manual              Education re: fluid intake nv                                                                    Exercise Diary            NMR via biofeedback nv 55' see above                        PFM contract/relax 5:5x10, 3x/day                         Hip adduction w/PFM   5"x10          Hip abduction   5"x10          SLR   x10 each          Head raise and exhale with PFM   x10          Standing plie w/ PFM on raise   2x10

## 2019-09-06 ENCOUNTER — OFFICE VISIT (OUTPATIENT)
Dept: PHYSICAL THERAPY | Facility: REHABILITATION | Age: 64
End: 2019-09-06
Payer: MEDICARE

## 2019-09-06 DIAGNOSIS — M62.89 PELVIC FLOOR DYSFUNCTION: ICD-10-CM

## 2019-09-06 DIAGNOSIS — N81.89 PELVIC FLOOR WEAKNESS IN FEMALE: Primary | ICD-10-CM

## 2019-09-06 DIAGNOSIS — N36.44 DETRUSOR AND SPHINCTER DYSSYNERGIA: ICD-10-CM

## 2019-09-06 DIAGNOSIS — N39.41 URGE INCONTINENCE OF URINE: ICD-10-CM

## 2019-09-06 PROCEDURE — 97110 THERAPEUTIC EXERCISES: CPT | Performed by: PHYSICAL THERAPIST

## 2019-09-09 ENCOUNTER — EVALUATION (OUTPATIENT)
Dept: SPEECH THERAPY | Facility: CLINIC | Age: 64
End: 2019-09-09
Payer: MEDICARE

## 2019-09-09 ENCOUNTER — EVALUATION (OUTPATIENT)
Dept: PHYSICAL THERAPY | Facility: CLINIC | Age: 64
End: 2019-09-09
Payer: MEDICARE

## 2019-09-09 ENCOUNTER — ANESTHESIA EVENT (OUTPATIENT)
Dept: GASTROENTEROLOGY | Facility: HOSPITAL | Age: 64
End: 2019-09-09

## 2019-09-09 DIAGNOSIS — G35 MULTIPLE SCLEROSIS (HCC): Chronic | ICD-10-CM

## 2019-09-09 DIAGNOSIS — R48.8 OTHER SYMBOLIC DYSFUNCTIONS: Primary | ICD-10-CM

## 2019-09-09 DIAGNOSIS — R26.89 BALANCE DISORDER: ICD-10-CM

## 2019-09-09 DIAGNOSIS — R41.3 AMNESIA/MEMORY DISORDER: ICD-10-CM

## 2019-09-09 PROCEDURE — 97162 PT EVAL MOD COMPLEX 30 MIN: CPT | Performed by: PHYSICAL THERAPIST

## 2019-09-09 PROCEDURE — 96125 COGNITIVE TEST BY HC PRO: CPT | Performed by: SPEECH-LANGUAGE PATHOLOGIST

## 2019-09-09 NOTE — PROGRESS NOTES
Speech-Language Pathology Initial Evaluation    Today's date: 2019  Patients name: Marci Farnsworth  : 1955  MRN: [id number]  Safety measures: MS, fall risk  Referring provider: Alpha Galeazzi, *    Subjective comments: "When I see people, it's like I am looking through them  I know that I should know them, I just can't remember who they are  "    Patient's goal(s): "to remember and know what I am talking about "     How did the patient hear about us?: Prior patient    Reason for referral: Change in cognitive status and Decreased language skills  Prior functional status: Communication effective and appropriate in all situations  Clinically complex situations: Previous therapy to address similar deficits    History: Patient is a 58year old female with MS (diagnosed in ) who was referred for speech therapy due to reported difficulty with cognition and word finding  Patient was last seen by neurology on 2019, where cognitive therapy was recommended  Patient reports difficulty with her working memory, remembering names, and organizing her thoughts in order to complete a task  She reports that she has become distant when talking to others  Hearing: WFL   Vision: WFL (with glasses)    Home environment/lifestyle: Lives home with her  and mother in law  Highest level of education: Not assessed  Vocational status: Retired ()    Mental status: Alert  Behavior status: Cooperative  Communication modalities: Verbal  Rehabilitation prognosis: Good rehab potential to reach the established goals    Assessments    The Repeatable Battery for the Assessment of Neuropsychological Status (RBANS) is a brief, individually-administered assessment which measures attention, language, visuospatial/constructional abilities, and immediate & delayed memory  The RBANS is intended for use with adolescents to adults, ages 15 to 80 years   The following results were obtained during the administration of the assessment  Form: A    Cognitive Domain/Subtest: Index Score: Percentile Rank: Classification:   IMMEDIATE MEMORY 85 16%ile Low Average        1  List Learning (23/40)        2  Story Memory (16/24)       VISUOSPATIAL/  CONSTRUCTIONAL 89 23%ile Low Average        3  Figure Copy (17/20)        4  Line Orientation (16/20)       LANGUAGE 85 16%ile Low Average        5  Picture Naming (9/10)        6  Semantic Fluency (13/40)       ATTENTION 60 0 4%ile Extremely Low        7  Digit Span (5/16)        8  Coding (30/89)       DELAYED MEMORY 95 37%ile Average        9  List Recall (4/10)        10  List Recognition (20/20)        11  Story Recall (8/12)        12  Figure Recall (9/20)         Sum of Index Scores:  414   Total Score:  78   Percentile: 7%ile   Classification: Borderline         Goals  Short-term goals:  1  Patient will complete concrete and abstract categorization tasks to 80% accuracy to facilitate improved generative naming skills and working memory, to be achieved in 4-6 weeks  2  Patient will demonstrate alternating attention by being able to shift focus of attention between two tasks with minomal cues in a distracting environment with 80% accuracy, to be achieved in 4-6 weeks  3  Patient will demonstrate divided attention by responding to multiple tasks or details within tasks at the same time with minimal cues in a distracting environment with 80% accuracy, to be achieved in 4-6 weeks  4  Patient will be educated on the use of internal and external memory aids and compensatory strategies with 80% accuracy to facilitate increased recall of routine, personal information, and recent events, to be achieved in 4-6 weeks  5  Patient will demonstrate functional use of external memory across 3 sessions with 80% accuracy to facilitate carryover of strategies in functional living environment, to be achieved in 4-6 weeks  Long-term goals:   1    Patient will demonstrate cognitive-communication skills consistent with age and education given use of compensatory strategies when needed to resume baseline activities and responsibilities in home and community settings by discharge  2  Patient will complete cognitive-linguistic therapy that addresses patients specific deficits in processing speed, short-term working memory, attention to detail, monitoring, sequencing, and organization skills, with instruction, to alleviate effects of executive functioning disorder deficits by discharge  Impressions/Recommendations    Impressions: Patient presents with moderate cognitive-linguistic deficits characterized by decreased language processing, verbal fluency, thought organization, attention, and delayed recall  Patient would benefit from ST to increase her overall skills in cognition, memory, and attention  Recommendations:  -Patient would benefit from outpatient skilled Speech Therapy services : Cognitive-Linguistic therapy    -Frequency: 2x weekly  -Duration: 4-6 weeks    -Intervention certification from: 1/5/7593  -Intervention certification to: 00/8/1412    Intervention comments:  Standardized test with patient: 45 minutes  Scoring of standardized test/Write-up: 1 hour    Visit Tracking:  -Referring provider: Epic  -Billing guidelines: CMS  -Visit #1/10   -Medicare  [facility] due 10/9/2019    Patient was evaluated by Neel Almaguer, graduate SLP student, and was under the direct supervision of DON Dunbar , CCC-SLP

## 2019-09-09 NOTE — PROGRESS NOTES
PT Evaluation     Today's date: 2019  Patient name: Audie Berger  : 1955  MRN: [id number]  Referring provider: Win Mcmullen, *  Dx:   Encounter Diagnosis     ICD-10-CM    1  Multiple sclerosis, relapsing/remitting G35 Ambulatory referral to Physical Therapy   2  Balance disorder R26 89 Ambulatory referral to Physical Therapy                  Assessment  Assessment details: Pt presents to physical therapy with a diagnosis of multiple sclerosis and difficulty with balance  At this time patient has decreased lower extremity strength bilaterally, increased tone in plantarflexors, decreased balance at fall risk per testing, decreased gait efficiency and endurance per 6 minute walk test, and overall difficulty with community mobility for long distances  She will benefit from skilled therapy intervention 2x/week for 4 weeks to assist to establish long term home exercise program, improve balance and overall community mobility  Understanding of Dx/Px/POC: good   Prognosis: good    Goals  Short Term Goals:  Pt will report no near falls within 2 weeks  Pt will score 54/56 on suarez balance test within 2 weeks  Pt will complete 6 minute walk test in 1450 ft wtihin 2 weeks    Long term goals:  Pt will independently complete home program wihtin 4 weeks  Pt will report no falls within4 weeks    Plan  Patient would benefit from: skilled physical therapy  Planned therapy interventions: neuromuscular re-education, home exercise program, therapeutic exercise and balance  Frequency: 2x week        Subjective Evaluation    History of Present Illness  Mechanism of injury: Pt is famliar to therapist prior patient with a history of multiple sclerosis  She was first diagnosed in   Pt reports returning to physical therapy as she feels she is becoming more unsteady and would like to initiate PT again  Patient denies any falls at this time, dizziness  Does report some loss of sensation in bilateral hands   She is retired at this time  She is als initiating speech therapy to improve cognition     Pain  No pain reported    Social Support  Stairs in house: no   Lives in: [facility]  Lives with: spouse    Employment status: not working  Exercise history: none    Treatments  Current treatment: physical therapy and speech therapy  Patient Goals  Patient goals for therapy: improved balance  Patient goal: improved ednurance        Objective     Strength/Myotome Testing     Left Hip   Planes of Motion   Flexion: 4  Extension: 4  Abduction: 4  External rotation: 4    Right Hip   Planes of Motion   Flexion: 4  Extension: 4  Abduction: 4  External rotation: 4    Left Knee   Flexion: 4    Right Knee   Flexion: 4  Extension: 4    Left Ankle/Foot   Dorsiflexion: 4  Plantar flexion: 4    Right Ankle/Foot   Dorsiflexion: 4  Plantar flexion: 4  Neuro Exam:     Sensation   Light touch LE: left WNL and right WNL  Proprioception LE: left WNL and right WNL    Coordination   Heel to shin: left WNL and right WNL  Finger to nose: left dysmetria  Finger to nose: right WNL    Modified Tonya   Left gastroc: 1  Right gastroc: 1  Left hamstrin  Right hamstrin  Left quadricepts: 0  Right quadricepts: 0    Transfers Sit to stand: independent Sit to supine: independent Supine to sit: independent     Functional outcomes   6 minute walk test: 1350ft   5x sit to stand: 12 85 sec (seconds)  TUG: 10 35 (seconds)    Sales Balance - 51/50         Precautions: Fal risk    Short Term Goal Expiration Date:(2019)  Long Term Goal Expiration Date: (10/9/2019)  POC Expiration Date: (2019)                Manual                                                     Exercise Diary                                                                                                                                                                             Modalities

## 2019-09-10 ENCOUNTER — ANESTHESIA (OUTPATIENT)
Dept: GASTROENTEROLOGY | Facility: HOSPITAL | Age: 64
End: 2019-09-10

## 2019-09-10 ENCOUNTER — HOSPITAL ENCOUNTER (OUTPATIENT)
Dept: GASTROENTEROLOGY | Facility: HOSPITAL | Age: 64
Setting detail: OUTPATIENT SURGERY
Discharge: HOME | End: 2019-09-10
Attending: INTERNAL MEDICINE
Payer: MEDICARE

## 2019-09-10 VITALS
DIASTOLIC BLOOD PRESSURE: 56 MMHG | OXYGEN SATURATION: 94 % | TEMPERATURE: 97 F | HEART RATE: 80 BPM | RESPIRATION RATE: 16 BRPM | SYSTOLIC BLOOD PRESSURE: 118 MMHG

## 2019-09-10 DIAGNOSIS — R93.89 ABNORMAL CT OF THE CHEST: ICD-10-CM

## 2019-09-10 PROCEDURE — 31622 DX BRONCHOSCOPE/WASH: CPT | Performed by: INTERNAL MEDICINE

## 2019-09-10 RX ORDER — SODIUM CHLORIDE 9 MG/ML
INJECTION, SOLUTION INTRAVENOUS CONTINUOUS PRN
Status: DISCONTINUED | OUTPATIENT
Start: 2019-09-10 | End: 2019-09-10 | Stop reason: SURG

## 2019-09-10 RX ORDER — KETAMINE HYDROCHLORIDE 50 MG/ML
INJECTION, SOLUTION, CONCENTRATE INTRAMUSCULAR; INTRAVENOUS AS NEEDED
Status: DISCONTINUED | OUTPATIENT
Start: 2019-09-10 | End: 2019-09-10 | Stop reason: SURG

## 2019-09-10 RX ORDER — SODIUM CHLORIDE, SODIUM LACTATE, POTASSIUM CHLORIDE, CALCIUM CHLORIDE 600; 310; 30; 20 MG/100ML; MG/100ML; MG/100ML; MG/100ML
125 INJECTION, SOLUTION INTRAVENOUS CONTINUOUS
Status: CANCELLED | OUTPATIENT
Start: 2019-09-10

## 2019-09-10 RX ORDER — PROPOFOL 10 MG/ML
INJECTION, EMULSION INTRAVENOUS CONTINUOUS PRN
Status: DISCONTINUED | OUTPATIENT
Start: 2019-09-10 | End: 2019-09-10 | Stop reason: SURG

## 2019-09-10 RX ORDER — PROPOFOL 10 MG/ML
INJECTION, EMULSION INTRAVENOUS AS NEEDED
Status: DISCONTINUED | OUTPATIENT
Start: 2019-09-10 | End: 2019-09-10 | Stop reason: SURG

## 2019-09-10 RX ORDER — MIDAZOLAM HYDROCHLORIDE 1 MG/ML
INJECTION INTRAMUSCULAR; INTRAVENOUS AS NEEDED
Status: DISCONTINUED | OUTPATIENT
Start: 2019-09-10 | End: 2019-09-10 | Stop reason: SURG

## 2019-09-10 RX ORDER — FENTANYL CITRATE 50 UG/ML
INJECTION, SOLUTION INTRAMUSCULAR; INTRAVENOUS AS NEEDED
Status: DISCONTINUED | OUTPATIENT
Start: 2019-09-10 | End: 2019-09-10 | Stop reason: SURG

## 2019-09-10 RX ORDER — GLYCOPYRROLATE 0.2 MG/ML
INJECTION INTRAMUSCULAR; INTRAVENOUS AS NEEDED
Status: DISCONTINUED | OUTPATIENT
Start: 2019-09-10 | End: 2019-09-10 | Stop reason: SURG

## 2019-09-10 RX ADMIN — FENTANYL CITRATE 25 MCG: 50 INJECTION, SOLUTION INTRAMUSCULAR; INTRAVENOUS at 11:32

## 2019-09-10 RX ADMIN — SODIUM CHLORIDE: 0.9 INJECTION, SOLUTION INTRAVENOUS at 11:26

## 2019-09-10 RX ADMIN — KETAMINE HYDROCHLORIDE 30 MG: 50 INJECTION, SOLUTION INTRAMUSCULAR; INTRAVENOUS at 11:32

## 2019-09-10 RX ADMIN — LIDOCAINE HYDROCHLORIDE 100 MG: 20 INJECTION, SOLUTION INTRAVENOUS at 11:30

## 2019-09-10 RX ADMIN — PROPOFOL 40 MG: 10 INJECTION, EMULSION INTRAVENOUS at 11:32

## 2019-09-10 RX ADMIN — KETAMINE HYDROCHLORIDE 10 MG: 50 INJECTION, SOLUTION INTRAMUSCULAR; INTRAVENOUS at 11:56

## 2019-09-10 RX ADMIN — PROPOFOL 120 MCG/KG/MIN: 10 INJECTION, EMULSION INTRAVENOUS at 11:32

## 2019-09-10 RX ADMIN — GLYCOPYRROLATE 0.1 MG: 0.2 INJECTION, SOLUTION INTRAMUSCULAR; INTRAVENOUS at 11:30

## 2019-09-10 RX ADMIN — MIDAZOLAM 1 MG: 1 INJECTION INTRAMUSCULAR; INTRAVENOUS at 11:32

## 2019-09-10 NOTE — ANESTHESIA PREPROCEDURE EVALUATION
Review of Systems/Medical History  Patient summary reviewed  Chart reviewed      Cardiovascular  Hyperlipidemia,    Pulmonary  Smoker cigarette smoker  , Tobacco cessation counseling given Cumulative Pack Years: 36, COPD moderate- medication dependent , Asthma ,        GI/Hepatic    Liver disease , Hepatitis A,             Endo/Other  Negative endo/other ROS      GYN  Negative gynecology ROS          Hematology   Musculoskeletal    Arthritis     Neurology    Neuromuscular disease , multiple sclerosis,    Psychology   Anxiety, Depression ,              Physical Exam    Airway    Mallampati score: II  TM Distance: >3 FB  Neck ROM: full     Dental   No notable dental hx     Cardiovascular  Rhythm: regular, Rate: normal,     Pulmonary  Breath sounds clear to auscultation, Decreased breath sounds,     Other Findings        Anesthesia Plan  ASA Score- 3     Anesthesia Type- IV sedation with anesthesia with ASA Monitors  Additional Monitors:   Airway Plan:         Plan Factors-    Induction- intravenous  Postoperative Plan- Plan for postoperative opioid use  Informed Consent- Anesthetic plan and risks discussed with patient and spouse  I personally reviewed this patient with the CRNA  Discussed and agreed on the Anesthesia Plan with the CRNA  Trevor Aguilar

## 2019-09-10 NOTE — ANESTHESIA POSTPROCEDURE EVALUATION
Post-Op Assessment Note    CV Status:  Stable  Pain Score: 0    Pain management: adequate     Mental Status:  Alert and awake   Hydration Status:  Euvolemic   PONV Controlled:  Controlled   Airway Patency:  Patent and adequate   Post Op Vitals Reviewed: Yes      Staff: CRNA           /71 (09/10/19 1218)    Temp     Pulse 102 (09/10/19 1218)   Resp 18 (09/10/19 1218)    SpO2 96 % (09/10/19 1218)

## 2019-09-11 ENCOUNTER — OFFICE VISIT (OUTPATIENT)
Dept: PHYSICAL THERAPY | Facility: CLINIC | Age: 64
End: 2019-09-11
Payer: MEDICARE

## 2019-09-11 DIAGNOSIS — G35 MULTIPLE SCLEROSIS (HCC): Primary | ICD-10-CM

## 2019-09-11 PROCEDURE — 97110 THERAPEUTIC EXERCISES: CPT | Performed by: PHYSICAL THERAPIST

## 2019-09-11 PROCEDURE — 97112 NEUROMUSCULAR REEDUCATION: CPT | Performed by: PHYSICAL THERAPIST

## 2019-09-11 NOTE — PROGRESS NOTES
Daily Note     Today's date: 2019  Patient name: Ara Leventhal  : 1955  MRN: [id number]  Referring provider: Nya Morin, *  Dx:   Encounter Diagnosis     ICD-10-CM    1  Multiple sclerosis, relapsing/remitting G35                   Subjective: Had testing done yesterday that has made her throat soat      Objective: See treatment diary below      Assessment: Pt was fatigued at end of session but felt it was a "good" sore  Plan to issue exercises for HEP at next session  Plan: Continue per plan of care        Precautions: Fal risk    Short Term Goal Expiration Date:(2019)  Long Term Goal Expiration Date: (10/9/2019)  POC Expiration Date: (2019)                Manual                                                     Exercise Diary         STS 3x10       Step ups 15 ea fwd/lat 8"       HK marches 3 laps no ue       lunges 3 laps with UE       Heel raise 30x       Toe raise 30x       Backward walking 40'x2       Ht/hn hallway 40'x2                                                                                                           Modalities

## 2019-09-16 ENCOUNTER — OFFICE VISIT (OUTPATIENT)
Dept: FAMILY MEDICINE CLINIC | Facility: CLINIC | Age: 64
End: 2019-09-16
Payer: MEDICARE

## 2019-09-16 VITALS
DIASTOLIC BLOOD PRESSURE: 70 MMHG | SYSTOLIC BLOOD PRESSURE: 110 MMHG | HEIGHT: 64 IN | TEMPERATURE: 96.8 F | RESPIRATION RATE: 16 BRPM | HEART RATE: 74 BPM | BODY MASS INDEX: 27.14 KG/M2 | OXYGEN SATURATION: 95 % | WEIGHT: 159 LBS

## 2019-09-16 DIAGNOSIS — E78.5 HYPERLIPIDEMIA, UNSPECIFIED HYPERLIPIDEMIA TYPE: Chronic | ICD-10-CM

## 2019-09-16 DIAGNOSIS — R22.42 SKIN LUMP OF LEG, LEFT: ICD-10-CM

## 2019-09-16 DIAGNOSIS — M79.662 PAIN OF LEFT CALF: ICD-10-CM

## 2019-09-16 DIAGNOSIS — G35 MULTIPLE SCLEROSIS (HCC): Primary | Chronic | ICD-10-CM

## 2019-09-16 DIAGNOSIS — J44.9 COPD WITHOUT EXACERBATION (HCC): Chronic | ICD-10-CM

## 2019-09-16 DIAGNOSIS — F33.9 MAJOR DEPRESSION, RECURRENT, CHRONIC (HCC): Chronic | ICD-10-CM

## 2019-09-16 DIAGNOSIS — E55.9 VITAMIN D DEFICIENCY: ICD-10-CM

## 2019-09-16 DIAGNOSIS — J30.89 ALLERGIC RHINITIS DUE TO FUNGAL SPORES, UNSPECIFIED SEASONALITY: ICD-10-CM

## 2019-09-16 PROCEDURE — 99215 OFFICE O/P EST HI 40 MIN: CPT | Performed by: FAMILY MEDICINE

## 2019-09-16 RX ORDER — LEVOCETIRIZINE DIHYDROCHLORIDE 5 MG/1
5 TABLET, FILM COATED ORAL EVERY EVENING
Qty: 90 TABLET | Refills: 1 | Status: SHIPPED | OUTPATIENT
Start: 2019-09-16 | End: 2021-03-03

## 2019-09-16 NOTE — PROGRESS NOTES
Daily Speech Treatment Note    Today's date: 2019   Patients name: Erum Griffiths  : 1955  MRN: [id number]  Safety measures: MS, fall risk  Referring provider: Joselyn Trammell, *    Primary Diagnosis/Billing code: C97 5  Secondary Diagnosis/ Billing code: Wyvonne Sandifer, R41 3    Visit Tracking:  -Referring provider: Epic  -Billing guidelines: CMS  -Visit #2/10   -Medicare  [address] due 10/9/2019    Subjective/Behavioral:  -"I'm good"    Objective/Assessment:  -Reviewed testing results and goals in plan care with patient  Patient is in agreement at this time  Short-term goals:  1  Patient will complete concrete and abstract categorization tasks to 80% accuracy to facilitate improved generative naming skills and working memory, to be achieved in 4-6 weeks  (Distraction task)  -Category members (concrete): Patient was asked to name 5 members in a given concrete category ([facility] bk2, pg  182)  Task complete in 8/9 trials independently, increasing to 9/9 given minimal semantic cues  2  Patient will demonstrate alternating attention by being able to shift focus of attention between two tasks with minomal cues in a distracting environment with 80% accuracy, to be achieved in 4-6 weeks  3  Patient will demonstrate divided attention by responding to multiple tasks or details within tasks at the same time with minimal cues in a distracting environment with 80% accuracy, to be achieved in 4-6 weeks  4  Patient will be educated on the use of internal and external memory aids and compensatory strategies with 80% accuracy to facilitate increased recall of routine, personal information, and recent events, to be achieved in 4-6 weeks   -Patient was provided with a list of memory strategies (e g  keeping a pocket calendar, "chucking", making associations, etc )   She was educated on the importance of implementing these into her everyday life to help with memory     -Fill in the letters & memorize: Patient was asked to fill in the letters LYNDON PURDY [facility] bk2, pg  144)  Task complete in 37/40 opp independently, increasing to 40/40 given minimal semantic cues  After completion, patient was asked to pick a list to memorize to work on immediate and delayed memory  Patient was educated on "chucking" the words to help with remembering them  Patient achieved an average of 90% accuracy over 2 trials when asked to name the words immediately after studying them  Patient achieved an average of 90% accuracy over 2 trials when asked to name the same words again 10 minutes later after completing a "distraction task"  5  Patient will demonstrate functional use of external memory across 3 sessions with 80% accuracy to facilitate carryover of strategies in functional living environment, to be achieved in 4-6 weeks  Plan:  -Patient was provided with home exercises/activities to target goals in plan of care at the end of today's session   -Continue with current plan of care  Patient was treated by Magy Ornelas, graduate SLP student, and was under the direct supervision of DON Ambrocio , CCC-SLP

## 2019-09-16 NOTE — PROGRESS NOTES
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        From evaluation: Patient reports that she has been experiencing a flare of her R/R MS symptoms (dx in )  She presents for PFPT for mixed incontinence of urine as well urinary and fecal urgency  "I wear a pad all the time " She reports that she has leakage a few times a week  "It's worse in the hot weather "                Subjective: Patient reports"I have been doing good " She reports that every once in a while it is hard to hold a BM  She is doing PT/speech therapy for MS symptoms and she wishes to stop pelvic therapy to continue working on the exercises independently  Objective: See treatment diary below      Assessment: Tolerated treatment well  Patient will be discharged at this time  We reviewed all HEP and frequency recommended (4 times/week)  She is to call me with questions or concerns    Patient did well with progression of therapeutic exercises with updated HEP provided  Goals from evaluation:  STGs to be met in 4 weeks:  * Patient will be compliant with introductory HEP as prescribed  MET  * Presents with improved understanding of bladder irritants in diet and makes concerted effort to reduce them by at least 50-75%  MET    LTGs to be met by discharge:  * Patient will present with a 30 % improvement on FOTO score by discharge to indicate improved symptom management  MET  * Patient reports that she is able to successfully suppress an urge to empty her bladder for 20' without leakage  IMPROVED  * Normalize sEMG findings to indicate strength average > 12uV and resting average < 2 5uV   NA ON DISCHARGE  * Presents with improved fluid intake to avoid concentrated and irritating urine by discharge  MET  * Patient implements urge suppression strategies throughout the day and reports that her average voiding interval is 2+ hours upon discharge  MET   * Patient will be compliant with comprehensive home exercise program for self management of condition  ONGOING     Plan: Discharge PT        Precautions: MS, DDD, osteoporosis, depression      Manual  8/14            Education re: fluid intake nv                                                                    Exercise Diary  8/14 8/29 9/6 9/18         NMR via biofeedback nv 55' see above                        PFM contract/relax 5:5x10, 3x/day                         Hip adduction w/PFM   5"x10 5"x12         Hip abduction   5"x10 5"x10         SLR   x10 each 2x10         Head raise and exhale with PFM   x10 x10         Standing plie w/ PFM on raise   2x10 2x10         nustep warmup    L4x8'

## 2019-09-16 NOTE — ASSESSMENT & PLAN NOTE
· [facility]y follows, Dr Clark  · Ocrevus infusions   · MRI brain 6/2019 :Stable MR appearance of the brain consistent with long-standing demyelinating disease  No new lesions and no pathologic enhancement to suggest acute inflammation  Although disease burden is considered moderate, there is parenchymal volume loss

## 2019-09-16 NOTE — PATIENT INSTRUCTIONS
· Please proceed with fasting blood work after October 3rd  · Please schedule mammogram  · Acosta Thurman, try new allergy medication, Xyzal, 5 mg at bedtime, it may make you slightly tired, please continue Singular

## 2019-09-16 NOTE — PROGRESS NOTES
FAMILY PRACTICE OFFICE VISIT       NAME: Michelle Kaufman  AGE: 58 y o  SEX: female       : 1955        MRN: [id number]        Assessment and Plan     Problem List Items Addressed This Visit        Respiratory    COPD without exacerbation ([address] ) (Chronic)     · Patient is still unfortunately smoking  · Recent evaluation by [facility] pulmonology with negative bronchoscopy , Dr Sheets  · CT chest 2019: Interim resolution of ground-glass opacity, possible small endobronchial lesion versus mucus  · Bronchoscopy 2019:Diffuse chronically inflammed mucosa- with tenaceous thick white mucous within the airways  No endobronchial lesions  · Breo 200/5  · Patient is still unfortunately smoking         Relevant Medications    levocetirizine (XYZAL) 5 MG tablet    Allergic rhinitis    Relevant Medications    levocetirizine (XYZAL) 5 MG tablet       Nervous and Auditory    Multiple sclerosis, relapsing/remitting - Primary (Chronic)     ·  neurology follows, Dr Clark  · Ocrevus infusions   · MRI brain 2019 :Stable MR appearance of the brain consistent with long-standing demyelinating disease  No new lesions and no pathologic enhancement to suggest acute inflammation  Although disease burden is considered moderate, there is parenchymal volume loss              Relevant Orders    CBC and differential       Other    Hyperlipidemia (Chronic)     · Lipitor 40 mg once a day and Zetia 10 mg daily  · Will reassess blood work in October         Relevant Orders    Comprehensive metabolic panel    Lipid panel    TSH, 3rd generation    Major depression, recurrent, chronic (HCC) (Chronic)     · Symptoms are well controlled on Zoloft 100 mg daily         Vitamin D deficiency    Relevant Orders    Vitamin D 25 hydroxy      Other Visit Diagnoses     Skin lump of leg, left        Relevant Orders    VAS lower limb venous duplex study, unilateral/limited    Pain of left calf        Relevant Orders    VAS lower limb venous duplex study, unilateral/limited        Patient presents for follow-up of chronic medical conditions  Assessment and plan as outlined above  We again had long discussion about importance of smoking cessation and healthy lifestyle  Patient is due for mammogram and will be scheduling shortly  Will proceed with venous duplex of left lower extremity to evaluate area of localized induration and discomfort after fall few months ago  Patient is due for routine blood work in October  Patient will schedule high dose flu vaccine after her 64th birthday in a few weeks     Follow up pending labs, diagnostic results, updates, as needed, routine checkup 6 months  BMI Counseling: Body mass index is 27 29 kg/m²  The BMI is above normal  Nutrition recommendations include decreasing overall calorie intake, reducing fast food intake, consuming healthier snacks, moderation in carbohydrate intake, increasing intake of lean protein, reducing intake of saturated fat and trans fat and reducing intake of cholesterol  Exercise recommendations include exercising 3-5 times per week  I have spent 40 minutes with Patient and family today in which greater than 50% of this time was spent in counseling/coordination of care regarding Diagnostic results, Risks and benefits of tx options, Intructions for management, Patient and family education, Importance of tx compliance, Risk factor reductions and Impressions  Patient Instructions   · Please proceed with fasting blood work after October 3rd  · Please schedule mammogram  · Eduardo Sloan, try new allergy medication, Xyzal, 5 mg at bedtime, it may make you slightly tired, please continue Singular        Discussed with the patient and all questioned fully answered  She will call me if any problems arise  M*Modal software was used to dictate this note  It may contain errors with dictating incorrect words/spelling  Please contact provider directly with any questions  Chief Complaint     Chief Complaint   Patient presents with    Follow-up     pt is here for 6 mos f/u blood work       History of Present Illness     Patient presents for follow-up of chronic medical conditions  She is here today accompanied by her   Medications updated, recent specialist consult reviewed  Patient remains under ongoing care of [facility] for treatment of multiple sclerosis, [facility] pulmonology, [facility] U[facility], Physical therapy  Patient was evaluated by [facility] pulmonology, Nando Rodriguez-  S/p bronchoscopy 9/10/19-  Copious mucus, last CT 7/2019  CT chest showed interim resolution of ground glass opacities and raise concern for possible small endobronchial lesion versus mucus  Recent bronchoscopy was negative for endobronchial lesions, thick tenacious mucus described  Patient is well aware of significant risks of smoking but continues to smoke less than pack daily  On Breo 1 puff daily  200/5    Patient remains under care of [facility] for treatment of multiple sclerosis, on Ocrevus infusions  Skelaxin worked well for back spasm  No back or neck pain, symptoms are well controlled, patient uses gabapentin 300 mg q h s  And occasionally increases dose to twice a day as needed  Pelvic floor exercises for chronic UI  S/p  Cystoscopy and urodynamics - Dr Francesco Wang    Depression  Anxiety  Zoloft at 100 mg daily  Works well    Patient will be scheduling mammography shortly  Patient is complaining of increased symptoms of seasonal allergies, she remains on regimen of Allegra and Singulair, she uses Flonase but is still experiencing quite bothersome postnasal drip  Hyperlipidemia:  Patient uses Lipitor 40 mg daily and Zetia 10 mg daily  Last blood work was performed in July  LDL is at goal     Patient is still experiencing frequent falls  She fell few months ago and noticed bruising in her left calf    Currently she is concerned about area of induration in her left mid calf, mild tenderness             Review of Systems   Review of Systems   Constitutional: Positive for fatigue  HENT: Positive for congestion and postnasal drip  Eyes: Negative  Respiratory: Negative  Cardiovascular: Negative  Gastrointestinal: Negative  Endocrine: Negative  Genitourinary: Positive for frequency  Chronic symptoms of urinary incontinence   Musculoskeletal: Positive for arthralgias, back pain and gait problem (Unsteadiness)  Overall well controlled, as outlined in HPI   Allergic/Immunologic: Positive for environmental allergies  Neurological: Negative for dizziness and headaches  Hematological: Negative  Psychiatric/Behavioral: Positive for decreased concentration         Active Problem List     Patient Active Problem List   Diagnosis    Multiple sclerosis, relapsing/remitting    Muscle spasm    Hyperlipidemia    Osteoporosis    Ambulatory dysfunction    COPD without exacerbation [address] [address]    Vitamin D deficiency    Disc degeneration, lumbar    Major depression, recurrent, chronic (HCC)    Allergic rhinitis    Amnesia/memory disorder    Shoulder impingement syndrome, left    Abnormal CT of the chest    Special screening for malignant neoplasms, colon    Family history of colon cancer    Urinary incontinence    Balance disorder       Past Medical History:  Past Medical History:   Diagnosis Date    Asthma     Degeneration of intervertebral disc at L5-S1 level     Depression     Full incontinence of feces     Generalized anxiety disorder     Hepatitis     at age 4 yrs    Herpes zoster     last assessed: 6/20/13    History of colon polyps     Hypercholesterolemia     Hyperlipidemia     MS (multiple sclerosis) ([facility] [address] )     MS (multiple sclerosis) ([facility] [address] [facility] )     Osteoporosis     Seasonal allergies     Tumor of breast     benign, right breast    Urinary incontinence        Past Surgical History:  Past Surgical History: Procedure Laterality Date    BREAST SURGERY      right breast fibroid tumor resection    COLONOSCOPY      11/2013 - due in 2018 - Dr Crisostomo    FL LUMBAR PUNCTURE  10/1/2018    ROTATOR CUFF REPAIR      Bilateral    ROTATOR CUFF REPAIR Bilateral     SEPTOPLASTY         Family History:  Family History   Problem Relation Age of Onset    Arthritis Mother     Osteoporosis Mother     COPD Father     Arthritis Family     COPD Family     Emphysema Family     Heart disease Family     Hyperthyroidism Family     Multiple sclerosis Family     Osteoarthritis Family        Social History:  Social History     Socioeconomic History    Marital status: /Civil Union     Spouse name: Not on file    Number of children: Not on file    Years of education: Not on file    Highest education level: Not on file   Occupational History    Occupation: Retired   Social Needs    Financial resource strain: Not on file    Food insecurity:     Worry: Not on file     Inability: Not on file    Transportation needs:     Medical: Not on file     Non-medical: Not on file   Tobacco Use    Smoking status: Current Every Day Smoker     Packs/day: 1 00     Years: 40 00     Pack years: 40 00     Types: Cigarettes    Smokeless tobacco: Never Used   Substance and Sexual Activity    Alcohol use: Yes     Frequency: Monthly or less     Drinks per session: 1 or 2     Binge frequency: Never     Comment: occasional; [employer] also states Never drank alcohol    Drug use: No    Sexual activity: Yes     Partners: Male     Comment: denied any risk of AIDS   Lifestyle    Physical activity:     Days per week: Not on file     Minutes per session: Not on file    Stress: Not on file   Relationships    Social connections:     Talks on phone: Not on file     Gets together: Not on file     Attends Sabianism service: Not on file     Active member of club or organization: Not on file     Attends meetings of clubs or organizations: Not on file Relationship status: Not on file    Intimate partner violence:     Fear of current or ex partner: Not on file     Emotionally abused: Not on file     Physically abused: Not on file     Forced sexual activity: Not on file   Other Topics Concern    Not on file   Social History Narrative         Objective     Vitals:    09/16/19 1307   BP: 110/70   Pulse: 74   Resp: 16   Temp: (!) 96 8 °F (36 °C)   TempSrc: Tympanic   SpO2: 95%   Weight: 72 1 kg (159 lb)   Height: 5' 4" (1 626 m)     Wt Readings from Last 3 Encounters:   09/16/19 72 1 kg (159 lb)   08/21/19 71 7 kg (158 lb)   08/07/19 71 7 kg (158 lb)       Physical Exam   Constitutional: She is oriented to person, place, and time  She appears well-developed and well-nourished  HENT:   Head: Normocephalic and atraumatic  Eyes: Conjunctivae are normal    Neck: Neck supple  No JVD present  Carotid bruit is not present  No thyromegaly present  Cardiovascular: Normal rate, regular rhythm and normal heart sounds  No murmur heard  Pulmonary/Chest: Effort normal and breath sounds normal  No respiratory distress  She has no wheezes  Abdominal: Normal appearance and bowel sounds are normal  She exhibits no abdominal bruit  Musculoskeletal: Normal range of motion  She exhibits no edema  Left inner mid calf -  Localized area of induration 2-3 cm  after fall 2 months ago- was tender before- slight discomfort with palpation now, no general calf discoloration or edema   Neurological: She is alert and oriented to person, place, and time  No cranial nerve deficit  Coordination normal    Psychiatric: She has a normal mood and affect  Her behavior is normal    Nursing note and vitals reviewed        Pertinent Laboratory/Diagnostic Studies:  Lab Results   Component Value Date    GLUCOSE 107 12/15/2015    BUN 16 07/03/2019    CREATININE 0 67 07/03/2019    CALCIUM 9 0 07/03/2019     12/15/2015    K 4 0 07/03/2019    CO2 26 07/03/2019     07/03/2019     Lab Results   Component Value Date    ALT 30 07/03/2019    AST 18 07/03/2019    ALKPHOS 105 07/03/2019    BILITOT 0 30 12/15/2015       Lab Results   Component Value Date    WBC 7 4 06/17/2019    WBC 7 38 06/17/2019    HGB 13 9 06/17/2019    HGB 13 5 06/17/2019    HCT 40 6 06/17/2019    HCT 39 7 06/17/2019    MCV 88 06/17/2019    MCV 89 06/17/2019     06/17/2019     06/17/2019       No results found for: TSH    Lab Results   Component Value Date    CHOL 169 07/21/2015     Lab Results   Component Value Date    TRIG 191 (H) 07/03/2019     Lab Results   Component Value Date    HDL 42 07/03/2019     Lab Results   Component Value Date    LDLCALC 100 07/03/2019     Lab Results   Component Value Date    HGBA1C 5 8 (H) 10/20/2015       Results for orders placed or performed in visit on 07/03/19   Comprehensive metabolic panel   Result Value Ref Range    Sodium 141 136 - 145 mmol/L    Potassium 4 0 3 5 - 5 3 mmol/L    Chloride 105 100 - 108 mmol/L    CO2 26 21 - 32 mmol/L    ANION GAP 10 4 - 13 mmol/L    BUN 16 5 - 25 mg/dL    Creatinine 0 67 0 60 - 1 30 mg/dL    Glucose, Fasting 114 (H) 65 - 99 mg/dL    Calcium 9 0 8 3 - 10 1 mg/dL    AST 18 5 - 45 U/L    ALT 30 12 - 78 U/L    Alkaline Phosphatase 105 46 - 116 U/L    Total Protein 7 3 6 4 - 8 2 g/dL    Albumin 4 0 3 5 - 5 0 g/dL    Total Bilirubin 0 40 0 20 - 1 00 mg/dL    eGFR 93 ml/min/1 73sq m   Lipid panel   Result Value Ref Range    Cholesterol 180 50 - 200 mg/dL    Triglycerides 191 (H) <=150 mg/dL    HDL, Direct 42 40 - 60 mg/dL    LDL Calculated 100 0 - 100 mg/dL    Non-HDL-Chol (CHOL-HDL) 138 mg/dl       Orders Placed This Encounter   Procedures    Comprehensive metabolic panel    Lipid panel    TSH, 3rd generation    CBC and differential    Vitamin D 25 hydroxy       ALLERGIES:  Allergies   Allergen Reactions    Bactrim [Sulfamethoxazole-Trimethoprim] Other (See Comments)     Reaction Date: 11Aug2011;     Betadine [Povidone Iodine]     Cefzil [Cefprozil]     Prempro [Conj Estrog-Medroxyprogest Ace] Other (See Comments)     Other      Zyban [Bupropion]     Levaquin [Levofloxacin]     Gadobutrol GI Intolerance    Medroxyprogesterone Other (See Comments)     Reaction Date: 11Aug2011;     Nortriptyline Other (See Comments)     n/a    Pamelor [Nortriptyline Hcl]     Provera [Medroxyprogesterone Acetate]        Current Medications     Current Outpatient Medications   Medication Sig Dispense Refill    albuterol (2 5 mg/3 mL) 0 083 % nebulizer solution Take 1 vial (2 5 mg total) by nebulization every 4 (four) hours as needed for wheezing or shortness of breath (cough) 120 vial 2    albuterol (PROAIR HFA) 90 mcg/act inhaler Inhale 2 puffs every 4 (four) hours as needed for wheezing or shortness of breath 3 Inhaler 1    aspirin (ECOTRIN LOW STRENGTH) 81 mg EC tablet Take 1 tablet by mouth daily      atorvastatin (LIPITOR) 40 mg tablet Take 1 tablet (40 mg total) by mouth daily 90 tablet 3    B Complex-C CAPS Take by mouth      BL EVENING PRIMROSE OIL PO Take by mouth      budesonide (PULMICORT) 0 5 mg/2 mL nebulizer solution inhale contents of 1 vial in nebulizer twice a day  0    cholecalciferol (VITAMIN D3) 1,000 units tablet Take 1,000 Units by mouth daily      ezetimibe (ZETIA) 10 mg tablet Take 1 tablet (10 mg total) by mouth daily 90 tablet 3    Flaxseed, Linseed, (FLAXSEED OIL PO) Take by mouth      fluticasone (FLONASE) 50 mcg/act nasal spray USE 2 SPRAYS IN EACH  NOSTRIL DAILY 48 g 1    fluticasone-vilanterol (BREO ELLIPTA) 200-25 MCG/INH inhaler Inhale 1 puff daily Rinse mouth after use   3 Inhaler 3    gabapentin (NEURONTIN) 300 mg capsule Take 1 capsule (300 mg total) by mouth 2 (two) times a day 180 capsule 3    montelukast (SINGULAIR) 10 mg tablet TAKE 1 TABLET BY MOUTH  DAILY AT BEDTIME 90 tablet 1    Ocrelizumab (OCREVUS IV) Infuse into a venous catheter      sertraline (ZOLOFT) 100 mg tablet TAKE 1 TABLET BY MOUTH  DAILY 90 tablet 1    levocetirizine (XYZAL) 5 MG tablet Take 1 tablet (5 mg total) by mouth every evening 90 tablet 1     No current facility-administered medications for this visit          Medications Discontinued During This Encounter   Medication Reason    baclofen 10 mg tablet     fexofenadine (ALLEGRA) 180 MG tablet        Health Maintenance     Health Maintenance   Topic Date Due    BMI: Followup Plan  09/28/1972    HEPATITIS B VACCINES (1 of 3 - Risk 3-dose series) 09/28/1973    DTaP,Tdap,and Td Vaccines (1 - Tdap) 06/03/2016    INFLUENZA VACCINE  07/01/2019    PT PLAN OF CARE  10/09/2019    SLP PLAN OF CARE  10/09/2019    Medicare Annual Wellness Visit (AWV)  03/14/2020    PAP SMEAR  07/26/2020    BMI: Adult  09/16/2020    Pneumococcal Vaccine: 65+ Years (2 of 2 - PPSV23) 09/29/2020    MAMMOGRAM  10/24/2020    CRC Screening: Colonoscopy  06/12/2022    Hepatitis C Screening  Completed    Pneumococcal Vaccine: Pediatrics (0 to 5 Years) and At-Risk Patients (6 to 59 Years)  Completed       Immunization History   Administered Date(s) Administered    INFLUENZA 09/26/2011    Influenza Quadrivalent Preservative Free 3 years and older IM 09/29/2015, 09/22/2016, 09/08/2017    Influenza TIV (IM) 01/17/2013, 09/03/2013, 09/23/2014    Influenza, recombinant, quadrivalent,injectable, preservative free 09/13/2018    Pneumococcal Conjugate 13-Valent 10/22/2018    Pneumococcal Polysaccharide PPV23 10/01/2007, 09/29/2015    Td (adult), adsorbed 06/02/2016       Jeannette Burris MD

## 2019-09-17 ENCOUNTER — OFFICE VISIT (OUTPATIENT)
Dept: SPEECH THERAPY | Facility: CLINIC | Age: 64
End: 2019-09-17
Payer: MEDICARE

## 2019-09-17 ENCOUNTER — OFFICE VISIT (OUTPATIENT)
Dept: PHYSICAL THERAPY | Facility: CLINIC | Age: 64
End: 2019-09-17
Payer: MEDICARE

## 2019-09-17 DIAGNOSIS — N36.44 DETRUSOR AND SPHINCTER DYSSYNERGIA: ICD-10-CM

## 2019-09-17 DIAGNOSIS — R26.89 BALANCE DISORDER: ICD-10-CM

## 2019-09-17 DIAGNOSIS — G35 MULTIPLE SCLEROSIS (HCC): Primary | ICD-10-CM

## 2019-09-17 DIAGNOSIS — G35 MULTIPLE SCLEROSIS (HCC): ICD-10-CM

## 2019-09-17 DIAGNOSIS — N81.89 PELVIC FLOOR WEAKNESS IN FEMALE: ICD-10-CM

## 2019-09-17 DIAGNOSIS — N39.41 URGE INCONTINENCE OF URINE: ICD-10-CM

## 2019-09-17 DIAGNOSIS — R48.8 OTHER SYMBOLIC DYSFUNCTIONS: Primary | ICD-10-CM

## 2019-09-17 DIAGNOSIS — R41.3 AMNESIA/MEMORY DISORDER: ICD-10-CM

## 2019-09-17 DIAGNOSIS — M62.89 PELVIC FLOOR DYSFUNCTION: ICD-10-CM

## 2019-09-17 PROCEDURE — 92507 TX SP LANG VOICE COMM INDIV: CPT | Performed by: SPEECH-LANGUAGE PATHOLOGIST

## 2019-09-17 PROCEDURE — 97110 THERAPEUTIC EXERCISES: CPT

## 2019-09-17 PROCEDURE — 97112 NEUROMUSCULAR REEDUCATION: CPT

## 2019-09-17 NOTE — PROGRESS NOTES
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            Subjective: Reports being tired after LV  Notes sleeping for 3 hours after session  Objective: See treatment diary below      Assessment: Patient tolerated session well  Able to perform TE without UE support  Demo good balance overall  Patient would continue to benefit from strengthening and proprioceptive training  Updated HEP  Plan: Continue per plan of care        Precautions: Fal risk    Short Term Goal Expiration Date:(2019)  Long Term Goal Expiration Date: (10/9/2019)  POC Expiration Date: (2019)        Manual                                                     Exercise Diary        STS 3x10 3x10      Step ups 15 ea fwd/lat 8" 15 ea fwd/lat 8      HK marches 3 laps no ue 3 laps no ue      lunges 3 laps with UE 3 laps no UE      Heel raise 30x 30x      Toe raise 30x 30x      Backward walking 40'x2       Ht/hn hallway 40'x2       TM  B/l UE support, 2 0-2 2mph, 10 min      Semi tandem stance  Foam, EO, 30''x2 EC NV     Tandem walking  3 laps                                                                                  Modalities

## 2019-09-18 ENCOUNTER — OFFICE VISIT (OUTPATIENT)
Dept: PHYSICAL THERAPY | Facility: REHABILITATION | Age: 64
End: 2019-09-18
Payer: MEDICARE

## 2019-09-18 DIAGNOSIS — N81.89 PELVIC FLOOR WEAKNESS IN FEMALE: Primary | ICD-10-CM

## 2019-09-18 DIAGNOSIS — N36.44 DETRUSOR AND SPHINCTER DYSSYNERGIA: ICD-10-CM

## 2019-09-18 DIAGNOSIS — N39.41 URGE INCONTINENCE OF URINE: ICD-10-CM

## 2019-09-18 DIAGNOSIS — M62.89 PELVIC FLOOR DYSFUNCTION: ICD-10-CM

## 2019-09-18 PROCEDURE — 97110 THERAPEUTIC EXERCISES: CPT | Performed by: PHYSICAL THERAPIST

## 2019-09-20 PROBLEM — J32.0 MAXILLARY SINUSITIS: Status: RESOLVED | Noted: 2019-03-22 | Resolved: 2019-09-20

## 2019-09-20 NOTE — ASSESSMENT & PLAN NOTE
· Patient is still unfortunately smoking  · Recent evaluation by [facility] pulmonology with negative bronchoscopy , Dr Sheets  · CT chest July 2019: Interim resolution of ground-glass opacity, possible small endobronchial lesion versus mucus  · Bronchoscopy September 2019:Diffuse chronically inflammed mucosa- with tenaceous thick white mucous within the airways   No endobronchial lesions  · Breo 200/5  · Patient is still unfortunately smoking

## 2019-09-23 ENCOUNTER — OFFICE VISIT (OUTPATIENT)
Dept: SPEECH THERAPY | Facility: CLINIC | Age: 64
End: 2019-09-23
Payer: MEDICARE

## 2019-09-23 ENCOUNTER — APPOINTMENT (OUTPATIENT)
Dept: PHYSICAL THERAPY | Facility: CLINIC | Age: 64
End: 2019-09-23
Payer: MEDICARE

## 2019-09-23 DIAGNOSIS — R48.8 OTHER SYMBOLIC DYSFUNCTIONS: Primary | ICD-10-CM

## 2019-09-23 DIAGNOSIS — R41.3 AMNESIA/MEMORY DISORDER: ICD-10-CM

## 2019-09-23 DIAGNOSIS — G35 MULTIPLE SCLEROSIS (HCC): ICD-10-CM

## 2019-09-23 PROCEDURE — 92507 TX SP LANG VOICE COMM INDIV: CPT | Performed by: SPEECH-LANGUAGE PATHOLOGIST

## 2019-09-23 NOTE — PROGRESS NOTES
Daily Speech Treatment Note    Today's date: 2019   Patients name: Dannielle Samayoa  : 1955  MRN: [id number]  Safety measures: MS, fall risk  Referring provider: Julian Albrecht, *    Primary Diagnosis/Billing code: Y55 1  Secondary Diagnosis/ Billing code: Morales Aj, R41 3    Visit Tracking:  -Referring provider: Epic  -Billing guidelines: CMS  -Visit #4/10    -Medicare  [address] due 10/9/2019    Subjective/Behavioral:  -"Im tired  PT worked me hard "    Objective/Assessment:  -Patient forgot to return with her HEP  Will bring it to next session  Short-term goals:  1  Patient will complete concrete and abstract categorization tasks to 80% accuracy to facilitate improved generative naming skills and working memory, to be achieved in 4-6 weeks   -Category Members: Abstract ([facility], pg  165): Patient was given a category to name 5 members that belonged (e g  Things that are cold)  Task complete in 4/10 trials independently, increasing to 10/10 given minimal-moderate semantic and phonemic  2  Patient will demonstrate alternating attention by being able to shift focus of attention between two tasks with minomal cues in a distracting environment with 80% accuracy, to be achieved in 4-6 weeks   -Patient participated in a memory game matching upside-down cards by number and color  For every match that she found, she was asked to orally spell a word backwards  Patient found 6/10 matches in the game and spelled 4/6 words accurately  3  Patient will demonstrate divided attention by responding to multiple tasks or details within tasks at the same time with minimal cues in a distracting environment with 80% accuracy, to be achieved in 4-6 weeks  -Mental Manipulation: alphabetical ([facility] bk2, pg  114): Patient was asked to pay attention to the first letter of a list of 3-4 words and repeat them back in alphabetical order   Task complete in  opp independently, increasing to  given minimal-moderate verbal repetition and semantic cues  4  Patient will be educated on the use of internal and external memory aids and compensatory strategies with 80% accuracy to facilitate increased recall of routine, personal information, and recent events, to be achieved in 4-6 weeks   -Patient reports that she is utilizing memory aids at home in the form of name associations, note taking, and a calendar  5  Patient will demonstrate functional use of external memory across 3 sessions with 80% accuracy to facilitate carryover of strategies in functional living environment, to be achieved in 4-6 weeks  Plan:  -Patient was provided with home exercises/activities to target goals in plan of care at the end of today's session   -Continue with current plan of care  Patient was treated by Amira Tran, graduate SLP student, and was under the direct supervision of DON Mccoy , CCC-SLP

## 2019-09-23 NOTE — PROGRESS NOTES
Daily Speech Treatment Note    Today's date: 2019   Patients name: Sathish Tse  : 1955  MRN: [id number]  Safety measures: MS, fall risk  Referring provider: Christopher Dumont, *    Primary Diagnosis/Billing code: U86 7  Secondary Diagnosis/ Billing code: Lennie Pina, R41 3    Visit Tracking:  -Referring provider: Epic  -Billing guidelines: CMS  -Visit #3/10   -Medicare  [address] due 10/9/2019    Subjective/Behavioral:  -"I'm having spasms in my legs so I had to cancel PT "    Objective/Assessment:  -Patient forgot to return with her HEP  Will bring it to next session  Short-term goals:  1  Patient will complete concrete and abstract categorization tasks to 80% accuracy to facilitate improved generative naming skills and working memory, to be achieved in 4-6 weeks  2  Patient will demonstrate alternating attention by being able to shift focus of attention between two tasks with minomal cues in a distracting environment with 80% accuracy, to be achieved in 4-6 weeks  3  Patient will demonstrate divided attention by responding to multiple tasks or details within tasks at the same time with minimal cues in a distracting environment with 80% accuracy, to be achieved in 4-6 weeks  (Distracton Task)  -Math Coding (Day 1): Patient was asked to complete a math coding sheet based on the symbols and key provided  Task complete with 9 errors  4  Patient will be educated on the use of internal and external memory aids and compensatory strategies with 80% accuracy to facilitate increased recall of routine, personal information, and recent events, to be achieved in 4-6 weeks   -Patient was read a list of word associations (Part-Whole) and told "When I say ____, you'll say____"  Patient was asked to remember them  Patient remembered 20/20 across 2 trials immediately after hearing them  A distraction task was given before asking the patient to recall the associated word from earlier   Patient remembered 20/20 across 2 trials for delayed recall  5  Patient will demonstrate functional use of external memory across 3 sessions with 80% accuracy to facilitate carryover of strategies in functional living environment, to be achieved in 4-6 weeks  Plan:  -Patient was provided with home exercises/activities to target goals in plan of care at the end of today's session   -Continue with current plan of care  Patient was treated by Willie Hsu, graduate SLP student, and was under the direct supervision of DON Lindo , CCC-SLP

## 2019-09-25 ENCOUNTER — OFFICE VISIT (OUTPATIENT)
Dept: PHYSICAL THERAPY | Facility: CLINIC | Age: 64
End: 2019-09-25
Payer: MEDICARE

## 2019-09-25 ENCOUNTER — OFFICE VISIT (OUTPATIENT)
Dept: SPEECH THERAPY | Facility: CLINIC | Age: 64
End: 2019-09-25
Payer: MEDICARE

## 2019-09-25 DIAGNOSIS — G35 MULTIPLE SCLEROSIS (HCC): ICD-10-CM

## 2019-09-25 DIAGNOSIS — R26.89 BALANCE DISORDER: Primary | ICD-10-CM

## 2019-09-25 DIAGNOSIS — R41.3 AMNESIA/MEMORY DISORDER: ICD-10-CM

## 2019-09-25 DIAGNOSIS — R48.8 OTHER SYMBOLIC DYSFUNCTIONS: Primary | ICD-10-CM

## 2019-09-25 PROCEDURE — 97112 NEUROMUSCULAR REEDUCATION: CPT | Performed by: PHYSICAL THERAPIST

## 2019-09-25 PROCEDURE — 97150 GROUP THERAPEUTIC PROCEDURES: CPT | Performed by: PHYSICAL THERAPIST

## 2019-09-25 PROCEDURE — 92507 TX SP LANG VOICE COMM INDIV: CPT | Performed by: SPEECH-LANGUAGE PATHOLOGIST

## 2019-09-26 NOTE — PROGRESS NOTES
Daily Note     Today's date: 2019  Patient name: Benito Lucio  : 1955  MRN: [id number]  Referring provider: Betzy Saavedra, *  Dx:   Encounter Diagnosis     ICD-10-CM    1  Balance disorder R26 89    2  Multiple sclerosis, relapsing/remitting G35            Subjective: Reports being tired after LV  Notes sleeping for 3 hours after session  Objective: See treatment diary below      Assessment: Patient tolerated session well  Able to perform TE without UE support  Demo good balance overall  Patient would continue to benefit from strengthening and proprioceptive training  Updated HEP  Plan: Continue per plan of care        Precautions: Fal risk    Short Term Goal Expiration Date:(2019)  Long Term Goal Expiration Date: (10/9/2019)  POC Expiration Date: (2019)        Manual                                                     Exercise Diary        STS 3x10 3x10      Step ups 15 ea fwd/lat 8" 15 ea fwd/lat 8      HK marches 3 laps no ue 3 laps no ue      lunges 3 laps with UE 3 laps no UE      Heel raise 30x 30x      Toe raise 30x 30x      Backward walking 40'x2       Ht/hn hallway 40'x2       TM  B/l UE support, 2 0-2 2mph, 10 min      Semi tandem stance  Foam, EO, 30''x2 EC NV     Tandem walking  3 laps                                                                                  Modalities

## 2019-09-30 ENCOUNTER — IMMUNIZATIONS (OUTPATIENT)
Dept: FAMILY MEDICINE CLINIC | Facility: CLINIC | Age: 64
End: 2019-09-30
Payer: MEDICARE

## 2019-09-30 DIAGNOSIS — Z23 FLU VACCINE NEED: Primary | ICD-10-CM

## 2019-09-30 PROCEDURE — G0008 ADMIN INFLUENZA VIRUS VAC: HCPCS

## 2019-09-30 PROCEDURE — 90662 IIV NO PRSV INCREASED AG IM: CPT

## 2019-09-30 NOTE — PROGRESS NOTES
Daily Speech Treatment Note    Today's date: 10/1/2019    Patients name: Amanda Mcguire  : 1955  MRN: [id number]  Safety measures: MS, fall risk  Referring provider: José Miguel Herrera, *    Primary Diagnosis/Billing code: T82 8  Secondary Diagnosis/ Billing code: Fabricio Clay, R41 3    Visit Tracking:  -Referring provider: Epic  -Billing guidelines: CMS  -Visit #5/10     -Medicare  [address] due 10/9/2019    Subjective/Behavioral:  -"I remembered my folder!"    Objective/Assessment:  -Reviewed patient's home exercises/activities completed since last appointment  Math Coding (Day 2 & 3): Both complete with 100% accuracy independently  Short-term goals:  1  Patient will complete concrete and abstract categorization tasks to 80% accuracy to facilitate improved generative naming skills and working memory, to be achieved in 4-6 weeks  2  Patient will demonstrate alternating attention by being able to shift focus of attention between two tasks with minomal cues in a distracting environment with 80% accuracy, to be achieved in 4-6 weeks  3  Patient will demonstrate divided attention by responding to multiple tasks or details within tasks at the same time with minimal cues in a distracting environment with 80% accuracy, to be achieved in 4-6 weeks  (Distraction task)  -Deduction Puzzle (1): Patient was asked to fill in the grid with the correct information based on the clues provided at the bottom of the paper  Task complete 8/12 opp independently, increasing to 12/12 given minimal-moderate verbal cues  4  Patient will be educated on the use of internal and external memory aids and compensatory strategies with 80% accuracy to facilitate increased recall of routine, personal information, and recent events, to be achieved in 4-6 weeks      5  Patient will demonstrate functional use of external memory across 3 sessions with 80% accuracy to facilitate carryover of strategies in functional living environment, to be achieved in 4-6 weeks   -Patient was read a list of word associations (Mixed) and told "When I say ____, you'll say____"  Patient was asked to remember them  Patient remembered 10/20 across 2 trials immediately after hearing them  A distraction task was given before asking the patient to recall the associated word from earlier  Patient remembered 8/10 across 1 trial for delayed recall  Plan:  -Patient was provided with home exercises/activities to target goals in plan of care at the end of today's session   -Continue with current plan of care  Patient was treated by Be Bernabe, graduate SLP student, and was under the direct supervision of DON Pickett , CCC-SLP

## 2019-10-01 ENCOUNTER — OFFICE VISIT (OUTPATIENT)
Dept: SPEECH THERAPY | Facility: CLINIC | Age: 64
End: 2019-10-01
Payer: MEDICARE

## 2019-10-01 ENCOUNTER — OFFICE VISIT (OUTPATIENT)
Dept: PHYSICAL THERAPY | Facility: CLINIC | Age: 64
End: 2019-10-01
Payer: MEDICARE

## 2019-10-01 DIAGNOSIS — R48.8 OTHER SYMBOLIC DYSFUNCTIONS: Primary | ICD-10-CM

## 2019-10-01 DIAGNOSIS — R26.89 BALANCE DISORDER: Primary | ICD-10-CM

## 2019-10-01 DIAGNOSIS — N81.89 PELVIC FLOOR WEAKNESS IN FEMALE: ICD-10-CM

## 2019-10-01 DIAGNOSIS — G35 MULTIPLE SCLEROSIS (HCC): ICD-10-CM

## 2019-10-01 DIAGNOSIS — N39.41 URGE INCONTINENCE OF URINE: ICD-10-CM

## 2019-10-01 DIAGNOSIS — M62.89 PELVIC FLOOR DYSFUNCTION: ICD-10-CM

## 2019-10-01 DIAGNOSIS — R41.3 AMNESIA/MEMORY DISORDER: ICD-10-CM

## 2019-10-01 DIAGNOSIS — N36.44 DETRUSOR AND SPHINCTER DYSSYNERGIA: ICD-10-CM

## 2019-10-01 PROCEDURE — 92507 TX SP LANG VOICE COMM INDIV: CPT | Performed by: SPEECH-LANGUAGE PATHOLOGIST

## 2019-10-01 PROCEDURE — 97110 THERAPEUTIC EXERCISES: CPT

## 2019-10-01 PROCEDURE — 97112 NEUROMUSCULAR REEDUCATION: CPT

## 2019-10-01 NOTE — PROGRESS NOTES
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            Subjective: Reports being tired after LV  Notes sleeping for 3 hours after session  Objective: See treatment diary below    Functional outcomes IE  6 minute walk test: 1350ft   5x sit to stand: 12 85 sec (seconds)  TUG: 10 35 (seconds)  Suarez Balance - 51/50    Functional outcomes: 10/1  6 mwt - 1450 ft   Suarez balance - 54/56    Assessment: Since initial evaluation patient has made progress with balance per suarez balance test as well as enduranc eper 6 mintue walk test  She has established HEP and will begin gym program  Plan for 2x/week for 2 more weeks  Goals  Short Term Goals:  Pt will report no near falls within 2 weeks - MET  Pt will score 54/56 on suarez balance test within 2 weeks - MET  Pt will complete 6 minute walk test in 1450 ft wtihin 2 weeks - MET    Long term goals:  Pt will independently complete home program wihtin 4 weeks  Pt will report no falls within4 weeks    Plan: Continue per plan of care        Precautions: Fal risk    Short Term Goal Expiration Date:(2019)  Long Term Goal Expiration Date: (10/9/2019)  POC Expiration Date: (2019)

## 2019-10-01 NOTE — PROGRESS NOTES
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        Subjective: Doing well overall  Objective: See treatment diary below      Assessment: See progress update for additional information  Spoke with patient and advised patient about gym routine  Plan: Progress treatment as tolerated         Precautions: Fall risk    Short Term Goal Expiration Date:(2019)  Long Term Goal Expiration Date: (10/9/2019)  POC Expiration Date: (2019)        Manual                                                     Exercise Diary  9/11 9/17 10/1     STS 3x10 3x10 2x10     Step ups 15 ea fwd/lat 8" 15 ea fwd/lat 8 15 ea fwd/lat 8     HK marches 3 laps no ue 3 laps no ue      lunges 3 laps with UE 3 laps no UE 3 laps no UE     Heel raise 30x 30x      Toe raise 30x 30x      Backward walking 40'x2       Ht/hn hallway 40'x2       TM  B/l UE support, 2 0-2 2mph, 10 min B/L UE, 2 0mph, 5 min     Semi tandem stance  Foam, EO, 30''x2 Foam, EC, 30''x2     Tandem walking  3 laps 3 laps     squats   2x10     rockerboard taps   15x ea     sls   Floor, EO, 30'' ea                                                         Modalities

## 2019-10-02 ENCOUNTER — HOSPITAL ENCOUNTER (OUTPATIENT)
Dept: NON INVASIVE DIAGNOSTICS | Facility: CLINIC | Age: 64
Discharge: HOME | End: 2019-10-02
Payer: MEDICARE

## 2019-10-02 DIAGNOSIS — M79.662 PAIN OF LEFT CALF: ICD-10-CM

## 2019-10-02 DIAGNOSIS — R22.42 SKIN LUMP OF LEG, LEFT: ICD-10-CM

## 2019-10-02 PROCEDURE — 93971 EXTREMITY STUDY: CPT | Performed by: SURGERY

## 2019-10-02 PROCEDURE — 93971 EXTREMITY STUDY: CPT

## 2019-10-02 NOTE — PROGRESS NOTES
Daily Speech Treatment Note    Today's date: 10/4/2019    Patients name: Leo Patel  : 1955  MRN: [id number]  Safety measures: MS, fall risk  Referring provider: Rajni Giraldo, *    Primary Diagnosis/Billing code: H24 2  Secondary Diagnosis/ Billing code: Siri Cyn, R41 3    Visit Tracking:  -Referring provider: Epic  -Billing guidelines: CMS  -Visit #6/10     -Medicare  [address] due 10/9/2019    Subjective/Behavioral:  -"Happy Friday"    Objective/Assessment:  -Reviewed patient's home exercises/activities completed since last appointment  Deduction puzzle #2: Task complete in 10/12 opp independently, increasing to  given minimal verbal cues  Short-term goals:  1  Patient will complete concrete and abstract categorization tasks to 80% accuracy to facilitate improved generative naming skills and working memory, to be achieved in 4-6 weeks  -Gravitontegories: Patient participated in the game iFLYER where she was given 4 minutes to complete a list of members following a list of given categories (e g  Things that make you scream) when provided with the starting letter each answer must start with  Patient was asked to name 1 members from all 12 listed categories in 4 minutes  Task completed with an average of 5 words across 3 trials  2  Patient will demonstrate alternating attention by being able to shift focus of attention between two tasks with minomal cues in a distracting environment with 80% accuracy, to be achieved in 4-6 weeks  3  Patient will demonstrate divided attention by responding to multiple tasks or details within tasks at the same time with minimal cues in a distracting environment with 80% accuracy, to be achieved in 4-6 weeks  -Deduction Puzzle (#3): Patient was asked to fill in the grid with the correct information based on the clues provided at the bottom of the paper  Task complete  opp independently, increasing to  given minimal verbal cues      4  Patient will be educated on the use of internal and external memory aids and compensatory strategies with 80% accuracy to facilitate increased recall of routine, personal information, and recent events, to be achieved in 4-6 weeks  5  Patient will demonstrate functional use of external memory across 3 sessions with 80% accuracy to facilitate carryover of strategies in functional living environment, to be achieved in 4-6 weeks  Plan:  -Patient was provided with home exercises/activities to target goals in plan of care at the end of today's session   -Continue with current plan of care  Patient was treated by Jan Hull, graduate SLP student, and was under the direct supervision of DON Mccrary , CCC-SLP

## 2019-10-03 ENCOUNTER — TELEPHONE (OUTPATIENT)
Dept: FAMILY MEDICINE CLINIC | Facility: CLINIC | Age: 64
End: 2019-10-03

## 2019-10-03 NOTE — TELEPHONE ENCOUNTER
----- Message from Giovany Sheehan MD sent at 10/3/2019  5:26 AM EDT -----  Please contact patient    Left leg venous ultrasound was normal   No evidence of blood clots  Thank you

## 2019-10-03 NOTE — TELEPHONE ENCOUNTER
----- Message from Frida Gross MD sent at 10/3/2019  5:26 AM EDT -----  Please contact patient    Left leg venous ultrasound was normal   No evidence of blood clots  Thank you

## 2019-10-04 ENCOUNTER — OFFICE VISIT (OUTPATIENT)
Dept: PHYSICAL THERAPY | Facility: CLINIC | Age: 64
End: 2019-10-04
Payer: MEDICARE

## 2019-10-04 ENCOUNTER — OFFICE VISIT (OUTPATIENT)
Dept: SPEECH THERAPY | Facility: CLINIC | Age: 64
End: 2019-10-04
Payer: MEDICARE

## 2019-10-04 DIAGNOSIS — R48.8 OTHER SYMBOLIC DYSFUNCTIONS: Primary | ICD-10-CM

## 2019-10-04 DIAGNOSIS — G35 MULTIPLE SCLEROSIS (HCC): ICD-10-CM

## 2019-10-04 DIAGNOSIS — R26.89 BALANCE DISORDER: Primary | ICD-10-CM

## 2019-10-04 DIAGNOSIS — R41.3 AMNESIA/MEMORY DISORDER: ICD-10-CM

## 2019-10-04 PROCEDURE — 92507 TX SP LANG VOICE COMM INDIV: CPT | Performed by: SPEECH-LANGUAGE PATHOLOGIST

## 2019-10-04 PROCEDURE — 97112 NEUROMUSCULAR REEDUCATION: CPT | Performed by: PHYSICAL THERAPIST

## 2019-10-04 PROCEDURE — 97110 THERAPEUTIC EXERCISES: CPT | Performed by: PHYSICAL THERAPIST

## 2019-10-04 NOTE — PROGRESS NOTES
Daily Note     Today's date: 10/4/2019  Patient name: Chepe Lerner  : 1955  MRN: [id number]  Referring provider: Tremayne Regalado, *  Dx:   Encounter Diagnosis     ICD-10-CM    1  Balance disorder R26 89    2  Multiple sclerosis, relapsing/remitting G35        Subjective: Plans to go to the gym tomorrow  Went and looked at it yesterday  Objective: See treatment diary below      Assessment: Pt was significantly fatigued at end of session but did well with all balance challenges throughout the session  Discussed functional strengthenign over wieght machines at gym might be advantageous for continued motor learning and strength gains  Pt voiced understanding  Plan: Progress treatment as tolerated         Precautions: Fall risk    Short Term Goal Expiration Date:(2019)  Long Term Goal Expiration Date: (10/9/2019)  POC Expiration Date: (2019)        Manual                                                     Exercise Diary  9/11 9/17 10/1 10/4    STS 3x10 3x10 2x10 2x10    Step ups 15 ea fwd/lat 8" 15 ea fwd/lat 8 15 ea fwd/lat 8 15 ea fwd/lat 8"    HK marches 3 laps no ue 3 laps no ue      lunges 3 laps with UE 3 laps no UE 3 laps no UE 3 laps no ue    Heel raise 30x 30x      Toe raise 30x 30x      Backward walking 40'x2       Ht/hn hallway 40'x2       TM  B/l UE support, 2 0-2 2mph, 10 min B/L UE, 2 0mph, 5 min 10 min 2 0 mph     Semi tandem stance  Foam, EO, 30''x2 Foam, EC, 30''x2     Tandem walking  3 laps 3 laps 3 laps no ue    squats   2x10     rockerboard taps   15x ea 20x     sls   Floor, EO, 30'' ea 30" x 2 EO flr    Side stepping    otb 3 laps no ue    Step taps    30x no UE 8" with foam                                        Modalities

## 2019-10-07 ENCOUNTER — APPOINTMENT (OUTPATIENT)
Dept: SPEECH THERAPY | Facility: CLINIC | Age: 64
End: 2019-10-07
Payer: MEDICARE

## 2019-10-07 ENCOUNTER — APPOINTMENT (OUTPATIENT)
Dept: PHYSICAL THERAPY | Facility: CLINIC | Age: 64
End: 2019-10-07
Payer: MEDICARE

## 2019-10-07 NOTE — PROGRESS NOTES
Daily Speech Treatment Note    Today's date: 10/7/2019    Patients name: Jason Choi  : 1955  MRN: [id number]  Safety measures: MS, fall risk  Referring provider: Elroy Gupta, *    Primary Diagnosis/Billing code: W10 8  Secondary Diagnosis/ Billing code: Kymberly Prasad, R41 3    Visit Tracking:  -Referring provider: Epic  -Billing guidelines: CMS  -Visit #6/10  ***  -Medicare  [address] due 10/9/2019    Subjective/Behavioral:  -***    Objective/Assessment:  -Reviewed patient's home exercises/activities completed since last appointment  Short-term goals:  1  Patient will complete concrete and abstract categorization tasks to 80% accuracy to facilitate improved generative naming skills and working memory, to be achieved in 4-6 weeks  2  Patient will demonstrate alternating attention by being able to shift focus of attention between two tasks with minomal cues in a distracting environment with 80% accuracy, to be achieved in 4-6 weeks  3  Patient will demonstrate divided attention by responding to multiple tasks or details within tasks at the same time with minimal cues in a distracting environment with 80% accuracy, to be achieved in 4-6 weeks  4  Patient will be educated on the use of internal and external memory aids and compensatory strategies with 80% accuracy to facilitate increased recall of routine, personal information, and recent events, to be achieved in 4-6 weeks  5  Patient will demonstrate functional use of external memory across 3 sessions with 80% accuracy to facilitate carryover of strategies in functional living environment, to be achieved in 4-6 weeks  Plan:  -Patient was provided with home exercises/activities to target goals in plan of care at the end of today's session   -Continue with current plan of care  Patient was treated by Manolo Salvador, graduate SLP student, and was under the direct supervision of DON Teresa , CCC-SLP

## 2019-10-07 NOTE — PROGRESS NOTES
Speech-Language Pathology Re-Evaluation    Today's date: 10/9/2019  Patients name: Jamil Goetz  : 1955  MRN: [id number]  Safety measures: MS, fall risk  Referring provider: Ranjit Kuhn, *    Subjective comments: "I have really bad allergies right now"    Patient reports an 8/10 headache at the start of the session  Patient's goal(s): "to remember and know what I am talking about "     Assessments    The Repeatable Battery for the Assessment of Neuropsychological Status (RBANS) is a brief, individually-administered assessment which measures attention, language, visuospatial/constructional abilities, and immediate & delayed memory  The RBANS is intended for use with adolescents to adults, ages 15 to 80 years  The following results were obtained during the administration of the assessment  Form: B    Cognitive Domain/Subtest: Index Score: Percentile Rank: Classification: IE: Status:   IMMEDIATE MEMORY 90 25%ile Average 85 IMPROVEMENT        1  List Learning ()          2  Story Memory ()           VISUOSPATIAL/  CONSTRUCTIONAL 72 3%ile Borderline 89 DECLINE        3  Figure Copy (10/20)          4  Line Orientation ()           LANGUAGE 85 16%ile Low Average 85 NO CHANGE        5  Picture Naming (10/10)          6  Semantic Fluency ()           ATTENTION 68 2%ile Extremely Low 60 IMPROVEMENT        7  Digit Span ()          8  Coding ()           DELAYED MEMORY 95 37%ile Average 95 NO CHANGE        9  List Recall (5/10)          10  List Recognition ()          11  Story Recall ()          12  Figure Recall ()           Sum of Index Scores:  410  414 NO SIGNIFICANT CHANGE   Total Score:  78      Percentile: 7%ile      Classification: Borderline          IE indicates the scores from the initial evaluation (2019)  Form: A      *Patient named 8 concrete category members (Zoo Animals) in 60 sec (norm=15+)   -- BELOW AVERAGE    *Patient named 5 abstract category members (words beginning with letter 'm') in 60 sec (norm=10+)  -- BELOW AVERAGE      Goals  Short-term goals:  1  Patient will complete concrete and abstract categorization tasks to 80% accuracy to facilitate improved generative naming skills and working memory, to be achieved in 4-6 weeks  --PARTIALLY MET    2  Patient will demonstrate alternating attention by being able to shift focus of attention between two tasks with minomal cues in a distracting environment with 80% accuracy, to be achieved in 4-6 weeks  --PARTIALLY MET    3  Patient will demonstrate divided attention by responding to multiple tasks or details within tasks at the same time with minimal cues in a distracting environment with 80% accuracy, to be achieved in 4-6 weeks  --PARTIALLY MET    4  Patient will be educated on the use of internal and external memory aids and compensatory strategies with 80% accuracy to facilitate increased recall of routine, personal information, and recent events, to be achieved in 4-6 weeks  --MET    5  Patient will demonstrate functional use of external memory across 3 sessions with 80% accuracy to facilitate carryover of strategies in functional living environment, to be achieved in 4-6 weeks  --PARTIALLY MET    Long-term goals:   1  Patient will demonstrate cognitive-communication skills consistent with age and education given use of compensatory strategies when needed to resume baseline activities and responsibilities in home and community settings by discharge  --PARTIALLY MET     2  Patient will complete cognitive-linguistic therapy that addresses patients specific deficits in processing speed, short-term working memory, attention to detail, monitoring, sequencing, and organization skills, with instruction, to alleviate effects of executive functioning disorder deficits by discharge  --PARTIALLY MET    Impressions/Recommendations    Impressions: Patient continues to present with moderate cognitive-linguistic deficits  Patient has made improvements in the areas of immediate memory and attention but continues to struggle in the areas of language processing, verbal fluency, thought organization, and delayed recall  Patient would continue to benefit from ST to increase her overall skills in cognition, memory, and attention  Recommendations:  -Patient would benefit from outpatient skilled Speech Therapy services : Cognitive-Linguistic therapy    -Frequency: 2x weekly  -Duration: 4-6 weeks    -Intervention certification from: 23/8/2027  -Intervention certification to: 60/90/4695    Intervention comments:  Standardized test with patient: 1 hour  Scoring of standardized test/Write-up: 1 hour    Visit Tracking:   -Referring provider: Epic  -Billing guidelines: CMS  -Visit #1/10  (Total: 7) (**KX Modifier**)  -Medicare  [address] [facility] due 11/09/2019    Patient was evaluated by Albina Dennis, graduate SLP student, and was under the direct supervision of DON Tate , CCC-SLP

## 2019-10-09 ENCOUNTER — EVALUATION (OUTPATIENT)
Dept: SPEECH THERAPY | Facility: CLINIC | Age: 64
End: 2019-10-09
Payer: MEDICARE

## 2019-10-09 ENCOUNTER — OFFICE VISIT (OUTPATIENT)
Dept: PHYSICAL THERAPY | Facility: CLINIC | Age: 64
End: 2019-10-09
Payer: MEDICARE

## 2019-10-09 DIAGNOSIS — R41.3 AMNESIA/MEMORY DISORDER: ICD-10-CM

## 2019-10-09 DIAGNOSIS — G35 MULTIPLE SCLEROSIS (HCC): ICD-10-CM

## 2019-10-09 DIAGNOSIS — R26.89 BALANCE DISORDER: Primary | ICD-10-CM

## 2019-10-09 DIAGNOSIS — R48.8 OTHER SYMBOLIC DYSFUNCTIONS: Primary | ICD-10-CM

## 2019-10-09 PROCEDURE — 97110 THERAPEUTIC EXERCISES: CPT | Performed by: PHYSICAL THERAPIST

## 2019-10-09 PROCEDURE — 97112 NEUROMUSCULAR REEDUCATION: CPT | Performed by: PHYSICAL THERAPIST

## 2019-10-09 PROCEDURE — 96125 COGNITIVE TEST BY HC PRO: CPT | Performed by: SPEECH-LANGUAGE PATHOLOGIST

## 2019-10-09 NOTE — PROGRESS NOTES
Daily Note     Today's date: 10/9/2019  Patient name: Jamil Goetz  : 1955  MRN: [id number]  Referring provider: Ranjit Kuhn, *  Dx:   Encounter Diagnosis     ICD-10-CM    1  Balance disorder R26 89    2  Multiple sclerosis, relapsing/remitting G35        Subjective: Feeling well today no new changes      Objective: See treatment diary below      Assessment: Pt was able to complete exercises without UE and did well with new challenge for taller hurdles with UE  She will be d/c at next session  Pt in agreement with plan  Plan: Progress treatment as tolerated         Precautions: Fall risk    Short Term Goal Expiration Date:(2019)  Long Term Goal Expiration Date: (10/9/2019)  POC Expiration Date: (2019)        Manual                                                     Exercise Diary  9/11 9/17 10/1 10/4 10/9   STS 3x10 3x10 2x10 2x10 3x10   Step ups 15 ea fwd/lat 8" 15 ea fwd/lat 8 15 ea fwd/lat 8 15 ea fwd/lat 8"    HK marches 3 laps no ue 3 laps no ue      lunges 3 laps with UE 3 laps no UE 3 laps no UE 3 laps no ue  3 laps no ue   Heel raise 30x 30x      Toe raise 30x 30x      Backward walking 40'x2       Ht/hn hallway 40'x2       TM  B/l UE support, 2 0-2 2mph, 10 min B/L UE, 2 0mph, 5 min 10 min 2 0 mph  10 min 2 0 mph   Semi tandem stance  Foam, EO, 30''x2 Foam, EC, 30''x2     Tandem walking  3 laps 3 laps 3 laps no ue 3 laps no ue   squats   2x10     rockerboard taps   15x ea 20x  30x ea fwd/lat   sls   Floor, EO, 30'' ea 30" x 2 EO flr 30 sec x 3 ea LE   Side stepping    otb 3 laps no ue otb 3 laps no UE   Step taps    30x no UE 8" with foam    hudrles     12" 2 5# wts 4 laps ea fwd/lat                               Modalities

## 2019-10-11 NOTE — PROGRESS NOTES
Daily Speech Treatment Note    Today's date: 10/14/2019   Patients name: Oumou Chau  : 1955  MRN: [id number]  Safety measures: MS, fall risk  Referring provider: Sarabjit Mary, *    Primary Diagnosis/Billing code: F43 0  Secondary Diagnosis/ Billing code: Gianni Pichardo, R41 3    Visit Tracking:  -Referring provider: Epic  -Billing guidelines: CMS  -Visit #2/10  (Total: 8) (**KX Modifier**)   -Medicare  [address] due 2019    Subjective/Behavioral:  -"I'm good "    Objective/Assessment:  -Reviewed patient's home exercises/activities completed since last appointment  Deduction Puzzle #5: Task complete with 100% accuracy independently  Short-term goals:  1  Patient will complete concrete and abstract categorization tasks to 80% accuracy to facilitate improved generative naming skills and working memory, to be achieved in 4-6 weeks  --PARTIALLY MET  -Patient participated in the game "Anomia"  The patient was shown cards looking for a targeted answer (e g  Fictional character) and asked to provide answers to as many cards as possible in 1 minute  Task complete at level 6 with an average of 9 cards over 2 trials with skips x4  (Goal is written for an average of 10 cards/minute)  2  Patient will demonstrate alternating attention by being able to shift focus of attention between two tasks with minomal cues in a distracting environment with 80% accuracy, to be achieved in 4-6 weeks  --PARTIALLY MET    3  Patient will demonstrate divided attention by responding to multiple tasks or details within tasks at the same time with minimal cues in a distracting environment with 80% accuracy, to be achieved in 4-6 weeks  --PARTIALLY MET  (Distraction Task)  Compass Activity: Patient was asked to follow a list of compass directions (e g  4NW) to plot marks on a grid to reveal a word  Task complete in 25 minutes with 7 errors       5  Patient will demonstrate functional use of external memory across 3 sessions with 80% accuracy to facilitate carryover of strategies in functional living environment, to be achieved in 4-6 weeks  --PARTIALLY MET  -Patient was read a list of word associations (Mixed) and told "When I say ____, you'll say____"  Patient was asked to remember them  Patient remembered 7/10 across 1 trial immediately after hearing them  A distraction task was given before asking the patient to recall the associated word from earlier  Patient remembered 8/10 across 1 trial for delayed recall  Plan:  -Patient was provided with home exercises/activities to target goals in plan of care at the end of today's session   -Continue with current plan of care  Patient was treated by Angeles Whitley, graduate SLP student, and was under the direct supervision of DON Knox , CCC-SLP

## 2019-10-14 ENCOUNTER — OFFICE VISIT (OUTPATIENT)
Dept: SPEECH THERAPY | Facility: CLINIC | Age: 64
End: 2019-10-14
Payer: MEDICARE

## 2019-10-14 ENCOUNTER — OFFICE VISIT (OUTPATIENT)
Dept: PHYSICAL THERAPY | Facility: CLINIC | Age: 64
End: 2019-10-14
Payer: MEDICARE

## 2019-10-14 DIAGNOSIS — N81.89 PELVIC FLOOR WEAKNESS IN FEMALE: ICD-10-CM

## 2019-10-14 DIAGNOSIS — M62.89 PELVIC FLOOR DYSFUNCTION: ICD-10-CM

## 2019-10-14 DIAGNOSIS — G35 MULTIPLE SCLEROSIS (HCC): ICD-10-CM

## 2019-10-14 DIAGNOSIS — R26.89 BALANCE DISORDER: Primary | ICD-10-CM

## 2019-10-14 DIAGNOSIS — R48.8 OTHER SYMBOLIC DYSFUNCTIONS: Primary | ICD-10-CM

## 2019-10-14 DIAGNOSIS — N36.44 DETRUSOR AND SPHINCTER DYSSYNERGIA: ICD-10-CM

## 2019-10-14 DIAGNOSIS — R41.3 AMNESIA/MEMORY DISORDER: ICD-10-CM

## 2019-10-14 DIAGNOSIS — N39.41 URGE INCONTINENCE OF URINE: ICD-10-CM

## 2019-10-14 PROCEDURE — 92507 TX SP LANG VOICE COMM INDIV: CPT | Performed by: SPEECH-LANGUAGE PATHOLOGIST

## 2019-10-14 PROCEDURE — 97112 NEUROMUSCULAR REEDUCATION: CPT

## 2019-10-14 NOTE — PROGRESS NOTES
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        Subjective: Feeling ready for d/c and to transition to the gym  Objective: See treatment diary below      Assessment: Spent time speaking with and educating patient about HEP and gym routine  Patient with good understanding  Verbally reviewed HEP and patient did not feel need to practice and deferred performing today  Plan: Discharge to HEP       Precautions: Fall risk    Short Term Goal Expiration Date:(2019)  Long Term Goal Expiration Date: (10/9/2019)  POC Expiration Date: (2019)        Manual                                                     Exercise Diary  10/14       STS        Step ups        HK marches        lunges        Heel raise        Toe raise        Backward walking        Ht/hn hallway        TM 8 min 2 2 mph       Semi tandem stance        Tandem walking        squats        rockerboard taps        sls        Side stepping        Step taps        hudrles                                    Modalities

## 2019-10-14 NOTE — PROGRESS NOTES
Daily Speech Treatment Note    Today's date: 10/16/2019   Patients name: Rosalee Martinez  : 1955  MRN: [id number]  Safety measures: MS, fall risk  Referring provider: Adonay Issa, *    Primary Diagnosis/Billing code: B80 1  Secondary Diagnosis/ Billing code: Carina Monreal, R41 3    Visit Tracking:  -Referring provider: Epic  -Billing guidelines: CMS  -Visit #3/10  (Total: 9) (**KX Modifier**)   -Medicare  [address] due 2019    Subjective/Behavioral:  "I'm good "    Objective/Assessment:  -Patient forgot to return with HEP  Will bring to next session  Short-term goals:  1  Patient will complete concrete and abstract categorization tasks to 80% accuracy to facilitate improved generative naming skills and working memory, to be achieved in 4-6 weeks  --PARTIALLY MET  -Scattegories: Patient participated in the game [place] where she was given 5 minutes to complete a list of members following a list of given categories (e g  Things that make you scream) when provided with the starting letter each answer must start with  Patient was asked to name 1 member from all 12 listed categories in 5 minutes  Task completed with an average of 3 words across 1 trial      2  Patient will demonstrate alternating attention by being able to shift focus of attention between two tasks with minomal cues in a distracting environment with 80% accuracy, to be achieved in 4-6 weeks  --PARTIALLY MET  -Math Coding (Day 4): Patient was asked to complete a math coding sheet based on the symbols and key provided while being times  Task complete in 16 minutes with 6 errors  3  Patient will demonstrate divided attention by responding to multiple tasks or details within tasks at the same time with minimal cues in a distracting environment with 80% accuracy, to be achieved in 4-6 weeks  --PARTIALLY MET    5   Patient will demonstrate functional use of external memory across 3 sessions with 80% accuracy to facilitate carryover of strategies in functional living environment, to be achieved in 4-6 weeks  --PARTIALLY MET    Plan:  -Patient was provided with home exercises/activities to target goals in plan of care at the end of today's session   -Continue with current plan of care  Patient was treated by Reggie Louise, graduate SLP student, and was under the direct supervision of DON Ordoñez , CCC-SLP

## 2019-10-16 ENCOUNTER — APPOINTMENT (OUTPATIENT)
Dept: PHYSICAL THERAPY | Facility: CLINIC | Age: 64
End: 2019-10-16
Payer: MEDICARE

## 2019-10-16 ENCOUNTER — OFFICE VISIT (OUTPATIENT)
Dept: SPEECH THERAPY | Facility: CLINIC | Age: 64
End: 2019-10-16
Payer: MEDICARE

## 2019-10-16 DIAGNOSIS — R41.3 AMNESIA/MEMORY DISORDER: ICD-10-CM

## 2019-10-16 DIAGNOSIS — G35 MULTIPLE SCLEROSIS (HCC): ICD-10-CM

## 2019-10-16 DIAGNOSIS — R48.8 OTHER SYMBOLIC DYSFUNCTIONS: Primary | ICD-10-CM

## 2019-10-16 PROCEDURE — 92507 TX SP LANG VOICE COMM INDIV: CPT | Performed by: SPEECH-LANGUAGE PATHOLOGIST

## 2019-10-16 NOTE — PROGRESS NOTES
Daily Speech Treatment Note    Today's date: 10/21/2019   Patients name: Sandra Heck  : 1955  MRN: [id number]  Safety measures: MS, fall risk  Referring provider: Shante Dsouza, *    Primary Diagnosis/Billing code: B39 0  Secondary Diagnosis/ Billing code: Malcolm Guillenjohnny, R41 3    Visit Tracking:  -Referring provider: Epic  -Billing guidelines: CMS  -Visit #4/10  (Total: 10) (**KX Modifier**)   -Medicare  [address] due 2019    Subjective/Behavioral:  "I'm trying to get my house ready for the holidays "    Objective/Assessment:  -Patient forgot to return with HEP  Will bring to next session  Short-term goals:  1  Patient will complete concrete and abstract categorization tasks to 80% accuracy to facilitate improved generative naming skills and working memory, to be achieved in 4-6 weeks  --PARTIALLY MET    2  Patient will demonstrate alternating attention by being able to shift focus of attention between two tasks with minomal cues in a distracting environment with 80% accuracy, to be achieved in 4-6 weeks  --PARTIALLY MET  -Patient was asked to write a synonym and an opposite for the list of words provided  Task complete in  opp independently, increasing to  given minimal-moderate semantic and phonemic cues  3  Patient will demonstrate divided attention by responding to multiple tasks or details within tasks at the same time with minimal cues in a distracting environment with 80% accuracy, to be achieved in 4-6 weeks  --PARTIALLY MET  Compass Activity: Patient was asked to follow a list of compass directions (e g  4NW) to plot marks on a grid to reveal a word  Task complete in 18 minutes with 8 errors  Patient was instructed to write the compass direction in each of the grid boxed instead of just an "X" to help with organization and keeping her place within the activity       5  Patient will demonstrate functional use of external memory across 3 sessions with 80% accuracy to facilitate carryover of strategies in functional living environment, to be achieved in 4-6 weeks  --PARTIALLY MET  -Patient was asked to listen to the information read to her, follow an instruction, and then answer the question following it  Task complete in 1/3 opp independently, increasing to 3/3 given minimal-moderate semantic cues and verbal repetition  Plan:  -Patient was provided with home exercises/activities to target goals in plan of care at the end of today's session   -Continue with current plan of care  Patient was treated by Lady Sullivan, graduate SLP student, and was under the direct supervision of DON Soto , CCC-SLP

## 2019-10-21 ENCOUNTER — OFFICE VISIT (OUTPATIENT)
Dept: SPEECH THERAPY | Facility: CLINIC | Age: 64
End: 2019-10-21
Payer: MEDICARE

## 2019-10-21 DIAGNOSIS — R48.8 OTHER SYMBOLIC DYSFUNCTIONS: Primary | ICD-10-CM

## 2019-10-21 DIAGNOSIS — G35 MULTIPLE SCLEROSIS (HCC): ICD-10-CM

## 2019-10-21 DIAGNOSIS — R41.3 AMNESIA/MEMORY DISORDER: ICD-10-CM

## 2019-10-21 PROCEDURE — 92507 TX SP LANG VOICE COMM INDIV: CPT | Performed by: SPEECH-LANGUAGE PATHOLOGIST

## 2019-10-22 ENCOUNTER — APPOINTMENT (OUTPATIENT)
Dept: SPEECH THERAPY | Facility: CLINIC | Age: 64
End: 2019-10-22
Payer: MEDICARE

## 2019-10-22 ENCOUNTER — APPOINTMENT (OUTPATIENT)
Dept: PHYSICAL THERAPY | Facility: CLINIC | Age: 64
End: 2019-10-22
Payer: MEDICARE

## 2019-10-22 NOTE — PROGRESS NOTES
Daily Speech Treatment Note    Today's date: 10/24/2019   Patients name: Nika Shetty  : 1955  MRN: [id number]  Safety measures: MS, fall risk  Referring provider: mDitry Garza, *    Primary Diagnosis/Billing code: Y37 2  Secondary Diagnosis/ Billing code: Janiya Cross, R41 3    Visit Tracking:  -Referring provider: Epic  -Billing guidelines: CMS  -Visit #/10  (Total: 11) (**KX Modifier**)   -Medicare  [address] due 2019    Subjective/Behavioral:  "I'm good "    Objective/Assessment:  -Patient forgot to return with HEP  Will bring to next session  Math Coding Day 5: Task complete with 100% accuracy independently  Providing Synonyms and Opposites: Task complete in  opp independently, increasing to  given minimal semantic and verbal cues  Compass Day 5: Task complete with 6 errors impacting patient's ability to finish the activity  Task was restarted and completed at the beginning of today's session  Patient completed the task with 3 errors including losing track of her place and moving in the wrong direction  Short-term goals:  1  Patient will complete concrete and abstract categorization tasks to 80% accuracy to facilitate improved generative naming skills and working memory, to be achieved in 4-6 weeks  --PARTIALLY MET  -Category members: Patient was asked to name 5 members in the given abstract category  Task complete in 3/4 opp independently, increasing to 4/4 given moderate semantic cues  2  Patient will demonstrate alternating attention by being able to shift focus of attention between two tasks with minomal cues in a distracting environment with 80% accuracy, to be achieved in 4-6 weeks  --PARTIALLY MET    3  Patient will demonstrate divided attention by responding to multiple tasks or details within tasks at the same time with minimal cues in a distracting environment with 80% accuracy, to be achieved in 4-6 weeks  --PARTIALLY MET    5   Patient will demonstrate functional use of external memory across 3 sessions with 80% accuracy to facilitate carryover of strategies in functional living environment, to be achieved in 4-6 weeks  --PARTIALLY MET  -Patient was asked to listen to the information read to her, follow an instruction, and then answer the question following it  Task complete in 4/11 opp independently, increasing to 10/11 given minimal verbal repetition and 11/11 given moderate verbal repetition and a phonemic cue  Plan:  -Patient was provided with home exercises/activities to target goals in plan of care at the end of today's session   -Continue with current plan of care  Patient was treated by Charlestine Lesch, graduate SLP student, and was under the direct supervision of DON Beck , CCC-SLP

## 2019-10-24 ENCOUNTER — APPOINTMENT (OUTPATIENT)
Dept: PHYSICAL THERAPY | Facility: CLINIC | Age: 64
End: 2019-10-24
Payer: MEDICARE

## 2019-10-24 ENCOUNTER — OFFICE VISIT (OUTPATIENT)
Dept: SPEECH THERAPY | Facility: CLINIC | Age: 64
End: 2019-10-24
Payer: MEDICARE

## 2019-10-24 ENCOUNTER — APPOINTMENT (OUTPATIENT)
Dept: LAB | Facility: CLINIC | Age: 64
End: 2019-10-24
Payer: MEDICARE

## 2019-10-24 DIAGNOSIS — R41.3 AMNESIA/MEMORY DISORDER: ICD-10-CM

## 2019-10-24 DIAGNOSIS — G35 MULTIPLE SCLEROSIS (HCC): ICD-10-CM

## 2019-10-24 DIAGNOSIS — E55.9 VITAMIN D DEFICIENCY: ICD-10-CM

## 2019-10-24 DIAGNOSIS — G35 MULTIPLE SCLEROSIS (HCC): Chronic | ICD-10-CM

## 2019-10-24 DIAGNOSIS — R48.8 OTHER SYMBOLIC DYSFUNCTIONS: Primary | ICD-10-CM

## 2019-10-24 DIAGNOSIS — E78.5 HYPERLIPIDEMIA, UNSPECIFIED HYPERLIPIDEMIA TYPE: Chronic | ICD-10-CM

## 2019-10-24 LAB
25(OH)D3 SERPL-MCNC: 18.7 NG/ML (ref 30–100)
ALBUMIN SERPL BCP-MCNC: 4.7 G/DL (ref 3.5–5)
ALP SERPL-CCNC: 109 U/L (ref 46–116)
ALT SERPL W P-5'-P-CCNC: 33 U/L (ref 12–78)
ANION GAP SERPL CALCULATED.3IONS-SCNC: 6 MMOL/L (ref 4–13)
AST SERPL W P-5'-P-CCNC: 12 U/L (ref 5–45)
BASOPHILS # BLD AUTO: 0.07 THOUSANDS/ΜL (ref 0–0.1)
BASOPHILS NFR BLD AUTO: 1 % (ref 0–1)
BILIRUB SERPL-MCNC: 0.55 MG/DL (ref 0.2–1)
BUN SERPL-MCNC: 16 MG/DL (ref 5–25)
CALCIUM SERPL-MCNC: 9.5 MG/DL (ref 8.3–10.1)
CHLORIDE SERPL-SCNC: 107 MMOL/L (ref 100–108)
CHOLEST SERPL-MCNC: 230 MG/DL (ref 50–200)
CO2 SERPL-SCNC: 28 MMOL/L (ref 21–32)
CREAT SERPL-MCNC: 0.74 MG/DL (ref 0.6–1.3)
EOSINOPHIL # BLD AUTO: 0.25 THOUSAND/ΜL (ref 0–0.61)
EOSINOPHIL NFR BLD AUTO: 3 % (ref 0–6)
ERYTHROCYTE [DISTWIDTH] IN BLOOD BY AUTOMATED COUNT: 11.8 % (ref 11.6–15.1)
GFR SERPL CREATININE-BSD FRML MDRD: 85 ML/MIN/1.73SQ M
GLUCOSE P FAST SERPL-MCNC: 103 MG/DL (ref 65–99)
HCT VFR BLD AUTO: 46 % (ref 34.8–46.1)
HDLC SERPL-MCNC: 42 MG/DL
HGB BLD-MCNC: 15.2 G/DL (ref 11.5–15.4)
IMM GRANULOCYTES # BLD AUTO: 0.02 THOUSAND/UL (ref 0–0.2)
IMM GRANULOCYTES NFR BLD AUTO: 0 % (ref 0–2)
LDLC SERPL CALC-MCNC: 119 MG/DL (ref 0–100)
LYMPHOCYTES # BLD AUTO: 2.4 THOUSANDS/ΜL (ref 0.6–4.47)
LYMPHOCYTES NFR BLD AUTO: 28 % (ref 14–44)
MCH RBC QN AUTO: 30 PG (ref 26.8–34.3)
MCHC RBC AUTO-ENTMCNC: 33 G/DL (ref 31.4–37.4)
MCV RBC AUTO: 91 FL (ref 82–98)
MONOCYTES # BLD AUTO: 0.72 THOUSAND/ΜL (ref 0.17–1.22)
MONOCYTES NFR BLD AUTO: 8 % (ref 4–12)
NEUTROPHILS # BLD AUTO: 5.07 THOUSANDS/ΜL (ref 1.85–7.62)
NEUTS SEG NFR BLD AUTO: 60 % (ref 43–75)
NONHDLC SERPL-MCNC: 188 MG/DL
NRBC BLD AUTO-RTO: 0 /100 WBCS
PLATELET # BLD AUTO: 286 THOUSANDS/UL (ref 149–390)
PMV BLD AUTO: 9.8 FL (ref 8.9–12.7)
POTASSIUM SERPL-SCNC: 3.9 MMOL/L (ref 3.5–5.3)
PROT SERPL-MCNC: 7.9 G/DL (ref 6.4–8.2)
RBC # BLD AUTO: 5.07 MILLION/UL (ref 3.81–5.12)
SODIUM SERPL-SCNC: 141 MMOL/L (ref 136–145)
TRIGL SERPL-MCNC: 345 MG/DL
TSH SERPL DL<=0.05 MIU/L-ACNC: 1.24 UIU/ML (ref 0.36–3.74)
WBC # BLD AUTO: 8.53 THOUSAND/UL (ref 4.31–10.16)

## 2019-10-24 PROCEDURE — 80053 COMPREHEN METABOLIC PANEL: CPT

## 2019-10-24 PROCEDURE — 36415 COLL VENOUS BLD VENIPUNCTURE: CPT

## 2019-10-24 PROCEDURE — 84443 ASSAY THYROID STIM HORMONE: CPT

## 2019-10-24 PROCEDURE — 85025 COMPLETE CBC W/AUTO DIFF WBC: CPT

## 2019-10-24 PROCEDURE — 82306 VITAMIN D 25 HYDROXY: CPT

## 2019-10-24 PROCEDURE — 92507 TX SP LANG VOICE COMM INDIV: CPT | Performed by: SPEECH-LANGUAGE PATHOLOGIST

## 2019-10-24 PROCEDURE — 80061 LIPID PANEL: CPT

## 2019-10-27 ENCOUNTER — TELEPHONE (OUTPATIENT)
Dept: FAMILY MEDICINE CLINIC | Facility: CLINIC | Age: 64
End: 2019-10-27

## 2019-10-27 DIAGNOSIS — E55.9 VITAMIN D DEFICIENCY: Primary | ICD-10-CM

## 2019-10-27 DIAGNOSIS — E78.5 HYPERLIPIDEMIA, UNSPECIFIED HYPERLIPIDEMIA TYPE: Chronic | ICD-10-CM

## 2019-10-27 RX ORDER — ERGOCALCIFEROL 1.25 MG/1
50000 CAPSULE ORAL WEEKLY
Qty: 8 CAPSULE | Refills: 0 | Status: SHIPPED | OUTPATIENT
Start: 2019-10-27 | End: 2020-05-13

## 2019-10-28 ENCOUNTER — HOSPITAL ENCOUNTER (OUTPATIENT)
Dept: RADIOLOGY | Facility: HOSPITAL | Age: 64
Discharge: HOME | End: 2019-10-28
Payer: MEDICARE

## 2019-10-28 ENCOUNTER — TELEPHONE (OUTPATIENT)
Dept: FAMILY MEDICINE CLINIC | Facility: CLINIC | Age: 64
End: 2019-10-28

## 2019-10-28 VITALS — WEIGHT: 159 LBS | HEIGHT: 64 IN | BODY MASS INDEX: 27.14 KG/M2

## 2019-10-28 DIAGNOSIS — Z12.39 BREAST SCREENING: ICD-10-CM

## 2019-10-28 PROCEDURE — 77067 SCR MAMMO BI INCL CAD: CPT

## 2019-10-28 NOTE — TELEPHONE ENCOUNTER
Please contact patient  All her blood work was normal aside from elevated cholesterol at 230 with triglycerides of 345 and decreased level of vitamin-D at 18  Please advise her to take Lipitor 40 mg once a day and Zetia 10 mg once a day as directed and start following low-fat, low-carbohydrate diet    Please advise her to discontinue over-the-counter vitamin D 3, will start her on vitamin D2 39300 units once a week for 8 weeks  Will repeat blood work to recheck her vitamin-D level lipid panel and liver function tests in 2 months  Orders placed      Thank you

## 2019-10-29 NOTE — PROGRESS NOTES
Daily Speech Treatment Note    Today's date: 10/29/2019 ***  Patients name: Rohith Mo  : 1955  MRN: [id number]  Safety measures: MS, fall risk  Referring provider: Dorota Tay, *    Primary Diagnosis/Billing code: W68 6  Secondary Diagnosis/ Billing code: Lois Glaser, R41 3    Visit Tracking:  -Referring provider: Epic  -Billing guidelines: CMS  -Visit #5/10  (Total: 11) (**KX Modifier**) ***  -Medicare  [address] due 2019    Subjective/Behavioral:  ***    Objective/Assessment:  -Patient forgot to return with HEP  Will bring to next session  Math Coding Day 5: Task complete with 100% accuracy independently  Providing Synonyms and Opposites: Task complete in  opp independently, increasing to  given minimal semantic and verbal cues  Compass Day 5: Task complete with 6 errors impacting patient's ability to finish the activity  Task was restarted and completed at the beginning of today's session  Patient completed the task with 3 errors including losing track of her place and moving in the wrong direction  Short-term goals:  1  Patient will complete concrete and abstract categorization tasks to 80% accuracy to facilitate improved generative naming skills and working memory, to be achieved in 4-6 weeks  --PARTIALLY MET  -Category members: Patient was asked to name 5 members in the given abstract category  Task complete in 3/4 opp independently, increasing to 4/4 given moderate semantic cues  2  Patient will demonstrate alternating attention by being able to shift focus of attention between two tasks with minomal cues in a distracting environment with 80% accuracy, to be achieved in 4-6 weeks  --PARTIALLY MET    3  Patient will demonstrate divided attention by responding to multiple tasks or details within tasks at the same time with minimal cues in a distracting environment with 80% accuracy, to be achieved in 4-6 weeks  --PARTIALLY MET    5   Patient will demonstrate functional use of external memory across 3 sessions with 80% accuracy to facilitate carryover of strategies in functional living environment, to be achieved in 4-6 weeks  --PARTIALLY MET  -Patient was asked to listen to the information read to her, follow an instruction, and then answer the question following it  Task complete in 4/11 opp independently, increasing to 10/11 given minimal verbal repetition and 11/11 given moderate verbal repetition and a phonemic cue  Plan:  -Patient was provided with home exercises/activities to target goals in plan of care at the end of today's session   -Continue with current plan of care  Patient was treated by Saurabh Valentin, graduate SLP student, and was under the direct supervision of DON Buckley , CCC-SLP

## 2019-10-30 ENCOUNTER — APPOINTMENT (OUTPATIENT)
Dept: SPEECH THERAPY | Facility: CLINIC | Age: 64
End: 2019-10-30
Payer: MEDICARE

## 2019-10-30 ENCOUNTER — APPOINTMENT (OUTPATIENT)
Dept: PHYSICAL THERAPY | Facility: CLINIC | Age: 64
End: 2019-10-30
Payer: MEDICARE

## 2019-10-30 NOTE — PROGRESS NOTES
Daily Speech Treatment Note    Today's date: 10/31/2019   Patients name: Rohith Mo  : 1955  MRN: [id number]  Safety measures: MS, fall risk  Referring provider: Dorota Tay, *    Primary Diagnosis/Billing code: S49 7  Secondary Diagnosis/ Billing code: Lois Glaser, R41 3    Visit Tracking:  -Referring provider: Epic  -Billing guidelines: CMS  -Visit #/10  (Total: 11) (**KX Modifier**)   -Medicare  [address] due 2019    Subjective/Behavioral:  "Hi how are you "  -Patient arrived late    Objective/Assessment:  -Patient arrived with HEP completed  Deduction puzzle 3x6 completed with 100% acc  Short-term goals:  1  Patient will complete concrete and abstract categorization tasks to 80% accuracy to facilitate improved generative naming skills and working memory, to be achieved in 4-6 weeks  --PARTIALLY MET    -Category members: Gwendolyn Merrill in the missing letters to complete the words in each concrete category presented  Task completed in 37/40 opp (92%acc) increasing to 40/40 with min semantic cues  -To target word generation,  Pt was presented with category matrices in which she was provided with a category and an initial letter of a member in the category and asked to name the category member  Pt was able to independently name category member in 37/32 opps (84%acc) increasing to 32/32 with min semantic and phonemic cues  Patient required additional processing time to complete task      2  Patient will demonstrate alternating attention by being able to shift focus of attention between two tasks with minomal cues in a distracting environment with 80% accuracy, to be achieved in 4-6 weeks  --PARTIALLY MET  To target alternating attention patient completed Math Coding activity Day 5 in which patient was asked to figure out mathematic equation based on symbols provided  Task completed with 93% acc patient made 6 errors and required min verbal cues from clinician to increase success      3  Patient will demonstrate divided attention by responding to multiple tasks or details within tasks at the same time with minimal cues in a distracting environment with 80% accuracy, to be achieved in 4-6 weeks  --PARTIALLY MET    5  Patient will demonstrate functional use of external memory across 3 sessions with 80% accuracy to facilitate carryover of strategies in functional living environment, to be achieved in 4-6 weeks  --PARTIALLY MET      Plan:  -Patient was provided with home exercises/activities to target goals in plan of care at the end of today's session   -Continue with current plan of care

## 2019-10-31 ENCOUNTER — OFFICE VISIT (OUTPATIENT)
Dept: SPEECH THERAPY | Facility: CLINIC | Age: 64
End: 2019-10-31
Payer: MEDICARE

## 2019-10-31 DIAGNOSIS — R41.3 AMNESIA/MEMORY DISORDER: ICD-10-CM

## 2019-10-31 DIAGNOSIS — G35 MULTIPLE SCLEROSIS (HCC): ICD-10-CM

## 2019-10-31 DIAGNOSIS — R48.8 OTHER SYMBOLIC DYSFUNCTIONS: Primary | ICD-10-CM

## 2019-10-31 PROCEDURE — 92507 TX SP LANG VOICE COMM INDIV: CPT

## 2019-10-31 NOTE — PROGRESS NOTES
Daily Speech Treatment Note    Today's date: 2019   Patients name: Chepe Lerner  : 1955  MRN: [id number]  Safety measures: MS, fall risk  Referring provider: Tremayne Regalado, *    Primary Diagnosis/Billing code: D36 1  Secondary Diagnosis/ Billing code: Seven Martinez, R41 3    Visit Tracking:  -Referring provider: Epic  -Billing guidelines: CMS  -Visit #10  (Total: 13) (**KX Modifier**)   -Medicare  [address] due 2019    Subjective/Behavioral:  "I'm good "    Objective/Assessment:  -Patient arrived with HEP completed  Compass Day 3: Task complete with 1 error that caused the last 5 plots to be incorrect  Patient was thrown off by the "error" on the direction sheet  Short-term goals:  1  Patient will complete concrete and abstract categorization tasks to 80% accuracy to facilitate improved generative naming skills and working memory, to be achieved in 4-6 weeks  --PARTIALLY MET    2  Patient will demonstrate alternating attention by being able to shift focus of attention between two tasks with minomal cues in a distracting environment with 80% accuracy, to be achieved in 4-6 weeks  --PARTIALLY MET  -Patient was asked to list 5 words associated with a given word (e g  Ocean = water, salt, boat, etc )  Task complete in 3/6 opp independently, increasing to 6/6 given moderate semantic and phonemic cues  3  Patient will demonstrate divided attention by responding to multiple tasks or details within tasks at the same time with minimal cues in a distracting environment with 80% accuracy, to be achieved in 4-6 weeks  --PARTIALLY MET  -Patient was given a word (e g  Encyclopedia) and asked to come up with as many words as possible from the long word that are 3+ letters long (e g  Clone) while being asked higher level y/n questions (e g  Is a person faster than a car?)  Task complete with an average of 23 words in 25 minutes   Patient correctly answered the y/n question in 6/8 opp immediately after hearing the question but self corrected to 8/8 within 30 seconds  Patient was educated on the use of a visual aid such as writing the letters in a [place] to help her visualize the new words  5  Patient will demonstrate functional use of external memory across 3 sessions with 80% accuracy to facilitate carryover of strategies in functional living environment, to be achieved in 4-6 weeks  --PARTIALLY MET      Plan:  -Patient was provided with home exercises/activities to target goals in plan of care at the end of today's session   -Continue with current plan of care  Patient was treated by Scott Ledezma, graduate SLP student, and was under the direct supervision of DON Ward , CCC-SLP

## 2019-11-01 ENCOUNTER — OFFICE VISIT (OUTPATIENT)
Dept: SPEECH THERAPY | Facility: CLINIC | Age: 64
End: 2019-11-01
Payer: MEDICARE

## 2019-11-01 DIAGNOSIS — G35 MULTIPLE SCLEROSIS (HCC): ICD-10-CM

## 2019-11-01 DIAGNOSIS — R41.3 AMNESIA/MEMORY DISORDER: ICD-10-CM

## 2019-11-01 DIAGNOSIS — R48.8 OTHER SYMBOLIC DYSFUNCTIONS: Primary | ICD-10-CM

## 2019-11-01 PROCEDURE — 92507 TX SP LANG VOICE COMM INDIV: CPT | Performed by: SPEECH-LANGUAGE PATHOLOGIST

## 2019-11-04 ENCOUNTER — OFFICE VISIT (OUTPATIENT)
Dept: SPEECH THERAPY | Facility: CLINIC | Age: 64
End: 2019-11-04
Payer: MEDICARE

## 2019-11-04 DIAGNOSIS — G35 MULTIPLE SCLEROSIS (HCC): ICD-10-CM

## 2019-11-04 DIAGNOSIS — R41.3 AMNESIA/MEMORY DISORDER: ICD-10-CM

## 2019-11-04 DIAGNOSIS — R48.8 OTHER SYMBOLIC DYSFUNCTIONS: Primary | ICD-10-CM

## 2019-11-04 PROCEDURE — 92507 TX SP LANG VOICE COMM INDIV: CPT | Performed by: SPEECH-LANGUAGE PATHOLOGIST

## 2019-11-04 NOTE — PROGRESS NOTES
Daily Speech Treatment Note    Today's date: 2019   Patients name: Juancarlos Kee  : 1955  MRN: [id number]  Safety measures: MS, fall risk  Referring provider: Clementina Mo, *    Primary Diagnosis/Billing code: R98 5  Secondary Diagnosis/ Billing code: Isacc Mac, R41 3    Visit Tracking:  -Referring provider: Epic  -Billing guidelines: CMS  -Visit #8/10  (Total: 14) (**KX Modifier**)   -Medicare  [address] due 2019    Subjective/Behavioral:  "I'm good "    Patient reports that she has started to take a larger dose of Vitamin D and immediately noticed a positive impact  Objective/Assessment:  -Patient arrived with HEP completed  Extracting Words: 55/60 independently, increasing to 60/60 given minimal semantic and phonemic cues  Short-term goals:  1  Patient will complete concrete and abstract categorization tasks to 80% accuracy to facilitate improved generative naming skills and working memory, to be achieved in 4-6 weeks  --PARTIALLY MET  -Patient was asked to fill in the missing letters to complete the words belonging to specific categories  Task complete in 33/40 opp independently, increasing to 40/40 given minimal semantic cues  Patient was then educated on chunking to help memorize one of the category lists  Patient was asked to repeat them memorized list back immediately with 6/10 accuracy independently, increasing to 10/10 given minimal reminders of the "categories" she chunked the words under, and again after 10 minutes of a distraction task with 10/10 accuracy  2  Patient will demonstrate alternating attention by being able to shift focus of attention between two tasks with minomal cues in a distracting environment with 80% accuracy, to be achieved in 4-6 weeks  --PARTIALLY MET    3   Patient will demonstrate divided attention by responding to multiple tasks or details within tasks at the same time with minimal cues in a distracting environment with 80% accuracy, to be achieved in 4-6 weeks  --PARTIALLY MET  (DISTRACTION TASK:)  Compass Activity: Patient was asked to follow a list of compass directions (e g  4NW) to plot marks on a grid to reveal a word  Task complete in 17 minutes with 2 errors  5  Patient will demonstrate functional use of external memory across 3 sessions with 80% accuracy to facilitate carryover of strategies in functional living environment, to be achieved in 4-6 weeks  --PARTIALLY MET  -See goal #1    Plan:  -Patient was provided with home exercises/activities to target goals in plan of care at the end of today's session   -Continue with current plan of care  Patient was treated by Nola Seip, graduate SLP student, and was under the direct supervision of DON Olivia , CCC-SLP

## 2019-11-05 NOTE — PROGRESS NOTES
Speech-Language Pathology Discharge    Today's date: 2019   Patients name: Rohith Mo  : 1955  MRN: [id number]  Safety measures:  MS, fall risk  Referring provider: Dorota Tay, *    Visit Tracking:   -Referring provider: Epic  -Billing guidelines: CMS  -Visit #9/10  (Total: 15) (**KX Modifier**)   -Medicare  [employer]    Subjective comments: "I like you guys "    Patient's goal(s): "to remember and know what I am talking about "     Assessments    The Repeatable Battery for the Assessment of Neuropsychological Status (RBANS) is a brief, individually-administered assessment which measures attention, language, visuospatial/constructional abilities, and immediate & delayed memory  The RBANS is intended for use with adolescents to adults, ages 15 to 80 years  The following results were obtained during the administration of the assessment  Form: C    Cognitive Domain/Subtest: Index Score: Percentile Rank: Classification: RE: Status:   IMMEDIATE MEMORY 83 13%ile Low Average 90 DECLINE        1  List Learning ()          2  Story Memory ()           VISUOSPATIAL/  CONSTRUCTIONAL 84 14%ile Low Average 72 IMPROVEMENT        3  Figure Copy ()          4  Line Orientation ()           LANGUAGE 82 12%ile Low Average 85 DECLINE        5  Picture Naming (9/10)          6  Semantic Fluency ()           ATTENTION 68 2%ile Extremely Low 68 NO CHANGE        7  Digit Span ()          8  Coding ()           DELAYED MEMORY 98 45%ile Average 95 IMPROVEMENT        9  List Recall (4/10)          10  List Recognition ()          11  Story Recall ()          12  Figure Recall ()           Sum of Index Scores:  415  410 IMPROVEMENT   Total Score:  79      Percentile: 8%ile      Classification: Borderline          RE indicates the scores from the re-evaluation (10/09/2019)   Form: B      *Patient named 7 concrete category members (musical instruments) in 60 sec (norm=15+)  -- BELOW AVERAGE    *Patient named 8 abstract category members (words beginning with letter 'b') in 60 sec (norm=10+)  -- BELOW AVERAGE      Goals    Short-term goals:   1  Patient will complete concrete and abstract categorization tasks to 80% accuracy to facilitate improved generative naming skills and working memory, to be achieved in 4-6 weeks  --PARTIALLY MET    2  Patient will demonstrate alternating attention by being able to shift focus of attention between two tasks with minomal cues in a distracting environment with 80% accuracy, to be achieved in 4-6 weeks  --PARTIALLY MET    3  Patient will demonstrate divided attention by responding to multiple tasks or details within tasks at the same time with minimal cues in a distracting environment with 80% accuracy, to be achieved in 4-6 weeks  --PARTIALLY MET    4  Patient will be educated on the use of internal and external memory aids and compensatory strategies with 80% accuracy to facilitate increased recall of routine, personal information, and recent events, to be achieved in 4-6 weeks  --MET    5  Patient will demonstrate functional use of external memory across 3 sessions with 80% accuracy to facilitate carryover of strategies in functional living environment, to be achieved in 4-6 weeks  --PARTIALLY MET    Long-term goals:   1  Patient will demonstrate cognitive-communication skills consistent with age and education given use of compensatory strategies when needed to resume baseline activities and responsibilities in home and community settings by discharge  --PARTIALLY MET     2  Patient will complete cognitive-linguistic therapy that addresses patients specific deficits in processing speed, short-term working memory, attention to detail, monitoring, sequencing, and organization skills, with instruction, to alleviate effects of executive functioning disorder deficits by discharge  --MET    Impressions/Recommendations    Impressions: Patient continues to present with moderate cognitive-linguistic deficits characterized by reduced immediate memory, decreased visuospatial skills, reduced language skills, and poor attention  Patient has made good progress towards POC goals  Patient has reached maximum potential in the skilled ST setting  Patient was discharged to an independent [facility] to continue to work on cognitive/linguistic deficits in the home environment  Recommendations:  -Patient to be discharged from outpatient skilled Speech Therapy services: Patient to be discharged to an independent Home Exercise Program  Patient made good progress toward goals in care and has reached maximum potential     Patient was evaluated by Saurabh Valentin, graduate SLP student, and was under the direct supervision of DON Buckley , CCC-SLP

## 2019-11-07 ENCOUNTER — EVALUATION (OUTPATIENT)
Dept: SPEECH THERAPY | Facility: CLINIC | Age: 64
End: 2019-11-07
Payer: MEDICARE

## 2019-11-07 DIAGNOSIS — G35 MULTIPLE SCLEROSIS (HCC): ICD-10-CM

## 2019-11-07 DIAGNOSIS — R48.8 OTHER SYMBOLIC DYSFUNCTIONS: Primary | ICD-10-CM

## 2019-11-07 DIAGNOSIS — R41.3 AMNESIA/MEMORY DISORDER: ICD-10-CM

## 2019-11-07 PROCEDURE — 96125 COGNITIVE TEST BY HC PRO: CPT | Performed by: SPEECH-LANGUAGE PATHOLOGIST

## 2019-12-17 ENCOUNTER — TELEPHONE (OUTPATIENT)
Dept: NEUROLOGY | Facility: CLINIC | Age: 64
End: 2019-12-17

## 2019-12-22 NOTE — PROGRESS NOTES
Patient ID: Alesia Wharton is a 71 y o  female  Assessment/Plan:    Multiple sclerosis   patient has remained clinically stable,  Doing well, no new neurologic issues  -- MRI of the brain June 2019 stable with evidence of chronic D mind disease, no new lesions or enhancement  Disease burden is moderate  --  patient remains on Ocrevus,  Initially started  December 2018,  Patient currently scheduled to receive her next infusion on January 8th  Aware that she needs labs done 1 week prior  Patient doing quite well with her infusion, no reported side effects  -- patient had updated mammography done,   No evidence of malignancy  -- most recent labs  From October with normal CBC, LFTs       memory Disorder  --  Recent MRI of the brain did demonstrate evidence of parenchymal volume loss related to her MS which may be a contributing factor  No evidence of any temporal mesial lobe atrophy  --  Patient underwent speech therapy to aid with her cognition  -- EEG in the past without epileptiform discharges,  Left greater than right temporal focal cerebral dysfunction noted  -- patient's primary issues in regards to difficulty with multi tasking, remaining focused  At this point, will retry Provigil 100 mg in the morning  Side effects were reviewed  She had been on this in the past and found this to be beneficial     vitamin-D deficiency  -- patient noted to have low vitamin-D levels, placed on prescription therapy, she feels better on higher doses    No problem-specific Assessment & Plan notes found for this encounter  Diagnoses and all orders for this visit:    Multiple sclerosis, relapsing/remitting    Vitamin D deficiency    Amnesia/memory disorder           Subjective:    AYESHA    Joaquín Watt is a 56-year-old female who presents today for neurologic follow-up for her MS  Patient initially diagnosed in 2008, had been on Tysabri since 2011  Patient was then initiated on 0crevus in December 2018   Patient was last seen in August   Last updated imaging MRI brain June 2019 stable consistent with chronic demyelinating disease, no new lesions or pathologic eEnhancement      today, patient remains clinically stable, no new signs or symptoms suggestive of MS exacerbation  She continues on Ocrevus  and has done well with the infusions, her  Last infusion occurred December 17th,  She is currently scheduled to have her next infusion on January 8th  Patient is aware that she needs to have labs done 1 week prior  patient's last labs from October LFT, BMP normal, GFR noted at 85  Patient's vitamin-D was low, she was subsequently placed on  Ergocalciferol  She did have updated mammography done, no evidence of malignancy  She notes ongoing issues with bladder urgency and is following with Urology  Patient was given Skelaxin for her complaints of low back spasms, she states this has been very effective  She denies any current back issues  She remains on gabapentin 300 mg at bedtime, on occasion, she will increase her dose  Patient with noted issues with her memory, most likely multifactorial including MDD, underlying MS  She did initiate cognitive therapy  She has had no further episodes of days of memory loss, no loss of consciousness  Patient's primary issue is in regards to multi tasking, and difficulty focusing  She needs to take longer in order to take care of her household finances and general cleaning  In the past, she had been on Provigil which she did find beneficial, at this point, we will retry that medication  Patient is being followed by her PCP  In the interim, she was also seen by  pulmonology with negative bronchoscopy  Her COPD is fairly stable, unfortunately she continues to smoke  She remains on Zoloft, her symptoms are well controlled  Patient had been complaining of a lump on her left lower extremity, she underwent a Dopplers which were negative        labs 10/24/2019; LFTs,  BMP, GFR normal,  Absolute lymphocytes = 2 4,  CBC normal,  TSH = 1 24, vitamin D = 18 7,CH = 230, TG = 345, HDL = 42, LDL = 119     mammography 10/28/2019; no mammographic evidence of malignancy      The following portions of the patient's history were reviewed and updated as appropriate: allergies, current medications, past family history, past medical history, past social history, past surgical history and problem list          Objective:  Current Outpatient Medications   Medication Sig Dispense Refill    albuterol (2 5 mg/3 mL) 0 083 % nebulizer solution Take 1 vial (2 5 mg total) by nebulization every 4 (four) hours as needed for wheezing or shortness of breath (cough) 120 vial 2    albuterol (PROAIR HFA) 90 mcg/act inhaler Inhale 2 puffs every 4 (four) hours as needed for wheezing or shortness of breath 3 Inhaler 1    aspirin (ECOTRIN LOW STRENGTH) 81 mg EC tablet Take 1 tablet by mouth daily      atorvastatin (LIPITOR) 40 mg tablet Take 1 tablet (40 mg total) by mouth daily 90 tablet 3    B Complex-C CAPS Take by mouth      BL EVENING PRIMROSE OIL PO Take by mouth      budesonide (PULMICORT) 0 5 mg/2 mL nebulizer solution inhale contents of 1 vial in nebulizer twice a day  0    ergocalciferol (VITAMIN D2) 50,000 units Take 1 capsule (50,000 Units total) by mouth once a week 8 capsule 0    ezetimibe (ZETIA) 10 mg tablet Take 1 tablet (10 mg total) by mouth daily 90 tablet 3    Flaxseed, Linseed, (FLAXSEED OIL PO) Take by mouth      fluticasone (FLONASE) 50 mcg/act nasal spray USE 2 SPRAYS IN EACH  NOSTRIL DAILY 48 g 1    fluticasone-vilanterol (BREO ELLIPTA) 200-25 MCG/INH inhaler Inhale 1 puff daily Rinse mouth after use   3 Inhaler 3    gabapentin (NEURONTIN) 300 mg capsule Take 1 capsule (300 mg total) by mouth 2 (two) times a day 180 capsule 3    levocetirizine (XYZAL) 5 MG tablet Take 1 tablet (5 mg total) by mouth every evening 90 tablet 1    montelukast (SINGULAIR) 10 mg tablet TAKE 1 TABLET BY MOUTH  DAILY AT BEDTIME 90 tablet 1    Ocrelizumab (OCREVUS IV) Infuse into a venous catheter      sertraline (ZOLOFT) 100 mg tablet TAKE 1 TABLET BY MOUTH  DAILY 90 tablet 1     No current facility-administered medications for this visit  Blood pressure 120/80, pulse 80, height 5' 4" (1 626 m), weight 72 6 kg (160 lb), not currently breastfeeding  Physical Exam   Constitutional: She appears well-developed  HENT:   Head: Normocephalic  Eyes: Pupils are equal, round, and reactive to light  Neck: Normal range of motion  Cardiovascular: Normal rate and regular rhythm  Pulmonary/Chest: Effort normal    Musculoskeletal: Normal range of motion  Neurological: She has normal reflexes  Coordination normal    Skin: Skin is warm  Psychiatric: She has a normal mood and affect  Her speech is normal and behavior is normal  Thought content normal        Neurological Exam  Mental Status  Awake, alert and oriented to person, place and time  Speech is normal  Language is fluent with no aphasia  Cranial Nerves  CN II: Visual fields full to confrontation  CN III, IV, VI: Extraocular movements intact bilaterally  Pupils equal round and reactive to light bilaterally  CN V: Facial sensation is normal   CN VII: Full and symmetric facial movement  CN XI: Shoulder shrug strength is normal     Motor  Normal muscle bulk throughout  Normal muscle tone  Strength is 5/5 in all four extremities except as noted  Right                     Left  Hip flexion                              4+                          5  Knee flexion                           5-                          5    Sensory  Sensation is intact to light touch, pinprick, vibration and proprioception in all four extremities  Reflexes  Deep tendon reflexes are 2+ and symmetric in all four extremities with downgoing toes bilaterally      Coordination  Finger-to-nose, rapid alternating movements and heel-to-shin normal bilaterally without dysmetria  Mild dysmetria on left  Gait  Casual gait: Wide stance  Spastic gait  Timed walk 4 38 seconds    Mild dysmetria,  Ambulated independently  ROS:ROS updated personally reviewed with patient today's visit    Review of Systems  Constitutional: Negative  Negative for appetite change and fever  HENT: Negative  Negative for hearing loss, tinnitus, trouble swallowing and voice change  Eyes: Negative  Negative for photophobia and pain  Respiratory: Negative  Negative for shortness of breath  Cardiovascular: Negative  Negative for palpitations  Gastrointestinal: Negative  Negative for nausea and vomiting  Endocrine: Negative  Negative for cold intolerance and heat intolerance  Genitourinary: Negative  Negative for dysuria, frequency and urgency  Musculoskeletal: Negative  Negative for myalgias and neck pain  Skin: Negative  Negative for rash  Neurological: Negative  Negative for dizziness, tremors, seizures, syncope, facial asymmetry, speech difficulty, weakness, light-headedness, numbness and headaches  Hematological: Negative  Does not bruise/bleed easily  Psychiatric/Behavioral: Negative  Negative for confusion, hallucinations and sleep disturbance

## 2019-12-23 ENCOUNTER — OFFICE VISIT (OUTPATIENT)
Dept: NEUROLOGY | Facility: CLINIC | Age: 64
End: 2019-12-23
Payer: MEDICARE

## 2019-12-23 ENCOUNTER — DOCUMENTATION (OUTPATIENT)
Dept: NEUROLOGY | Facility: CLINIC | Age: 64
End: 2019-12-23

## 2019-12-23 VITALS
HEIGHT: 64 IN | DIASTOLIC BLOOD PRESSURE: 80 MMHG | HEART RATE: 80 BPM | SYSTOLIC BLOOD PRESSURE: 120 MMHG | WEIGHT: 160 LBS | BODY MASS INDEX: 27.31 KG/M2

## 2019-12-23 DIAGNOSIS — E55.9 VITAMIN D DEFICIENCY: ICD-10-CM

## 2019-12-23 DIAGNOSIS — G35 MULTIPLE SCLEROSIS (HCC): Primary | Chronic | ICD-10-CM

## 2019-12-23 DIAGNOSIS — G35 MULTIPLE SCLEROSIS (HCC): Primary | ICD-10-CM

## 2019-12-23 DIAGNOSIS — R41.3 AMNESIA/MEMORY DISORDER: ICD-10-CM

## 2019-12-23 PROCEDURE — 99214 OFFICE O/P EST MOD 30 MIN: CPT | Performed by: NURSE PRACTITIONER

## 2019-12-23 RX ORDER — ACETAMINOPHEN 325 MG/1
650 TABLET ORAL ONCE
Status: CANCELLED | OUTPATIENT
Start: 2020-01-08

## 2019-12-23 RX ORDER — MODAFINIL 100 MG/1
100 TABLET ORAL DAILY
Qty: 30 TABLET | Refills: 0 | Status: SHIPPED | OUTPATIENT
Start: 2019-12-23 | End: 2020-08-25 | Stop reason: ALTCHOICE

## 2019-12-23 RX ORDER — SODIUM CHLORIDE 9 MG/ML
20 INJECTION, SOLUTION INTRAVENOUS ONCE
Status: CANCELLED | OUTPATIENT
Start: 2020-01-08

## 2019-12-23 NOTE — PROGRESS NOTES
Pt seen in office today  Pt overdue for Ocrevus  Pt now scheduled 1/8/20 7:30am  She is aware to get labs  Has Medicare  No PA needed  Order updated

## 2019-12-23 NOTE — PROGRESS NOTES
Patient ID: Ladarius Quinn is a 71 y o  female  Assessment/Plan:    No problem-specific Assessment & Plan notes found for this encounter  {Assess/PlanSmartLinks:76477}       Subjective:    HPI    {[facility] HPI texts:40562}    {Common ambulatory SmartLinks:83559}         Objective:    Blood pressure 120/80, pulse 80, height 5' 4" (1 626 m), weight 72 6 kg (160 lb), not currently breastfeeding  Physical Exam    Neurological Exam    Gait  T25FW: 4 38 Sec           ROS:    Review of Systems   Constitutional: Negative  Negative for appetite change and fever  HENT: Negative  Negative for hearing loss, tinnitus, trouble swallowing and voice change  Eyes: Negative  Negative for photophobia and pain  Respiratory: Negative  Negative for shortness of breath  Cardiovascular: Negative  Negative for palpitations  Gastrointestinal: Negative  Negative for nausea and vomiting  Endocrine: Negative  Negative for cold intolerance and heat intolerance  Genitourinary: Negative  Negative for dysuria, frequency and urgency  Musculoskeletal: Negative  Negative for myalgias and neck pain  Skin: Negative  Negative for rash  Neurological: Negative  Negative for dizziness, tremors, seizures, syncope, facial asymmetry, speech difficulty, weakness, light-headedness, numbness and headaches  Hematological: Negative  Does not bruise/bleed easily  Psychiatric/Behavioral: Negative  Negative for confusion, hallucinations and sleep disturbance

## 2019-12-23 NOTE — PATIENT INSTRUCTIONS
Patient doing well, clinically stable   continues on low-dose gabapentin and Zoloft   has 0crevus  Infusion scheduled for January 8th   obtain labs 1 week prior   will try Provigil   patient following with PCP and pulmonology

## 2019-12-26 ENCOUNTER — TELEPHONE (OUTPATIENT)
Dept: NEUROLOGY | Facility: CLINIC | Age: 64
End: 2019-12-26

## 2019-12-26 NOTE — TELEPHONE ENCOUNTER
Received fax stating Modafinil requires PA  Submitted on CMM, Key: [id number]  Modafinil approved from 12/26/19 to 6/26/20

## 2019-12-30 ENCOUNTER — TELEPHONE (OUTPATIENT)
Dept: NEUROLOGY | Facility: CLINIC | Age: 64
End: 2019-12-30

## 2019-12-30 ENCOUNTER — OFFICE VISIT (OUTPATIENT)
Dept: URGENT CARE | Age: 64
End: 2019-12-30
Payer: MEDICARE

## 2019-12-30 VITALS
DIASTOLIC BLOOD PRESSURE: 97 MMHG | HEIGHT: 64 IN | SYSTOLIC BLOOD PRESSURE: 126 MMHG | WEIGHT: 161 LBS | RESPIRATION RATE: 20 BRPM | BODY MASS INDEX: 27.49 KG/M2 | HEART RATE: 97 BPM | TEMPERATURE: 99.1 F | OXYGEN SATURATION: 96 %

## 2019-12-30 DIAGNOSIS — J01.00 ACUTE MAXILLARY SINUSITIS, RECURRENCE NOT SPECIFIED: Primary | ICD-10-CM

## 2019-12-30 PROCEDURE — 99213 OFFICE O/P EST LOW 20 MIN: CPT | Performed by: PHYSICIAN ASSISTANT

## 2019-12-30 PROCEDURE — G0463 HOSPITAL OUTPT CLINIC VISIT: HCPCS | Performed by: PHYSICIAN ASSISTANT

## 2019-12-30 RX ORDER — AZITHROMYCIN 250 MG/1
TABLET, FILM COATED ORAL
Qty: 6 TABLET | Refills: 0 | Status: SHIPPED | OUTPATIENT
Start: 2019-12-30 | End: 2020-01-02 | Stop reason: ALTCHOICE

## 2019-12-30 NOTE — TELEPHONE ENCOUNTER
As a general rule - patient will not be advised to proceed with any MS infusions until she is well and 7 days after she completes her antibacterial therapy  Based on the phone encounter, patient is sick, she would be advised re-scheduling Alton Gastelum, not a problem  Patient is to follow with PCP or UC TODAY for her treatment while she is on cytotoxic therapy for MS

## 2019-12-30 NOTE — PROGRESS NOTES
[facility]        NAME: Gena White is a 71 y o  female  : 1955    MRN: [id number]  DATE: 2019  TIME: 5:47 PM    Assessment and Plan   Acute maxillary sinusitis, recurrence not specified [J01 00]  1  Acute maxillary sinusitis, recurrence not specified  azithromycin (ZITHROMAX) 250 mg tablet         Patient Instructions       Follow up with PCP in 3-5 days  Proceed to  ER if symptoms worsen  Chief Complaint     Chief Complaint   Patient presents with    Cough     head congestion and chest congestion it started today   Headache         History of Present Illness       Patient for evaluation congestion, sinus pressure, mucopurulent drainage, congestion  Review of Systems   Review of Systems   Constitutional: Negative  HENT: Positive for congestion, postnasal drip, sinus pressure and sore throat  Negative for ear pain, rhinorrhea, sinus pain, trouble swallowing and voice change  Eyes: Negative  Respiratory: Negative for cough, shortness of breath and wheezing  Cardiovascular: Negative            Current Medications       Current Outpatient Medications:     albuterol (2 5 mg/3 mL) 0 083 % nebulizer solution, Take 1 vial (2 5 mg total) by nebulization every 4 (four) hours as needed for wheezing or shortness of breath (cough), Disp: 120 vial, Rfl: 2    albuterol (PROAIR HFA) 90 mcg/act inhaler, Inhale 2 puffs every 4 (four) hours as needed for wheezing or shortness of breath, Disp: 3 Inhaler, Rfl: 1    aspirin (ECOTRIN LOW STRENGTH) 81 mg EC tablet, Take 1 tablet by mouth daily, Disp: , Rfl:     atorvastatin (LIPITOR) 40 mg tablet, Take 1 tablet (40 mg total) by mouth daily, Disp: 90 tablet, Rfl: 3    B Complex-C CAPS, Take by mouth, Disp: , Rfl:     BL EVENING PRIMROSE OIL PO, Take by mouth, Disp: , Rfl:     budesonide (PULMICORT) 0 5 mg/2 mL nebulizer solution, inhale contents of 1 vial in nebulizer twice a day, Disp: , Rfl: 0    ergocalciferol (VITAMIN D2) 50,000 units, Take 1 capsule (50,000 Units total) by mouth once a week, Disp: 8 capsule, Rfl: 0    ezetimibe (ZETIA) 10 mg tablet, Take 1 tablet (10 mg total) by mouth daily, Disp: 90 tablet, Rfl: 3    Flaxseed, Linseed, (FLAXSEED OIL PO), Take by mouth, Disp: , Rfl:     fluticasone (FLONASE) 50 mcg/act nasal spray, USE 2 SPRAYS IN EACH  NOSTRIL DAILY, Disp: 48 g, Rfl: 1    fluticasone-vilanterol (BREO ELLIPTA) 200-25 MCG/INH inhaler, Inhale 1 puff daily Rinse mouth after use , Disp: 3 Inhaler, Rfl: 3    gabapentin (NEURONTIN) 300 mg capsule, Take 1 capsule (300 mg total) by mouth 2 (two) times a day, Disp: 180 capsule, Rfl: 3    levocetirizine (XYZAL) 5 MG tablet, Take 1 tablet (5 mg total) by mouth every evening, Disp: 90 tablet, Rfl: 1    modafinil (PROVIGIL) 100 mg tablet, Take 1 tablet (100 mg total) by mouth daily, Disp: 30 tablet, Rfl: 0    montelukast (SINGULAIR) 10 mg tablet, TAKE 1 TABLET BY MOUTH  DAILY AT BEDTIME, Disp: 90 tablet, Rfl: 1    Ocrelizumab (OCREVUS IV), Infuse into a venous catheter, Disp: , Rfl:     sertraline (ZOLOFT) 100 mg tablet, TAKE 1 TABLET BY MOUTH  DAILY, Disp: 90 tablet, Rfl: 1    azithromycin (ZITHROMAX) 250 mg tablet, Take 2 tablets once daily for 3 days, Disp: 6 tablet, Rfl: 0    Current Allergies     Allergies as of 12/30/2019 - Reviewed 12/30/2019   Allergen Reaction Noted    Bactrim [sulfamethoxazole-trimethoprim] Other (See Comments) 04/22/2012    Betadine [povidone iodine]  01/12/2016    Cefzil [cefprozil]  01/12/2016    Prempro [conj estrog-medroxyprogest ace] Other (See Comments) 07/15/2013    Zyban [bupropion]  01/12/2016    Levaquin [levofloxacin]  05/13/2019    Gadobutrol GI Intolerance 01/12/2016    Medroxyprogesterone Other (See Comments) 04/22/2012    Nortriptyline Other (See Comments) 07/15/2013    Pamelor [nortriptyline hcl]  01/12/2016    Provera [medroxyprogesterone acetate]  01/12/2016            The following portions of the patient's history were reviewed and updated as appropriate: allergies, current medications, past family history, past medical history, past social history, past surgical history and problem list      Past Medical History:   Diagnosis Date    Asthma     Degeneration of intervertebral disc at L5-S1 level     Depression     Full incontinence of feces     Generalized anxiety disorder     Hepatitis     at age 4 yrs   [de-identified] Herpes zoster     last assessed: 6/20/13    History of colon polyps     Hypercholesterolemia     Hyperlipidemia     [facility]     [facility]     Osteoporosis     Seasonal allergies     Tumor of breast     benign, right breast    Urinary incontinence        Past Surgical History:   Procedure Laterality Date    BREAST BIOPSY Right     benign    BREAST SURGERY      right breast fibroid tumor resection    COLONOSCOPY      11/2013 - due in 2018 - Dr Crisostomo    FL LUMBAR PUNCTURE  10/1/2018    ROTATOR CUFF REPAIR      Bilateral    ROTATOR CUFF REPAIR Bilateral     SEPTOPLASTY         Family History   Problem Relation Age of Onset    Arthritis Mother     Osteoporosis Mother     COPD Father     No Known Problems Maternal Grandmother     No Known Problems Maternal Grandfather     No Known Problems Paternal Grandmother     No Known Problems Paternal Grandfather     Arthritis Family     COPD Family     Emphysema Family     Heart disease Family     Hyperthyroidism Family     Multiple sclerosis Family     Osteoarthritis Family     No Known Problems Sister     No Known Problems Sister     Colon cancer Brother 72    No Known Problems Brother     No Known Problems Brother     No Known Problems Maternal Aunt     No Known Problems Maternal Aunt     No Known Problems Paternal Aunt          Medications have been verified          Objective   /97 (BP Location: Right arm, Patient Position: Sitting, Cuff Size: Large)   Pulse 97   Temp 99 1 °F (37 3 °C) (Temporal)   Resp 20   Ht 5' 4" (1 626 m)   Wt 73 kg (161 lb)   LMP  (LMP Unknown)   SpO2 96%   BMI 27 64 kg/m²        Physical Exam     Physical Exam   Constitutional: She is oriented to person, place, and time  She appears well-developed and well-nourished  No distress  HENT:   Head: Normocephalic and atraumatic  Right Ear: External ear normal    Left Ear: External ear normal    Mouth/Throat: No oropharyngeal exudate  Bilateral nasal congestion erythema with mucopurulent drainage  Bilateral maxillary sinus tenderness  Bilateral tonsillar erythema with no soft tissue swelling  No exudate  Eyes: Pupils are equal, round, and reactive to light  Conjunctivae and EOM are normal  Right eye exhibits no discharge  Left eye exhibits no discharge  Cardiovascular: Normal rate, regular rhythm and normal heart sounds  No murmur heard  Pulmonary/Chest: Effort normal and breath sounds normal  No stridor  No respiratory distress  She has no wheezes  She has no rales  Lymphadenopathy:     She has cervical adenopathy  Neurological: She is alert and oriented to person, place, and time  Skin: Skin is warm and dry  She is not diaphoretic  Psychiatric: She has a normal mood and affect  Her behavior is normal    Nursing note and vitals reviewed

## 2019-12-30 NOTE — TELEPHONE ENCOUNTER
Pt called stated that she is congested and does not feel well (sounded very congested over the phone), she has an appt with her PCP Thursday  She was questioning if she should go for her labs if she is sick because she doesn't want the labs to be off and make her have to reschedule her infusion  I made her aware the labs must be completed no later than 6 days prior to her infusion  I also made patient aware that she needs to make us aware if she is still feeling like this, as her infusion will most likely need to be rescheduled  Pt agreeable and will call us with an update  She is scheduled for next Wednesday

## 2020-01-02 ENCOUNTER — OFFICE VISIT (OUTPATIENT)
Dept: FAMILY MEDICINE CLINIC | Facility: CLINIC | Age: 65
End: 2020-01-02
Payer: MEDICARE

## 2020-01-02 VITALS
DIASTOLIC BLOOD PRESSURE: 88 MMHG | HEART RATE: 77 BPM | HEIGHT: 64 IN | WEIGHT: 160.6 LBS | SYSTOLIC BLOOD PRESSURE: 134 MMHG | TEMPERATURE: 96.4 F | OXYGEN SATURATION: 98 % | RESPIRATION RATE: 16 BRPM | BODY MASS INDEX: 27.42 KG/M2

## 2020-01-02 DIAGNOSIS — J32.9 SINUSITIS, UNSPECIFIED CHRONICITY, UNSPECIFIED LOCATION: ICD-10-CM

## 2020-01-02 DIAGNOSIS — R39.9 UTI SYMPTOMS: Primary | ICD-10-CM

## 2020-01-02 LAB
BACTERIA UR QL AUTO: ABNORMAL /HPF
BILIRUB UR QL STRIP: NEGATIVE
CLARITY UR: CLEAR
COLOR UR: YELLOW
GLUCOSE UR STRIP-MCNC: NEGATIVE MG/DL
HGB UR QL STRIP.AUTO: ABNORMAL
HYALINE CASTS #/AREA URNS LPF: ABNORMAL /LPF
KETONES UR STRIP-MCNC: NEGATIVE MG/DL
LEUKOCYTE ESTERASE UR QL STRIP: NEGATIVE
NITRITE UR QL STRIP: NEGATIVE
NON-SQ EPI CELLS URNS QL MICRO: ABNORMAL /HPF
PH UR STRIP.AUTO: 6 [PH]
PROT UR STRIP-MCNC: NEGATIVE MG/DL
RBC #/AREA URNS AUTO: ABNORMAL /HPF
SL AMB  POCT GLUCOSE, UA: ABNORMAL
SL AMB LEUKOCYTE ESTERASE,UA: ABNORMAL
SL AMB POCT BILIRUBIN,UA: ABNORMAL
SL AMB POCT BLOOD,UA: ABNORMAL
SL AMB POCT CLARITY,UA: CLEAR
SL AMB POCT COLOR,UA: YELLOW
SL AMB POCT KETONES,UA: ABNORMAL
SL AMB POCT NITRITE,UA: ABNORMAL
SL AMB POCT PH,UA: 5
SL AMB POCT SPECIFIC GRAVITY,UA: 1
SL AMB POCT URINE PROTEIN: ABNORMAL
SL AMB POCT UROBILINOGEN: 0.2
SP GR UR STRIP.AUTO: 1.01 (ref 1–1.03)
UROBILINOGEN UR QL STRIP.AUTO: 0.2 E.U./DL
WBC #/AREA URNS AUTO: ABNORMAL /HPF

## 2020-01-02 PROCEDURE — 81001 URINALYSIS AUTO W/SCOPE: CPT | Performed by: NURSE PRACTITIONER

## 2020-01-02 PROCEDURE — 87186 SC STD MICRODIL/AGAR DIL: CPT | Performed by: NURSE PRACTITIONER

## 2020-01-02 PROCEDURE — 99213 OFFICE O/P EST LOW 20 MIN: CPT | Performed by: NURSE PRACTITIONER

## 2020-01-02 PROCEDURE — 81002 URINALYSIS NONAUTO W/O SCOPE: CPT | Performed by: NURSE PRACTITIONER

## 2020-01-02 PROCEDURE — 87147 CULTURE TYPE IMMUNOLOGIC: CPT | Performed by: NURSE PRACTITIONER

## 2020-01-02 PROCEDURE — 87086 URINE CULTURE/COLONY COUNT: CPT | Performed by: NURSE PRACTITIONER

## 2020-01-02 PROCEDURE — 87077 CULTURE AEROBIC IDENTIFY: CPT | Performed by: NURSE PRACTITIONER

## 2020-01-02 NOTE — PROGRESS NOTES
FAMILY PRACTICE OFFICE VISIT       NAME: Can Kaufman  AGE: 71 y o  SEX: female       : 1955        MRN: [id number]    DATE: 2020    Assessment and Plan     Problem List Items Addressed This Visit     None      Visit Diagnoses     UTI symptoms    -  Primary    Relevant Orders    POCT urine dip (Completed)    Urinalysis with microscopic    Urine culture    Sinusitis, unspecified chronicity, unspecified location            1  UTI symptoms  POCT urine dip    Urinalysis with microscopic    Urine culture   2  Sinusitis, unspecified chronicity, unspecified location       This 59-year-old female with history of multiple sclerosis, COPD, hyperlipidemia, urinary incontinence and neurogenic bladder presents today for possible bladder infection  Yesterday she was having significant symptoms of dysuria, hematuria, and urgency  She was pushing fluids all day and eating yogurt  Today symptoms have resolved  She is currently being treated with Ocrevus, next infusion is scheduled next week  She wants to make sure she does not have a urinary tract infection prior to this infusion  In office urine dip is clear except for moderate amount of microscopic blood  She does have a history of microscopic blood in her urine  Will send urine culture and urinalysis  Will hold off on antibiotics until culture results return, given symptoms have resolved, and urine dip in office is unremarkable  If symptoms should return, she will call  Office will call her with results of urine culture when available  Sinusitis treated at urgent care on  has significantly improved with a [place]  Chief Complaint     Chief Complaint   Patient presents with    Urinary Tract Infection       History of Present Illness     Rohith Mo is 59-year-old female presenting today for follow-up visit  She was evaluated at urgent care  for cold symptoms  Cold symptoms are feeling better with a [place]      Yesterday developed symptoms of bladder infection  Including urgency, dysuria, and hematuria  She pushed fluids yesterday, and was eating a lot of yogurt  Today symptoms are feeling better  Concerned she has a bladder infection, and is due for Ocrevus infusion next week, wants to make sure she does not have an infection  Review of Systems   Review of Systems   Constitutional: Negative for chills, diaphoresis, fatigue and fever  HENT: Negative for congestion, ear pain, postnasal drip, rhinorrhea, sore throat and voice change  Respiratory: Negative for cough, chest tightness, shortness of breath and wheezing  Cardiovascular: Negative for chest pain, palpitations and leg swelling     Genitourinary:        As noted in HPI       Active Problem List     Patient Active Problem List   Diagnosis    Multiple sclerosis, relapsing/remitting    Muscle spasm    Hyperlipidemia    Osteoporosis    Ambulatory dysfunction    COPD without exacerbation ([facility] [address] )    Vitamin D deficiency    Disc degeneration, lumbar    Major depression, recurrent, chronic (HCC)    Allergic rhinitis    Amnesia/memory disorder    Shoulder impingement syndrome, left    Abnormal CT of the chest    Special screening for malignant neoplasms, colon    Family history of colon cancer    Urinary incontinence    Balance disorder    Acute maxillary sinusitis       Past Medical History:  Past Medical History:   Diagnosis Date    Asthma     Degeneration of intervertebral disc at L5-S1 level     Depression     Full incontinence of feces     Generalized anxiety disorder     Hepatitis     at age 4 yrs    Herpes zoster     last assessed: 6/20/13    History of colon polyps     Hypercholesterolemia     Hyperlipidemia     MS (multiple sclerosis) ([facility] [address] [facility]     MS (multiple sclerosis) ([facility] [address] [facility]     Osteoporosis     Seasonal allergies     Tumor of breast     benign, right breast    Urinary incontinence        Past Surgical History:  Past Surgical History:   Procedure Laterality Date    BREAST BIOPSY Right     benign    BREAST SURGERY      right breast fibroid tumor resection    COLONOSCOPY      11/2013 - due in 2018 - Dr Crisostomo    FL LUMBAR PUNCTURE DIAGNOSTIC  10/1/2018    ROTATOR CUFF REPAIR      Bilateral    ROTATOR CUFF REPAIR Bilateral     SEPTOPLASTY         Family History:  Family History   Problem Relation Age of Onset    Arthritis Mother     Osteoporosis Mother     COPD Father     No Known Problems Maternal Grandmother     No Known Problems Maternal Grandfather     No Known Problems Paternal Grandmother     No Known Problems Paternal Grandfather     Arthritis Family     COPD Family     Emphysema Family     Heart disease Family     Hyperthyroidism Family     Multiple sclerosis Family     Osteoarthritis Family     No Known Problems Sister     No Known Problems Sister     Colon cancer Brother 72    No Known Problems Brother     No Known Problems Brother     No Known Problems Maternal Aunt     No Known Problems Maternal Aunt     No Known Problems Paternal Aunt        Social History:  Social History     Socioeconomic History    Marital status: /Civil Union     Spouse name: Not on file    Number of children: Not on file    Years of education: Not on file    Highest education level: Not on file   Occupational History    Occupation: Retired   Social Needs    Financial resource strain: Not on file    Food insecurity:     Worry: Not on file     Inability: Not on file    Transportation needs:     Medical: Not on file     Non-medical: Not on file   Tobacco Use    Smoking status: Current Every Day Smoker     Packs/day: 1 00     Years: 40 00     Pack years: 40 00     Types: Cigarettes    Smokeless tobacco: Never Used   Substance and Sexual Activity    Alcohol use: Yes     Frequency: Monthly or less     Drinks per session: 1 or 2     Binge frequency: Never     Comment: occasional; [employer] also states Never drank alcohol    Drug use: No    Sexual activity: Yes     Partners: Male     Comment: denied any risk of AIDS   Lifestyle    Physical activity:     Days per week: Not on file     Minutes per session: Not on file    Stress: Not on file   Relationships    Social connections:     Talks on phone: Not on file     Gets together: Not on file     Attends Rastafarian service: Not on file     Active member of club or organization: Not on file     Attends meetings of clubs or organizations: Not on file     Relationship status: Not on file    Intimate partner violence:     Fear of current or ex partner: Not on file     Emotionally abused: Not on file     Physically abused: Not on file     Forced sexual activity: Not on file   Other Topics Concern    Not on file   Social History Narrative           I have reviewed the patient's medical history in detail; there are no changes to the history as noted in the electronic medical record  Objective     Vitals:    01/02/20 0932   BP: 134/88   BP Location: Left arm   Patient Position: Sitting   Cuff Size: Standard   Pulse: 77   Resp: 16   Temp: (!) 96 4 °F (35 8 °C)   TempSrc: Tympanic   SpO2: 98%   Weight: 72 8 kg (160 lb 9 6 oz)   Height: 5' 4" (1 626 m)     Wt Readings from Last 3 Encounters:   01/02/20 72 8 kg (160 lb 9 6 oz)   12/30/19 73 kg (161 lb)   12/23/19 72 6 kg (160 lb)     Physical Exam   Constitutional: She appears well-developed and well-nourished  No distress  HENT:   Head: Normocephalic and atraumatic  Right Ear: Tympanic membrane normal    Left Ear: Tympanic membrane normal    Nose: Nose normal    Mouth/Throat: Uvula is midline and oropharynx is clear and moist    Eyes: Conjunctivae are normal    Neck: Normal range of motion  Neck supple  Cardiovascular: Normal rate, regular rhythm and normal heart sounds  Pulmonary/Chest: Effort normal and breath sounds normal    Abdominal: Soft  Bowel sounds are normal  There is no tenderness  There is no CVA tenderness  Musculoskeletal: She exhibits no edema  Lymphadenopathy:     She has no cervical adenopathy  Psychiatric: She has a normal mood and affect  Nursing note and vitals reviewed  ALLERGIES:  Allergies   Allergen Reactions    Bactrim [Sulfamethoxazole-Trimethoprim] Other (See Comments)     Reaction Date: 11Aug2011;     Betadine [Povidone Iodine]     Cefzil [Cefprozil]     Prempro [Conj Estrog-Medroxyprogest Ace] Other (See Comments)     Other      Zyban [Bupropion]     Levaquin [Levofloxacin]     Gadobutrol GI Intolerance    Medroxyprogesterone Other (See Comments)     Reaction Date: 11Aug2011;     Nortriptyline Other (See Comments)     n/a    Pamelor [Nortriptyline Hcl]     Provera [Medroxyprogesterone Acetate]        Current Medications     Current Outpatient Medications   Medication Sig Dispense Refill    albuterol (2 5 mg/3 mL) 0 083 % nebulizer solution Take 1 vial (2 5 mg total) by nebulization every 4 (four) hours as needed for wheezing or shortness of breath (cough) 120 vial 2    albuterol (PROAIR HFA) 90 mcg/act inhaler Inhale 2 puffs every 4 (four) hours as needed for wheezing or shortness of breath 3 Inhaler 1    aspirin (ECOTRIN LOW STRENGTH) 81 mg EC tablet Take 1 tablet by mouth daily      atorvastatin (LIPITOR) 40 mg tablet Take 1 tablet (40 mg total) by mouth daily 90 tablet 3    B Complex-C CAPS Take by mouth      BL EVENING PRIMROSE OIL PO Take by mouth      budesonide (PULMICORT) 0 5 mg/2 mL nebulizer solution inhale contents of 1 vial in nebulizer twice a day  0    ezetimibe (ZETIA) 10 mg tablet Take 1 tablet (10 mg total) by mouth daily 90 tablet 3    Flaxseed, Linseed, (FLAXSEED OIL PO) Take by mouth      fluticasone (FLONASE) 50 mcg/act nasal spray USE 2 SPRAYS IN EACH  NOSTRIL DAILY 48 g 1    fluticasone-vilanterol (BREO ELLIPTA) 200-25 MCG/INH inhaler Inhale 1 puff daily Rinse mouth after use   3 Inhaler 3    gabapentin (NEURONTIN) 300 mg capsule Take 1 capsule (300 mg total) by mouth 2 (two) times a day 180 capsule 3    levocetirizine (XYZAL) 5 MG tablet Take 1 tablet (5 mg total) by mouth every evening 90 tablet 1    modafinil (PROVIGIL) 100 mg tablet Take 1 tablet (100 mg total) by mouth daily 30 tablet 0    montelukast (SINGULAIR) 10 mg tablet TAKE 1 TABLET BY MOUTH  DAILY AT BEDTIME 90 tablet 1    Ocrelizumab (OCREVUS IV) Infuse into a venous catheter      sertraline (ZOLOFT) 100 mg tablet TAKE 1 TABLET BY MOUTH  DAILY 90 tablet 1    ergocalciferol (VITAMIN D2) 50,000 units Take 1 capsule (50,000 Units total) by mouth once a week 8 capsule 0     No current facility-administered medications for this visit            Health Maintenance     Health Maintenance   Topic Date Due    DTaP,Tdap,and Td Vaccines (1 - Tdap) 09/28/1965    HIV Screening  09/28/1969    Falls: Plan of Care  09/28/2019    PT PLAN OF CARE  10/09/2019    SLP PLAN OF CARE  11/08/2019    Urinary Incontinence Screening  03/14/2020    Medicare Annual Wellness Visit (AWV)  03/14/2020    Cervical Cancer Screening  07/26/2020    BMI: Followup Plan  09/20/2020    Pneumococcal Vaccine: 65+ Years (2 of 2 - PPSV23) 09/29/2020    Fall Risk  10/09/2020    BMI: Adult  01/02/2021    MAMMOGRAM  10/28/2021    CRC Screening: Colonoscopy  06/12/2022    Hepatitis C Screening  Completed    Influenza Vaccine  Completed    Pneumococcal Vaccine: Pediatrics (0 to 5 Years) and At-Risk Patients (6 to 59 Years)  Aged Out    HIB Vaccine  Aged Out    Hepatitis B Vaccine  Aged Out    IPV Vaccine  Aged Out    Hepatitis A Vaccine  Aged Out    Meningococcal ACWY Vaccine  Aged Out    HPV Vaccine  Aged [employer] History   Administered Date(s) Administered    INFLUENZA 09/26/2011    Influenza Quadrivalent Preservative Free 3 years and older IM 09/29/2015, 09/22/2016, 09/08/2017    Influenza TIV (IM) 01/17/2013, 09/03/2013, 09/23/2014    Influenza, high dose seasonal 0 5 mL 09/30/2019    Influenza, recombinant, quadrivalent,injectable, preservative free 09/13/2018    Pneumococcal Conjugate 13-Valent 10/22/2018    Pneumococcal Polysaccharide PPV23 10/01/2007, 09/29/2015    Td (adult), adsorbed 06/02/2016       JUSTINE Venegas

## 2020-01-02 NOTE — PLAN OF CARE
Problem: Knowledge Deficit  Goal: Patient/family/caregiver demonstrates understanding of disease process, treatment plan, medications, and discharge instructions  Complete learning assessment and assess knowledge base    Interventions:  - Provide teaching at level of understanding  - Provide teaching via preferred learning methods   Outcome: Progressing
Yes

## 2020-01-03 NOTE — TELEPHONE ENCOUNTER
Correct, if there are no concern for any infectious process, including urine Cx, let the patient proceed with infusion

## 2020-01-03 NOTE — TELEPHONE ENCOUNTER
Reviewed note  No mention of cold sx in PCP note  Possible UTI; likely resolved  No abx since sx have resolved per pt  They aware awaiting urine culture results  I LMOM for pt to return call  Pt has not yet completed pre-Ocrevus labs and need to remind her  Dr Shary Habermann, if culture negative, ok to proceed with Patricia Sloan pending her CBC & CMP?

## 2020-01-04 LAB — BACTERIA UR CULT: ABNORMAL

## 2020-01-06 ENCOUNTER — TELEPHONE (OUTPATIENT)
Dept: FAMILY MEDICINE CLINIC | Facility: CLINIC | Age: 65
End: 2020-01-06

## 2020-01-06 NOTE — RESULT ENCOUNTER NOTE
Please let patient know her urine culture has a very small amount of bacteria, that if she is asymptomatic, is not enough bacteria to be considered a  Urinary tract infection  If she has any urinary symptoms, then we need to treat her for a UTI  Please let me know if she currently has any urinary symptoms

## 2020-01-06 NOTE — TELEPHONE ENCOUNTER
Patient called office to let us know she is now off medication and she will be getting labs done for infusion Wednesday  Recommended she also reach out to PCP regarding Urine culture (saw Boyd's prior message within this encounter) she states she has not yet heard anything from PCP regarding it, but will make contact  Patient asking if she will be okay to continue with infusion Wednesday

## 2020-01-06 NOTE — TELEPHONE ENCOUNTER
Please contact patient  Given she still has urinary urgency, and there is a small amount of bacteria in her urine, I would recommend treating her for a UTI  I see is allergic to many antibiotics, but has taken Cipro in the past  Does she tolerate this medication?      ----- Message from Karishma Gutierrez MA sent at 1/6/2020 10:11 AM EST -----  Spoke with patient and I gave her results and she is still having urinary urgency but no other symptoms  She has an infusion on Wednesday but if she has an infection she is not able to go  Please call to advise on what she should be doing

## 2020-01-07 DIAGNOSIS — N39.0 URINARY TRACT INFECTION WITHOUT HEMATURIA, SITE UNSPECIFIED: Primary | ICD-10-CM

## 2020-01-07 RX ORDER — CIPROFLOXACIN 500 MG/1
500 TABLET, FILM COATED ORAL EVERY 12 HOURS SCHEDULED
Qty: 6 TABLET | Refills: 0 | Status: SHIPPED | OUTPATIENT
Start: 2020-01-07 | End: 2020-01-10

## 2020-01-07 NOTE — TELEPHONE ENCOUNTER
Patient called and stated that she is ok with Cipro and that has worked in the past for her    Please call to advise when it is sent over to pharmacy

## 2020-01-07 NOTE — TELEPHONE ENCOUNTER
FYI     Pt returned call  She spoke with PCP who will prescribe Cipro  Pt aware that infusion will need to be rescheduled and labs need to be repeated  Pt has been tentatively rescheduled 1/23 at 8am  Will await pt's call as pt to inform me of date she starts abx

## 2020-01-07 NOTE — TELEPHONE ENCOUNTER
See PCP encounter from yesterday  They are looking to start abx  Sahil Hunt will need to be rescheduled  I called pt and left detailed message (per communication consent form) requesting pt call PCP ASAP  Also informed her we will need to reschedule infusion 7 days after abx completed  Requested pt call me back

## 2020-01-08 ENCOUNTER — HOSPITAL ENCOUNTER (OUTPATIENT)
Dept: INFUSION CENTER | Facility: HOSPITAL | Age: 65
End: 2020-01-08
Attending: PSYCHIATRY & NEUROLOGY

## 2020-01-09 DIAGNOSIS — N39.0 FREQUENT UTI: ICD-10-CM

## 2020-01-09 DIAGNOSIS — G35 MULTIPLE SCLEROSIS (HCC): Primary | Chronic | ICD-10-CM

## 2020-01-20 ENCOUNTER — TELEPHONE (OUTPATIENT)
Dept: NEUROLOGY | Facility: CLINIC | Age: 65
End: 2020-01-20

## 2020-01-20 ENCOUNTER — APPOINTMENT (OUTPATIENT)
Dept: LAB | Facility: CLINIC | Age: 65
End: 2020-01-20
Payer: MEDICARE

## 2020-01-20 DIAGNOSIS — E78.5 HYPERLIPIDEMIA, UNSPECIFIED HYPERLIPIDEMIA TYPE: Chronic | ICD-10-CM

## 2020-01-20 DIAGNOSIS — E55.9 VITAMIN D DEFICIENCY: ICD-10-CM

## 2020-01-20 LAB
25(OH)D3 SERPL-MCNC: 27.1 NG/ML (ref 30–100)
ALBUMIN SERPL BCP-MCNC: 4 G/DL (ref 3.5–5)
ALP SERPL-CCNC: 109 U/L (ref 46–116)
ALT SERPL W P-5'-P-CCNC: 35 U/L (ref 12–78)
AST SERPL W P-5'-P-CCNC: 17 U/L (ref 5–45)
BACTERIA UR QL AUTO: ABNORMAL /HPF
BILIRUB DIRECT SERPL-MCNC: 0.09 MG/DL (ref 0–0.2)
BILIRUB SERPL-MCNC: 0.35 MG/DL (ref 0.2–1)
BILIRUB UR QL STRIP: NEGATIVE
CHOLEST SERPL-MCNC: 246 MG/DL (ref 50–200)
CLARITY UR: CLEAR
COLOR UR: YELLOW
GLUCOSE UR STRIP-MCNC: NEGATIVE MG/DL
HDLC SERPL-MCNC: 39 MG/DL
HGB UR QL STRIP.AUTO: ABNORMAL
KETONES UR STRIP-MCNC: NEGATIVE MG/DL
LDLC SERPL CALC-MCNC: 149 MG/DL (ref 0–100)
LEUKOCYTE ESTERASE UR QL STRIP: NEGATIVE
NITRITE UR QL STRIP: NEGATIVE
NON-SQ EPI CELLS URNS QL MICRO: ABNORMAL /HPF
PH UR STRIP.AUTO: 6 [PH]
PROT SERPL-MCNC: 7.3 G/DL (ref 6.4–8.2)
PROT UR STRIP-MCNC: NEGATIVE MG/DL
RBC #/AREA URNS AUTO: ABNORMAL /HPF
SP GR UR STRIP.AUTO: 1.01 (ref 1–1.03)
TRIGL SERPL-MCNC: 291 MG/DL
UROBILINOGEN UR QL STRIP.AUTO: 0.2 E.U./DL
WBC #/AREA URNS AUTO: ABNORMAL /HPF

## 2020-01-20 PROCEDURE — 80076 HEPATIC FUNCTION PANEL: CPT

## 2020-01-20 PROCEDURE — 80061 LIPID PANEL: CPT

## 2020-01-20 PROCEDURE — 36415 COLL VENOUS BLD VENIPUNCTURE: CPT

## 2020-01-20 PROCEDURE — 82306 VITAMIN D 25 HYDROXY: CPT

## 2020-01-20 PROCEDURE — 81001 URINALYSIS AUTO W/SCOPE: CPT | Performed by: NURSE PRACTITIONER

## 2020-01-20 NOTE — TELEPHONE ENCOUNTER
----- Message from Kaylin Davis [place] sent at 1/20/2020  2:14 PM EST -----  Please call patient let her know her UA looks good, no evidence of UTI at present

## 2020-01-21 ENCOUNTER — TRANSCRIBE ORDERS (OUTPATIENT)
Dept: LAB | Facility: CLINIC | Age: 65
End: 2020-01-21

## 2020-01-21 ENCOUNTER — APPOINTMENT (OUTPATIENT)
Dept: LAB | Facility: CLINIC | Age: 65
End: 2020-01-21
Payer: MEDICARE

## 2020-01-21 DIAGNOSIS — G35 MULTIPLE SCLEROSIS (HCC): ICD-10-CM

## 2020-01-21 LAB
ALBUMIN SERPL BCP-MCNC: 3.8 G/DL (ref 3.5–5)
ALP SERPL-CCNC: 110 U/L (ref 46–116)
ALT SERPL W P-5'-P-CCNC: 34 U/L (ref 12–78)
ANION GAP SERPL CALCULATED.3IONS-SCNC: 9 MMOL/L (ref 4–13)
AST SERPL W P-5'-P-CCNC: 16 U/L (ref 5–45)
BASOPHILS # BLD AUTO: 0.04 THOUSANDS/ΜL (ref 0–0.1)
BASOPHILS NFR BLD AUTO: 0 % (ref 0–1)
BILIRUB SERPL-MCNC: 0.22 MG/DL (ref 0.2–1)
BUN SERPL-MCNC: 17 MG/DL (ref 5–25)
CALCIUM SERPL-MCNC: 8.6 MG/DL (ref 8.3–10.1)
CHLORIDE SERPL-SCNC: 106 MMOL/L (ref 100–108)
CO2 SERPL-SCNC: 28 MMOL/L (ref 21–32)
CREAT SERPL-MCNC: 0.89 MG/DL (ref 0.6–1.3)
EOSINOPHIL # BLD AUTO: 0.24 THOUSAND/ΜL (ref 0–0.61)
EOSINOPHIL NFR BLD AUTO: 2 % (ref 0–6)
ERYTHROCYTE [DISTWIDTH] IN BLOOD BY AUTOMATED COUNT: 11.7 % (ref 11.6–15.1)
GFR SERPL CREATININE-BSD FRML MDRD: 68 ML/MIN/1.73SQ M
GLUCOSE SERPL-MCNC: 115 MG/DL (ref 65–140)
HCT VFR BLD AUTO: 40.4 % (ref 34.8–46.1)
HGB BLD-MCNC: 13.7 G/DL (ref 11.5–15.4)
IMM GRANULOCYTES # BLD AUTO: 0.03 THOUSAND/UL (ref 0–0.2)
IMM GRANULOCYTES NFR BLD AUTO: 0 % (ref 0–2)
LYMPHOCYTES # BLD AUTO: 2.34 THOUSANDS/ΜL (ref 0.6–4.47)
LYMPHOCYTES NFR BLD AUTO: 24 % (ref 14–44)
MCH RBC QN AUTO: 30.1 PG (ref 26.8–34.3)
MCHC RBC AUTO-ENTMCNC: 33.9 G/DL (ref 31.4–37.4)
MCV RBC AUTO: 89 FL (ref 82–98)
MONOCYTES # BLD AUTO: 0.67 THOUSAND/ΜL (ref 0.17–1.22)
MONOCYTES NFR BLD AUTO: 7 % (ref 4–12)
NEUTROPHILS # BLD AUTO: 6.5 THOUSANDS/ΜL (ref 1.85–7.62)
NEUTS SEG NFR BLD AUTO: 67 % (ref 43–75)
NRBC BLD AUTO-RTO: 0 /100 WBCS
PLATELET # BLD AUTO: 267 THOUSANDS/UL (ref 149–390)
PMV BLD AUTO: 9.7 FL (ref 8.9–12.7)
POTASSIUM SERPL-SCNC: 3.8 MMOL/L (ref 3.5–5.3)
PROT SERPL-MCNC: 7 G/DL (ref 6.4–8.2)
RBC # BLD AUTO: 4.55 MILLION/UL (ref 3.81–5.12)
SODIUM SERPL-SCNC: 143 MMOL/L (ref 136–145)
WBC # BLD AUTO: 9.82 THOUSAND/UL (ref 4.31–10.16)

## 2020-01-21 PROCEDURE — 80053 COMPREHEN METABOLIC PANEL: CPT

## 2020-01-21 PROCEDURE — 36415 COLL VENOUS BLD VENIPUNCTURE: CPT

## 2020-01-21 PROCEDURE — 85025 COMPLETE CBC W/AUTO DIFF WBC: CPT

## 2020-01-21 NOTE — TELEPHONE ENCOUNTER
Pt has Ocrevus scheduled 1/23  Pt did not have CBC and CMP done  Pt did have a hep function panel completed (PCP order)  I called lab, unfortunately lavender tube not used therefore cannot run CBC  Pt needs labs ASAP  Left pt detailed message

## 2020-01-21 NOTE — TELEPHONE ENCOUNTER
Took call from patient  Advised her that she needs to have labs done ASAP  Advised that they are in the system  She will go get the lab completed and verbalized understanding of the message

## 2020-01-22 ENCOUNTER — TELEPHONE (OUTPATIENT)
Dept: FAMILY MEDICINE CLINIC | Facility: CLINIC | Age: 65
End: 2020-01-22

## 2020-01-22 DIAGNOSIS — E78.5 HYPERLIPIDEMIA, UNSPECIFIED HYPERLIPIDEMIA TYPE: Chronic | ICD-10-CM

## 2020-01-22 RX ORDER — ATORVASTATIN CALCIUM 80 MG/1
80 TABLET, FILM COATED ORAL DAILY
Qty: 90 TABLET | Refills: 0 | Status: SHIPPED | OUTPATIENT
Start: 2020-01-22 | End: 2020-06-08 | Stop reason: SDUPTHER

## 2020-01-22 NOTE — TELEPHONE ENCOUNTER
Please contact patient  I received her blood work  Her cholesterol is high again  Liver function tests are normal     I advised to increase dose of atorvastatin from 40 mg once a day to 80 mg daily  I will send new prescription to the pharmacy    Thank you

## 2020-01-23 ENCOUNTER — TELEPHONE (OUTPATIENT)
Dept: NEUROLOGY | Facility: CLINIC | Age: 65
End: 2020-01-23

## 2020-01-23 ENCOUNTER — HOSPITAL ENCOUNTER (OUTPATIENT)
Dept: INFUSION CENTER | Facility: HOSPITAL | Age: 65
Discharge: HOME | End: 2020-01-23
Attending: PSYCHIATRY & NEUROLOGY
Payer: MEDICARE

## 2020-01-23 VITALS
SYSTOLIC BLOOD PRESSURE: 123 MMHG | HEART RATE: 78 BPM | DIASTOLIC BLOOD PRESSURE: 58 MMHG | TEMPERATURE: 98.8 F | RESPIRATION RATE: 18 BRPM

## 2020-01-23 DIAGNOSIS — G35 MULTIPLE SCLEROSIS (HCC): ICD-10-CM

## 2020-01-23 DIAGNOSIS — G35 MULTIPLE SCLEROSIS (HCC): Primary | ICD-10-CM

## 2020-01-23 PROCEDURE — 96413 CHEMO IV INFUSION 1 HR: CPT

## 2020-01-23 PROCEDURE — 96367 TX/PROPH/DG ADDL SEQ IV INF: CPT

## 2020-01-23 PROCEDURE — 96415 CHEMO IV INFUSION ADDL HR: CPT

## 2020-01-23 RX ORDER — ACETAMINOPHEN 325 MG/1
650 TABLET ORAL ONCE
Status: CANCELLED | OUTPATIENT
Start: 2020-07-21

## 2020-01-23 RX ORDER — SODIUM CHLORIDE 9 MG/ML
20 INJECTION, SOLUTION INTRAVENOUS ONCE
Status: CANCELLED | OUTPATIENT
Start: 2020-01-23

## 2020-01-23 RX ORDER — ACETAMINOPHEN 325 MG/1
650 TABLET ORAL ONCE
Status: COMPLETED | OUTPATIENT
Start: 2020-01-23 | End: 2020-01-23

## 2020-01-23 RX ORDER — SODIUM CHLORIDE 9 MG/ML
20 INJECTION, SOLUTION INTRAVENOUS ONCE
Status: COMPLETED | OUTPATIENT
Start: 2020-01-23 | End: 2020-01-23

## 2020-01-23 RX ORDER — SODIUM CHLORIDE 9 MG/ML
20 INJECTION, SOLUTION INTRAVENOUS ONCE
Status: CANCELLED | OUTPATIENT
Start: 2020-07-21

## 2020-01-23 RX ORDER — ACETAMINOPHEN 325 MG/1
650 TABLET ORAL ONCE
Status: CANCELLED | OUTPATIENT
Start: 2020-01-23

## 2020-01-23 RX ADMIN — OCRELIZUMAB 600 MG: 300 INJECTION INTRAVENOUS at 12:25

## 2020-01-23 RX ADMIN — ACETAMINOPHEN 650 MG: 325 TABLET ORAL at 11:10

## 2020-01-23 RX ADMIN — DIPHENHYDRAMINE HYDROCHLORIDE 50 MG: 50 INJECTION, SOLUTION INTRAMUSCULAR; INTRAVENOUS at 10:37

## 2020-01-23 RX ADMIN — SODIUM CHLORIDE 100 MG: 0.9 INJECTION, SOLUTION INTRAVENOUS at 11:12

## 2020-01-23 RX ADMIN — SODIUM CHLORIDE 20 ML/HR: 0.9 INJECTION, SOLUTION INTRAVENOUS at 09:45

## 2020-01-23 RX ADMIN — FAMOTIDINE 20 MG: 10 INJECTION INTRAVENOUS at 10:10

## 2020-01-23 NOTE — PROGRESS NOTES
Patient tolerated treatment as ordered without incident  Currently under observation for one hour post infusion

## 2020-01-23 NOTE — PROGRESS NOTES
Spoke with Fredy Guo regarding the treatment date on the orders being outside the tolerance range and she stated she would get the orders corrected

## 2020-01-23 NOTE — PLAN OF CARE
Problem: Potential for Falls  Goal: Patient will remain free of falls  Description  INTERVENTIONS:  - Assess patient frequently for physical needs  -  Identify cognitive and physical deficits and behaviors that affect risk of falls    -  [place] fall precautions as indicated by assessment   - Educate patient/family on patient safety including physical limitations  - Instruct patient to call for assistance with activity based on assessment  - Modify environment to reduce risk of injury  - Consider OT/PT consult to assist with strengthening/mobility  Outcome: Progressing

## 2020-01-23 NOTE — TELEPHONE ENCOUNTER
Hina from Infusion called to state today's infusion orders need to be changed to today since they are out of the 7 window  Pt is at infusion now  Made Paty caro

## 2020-02-10 ENCOUNTER — TELEPHONE (OUTPATIENT)
Dept: NEUROLOGY | Facility: CLINIC | Age: 65
End: 2020-02-10

## 2020-02-10 NOTE — TELEPHONE ENCOUNTER
Patient c/o having diarrhea x one week with loss of appetite and discomfort near her belly button (denies any real pain)  Denies n/v  Denies fever or any other sxs  Denies any med changes  Patient admits to having IBS when she was younger  Advised she contact PCP regarding this and be sure to stay hydrated  Patient stated she will contact PCP now

## 2020-02-10 NOTE — TELEPHONE ENCOUNTER
I agree with a plan - had completed Mayi Parmar recently, with normal liver function and blood work in general prior to infusion   Further work up is with primary care team

## 2020-02-11 ENCOUNTER — TELEPHONE (OUTPATIENT)
Dept: FAMILY MEDICINE CLINIC | Facility: CLINIC | Age: 65
End: 2020-02-11

## 2020-02-11 ENCOUNTER — OFFICE VISIT (OUTPATIENT)
Dept: FAMILY MEDICINE CLINIC | Facility: CLINIC | Age: 65
End: 2020-02-11
Payer: MEDICARE

## 2020-02-11 ENCOUNTER — HOSPITAL ENCOUNTER (OUTPATIENT)
Dept: CT IMAGING | Facility: HOSPITAL | Age: 65
Discharge: HOME | End: 2020-02-11
Payer: MEDICARE

## 2020-02-11 ENCOUNTER — TELEPHONE (OUTPATIENT)
Dept: OTHER | Facility: OTHER | Age: 65
End: 2020-02-11

## 2020-02-11 VITALS
HEART RATE: 88 BPM | BODY MASS INDEX: 26.73 KG/M2 | SYSTOLIC BLOOD PRESSURE: 124 MMHG | OXYGEN SATURATION: 98 % | DIASTOLIC BLOOD PRESSURE: 82 MMHG | TEMPERATURE: 96.1 F | WEIGHT: 156.6 LBS | HEIGHT: 64 IN | RESPIRATION RATE: 16 BRPM

## 2020-02-11 DIAGNOSIS — R19.7 DIARRHEA IN ADULT PATIENT: ICD-10-CM

## 2020-02-11 DIAGNOSIS — G35 MULTIPLE SCLEROSIS (HCC): Chronic | ICD-10-CM

## 2020-02-11 DIAGNOSIS — R10.84 GENERALIZED ABDOMINAL PAIN: ICD-10-CM

## 2020-02-11 DIAGNOSIS — R10.84 GENERALIZED ABDOMINAL PAIN: Primary | ICD-10-CM

## 2020-02-11 PROCEDURE — 1124F ACP DISCUSS-NO DSCNMKR DOCD: CPT | Performed by: FAMILY MEDICINE

## 2020-02-11 PROCEDURE — 4040F PNEUMOC VAC/ADMIN/RCVD: CPT | Performed by: FAMILY MEDICINE

## 2020-02-11 PROCEDURE — 99214 OFFICE O/P EST MOD 30 MIN: CPT | Performed by: FAMILY MEDICINE

## 2020-02-11 PROCEDURE — 4004F PT TOBACCO SCREEN RCVD TLK: CPT | Performed by: FAMILY MEDICINE

## 2020-02-11 PROCEDURE — 74177 CT ABD & PELVIS W/CONTRAST: CPT

## 2020-02-11 RX ADMIN — IOHEXOL 50 ML: 240 INJECTION, SOLUTION INTRATHECAL; INTRAVASCULAR; INTRAVENOUS; ORAL at 17:58

## 2020-02-11 RX ADMIN — IOHEXOL 100 ML: 350 INJECTION, SOLUTION INTRAVENOUS at 17:58

## 2020-02-11 NOTE — PROGRESS NOTES
FAMILY PRACTICE OFFICE VISIT       NAME: Tanner Kaufman  AGE: 71 y o  SEX: female       : 1955        MRN: [id number]        Assessment and Plan     Problem List Items Addressed This Visit        Nervous and Auditory    Multiple sclerosis, relapsing/remitting (Chronic)      Other Visit Diagnoses     Generalized abdominal pain    -  Primary    Relevant Orders    CT abdomen pelvis w contrast (Completed)    CBC and differential (Completed)    Comprehensive metabolic panel (Completed)    Lipase (Completed)    Urinalysis with microscopic (Completed)    Urine culture (Completed)    Diarrhea in adult patient        Relevant Orders    CT abdomen pelvis w contrast (Completed)    Fecal leukocytes    Stool Enteric Bacterial Panel by PCR (Completed)    Ova and parasite examination    Clostridium difficile toxin by PCR with EIA (Completed)       Patient with history of multiple sclerosis and IBS presents for evaluation of diarrhea x3 weeks  She denies symptoms of fever and admits to abdominal bloating  Patient denies symptoms of dysuria, flank pain or gross hematuria  Exam reveals generalized tenderness  Patient denies recent travel or any new medications  No recent antibiotic therapy aside from Z-Kingston in December and Cipro in early January  Patient denies symptoms of GERD per se and has not been using Protonix  She admits to symptoms of early satiety and decreased appetite  Plan:  · Stat CT abdomen pelvis  · Stat blood work including CBC with diff, CMP and lipase  · UA C&S  · Stool studies  · Will contact patient regarding diagnostic workup  · If testing is unremarkable-will consider treatment for IBS and referral to Gastroenterology for further evaluation and treatment  There are no Patient Instructions on file for this visit  Discussed with the patient and all questioned fully answered  She will call me if any problems arise  M*Modal software was used to dictate this note   It may contain errors with dictating incorrect words/spelling  Please contact provider directly with any questions  Chief Complaint     Chief Complaint   Patient presents with    Diarrhea     3 weeks    Rash     Right wrist       History of Present Illness      Diarrhea x 3 weeks     Loose BMs up to 6 per day   no recent  travel    No fever, feels fatigued   Increased daytime sleepiness    No dysuria    Abdominal  soreness, bloating     Early satiety, decreased appetite    Follows very light /bland diet    h/o IBS, no current medication   Patient has tried  [facility]   Patient remains under care of [facility] for treatment of multiple sclerosis  OCREVUS 1/12/2020- [facility] of [facility] and was advised to follow up with PCP  No BRBPR,  stool is described as watery , black at firs ( likely due to Pepto-Bismol therapy) now  "normal color"  Patient has tried [employer] and Imodium OTC   Patient loss 4 lbs since 1/6/2020    Patient reports rash right wrist that has resolved few days prior to onset of diarrhea and is non visualized on today's exam    Patient was treated with Z-Kingston on December 30th by urgent care center and subsequently developed UTI and was treated with Cipro in early January  No other recent antibiotics    Patient has not been using Protonix for acid reflux symptoms, denies burping or belching    Diarrhea    Associated symptoms include arthralgias  Pertinent negatives include no fever  Rash   Associated symptoms include diarrhea and fatigue  Pertinent negatives include no fever  Review of Systems   Review of Systems   Constitutional: Positive for fatigue  Negative for fever  HENT: Negative  Eyes: Negative  Respiratory: Negative  Cardiovascular: Negative  Gastrointestinal: Positive for abdominal distention and diarrhea  Endocrine: Negative  Genitourinary: Negative  Negative for dysuria, flank pain and hematuria  Chronic symptoms of intermittent urinary incontinence   Musculoskeletal: Positive for arthralgias  Skin: Negative  Negative for rash  Neurological: Negative  Psychiatric/Behavioral: Positive for decreased concentration  The patient is nervous/anxious          Active Problem List     Patient Active Problem List   Diagnosis    Multiple sclerosis, relapsing/remitting    Muscle spasm    Hyperlipidemia    Osteoporosis    Ambulatory dysfunction    COPD without exacerbation [facility] [address] [facility]    Vitamin D deficiency    Disc degeneration, lumbar    Major depression, recurrent, chronic (HCC)    Allergic rhinitis    Amnesia/memory disorder    Shoulder impingement syndrome, left    Abnormal CT of the chest    Special screening for malignant neoplasms, colon    Family history of colon cancer    Urinary incontinence    Balance disorder    Acute maxillary sinusitis       Past Medical History:  Past Medical History:   Diagnosis Date    Asthma     Degeneration of intervertebral disc at L5-S1 level     Depression     Full incontinence of feces     Generalized anxiety disorder     Hepatitis     at age 4 yrs   Normie Glory Herpes zoster     last assessed: 6/20/13    History of colon polyps     Hypercholesterolemia     Hyperlipidemia     MS (multiple sclerosis) ([facility] [address] [facility]     MS (multiple sclerosis) ([facility] [address] [facility]     Osteoporosis     Seasonal allergies     Tumor of breast     benign, right breast    Urinary incontinence        Past Surgical History:  Past Surgical History:   Procedure Laterality Date    BREAST BIOPSY Right     benign    BREAST SURGERY      right breast fibroid tumor resection    COLONOSCOPY      11/2013 - due in 2018 - Dr Crisostomo    FL LUMBAR PUNCTURE DIAGNOSTIC  10/1/2018    ROTATOR CUFF REPAIR      Bilateral    ROTATOR CUFF REPAIR Bilateral     SEPTOPLASTY         Family History:  Family History   Problem Relation Age of Onset    Arthritis Mother     Osteoporosis Mother     COPD Father     No Known Problems Maternal Grandmother     No Known Problems Maternal Grandfather     No Known Problems Paternal Grandmother     No Known Problems Paternal Grandfather     Arthritis Family     COPD Family     Emphysema Family     Heart disease Family     Hyperthyroidism Family     Multiple sclerosis Family     Osteoarthritis Family     No Known Problems Sister     No Known Problems Sister     Colon cancer Brother 72    No Known Problems Brother     No Known Problems Brother     No Known Problems Maternal Aunt     No Known Problems Maternal Aunt     No Known Problems Paternal Aunt        Social History:  Social History     Socioeconomic History    Marital status: /Civil Union     Spouse name: Not on file    Number of children: Not on file    Years of education: Not on file    Highest education level: Not on file   Occupational History    Occupation: Retired   Social Needs    Financial resource strain: Not on file    Food insecurity:     Worry: Not on file     Inability: Not on file    Transportation needs:     Medical: Not on file     Non-medical: Not on file   Tobacco Use    Smoking status: Current Every Day Smoker     Packs/day: 1 00     Years: 40 00     Pack years: 40 00     Types: Cigarettes    Smokeless tobacco: Never Used   Substance and Sexual Activity    Alcohol use: Yes     Frequency: Monthly or less     Drinks per session: 1 or 2     Binge frequency: Never     Comment: occasional; [employer] also states Never drank alcohol    Drug use: No    Sexual activity: Yes     Partners: Male     Comment: denied any risk of AIDS   Lifestyle    Physical activity:     Days per week: Not on file     Minutes per session: Not on file    Stress: Not on file   Relationships    Social connections:     Talks on phone: Not on file     Gets together: Not on file     Attends [employer] service: Not on file     Active member of club or organization: Not on file     Attends meetings of clubs or organizations: Not on file     Relationship status: Not on file    Intimate partner violence:     Fear of current or ex partner: Not on file     Emotionally abused: Not on file     Physically abused: Not on file     Forced sexual activity: Not on file   Other Topics Concern    Not on file   Social History Narrative               Objective     Vitals:    02/11/20 1458   BP: 124/82   BP Location: Left arm   Patient Position: Sitting   Cuff Size: Adult   Pulse: 88   Resp: 16   Temp: (!) 96 1 °F (35 6 °C)   TempSrc: Tympanic   SpO2: 98%   Weight: 71 kg (156 lb 9 6 oz)   Height: 5' 4" (1 626 m)     Wt Readings from Last 3 Encounters:   02/11/20 71 kg (156 lb 9 6 oz)   01/02/20 72 8 kg (160 lb 9 6 oz)   12/30/19 73 kg (161 lb)       Physical Exam   Constitutional: She is oriented to person, place, and time  She appears well-developed and well-nourished  HENT:   Head: Normocephalic and atraumatic  Eyes: Conjunctivae are normal    Neck: Neck supple  Carotid bruit is not present  No thyromegaly present  Cardiovascular: Normal rate, regular rhythm and normal heart sounds  No murmur heard  Pulmonary/Chest: Effort normal and breath sounds normal  No respiratory distress  She has no wheezes  Abdominal: Soft  Normal appearance and bowel sounds are normal  She exhibits no abdominal bruit and no mass  There is tenderness in the right upper quadrant, right lower quadrant, epigastric area, suprapubic area and left lower quadrant  There is rebound  There is no guarding and no CVA tenderness  Musculoskeletal: Normal range of motion  She exhibits no edema  Neurological: She is alert and oriented to person, place, and time  No cranial nerve deficit  Coordination normal    Psychiatric: She has a normal mood and affect  Her behavior is normal    Nursing note and vitals reviewed        Pertinent Laboratory/Diagnostic Studies:  Lab Results   Component Value Date    GLUCOSE 107 12/15/2015    BUN 17 02/12/2020    CREATININE 0 66 02/12/2020    CALCIUM 8 6 02/12/2020     12/15/2015    K 3 7 02/12/2020    CO2 26 02/12/2020     02/12/2020     Lab Results   Component Value Date    ALT 32 02/12/2020    AST 12 02/12/2020    ALKPHOS 100 02/12/2020    BILITOT 0 30 12/15/2015       Lab Results   Component Value Date    WBC 7 95 02/12/2020    HGB 14 0 02/12/2020    HCT 39 7 02/12/2020    MCV 87 02/12/2020     02/12/2020       No results found for: TSH    Lab Results   Component Value Date    CHOL 169 07/21/2015     Lab Results   Component Value Date    TRIG 291 (H) 01/20/2020     Lab Results   Component Value Date    HDL 39 (L) 01/20/2020     Lab Results   Component Value Date    LDLCALC 149 (H) 01/20/2020     Lab Results   Component Value Date    HGBA1C 5 8 (H) 10/20/2015       Results for orders placed or performed in visit on 02/11/20   Urine culture   Result Value Ref Range    Urine Culture No Growth <1000 cfu/mL    Urinalysis with microscopic   Result Value Ref Range    Clarity, UA Clear     Color, UA Yellow     Specific [place], UA 1 020 1 003 - 1 030    pH, UA 5 5 4 5, 5 0, 5 5, 6 0, 6 5, 7 0, 7 5, 8 0    Glucose, UA Negative Negative mg/dl    Ketones, UA Negative Negative mg/dl    Blood, UA Moderate (A) Negative    Protein, UA Negative Negative mg/dl    Nitrite, UA Negative Negative    Bilirubin, UA Negative Negative    Urobilinogen, UA 0 2 0 2, 1 0 E U /dl E U /dl    Leukocytes, UA Negative Negative    WBC, UA 0-1 (A) None Seen, 0-5, 5-55, 5-65 /hpf    RBC, UA 1-2 (A) None Seen, 0-5 /hpf    Bacteria, UA Occasional None Seen, Occasional /hpf    Ca Oxalate Renee, UA Occasional (A) None Seen /hpf    Epithelial Cells Occasional None Seen, Occasional /hpf     *Note: Due to a large number of results and/or encounters for the requested time period, some results have not been displayed  A complete set of results can be found in Results Review         Orders Placed This Encounter   Procedures    Urine culture    Fecal leukocytes    Stool Enteric Bacterial Panel by PCR    Ova and parasite examination    Clostridium difficile toxin by PCR with EIA    CT abdomen pelvis w contrast    CBC and differential    Comprehensive metabolic panel    Lipase    Urinalysis with microscopic       ALLERGIES:  Allergies   Allergen Reactions    Bactrim [Sulfamethoxazole-Trimethoprim] Other (See Comments)     Reaction Date: 11Aug2011;     Betadine [Povidone Iodine]     Cefzil [Cefprozil]     Prempro [Conj Estrog-Medroxyprogest Ace] Other (See Comments)     Other      Zyban [Bupropion]     Levaquin [Levofloxacin]     Gadobutrol GI Intolerance    Medroxyprogesterone Other (See Comments)     Reaction Date: 11Aug2011;     Nortriptyline Other (See Comments)     n/a    Pamelor [Nortriptyline Hcl]     Provera [Medroxyprogesterone Acetate]        Current Medications     Current Outpatient Medications   Medication Sig Dispense Refill    albuterol (2 5 mg/3 mL) 0 083 % nebulizer solution Take 1 vial (2 5 mg total) by nebulization every 4 (four) hours as needed for wheezing or shortness of breath (cough) 120 vial 2    albuterol (PROAIR HFA) 90 mcg/act inhaler Inhale 2 puffs every 4 (four) hours as needed for wheezing or shortness of breath 3 Inhaler 1    aspirin (ECOTRIN LOW STRENGTH) 81 mg EC tablet Take 1 tablet by mouth daily      atorvastatin (LIPITOR) 80 mg tablet Take 1 tablet (80 mg total) by mouth daily 90 tablet 0    B Complex-C CAPS Take by mouth      BL EVENING PRIMROSE OIL PO Take by mouth      budesonide (PULMICORT) 0 5 mg/2 mL nebulizer solution inhale contents of 1 vial in nebulizer twice a day  0    ezetimibe (ZETIA) 10 mg tablet Take 1 tablet (10 mg total) by mouth daily 90 tablet 3    Flaxseed, Linseed, (FLAXSEED OIL PO) Take by mouth      fluticasone (FLONASE) 50 mcg/act nasal spray USE 2 SPRAYS IN EACH  NOSTRIL DAILY 48 g 1    fluticasone-vilanterol (BREO ELLIPTA) 200-25 MCG/INH inhaler Inhale 1 puff daily Rinse mouth after use  3 Inhaler 3    gabapentin (NEURONTIN) 300 mg capsule Take 1 capsule (300 mg total) by mouth 2 (two) times a day 180 capsule 3    levocetirizine (XYZAL) 5 MG tablet Take 1 tablet (5 mg total) by mouth every evening 90 tablet 1    modafinil (PROVIGIL) 100 mg tablet Take 1 tablet (100 mg total) by mouth daily 30 tablet 0    montelukast (SINGULAIR) 10 mg tablet TAKE 1 TABLET BY MOUTH  DAILY AT BEDTIME 90 tablet 1    Ocrelizumab (OCREVUS IV) Infuse into a venous catheter      sertraline (ZOLOFT) 100 mg tablet TAKE 1 TABLET BY MOUTH  DAILY 90 tablet 1    ergocalciferol (VITAMIN D2) 50,000 units Take 1 capsule (50,000 Units total) by mouth once a week 8 capsule 0     No current facility-administered medications for this visit  There are no discontinued medications      Health Maintenance     Health Maintenance   Topic Date Due    DTaP,Tdap,and Td Vaccines (1 - Tdap) 09/28/1965    HIV Screening  09/28/1969    Falls: Plan of Care  09/28/2019    PT PLAN OF CARE  10/09/2019    SLP PLAN OF CARE  11/08/2019    Medicare Annual Wellness Visit (AWV)  03/14/2020    Cervical Cancer Screening  07/26/2020    BMI: Followup Plan  09/20/2020    Pneumococcal Vaccine: 65+ Years (2 of 2 - PPSV23) 09/29/2020    Fall Risk  10/09/2020    BMI: Adult  02/11/2021    MAMMOGRAM  10/28/2021    CRC Screening: Colonoscopy  06/12/2022    Hepatitis C Screening  Completed    Influenza Vaccine  Completed    Pneumococcal Vaccine: Pediatrics (0 to 5 Years) and At-Risk Patients (6 to 59 Years)  Aged Out    HIB Vaccine  Aged Out    Hepatitis B Vaccine  Aged Out    IPV Vaccine  Aged Out    Hepatitis A Vaccine  Aged Out    Meningococcal ACWY Vaccine  Aged Out    HPV Vaccine  Aged [employer] History   Administered Date(s) Administered    INFLUENZA 09/26/2011    Influenza Quadrivalent Preservative Free 3 years and older IM 09/29/2015, 09/22/2016, 09/08/2017    Influenza TIV (IM) 01/17/2013, 09/03/2013, 09/23/2014    Influenza, high dose seasonal 0 5 mL 09/30/2019    Influenza, recombinant, quadrivalent,injectable, preservative free 09/13/2018    Pneumococcal Conjugate 13-Valent 10/22/2018    Pneumococcal Polysaccharide PPV23 10/01/2007, 09/29/2015    Td (adult), adsorbed 06/02/2016       Megan Low MD

## 2020-02-11 NOTE — TELEPHONE ENCOUNTER
Stat results    Tried calling house phone @ 6:43 no answer    Spoke to Tay Randall @ 7:08 office called [facility] radiology back

## 2020-02-12 ENCOUNTER — APPOINTMENT (OUTPATIENT)
Dept: LAB | Facility: CLINIC | Age: 65
End: 2020-02-12
Payer: MEDICARE

## 2020-02-12 DIAGNOSIS — R10.84 GENERALIZED ABDOMINAL PAIN: ICD-10-CM

## 2020-02-12 DIAGNOSIS — R19.7 DIARRHEA IN ADULT PATIENT: ICD-10-CM

## 2020-02-12 LAB
ALBUMIN SERPL BCP-MCNC: 4 G/DL (ref 3.5–5)
ALP SERPL-CCNC: 100 U/L (ref 46–116)
ALT SERPL W P-5'-P-CCNC: 32 U/L (ref 12–78)
ANION GAP SERPL CALCULATED.3IONS-SCNC: 11 MMOL/L (ref 4–13)
AST SERPL W P-5'-P-CCNC: 12 U/L (ref 5–45)
BACTERIA UR QL AUTO: ABNORMAL /HPF
BASOPHILS # BLD AUTO: 0.05 THOUSANDS/ΜL (ref 0–0.1)
BASOPHILS NFR BLD AUTO: 1 % (ref 0–1)
BILIRUB SERPL-MCNC: 0.38 MG/DL (ref 0.2–1)
BILIRUB UR QL STRIP: NEGATIVE
BUN SERPL-MCNC: 17 MG/DL (ref 5–25)
CALCIUM SERPL-MCNC: 8.6 MG/DL (ref 8.3–10.1)
CAOX CRY URNS QL MICRO: ABNORMAL /HPF
CHLORIDE SERPL-SCNC: 106 MMOL/L (ref 100–108)
CLARITY UR: CLEAR
CO2 SERPL-SCNC: 26 MMOL/L (ref 21–32)
COLOR UR: YELLOW
CREAT SERPL-MCNC: 0.66 MG/DL (ref 0.6–1.3)
EOSINOPHIL # BLD AUTO: 0.12 THOUSAND/ΜL (ref 0–0.61)
EOSINOPHIL NFR BLD AUTO: 2 % (ref 0–6)
ERYTHROCYTE [DISTWIDTH] IN BLOOD BY AUTOMATED COUNT: 11.8 % (ref 11.6–15.1)
GFR SERPL CREATININE-BSD FRML MDRD: 93 ML/MIN/1.73SQ M
GLUCOSE SERPL-MCNC: 113 MG/DL (ref 65–140)
GLUCOSE UR STRIP-MCNC: NEGATIVE MG/DL
HCT VFR BLD AUTO: 39.7 % (ref 34.8–46.1)
HGB BLD-MCNC: 14 G/DL (ref 11.5–15.4)
HGB UR QL STRIP.AUTO: ABNORMAL
IMM GRANULOCYTES # BLD AUTO: 0.02 THOUSAND/UL (ref 0–0.2)
IMM GRANULOCYTES NFR BLD AUTO: 0 % (ref 0–2)
KETONES UR STRIP-MCNC: NEGATIVE MG/DL
LEUKOCYTE ESTERASE UR QL STRIP: NEGATIVE
LIPASE SERPL-CCNC: 145 U/L (ref 73–393)
LYMPHOCYTES # BLD AUTO: 1.7 THOUSANDS/ΜL (ref 0.6–4.47)
LYMPHOCYTES NFR BLD AUTO: 21 % (ref 14–44)
MCH RBC QN AUTO: 30.8 PG (ref 26.8–34.3)
MCHC RBC AUTO-ENTMCNC: 35.3 G/DL (ref 31.4–37.4)
MCV RBC AUTO: 87 FL (ref 82–98)
MONOCYTES # BLD AUTO: 0.72 THOUSAND/ΜL (ref 0.17–1.22)
MONOCYTES NFR BLD AUTO: 9 % (ref 4–12)
NEUTROPHILS # BLD AUTO: 5.34 THOUSANDS/ΜL (ref 1.85–7.62)
NEUTS SEG NFR BLD AUTO: 67 % (ref 43–75)
NITRITE UR QL STRIP: NEGATIVE
NON-SQ EPI CELLS URNS QL MICRO: ABNORMAL /HPF
NRBC BLD AUTO-RTO: 0 /100 WBCS
PH UR STRIP.AUTO: 5.5 [PH]
PLATELET # BLD AUTO: 295 THOUSANDS/UL (ref 149–390)
PMV BLD AUTO: 9.7 FL (ref 8.9–12.7)
POTASSIUM SERPL-SCNC: 3.7 MMOL/L (ref 3.5–5.3)
PROT SERPL-MCNC: 6.9 G/DL (ref 6.4–8.2)
PROT UR STRIP-MCNC: NEGATIVE MG/DL
RBC # BLD AUTO: 4.54 MILLION/UL (ref 3.81–5.12)
RBC #/AREA URNS AUTO: ABNORMAL /HPF
SODIUM SERPL-SCNC: 143 MMOL/L (ref 136–145)
SP GR UR STRIP.AUTO: 1.02 (ref 1–1.03)
UROBILINOGEN UR QL STRIP.AUTO: 0.2 E.U./DL
WBC # BLD AUTO: 7.95 THOUSAND/UL (ref 4.31–10.16)
WBC #/AREA URNS AUTO: ABNORMAL /HPF

## 2020-02-12 PROCEDURE — 81001 URINALYSIS AUTO W/SCOPE: CPT | Performed by: FAMILY MEDICINE

## 2020-02-12 PROCEDURE — 87209 SMEAR COMPLEX STAIN: CPT

## 2020-02-12 PROCEDURE — 36415 COLL VENOUS BLD VENIPUNCTURE: CPT

## 2020-02-12 PROCEDURE — 80053 COMPREHEN METABOLIC PANEL: CPT

## 2020-02-12 PROCEDURE — 87177 OVA AND PARASITES SMEARS: CPT

## 2020-02-12 PROCEDURE — 87086 URINE CULTURE/COLONY COUNT: CPT | Performed by: FAMILY MEDICINE

## 2020-02-12 PROCEDURE — 89055 LEUKOCYTE ASSESSMENT FECAL: CPT

## 2020-02-12 PROCEDURE — 85025 COMPLETE CBC W/AUTO DIFF WBC: CPT

## 2020-02-12 PROCEDURE — 83690 ASSAY OF LIPASE: CPT

## 2020-02-12 PROCEDURE — 87505 NFCT AGENT DETECTION GI: CPT

## 2020-02-13 LAB
BACTERIA UR CULT: NORMAL
C DIFF TOX GENS STL QL NAA+PROBE: NEGATIVE
CAMPYLOBACTER DNA SPEC NAA+PROBE: NORMAL
SALMONELLA DNA SPEC QL NAA+PROBE: NORMAL
SHIGA TOXIN STX GENE SPEC NAA+PROBE: NORMAL
SHIGELLA DNA SPEC QL NAA+PROBE: NORMAL

## 2020-02-14 ENCOUNTER — TELEPHONE (OUTPATIENT)
Dept: FAMILY MEDICINE CLINIC | Facility: CLINIC | Age: 65
End: 2020-02-14

## 2020-02-14 DIAGNOSIS — K21.00 GASTRO-ESOPHAGEAL REFLUX DISEASE WITH ESOPHAGITIS: Primary | ICD-10-CM

## 2020-02-14 DIAGNOSIS — K58.0 IRRITABLE BOWEL SYNDROME WITH DIARRHEA: ICD-10-CM

## 2020-02-14 DIAGNOSIS — R32 URINARY INCONTINENCE, UNSPECIFIED TYPE: ICD-10-CM

## 2020-02-14 RX ORDER — DICYCLOMINE HYDROCHLORIDE 10 MG/1
10 CAPSULE ORAL 3 TIMES DAILY PRN
Qty: 45 CAPSULE | Refills: 1 | Status: SHIPPED | OUTPATIENT
Start: 2020-02-14 | End: 2020-03-16

## 2020-02-14 RX ORDER — PANTOPRAZOLE SODIUM 40 MG/1
40 TABLET, DELAYED RELEASE ORAL
Qty: 30 TABLET | Refills: 5 | Status: SHIPPED | OUTPATIENT
Start: 2020-02-14 | End: 2020-08-25 | Stop reason: ALTCHOICE

## 2020-02-14 NOTE — TELEPHONE ENCOUNTER
Please contact patient with update on her diagnostic testing  Will previously inform her that CT abdomen and pelvis and blood work were all normal   Stool culture is negative  Testing for C diff is negative as well  We have to outstanding stool tests for the time being  Patient's urinalysis reveals trace of blood but no evidence of urinary tract infection  Plan:  · I would like patient to start on medication for abdominal cramping and diarrhea, dicyclomine  · I would like patient to restart medicine for acid reflux, Protonix    I will send both prescriptions to the pharmacy  Patient should repeat urinalysis next week, she can drop off  urine at any [facility] lab, I placed orders  Please ask patient to contact us in 5-7 days if her symptoms will not be improving on medications that we are starting today    Thank you

## 2020-02-16 LAB — O+P STL CONC: NORMAL

## 2020-02-19 LAB — WBC SPEC QL GRAM STN: NORMAL

## 2020-02-24 ENCOUNTER — TELEPHONE (OUTPATIENT)
Dept: FAMILY MEDICINE CLINIC | Facility: CLINIC | Age: 65
End: 2020-02-24

## 2020-02-24 DIAGNOSIS — R19.7 DIARRHEA IN ADULT PATIENT: Primary | ICD-10-CM

## 2020-03-16 ENCOUNTER — OFFICE VISIT (OUTPATIENT)
Dept: FAMILY MEDICINE CLINIC | Facility: CLINIC | Age: 65
End: 2020-03-16
Payer: MEDICARE

## 2020-03-16 VITALS
WEIGHT: 158.4 LBS | HEART RATE: 69 BPM | BODY MASS INDEX: 27.04 KG/M2 | OXYGEN SATURATION: 98 % | DIASTOLIC BLOOD PRESSURE: 74 MMHG | SYSTOLIC BLOOD PRESSURE: 116 MMHG | HEIGHT: 64 IN | RESPIRATION RATE: 17 BRPM | TEMPERATURE: 97.3 F

## 2020-03-16 DIAGNOSIS — J30.9 ALLERGIC RHINITIS, UNSPECIFIED SEASONALITY, UNSPECIFIED TRIGGER: ICD-10-CM

## 2020-03-16 DIAGNOSIS — G35 MULTIPLE SCLEROSIS (HCC): Chronic | ICD-10-CM

## 2020-03-16 DIAGNOSIS — R10.84 ABDOMINAL PAIN, GENERALIZED: ICD-10-CM

## 2020-03-16 DIAGNOSIS — R93.89 ABNORMAL CT OF THE CHEST: Chronic | ICD-10-CM

## 2020-03-16 DIAGNOSIS — E55.9 VITAMIN D DEFICIENCY: ICD-10-CM

## 2020-03-16 DIAGNOSIS — J44.9 COPD WITHOUT EXACERBATION (HCC): Chronic | ICD-10-CM

## 2020-03-16 DIAGNOSIS — K21.00 GASTRO-ESOPHAGEAL REFLUX DISEASE WITH ESOPHAGITIS: Primary | ICD-10-CM

## 2020-03-16 DIAGNOSIS — K58.0 IRRITABLE BOWEL SYNDROME WITH DIARRHEA: ICD-10-CM

## 2020-03-16 DIAGNOSIS — E78.5 HYPERLIPIDEMIA, UNSPECIFIED HYPERLIPIDEMIA TYPE: Chronic | ICD-10-CM

## 2020-03-16 DIAGNOSIS — Z00.00 ENCOUNTER FOR MEDICARE ANNUAL WELLNESS EXAM: ICD-10-CM

## 2020-03-16 DIAGNOSIS — F33.9 MAJOR DEPRESSION, RECURRENT, CHRONIC (HCC): Chronic | ICD-10-CM

## 2020-03-16 PROCEDURE — G0439 PPPS, SUBSEQ VISIT: HCPCS | Performed by: FAMILY MEDICINE

## 2020-03-16 PROCEDURE — 99214 OFFICE O/P EST MOD 30 MIN: CPT | Performed by: FAMILY MEDICINE

## 2020-03-16 PROCEDURE — 4040F PNEUMOC VAC/ADMIN/RCVD: CPT | Performed by: FAMILY MEDICINE

## 2020-03-16 PROCEDURE — 4004F PT TOBACCO SCREEN RCVD TLK: CPT | Performed by: FAMILY MEDICINE

## 2020-03-16 RX ORDER — FLUTICASONE PROPIONATE 50 MCG
2 SPRAY, SUSPENSION (ML) NASAL DAILY
Qty: 48 G | Refills: 3 | Status: SHIPPED | OUTPATIENT
Start: 2020-03-16 | End: 2021-07-07

## 2020-03-16 RX ORDER — GABAPENTIN 300 MG/1
300 CAPSULE ORAL 2 TIMES DAILY
Qty: 180 CAPSULE | Refills: 3 | Status: SHIPPED | OUTPATIENT
Start: 2020-03-16 | End: 2020-12-02

## 2020-03-16 RX ORDER — OMEGA-3-ACID ETHYL ESTERS 1 G/1
2 CAPSULE, LIQUID FILLED ORAL 2 TIMES DAILY
Qty: 360 CAPSULE | Refills: 3 | Status: SHIPPED | OUTPATIENT
Start: 2020-03-16 | End: 2022-04-20

## 2020-03-16 RX ORDER — SUCRALFATE 1 G/1
1 TABLET ORAL
Qty: 60 TABLET | Refills: 0 | Status: SHIPPED | OUTPATIENT
Start: 2020-03-16 | End: 2020-08-13 | Stop reason: CLARIF

## 2020-03-16 NOTE — PROGRESS NOTES
FAMILY PRACTICE OFFICE VISIT       NAME: John Kaufman  AGE: 71 y o  SEX: female       : 1955        MRN: [id number]        Assessment and Plan     Problem List Items Addressed This Visit        Digestive    Gastro-esophageal reflux disease with esophagitis - Primary (Chronic)     · Continue Protonix 40 mg daily  · Will add Carafate 1 g t i d  for the next 10-14 days, short-term pending results of EGD in a few days, patient will follow-up with Dr Jonathan Dumont         Relevant Medications    sucralfate (CARAFATE) 1 g tablet    Other Relevant Orders    CBC and differential    Irritable bowel syndrome with diarrhea (Chronic)       Respiratory    COPD without exacerbation (HCC) (Chronic)    Relevant Medications    fluticasone (FLONASE) 50 mcg/act nasal spray    Other Relevant Orders    CT chest wo contrast    Allergic rhinitis    Relevant Medications    fluticasone (FLONASE) 50 mcg/act nasal spray       Nervous and Auditory    Multiple sclerosis, relapsing/remitting (Chronic)     · Patient remains under ongoing care of [facility]  · Last Ocrevus infusion is due in 2020         Relevant Medications    gabapentin (NEURONTIN) 300 mg capsule       Other    Hyperlipidemia (Chronic)     · Patient unfortunately self discontinue atorvastatin and Zetia  · I strongly advised her to restart Lipitor at 40 mg daily along with Zetia 10 mg daily, will restart fish oil capsules  · Patient will repeat blood work in 6 weeks         Relevant Medications    omega-3-acid ethyl esters (LOVAZA) 1 g capsule    Other Relevant Orders    Comprehensive metabolic panel    Lipid Panel with Direct LDL reflex    TSH, 3rd generation    Major depression, recurrent, chronic (HCC) (Chronic)     · Symptoms are stably well controlled on Zoloft 100 mg daily         Abnormal CT of the chest (Chronic)     · Patient has been followed with CT chest performed in 2018 and 2019   · [facility] consult, 2019    · I advised patient to repeat CT chest in July of 2020         Relevant Orders    CT chest wo contrast    Vitamin D deficiency    Relevant Orders    Vitamin D 25 hydroxy      Other Visit Diagnoses     Encounter for Medicare annual wellness exam        Abdominal pain, generalized        Relevant Medications    sucralfate (CARAFATE) 1 g tablet      Patient with complex medical history including COPD, multiple sclerosis, hyperlipidemia, GERD, IBS, depression/anxiety presents for follow-up of chronic medical conditions  · She has been experiencing recurrent symptoms of generalized abdominal pain associated with bloating and diarrhea  · Recent extensive workup including blood work and CT abdomen and pelvis was unremarkable  Stool testing reveals few leukocytes  Patient was evaluated by Gastroenterology, Dr Marc Muniz and is proceeding with EGD and colonoscopy later this week  · She will continue same daily medications  Will try her on Carafate due to significant epigastric tenderness on exam today  Further recommendations as per GI  · I advised patient to restart Lipitor and Zetia due to uncontrolled hyperlipidemia  She will be repeating blood work in 6 weeks, follow-up over the phone  · Patient will be due for annual CT chest in July of 2020   I strongly advised her to quit tobacco    Follow up pending labs, updates, as needed and routine follow-up in 6 months    Patient Instructions       · Please restart Lipitor, take half a tablet once a day, you daily dose would be 40 mg daily  · Please restart Zetia 10 mg once a day  · Please start fish oil capsules as per prescription, 2 capsules twice a day  · Please proceed with blood work, 12 hour fasting in early May and I will call you with results  · Please try medication for abdominal pain, Carafate, 1 tablet 3 times daily before food for the next few days till results of EGD colonoscopy   · please schedule  CT chest in late July  Medicare Preventive Visit Patient Instructions  Thank you for completing your Welcome to Medicare Visit or Medicare Annual Wellness Visit today  Your next wellness visit will be due in one year (3/16/2021)  The screening/preventive services that you may require over the next 5-10 years are detailed below  Some tests may not apply to you based off risk factors and/or age  Screening tests ordered at today's visit but not completed yet may show as past due  Also, please note that scanned in results may not display below  Preventive Screenings:  Service Recommendations Previous Testing/Comments   Colorectal Cancer Screening  * Colonoscopy    * Fecal Occult Blood Test (FOBT)/Fecal Immunochemical Test (FIT)  * Fecal DNA/Cologuard Test  * Flexible Sigmoidoscopy Age: 54-65 years old   Colonoscopy: every 10 years (may be performed more frequently if at higher risk)  OR  FOBT/FIT: every 1 year  OR  Cologuard: every 3 years  OR  Sigmoidoscopy: every 5 years  Screening may be recommended earlier than age 48 if at higher risk for colorectal cancer  Also, an individualized decision between you and your healthcare provider will decide whether screening between the ages of 74-80 would be appropriate  Colonoscopy: 06/12/2019  FOBT/FIT: Not on file  Cologuard: Not on file  Sigmoidoscopy: Not on file    Screening Current     Breast Cancer Screening Age: 36 years old  Frequency: every 1-2 years  Not required if history of left and right mastectomy Mammogram: 10/28/2019    Screening Current   Cervical Cancer Screening Between the ages of 21-29, pap smear recommended once every 3 years  Between the ages of 33-67, can perform pap smear with HPV co-testing every 5 years     Recommendations may differ for women with a history of total hysterectomy, cervical cancer, or abnormal pap smears in past  Pap Smear: 08/07/2019    Screening Not Indicated   Hepatitis C Screening Once for adults born between 1945 and 1965  More frequently in patients at high risk for Hepatitis C Hep C Antibody: 10/10/2018    Screening Current   Diabetes Screening 1-2 times per year if you're at risk for diabetes or have pre-diabetes Fasting glucose: 103 mg/dL   A1C: 5 8 %    Screening Current   Cholesterol Screening Once every 5 years if you don't have a lipid disorder  May order more often based on risk factors  Lipid panel: 01/20/2020    Screening Not Indicated  History Lipid Disorder     Other Preventive Screenings Covered by Medicare:  1  Abdominal Aortic Aneurysm (AAA) Screening: covered once if your at risk  You're considered to be at risk if you have a family history of AAA  2  Lung Cancer Screening: covers low dose CT scan once per year if you meet all of the following conditions: (1) Age 50-69; (2) No signs or symptoms of lung cancer; (3) Current smoker or have quit smoking within the last 15 years; (4) You have a tobacco smoking history of at least 30 pack years (packs per day multiplied by number of years you smoked); (5) You get a written order from a healthcare provider  3  Glaucoma Screening: covered annually if you're considered high risk: (1) You have diabetes OR (2) Family history of glaucoma OR (3)  aged 48 and older OR (3)  American aged 72 and older  3  Osteoporosis Screening: covered every 2 years if you meet one of the following conditions: (1) You're estrogen deficient and at risk for osteoporosis based off medical history and other findings; (2) Have a vertebral abnormality; (3) On glucocorticoid therapy for more than 3 months; (4) Have primary hyperparathyroidism; (5) On osteoporosis medications and need to assess response to drug therapy  · Last bone density test (DXA Scan): 07/26/2016   5  HIV Screening: covered annually if you're between the age of 15-65  Also covered annually if you are younger than 13 and older than 72 with risk factors for HIV infection   For pregnant patients, it is covered up to 3 times per pregnancy  Immunizations:  Immunization Recommendations   Influenza Vaccine Annual influenza vaccination during flu season is recommended for all persons aged >= 6 months who do not have contraindications   Pneumococcal Vaccine (Prevnar and Pneumovax)  * Prevnar = PCV13  * Pneumovax = PPSV23   Adults 25-60 years old: 1-3 doses may be recommended based on certain risk factors  Adults 72 years old: Prevnar (PCV13) vaccine recommended followed by Pneumovax (PPSV23) vaccine  If already received PPSV23 since turning 65, then PCV13 recommended at least one year after PPSV23 dose  Hepatitis B Vaccine 3 dose series if at intermediate or high risk (ex: diabetes, end stage renal disease, liver disease)   Tetanus (Td) Vaccine - COST NOT COVERED BY MEDICARE PART B Following completion of primary series, a booster dose should be given every 10 years to maintain immunity against tetanus  Td may also be given as tetanus wound prophylaxis  Tdap Vaccine - COST NOT COVERED BY MEDICARE PART B Recommended at least once for all adults  For pregnant patients, recommended with each pregnancy  Shingles Vaccine (Shingrix) - COST NOT COVERED BY MEDICARE PART B  2 shot series recommended in those aged 48 and above     Health Maintenance Due:      Topic Date Due    Cervical Cancer Screening  07/26/2020    MAMMOGRAM  10/28/2021    CRC Screening: Colonoscopy  06/12/2022    Hepatitis C Screening  Completed     Immunizations Due:      Topic Date Due    DTaP,Tdap,and Td Vaccines (1 - Tdap) 09[birth year]1965     Advance Directives   What are advance directives? Advance directives are legal documents that state your wishes and plans for medical care  These plans are made ahead of time in case you lose your ability to make decisions for yourself  Advance directives can apply to any medical decision, such as the treatments you want, and if you want to donate organs  What are the types of advance directives?   There are many types of advance directives, and each state has rules about how to use them  You may choose a combination of any of the following:  · Living will: This is a written record of the treatment you want  You can also choose which treatments you do not want, which to limit, and which to stop at a certain time  This includes surgery, medicine, IV fluid, and tube feedings  · Durable power of  for healthcare [facility]): This is a written record that states who you want to make healthcare choices for you when you are unable to make them for yourself  This person, called a proxy, is usually a family member or a friend  You may choose more than 1 proxy  · Do not resuscitate (DNR) order:  A DNR order is used in case your heart stops beating or you stop breathing  It is a request not to have certain forms of treatment, such as CPR  A DNR order may be included in other types of advance directives  · Medical directive: This covers the care that you want if you are in a coma, near death, or unable to make decisions for yourself  You can list the treatments you want for each condition  Treatment may include pain medicine, surgery, blood transfusions, dialysis, IV or tube feedings, and a ventilator (breathing machine)  · Values history: This document has questions about your views, beliefs, and how you feel and think about life  This information can help others choose the care that you would choose  Why are advance directives important? An advance directive helps you control your care  Although spoken wishes may be used, it is better to have your wishes written down  Spoken wishes can be misunderstood, or not followed  Treatments may be given even if you do not want them  An advance directive may make it easier for your family to make difficult choices about your care  Cigarette Smoking and Your Health   Risks to your health if you smoke:  Nicotine and other chemicals found in tobacco damage every cell in your body   Even if you are a light smoker, you have an increased risk for cancer, heart disease, and lung disease  If you are pregnant or have diabetes, smoking increases your risk for complications  Benefits to your health if you stop smoking:   · You decrease respiratory symptoms such as coughing, wheezing, and shortness of breath  · You reduce your risk for cancers of the lung, mouth, throat, kidney, bladder, pancreas, stomach, and cervix  If you already have cancer, you increase the benefits of chemotherapy  You also reduce your risk for cancer returning or a second cancer from developing  · You reduce your risk for heart disease, blood clots, heart attack, and stroke  · You reduce your risk for lung infections, and diseases such as pneumonia, asthma, chronic bronchitis, and emphysema  · Your circulation improves  More oxygen can be delivered to your body  If you have diabetes, you lower your risk for complications, such as kidney, artery, and eye diseases  You also lower your risk for nerve damage  Nerve damage can lead to amputations, poor vision, and blindness  · You improve your body's ability to heal and to fight infections  For more information and support to stop smoking:   · [employer]  Phone: 1- 535 - 825-2484  Web Address: www [employer]  Weight Management   Why it is important to manage your weight:  Being overweight increases your risk of health conditions such as heart disease, high blood pressure, type 2 diabetes, and certain types of cancer  It can also increase your risk for osteoarthritis, sleep apnea, and other respiratory problems  Aim for a slow, steady weight loss  Even a small amount of weight loss can lower your risk of health problems  How to lose weight safely:  A safe and healthy way to lose weight is to eat fewer calories and get regular exercise  You can lose up about 1 pound a week by decreasing the number of calories you eat by 500 calories each day     Healthy meal plan for weight management: A healthy meal plan includes a variety of foods, contains fewer calories, and helps you stay healthy  A healthy meal plan includes the following:  · Eat whole-grain foods more often  A healthy meal plan should contain fiber  Fiber is the part of grains, fruits, and vegetables that is not broken down by your body  Whole-grain foods are healthy and provide extra fiber in your diet  Some examples of whole-grain foods are whole-wheat breads and pastas, oatmeal, brown rice, and bulgur  · Eat a variety of vegetables every day  Include dark, leafy greens such as spinach, kale, letitia greens, and mustard greens  Eat yellow and orange vegetables such as carrots, sweet potatoes, and winter squash  · Eat a variety of fruits every day  Choose fresh or canned fruit (canned in its own juice or light syrup) instead of juice  Fruit juice has very little or no fiber  · Eat low-fat dairy foods  Drink fat-free (skim) milk or 1% milk  Eat fat-free yogurt and low-fat cottage cheese  Try low-fat cheeses such as mozzarella and other reduced-fat cheeses  · Choose meat and other protein foods that are low in fat  Choose beans or other legumes such as split peas or lentils  Choose fish, skinless poultry (chicken or turkey), or lean cuts of red meat (beef or pork)  Before you cook meat or poultry, cut off any visible fat  · Use less fat and oil  Try baking foods instead of frying them  Add less fat, such as margarine, sour cream, regular salad dressing and mayonnaise to foods  Eat fewer high-fat foods  Some examples of high-fat foods include french fries, doughnuts, ice cream, and cakes  · Eat fewer sweets  Limit foods and drinks that are high in sugar  This includes candy, cookies, regular soda, and sweetened drinks  Exercise:  Exercise at least 30 minutes per day on most days of the week  Some examples of exercise include walking, biking, dancing, and swimming   You can also fit in more physical activity by taking the stairs instead of the elevator or parking farther away from stores  Ask your healthcare provider about the best exercise plan for you  © Copyright [employer] 2018 Information is for End User's use only and may not be sold, redistributed or otherwise used for commercial purposes  All illustrations and images included in CareNotes® are the copyrighted property of [employer]  or [employer]      Discussed with the patient and all questioned fully answered  She will call me if any problems arise  M*Modal software was used to dictate this note  It may contain errors with dictating incorrect words/spelling  Please contact provider directly with any questions  Chief Complaint     Chief Complaint   Patient presents with    Follow-up    Medicare Wellness Visit       History of Present Illness     Patient presents for follow-up of chronic medical conditions  She is accompanied by her   Recent evaluation by Gastroenterology, Dr Steve [place] due to persistent generalized abdominal pain and intermittent symptoms of diarrhea  Patient states that pain is generalized, denies symptoms of nausea and vomiting  Pain is crampy, usually worse after food  No relief with Bentyl  No fever  Gastroenterology reportedly is concerned about possibility of collagenous colitis due to finding of few white blood cells in patient's stool  Pending EGD and colonoscopy in a few days  Patient was evaluated with CT abdomen and pelvis which was unremarkable  All stool studies were normal aside from findings of leukocytes  Patient has been using over-the-counter Imodium  She is still experiencing intermittent diarrhea up to 4-5 bowel movements daily, brown in color  No melena, no hematochezia  Diarrhea usually occurs after meals    Patient states that she did discuss new onset of diarrhea with Neurology team    Patient is under ongoing care of [facility] -  for treatment of multiple sclerosis  She remains on Ocrevus , status post 3 infusions, last infusion is pending in August       [facility] with [facility]ogram 10/2019     Patient self discontinued Lipitor as she is concerned that medication is causing side effect of diarrhea  She has 80 mg tablets on hand  And is agreeable to restart medication at half a dose at 40 mg daily  Patient with known hyperlipidemia, most recent cholesterol is high since patient has been off medication  Blood work performed in January of 2020 reveals total cholesterol of 246, triglycerides of 291 and LDL of 149  COPD  Patient is still unfortunately smoking  She is well aware of significant health risks associated with ongoing tobacco use  She denies symptoms of cough, wheezing or chest tightness/shortness of breath at present time  She was seen by [facility] pul[facility]ology and was evaluated with CT chest    Most recent CT chest was performed in July of 2019     1  Resolution of previous groundglass opacities in keeping with inflammatory process    2   New tiny right upper lobe endobronchial nodular density, likely a small secretion though nodule not excluded  Based on current Fleischner Society 2017 Guidelines on incidental pulmonary nodule, because the patient is considered high risk for lung cancer, 12 month follow-up non-contrast chest CT is recommended  Patient remains on Breo  1 daily  An Proair PRN 1 ppd    Depression  Anxiety  Patient remains on Zoloft, she states that medication works well  Review of Systems   Review of Systems   Constitutional: Negative  HENT: Negative  Eyes: Negative  Respiratory: Negative  Cardiovascular: Negative  Gastrointestinal: Positive for abdominal pain and diarrhea  Negative for nausea and vomiting  As outlined in HPI   Endocrine: Negative  Genitourinary: Negative  Musculoskeletal: Positive for arthralgias  Allergic/Immunologic: Negative  Neurological: Positive for syncope     Psychiatric/Behavioral: The patient is nervous/anxious          Active Problem List     Patient Active Problem List   Diagnosis    Multiple sclerosis, relapsing/remitting    Muscle spasm    Hyperlipidemia    Osteoporosis    Ambulatory dysfunction    COPD without exacerbation ([facility] [address] [facility]    Vitamin D deficiency    Disc degeneration, lumbar    Major depression, recurrent, chronic (HCC)    Allergic rhinitis    Amnesia/memory disorder    Shoulder impingement syndrome, left    Abnormal CT of the chest    Special screening for malignant neoplasms, colon    Family history of colon cancer    Urinary incontinence    Balance disorder    Gastro-esophageal reflux disease with esophagitis    Irritable bowel syndrome with diarrhea       Past Medical History:  Past Medical History:   Diagnosis Date    Asthma     Degeneration of intervertebral disc at L5-S1 level     Depression     Full incontinence of feces     Generalized anxiety disorder     Hepatitis     at age 4 yrs    Herpes zoster     last assessed: 6/20/13    History of colon polyps     Hypercholesterolemia     Hyperlipidemia     MS (multiple sclerosis) ([facility] [address] [facility]     MS (multiple sclerosis) ([facility] [address] [facility]     Osteoporosis     Seasonal allergies     Tumor of breast     benign, right breast    Urinary incontinence        Past Surgical History:  Past Surgical History:   Procedure Laterality Date    BREAST BIOPSY Right     benign    BREAST SURGERY      right breast fibroid tumor resection    COLONOSCOPY      11/2013 - due in 2018 - Dr Crisostomo    FL LUMBAR PUNCTURE DIAGNOSTIC  10/1/2018    ROTATOR CUFF REPAIR      Bilateral    ROTATOR CUFF REPAIR Bilateral     SEPTOPLASTY         Family History:  Family History   Problem Relation Age of Onset    Arthritis Mother     Osteoporosis Mother     COPD Father     No Known Problems Maternal Grandmother     No Known Problems Maternal Grandfather     No Known Problems Paternal Grandmother     No Known Problems Paternal Grandfather     Arthritis Family     COPD Family     Emphysema Family     Heart disease Family     Hyperthyroidism Family     Multiple sclerosis Family     Osteoarthritis Family     No Known Problems Sister     No Known Problems Sister     Colon cancer Brother 72    No Known Problems Brother     No Known Problems Brother     No Known Problems Maternal Aunt     No Known Problems Maternal Aunt     No Known Problems Paternal Aunt        Social History:  Social History     Socioeconomic History    Marital status: /Civil Union     Spouse name: Not on file    Number of children: Not on file    Years of education: Not on file    Highest education level: Not on file   Occupational History    Occupation: Retired   Social Needs    Financial resource strain: Not on file    Food insecurity:     Worry: Not on file     Inability: Not on file    Transportation needs:     Medical: Not on file     Non-medical: Not on file   Tobacco Use    Smoking status: Current Every Day Smoker     Packs/day: 1 00     Years: 40 00     Pack years: 40 00     Types: Cigarettes    Smokeless tobacco: Never Used   Substance and Sexual Activity    Alcohol use: Yes     Frequency: Monthly or less     Drinks per session: 1 or 2     Binge frequency: Never     Comment: occasional; [employer] also states Never drank alcohol    Drug use: No    Sexual activity: Yes     Partners: Male     Comment: denied any risk of AIDS   Lifestyle    Physical activity:     Days per week: Not on file     Minutes per session: Not on file    Stress: Not on file   Relationships    Social connections:     Talks on phone: Not on file     Gets together: Not on file     Attends Sikh service: Not on file     Active member of club or organization: Not on file     Attends meetings of clubs or organizations: Not on file     Relationship status: Not on file    Intimate partner violence:     Fear of current or ex partner: Not on file     Emotionally abused: Not on file Physically abused: Not on file     Forced sexual activity: Not on file   Other Topics Concern    Not on file   Social History Narrative               Objective     Vitals:    03/16/20 1120   BP: 116/74   BP Location: Right arm   Patient Position: Sitting   Cuff Size: Adult   Pulse: 69   Resp: 17   Temp: (!) 97 3 °F (36 3 °C)   TempSrc: Tympanic   SpO2: 98%   Weight: 71 8 kg (158 lb 6 4 oz)   Height: 5' 4" (1 626 m)     Wt Readings from Last 3 Encounters:   03/16/20 71 8 kg (158 lb 6 4 oz)   02/11/20 71 kg (156 lb 9 6 oz)   01/02/20 72 8 kg (160 lb 9 6 oz)       Physical Exam   Constitutional: She is oriented to person, place, and time  She appears well-developed and well-nourished  HENT:   Head: Normocephalic and atraumatic  Eyes: Conjunctivae are normal    Neck: Neck supple  No JVD present  Carotid bruit is not present  No thyromegaly present  Cardiovascular: Normal rate, regular rhythm and normal heart sounds  No murmur heard  Pulmonary/Chest: Effort normal and breath sounds normal  No respiratory distress  She has no wheezes  Abdominal: Soft  Normal appearance and bowel sounds are normal  She exhibits no abdominal bruit  There is tenderness in the epigastric area and periumbilical area  There is no rigidity, no rebound, no guarding and negative Dalal's sign  Musculoskeletal: Normal range of motion  She exhibits no edema  Neurological: She is alert and oriented to person, place, and time  No cranial nerve deficit  Coordination normal    Psychiatric: She has a normal mood and affect  Her behavior is normal    Nursing note and vitals reviewed        Pertinent Laboratory/Diagnostic Studies:  Lab Results   Component Value Date    GLUCOSE 107 12/15/2015    BUN 17 02/12/2020    CREATININE 0 66 02/12/2020    CALCIUM 8 6 02/12/2020     12/15/2015    K 3 7 02/12/2020    CO2 26 02/12/2020     02/12/2020     Lab Results   Component Value Date    ALT 32 02/12/2020    AST 12 02/12/2020    ALKPHOS 100 02/12/2020    BILITOT 0 30 12/15/2015       Lab Results   Component Value Date    WBC 7 95 02/12/2020    HGB 14 0 02/12/2020    HCT 39 7 02/12/2020    MCV 87 02/12/2020     02/12/2020       No results found for: TSH    Lab Results   Component Value Date    CHOL 169 07/21/2015     Lab Results   Component Value Date    TRIG 291 (H) 01/20/2020     Lab Results   Component Value Date    HDL 39 (L) 01/20/2020     Lab Results   Component Value Date    LDLCALC 149 (H) 01/20/2020     Lab Results   Component Value Date    HGBA1C 5 8 (H) 10/20/2015       Results for orders placed or performed in visit on 02/12/20   Fecal leukocytes   Result Value Ref Range    White Blood Cells, Stool Rare white blood cells  None Seen   Stool Enteric Bacterial Panel by PCR   Result Value Ref Range    Salmonella sp PCR None Detected None Detected    Shigella sp/Enteroinvasive E  coli (EIEC) PCR None Detected None Detected    Campylobacter sp (jejuni and coli) PCR None Detected None Detected    Shiga toxin 1/Shiga toxin 2 genes PCR None Detected None Detected   Ova and parasite examination   Result Value Ref Range    Ova + Parasite Exam       No ova, cysts, or parasites seen     One negative specimen does not rule out the possibility of a  parasitic infection     Clostridium difficile toxin by PCR with EIA   Result Value Ref Range    C difficile toxin by PCR Negative Negative   CBC and differential   Result Value Ref Range    WBC 7 95 4 31 - 10 16 Thousand/uL    RBC 4 54 3 81 - 5 12 Million/uL    Hemoglobin 14 0 11 5 - 15 4 g/dL    Hematocrit 39 7 34 8 - 46 1 %    MCV 87 82 - 98 fL    MCH 30 8 26 8 - 34 3 pg    MCHC 35 3 31 4 - 37 4 g/dL    RDW 11 8 11 6 - 15 1 %    MPV 9 7 8 9 - 12 7 fL    Platelets 437 125 - 004 Thousands/uL    nRBC 0 /100 WBCs    Neutrophils Relative 67 43 - 75 %    Immat GRANS % 0 0 - 2 %    Lymphocytes Relative 21 14 - 44 %    Monocytes Relative 9 4 - 12 % Eosinophils Relative 2 0 - 6 %    Basophils Relative 1 0 - 1 %    Neutrophils Absolute 5 34 1 85 - 7 62 Thousands/µL    Immature Grans Absolute 0 02 0 00 - 0 20 Thousand/uL    Lymphocytes Absolute 1 70 0 60 - 4 47 Thousands/µL    Monocytes Absolute 0 72 0 17 - 1 22 Thousand/µL    Eosinophils Absolute 0 12 0 00 - 0 61 Thousand/µL    Basophils Absolute 0 05 0 00 - 0 10 Thousands/µL   Comprehensive metabolic panel   Result Value Ref Range    Sodium 143 136 - 145 mmol/L    Potassium 3 7 3 5 - 5 3 mmol/L    Chloride 106 100 - 108 mmol/L    CO2 26 21 - 32 mmol/L    ANION GAP 11 4 - 13 mmol/L    BUN 17 5 - 25 mg/dL    Creatinine 0 66 0 60 - 1 30 mg/dL    Glucose 113 65 - 140 mg/dL    Calcium 8 6 8 3 - 10 1 mg/dL    AST 12 5 - 45 U/L    ALT 32 12 - 78 U/L    Alkaline Phosphatase 100 46 - 116 U/L    Total Protein 6 9 6 4 - 8 2 g/dL    Albumin 4 0 3 5 - 5 0 g/dL    Total Bilirubin 0 38 0 20 - 1 00 mg/dL    eGFR 93 ml/min/1 73sq m   Lipase   Result Value Ref Range    Lipase 145 73 - 393 u/L     *Note: Due to a large number of results and/or encounters for the requested time period, some results have not been displayed  A complete set of results can be found in Results Review         Orders Placed This Encounter   Procedures    CT chest wo contrast    Comprehensive metabolic panel    CBC and differential    Lipid Panel with Direct LDL reflex    TSH, 3rd generation    Vitamin D 25 hydroxy       ALLERGIES:  Allergies   Allergen Reactions    Bactrim [Sulfamethoxazole-Trimethoprim] Other (See Comments)     Reaction Date: 11Aug2011;     Betadine [Povidone Iodine]     Cefzil [Cefprozil]     Prempro [Conj Estrog-Medroxyprogest Ace] Other (See Comments)     Other      Zyban [Bupropion]     Levaquin [Levofloxacin]     Gadobutrol GI Intolerance    Medroxyprogesterone Other (See Comments)     Reaction Date: 11Aug2011;     Nortriptyline Other (See Comments)     n/a    Pamelor [Nortriptyline Hcl]     Provera [Medroxyprogesterone Acetate]        Current Medications     Current Outpatient Medications   Medication Sig Dispense Refill    albuterol (2 5 mg/3 mL) 0 083 % nebulizer solution Take 1 vial (2 5 mg total) by nebulization every 4 (four) hours as needed for wheezing or shortness of breath (cough) 120 vial 2    albuterol (PROAIR HFA) 90 mcg/act inhaler Inhale 2 puffs every 4 (four) hours as needed for wheezing or shortness of breath 3 Inhaler 1    aspirin (ECOTRIN LOW STRENGTH) 81 mg EC tablet Take 1 tablet by mouth daily      atorvastatin (LIPITOR) 80 mg tablet Take 1 tablet (80 mg total) by mouth daily 90 tablet 0    B Complex-C CAPS Take by mouth      BL EVENING PRIMROSE OIL PO Take by mouth      budesonide (PULMICORT) 0 5 mg/2 mL nebulizer solution inhale contents of 1 vial in nebulizer twice a day  0    ezetimibe (ZETIA) 10 mg tablet Take 1 tablet (10 mg total) by mouth daily 90 tablet 3    Flaxseed, Linseed, (FLAXSEED OIL PO) Take by mouth      fluticasone (FLONASE) 50 mcg/act nasal spray 2 sprays into each nostril daily 48 g 3    fluticasone-vilanterol (BREO ELLIPTA) 200-25 MCG/INH inhaler Inhale 1 puff daily Rinse mouth after use   3 Inhaler 3    gabapentin (NEURONTIN) 300 mg capsule Take 1 capsule (300 mg total) by mouth 2 (two) times a day 180 capsule 3    levocetirizine (XYZAL) 5 MG tablet Take 1 tablet (5 mg total) by mouth every evening 90 tablet 1    modafinil (PROVIGIL) 100 mg tablet Take 1 tablet (100 mg total) by mouth daily 30 tablet 0    montelukast (SINGULAIR) 10 mg tablet TAKE 1 TABLET BY MOUTH  DAILY AT BEDTIME 90 tablet 1    Ocrelizumab (OCREVUS IV) Infuse into a venous catheter      pantoprazole (PROTONIX) 40 mg tablet Take 1 tablet (40 mg total) by mouth daily before breakfast 30 tablet 5    sertraline (ZOLOFT) 100 mg tablet TAKE 1 TABLET BY MOUTH  DAILY 90 tablet 1    ergocalciferol (VITAMIN D2) 50,000 units Take 1 capsule (50,000 Units total) by mouth once a week 8 capsule 0    omega-3-acid ethyl esters (LOVAZA) 1 g capsule Take 2 capsules (2 g total) by mouth 2 (two) times a day 360 capsule 3    sucralfate (CARAFATE) 1 g tablet Take 1 tablet (1 g total) by mouth 3 (three) times a day before meals 60 tablet 0     No current facility-administered medications for this visit          Medications Discontinued During This Encounter   Medication Reason    dicyclomine (BENTYL) 10 mg capsule     gabapentin (NEURONTIN) 300 mg capsule Reorder    fluticasone (FLONASE) 50 mcg/act nasal spray Reorder       Health Maintenance     Health Maintenance   Topic Date Due    DTaP,Tdap,and Td Vaccines (1 - Tdap) 09/28/1965    HIV Screening  09/28/1969    Falls: Plan of Care  09/28/2019    PT PLAN OF CARE  10/09/2019    SLP PLAN OF CARE  11/08/2019    Medicare Annual Wellness Visit (AWV)  03/14/2020    Cervical Cancer Screening  07/26/2020    BMI: Followup Plan  09/20/2020    Pneumococcal Vaccine: 65+ Years (2 of 2 - PPSV23) 09/29/2020    Fall Risk  03/16/2021    BMI: Adult  03/16/2021    MAMMOGRAM  10/28/2021    CRC Screening: Colonoscopy  06/12/2022    Hepatitis C Screening  Completed    Influenza Vaccine  Completed    Pneumococcal Vaccine: Pediatrics (0 to 5 Years) and At-Risk Patients (6 to 59 Years)  Aged Out    HIB Vaccine  Aged Out    Hepatitis B Vaccine  Aged Out    IPV Vaccine  Aged Out    Hepatitis A Vaccine  Aged Out    Meningococcal ACWY Vaccine  Aged Out    HPV Vaccine  Aged [employer] History   Administered Date(s) Administered    INFLUENZA 09/26/2011    Influenza Quadrivalent Preservative Free 3 years and older IM 09/29/2015, 09/22/2016, 09/08/2017    Influenza TIV (IM) 01/17/2013, 09/03/2013, 09/23/2014    Influenza, high dose seasonal 0 5 mL 09/30/2019    Influenza, recombinant, quadrivalent,injectable, preservative free 09/13/2018    Pneumococcal Conjugate 13-Valent 10/22/2018    Pneumococcal Polysaccharide PPV23 10/01/2007, 09/29/2015    Td (adult), adsorbed 06/02/2016       Mandy Lundy MD

## 2020-03-16 NOTE — PATIENT INSTRUCTIONS
· Please restart Lipitor, take half a tablet once a day, you daily dose would be 40 mg daily  · Please restart Zetia 10 mg once a day  · Please start fish oil capsules as per prescription, 2 capsules twice a day  · Please proceed with blood work, 12 hour fasting in early May and I will call you with results  · Please try medication for abdominal pain, Carafate, 1 tablet 3 times daily before food for the next few days till results of EGD colonoscopy   · please schedule  CT chest in late July  Medicare Preventive Visit Patient Instructions  Thank you for completing your Welcome to Medicare Visit or Medicare Annual Wellness Visit today  Your next wellness visit will be due in one year (3/16/2021)  The screening/preventive services that you may require over the next 5-10 years are detailed below  Some tests may not apply to you based off risk factors and/or age  Screening tests ordered at today's visit but not completed yet may show as past due  Also, please note that scanned in results may not display below  Preventive Screenings:  Service Recommendations Previous Testing/Comments   Colorectal Cancer Screening  * Colonoscopy    * Fecal Occult Blood Test (FOBT)/Fecal Immunochemical Test (FIT)  * Fecal DNA/Cologuard Test  * Flexible Sigmoidoscopy Age: 54-65 years old   Colonoscopy: every 10 years (may be performed more frequently if at higher risk)  OR  FOBT/FIT: every 1 year  OR  Cologuard: every 3 years  OR  Sigmoidoscopy: every 5 years  Screening may be recommended earlier than age 48 if at higher risk for colorectal cancer  Also, an individualized decision between you and your healthcare provider will decide whether screening between the ages of 74-80 would be appropriate   Colonoscopy: 06/12/2019  FOBT/FIT: Not on file  Cologuard: Not on file  Sigmoidoscopy: Not on file    Screening Current     Breast Cancer Screening Age: 36 years old  Frequency: every 1-2 years  Not required if history of left and right mastectomy Mammogram: 10/28/2019    Screening Current   Cervical Cancer Screening Between the ages of 21-29, pap smear recommended once every 3 years  Between the ages of 33-67, can perform pap smear with HPV co-testing every 5 years  Recommendations may differ for women with a history of total hysterectomy, cervical cancer, or abnormal pap smears in past  Pap Smear: 08/07/2019    Screening Not Indicated   Hepatitis C Screening Once for adults born between 1945 and 1965  More frequently in patients at high risk for Hepatitis C Hep C Antibody: 10/10/2018    Screening Current   Diabetes Screening 1-2 times per year if you're at risk for diabetes or have pre-diabetes Fasting glucose: 103 mg/dL   A1C: 5 8 %    Screening Current   Cholesterol Screening Once every 5 years if you don't have a lipid disorder  May order more often based on risk factors  Lipid panel: 01/20/2020    Screening Not Indicated  History Lipid Disorder     Other Preventive Screenings Covered by Medicare:  1  Abdominal Aortic Aneurysm (AAA) Screening: covered once if your at risk  You're considered to be at risk if you have a family history of AAA  2  Lung Cancer Screening: covers low dose CT scan once per year if you meet all of the following conditions: (1) Age 50-69; (2) No signs or symptoms of lung cancer; (3) Current smoker or have quit smoking within the last 15 years; (4) You have a tobacco smoking history of at least 30 pack years (packs per day multiplied by number of years you smoked); (5) You get a written order from a healthcare provider  3  Glaucoma Screening: covered annually if you're considered high risk: (1) You have diabetes OR (2) Family history of glaucoma OR (3)  aged 48 and older OR (3)  American aged 72 and older  3   Osteoporosis Screening: covered every 2 years if you meet one of the following conditions: (1) You're estrogen deficient and at risk for osteoporosis based off medical history and other findings; (2) Have a vertebral abnormality; (3) On glucocorticoid therapy for more than 3 months; (4) Have primary hyperparathyroidism; (5) On osteoporosis medications and need to assess response to drug therapy  · Last bone density test (DXA Scan): 07/26/2016   5  HIV Screening: covered annually if you're between the age of 15-65  Also covered annually if you are younger than 13 and older than 72 with risk factors for HIV infection  For pregnant patients, it is covered up to 3 times per pregnancy  Immunizations:  Immunization Recommendations   Influenza Vaccine Annual influenza vaccination during flu season is recommended for all persons aged >= 6 months who do not have contraindications   Pneumococcal Vaccine (Prevnar and Pneumovax)  * Prevnar = PCV13  * Pneumovax = PPSV23   Adults 25-60 years old: 1-3 doses may be recommended based on certain risk factors  Adults 72 years old: Prevnar (PCV13) vaccine recommended followed by Pneumovax (PPSV23) vaccine  If already received PPSV23 since turning 65, then PCV13 recommended at least one year after PPSV23 dose  Hepatitis B Vaccine 3 dose series if at intermediate or high risk (ex: diabetes, end stage renal disease, liver disease)   Tetanus (Td) Vaccine - COST NOT COVERED BY MEDICARE PART B Following completion of primary series, a booster dose should be given every 10 years to maintain immunity against tetanus  Td may also be given as tetanus wound prophylaxis  Tdap Vaccine - COST NOT COVERED BY MEDICARE PART B Recommended at least once for all adults  For pregnant patients, recommended with each pregnancy     Shingles Vaccine (Shingrix) - COST NOT COVERED BY MEDICARE PART B  2 shot series recommended in those aged 48 and above     Health Maintenance Due:      Topic Date Due    Cervical Cancer Screening  07/26/2020    MAMMOGRAM  10/28/2021    CRC Screening: Colonoscopy  06/12/2022    Hepatitis C Screening  Completed     Immunizations Due:      Topic Date Due    DTaP,Tdap,and Td Vaccines (1 - Tdap) 09/28/1965     Advance Directives   What are advance directives? Advance directives are legal documents that state your wishes and plans for medical care  These plans are made ahead of time in case you lose your ability to make decisions for yourself  Advance directives can apply to any medical decision, such as the treatments you want, and if you want to donate organs  What are the types of advance directives? There are many types of advance directives, and each state has rules about how to use them  You may choose a combination of any of the following:  · Living will: This is a written record of the treatment you want  You can also choose which treatments you do not want, which to limit, and which to stop at a certain time  This includes surgery, medicine, IV fluid, and tube feedings  · Durable power of  for healthcare [facility]): This is a written record that states who you want to make healthcare choices for you when you are unable to make them for yourself  This person, called a proxy, is usually a family member or a friend  You may choose more than 1 proxy  · Do not resuscitate (DNR) order:  A DNR order is used in case your heart stops beating or you stop breathing  It is a request not to have certain forms of treatment, such as CPR  A DNR order may be included in other types of advance directives  · Medical directive: This covers the care that you want if you are in a coma, near death, or unable to make decisions for yourself  You can list the treatments you want for each condition  Treatment may include pain medicine, surgery, blood transfusions, dialysis, IV or tube feedings, and a ventilator (breathing machine)  · Values history: This document has questions about your views, beliefs, and how you feel and think about life  This information can help others choose the care that you would choose  Why are advance directives important?   An advance directive helps you control your care  Although spoken wishes may be used, it is better to have your wishes written down  Spoken wishes can be misunderstood, or not followed  Treatments may be given even if you do not want them  An advance directive may make it easier for your family to make difficult choices about your care  Cigarette Smoking and Your Health   Risks to your health if you smoke:  Nicotine and other chemicals found in tobacco damage every cell in your body  Even if you are a light smoker, you have an increased risk for cancer, heart disease, and lung disease  If you are pregnant or have diabetes, smoking increases your risk for complications  Benefits to your health if you stop smoking:   · You decrease respiratory symptoms such as coughing, wheezing, and shortness of breath  · You reduce your risk for cancers of the lung, mouth, throat, kidney, bladder, pancreas, stomach, and cervix  If you already have cancer, you increase the benefits of chemotherapy  You also reduce your risk for cancer returning or a second cancer from developing  · You reduce your risk for heart disease, blood clots, heart attack, and stroke  · You reduce your risk for lung infections, and diseases such as pneumonia, asthma, chronic bronchitis, and emphysema  · Your circulation improves  More oxygen can be delivered to your body  If you have diabetes, you lower your risk for complications, such as kidney, artery, and eye diseases  You also lower your risk for nerve damage  Nerve damage can lead to amputations, poor vision, and blindness  · You improve your body's ability to heal and to fight infections  For more information and support to stop smoking:   · [employer]  Phone: 8- 921 - [phone]  Web Address: www [employer]  Weight Management   Why it is important to manage your weight:  Being overweight increases your risk of health conditions such as heart disease, high blood pressure, type 2 diabetes, and certain types of cancer  It can also increase your risk for osteoarthritis, sleep apnea, and other respiratory problems  Aim for a slow, steady weight loss  Even a small amount of weight loss can lower your risk of health problems  How to lose weight safely:  A safe and healthy way to lose weight is to eat fewer calories and get regular exercise  You can lose up about 1 pound a week by decreasing the number of calories you eat by 500 calories each day  Healthy meal plan for weight management:  A healthy meal plan includes a variety of foods, contains fewer calories, and helps you stay healthy  A healthy meal plan includes the following:  · Eat whole-grain foods more often  A healthy meal plan should contain fiber  Fiber is the part of grains, fruits, and vegetables that is not broken down by your body  Whole-grain foods are healthy and provide extra fiber in your diet  Some examples of whole-grain foods are whole-wheat breads and pastas, oatmeal, brown rice, and bulgur  · Eat a variety of vegetables every day  Include dark, leafy greens such as spinach, kale, letitia greens, and mustard greens  Eat yellow and orange vegetables such as carrots, sweet potatoes, and winter squash  · Eat a variety of fruits every day  Choose fresh or canned fruit (canned in its own juice or light syrup) instead of juice  Fruit juice has very little or no fiber  · Eat low-fat dairy foods  Drink fat-free (skim) milk or 1% milk  Eat fat-free yogurt and low-fat cottage cheese  Try low-fat cheeses such as mozzarella and other reduced-fat cheeses  · Choose meat and other protein foods that are low in fat  Choose beans or other legumes such as split peas or lentils  Choose fish, skinless poultry (chicken or turkey), or lean cuts of red meat (beef or pork)  Before you cook meat or poultry, cut off any visible fat  · Use less fat and oil  Try baking foods instead of frying them   Add less fat, such as margarine, sour cream, regular salad dressing and mayonnaise to foods  Eat fewer high-fat foods  Some examples of high-fat foods include french fries, doughnuts, ice cream, and cakes  · Eat fewer sweets  Limit foods and drinks that are high in sugar  This includes candy, cookies, regular soda, and sweetened drinks  Exercise:  Exercise at least 30 minutes per day on most days of the week  Some examples of exercise include walking, biking, dancing, and swimming  You can also fit in more physical activity by taking the stairs instead of the elevator or parking farther away from stores  Ask your healthcare provider about the best exercise plan for you  © Copyright [employer] 2018 Information is for End User's use only and may not be sold, redistributed or otherwise used for commercial purposes   All illustrations and images included in CareNotes® are the copyrighted property of [employer]  or [employer]

## 2020-03-16 NOTE — PROGRESS NOTES
Assessment and Plan:     Problem List Items Addressed This Visit        Digestive    Gastro-esophageal reflux disease with esophagitis - Primary (Chronic)     · Continue Protonix 40 mg daily  · Will add Carafate 1 g t i d  for the next 10-14 days, short-term pending results of EGD in a few days, patient will follow-up with Dr Flaco Robert         Relevant Medications    sucralfate (CARAFATE) 1 g tablet    Other Relevant Orders    CBC and differential    Irritable bowel syndrome with diarrhea (Chronic)       Respiratory    COPD without exacerbation (HCC) (Chronic)    Relevant Medications    fluticasone (FLONASE) 50 mcg/act nasal spray    Other Relevant Orders    CT chest wo contrast    Allergic rhinitis    Relevant Medications    fluticasone (FLONASE) 50 mcg/act nasal spray       Nervous and Auditory    Multiple sclerosis, relapsing/remitting (Chronic)     · Patient remains under ongoing care of [facility]  · Last Ocrevus infusion is due in July of 2020         Relevant Medications    gabapentin (NEURONTIN) 300 mg capsule       Other    Hyperlipidemia (Chronic)     · Patient unfortunately self discontinue atorvastatin and Zetia  · I strongly advised her to restart Lipitor at 40 mg daily along with Zetia 10 mg daily, will restart fish oil capsules  · Patient will repeat blood work in 6 weeks         Relevant Medications    omega-3-acid ethyl esters (LOVAZA) 1 g capsule    Other Relevant Orders    Comprehensive metabolic panel    Lipid Panel with Direct LDL reflex    TSH, 3rd generation    Major depression, recurrent, chronic (HCC) (Chronic)     · Symptoms are stably well controlled on Zoloft 100 mg daily         Abnormal CT of the chest (Chronic)     · Patient has been followed with CT chest performed in October of 2018 and July 2019   · [facility] consult, July 2019    · I advised patient to repeat CT chest in July of 2020         Relevant Orders    CT chest wo contrast    Vitamin D deficiency Relevant Orders    Vitamin D 25 hydroxy      Other Visit Diagnoses     Encounter for Medicare annual wellness exam        Abdominal pain, generalized        Relevant Medications    sucralfate (CARAFATE) 1 g tablet        BMI Counseling: Body mass index is 27 19 kg/m²  The BMI is above normal  Nutrition recommendations include encouraging healthy choices of fruits and vegetables, consuming healthier snacks, moderation in carbohydrate intake and reducing intake of cholesterol  Exercise recommendations include exercising 3-5 times per week  Tobacco Cessation Counseling: Tobacco cessation counseling was provided  The patient is sincerely urged to quit consumption of tobacco  She is ready to quit tobacco      Preventive health issues were discussed with patient, and age appropriate screening tests were ordered as noted in patient's After Visit Summary  Personalized health advice and appropriate referrals for health education or preventive services given if needed, as noted in patient's After Visit Summary       History of Present Illness:     Patient presents for Medicare Annual Wellness visit    Patient Care Team:  Uche Griffiths MD as PCP - General  DO Norma De Leon MD Ricke Grate, CRNP Drusilla Lighter, MD Albin Jurist, MD Harding Collard, MD Marcell Maker, MD (Ophthalmology)     Problem List:     Patient Active Problem List   Diagnosis    Multiple sclerosis, relapsing/remitting    Muscle spasm    Hyperlipidemia    Osteoporosis    Ambulatory dysfunction    COPD without exacerbation ([facility] [address] )    Vitamin D deficiency    Disc degeneration, lumbar    Major depression, recurrent, chronic (HCC)    Allergic rhinitis    Amnesia/memory disorder    Shoulder impingement syndrome, left    Abnormal CT of the chest    Special screening for malignant neoplasms, colon    Family history of colon cancer    Urinary incontinence    Balance disorder    Gastro-esophageal reflux disease with esophagitis    Irritable bowel syndrome with diarrhea      Past Medical and Surgical History:     Past Medical History:   Diagnosis Date    Asthma     Degeneration of intervertebral disc at L5-S1 level     Depression     Full incontinence of feces     Generalized anxiety disorder     Hepatitis     at age 4 yrs    Herpes zoster     last assessed: 6/20/13    History of colon polyps     Hypercholesterolemia     Hyperlipidemia     MS (multiple sclerosis) (HCC)     MS (multiple sclerosis) ([facility] [address] [facility]     Osteoporosis     Seasonal allergies     Tumor of breast     benign, right breast    Urinary incontinence      Past Surgical History:   Procedure Laterality Date    BREAST BIOPSY Right     benign    BREAST SURGERY      right breast fibroid tumor resection    COLONOSCOPY      11/2013 - due in 2018 - Dr Crisostomo    FL LUMBAR PUNCTURE DIAGNOSTIC  10/1/2018    ROTATOR CUFF REPAIR      Bilateral    ROTATOR CUFF REPAIR Bilateral     SEPTOPLASTY        Family History:     Family History   Problem Relation Age of Onset    Arthritis Mother     Osteoporosis Mother     COPD Father     No Known Problems Maternal Grandmother     No Known Problems Maternal Grandfather     No Known Problems Paternal Grandmother     No Known Problems Paternal Grandfather     Arthritis Family     COPD Family     Emphysema Family     Heart disease Family     Hyperthyroidism Family     Multiple sclerosis Family     Osteoarthritis Family     No Known Problems Sister     No Known Problems Sister     Colon cancer Brother 72    No Known Problems Brother     No Known Problems Brother     No Known Problems Maternal Aunt     No Known Problems Maternal Aunt     No Known Problems Paternal Aunt       Social History:        Social History     Socioeconomic History    Marital status: /Civil Union     Spouse name: None    Number of children: None    Years of education: None    Highest education level: None   Occupational History    Occupation: Retired   Social Needs    Financial resource strain: None    Food insecurity:     Worry: None     Inability: None    Transportation needs:     Medical: None     Non-medical: None   Tobacco Use    Smoking status: Current Every Day Smoker     Packs/day: 1 00     Years: 40 00     Pack years: 40 00     Types: Cigarettes    Smokeless tobacco: Never Used   Substance and Sexual Activity    Alcohol use: Yes     Frequency: Monthly or less     Drinks per session: 1 or 2     Binge frequency: Never     Comment: occasional; [employer] also states Never drank alcohol    Drug use: No    Sexual activity: Yes     Partners: Male     Comment: denied any risk of AIDS   Lifestyle    Physical activity:     Days per week: None     Minutes per session: None    Stress: None   Relationships    Social connections:     Talks on phone: None     Gets together: None     Attends Muslim service: None     Active member of club or organization: None     Attends meetings of clubs or organizations: None     Relationship status: None    Intimate partner violence:     Fear of current or ex partner: None     Emotionally abused: None     Physically abused: None     Forced sexual activity: None   Other Topics Concern    None   Social History Narrative          Medications and Allergies:     Current Outpatient Medications   Medication Sig Dispense Refill    albuterol (2 5 mg/3 mL) 0 083 % nebulizer solution Take 1 vial (2 5 mg total) by nebulization every 4 (four) hours as needed for wheezing or shortness of breath (cough) 120 vial 2    albuterol (PROAIR HFA) 90 mcg/act inhaler Inhale 2 puffs every 4 (four) hours as needed for wheezing or shortness of breath 3 Inhaler 1    aspirin (ECOTRIN LOW STRENGTH) 81 mg EC tablet Take 1 tablet by mouth daily      atorvastatin (LIPITOR) 80 mg tablet Take 1 tablet (80 mg total) by mouth daily 90 tablet 0    B Complex-C CAPS Take by mouth      BL EVENING PRIMROSE OIL PO Take by mouth      budesonide (PULMICORT) 0 5 mg/2 mL nebulizer solution inhale contents of 1 vial in nebulizer twice a day  0    ezetimibe (ZETIA) 10 mg tablet Take 1 tablet (10 mg total) by mouth daily 90 tablet 3    Flaxseed, Linseed, (FLAXSEED OIL PO) Take by mouth      fluticasone (FLONASE) 50 mcg/act nasal spray 2 sprays into each nostril daily 48 g 3    fluticasone-vilanterol (BREO ELLIPTA) 200-25 MCG/INH inhaler Inhale 1 puff daily Rinse mouth after use  3 Inhaler 3    gabapentin (NEURONTIN) 300 mg capsule Take 1 capsule (300 mg total) by mouth 2 (two) times a day 180 capsule 3    levocetirizine (XYZAL) 5 MG tablet Take 1 tablet (5 mg total) by mouth every evening 90 tablet 1    modafinil (PROVIGIL) 100 mg tablet Take 1 tablet (100 mg total) by mouth daily 30 tablet 0    montelukast (SINGULAIR) 10 mg tablet TAKE 1 TABLET BY MOUTH  DAILY AT BEDTIME 90 tablet 1    Ocrelizumab (OCREVUS IV) Infuse into a venous catheter      pantoprazole (PROTONIX) 40 mg tablet Take 1 tablet (40 mg total) by mouth daily before breakfast 30 tablet 5    sertraline (ZOLOFT) 100 mg tablet TAKE 1 TABLET BY MOUTH  DAILY 90 tablet 1    ergocalciferol (VITAMIN D2) 50,000 units Take 1 capsule (50,000 Units total) by mouth once a week 8 capsule 0    omega-3-acid ethyl esters (LOVAZA) 1 g capsule Take 2 capsules (2 g total) by mouth 2 (two) times a day 360 capsule 3    sucralfate (CARAFATE) 1 g tablet Take 1 tablet (1 g total) by mouth 3 (three) times a day before meals 60 tablet 0     No current facility-administered medications for this visit        Allergies   Allergen Reactions    Bactrim [Sulfamethoxazole-Trimethoprim] Other (See Comments)     Reaction Date: 11Aug2011;     Betadine [Povidone Iodine]     Cefzil [Cefprozil]     Prempro [Conj Estrog-Medroxyprogest Ace] Other (See Comments)     Other      Zyban [Bupropion]     Levaquin [Levofloxacin]     Gadobutrol GI Intolerance    Medroxyprogesterone Other (See Comments)     Reaction Date: 11Aug2011;     Nortriptyline Other (See Comments)     n/a    Pamelor [Nortriptyline Hcl]     Provera [Medroxyprogesterone Acetate]       Immunizations:     Immunization History   Administered Date(s) Administered    INFLUENZA 09/26/2011    Influenza Quadrivalent Preservative Free 3 years and older IM 09/29/2015, 09/22/2016, 09/08/2017    Influenza TIV (IM) 01/17/2013, 09/03/2013, 09/23/2014    Influenza, high dose seasonal 0 5 mL 09/30/2019    Influenza, recombinant, quadrivalent,injectable, preservative free 09/13/2018    Pneumococcal Conjugate 13-Valent 10/22/2018    Pneumococcal Polysaccharide PPV23 10/01/2007, 09/29/2015    Td (adult), adsorbed 06/02/2016      Health Maintenance:         Topic Date Due    Cervical Cancer Screening  07/26/2020    MAMMOGRAM  10/28/2021    CRC Screening: Colonoscopy  06/12/2022    Hepatitis C Screening  Completed         Topic Date Due    DTaP,Tdap,and Td Vaccines (1 - Tdap) 09/28/1966      Medicare Health Risk Assessment:     /74 (BP Location: Right arm, Patient Position: Sitting, Cuff Size: Adult)   Pulse 69   Temp (!) 97 3 °F (36 3 °C) (Tympanic)   Resp 17   Ht 5' 4" (1 626 m)   Wt 71 8 kg (158 lb 6 4 oz)   LMP  (LMP Unknown)   SpO2 98%   BMI 27 19 kg/m²      Yomi Cook is here for her Subsequent Wellness visit  Health Risk Assessment:   Patient rates overall health as good  Patient feels that their physical health rating is same  Eyesight was rated as same  Hearing was rated as same  Patient feels that their emotional and mental health rating is same  Pain experienced in the last 7 days has been some  Patient's pain rating has been 6/10  Patient states that she has experienced no weight loss or gain in last 6 months  Gas pains    Depression Screening:   PHQ-2 Score: 0  PHQ-9 Score: 0      Fall Risk Screening:    In the past year, patient has experienced: no history of falling in past year      Urinary Incontinence Screening:   Patient has not leaked urine accidently in the last six months  Home Safety:  Patient has trouble with stairs inside or outside of their home  Patient has working smoke alarms and has no working carbon monoxide detector  Home safety hazards include: none  Nutrition:   Current diet is Regular  Medications:   Patient is currently taking over-the-counter supplements  OTC medications include: see medication list  Patient is able to manage medications  Activities of Daily Living (ADLs)/Instrumental Activities of Daily Living (IADLs):   Walk and transfer into and out of bed and chair?: Yes  Dress and groom yourself?: Yes    Bathe or shower yourself?: Yes    Feed yourself? Yes  Do your laundry/housekeeping?: Yes  Manage your money, pay your bills and track your expenses?: Yes  Make your own meals?: Yes    Do your own shopping?: Yes    Previous Hospitalizations:   Any hospitalizations or ED visits within the last 12 months?: No      Advance Care Planning:   Living will: Yes    Durable POA for healthcare:  Yes    Advanced directive: Yes      Cognitive Screening:   Provider or family/friend/caregiver concerned regarding cognition?: Yes    Cognition Comments: Cognitive/memory changes due to multiple sclerosis  Assessment as per Neurology    PREVENTIVE SCREENINGS      Cardiovascular Screening:    General: Screening Not Indicated and History Lipid Disorder      Diabetes Screening:     General: Screening Current      Colorectal Cancer Screening:     General: Screening Current      Breast Cancer Screening:     General: Screening Current      Cervical Cancer Screening:    General: Screening Not Indicated      Osteoporosis Screening:    General: Screening Not Indicated and History Osteoporosis      Abdominal Aortic Aneurysm (AAA) Screening:        General: Screening Not Indicated      Lung Cancer Screening:     General: Screening Current      Hepatitis C Screening:    General: Screening Current Preventive Screening Comments: Patient is up-to-date with pneumococcal vaccination and influenza vaccine this season    Other Counseling Topics:   Regular weightbearing exercise         Edvin Devlin MD

## 2020-03-17 ENCOUNTER — TRANSCRIBE ORDERS (OUTPATIENT)
Dept: LAB | Facility: CLINIC | Age: 65
End: 2020-03-17

## 2020-03-17 ENCOUNTER — APPOINTMENT (OUTPATIENT)
Dept: LAB | Facility: CLINIC | Age: 65
End: 2020-03-17
Payer: MEDICARE

## 2020-03-17 DIAGNOSIS — R19.7 DIARRHEA OF PRESUMED INFECTIOUS ORIGIN: Primary | ICD-10-CM

## 2020-03-17 DIAGNOSIS — R19.7 DIARRHEA OF PRESUMED INFECTIOUS ORIGIN: ICD-10-CM

## 2020-03-17 LAB
CRP SERPL QL: <3 MG/L
T4 FREE SERPL-MCNC: 0.93 NG/DL (ref 0.76–1.46)
TSH SERPL DL<=0.05 MIU/L-ACNC: 0.64 UIU/ML (ref 0.36–3.74)

## 2020-03-17 PROCEDURE — 86140 C-REACTIVE PROTEIN: CPT

## 2020-03-17 PROCEDURE — 84443 ASSAY THYROID STIM HORMONE: CPT

## 2020-03-17 PROCEDURE — 83993 ASSAY FOR CALPROTECTIN FECAL: CPT

## 2020-03-17 PROCEDURE — 84439 ASSAY OF FREE THYROXINE: CPT

## 2020-03-17 PROCEDURE — 36415 COLL VENOUS BLD VENIPUNCTURE: CPT

## 2020-03-17 PROCEDURE — 83516 IMMUNOASSAY NONANTIBODY: CPT

## 2020-03-18 LAB
TTG IGA SER-ACNC: <2 U/ML (ref 0–3)
TTG IGG SER-ACNC: 5 U/ML (ref 0–5)

## 2020-03-19 LAB — CALPROTECTIN STL-MCNT: 23 UG/G (ref 0–120)

## 2020-03-22 PROBLEM — J01.00 ACUTE MAXILLARY SINUSITIS: Status: RESOLVED | Noted: 2019-12-30 | Resolved: 2020-03-22

## 2020-03-22 PROBLEM — K58.0 IRRITABLE BOWEL SYNDROME WITH DIARRHEA: Chronic | Status: ACTIVE | Noted: 2020-02-14

## 2020-03-22 NOTE — ASSESSMENT & PLAN NOTE
· Continue Protonix 40 mg daily    · Will add Carafate 1 g t i d  for the next 10-14 days, short-term pending results of EGD in a few days, patient will follow-up with Dr Laura Lazar

## 2020-03-22 NOTE — ASSESSMENT & PLAN NOTE
· Patient has been followed with CT chest performed in October of 2018 and July 2019   · [facility] consult, July 2019    · I advised patient to repeat CT chest in July of 2020

## 2020-03-22 NOTE — ASSESSMENT & PLAN NOTE
· Patient unfortunately self discontinue atorvastatin and Zetia  · I strongly advised her to restart Lipitor at 40 mg daily along with Zetia 10 mg daily, will restart fish oil capsules    · Patient will repeat blood work in 6 weeks

## 2020-03-22 NOTE — ASSESSMENT & PLAN NOTE
· Patient remains under ongoing care of [facility]    · Last Ocrevus infusion is due in July of 2020

## 2020-05-05 ENCOUNTER — APPOINTMENT (OUTPATIENT)
Dept: LAB | Facility: CLINIC | Age: 65
End: 2020-05-05
Payer: MEDICARE

## 2020-05-05 DIAGNOSIS — E78.5 HYPERLIPIDEMIA, UNSPECIFIED HYPERLIPIDEMIA TYPE: Chronic | ICD-10-CM

## 2020-05-05 DIAGNOSIS — K21.00 GASTRO-ESOPHAGEAL REFLUX DISEASE WITH ESOPHAGITIS: ICD-10-CM

## 2020-05-05 DIAGNOSIS — E55.9 VITAMIN D DEFICIENCY: ICD-10-CM

## 2020-05-05 LAB
25(OH)D3 SERPL-MCNC: 18.3 NG/ML (ref 30–100)
ALBUMIN SERPL BCP-MCNC: 4 G/DL (ref 3.5–5)
ALP SERPL-CCNC: 100 U/L (ref 46–116)
ALT SERPL W P-5'-P-CCNC: 34 U/L (ref 12–78)
ANION GAP SERPL CALCULATED.3IONS-SCNC: 9 MMOL/L (ref 4–13)
AST SERPL W P-5'-P-CCNC: 14 U/L (ref 5–45)
BASOPHILS # BLD AUTO: 0.05 THOUSANDS/ΜL (ref 0–0.1)
BASOPHILS NFR BLD AUTO: 1 % (ref 0–1)
BILIRUB SERPL-MCNC: 0.59 MG/DL (ref 0.2–1)
BUN SERPL-MCNC: 17 MG/DL (ref 5–25)
CALCIUM SERPL-MCNC: 9 MG/DL (ref 8.3–10.1)
CHLORIDE SERPL-SCNC: 108 MMOL/L (ref 100–108)
CHOLEST SERPL-MCNC: 179 MG/DL (ref 50–200)
CO2 SERPL-SCNC: 25 MMOL/L (ref 21–32)
CREAT SERPL-MCNC: 0.64 MG/DL (ref 0.6–1.3)
EOSINOPHIL # BLD AUTO: 0.27 THOUSAND/ΜL (ref 0–0.61)
EOSINOPHIL NFR BLD AUTO: 3 % (ref 0–6)
ERYTHROCYTE [DISTWIDTH] IN BLOOD BY AUTOMATED COUNT: 11.6 % (ref 11.6–15.1)
GFR SERPL CREATININE-BSD FRML MDRD: 94 ML/MIN/1.73SQ M
GLUCOSE P FAST SERPL-MCNC: 105 MG/DL (ref 65–99)
HCT VFR BLD AUTO: 39.9 % (ref 34.8–46.1)
HDLC SERPL-MCNC: 41 MG/DL
HGB BLD-MCNC: 13.8 G/DL (ref 11.5–15.4)
IMM GRANULOCYTES # BLD AUTO: 0.02 THOUSAND/UL (ref 0–0.2)
IMM GRANULOCYTES NFR BLD AUTO: 0 % (ref 0–2)
LDLC SERPL CALC-MCNC: 96 MG/DL (ref 0–100)
LYMPHOCYTES # BLD AUTO: 2.12 THOUSANDS/ΜL (ref 0.6–4.47)
LYMPHOCYTES NFR BLD AUTO: 22 % (ref 14–44)
MCH RBC QN AUTO: 30.5 PG (ref 26.8–34.3)
MCHC RBC AUTO-ENTMCNC: 34.6 G/DL (ref 31.4–37.4)
MCV RBC AUTO: 88 FL (ref 82–98)
MONOCYTES # BLD AUTO: 0.61 THOUSAND/ΜL (ref 0.17–1.22)
MONOCYTES NFR BLD AUTO: 6 % (ref 4–12)
NEUTROPHILS # BLD AUTO: 6.54 THOUSANDS/ΜL (ref 1.85–7.62)
NEUTS SEG NFR BLD AUTO: 68 % (ref 43–75)
NRBC BLD AUTO-RTO: 0 /100 WBCS
PLATELET # BLD AUTO: 252 THOUSANDS/UL (ref 149–390)
PMV BLD AUTO: 10.3 FL (ref 8.9–12.7)
POTASSIUM SERPL-SCNC: 4 MMOL/L (ref 3.5–5.3)
PROT SERPL-MCNC: 7.2 G/DL (ref 6.4–8.2)
RBC # BLD AUTO: 4.52 MILLION/UL (ref 3.81–5.12)
SODIUM SERPL-SCNC: 142 MMOL/L (ref 136–145)
TRIGL SERPL-MCNC: 211 MG/DL
TSH SERPL DL<=0.05 MIU/L-ACNC: 1.07 UIU/ML (ref 0.36–3.74)
WBC # BLD AUTO: 9.61 THOUSAND/UL (ref 4.31–10.16)

## 2020-05-05 PROCEDURE — 80061 LIPID PANEL: CPT

## 2020-05-05 PROCEDURE — 82306 VITAMIN D 25 HYDROXY: CPT

## 2020-05-05 PROCEDURE — 80053 COMPREHEN METABOLIC PANEL: CPT

## 2020-05-05 PROCEDURE — 84443 ASSAY THYROID STIM HORMONE: CPT

## 2020-05-05 PROCEDURE — 85025 COMPLETE CBC W/AUTO DIFF WBC: CPT

## 2020-05-05 PROCEDURE — 36415 COLL VENOUS BLD VENIPUNCTURE: CPT

## 2020-05-06 DIAGNOSIS — F33.9 MAJOR DEPRESSION, RECURRENT, CHRONIC (HCC): ICD-10-CM

## 2020-05-06 RX ORDER — SERTRALINE HYDROCHLORIDE 100 MG/1
TABLET, FILM COATED ORAL
Qty: 90 TABLET | Refills: 1 | Status: SHIPPED | OUTPATIENT
Start: 2020-05-06 | End: 2020-11-10

## 2020-05-13 ENCOUNTER — TELEPHONE (OUTPATIENT)
Dept: FAMILY MEDICINE CLINIC | Facility: CLINIC | Age: 65
End: 2020-05-13

## 2020-05-13 DIAGNOSIS — E55.9 VITAMIN D DEFICIENCY: Primary | ICD-10-CM

## 2020-05-18 ENCOUNTER — TELEPHONE (OUTPATIENT)
Dept: UROLOGY | Facility: MEDICAL CENTER | Age: 65
End: 2020-05-18

## 2020-06-04 ENCOUNTER — TELEMEDICINE (OUTPATIENT)
Dept: UROLOGY | Facility: MEDICAL CENTER | Age: 65
End: 2020-06-04
Payer: MEDICARE

## 2020-06-04 DIAGNOSIS — N31.9 NEUROGENIC BLADDER: ICD-10-CM

## 2020-06-04 DIAGNOSIS — N39.41 URGE INCONTINENCE: Primary | ICD-10-CM

## 2020-06-04 PROCEDURE — 99214 OFFICE O/P EST MOD 30 MIN: CPT | Performed by: UROLOGY

## 2020-06-08 DIAGNOSIS — E78.5 HYPERLIPIDEMIA, UNSPECIFIED HYPERLIPIDEMIA TYPE: Chronic | ICD-10-CM

## 2020-06-09 RX ORDER — ATORVASTATIN CALCIUM 80 MG/1
80 TABLET, FILM COATED ORAL DAILY
Qty: 90 TABLET | Refills: 1 | Status: SHIPPED | OUTPATIENT
Start: 2020-06-09 | End: 2020-11-10

## 2020-06-11 ENCOUNTER — DOCUMENTATION (OUTPATIENT)
Dept: NEUROLOGY | Facility: CLINIC | Age: 65
End: 2020-06-11

## 2020-06-12 ENCOUNTER — TELEPHONE (OUTPATIENT)
Dept: NEUROLOGY | Facility: CLINIC | Age: 65
End: 2020-06-12

## 2020-06-15 ENCOUNTER — TRANSCRIBE ORDERS (OUTPATIENT)
Dept: LAB | Facility: CLINIC | Age: 65
End: 2020-06-15

## 2020-06-15 DIAGNOSIS — Z01.818 OTHER SPECIFIED PRE-OPERATIVE EXAMINATION: Primary | ICD-10-CM

## 2020-06-22 ENCOUNTER — OFFICE VISIT (OUTPATIENT)
Dept: NEUROLOGY | Facility: CLINIC | Age: 65
End: 2020-06-22
Payer: MEDICARE

## 2020-06-22 ENCOUNTER — TELEPHONE (OUTPATIENT)
Dept: NEUROLOGY | Facility: CLINIC | Age: 65
End: 2020-06-22

## 2020-06-22 VITALS
SYSTOLIC BLOOD PRESSURE: 126 MMHG | HEART RATE: 69 BPM | TEMPERATURE: 95.9 F | BODY MASS INDEX: 26.98 KG/M2 | HEIGHT: 64 IN | DIASTOLIC BLOOD PRESSURE: 68 MMHG | WEIGHT: 158 LBS

## 2020-06-22 DIAGNOSIS — E55.9 VITAMIN D DEFICIENCY: Primary | ICD-10-CM

## 2020-06-22 DIAGNOSIS — G35 MULTIPLE SCLEROSIS (HCC): Primary | Chronic | ICD-10-CM

## 2020-06-22 DIAGNOSIS — E55.9 VITAMIN D DEFICIENCY: ICD-10-CM

## 2020-06-22 DIAGNOSIS — R41.3 AMNESIA/MEMORY DISORDER: ICD-10-CM

## 2020-06-22 PROCEDURE — 4040F PNEUMOC VAC/ADMIN/RCVD: CPT | Performed by: NURSE PRACTITIONER

## 2020-06-22 PROCEDURE — 4004F PT TOBACCO SCREEN RCVD TLK: CPT | Performed by: NURSE PRACTITIONER

## 2020-06-22 PROCEDURE — 99214 OFFICE O/P EST MOD 30 MIN: CPT | Performed by: NURSE PRACTITIONER

## 2020-06-22 PROCEDURE — 3008F BODY MASS INDEX DOCD: CPT | Performed by: NURSE PRACTITIONER

## 2020-06-22 RX ORDER — ERGOCALCIFEROL 1.25 MG/1
50000 CAPSULE ORAL WEEKLY
Qty: 12 CAPSULE | Refills: 0 | Status: SHIPPED | OUTPATIENT
Start: 2020-06-22 | End: 2021-01-19 | Stop reason: ALTCHOICE

## 2020-06-23 ENCOUNTER — TELEPHONE (OUTPATIENT)
Dept: NEUROLOGY | Facility: CLINIC | Age: 65
End: 2020-06-23

## 2020-07-15 PROCEDURE — 88305 TISSUE EXAM BY PATHOLOGIST: CPT | Performed by: PATHOLOGY

## 2020-07-16 ENCOUNTER — LAB REQUISITION (OUTPATIENT)
Dept: LAB | Facility: HOSPITAL | Age: 65
End: 2020-07-16
Payer: MEDICARE

## 2020-07-16 DIAGNOSIS — K62.1 RECTAL POLYP: ICD-10-CM

## 2020-07-16 DIAGNOSIS — R10.13 EPIGASTRIC PAIN: ICD-10-CM

## 2020-07-16 DIAGNOSIS — K31.89 OTHER DISEASES OF STOMACH AND DUODENUM: ICD-10-CM

## 2020-07-16 DIAGNOSIS — Z80.0 FAMILY HISTORY OF MALIGNANT NEOPLASM OF DIGESTIVE ORGANS: ICD-10-CM

## 2020-07-16 DIAGNOSIS — R19.7 DIARRHEA, UNSPECIFIED: ICD-10-CM

## 2020-07-16 DIAGNOSIS — K22.89 OTHER SPECIFIED DISEASES OF ESOPHAGUS: ICD-10-CM

## 2020-07-16 DIAGNOSIS — K63.5 POLYP OF COLON: ICD-10-CM

## 2020-07-16 NOTE — TELEPHONE ENCOUNTER
Already addressed Complex Repair And Z Plasty Text: The defect edges were debeveled with a #15 scalpel blade.  The primary defect was closed partially with a complex linear closure.  Given the location of the remaining defect, shape of the defect and the proximity to free margins a Z plasty was deemed most appropriate for complete closure of the defect.  Using a sterile surgical marker, an appropriate advancement flap was drawn incorporating the defect and placing the expected incisions within the relaxed skin tension lines where possible.    The area thus outlined was incised deep to adipose tissue with a #15 scalpel blade.  The skin margins were undermined to an appropriate distance in all directions utilizing iris scissors.

## 2020-07-23 ENCOUNTER — TELEPHONE (OUTPATIENT)
Dept: NEUROLOGY | Facility: CLINIC | Age: 65
End: 2020-07-23

## 2020-07-23 DIAGNOSIS — G35 MULTIPLE SCLEROSIS, RELAPSING-REMITTING (HCC): Primary | ICD-10-CM

## 2020-07-23 NOTE — TELEPHONE ENCOUNTER
Patient stated she needs to get labs prior to her MRI on 7/28  BUN and creatinine labs ordered  Patient stated she uses a [facility]

## 2020-07-25 DIAGNOSIS — J30.9 ALLERGIC RHINITIS, UNSPECIFIED SEASONALITY, UNSPECIFIED TRIGGER: ICD-10-CM

## 2020-07-27 ENCOUNTER — APPOINTMENT (OUTPATIENT)
Dept: LAB | Facility: CLINIC | Age: 65
End: 2020-07-27
Payer: MEDICARE

## 2020-07-27 DIAGNOSIS — G35 MULTIPLE SCLEROSIS, RELAPSING-REMITTING (HCC): ICD-10-CM

## 2020-07-27 LAB
BUN SERPL-MCNC: 14 MG/DL (ref 5–25)
CREAT SERPL-MCNC: 0.74 MG/DL (ref 0.6–1.3)
GFR SERPL CREATININE-BSD FRML MDRD: 85 ML/MIN/1.73SQ M

## 2020-07-27 PROCEDURE — 36415 COLL VENOUS BLD VENIPUNCTURE: CPT

## 2020-07-27 PROCEDURE — 82565 ASSAY OF CREATININE: CPT

## 2020-07-27 PROCEDURE — 84520 ASSAY OF UREA NITROGEN: CPT

## 2020-07-27 RX ORDER — MONTELUKAST SODIUM 10 MG/1
TABLET ORAL
Qty: 90 TABLET | Refills: 3 | Status: SHIPPED | OUTPATIENT
Start: 2020-07-27 | End: 2021-08-28

## 2020-07-28 ENCOUNTER — HOSPITAL ENCOUNTER (OUTPATIENT)
Dept: MRI IMAGING | Facility: HOSPITAL | Age: 65
Discharge: HOME | End: 2020-07-28
Payer: MEDICARE

## 2020-07-28 DIAGNOSIS — G35 MULTIPLE SCLEROSIS (HCC): Chronic | ICD-10-CM

## 2020-07-28 PROCEDURE — A9585 GADOBUTROL INJECTION: HCPCS | Performed by: NURSE PRACTITIONER

## 2020-07-28 PROCEDURE — 72156 MRI NECK SPINE W/O & W/DYE: CPT

## 2020-07-28 PROCEDURE — 70553 MRI BRAIN STEM W/O & W/DYE: CPT

## 2020-07-28 RX ADMIN — GADOBUTROL 7 ML: 604.72 INJECTION INTRAVENOUS at 11:14

## 2020-07-30 ENCOUNTER — TELEPHONE (OUTPATIENT)
Dept: NEUROLOGY | Facility: CLINIC | Age: 65
End: 2020-07-30

## 2020-07-30 NOTE — TELEPHONE ENCOUNTER
----- Message from Jani Scott sent at 7/30/2020 10:54 AM EDT -----  Please let patient know that both her cervical and her brain MRI are stable, no new or enhancing lesions on either    She has an upcoming appointment in August with Dr Cris Isaac, will discuss at that time further treatment options

## 2020-07-30 NOTE — TELEPHONE ENCOUNTER
Contacted pt made aware of mri's results pt understood  Also advice to get blood work done before her appt with Dr Tiera Salguero  Pt agreed

## 2020-08-07 ENCOUNTER — APPOINTMENT (OUTPATIENT)
Dept: LAB | Facility: CLINIC | Age: 65
End: 2020-08-07
Payer: MEDICARE

## 2020-08-07 ENCOUNTER — TRANSCRIBE ORDERS (OUTPATIENT)
Dept: LAB | Facility: CLINIC | Age: 65
End: 2020-08-07

## 2020-08-07 DIAGNOSIS — G35 MULTIPLE SCLEROSIS (HCC): ICD-10-CM

## 2020-08-07 LAB
ALBUMIN SERPL BCP-MCNC: 4.2 G/DL (ref 3.5–5)
ALP SERPL-CCNC: 88 U/L (ref 46–116)
ALT SERPL W P-5'-P-CCNC: 32 U/L (ref 12–78)
ANION GAP SERPL CALCULATED.3IONS-SCNC: 7 MMOL/L (ref 4–13)
AST SERPL W P-5'-P-CCNC: 14 U/L (ref 5–45)
BASOPHILS # BLD AUTO: 0.06 THOUSANDS/ΜL (ref 0–0.1)
BASOPHILS NFR BLD AUTO: 1 % (ref 0–1)
BILIRUB SERPL-MCNC: 0.5 MG/DL (ref 0.2–1)
BUN SERPL-MCNC: 16 MG/DL (ref 5–25)
CALCIUM SERPL-MCNC: 8.8 MG/DL (ref 8.3–10.1)
CHLORIDE SERPL-SCNC: 102 MMOL/L (ref 100–108)
CO2 SERPL-SCNC: 29 MMOL/L (ref 21–32)
CREAT SERPL-MCNC: 0.72 MG/DL (ref 0.6–1.3)
EOSINOPHIL # BLD AUTO: 0.14 THOUSAND/ΜL (ref 0–0.61)
EOSINOPHIL NFR BLD AUTO: 1 % (ref 0–6)
ERYTHROCYTE [DISTWIDTH] IN BLOOD BY AUTOMATED COUNT: 11.9 % (ref 11.6–15.1)
GFR SERPL CREATININE-BSD FRML MDRD: 88 ML/MIN/1.73SQ M
GLUCOSE SERPL-MCNC: 105 MG/DL (ref 65–140)
HCT VFR BLD AUTO: 41.5 % (ref 34.8–46.1)
HGB BLD-MCNC: 14.3 G/DL (ref 11.5–15.4)
IMM GRANULOCYTES # BLD AUTO: 0.03 THOUSAND/UL (ref 0–0.2)
IMM GRANULOCYTES NFR BLD AUTO: 0 % (ref 0–2)
LYMPHOCYTES # BLD AUTO: 2.23 THOUSANDS/ΜL (ref 0.6–4.47)
LYMPHOCYTES NFR BLD AUTO: 23 % (ref 14–44)
MCH RBC QN AUTO: 30.4 PG (ref 26.8–34.3)
MCHC RBC AUTO-ENTMCNC: 34.5 G/DL (ref 31.4–37.4)
MCV RBC AUTO: 88 FL (ref 82–98)
MONOCYTES # BLD AUTO: 0.67 THOUSAND/ΜL (ref 0.17–1.22)
MONOCYTES NFR BLD AUTO: 7 % (ref 4–12)
NEUTROPHILS # BLD AUTO: 6.67 THOUSANDS/ΜL (ref 1.85–7.62)
NEUTS SEG NFR BLD AUTO: 68 % (ref 43–75)
NRBC BLD AUTO-RTO: 0 /100 WBCS
PLATELET # BLD AUTO: 251 THOUSANDS/UL (ref 149–390)
PMV BLD AUTO: 10 FL (ref 8.9–12.7)
POTASSIUM SERPL-SCNC: 3.8 MMOL/L (ref 3.5–5.3)
PROT SERPL-MCNC: 7.3 G/DL (ref 6.4–8.2)
RBC # BLD AUTO: 4.71 MILLION/UL (ref 3.81–5.12)
SODIUM SERPL-SCNC: 138 MMOL/L (ref 136–145)
WBC # BLD AUTO: 9.8 THOUSAND/UL (ref 4.31–10.16)

## 2020-08-07 PROCEDURE — 80053 COMPREHEN METABOLIC PANEL: CPT

## 2020-08-07 PROCEDURE — 36415 COLL VENOUS BLD VENIPUNCTURE: CPT

## 2020-08-07 PROCEDURE — 85025 COMPLETE CBC W/AUTO DIFF WBC: CPT

## 2020-08-08 ENCOUNTER — HOSPITAL ENCOUNTER (OUTPATIENT)
Dept: CT IMAGING | Facility: HOSPITAL | Age: 65
Discharge: HOME | End: 2020-08-08
Payer: MEDICARE

## 2020-08-08 DIAGNOSIS — J44.9 COPD WITHOUT EXACERBATION (HCC): ICD-10-CM

## 2020-08-08 DIAGNOSIS — R93.89 ABNORMAL CT OF THE CHEST: ICD-10-CM

## 2020-08-08 PROCEDURE — 71250 CT THORAX DX C-: CPT

## 2020-08-13 ENCOUNTER — ANNUAL EXAM (OUTPATIENT)
Dept: OBGYN CLINIC | Facility: CLINIC | Age: 65
End: 2020-08-13
Payer: MEDICARE

## 2020-08-13 ENCOUNTER — OFFICE VISIT (OUTPATIENT)
Dept: NEUROLOGY | Facility: CLINIC | Age: 65
End: 2020-08-13
Payer: MEDICARE

## 2020-08-13 ENCOUNTER — TELEPHONE (OUTPATIENT)
Dept: FAMILY MEDICINE CLINIC | Facility: CLINIC | Age: 65
End: 2020-08-13

## 2020-08-13 VITALS
BODY MASS INDEX: 26.63 KG/M2 | WEIGHT: 156 LBS | DIASTOLIC BLOOD PRESSURE: 80 MMHG | HEIGHT: 64 IN | SYSTOLIC BLOOD PRESSURE: 120 MMHG

## 2020-08-13 VITALS — WEIGHT: 156.6 LBS | RESPIRATION RATE: 16 BRPM | TEMPERATURE: 97.4 F | BODY MASS INDEX: 26.73 KG/M2 | HEIGHT: 64 IN

## 2020-08-13 DIAGNOSIS — Z01.419 ENCOUNTER FOR WELL WOMAN EXAM: Primary | ICD-10-CM

## 2020-08-13 DIAGNOSIS — Z12.39 ENCOUNTER FOR SCREENING BREAST EXAMINATION: ICD-10-CM

## 2020-08-13 DIAGNOSIS — D84.9 IMMUNOSUPPRESSED STATUS (HCC): ICD-10-CM

## 2020-08-13 DIAGNOSIS — R32 URINARY INCONTINENCE, UNSPECIFIED TYPE: ICD-10-CM

## 2020-08-13 DIAGNOSIS — Z11.51 SPECIAL SCREENING EXAMINATION FOR HUMAN PAPILLOMAVIRUS (HPV): ICD-10-CM

## 2020-08-13 DIAGNOSIS — Z12.31 ENCOUNTER FOR SCREENING MAMMOGRAM FOR BREAST CANCER: ICD-10-CM

## 2020-08-13 DIAGNOSIS — G35 MULTIPLE SCLEROSIS (HCC): ICD-10-CM

## 2020-08-13 DIAGNOSIS — R93.89 ABNORMAL CT OF THE CHEST: Primary | ICD-10-CM

## 2020-08-13 DIAGNOSIS — Z01.419 CERVICAL SMEAR, AS PART OF ROUTINE GYNECOLOGICAL EXAMINATION: ICD-10-CM

## 2020-08-13 DIAGNOSIS — Z78.0 POSTMENOPAUSAL: ICD-10-CM

## 2020-08-13 PROCEDURE — 3008F BODY MASS INDEX DOCD: CPT | Performed by: PSYCHIATRY & NEUROLOGY

## 2020-08-13 PROCEDURE — G0145 SCR C/V CYTO,THINLAYER,RESCR: HCPCS | Performed by: PHYSICIAN ASSISTANT

## 2020-08-13 PROCEDURE — 99215 OFFICE O/P EST HI 40 MIN: CPT | Performed by: PSYCHIATRY & NEUROLOGY

## 2020-08-13 PROCEDURE — 4004F PT TOBACCO SCREEN RCVD TLK: CPT | Performed by: PSYCHIATRY & NEUROLOGY

## 2020-08-13 PROCEDURE — 4040F PNEUMOC VAC/ADMIN/RCVD: CPT | Performed by: PSYCHIATRY & NEUROLOGY

## 2020-08-13 PROCEDURE — 87624 HPV HI-RISK TYP POOLED RSLT: CPT | Performed by: PHYSICIAN ASSISTANT

## 2020-08-13 PROCEDURE — G0101 CA SCREEN;PELVIC/BREAST EXAM: HCPCS | Performed by: PHYSICIAN ASSISTANT

## 2020-08-13 NOTE — TELEPHONE ENCOUNTER
Please contact patient  I received results of CT chest   It reveals area of inflammation/possible pneumonia? In the right middle lobe  Please find out if patient is experiencing any symptoms of fever, cough, unusual shortness of breath or colored mucus expectoration? Please let me know    Patient should contact [facility] pulmonology to schedule follow-up ASAP, she is an existing patient with our Pulmonary group and was seen in the past by Mikala June      Thank you     MER Liz, please see CT results  Thank you

## 2020-08-13 NOTE — TELEPHONE ENCOUNTER
Spoke to pt  She is not experiencing any of those symptoms  Pt aware to call Dr Wilman Peng office   She also stated that she is going to have a Covid Antibody test and urine tomorrow morning ordered by Neurology

## 2020-08-13 NOTE — PROGRESS NOTES
Patient is here for yearly exam  Patient has no bleeding, no breast concerns and B&B ok  Patient is due for a pap smear with HPV testing at this visit  7/26/17 Normal Pap, Negative HPV  4/15/15 Normal Pap, Negative HPV  4/9/14 Normal Pap  10/28/19 Normal Mammo

## 2020-08-13 NOTE — PROGRESS NOTES
Assessment/Plan:    No problem-specific Assessment & Plan notes found for this encounter  Diagnoses and all orders for this visit:    Encounter for well woman exam    Encounter for screening breast examination    Encounter for screening mammogram for breast cancer  -     Mammo screening bilateral w cad; Future    Cervical smear, as part of routine gynecological examination  -     Liquid-based pap, screening    Special screening examination for human papillomavirus (HPV)  -     Liquid-based pap, screening          Subjective:      Patient ID: Thai Mercado is a 71 y o  female  Pt presents for her annual exam today--  She has no complaints  She has no bleeding or pelvic pain--postmeno  Bowel and bladder are regular  Colonoscopy--?? No breast concerns today  Last mammo--10/19    pap today  rx mammo  The following portions of the patient's history were reviewed and updated as appropriate: allergies, current medications, past family history, past medical history, past social history, past surgical history and problem list     Review of Systems   Constitutional: Negative for chills, fever and unexpected weight change  Gastrointestinal: Negative for abdominal pain, blood in stool, constipation and diarrhea  Genitourinary: Negative  Objective:      /80   Ht 5' 4" (1 626 m)   Wt 70 8 kg (156 lb)   LMP  (LMP Unknown)   BMI 26 78 kg/m²          Physical Exam  Vitals signs and nursing note reviewed  Constitutional:       Appearance: She is well-developed  HENT:      Head: Normocephalic and atraumatic  Neck:      Musculoskeletal: Normal range of motion  Chest:      Breasts:         Right: No inverted nipple, mass, nipple discharge or skin change  Left: No inverted nipple, mass, nipple discharge or skin change  Abdominal:      Palpations: Abdomen is soft  Genitourinary:     Exam position: Supine  Labia:         Right: No rash, tenderness or lesion           Left: No rash, tenderness or lesion  Vagina: Normal       Cervix: No cervical motion tenderness, discharge or friability  Adnexa:         Right: No mass, tenderness or fullness  Left: No mass, tenderness or fullness  Lymphadenopathy:      Lower Body: No right inguinal adenopathy  No left inguinal adenopathy

## 2020-08-13 NOTE — PROGRESS NOTES
[facility]  PATIENT:  Roya Trejo  MRN:  [id number]  :  1955  DATE OF SERVICE:  2020    Assessment/Plan:     Problem List Items Addressed This Visit        Nervous and Auditory    Multiple sclerosis, relapsing/remitting (Chronic)    Relevant Orders    SARS-CoV2 Antibody, Total (IgG, IgA, IgM) [facility]    UA w Reflex to Microscopic w Reflex to Culture       Other    Abnormal CT of the chest - Primary (Chronic)    Urinary incontinence    Relevant Orders    UA w Reflex to Microscopic w Reflex to Culture    Immunosuppressed status (HCC)    Relevant Orders    SARS-CoV2 Antibody, Total (IgG, IgA, IgM) [facility]         Mrs Haseeb Llamas presented to [facility] multiple [address] for follow-up on multiple sclerosis and related issues  Patient clinically stable, no new neurologic issues, with muscle cramps reported in her lower extremities, non concerning     --  Last MRI of the brain and cervical spine completed on 2020,  stable with evidence of chronic demyelinating disease, no new lesions or enhancement  Disease burden is moderate  Patient will need updated study  -- patient initially started Ocrevus treatment in 2018, with last Ocrevus infusion therapy provided on 2020  Considering patient age, stable findings not only clinical but radiographic and rapidly evolving pandemic COVID-19 outbreak, patient was advised against continuing Ocrevus infusion  We agreed to consider observational approach with brain and spine imaging may be advised within 6 months  Patient had completed CT chest on 2020 to follow on lung nodules, with  "New patchy groundglass opacity in the right middle lobe is most likely infectious or inflammatory in etiology " described  Patient has SARS-CoV-2 test pending from 2020; Patient has NO fever, no cough, no CP or SOB   Patient feels tired and confused; despite last Wilhelmena Baseman provided in 2020, patient is still immunosuppressed, Coronavirus antibodies will be advised after this office visit  Patient is to follow with primary care team for further evaluation and management, UA was provided as well  Follow-up with Neurology within 3 months  Subjective:  Multiple sclerosis and related issues    HPI    Mrs Kaufman has MS and she has stable clinical findings with no radiographic progression of her demyelination in her brain parenchyma  Patient will continue Chitra Forts at this point with total 2 years therapy were advised  Mammogram 2019  within normal limits         MRI brain and spine will be in July 2020 - stable findings has been described  Patient has been working with Urology team for bladder urgency  She has concern for bowel dysfunction as well  Today she is not feeling well, confused, generalized body fatigue, but no fevers, no shortness of breath, no cough no chest tightness      We extensively discussed natural history of multiple sclerosis as patient reach her age were risk of continuing disease modifying therapy with immunosuppression is overweight benefits considering less aggressive immune response at age of 60 and above  The following portions of the patient's history were reviewed and updated as appropriate:   She  has a past medical history of Asthma, Degeneration of intervertebral disc at L5-S1 level, Depression, Full incontinence of feces, Generalized anxiety disorder, Hepatitis, Herpes zoster, History of colon polyps, Hypercholesterolemia, Hyperlipidemia, MS (multiple sclerosis) ([address] ), MS (multiple sclerosis) ([address] ), Osteoporosis, Seasonal allergies, Tumor of breast, and Urinary incontinence    She   Patient Active Problem List    Diagnosis Date Noted    Immunosuppressed status ([facility] ) 08/13/2020    Irritable bowel syndrome with diarrhea 02/14/2020    Urinary incontinence 08/01/2019    Balance disorder 08/01/2019    Special screening for malignant neoplasms, colon 03/27/2019    Family history of colon cancer 03/27/2019    Abnormal CT of the chest 03/17/2019    Shoulder impingement syndrome, left 11/16/2018    Amnesia/memory disorder     Vitamin D deficiency 01/24/2018    Major depression, recurrent, chronic ([facility] [address] ) 09/12/2017    [facility] without exacerbation [facility] ) 04/17/2017    Ambulatory dysfunction 04/15/2017    Multiple sclerosis, relapsing/remitting 02/02/2017    Muscle spasm 02/02/2017    Hyperlipidemia 02/02/2017    Osteoporosis 02/02/2017    Gastro-esophageal reflux disease with esophagitis 09/25/2013    Allergic rhinitis 09/11/2012     She  has a past surgical history that includes Rotator cuff repair; Rotator cuff repair (Bilateral); Breast surgery; Colonoscopy; Septoplasty; [place] lumbar puncture diagnostic (10/1/2018); and Breast biopsy (Right)  Her family history includes Arthritis in her family and mother; COPD in her family and father; Colon cancer (age of onset: 72) in her brother; Emphysema in her family; Heart disease in her family; Hyperthyroidism in her family; Multiple sclerosis in her family; No Known Problems in her brother, brother, maternal aunt, maternal aunt, maternal grandfather, maternal grandmother, paternal aunt, paternal grandfather, paternal grandmother, sister, and sister; Osteoarthritis in her family; Osteoporosis in her mother  She  reports that she has been smoking cigarettes  She has a 40 00 pack-year smoking history  She has never used smokeless tobacco  She reports current alcohol use  She reports that she does not use drugs    Current Outpatient Medications   Medication Sig Dispense Refill    albuterol (2 5 mg/3 mL) 0 083 % nebulizer solution Take 1 vial (2 5 mg total) by nebulization every 4 (four) hours as needed for wheezing or shortness of breath (cough) 120 vial 2    albuterol (PROAIR HFA) 90 mcg/act inhaler Inhale 2 puffs every 4 (four) hours as needed for wheezing or shortness of breath 3 Inhaler 1    aspirin (ECOTRIN LOW STRENGTH) 81 mg EC tablet Take 1 tablet by mouth daily      atorvastatin (LIPITOR) 80 mg tablet Take 1 tablet (80 mg total) by mouth daily 90 tablet 1    B Complex-C CAPS Take by mouth      BL EVENING PRIMROSE OIL PO Take by mouth      budesonide (PULMICORT) 0 5 mg/2 mL nebulizer solution inhale contents of 1 vial in nebulizer twice a day  0    Cholecalciferol (Vitamin D3 Ultra Strength) 125 MCG (5000 UT) capsule Take 1 capsule (5,000 Units total) by mouth daily 30 capsule 11    ergocalciferol (VITAMIN D2) 50,000 units Take 1 capsule (50,000 Units total) by mouth once a week x3 months  Please then call for refill or dose change  12 capsule 0    fluticasone (FLONASE) 50 mcg/act nasal spray 2 sprays into each nostril daily 48 g 3    fluticasone-vilanterol (BREO ELLIPTA) 200-25 MCG/INH inhaler Inhale 1 puff daily Rinse mouth after use  3 Inhaler 3    gabapentin (NEURONTIN) 300 mg capsule Take 1 capsule (300 mg total) by mouth 2 (two) times a day 180 capsule 3    levocetirizine (XYZAL) 5 MG tablet Take 1 tablet (5 mg total) by mouth every evening 90 tablet 1    modafinil (PROVIGIL) 100 mg tablet Take 1 tablet (100 mg total) by mouth daily 30 tablet 0    montelukast (SINGULAIR) 10 mg tablet TAKE 1 TABLET BY MOUTH  DAILY AT BEDTIME 90 tablet 3    omega-3-acid ethyl esters (LOVAZA) 1 g capsule Take 2 capsules (2 g total) by mouth 2 (two) times a day 360 capsule 3    sertraline (ZOLOFT) 100 mg tablet TAKE 1 TABLET BY MOUTH  DAILY 90 tablet 1    ezetimibe (ZETIA) 10 mg tablet Take 1 tablet (10 mg total) by mouth daily (Patient not taking: Reported on 6/22/2020) 90 tablet 3    Flaxseed, Linseed, (FLAXSEED OIL PO) Take by mouth      Ocrelizumab (OCREVUS IV) Infuse into a venous catheter      pantoprazole (PROTONIX) 40 mg tablet Take 1 tablet (40 mg total) by mouth daily before breakfast (Patient not taking: Reported on 8/13/2020) 30 tablet 5     No current facility-administered medications for this visit        Current Outpatient Medications on File Prior to Visit   Medication Sig    albuterol (2 5 mg/3 mL) 0 083 % nebulizer solution Take 1 vial (2 5 mg total) by nebulization every 4 (four) hours as needed for wheezing or shortness of breath (cough)    albuterol (PROAIR HFA) 90 mcg/act inhaler Inhale 2 puffs every 4 (four) hours as needed for wheezing or shortness of breath    aspirin (ECOTRIN LOW STRENGTH) 81 mg EC tablet Take 1 tablet by mouth daily    atorvastatin (LIPITOR) 80 mg tablet Take 1 tablet (80 mg total) by mouth daily    B Complex-C CAPS Take by mouth    BL EVENING PRIMROSE OIL PO Take by mouth    budesonide (PULMICORT) 0 5 mg/2 mL nebulizer solution inhale contents of 1 vial in nebulizer twice a day    Cholecalciferol (Vitamin D3 Ultra Strength) 125 MCG (5000 UT) capsule Take 1 capsule (5,000 Units total) by mouth daily    ergocalciferol (VITAMIN D2) 50,000 units Take 1 capsule (50,000 Units total) by mouth once a week x3 months  Please then call for refill or dose change   fluticasone (FLONASE) 50 mcg/act nasal spray 2 sprays into each nostril daily    fluticasone-vilanterol (BREO ELLIPTA) 200-25 MCG/INH inhaler Inhale 1 puff daily Rinse mouth after use      gabapentin (NEURONTIN) 300 mg capsule Take 1 capsule (300 mg total) by mouth 2 (two) times a day    levocetirizine (XYZAL) 5 MG tablet Take 1 tablet (5 mg total) by mouth every evening    modafinil (PROVIGIL) 100 mg tablet Take 1 tablet (100 mg total) by mouth daily    montelukast (SINGULAIR) 10 mg tablet TAKE 1 TABLET BY MOUTH  DAILY AT BEDTIME    omega-3-acid ethyl esters (LOVAZA) 1 g capsule Take 2 capsules (2 g total) by mouth 2 (two) times a day    sertraline (ZOLOFT) 100 mg tablet TAKE 1 TABLET BY MOUTH  DAILY    ezetimibe (ZETIA) 10 mg tablet Take 1 tablet (10 mg total) by mouth daily (Patient not taking: Reported on 6/22/2020)    Flaxseed, Linseed, (FLAXSEED OIL PO) Take by mouth    Ocrelizumab (OCREVUS IV) Infuse into a venous catheter    pantoprazole (PROTONIX) 40 mg tablet Take 1 tablet (40 mg total) by mouth daily before breakfast (Patient not taking: Reported on 8/13/2020)    [DISCONTINUED] sucralfate (CARAFATE) 1 g tablet Take 1 tablet (1 g total) by mouth 3 (three) times a day before meals     No current facility-administered medications on file prior to visit  She is allergic to bupropion; cefprozil; conj estrog-medroxyprogest ace; povidone iodine; sulfamethoxazole-trimethoprim; levofloxacin; gadobutrol; medroxyprogesterone; nortriptyline; pamelor [nortriptyline hcl]; and provera [medroxyprogesterone acetate]            Objective:    Temperature (!) 97 4 °F (36 3 °C), temperature source Tympanic, resp  rate 16, height 5' 4" (1 626 m), weight 71 kg (156 lb 9 6 oz), not currently breastfeeding  Physical Exam    Neurological Exam    Gait  25 ft walk 6 35 sec  CONSTITUTIONAL: NAD, pleasant  NECK: supple, no lymphadenopathy, no thyromegaly, no JVD  CARDIOVASCULAR: RRR, normal S1S2, no murmurs, no rubs  RESP: clear to auscultation bilaterally, no wheezes/rhonchi/rales  ABDOMEN: soft, non tender, non distended  SKIN: no rash or skin lesions  EXTREMITIES: no edema, pulses 2+bilaterally  PSYCH: appropriate mood and affect  NEUROLOGIC COMPREHENSIVE EXAM: Patient is oriented to person, place and time, NAD; appropriate affect  CN II, III, IV, V, VI, VII,VIII,IX,X,XI-XII intact with EOMI, PERRLA, OKN intact, VF grossly intact, fundi poorly visualized secondary to pupillary constriction; symmetric face noted  Motor: 5/5 UE/LE bilateral symmetric; Sensory: intact to light touch and pinprick bilaterally; normal vibration sensation feet bilaterally; Coordination within normal limits on FTN and XANDER testing; DTR: 2/4 through, no Babinski, no clonus  Tandem gait is abnormal  Romberg: positive          ROS:  12 points of review of system was reviewed with the patient and was unremarkable with exception: see HPI  Review of Systems Constitutional: Negative  Negative for appetite change and fever  HENT: Positive for tinnitus  Negative for hearing loss, trouble swallowing and voice change  Eyes: Negative  Negative for photophobia and pain  Respiratory: Negative  Negative for shortness of breath  Cardiovascular: Negative  Negative for palpitations  Gastrointestinal: Negative  Negative for nausea and vomiting  Endocrine: Negative  Negative for cold intolerance  Genitourinary: Negative  Negative for dysuria, frequency and urgency  Musculoskeletal: Positive for gait problem  Negative for myalgias and neck pain  Skin: Negative  Negative for rash  Allergic/Immunologic: Negative  Neurological: Negative for dizziness, tremors, seizures, syncope, facial asymmetry, speech difficulty, weakness, light-headedness, numbness and headaches  Jitendra horses on legs and thinking issues   Hematological: Negative  Does not bruise/bleed easily  Psychiatric/Behavioral: Positive for agitation (irritability), confusion and sleep disturbance (over sleeping)  Negative for hallucinations

## 2020-08-14 ENCOUNTER — TELEPHONE (OUTPATIENT)
Dept: NEUROLOGY | Facility: CLINIC | Age: 65
End: 2020-08-14

## 2020-08-14 NOTE — TELEPHONE ENCOUNTER
Please follow with the patient if she had completed tests advised during her neurology visit, considering she is in  Immunosuppressed state  Due to cell-depleting therapy for MS

## 2020-08-14 NOTE — TELEPHONE ENCOUNTER
MER  Reviewed chart, COVID antibody testing was ordered as well as UA  No results in epic yet  Called patient, she reports she just reviewed her paperwork and plans to have testing completed tomorrow  Patient understands that she will need to make this a priority as she is immunosuppressed

## 2020-08-15 ENCOUNTER — LAB (OUTPATIENT)
Dept: LAB | Facility: CLINIC | Age: 65
End: 2020-08-15
Payer: MEDICARE

## 2020-08-15 DIAGNOSIS — D84.9 IMMUNOSUPPRESSED STATUS (HCC): ICD-10-CM

## 2020-08-15 DIAGNOSIS — G35 MULTIPLE SCLEROSIS (HCC): Chronic | ICD-10-CM

## 2020-08-15 LAB
BACTERIA UR QL AUTO: ABNORMAL /HPF
BILIRUB UR QL STRIP: NEGATIVE
CLARITY UR: CLEAR
COLOR UR: ABNORMAL
GLUCOSE UR STRIP-MCNC: NEGATIVE MG/DL
HGB UR QL STRIP.AUTO: ABNORMAL
KETONES UR STRIP-MCNC: NEGATIVE MG/DL
LEUKOCYTE ESTERASE UR QL STRIP: NEGATIVE
NITRITE UR QL STRIP: NEGATIVE
NON-SQ EPI CELLS URNS QL MICRO: ABNORMAL /HPF
PH UR STRIP.AUTO: 7 [PH]
PROT UR STRIP-MCNC: NEGATIVE MG/DL
RBC #/AREA URNS AUTO: ABNORMAL /HPF
SP GR UR STRIP.AUTO: 1.01 (ref 1–1.03)
UROBILINOGEN UR QL STRIP.AUTO: 0.2 E.U./DL
WBC #/AREA URNS AUTO: ABNORMAL /HPF

## 2020-08-15 PROCEDURE — 36415 COLL VENOUS BLD VENIPUNCTURE: CPT

## 2020-08-15 PROCEDURE — 81001 URINALYSIS AUTO W/SCOPE: CPT | Performed by: PSYCHIATRY & NEUROLOGY

## 2020-08-15 PROCEDURE — 86769 SARS-COV-2 COVID-19 ANTIBODY: CPT

## 2020-08-16 LAB — SARS-COV-2 IGG+IGM SERPL QL IA: NORMAL

## 2020-08-16 NOTE — RESULT ENCOUNTER NOTE
I called Douglas Kiser and let her know that her COVID-19 antibody was negative   I advised her to continue social distancing procedures and follow with Pulmonary team

## 2020-08-17 LAB
HPV HR 12 DNA CVX QL NAA+PROBE: NEGATIVE
HPV16 DNA CVX QL NAA+PROBE: NEGATIVE
HPV18 DNA CVX QL NAA+PROBE: NEGATIVE

## 2020-08-17 NOTE — TELEPHONE ENCOUNTER
MD Violeta Martínez, RN            I called Abi Co and let her know that her COVID-19 antibody was negative   I advised her to continue social distancing procedures and follow with Pulmonary team

## 2020-08-20 LAB
LAB AP GYN PRIMARY INTERPRETATION: NORMAL
Lab: NORMAL

## 2020-08-25 ENCOUNTER — OFFICE VISIT (OUTPATIENT)
Dept: PULMONOLOGY | Facility: CLINIC | Age: 65
End: 2020-08-25
Payer: MEDICARE

## 2020-08-25 VITALS
HEART RATE: 69 BPM | WEIGHT: 155.6 LBS | TEMPERATURE: 98.2 F | HEIGHT: 64 IN | DIASTOLIC BLOOD PRESSURE: 60 MMHG | OXYGEN SATURATION: 96 % | BODY MASS INDEX: 26.56 KG/M2 | SYSTOLIC BLOOD PRESSURE: 112 MMHG

## 2020-08-25 DIAGNOSIS — J30.89 ALLERGIC RHINITIS DUE TO FUNGAL SPORES, UNSPECIFIED SEASONALITY: ICD-10-CM

## 2020-08-25 DIAGNOSIS — R93.89 ABNORMAL CT OF THE CHEST: ICD-10-CM

## 2020-08-25 DIAGNOSIS — R06.00 DOE (DYSPNEA ON EXERTION): ICD-10-CM

## 2020-08-25 DIAGNOSIS — Z72.0 TOBACCO ABUSE: ICD-10-CM

## 2020-08-25 DIAGNOSIS — J43.2 CENTRILOBULAR EMPHYSEMA (HCC): Primary | ICD-10-CM

## 2020-08-25 PROCEDURE — 99406 BEHAV CHNG SMOKING 3-10 MIN: CPT | Performed by: INTERNAL MEDICINE

## 2020-08-25 PROCEDURE — 99215 OFFICE O/P EST HI 40 MIN: CPT | Performed by: INTERNAL MEDICINE

## 2020-08-25 PROCEDURE — 3008F BODY MASS INDEX DOCD: CPT | Performed by: INTERNAL MEDICINE

## 2020-08-25 PROCEDURE — 4040F PNEUMOC VAC/ADMIN/RCVD: CPT | Performed by: INTERNAL MEDICINE

## 2020-08-25 PROCEDURE — 4004F PT TOBACCO SCREEN RCVD TLK: CPT | Performed by: INTERNAL MEDICINE

## 2020-08-25 NOTE — PROGRESS NOTES
Office Progress Note - Pulmonary    Bryn Kaufman 71 y o  female MRN: [id number]    Encounter: [id number]      Assessment:   Abnormal CT scan of the chest    Chronic obstructive pulmonary disease   Ongoing tobacco use   Allergic rhinitis   Dyspnea on exertion  Plan:     CT of the chest without IV contrast in 3 months   Breo 200/25, 1 inhalation once a day   Albuterol rescue inhaler 2 inhalations 4 times a day as needed   Fluticasone nasal spray 2 sprays to each nostril once a day   Xyzal 5 mg once a day   Smoking cessation   Follow-up in 3 months  Discussion:   The patient's right middle lobe ground-glass opacity is most likely inflammatory or infectious  I have ordered a CT without IV contrast of the chest to be done in 3 months  This is to ensure that there is complete resolution of the airspace disease  Her COPD is in remission  I have maintained her on the Breo 200/25, 1 inhalation once a day  She will use the albuterol rescue inhaler 2 inhalations 4 times a day as needed  Her allergic rhinitis is well treated  I have maintained her on the fluticasone nasal spray 2 sprays to each nostril once a day Xyzal 5 mg once a day  We had a long discussion about her smoking habit  We discussed different options and strategies to quit smoking  The patient is struggling to commit to smoking cessation  Hopefully her  will quit worse her which will help her tremendously  I will see her in 3 months after the CT of the chest is done  I have spent 10 minutes counseling the patient regarding smoking cessation  Subjective: The patient is here for a follow-up visit  She has occasional cough  No significant sputum production  Denies chest pain or palpitations  She has shortness of breath on exertion  She is still smoking a pack to a pack and half a day  She is using Breo 200/25, 1 inhalation once a day  She has mild postnasal dripping    She is using fluticasone nasal spray 2 sprays to each nostril once a day and Xyzal 5 mg once a day  She has no nocturnal symptoms  Review of systems:  A 12 point system review is done and aside from what is stated above the rest of the review of systems is negative  Family history and social history are reviewed  Medications list is reviewed  Vitals: Blood pressure 112/60, pulse 69, temperature 98 2 °F (36 8 °C), height 5' 4" (1 626 m), weight 70 6 kg (155 lb 9 6 oz), SpO2 96 %, not currently breastfeeding ,     Physical Exam  Gen: Awake, alert, oriented x 3, no acute distress  HEENT: Mucous membranes moist, no oral lesions, no thrush  NECK: No accessory muscle use, JVP not elevated  Cardiac: Regular, single S1, single S2, no murmurs, no rubs, no gallops  Lungs:  Coarse breath sounds  No wheezing or rhonchi  Abdomen: normoactive bowel sounds, soft nontender, nondistended, no rebound or rigidity, no guarding  Extremities: no cyanosis, no clubbing, no edema  Neuro:  Grossly nonfocal   Skin:  No rash  CT scan of the chest is reviewed on the [facility] system and compared to the CT done about a year ago  There is ground-glass opacity involving the right middle lobe which is new compared to the study from a year ago      Lab Results   Component Value Date    WBC 9 80 08/07/2020    HGB 14 3 08/07/2020    HCT 41 5 08/07/2020    MCV 88 08/07/2020     08/07/2020     Lab Results   Component Value Date    SODIUM 138 08/07/2020    K 3 8 08/07/2020     08/07/2020    CO2 29 08/07/2020    BUN 16 08/07/2020    CREATININE 0 72 08/07/2020    GLUC 105 08/07/2020    CALCIUM 8 8 08/07/2020

## 2020-09-17 ENCOUNTER — OFFICE VISIT (OUTPATIENT)
Dept: FAMILY MEDICINE CLINIC | Facility: CLINIC | Age: 65
End: 2020-09-17
Payer: MEDICARE

## 2020-09-17 VITALS
DIASTOLIC BLOOD PRESSURE: 74 MMHG | OXYGEN SATURATION: 98 % | BODY MASS INDEX: 26.26 KG/M2 | HEART RATE: 64 BPM | WEIGHT: 153.8 LBS | HEIGHT: 64 IN | TEMPERATURE: 97.6 F | SYSTOLIC BLOOD PRESSURE: 128 MMHG | RESPIRATION RATE: 17 BRPM

## 2020-09-17 DIAGNOSIS — F33.9 MAJOR DEPRESSION, RECURRENT, CHRONIC (HCC): Chronic | ICD-10-CM

## 2020-09-17 DIAGNOSIS — M51.16 INTERVERTEBRAL DISC DISORDERS WITH RADICULOPATHY, LUMBAR REGION: Chronic | ICD-10-CM

## 2020-09-17 DIAGNOSIS — E78.5 HYPERLIPIDEMIA, UNSPECIFIED HYPERLIPIDEMIA TYPE: Chronic | ICD-10-CM

## 2020-09-17 DIAGNOSIS — E55.9 VITAMIN D DEFICIENCY: Chronic | ICD-10-CM

## 2020-09-17 DIAGNOSIS — K21.00 GASTRO-ESOPHAGEAL REFLUX DISEASE WITH ESOPHAGITIS: Chronic | ICD-10-CM

## 2020-09-17 DIAGNOSIS — R32 URINARY INCONTINENCE, UNSPECIFIED TYPE: Chronic | ICD-10-CM

## 2020-09-17 DIAGNOSIS — G35 MULTIPLE SCLEROSIS (HCC): Primary | Chronic | ICD-10-CM

## 2020-09-17 DIAGNOSIS — K52.831 COLLAGENOUS COLITIS: ICD-10-CM

## 2020-09-17 DIAGNOSIS — Z23 FLU VACCINE NEED: ICD-10-CM

## 2020-09-17 DIAGNOSIS — R41.3 AMNESIA/MEMORY DISORDER: ICD-10-CM

## 2020-09-17 DIAGNOSIS — J44.9 COPD WITHOUT EXACERBATION (HCC): Chronic | ICD-10-CM

## 2020-09-17 DIAGNOSIS — R93.89 ABNORMAL CT OF THE CHEST: Chronic | ICD-10-CM

## 2020-09-17 PROCEDURE — 90662 IIV NO PRSV INCREASED AG IM: CPT | Performed by: FAMILY MEDICINE

## 2020-09-17 PROCEDURE — 99215 OFFICE O/P EST HI 40 MIN: CPT | Performed by: FAMILY MEDICINE

## 2020-09-17 PROCEDURE — G0008 ADMIN INFLUENZA VIRUS VAC: HCPCS | Performed by: FAMILY MEDICINE

## 2020-09-17 RX ORDER — OMEPRAZOLE 40 MG/1
40 CAPSULE, DELAYED RELEASE ORAL DAILY
COMMUNITY

## 2020-09-17 NOTE — PROGRESS NOTES
FAMILY PRACTICE OFFICE VISIT       NAME: Karen Kaufman  AGE: 71 y o  SEX: female       : 1955        MRN: [id number]        Assessment and Plan     Problem List Items Addressed This Visit        Digestive    Gastro-esophageal reflux disease with esophagitis (Chronic)     · EGD 2020:  Dr Anastacia Delgado  · Small duodenal ulcer, esophagitis  · Protonix discontinued and switched to omeprazole 40 mg daily         Relevant Medications    omeprazole (PriLOSEC) 40 MG capsule    Collagenous colitis (Chronic)     · Colonoscopy 2020:  Dr Anastacia Delgado  · Patient started therapy with budesonide:  9 mg daily times 30 days then 6 mg daily times 30 days then 3 mg daily times 30 days            Respiratory    COPD without exacerbation (HCC) (Chronic)     · Patient remains under ongoing care of [facility] pulmonology  · She is followed with regular CT chest   · Status post bronchoscopy in 2019, no endobronchial lesions  · Recent CT chest performed in 2020 indicates emphysema  · Patient remains on Breo 1 puff daily and p r n  albuterol  · Unfortunately, she continues to smoke in spite of strong advise from all her physicians to quit            Nervous and Auditory    Multiple sclerosis, relapsing/remitting - Primary (Chronic)     · Patient is under care of [facility] Neurology  · She has completed 3 doses of Ocrevus, last infusion was held due to abnormal findings on CT chest   · Patient and her  are bit confused about future plan of treatment  I will contact [facility] Neurology   · MRI brain and C-spine were performed in 2020  MRI brain:  Stable moderate supratentorial, infratentorial and upper cervical cord demyelinating disease with mild generalized parenchymal volume loss  Findings consistent with the patient's reported history of multiple sclerosis  No new or enhancing lesions to suggest active demyelination       MRI C-spine:  Redemonstrated multifocal cervical and upper thoracic cord demyelinating disease  No new demyelinating plaques, regions of cord atrophy or expansion  No cord enhancement to suggest active demyelination    Stable cervical spondylosis  ·          Intervertebral disc disorders with radiculopathy, lumbar region (Chronic)     · Continue gabapentin 300 mg twice a day            Other    Hyperlipidemia (Chronic)     · Atorvastatin 80 mg daily  · Normal CMP August 2020  · Patient will repeat complete blood work panel including lipid panel and CMP in fall         Relevant Medications    ezetimibe (ZETIA) 10 mg tablet    Other Relevant Orders    CBC    Comprehensive metabolic panel    Lipid Panel with Direct LDL reflex    TSH, 3rd generation    Vitamin D deficiency (Chronic)    Relevant Orders    Vitamin D 25 hydroxy    Major depression, recurrent, chronic (HCC) (Chronic)     · Chronic symptoms of depression and anxiety  · Patient remains on Zoloft 100 mg daily, medication works well         Abnormal CT of the chest (Chronic)     CT chest August 2020:  New patchy groundglass opacity in the right middle lobe is most likely infectious or inflammatory in etiology  Evaluation for pneumonia/pneumonitis recommended  Atelectasis is also in the the differential diagnosis  Recommend repeat follow-up CT   of the chest in 3 months to assess for resolution as other etiologies including malignancy are in the differential   Stable 2 mm nodule right upper lobe  Emphysema     Patient is an active smoker, she was counseled on significant risks of ongoing tobacco use on numerous occasions by PCP, pulmonology and her other physicians  Patient denies symptoms of unusual cough, fever or colored mucus expectoration  Patient will repeat CT chest again in 3 months  I strongly urged her to quit tobacco again      Patient remains under ongoing care of Dr Duglas Sanderson         Urinary incontinence (Chronic)     · Patient is managed by [facility]         Amnesia/memory disorder     · Likely due to multiple sclerosis  · Patient's  was not able to attend a lot of appointments lately due to COVID-19 visitor restrictions           Other Visit Diagnoses     Flu vaccine need        Relevant Orders    influenza vaccine, high-dose, PF 0 7 mL (FLUZONE HIGH-DOSE) (Completed)      Patient presents for follow-up of chronic medical conditions  She remains under ongoing care of [facility] Neurology and [facility] pulmonology  Her last Ocrevus infusion was held due to abnormal findings on CT chest   Patient is scheduled for repeat CT chest in November  I will contact [facility] Neurology to provide patient with future plan of care  Recent extensive evaluation by Gastroenterology with diagnosis of collagenous colitis, esophagitis and small duodenal ulcer  Patient is currently on omeprazole 40 mg daily and oral budesonide 3 months taper  Most recent CBC and CMP performed in early August were normal   Patient will repeat routine blood work in 3 months  Flu vaccine was administered today  Patient is up-to-date with health screenings but unfortunately continues to smoke  We had lengthy discussion with patient and her  today again about significant risks of ongoing tobacco use, I have urged them both to quit  We discussed advanced directives  Five wishes provided for both patient and her   Unfortunately, patient's  was not allowed to attend her recent appointments  Patient suffers from memory lapses due to MS  I extensively reviewed results of all recent testing with both patient and  today  Will request that specialty offices allow him to be present during her visits due to patient's underlying memory impairment      I have spent 40 minutes with Patient and family today in which greater than 50% of this time was spent in counseling/coordination of care regarding Diagnostic results, Prognosis, Risks and benefits of tx options, Intructions for management, Patient and family education, Importance of tx compliance, Risk factor reductions and Impressions  Follow up pending labs, updates, as needed and in 6 months    There are no Patient Instructions on file for this visit  Discussed with the patient and all questioned fully answered  She will call me if any problems arise  M*Modal software was used to dictate this note  It may contain errors with dictating incorrect words/spelling  Please contact provider directly with any questions  Chief Complaint     Chief Complaint   Patient presents with    Follow-up     6 month        History of Present Illness     Patient presents for follow-up chronic medical conditions  She is accompanied by her  today  Patient is rather vague historian, intermittent memory lapses due to known MS  Patient remains under care of [facility] Neurology for treatment of multiple sclerosis  She has completed 3 infusions of Ocervus within past 2 years  Patient believes that medication provided significant improvement in her overall condition, memory in symptoms  Patient's last treatment was held due to abnormal findings on CT chest   Patient is a lifelong smoker  She denies any recent onset of cough, wheezing, unusual dyspnea, fever or colored mucus expectoration  COVID-19 antibody test performed by Neurology was negative  Recent pulmonary consult regarding abnormal findings on CT reviewed with patient and  today  Jesus Renee recommends watchful observation and repeat CT chest in 3 months  Extensive counseling regarding smoking cessation was provided by pulmonology  Patient remains on regimen of Breo, along with Xyzal and Flonase for seasonal allergies  No med changes, patient will continue on Breo Xyzal Flonase and will repeat CT in 3 months    Last infusion of Chitra Forts was administered in January  Patient reports that her memory is worse without recent dose of medication      Recent evaluation by Gastroenterology, Maye Boogie- Colonoscopy and EGD  Endoscopy findings include:  Small duodenal ulcer, esophagitis, colon polyps, procedure was performed in July of 2020  Patient was diagnosed with lymphocytic colitis  Patient was prescribed Entocort 9 mg daily for 1 months and then 6 mg daily for 1 month and 3 mg daily for 1 months then stop  Protonix was discontinued and switched omeprazole 40 mg daily  Patient will be due for colonoscopy again in July of 2023     UTD with eye exam-  Dr Pearson     Chronic urinary incontinence  Patient is under care of [facility], Dr Wang  Patient uses pad, no requirements for self catheterization  Patient is up-to-date with gyn exam      Most recent blood work including lipid panel and TSH was performed in May  Most recent CBC and CMP, August 2020      Recent imaging studies included brain and C-spine MRI as per Neurology in July 2020  MRI brain:  Stable moderate supratentorial, infratentorial and upper cervical cord demyelinating disease with mild generalized parenchymal volume loss  Findings consistent with the patient's reported history of multiple sclerosis  No new or enhancing lesions to suggest active demyelination  MRI C-spine:  Redemonstrated multifocal cervical and upper thoracic cord demyelinating disease  No new demyelinating plaques, regions of cord atrophy or expansion  No cord enhancement to suggest active demyelination    Stable cervical spondylosis  Review of Systems   Review of Systems   Constitutional: Positive for fatigue (Chronic)  Negative for activity change and appetite change  HENT: Negative  Eyes: Positive for visual disturbance ( chronic)  Respiratory: Positive for cough ( occasional)  Negative for chest tightness, shortness of breath and wheezing  Cardiovascular: Negative  Negative for chest pain, palpitations and leg swelling  Gastrointestinal: Negative  Negative for abdominal pain and diarrhea  Endocrine: Negative      Genitourinary: Positive for frequency and urgency  Negative for dysuria  Musculoskeletal: Positive for arthralgias, back pain and neck pain  Skin: Negative  Allergic/Immunologic: Positive for environmental allergies  Neurological: Negative  Negative for headaches  Balance problem   Hematological: Negative  Psychiatric/Behavioral: Positive for decreased concentration  The patient is nervous/anxious          Active Problem List     Patient Active Problem List   Diagnosis    Multiple sclerosis, relapsing/remitting    Muscle spasm    Hyperlipidemia    Osteoporosis    Ambulatory dysfunction    COPD without exacerbation ([facility] )    Vitamin D deficiency    Major depression, recurrent, chronic (HCC)    Allergic rhinitis    Amnesia/memory disorder    Shoulder impingement syndrome, left    Abnormal CT of the chest    Urinary incontinence    Balance disorder    Gastro-esophageal reflux disease with esophagitis    Collagenous colitis    Immunosuppressed status ([facility] )    Intervertebral disc disorders with radiculopathy, lumbar region    DDD (degenerative disc disease), cervical       Past Medical History:  Past Medical History:   Diagnosis Date    Asthma     Degeneration of intervertebral disc at L5-S1 level     Depression     Family history of colon cancer 3/27/2019    Added automatically from request for surgery [id number]    Full incontinence of feces     Generalized anxiety disorder     Hepatitis     at age 4 yrs    Herpes zoster     last assessed: 6/20/13    History of colon polyps     Hypercholesterolemia     Hyperlipidemia     [facility]multiple sclerosis) ([facility] )     MS (multiple sclerosis) ([facility] )     Osteoporosis     Seasonal allergies     Tumor of breast     benign, right breast    Urinary incontinence        Past Surgical History:  Past Surgical History:   Procedure Laterality Date    BREAST BIOPSY Right     benign    BREAST SURGERY      right breast fibroid tumor resection    COLONOSCOPY 11/2013 - due in 2018 - Dr Crisostomo    FL LUMBAR PUNCTURE DIAGNOSTIC  10/1/2018    ROTATOR CUFF REPAIR      Bilateral    ROTATOR CUFF REPAIR Bilateral     SEPTOPLASTY         Family History:  Family History   Problem Relation Age of Onset    Arthritis Mother     Osteoporosis Mother     COPD Father     No Known Problems Maternal Grandmother     No Known Problems Maternal Grandfather     No Known Problems Paternal Grandmother     No Known Problems Paternal Grandfather     Arthritis Family     COPD Family     Emphysema Family     Heart disease Family     Hyperthyroidism Family     Multiple sclerosis Family     Osteoarthritis Family     No Known Problems Sister     No Known Problems Sister     Colon cancer Brother 72    No Known Problems Brother     No Known Problems Brother     No Known Problems Maternal Aunt     No Known Problems Maternal Aunt     No Known Problems Paternal Aunt        Social History:  Social History     Socioeconomic History    Marital status: /Civil Union     Spouse name: Not on file    Number of children: Not on file    Years of education: Not on file    Highest education level: Not on file   Occupational History    Occupation: Retired   Social Needs    Financial resource strain: Not on file    Food insecurity     Worry: Not on file     Inability: Not on file   [employer] needs     Medical: Not on file     Non-medical: Not on file   Tobacco Use    Smoking status: Current Every Day Smoker     Packs/day: 1 00     Years: 40 00     Pack years: 40 00     Types: Cigarettes     Start date: 1982    Smokeless tobacco: Never Used   Substance and Sexual Activity    Alcohol use: Yes     Frequency: Monthly or less     Drinks per session: 1 or 2     Binge frequency: Never     Comment: occasional; [employer] also states Never drank alcohol    Drug use: No    Sexual activity: Yes     Partners: Male     Comment: denied any risk of AIDS   Lifestyle    Physical activity     Days per week: Not on file     Minutes per session: Not on file    Stress: Not on file   Relationships    Social connections     Talks on phone: Not on file     Gets together: Not on file     Attends Mu-ism service: Not on file     Active member of club or organization: Not on file     Attends meetings of clubs or organizations: Not on file     Relationship status: Not on file    Intimate partner violence     Fear of current or ex partner: Not on file     Emotionally abused: Not on file     Physically abused: Not on file     Forced sexual activity: Not on file   Other Topics Concern    Not on file   Social History Narrative               Objective     Vitals:    09/17/20 1003   BP: 128/74   BP Location: Left arm   Patient Position: Sitting   Cuff Size: Adult   Pulse: 64   Resp: 17   Temp: 97 6 °F (36 4 °C)   TempSrc: Temporal   SpO2: 98%   Weight: 69 8 kg (153 lb 12 8 oz)   Height: 5' 4" (1 626 m)     Wt Readings from Last 3 Encounters:   09/17/20 69 8 kg (153 lb 12 8 oz)   08/25/20 70 6 kg (155 lb 9 6 oz)   08/13/20 71 kg (156 lb 9 6 oz)       Physical Exam  Vitals signs and nursing note reviewed  Constitutional:       Appearance: She is well-developed  HENT:      Head: Normocephalic and atraumatic  Eyes:      Conjunctiva/sclera: Conjunctivae normal    Neck:      Musculoskeletal: Neck supple  Thyroid: No thyromegaly  Vascular: No carotid bruit  Cardiovascular:      Rate and Rhythm: Normal rate and regular rhythm  Heart sounds: Normal heart sounds  No murmur  Pulmonary:      Effort: Pulmonary effort is normal  No respiratory distress  Breath sounds: Normal breath sounds  No wheezing  Abdominal:      General: There is no abdominal bruit  Musculoskeletal: Normal range of motion  Right lower leg: No edema  Left lower leg: No edema  Skin:     General: Skin is warm  Neurological:      Mental Status: She is alert and oriented to person, place, and time  Cranial Nerves: No cranial nerve deficit  Coordination: Coordination normal    Psychiatric:         Attention and Perception: Attention normal          Mood and Affect: Mood normal          Speech: Speech normal          Behavior: Behavior normal          Cognition and Memory: Memory is impaired  Pertinent Laboratory/Diagnostic Studies:  Lab Results   Component Value Date    GLUCOSE 107 12/15/2015    BUN 16 08/07/2020    CREATININE 0 72 08/07/2020    CALCIUM 8 8 08/07/2020     12/15/2015    K 3 8 08/07/2020    CO2 29 08/07/2020     08/07/2020     Lab Results   Component Value Date    ALT 32 08/07/2020    AST 14 08/07/2020    ALKPHOS 88 08/07/2020    BILITOT 0 30 12/15/2015       Lab Results   Component Value Date    WBC 9 80 08/07/2020    HGB 14 3 08/07/2020    HCT 41 5 08/07/2020    MCV 88 08/07/2020     08/07/2020       No results found for: TSH    Lab Results   Component Value Date    CHOL 169 07/21/2015     Lab Results   Component Value Date    TRIG 211 (H) 05/05/2020     Lab Results   Component Value Date    HDL 41 05/05/2020     Lab Results   Component Value Date    LDLCALC 96 05/05/2020     Lab Results   Component Value Date    HGBA1C 5 8 (H) 10/20/2015       Results for orders placed or performed in visit on 08/15/20   SARS-CoV2 Antibody, Total (IgG, IgA, IgM) [facility]   Result Value Ref Range    SARS-CoV-2 Ab, Total (IgG, IgA, IgM) Non-Reactive Non-Reactive     *Note: Due to a large number of results and/or encounters for the requested time period, some results have not been displayed  A complete set of results can be found in Results Review         Orders Placed This Encounter   Procedures    influenza vaccine, high-dose, PF 0 7 mL (FLUZONE HIGH-DOSE)    CBC    Comprehensive metabolic panel    Lipid Panel with Direct LDL reflex    TSH, 3rd generation    Vitamin D 25 hydroxy       ALLERGIES:  Allergies   Allergen Reactions    Bupropion     Cefprozil     Conj Estrog-Medroxyprogest Ace Other (See Comments)     Other      Povidone Iodine      Patient unsure of what this is    Sulfamethoxazole-Trimethoprim Other (See Comments)     Reaction Date: 11Aug2011;     Levofloxacin     Gadobutrol GI Intolerance    Medroxyprogesterone Other (See Comments)     Reaction Date: 11Aug2011;     Nortriptyline Other (See Comments)     n/a    Pamelor [Nortriptyline Hcl]     Provera [Medroxyprogesterone Acetate]        Current Medications     Current Outpatient Medications   Medication Sig Dispense Refill    albuterol (2 5 mg/3 mL) 0 083 % nebulizer solution Take 1 vial (2 5 mg total) by nebulization every 4 (four) hours as needed for wheezing or shortness of breath (cough) 120 vial 2    albuterol (PROAIR HFA) 90 mcg/act inhaler Inhale 2 puffs every 4 (four) hours as needed for wheezing or shortness of breath 3 Inhaler 1    aspirin (ECOTRIN LOW STRENGTH) 81 mg EC tablet Take 1 tablet by mouth daily      atorvastatin (LIPITOR) 80 mg tablet Take 1 tablet (80 mg total) by mouth daily 90 tablet 1    B Complex-C CAPS Take by mouth      BL EVENING PRIMROSE OIL PO Take by mouth      budesonide (ENTOCORT EC) 3 mg 24 hr capsule Take 6 mg by mouth every morning 2 pill a day for 4 weeks      budesonide (PULMICORT) 0 5 mg/2 mL nebulizer solution inhale contents of 1 vial in nebulizer twice a day  0    Cholecalciferol (Vitamin D3 Ultra Strength) 125 MCG (5000 UT) capsule Take 1 capsule (5,000 Units total) by mouth daily 30 capsule 11    ergocalciferol (VITAMIN D2) 50,000 units Take 1 capsule (50,000 Units total) by mouth once a week x3 months  Please then call for refill or dose change  12 capsule 0    Flaxseed, Linseed, (FLAXSEED OIL PO) Take by mouth      fluticasone (FLONASE) 50 mcg/act nasal spray 2 sprays into each nostril daily 48 g 3    fluticasone-vilanterol (BREO ELLIPTA) 200-25 MCG/INH inhaler Inhale 1 puff daily Rinse mouth after use   3 Inhaler 3    gabapentin (NEURONTIN) 300 mg capsule Take 1 capsule (300 mg total) by mouth 2 (two) times a day 180 capsule 3    levocetirizine (XYZAL) 5 MG tablet Take 1 tablet (5 mg total) by mouth every evening 90 tablet 1    montelukast (SINGULAIR) 10 mg tablet TAKE 1 TABLET BY MOUTH  DAILY AT BEDTIME 90 tablet 3    Ocrelizumab (OCREVUS IV) Infuse into a venous catheter      omega-3-acid ethyl esters (LOVAZA) 1 g capsule Take 2 capsules (2 g total) by mouth 2 (two) times a day 360 capsule 3    omeprazole (PriLOSEC) 40 MG capsule Take 40 mg by mouth daily      sertraline (ZOLOFT) 100 mg tablet TAKE 1 TABLET BY MOUTH  DAILY 90 tablet 1    ezetimibe (ZETIA) 10 mg tablet Take 1 tablet (10 mg total) by mouth daily 90 tablet 3     No current facility-administered medications for this visit          Medications Discontinued During This Encounter   Medication Reason    ezetimibe (ZETIA) 10 mg tablet Therapy completed       Health Maintenance     Health Maintenance   Topic Date Due    HIV Screening  09/28/1970    DTaP,Tdap,and Td Vaccines (1 - Tdap) 09/28/1975    PT PLAN OF CARE  10/09/2019    SLP PLAN OF CARE  11/08/2019    BMI: Followup Plan  03/22/2021    Pneumococcal Vaccine: 65+ Years (2 of 2 - PPSV23) 09/29/2020    Fall Risk  03/16/2021    Medicare Annual Wellness Visit (AWV)  03/16/2021    BMI: Adult  09/17/2021    MAMMOGRAM  10/28/2021    Colonoscopy Surveillance  06/12/2022    Cervical Cancer Screening  08/13/2023    Colorectal Cancer Screening  06/12/2029    Hepatitis C Screening  Completed    Influenza Vaccine  Completed    HIB Vaccine  Aged Out    Hepatitis B Vaccine  Aged Out    IPV Vaccine  Aged Out    Hepatitis A Vaccine  Aged Out    Meningococcal ACWY Vaccine  Aged Out    HPV Vaccine  Aged Out       Immunization History   Administered Date(s) Administered    INFLUENZA 09/26/2011    Influenza Quadrivalent Preservative Free 3 years and older IM 09/29/2015, 09/22/2016, 09/08/2017    Influenza TIV (IM) 01/17/2013, 09/03/2013, 09/23/2014    Influenza, high dose seasonal 0 7 mL 09/30/2019, 09/17/2020    Influenza, recombinant, quadrivalent,injectable, preservative free 09/13/2018    Pneumococcal Conjugate 13-Valent 10/22/2018    Pneumococcal Polysaccharide PPV23 10/01/2007, 09/29/2015    Td (adult), adsorbed 06/02/2016       Eric Bowen MD

## 2020-09-22 PROBLEM — K52.831 COLLAGENOUS COLITIS: Status: ACTIVE | Noted: 2020-02-14

## 2020-09-22 PROBLEM — R32 URINARY INCONTINENCE: Chronic | Status: ACTIVE | Noted: 2019-08-01

## 2020-09-22 PROBLEM — K52.831 COLLAGENOUS COLITIS: Chronic | Status: ACTIVE | Noted: 2020-02-14

## 2020-09-22 PROBLEM — Z80.0 FAMILY HISTORY OF COLON CANCER: Status: RESOLVED | Noted: 2019-03-27 | Resolved: 2020-09-22

## 2020-09-22 PROBLEM — E55.9 VITAMIN D DEFICIENCY: Chronic | Status: ACTIVE | Noted: 2018-01-24

## 2020-09-22 PROBLEM — Z12.11 SPECIAL SCREENING FOR MALIGNANT NEOPLASMS, COLON: Status: RESOLVED | Noted: 2019-03-27 | Resolved: 2020-09-22

## 2020-09-22 RX ORDER — EZETIMIBE 10 MG/1
10 TABLET ORAL DAILY
Qty: 90 TABLET | Refills: 3 | COMMUNITY
Start: 2020-09-22 | End: 2021-01-19 | Stop reason: ALTCHOICE

## 2020-09-22 NOTE — ASSESSMENT & PLAN NOTE
· Patient is under care of [facility] Neu[facility]  · She has completed 3 doses of Ocrevus, last infusion was held due to abnormal findings on CT chest   · Patient and her  are bit confused about future plan of treatment  I will contact [facility] Neu[facility]   · MRI brain and C-spine were performed in July 2020  MRI brain:  Stable moderate supratentorial, infratentorial and upper cervical cord demyelinating disease with mild generalized parenchymal volume loss  Findings consistent with the patient's reported history of multiple sclerosis  No new or enhancing lesions to suggest active demyelination  MRI C-spine:  Redemonstrated multifocal cervical and upper thoracic cord demyelinating disease  No new demyelinating plaques, regions of cord atrophy or expansion  No cord enhancement to suggest active demyelination    Stable cervical spondylosis      ·

## 2020-09-22 NOTE — ASSESSMENT & PLAN NOTE
· EGD July 2020:  Dr Tigre Ybarra  · Small duodenal ulcer, esophagitis    · Protonix discontinued and switched to omeprazole 40 mg daily

## 2020-09-22 NOTE — ASSESSMENT & PLAN NOTE
· Atorvastatin 80 mg daily  · Normal CMP August 2020    · Patient will repeat complete blood work panel including lipid panel and CMP in fall

## 2020-09-22 NOTE — ASSESSMENT & PLAN NOTE
· Patient remains under ongoing care of [facility] pulmonology  · She is followed with regular CT chest   · Status post bronchoscopy in September 2019, no endobronchial lesions  · Recent CT chest performed in August 2020 indicates emphysema  · Patient remains on Breo 1 puff daily and p r n  albuterol    · Unfortunately, she continues to smoke in spite of strong advise from all her physicians to quit

## 2020-09-22 NOTE — ASSESSMENT & PLAN NOTE
· Chronic symptoms of depression and anxiety    · Patient remains on Zoloft 100 mg daily, medication works well

## 2020-09-22 NOTE — ASSESSMENT & PLAN NOTE
CT chest August 2020:  New patchy groundglass opacity in the right middle lobe is most likely infectious or inflammatory in etiology  Evaluation for pneumonia/pneumonitis recommended  Atelectasis is also in the the differential diagnosis  Recommend repeat follow-up CT   of the chest in 3 months to assess for resolution as other etiologies including malignancy are in the differential   Stable 2 mm nodule right upper lobe  Emphysema     Patient is an active smoker, she was counseled on significant risks of ongoing tobacco use on numerous occasions by PCP, pulmonology and her other physicians  Patient denies symptoms of unusual cough, fever or colored mucus expectoration  Patient will repeat CT chest again in 3 months  I strongly urged her to quit tobacco again      Patient remains under ongoing care of Dr Brittney Lopez

## 2020-09-22 NOTE — ASSESSMENT & PLAN NOTE
· Likely due to multiple sclerosis    · Patient's  was not able to attend a lot of appointments lately due to COVID-19 visitor restrictions

## 2020-09-22 NOTE — ASSESSMENT & PLAN NOTE
· Colonoscopy July 2020:  Dr Spencer Wild    · Patient started therapy with budesonide:  9 mg daily times 30 days then 6 mg daily times 30 days then 3 mg daily times 30 days

## 2020-10-21 ENCOUNTER — TELEPHONE (OUTPATIENT)
Dept: OTHER | Facility: OTHER | Age: 65
End: 2020-10-21

## 2020-11-06 ENCOUNTER — TELEPHONE (OUTPATIENT)
Dept: NEUROLOGY | Facility: CLINIC | Age: 65
End: 2020-11-06

## 2020-11-09 ENCOUNTER — HOSPITAL ENCOUNTER (OUTPATIENT)
Dept: CT IMAGING | Facility: HOSPITAL | Age: 65
Discharge: HOME | End: 2020-11-09
Attending: INTERNAL MEDICINE
Payer: MEDICARE

## 2020-11-09 DIAGNOSIS — R93.89 ABNORMAL CT OF THE CHEST: ICD-10-CM

## 2020-11-09 PROCEDURE — 71250 CT THORAX DX C-: CPT

## 2020-11-09 PROCEDURE — G1004 CDSM NDSC: HCPCS

## 2020-11-10 DIAGNOSIS — F33.9 MAJOR DEPRESSION, RECURRENT, CHRONIC (HCC): ICD-10-CM

## 2020-11-10 DIAGNOSIS — E78.5 HYPERLIPIDEMIA, UNSPECIFIED HYPERLIPIDEMIA TYPE: Chronic | ICD-10-CM

## 2020-11-10 RX ORDER — ATORVASTATIN CALCIUM 80 MG/1
TABLET, FILM COATED ORAL
Qty: 90 TABLET | Refills: 3 | Status: SHIPPED | OUTPATIENT
Start: 2020-11-10 | End: 2021-01-26

## 2020-11-10 RX ORDER — SERTRALINE HYDROCHLORIDE 100 MG/1
TABLET, FILM COATED ORAL
Qty: 90 TABLET | Refills: 3 | Status: SHIPPED | OUTPATIENT
Start: 2020-11-10 | End: 2021-11-15

## 2020-11-11 ENCOUNTER — OFFICE VISIT (OUTPATIENT)
Dept: PULMONOLOGY | Facility: CLINIC | Age: 65
End: 2020-11-11
Payer: MEDICARE

## 2020-11-11 VITALS
SYSTOLIC BLOOD PRESSURE: 120 MMHG | HEIGHT: 64 IN | TEMPERATURE: 97.9 F | HEART RATE: 98 BPM | OXYGEN SATURATION: 95 % | WEIGHT: 156 LBS | BODY MASS INDEX: 26.63 KG/M2 | DIASTOLIC BLOOD PRESSURE: 76 MMHG

## 2020-11-11 DIAGNOSIS — J44.9 CHRONIC OBSTRUCTIVE PULMONARY DISEASE, UNSPECIFIED COPD TYPE (HCC): ICD-10-CM

## 2020-11-11 DIAGNOSIS — J43.2 CENTRILOBULAR EMPHYSEMA (HCC): Primary | ICD-10-CM

## 2020-11-11 DIAGNOSIS — R93.89 ABNORMAL CT OF THE CHEST: ICD-10-CM

## 2020-11-11 DIAGNOSIS — K21.9 GASTROESOPHAGEAL REFLUX DISEASE WITHOUT ESOPHAGITIS: ICD-10-CM

## 2020-11-11 DIAGNOSIS — F17.210 NICOTINE DEPENDENCE, CIGARETTES, UNCOMPLICATED: ICD-10-CM

## 2020-11-11 DIAGNOSIS — J30.89 ALLERGIC RHINITIS DUE TO FUNGAL SPORES, UNSPECIFIED SEASONALITY: ICD-10-CM

## 2020-11-11 PROCEDURE — 99214 OFFICE O/P EST MOD 30 MIN: CPT | Performed by: INTERNAL MEDICINE

## 2020-11-11 RX ORDER — ALBUTEROL SULFATE 90 UG/1
2 AEROSOL, METERED RESPIRATORY (INHALATION) EVERY 4 HOURS PRN
Qty: 3 INHALER | Refills: 3 | Status: SHIPPED | OUTPATIENT
Start: 2020-11-11 | End: 2020-12-11 | Stop reason: SDUPTHER

## 2020-12-02 DIAGNOSIS — G35 MULTIPLE SCLEROSIS (HCC): Chronic | ICD-10-CM

## 2020-12-02 DIAGNOSIS — J44.9 COPD (CHRONIC OBSTRUCTIVE PULMONARY DISEASE) (HCC): ICD-10-CM

## 2020-12-02 RX ORDER — GABAPENTIN 300 MG/1
CAPSULE ORAL
Qty: 180 CAPSULE | Refills: 3 | Status: SHIPPED | OUTPATIENT
Start: 2020-12-02 | End: 2021-11-15

## 2020-12-07 ENCOUNTER — TELEPHONE (OUTPATIENT)
Dept: NEUROLOGY | Facility: CLINIC | Age: 65
End: 2020-12-07

## 2020-12-11 DIAGNOSIS — J44.9 CHRONIC OBSTRUCTIVE PULMONARY DISEASE, UNSPECIFIED COPD TYPE (HCC): ICD-10-CM

## 2020-12-11 RX ORDER — ALBUTEROL SULFATE 90 UG/1
2 AEROSOL, METERED RESPIRATORY (INHALATION) EVERY 4 HOURS PRN
Qty: 3 INHALER | Refills: 3 | Status: SHIPPED | OUTPATIENT
Start: 2020-12-11 | End: 2021-07-06

## 2021-01-06 ENCOUNTER — LAB (OUTPATIENT)
Dept: LAB | Facility: CLINIC | Age: 66
End: 2021-01-06
Payer: MEDICARE

## 2021-01-06 DIAGNOSIS — E55.9 VITAMIN D DEFICIENCY: Chronic | ICD-10-CM

## 2021-01-06 DIAGNOSIS — E78.5 HYPERLIPIDEMIA, UNSPECIFIED HYPERLIPIDEMIA TYPE: Chronic | ICD-10-CM

## 2021-01-06 LAB
25(OH)D3 SERPL-MCNC: 16 NG/ML (ref 30–100)
ALBUMIN SERPL BCP-MCNC: 4.4 G/DL (ref 3.5–5)
ALP SERPL-CCNC: 108 U/L (ref 46–116)
ALT SERPL W P-5'-P-CCNC: 31 U/L (ref 12–78)
ANION GAP SERPL CALCULATED.3IONS-SCNC: 5 MMOL/L (ref 4–13)
AST SERPL W P-5'-P-CCNC: 16 U/L (ref 5–45)
BILIRUB SERPL-MCNC: 0.56 MG/DL (ref 0.2–1)
BUN SERPL-MCNC: 16 MG/DL (ref 5–25)
CALCIUM SERPL-MCNC: 9.6 MG/DL (ref 8.3–10.1)
CHLORIDE SERPL-SCNC: 105 MMOL/L (ref 100–108)
CHOLEST SERPL-MCNC: 251 MG/DL (ref 50–200)
CO2 SERPL-SCNC: 30 MMOL/L (ref 21–32)
CREAT SERPL-MCNC: 0.72 MG/DL (ref 0.6–1.3)
ERYTHROCYTE [DISTWIDTH] IN BLOOD BY AUTOMATED COUNT: 11.8 % (ref 11.6–15.1)
GFR SERPL CREATININE-BSD FRML MDRD: 88 ML/MIN/1.73SQ M
GLUCOSE P FAST SERPL-MCNC: 105 MG/DL (ref 65–99)
HCT VFR BLD AUTO: 43.6 % (ref 34.8–46.1)
HDLC SERPL-MCNC: 45 MG/DL
HGB BLD-MCNC: 14.5 G/DL (ref 11.5–15.4)
LDLC SERPL CALC-MCNC: 158 MG/DL (ref 0–100)
MCH RBC QN AUTO: 30.1 PG (ref 26.8–34.3)
MCHC RBC AUTO-ENTMCNC: 33.3 G/DL (ref 31.4–37.4)
MCV RBC AUTO: 91 FL (ref 82–98)
PLATELET # BLD AUTO: 277 THOUSANDS/UL (ref 149–390)
PMV BLD AUTO: 10.3 FL (ref 8.9–12.7)
POTASSIUM SERPL-SCNC: 3.6 MMOL/L (ref 3.5–5.3)
PROT SERPL-MCNC: 7.5 G/DL (ref 6.4–8.2)
RBC # BLD AUTO: 4.81 MILLION/UL (ref 3.81–5.12)
SODIUM SERPL-SCNC: 140 MMOL/L (ref 136–145)
TRIGL SERPL-MCNC: 242 MG/DL
TSH SERPL DL<=0.05 MIU/L-ACNC: 1.57 UIU/ML (ref 0.36–3.74)
WBC # BLD AUTO: 9.42 THOUSAND/UL (ref 4.31–10.16)

## 2021-01-06 PROCEDURE — 80053 COMPREHEN METABOLIC PANEL: CPT

## 2021-01-06 PROCEDURE — 80061 LIPID PANEL: CPT

## 2021-01-06 PROCEDURE — 36415 COLL VENOUS BLD VENIPUNCTURE: CPT

## 2021-01-06 PROCEDURE — 85027 COMPLETE CBC AUTOMATED: CPT

## 2021-01-06 PROCEDURE — 84443 ASSAY THYROID STIM HORMONE: CPT

## 2021-01-06 PROCEDURE — 82306 VITAMIN D 25 HYDROXY: CPT

## 2021-01-11 ENCOUNTER — TELEPHONE (OUTPATIENT)
Dept: FAMILY MEDICINE CLINIC | Facility: CLINIC | Age: 66
End: 2021-01-11

## 2021-01-11 NOTE — TELEPHONE ENCOUNTER
Please contact patient  I received her blood work  It was all normal aside from high cholesterol of 251 and low level of vitamin-D at 16  We had few similar current cysts in the past when patient has not been taking her cholesterol medication, atorvastatin 80 milligrams once a day on a regular basis as directed  Please advise her to restart daily atorvastatin 80 milligrams daily and vitamin D3 5000 units daily    Thank you

## 2021-01-13 NOTE — TELEPHONE ENCOUNTER
Carey Smith,  This patient has very complex medical history  She has been experiencing intermittent symptoms of memory loss due to multiple sclerosis  I would appreciate if we could reconcile her medications  She has been frequently confused about her medications  It is hard to believe that she actually takes atorvastatin 80 mg daily with those high numbers    Thank you

## 2021-01-18 ENCOUNTER — CLINICAL SUPPORT (OUTPATIENT)
Dept: FAMILY MEDICINE CLINIC | Facility: CLINIC | Age: 66
End: 2021-01-18

## 2021-01-18 DIAGNOSIS — Z79.899 MEDICATION MANAGEMENT: Primary | ICD-10-CM

## 2021-01-18 NOTE — PROGRESS NOTES
[address]   Demetrius Celestin, PharmD       Tay Kate presented with all prescribed and OTC medications for medication review  COMPREHENSIVE MEDICATION THERAPY REVIEW    Medication list was reviewed with patient  Medications were reviewed for appropriateness, efficacy, and ADRs         Current Outpatient Medications:     albuterol (2 5 mg/3 mL) 0 083 % nebulizer solution, Take 1 vial (2 5 mg total) by nebulization every 4 (four) hours as needed for wheezing or shortness of breath (cough), Disp: 120 vial, Rfl: 2    albuterol (ProAir HFA) 90 mcg/act inhaler, Inhale 2 puffs every 4 (four) hours as needed for wheezing or shortness of breath, Disp: 3 Inhaler, Rfl: 3    aspirin (ECOTRIN LOW STRENGTH) 81 mg EC tablet, Take 1 tablet by mouth daily, Disp: , Rfl:     atorvastatin (LIPITOR) 80 mg tablet, TAKE 1 TABLET BY MOUTH  DAILY, Disp: 90 tablet, Rfl: 3    Breo Ellipta 200-25 MCG/INH inhaler, INHALE 1 INHALATION BY  MOUTH DAILY, RINSE MOUTH  AFTER USE , Disp: 180 each, Rfl: 3    budesonide (PULMICORT) 0 5 mg/2 mL nebulizer solution, inhale contents of 1 vial in nebulizer twice a day, Disp: , Rfl: 0    CANNABIDIOL PO, Take 300 mg by mouth Daily or twice daily PRN back pain, Disp: , Rfl:     fluticasone (FLONASE) 50 mcg/act nasal spray, 2 sprays into each nostril daily, Disp: 48 g, Rfl: 3    gabapentin (NEURONTIN) 300 mg capsule, TAKE 1 CAPSULE BY MOUTH  TWICE DAILY, Disp: 180 capsule, Rfl: 3    levocetirizine (XYZAL) 5 MG tablet, Take 1 tablet (5 mg total) by mouth every evening, Disp: 90 tablet, Rfl: 1    montelukast (SINGULAIR) 10 mg tablet, TAKE 1 TABLET BY MOUTH  DAILY AT BEDTIME, Disp: 90 tablet, Rfl: 3    omega-3-acid ethyl esters (LOVAZA) 1 g capsule, Take 2 capsules (2 g total) by mouth 2 (two) times a day, Disp: 360 capsule, Rfl: 3    omeprazole (PriLOSEC) 40 MG capsule, Take 40 mg by mouth daily, Disp: , Rfl:     sertraline (ZOLOFT) 100 mg tablet, TAKE 1 TABLET BY MOUTH  DAILY, Disp: 90 tablet, Rfl: 3    Cholecalciferol (Vitamin D3 Ultra Strength) 125 MCG (5000 UT) capsule, Take 1 capsule (5,000 Units total) by mouth daily (Patient not taking: Reported on 11/11/2020), Disp: 30 capsule, Rfl: 11    The following drug therapy problems were identified:      1  Patient insists perfect adherence to atorvastatin 80mg and dispense history per pharmacy matches that she has been taking it daily since June 2020   a  Consider switching statins to rosuvastatin 40mg daily and referral to dietician for cholesterol management  2  No longer taking zetia plus flaxseed for triglyceride lowering  3  No longer taking EPIO and B complex for MS    4  No longer receiving Ocrevus infusions for MS - just monitoring, once yearly with [facility]  5  Due for Vaccines PPSV23, Tdap  6  Labs again 3-6 months  a  Consider A1C with next labs, IFG, last A1C 2015  7  Patient questioning Vitamin D   a  She was requesting Ergocalciferol dosed weekly versus daily OTC for her most recent level  8  Taking CBD Oil 300mg daily or twice daily PRN back pain  9   Misses medications 1-2 times monthly, uses pillbox    Patient Self-Reported Adherence Level:good    Barriers to Adherence: memory    Medication-Related Labs:     Patient was fasting for most recent labs    Sodium (mmol/L)   Date Value   01/06/2021 140     Potassium (mmol/L)   Date Value   01/06/2021 3 6   12/15/2015 3 9     Chloride (mmol/L)   Date Value   01/06/2021 105   12/15/2015 107     CO2 (mmol/L)   Date Value   01/06/2021 30   12/15/2015 26 5     ANION GAP (mmol/L)   Date Value   01/06/2021 5     BUN (mg/dL)   Date Value   01/06/2021 16   12/15/2015 17     Creatinine (mg/dL)   Date Value   01/06/2021 0 72   12/15/2015 0 61     Glucose, Fasting (mg/dL)   Date Value   01/06/2021 105 (H)     Calcium (mg/dL)   Date Value   01/06/2021 9 6   12/15/2015 8 5     AST (U/L)   Date Value   01/06/2021 16   12/15/2015 12     ALT (U/L)   Date Value 01/06/2021 31   12/15/2015 24     Alkaline Phosphatase (U/L)   Date Value   01/06/2021 108   12/15/2015 111     Total Protein (g/dL)   Date Value   01/06/2021 7 5     Albumin (g/dL)   Date Value   01/06/2021 4 4   12/15/2015 3 9     Total Bilirubin (mg/dL)   Date Value   01/06/2021 0 56     eGFR (ml/min/1 73sq m)   Date Value   01/06/2021 88     Hemoglobin A1C (%)   Date Value   10/20/2015 5 8 (H)     Glucose (mg/dL)   Date Value   12/15/2015 107   11/17/2015 105   09/21/2015 104      Cholesterol (mg/dL)   Date Value   01/06/2021 251 (H)   05/05/2020 179   01/20/2020 246 (H)     Triglycerides (mg/dL)   Date Value   01/06/2021 242 (H)   05/05/2020 211 (H)   01/20/2020 291 (H)   07/21/2015 147   04/28/2015 333   01/28/2015 258     HDL (mg/dL)   Date Value   07/21/2015 44   04/28/2015 44   01/28/2015 41     HDL, Direct (mg/dL)   Date Value   01/06/2021 45   05/05/2020 41   01/20/2020 39 (L)     LDL Calculated (mg/dL)   Date Value   01/06/2021 158 (H)   05/05/2020 96   01/20/2020 149 (H)   07/21/2015 96   04/28/2015 215 (H)   01/28/2015 148 (H)     No components found for: CREATSERUM, CREATFLUID, CREATADULT, MICROALBUMIN, MICROALBUPOC, POTASSIUM  ALT (U/L)   Date Value   01/06/2021 31   12/15/2015 24     AST (U/L)   Date Value   01/06/2021 16   12/15/2015 12     Alkaline Phosphatase (U/L)   Date Value   01/06/2021 108   12/15/2015 111      Lab Results   Component Value Date    [id number] 490 10/04/2018     No results found for: MICROALBCRE    Allergies   Allergen Reactions    Bupropion     Cefprozil     Conj Estrog-Medroxyprogest Ace Other (See Comments)     Other      Povidone Iodine      Patient unsure of what this is    Sulfamethoxazole-Trimethoprim Other (See Comments)     Reaction Date: 11Aug2011;     Levofloxacin     Gadobutrol GI Intolerance    Medroxyprogesterone Other (See Comments)     Reaction Date: 11Aug2011;     Nortriptyline Other (See Comments)     n/a    Pamelor [Nortriptyline Hcl]    Melody Crystal Provera [Medroxyprogesterone Acetate]        Social History     Tobacco Use   Smoking Status Current Every Day Smoker    Packs/day: 0 50    Years: 40 00    Pack years: 20 00    Types: Cigarettes    Start date: 1982   Smokeless Tobacco Never Used     Social History     Substance and Sexual Activity   Alcohol Use Yes    Frequency: Monthly or less    Drinks per session: 1 or 2    Binge frequency: Never    Comment: occasional; [employer] also states Never drank alcohol       Recommendations/Interventions: There are no Patient Instructions on file for this visit  Education/Resources Provided:   Patient given Personal-Medication Record of current medications- printed with After Visit Summary    Recommended Follow Up:  Patient instructed to follow-up with Dr Silvia Wise for routine health maintenance and to schedule yearly Medication Review with pharmacist       Medication list is now up-to-date and current  Demographics  Interaction Method: Virtual  Type of Intervention: New    Topic(s) Addressed  Medication Management  Polypharmacy  Statin Use  Dyslipidemia    Intervention(s) Made    Pharmacologic:      Prevent or Manage Adverse Drug Reaction    Drug Interaction    Non-Pharmacologic:  Adherence Addressed    Preventive Care Gap Closure Recommendation    Lab Ordered    Tool(s) Used  Not Applicable    Time Spent:   Time Spent in Direct Patient Care: 40 minutes    Time Spent in Care Coordination: 30 minutes    Recommendation(s) Accepted by the Patient/Caregiver:  All Accepted

## 2021-01-18 NOTE — Clinical Note
Patient wondering about weekly Ergo versus daily Vernell for low vitamin D  Perhaps switch to rosuvastatin to see if that makes an impact on her cholesterol

## 2021-01-26 ENCOUNTER — TELEPHONE (OUTPATIENT)
Dept: FAMILY MEDICINE CLINIC | Facility: CLINIC | Age: 66
End: 2021-01-26

## 2021-01-26 DIAGNOSIS — E78.2 MIXED HYPERLIPIDEMIA: Chronic | ICD-10-CM

## 2021-01-26 DIAGNOSIS — E55.9 VITAMIN D DEFICIENCY: Primary | Chronic | ICD-10-CM

## 2021-01-26 RX ORDER — ERGOCALCIFEROL 1.25 MG/1
CAPSULE ORAL
Qty: 8 CAPSULE | Refills: 0 | Status: SHIPPED | OUTPATIENT
Start: 2021-01-26 | End: 2021-05-04

## 2021-01-26 RX ORDER — ROSUVASTATIN CALCIUM 20 MG/1
20 TABLET, COATED ORAL DAILY
Qty: 90 TABLET | Refills: 0 | Status: SHIPPED | OUTPATIENT
Start: 2021-01-26 | End: 2021-04-15 | Stop reason: SDUPTHER

## 2021-01-26 RX ORDER — ERGOCALCIFEROL 1.25 MG/1
50000 CAPSULE ORAL
Qty: 6 CAPSULE | Refills: 3 | Status: SHIPPED | OUTPATIENT
Start: 2021-03-26 | End: 2021-03-18 | Stop reason: SDUPTHER

## 2021-01-26 NOTE — TELEPHONE ENCOUNTER
Patient states she seen Rochelle Anguiano Monday and she would like an update from [facility]  Any recommendations or advisements for this patient  Patient states she reconciled medication list with Rochelle kelley please review and advise

## 2021-01-26 NOTE — TELEPHONE ENCOUNTER
Please contact patient  I reviewed reports from clinical pharmacist and her blood work results again  1  Please advised patient to discontinue atorvastatin/Lipitor  2  Please advised patient to start alternative cholesterol medication, Crestor 20 mg daily, I did send prescription to the pharmacy  3  Patient will start prescription vitamin D2 57509 units once a week for 8 weeks and then will decrease dose to every other week   Patient should discontinue over-the-counter vitamin-D  4   Please advise patient to repeat blood work prior to her office visit with me in March, will check lipid panel and liver test       Thank you

## 2021-02-08 ENCOUNTER — HOSPITAL ENCOUNTER (OUTPATIENT)
Dept: RADIOLOGY | Age: 66
Discharge: HOME | End: 2021-02-08
Payer: MEDICARE

## 2021-02-08 VITALS — HEIGHT: 64 IN | WEIGHT: 157 LBS | BODY MASS INDEX: 26.8 KG/M2

## 2021-02-08 DIAGNOSIS — Z12.31 ENCOUNTER FOR SCREENING MAMMOGRAM FOR BREAST CANCER: ICD-10-CM

## 2021-02-08 PROCEDURE — 77063 BREAST TOMOSYNTHESIS BI: CPT

## 2021-02-08 PROCEDURE — 77067 SCR MAMMO BI INCL CAD: CPT

## 2021-03-02 ENCOUNTER — LAB (OUTPATIENT)
Dept: LAB | Facility: CLINIC | Age: 66
End: 2021-03-02
Payer: MEDICARE

## 2021-03-02 ENCOUNTER — TRANSCRIBE ORDERS (OUTPATIENT)
Dept: LAB | Facility: CLINIC | Age: 66
End: 2021-03-02

## 2021-03-02 DIAGNOSIS — J30.89 ALLERGIC RHINITIS DUE TO FUNGAL SPORES, UNSPECIFIED SEASONALITY: ICD-10-CM

## 2021-03-02 DIAGNOSIS — G35 MULTIPLE SCLEROSIS (HCC): ICD-10-CM

## 2021-03-02 DIAGNOSIS — E78.2 MIXED HYPERLIPIDEMIA: ICD-10-CM

## 2021-03-02 LAB
ALBUMIN SERPL BCP-MCNC: 4.1 G/DL (ref 3.5–5)
ALP SERPL-CCNC: 90 U/L (ref 46–116)
ALT SERPL W P-5'-P-CCNC: 34 U/L (ref 12–78)
ANION GAP SERPL CALCULATED.3IONS-SCNC: 4 MMOL/L (ref 4–13)
AST SERPL W P-5'-P-CCNC: 14 U/L (ref 5–45)
BASOPHILS # BLD AUTO: 0.05 THOUSANDS/ΜL (ref 0–0.1)
BASOPHILS NFR BLD AUTO: 1 % (ref 0–1)
BILIRUB DIRECT SERPL-MCNC: 0.08 MG/DL (ref 0–0.2)
BILIRUB SERPL-MCNC: 0.42 MG/DL (ref 0.2–1)
BUN SERPL-MCNC: 13 MG/DL (ref 5–25)
CALCIUM SERPL-MCNC: 9.2 MG/DL (ref 8.3–10.1)
CHLORIDE SERPL-SCNC: 108 MMOL/L (ref 100–108)
CHOLEST SERPL-MCNC: 200 MG/DL (ref 50–200)
CO2 SERPL-SCNC: 30 MMOL/L (ref 21–32)
CREAT SERPL-MCNC: 0.66 MG/DL (ref 0.6–1.3)
EOSINOPHIL # BLD AUTO: 0.22 THOUSAND/ΜL (ref 0–0.61)
EOSINOPHIL NFR BLD AUTO: 3 % (ref 0–6)
ERYTHROCYTE [DISTWIDTH] IN BLOOD BY AUTOMATED COUNT: 11.8 % (ref 11.6–15.1)
GFR SERPL CREATININE-BSD FRML MDRD: 92 ML/MIN/1.73SQ M
GLUCOSE P FAST SERPL-MCNC: 108 MG/DL (ref 65–99)
HCT VFR BLD AUTO: 42.5 % (ref 34.8–46.1)
HDLC SERPL-MCNC: 44 MG/DL
HGB BLD-MCNC: 14.3 G/DL (ref 11.5–15.4)
IMM GRANULOCYTES # BLD AUTO: 0.02 THOUSAND/UL (ref 0–0.2)
IMM GRANULOCYTES NFR BLD AUTO: 0 % (ref 0–2)
LDLC SERPL CALC-MCNC: 107 MG/DL (ref 0–100)
LYMPHOCYTES # BLD AUTO: 2.45 THOUSANDS/ΜL (ref 0.6–4.47)
LYMPHOCYTES NFR BLD AUTO: 32 % (ref 14–44)
MCH RBC QN AUTO: 30 PG (ref 26.8–34.3)
MCHC RBC AUTO-ENTMCNC: 33.6 G/DL (ref 31.4–37.4)
MCV RBC AUTO: 89 FL (ref 82–98)
MONOCYTES # BLD AUTO: 0.6 THOUSAND/ΜL (ref 0.17–1.22)
MONOCYTES NFR BLD AUTO: 8 % (ref 4–12)
NEUTROPHILS # BLD AUTO: 4.33 THOUSANDS/ΜL (ref 1.85–7.62)
NEUTS SEG NFR BLD AUTO: 56 % (ref 43–75)
NRBC BLD AUTO-RTO: 0 /100 WBCS
PLATELET # BLD AUTO: 260 THOUSANDS/UL (ref 149–390)
PMV BLD AUTO: 9.9 FL (ref 8.9–12.7)
POTASSIUM SERPL-SCNC: 4.1 MMOL/L (ref 3.5–5.3)
PROT SERPL-MCNC: 7 G/DL (ref 6.4–8.2)
RBC # BLD AUTO: 4.76 MILLION/UL (ref 3.81–5.12)
SODIUM SERPL-SCNC: 142 MMOL/L (ref 136–145)
TRIGL SERPL-MCNC: 245 MG/DL
WBC # BLD AUTO: 7.67 THOUSAND/UL (ref 4.31–10.16)

## 2021-03-02 PROCEDURE — 80053 COMPREHEN METABOLIC PANEL: CPT

## 2021-03-02 PROCEDURE — 85025 COMPLETE CBC W/AUTO DIFF WBC: CPT

## 2021-03-02 PROCEDURE — 82248 BILIRUBIN DIRECT: CPT

## 2021-03-02 PROCEDURE — 36415 COLL VENOUS BLD VENIPUNCTURE: CPT

## 2021-03-02 PROCEDURE — 80061 LIPID PANEL: CPT

## 2021-03-03 RX ORDER — LEVOCETIRIZINE DIHYDROCHLORIDE 5 MG/1
TABLET, FILM COATED ORAL
Qty: 90 TABLET | Refills: 1 | Status: SHIPPED | OUTPATIENT
Start: 2021-03-03 | End: 2022-02-02

## 2021-03-10 DIAGNOSIS — Z23 ENCOUNTER FOR IMMUNIZATION: ICD-10-CM

## 2021-03-18 ENCOUNTER — OFFICE VISIT (OUTPATIENT)
Dept: FAMILY MEDICINE CLINIC | Facility: CLINIC | Age: 66
End: 2021-03-18
Payer: MEDICARE

## 2021-03-18 VITALS
RESPIRATION RATE: 16 BRPM | BODY MASS INDEX: 26.63 KG/M2 | HEART RATE: 64 BPM | WEIGHT: 156 LBS | SYSTOLIC BLOOD PRESSURE: 122 MMHG | OXYGEN SATURATION: 96 % | TEMPERATURE: 97.4 F | HEIGHT: 64 IN | DIASTOLIC BLOOD PRESSURE: 72 MMHG

## 2021-03-18 DIAGNOSIS — Z00.00 ENCOUNTER FOR MEDICARE ANNUAL WELLNESS EXAM: ICD-10-CM

## 2021-03-18 DIAGNOSIS — R93.89 ABNORMAL CT OF THE CHEST: Chronic | ICD-10-CM

## 2021-03-18 DIAGNOSIS — F33.9 MAJOR DEPRESSION, RECURRENT, CHRONIC (HCC): ICD-10-CM

## 2021-03-18 DIAGNOSIS — K21.00 GASTROESOPHAGEAL REFLUX DISEASE WITH ESOPHAGITIS, UNSPECIFIED WHETHER HEMORRHAGE: Chronic | ICD-10-CM

## 2021-03-18 DIAGNOSIS — E78.2 MIXED HYPERLIPIDEMIA: Chronic | ICD-10-CM

## 2021-03-18 DIAGNOSIS — G35 MULTIPLE SCLEROSIS (HCC): Chronic | ICD-10-CM

## 2021-03-18 DIAGNOSIS — R41.3 AMNESIA/MEMORY DISORDER: ICD-10-CM

## 2021-03-18 DIAGNOSIS — J44.9 COPD WITHOUT EXACERBATION (HCC): Primary | Chronic | ICD-10-CM

## 2021-03-18 PROCEDURE — 1123F ACP DISCUSS/DSCN MKR DOCD: CPT | Performed by: FAMILY MEDICINE

## 2021-03-18 PROCEDURE — G0439 PPPS, SUBSEQ VISIT: HCPCS | Performed by: FAMILY MEDICINE

## 2021-03-18 PROCEDURE — 99214 OFFICE O/P EST MOD 30 MIN: CPT | Performed by: FAMILY MEDICINE

## 2021-03-18 NOTE — PATIENT INSTRUCTIONS
Medicare Preventive Visit Patient Instructions  Thank you for completing your Welcome to Medicare Visit or Medicare Annual Wellness Visit today  Your next wellness visit will be due in one year (3/19/2022)  The screening/preventive services that you may require over the next 5-10 years are detailed below  Some tests may not apply to you based off risk factors and/or age  Screening tests ordered at today's visit but not completed yet may show as past due  Also, please note that scanned in results may not display below  Preventive Screenings:  Service Recommendations Previous Testing/Comments   Colorectal Cancer Screening  * Colonoscopy    * Fecal Occult Blood Test (FOBT)/Fecal Immunochemical Test (FIT)  * Fecal DNA/Cologuard Test  * Flexible Sigmoidoscopy Age: 54-65 years old   Colonoscopy: every 10 years (may be performed more frequently if at higher risk)  OR  FOBT/FIT: every 1 year  OR  Cologuard: every 3 years  OR  Sigmoidoscopy: every 5 years  Screening may be recommended earlier than age 48 if at higher risk for colorectal cancer  Also, an individualized decision between you and your healthcare provider will decide whether screening between the ages of 74-80 would be appropriate  Colonoscopy: 06/12/2019  FOBT/FIT: Not on file  Cologuard: Not on file  Sigmoidoscopy: Not on file    Screening Current     Breast Cancer Screening Age: 36 years old  Frequency: every 1-2 years  Not required if history of left and right mastectomy Mammogram: 02/08/2021    Screening Current   Cervical Cancer Screening Between the ages of 21-29, pap smear recommended once every 3 years  Between the ages of 33-67, can perform pap smear with HPV co-testing every 5 years     Recommendations may differ for women with a history of total hysterectomy, cervical cancer, or abnormal pap smears in past  Pap Smear: 08/13/2020    Screening Not Indicated   Hepatitis C Screening Once for adults born between 1945 and 1965  More frequently in patients at high risk for Hepatitis C Hep C Antibody: Not on file    Screening Current   Diabetes Screening 1-2 times per year if you're at risk for diabetes or have pre-diabetes Fasting glucose: 108 mg/dL   A1C: No results in last 5 years    Screening Current   Cholesterol Screening Once every 5 years if you don't have a lipid disorder  May order more often based on risk factors  Lipid panel: 03/02/2021    Screening Not Indicated  History Lipid Disorder     Other Preventive Screenings Covered by Medicare:  1  Abdominal Aortic Aneurysm (AAA) Screening: covered once if your at risk  You're considered to be at risk if you have a family history of AAA  2  Lung Cancer Screening: covers low dose CT scan once per year if you meet all of the following conditions: (1) Age 50-69; (2) No signs or symptoms of lung cancer; (3) Current smoker or have quit smoking within the last 15 years; (4) You have a tobacco smoking history of at least 30 pack years (packs per day multiplied by number of years you smoked); (5) You get a written order from a healthcare provider  3  Glaucoma Screening: covered annually if you're considered high risk: (1) You have diabetes OR (2) Family history of glaucoma OR (3)  aged 48 and older OR (3)  American aged 72 and older  3  Osteoporosis Screening: covered every 2 years if you meet one of the following conditions: (1) You're estrogen deficient and at risk for osteoporosis based off medical history and other findings; (2) Have a vertebral abnormality; (3) On glucocorticoid therapy for more than 3 months; (4) Have primary hyperparathyroidism; (5) On osteoporosis medications and need to assess response to drug therapy  · Last bone density test (DXA Scan): 07/26/2016   5  HIV Screening: covered annually if you're between the age of 15-65  Also covered annually if you are younger than 13 and older than 72 with risk factors for HIV infection   For pregnant patients, it is covered up to 3 times per pregnancy  Immunizations:  Immunization Recommendations   Influenza Vaccine Annual influenza vaccination during flu season is recommended for all persons aged >= 6 months who do not have contraindications   Pneumococcal Vaccine (Prevnar and Pneumovax)  * Prevnar = PCV13  * Pneumovax = PPSV23   Adults 25-60 years old: 1-3 doses may be recommended based on certain risk factors  Adults 72 years old: Prevnar (PCV13) vaccine recommended followed by Pneumovax (PPSV23) vaccine  If already received PPSV23 since turning 65, then PCV13 recommended at least one year after PPSV23 dose  Hepatitis B Vaccine 3 dose series if at intermediate or high risk (ex: diabetes, end stage renal disease, liver disease)   Tetanus (Td) Vaccine - COST NOT COVERED BY MEDICARE PART B Following completion of primary series, a booster dose should be given every 10 years to maintain immunity against tetanus  Td may also be given as tetanus wound prophylaxis  Tdap Vaccine - COST NOT COVERED BY MEDICARE PART B Recommended at least once for all adults  For pregnant patients, recommended with each pregnancy  Shingles Vaccine (Shingrix) - COST NOT COVERED BY MEDICARE PART B  2 shot series recommended in those aged 48 and above     Health Maintenance Due:      Topic Date Due    Colonoscopy Surveillance  06/12/2022    Cervical Cancer Screening  08/13/2023    Colorectal Cancer Screening  06/12/2029    Hepatitis C Screening  Completed     Immunizations Due:      Topic Date Due    COVID-19 Vaccine (1) Never done    DTaP,Tdap,and Td Vaccines (1 - Tdap) 09/28/1975    Pneumococcal Vaccine: 65+ Years (2 of 2 - PPSV23) 09/29/2020     Advance Directives   What are advance directives? Advance directives are legal documents that state your wishes and plans for medical care  These plans are made ahead of time in case you lose your ability to make decisions for yourself   Advance directives can apply to any medical decision, such as the treatments you want, and if you want to donate organs  What are the types of advance directives? There are many types of advance directives, and each state has rules about how to use them  You may choose a combination of any of the following:  · Living will: This is a written record of the treatment you want  You can also choose which treatments you do not want, which to limit, and which to stop at a certain time  This includes surgery, medicine, IV fluid, and tube feedings  · Durable power of  for healthcare [facility]): This is a written record that states who you want to make healthcare choices for you when you are unable to make them for yourself  This person, called a proxy, is usually a family member or a friend  You may choose more than 1 proxy  · Do not resuscitate (DNR) order:  A DNR order is used in case your heart stops beating or you stop breathing  It is a request not to have certain forms of treatment, such as CPR  A DNR order may be included in other types of advance directives  · Medical directive: This covers the care that you want if you are in a coma, near death, or unable to make decisions for yourself  You can list the treatments you want for each condition  Treatment may include pain medicine, surgery, blood transfusions, dialysis, IV or tube feedings, and a ventilator (breathing machine)  · Values history: This document has questions about your views, beliefs, and how you feel and think about life  This information can help others choose the care that you would choose  Why are advance directives important? An advance directive helps you control your care  Although spoken wishes may be used, it is better to have your wishes written down  Spoken wishes can be misunderstood, or not followed  Treatments may be given even if you do not want them  An advance directive may make it easier for your family to make difficult choices about your care     Urinary Incontinence   Urinary incontinence (UI)  is when you lose control of your bladder  UI develops because your bladder cannot store or empty urine properly  The 3 most common types of UI are stress incontinence, urge incontinence, or both  Medicines:   · May be given to help strengthen your bladder control  Report any side effects of medication to your healthcare provider  Do pelvic muscle exercises often:  Your pelvic muscles help you stop urinating  Squeeze these muscles tight for 5 seconds, then relax for 5 seconds  Gradually work up to squeezing for 10 seconds  Do 3 sets of 15 repetitions a day, or as directed  This will help strengthen your pelvic muscles and improve bladder control  Train your bladder:  Go to the bathroom at set times, such as every 2 hours, even if you do not feel the urge to go  You can also try to hold your urine when you feel the urge to go  For example, hold your urine for 5 minutes when you feel the urge to go  As that becomes easier, hold your urine for 10 minutes  Self-care:   · Keep a UI record  Write down how often you leak urine and how much you leak  Make a note of what you were doing when you leaked urine  · Drink liquids as directed  You may need to limit the amount of liquid you drink to help control your urine leakage  Do not drink any liquid right before you go to bed  Limit or do not have drinks that contain caffeine or alcohol  · Prevent constipation  Eat a variety of high-fiber foods  Good examples are high-fiber cereals, beans, vegetables, and whole-grain breads  Walking is the best way to trigger your intestines to have a bowel movement  · Exercise regularly and maintain a healthy weight  Weight loss and exercise will decrease pressure on your bladder and help you control your leakage  · Use a catheter as directed  to help empty your bladder  A catheter is a tiny, plastic tube that is put into your bladder to drain your urine  · Go to behavior therapy as directed    Behavior therapy may be used to help you learn to control your urge to urinate  Cigarette Smoking and Your Health   Risks to your health if you smoke:  Nicotine and other chemicals found in tobacco damage every cell in your body  Even if you are a light smoker, you have an increased risk for cancer, heart disease, and lung disease  If you are pregnant or have diabetes, smoking increases your risk for complications  Benefits to your health if you stop smoking:   · You decrease respiratory symptoms such as coughing, wheezing, and shortness of breath  · You reduce your risk for cancers of the lung, mouth, throat, kidney, bladder, pancreas, stomach, and cervix  If you already have cancer, you increase the benefits of chemotherapy  You also reduce your risk for cancer returning or a second cancer from developing  · You reduce your risk for heart disease, blood clots, heart attack, and stroke  · You reduce your risk for lung infections, and diseases such as pneumonia, asthma, chronic bronchitis, and emphysema  · Your circulation improves  More oxygen can be delivered to your body  If you have diabetes, you lower your risk for complications, such as kidney, artery, and eye diseases  You also lower your risk for nerve damage  Nerve damage can lead to amputations, poor vision, and blindness  · You improve your body's ability to heal and to fight infections  For more information and support to stop smoking:   · [employer]  Phone: 9- 323 - 510-8476  Web Address: www [employer]  Weight Management   Why it is important to manage your weight:  Being overweight increases your risk of health conditions such as heart disease, high blood pressure, type 2 diabetes, and certain types of cancer  It can also increase your risk for osteoarthritis, sleep apnea, and other respiratory problems  Aim for a slow, steady weight loss  Even a small amount of weight loss can lower your risk of health problems    How to lose weight safely: A safe and healthy way to lose weight is to eat fewer calories and get regular exercise  You can lose up about 1 pound a week by decreasing the number of calories you eat by 500 calories each day  Healthy meal plan for weight management:  A healthy meal plan includes a variety of foods, contains fewer calories, and helps you stay healthy  A healthy meal plan includes the following:  · Eat whole-grain foods more often  A healthy meal plan should contain fiber  Fiber is the part of grains, fruits, and vegetables that is not broken down by your body  Whole-grain foods are healthy and provide extra fiber in your diet  Some examples of whole-grain foods are whole-wheat breads and pastas, oatmeal, brown rice, and bulgur  · Eat a variety of vegetables every day  Include dark, leafy greens such as spinach, kale, letitia greens, and mustard greens  Eat yellow and orange vegetables such as carrots, sweet potatoes, and winter squash  · Eat a variety of fruits every day  Choose fresh or canned fruit (canned in its own juice or light syrup) instead of juice  Fruit juice has very little or no fiber  · Eat low-fat dairy foods  Drink fat-free (skim) milk or 1% milk  Eat fat-free yogurt and low-fat cottage cheese  Try low-fat cheeses such as mozzarella and other reduced-fat cheeses  · Choose meat and other protein foods that are low in fat  Choose beans or other legumes such as split peas or lentils  Choose fish, skinless poultry (chicken or turkey), or lean cuts of red meat (beef or pork)  Before you cook meat or poultry, cut off any visible fat  · Use less fat and oil  Try baking foods instead of frying them  Add less fat, such as margarine, sour cream, regular salad dressing and mayonnaise to foods  Eat fewer high-fat foods  Some examples of high-fat foods include french fries, doughnuts, ice cream, and cakes  · Eat fewer sweets  Limit foods and drinks that are high in sugar   This includes candy, cookies, regular soda, and sweetened drinks  Exercise:  Exercise at least 30 minutes per day on most days of the week  Some examples of exercise include walking, biking, dancing, and swimming  You can also fit in more physical activity by taking the stairs instead of the elevator or parking farther away from stores  Ask your healthcare provider about the best exercise plan for you  © Copyright [employer] 2018 Information is for End User's use only and may not be sold, redistributed or otherwise used for commercial purposes   All illustrations and images included in CareNotes® are the copyrighted property of [employer]  or [address] [place] [address] [facility]

## 2021-03-18 NOTE — PROGRESS NOTES
FAMILY PRACTICE OFFICE VISIT       NAME: Catracho Kaufman  AGE: 76 y o  SEX: female       : 1955        MRN: [id number]        Assessment and Plan     Problem List Items Addressed This Visit        Digestive    Gastro-esophageal reflux disease with esophagitis (Chronic)     ·  Patient remains under care of Gastroenterology, Dr Peg Dakins  · Most recent EGD 2020  · Patient is doing well on omeprazole 40 mg daily            Respiratory    COPD without exacerbation ([address] ) - Primary (Chronic)     ·  No daily inhaler use  · Patient denies symptoms of dyspnea, cough or wheezing  · She is still unfortunately smoking  · Repeat CT chest 2020: Stable, no concerning findings  · Patient is under care of [facility] pulmonology            Nervous and Auditory    Multiple sclerosis, relapsing/remitting (Chronic)     ·  Patient has switched her care to [facility]  ·  She has completed 2 year course of  Yogi Cash  is currently not on any therapy            Other    Hyperlipidemia (Chronic)    Relevant Orders    Comprehensive metabolic panel    Lipid Panel with Direct LDL reflex    Major depression, recurrent, chronic (HCC) (Chronic)    Abnormal CT of the chest (Chronic)     2020 CT chest  Interval resolution of previously identified right middle lobe groundglass  No new areas of consolidation identified  No new suspicious pulmonary nodules identified  ·  patient remains under care of [facility] pulmonology, she is still unfortunately smoking half a pack to 1 pack daily, patient is well aware was counseled on numerous occasions regarding serious risks of ongoing tobacco use           Amnesia/memory disorder     ·  Stable changes due to multiple sclerosis           Other Visit Diagnoses     Encounter for Medicare annual wellness exam           Patient presents for follow-up of chronic medical conditions  Assessment and plan as outlined above      She will continue same daily medications and will repeat CMP and lipid panel to reassess her progress on Crestor 20 mg daily  Had long discussion with patient her  again regarding importance of tobacco cessation  Patient is under care of [facility] pulmonology with yearly follow-up  She has recently switched neurology care to [facility]   No current therapy for MS  Patient is proceeding with COVID-19 vaccination within next few days  Patient is up-to-date with health screenings  Routine follow-up in 6 months  Patient Instructions       Medicare Preventive Visit Patient Instructions  Thank you for completing your Welcome to Medicare Visit or Medicare Annual Wellness Visit today  Your next wellness visit will be due in one year (3/19/2022)  The screening/preventive services that you may require over the next 5-10 years are detailed below  Some tests may not apply to you based off risk factors and/or age  Screening tests ordered at today's visit but not completed yet may show as past due  Also, please note that scanned in results may not display below  Preventive Screenings:  Service Recommendations Previous Testing/Comments   Colorectal Cancer Screening  * Colonoscopy    * Fecal Occult Blood Test (FOBT)/Fecal Immunochemical Test (FIT)  * Fecal DNA/Cologuard Test  * Flexible Sigmoidoscopy Age: 54-65 years old   Colonoscopy: every 10 years (may be performed more frequently if at higher risk)  OR  FOBT/FIT: every 1 year  OR  Cologuard: every 3 years  OR  Sigmoidoscopy: every 5 years  Screening may be recommended earlier than age 48 if at higher risk for colorectal cancer  Also, an individualized decision between you and your healthcare provider will decide whether screening between the ages of 74-80 would be appropriate   Colonoscopy: 06/12/2019  FOBT/FIT: Not on file  Cologuard: Not on file  Sigmoidoscopy: Not on file    Screening Current     Breast Cancer Screening Age: 36 years old  Frequency: every 1-2 years  Not required if history of left and right mastectomy Mammogram: 02/08/2021    Screening Current   Cervical Cancer Screening Between the ages of 21-29, pap smear recommended once every 3 years  Between the ages of 33-67, can perform pap smear with HPV co-testing every 5 years  Recommendations may differ for women with a history of total hysterectomy, cervical cancer, or abnormal pap smears in past  Pap Smear: 08/13/2020    Screening Not Indicated   Hepatitis C Screening Once for adults born between 1945 and 1965  More frequently in patients at high risk for Hepatitis C Hep C Antibody: Not on file    Screening Current   Diabetes Screening 1-2 times per year if you're at risk for diabetes or have pre-diabetes Fasting glucose: 108 mg/dL   A1C: No results in last 5 years    Screening Current   Cholesterol Screening Once every 5 years if you don't have a lipid disorder  May order more often based on risk factors  Lipid panel: 03/02/2021    Screening Not Indicated  History Lipid Disorder     Other Preventive Screenings Covered by Medicare:  1  Abdominal Aortic Aneurysm (AAA) Screening: covered once if your at risk  You're considered to be at risk if you have a family history of AAA  2  Lung Cancer Screening: covers low dose CT scan once per year if you meet all of the following conditions: (1) Age 50-69; (2) No signs or symptoms of lung cancer; (3) Current smoker or have quit smoking within the last 15 years; (4) You have a tobacco smoking history of at least 30 pack years (packs per day multiplied by number of years you smoked); (5) You get a written order from a healthcare provider  3  Glaucoma Screening: covered annually if you're considered high risk: (1) You have diabetes OR (2) Family history of glaucoma OR (3)  aged 48 and older OR (3)  American aged 72 and older  3   Osteoporosis Screening: covered every 2 years if you meet one of the following conditions: (1) You're estrogen deficient and at risk for osteoporosis based off medical history and other findings; (2) Have a vertebral abnormality; (3) On glucocorticoid therapy for more than 3 months; (4) Have primary hyperparathyroidism; (5) On osteoporosis medications and need to assess response to drug therapy  · Last bone density test (DXA Scan): 07/26/2016   5  HIV Screening: covered annually if you're between the age of 15-65  Also covered annually if you are younger than 13 and older than 72 with risk factors for HIV infection  For pregnant patients, it is covered up to 3 times per pregnancy  Immunizations:  Immunization Recommendations   Influenza Vaccine Annual influenza vaccination during flu season is recommended for all persons aged >= 6 months who do not have contraindications   Pneumococcal Vaccine (Prevnar and Pneumovax)  * Prevnar = PCV13  * Pneumovax = PPSV23   Adults 25-60 years old: 1-3 doses may be recommended based on certain risk factors  Adults 72 years old: Prevnar (PCV13) vaccine recommended followed by Pneumovax (PPSV23) vaccine  If already received PPSV23 since turning 65, then PCV13 recommended at least one year after PPSV23 dose  Hepatitis B Vaccine 3 dose series if at intermediate or high risk (ex: diabetes, end stage renal disease, liver disease)   Tetanus (Td) Vaccine - COST NOT COVERED BY MEDICARE PART B Following completion of primary series, a booster dose should be given every 10 years to maintain immunity against tetanus  Td may also be given as tetanus wound prophylaxis  Tdap Vaccine - COST NOT COVERED BY MEDICARE PART B Recommended at least once for all adults  For pregnant patients, recommended with each pregnancy     Shingles Vaccine (Shingrix) - COST NOT COVERED BY MEDICARE PART B  2 shot series recommended in those aged 48 and above     Health Maintenance Due:      Topic Date Due    Colonoscopy Surveillance  06/12/2022    Cervical Cancer Screening  08/13/2023    Colorectal Cancer Screening  06/12/2029  Hepatitis C Screening  Completed     Immunizations Due:      Topic Date Due    COVID-19 Vaccine (1) Never done    DTaP,Tdap,and Td Vaccines (1 - Tdap) 09/28/1975    Pneumococcal Vaccine: 65+ Years (2 of 2 - PPSV23) 09/29/2020     Advance Directives   What are advance directives? Advance directives are legal documents that state your wishes and plans for medical care  These plans are made ahead of time in case you lose your ability to make decisions for yourself  Advance directives can apply to any medical decision, such as the treatments you want, and if you want to donate organs  What are the types of advance directives? There are many types of advance directives, and each state has rules about how to use them  You may choose a combination of any of the following:  · Living will: This is a written record of the treatment you want  You can also choose which treatments you do not want, which to limit, and which to stop at a certain time  This includes surgery, medicine, IV fluid, and tube feedings  · Durable power of  for healthcare [facility]): This is a written record that states who you want to make healthcare choices for you when you are unable to make them for yourself  This person, called a proxy, is usually a family member or a friend  You may choose more than 1 proxy  · Do not resuscitate (DNR) order:  A DNR order is used in case your heart stops beating or you stop breathing  It is a request not to have certain forms of treatment, such as CPR  A DNR order may be included in other types of advance directives  · Medical directive: This covers the care that you want if you are in a coma, near death, or unable to make decisions for yourself  You can list the treatments you want for each condition  Treatment may include pain medicine, surgery, blood transfusions, dialysis, IV or tube feedings, and a ventilator (breathing machine)  · Values history:   This document has questions about your views, beliefs, and how you feel and think about life  This information can help others choose the care that you would choose  Why are advance directives important? An advance directive helps you control your care  Although spoken wishes may be used, it is better to have your wishes written down  Spoken wishes can be misunderstood, or not followed  Treatments may be given even if you do not want them  An advance directive may make it easier for your family to make difficult choices about your care  Urinary Incontinence   Urinary incontinence (UI)  is when you lose control of your bladder  UI develops because your bladder cannot store or empty urine properly  The 3 most common types of UI are stress incontinence, urge incontinence, or both  Medicines:   · May be given to help strengthen your bladder control  Report any side effects of medication to your healthcare provider  Do pelvic muscle exercises often:  Your pelvic muscles help you stop urinating  Squeeze these muscles tight for 5 seconds, then relax for 5 seconds  Gradually work up to squeezing for 10 seconds  Do 3 sets of 15 repetitions a day, or as directed  This will help strengthen your pelvic muscles and improve bladder control  Train your bladder:  Go to the bathroom at set times, such as every 2 hours, even if you do not feel the urge to go  You can also try to hold your urine when you feel the urge to go  For example, hold your urine for 5 minutes when you feel the urge to go  As that becomes easier, hold your urine for 10 minutes  Self-care:   · Keep a UI record  Write down how often you leak urine and how much you leak  Make a note of what you were doing when you leaked urine  · Drink liquids as directed  You may need to limit the amount of liquid you drink to help control your urine leakage  Do not drink any liquid right before you go to bed  Limit or do not have drinks that contain caffeine or alcohol  · Prevent constipation    Eat a variety of high-fiber foods  Good examples are high-fiber cereals, beans, vegetables, and whole-grain breads  Walking is the best way to trigger your intestines to have a bowel movement  · Exercise regularly and maintain a healthy weight  Weight loss and exercise will decrease pressure on your bladder and help you control your leakage  · Use a catheter as directed  to help empty your bladder  A catheter is a tiny, plastic tube that is put into your bladder to drain your urine  · Go to behavior therapy as directed  Behavior therapy may be used to help you learn to control your urge to urinate  Cigarette Smoking and Your Health   Risks to your health if you smoke:  Nicotine and other chemicals found in tobacco damage every cell in your body  Even if you are a light smoker, you have an increased risk for cancer, heart disease, and lung disease  If you are pregnant or have diabetes, smoking increases your risk for complications  Benefits to your health if you stop smoking:   · You decrease respiratory symptoms such as coughing, wheezing, and shortness of breath  · You reduce your risk for cancers of the lung, mouth, throat, kidney, bladder, pancreas, stomach, and cervix  If you already have cancer, you increase the benefits of chemotherapy  You also reduce your risk for cancer returning or a second cancer from developing  · You reduce your risk for heart disease, blood clots, heart attack, and stroke  · You reduce your risk for lung infections, and diseases such as pneumonia, asthma, chronic bronchitis, and emphysema  · Your circulation improves  More oxygen can be delivered to your body  If you have diabetes, you lower your risk for complications, such as kidney, artery, and eye diseases  You also lower your risk for nerve damage  Nerve damage can lead to amputations, poor vision, and blindness  · You improve your body's ability to heal and to fight infections    For more information and support to stop smoking:   · [employer]  Phone: 8- 406 - [phone]  Web Address: www [employer]  Weight Management   Why it is important to manage your weight:  Being overweight increases your risk of health conditions such as heart disease, high blood pressure, type 2 diabetes, and certain types of cancer  It can also increase your risk for osteoarthritis, sleep apnea, and other respiratory problems  Aim for a slow, steady weight loss  Even a small amount of weight loss can lower your risk of health problems  How to lose weight safely:  A safe and healthy way to lose weight is to eat fewer calories and get regular exercise  You can lose up about 1 pound a week by decreasing the number of calories you eat by 500 calories each day  Healthy meal plan for weight management:  A healthy meal plan includes a variety of foods, contains fewer calories, and helps you stay healthy  A healthy meal plan includes the following:  · Eat whole-grain foods more often  A healthy meal plan should contain fiber  Fiber is the part of grains, fruits, and vegetables that is not broken down by your body  Whole-grain foods are healthy and provide extra fiber in your diet  Some examples of whole-grain foods are whole-wheat breads and pastas, oatmeal, brown rice, and bulgur  · Eat a variety of vegetables every day  Include dark, leafy greens such as spinach, kale, letitia greens, and mustard greens  Eat yellow and orange vegetables such as carrots, sweet potatoes, and winter squash  · Eat a variety of fruits every day  Choose fresh or canned fruit (canned in its own juice or light syrup) instead of juice  Fruit juice has very little or no fiber  · Eat low-fat dairy foods  Drink fat-free (skim) milk or 1% milk  Eat fat-free yogurt and low-fat cottage cheese  Try low-fat cheeses such as mozzarella and other reduced-fat cheeses  · Choose meat and other protein foods that are low in fat    Choose beans or other legumes such as split peas or lentils  Choose fish, skinless poultry (chicken or turkey), or lean cuts of red meat (beef or pork)  Before you cook meat or poultry, cut off any visible fat  · Use less fat and oil  Try baking foods instead of frying them  Add less fat, such as margarine, sour cream, regular salad dressing and mayonnaise to foods  Eat fewer high-fat foods  Some examples of high-fat foods include french fries, doughnuts, ice cream, and cakes  · Eat fewer sweets  Limit foods and drinks that are high in sugar  This includes candy, cookies, regular soda, and sweetened drinks  Exercise:  Exercise at least 30 minutes per day on most days of the week  Some examples of exercise include walking, biking, dancing, and swimming  You can also fit in more physical activity by taking the stairs instead of the elevator or parking farther away from stores  Ask your healthcare provider about the best exercise plan for you  © Copyright [employer] 2018 Information is for End User's use only and may not be sold, redistributed or otherwise used for commercial purposes  All illustrations and images included in CareNotes® are the copyrighted property of A [employer] A M , Inc  or [employer]         Discussed with the patient and all questioned fully answered  She will call me if any problems arise  M*Modal software was used to dictate this note  It may contain errors with dictating incorrect words/spelling  Please contact provider directly with any questions  Chief Complaint     Chief Complaint   Patient presents with    Medicare Wellness Visit       History of Present Illness     Patient presents for follow-up of chronic medical conditions  She has been feeling stably  She has switch her care for multiple sclerosis to [facility] and is currently under care of Dr Escalera   patient is up-to-date with eye exam, she follows up with Dr Pearson,8/2020     patient has been admit to chronic memory lapses, unsteady gait especially in the morning  No recent falls  Patient is currently not on any therapy for multiple sclerosis due to age  She completed 2 year course of Ocrevus  Patient is still unfortunately smoking  No daily inhaler use  Patient denies symptoms of cough, dyspnea or wheezing  Patient reports smoking half a pack to 1 pack daily  Patient reports significant improvement of seasonal allergies with start of Xyzal    She has been following up with [facility] pulmonology due to abnormal findings on CT chest      11/2020 CT chest  Interval resolution of previously identified right middle lobe groundglass  No new areas of consolidation identified    No new suspicious pulmonary nodules identified        Patient is up-to-date with pneumonia vaccinations  patient is up-to-date with mammogram        hyperlipidemia:  We switched patient from atorvastatin to Crestor 20 mg daily  She tolerates medication well  Recent blood work indicates significant improvement of lipid panel  Patient admits to dietary indiscretions and decreased physical activity due to recent endemic and lock down  Patient will be proceeding with her 1st COVID vaccine within next few days  We discussed results of recent blood work performed on March 2, 2021  Patient's cholesterol is 200 with LDL of 107, triglycerides are still elevated at 245  Cholesterol has significantly improved from prior testing in January when it was 251 with LDL of 158  Patient's Lipitor was discontinued and switched to Crestor  Symptoms of depression and anxiety are well controlled on Zoloft      Review of Systems   Review of Systems   Constitutional: Negative  HENT: Negative  Eyes: Negative  Respiratory: Negative  Cardiovascular: Negative  Gastrointestinal: Negative  Genitourinary: Positive for frequency          Chronic symptoms of urinary incontinence   Musculoskeletal: Positive for arthralgias, back pain and gait problem ( intermittent symptoms of unsteadiness and poor balance)  Skin: Negative  Allergic/Immunologic: Positive for environmental allergies  Neurological: Negative for dizziness and headaches  Psychiatric/Behavioral: Positive for decreased concentration and dysphoric mood  The patient is nervous/anxious          Active Problem List     Patient Active Problem List   Diagnosis    Multiple sclerosis, relapsing/remitting    Muscle spasm    Hyperlipidemia    Osteoporosis    Ambulatory dysfunction    COPD without exacerbation ([facility] [address] )    Vitamin D deficiency    Major depression, recurrent, chronic (HCC)    Allergic rhinitis    Amnesia/memory disorder    Shoulder impingement syndrome, left    Abnormal CT of the chest    Urinary incontinence    Balance disorder    Gastro-esophageal reflux disease with esophagitis    Collagenous colitis    Intervertebral disc disorders with radiculopathy, lumbar region    DDD (degenerative disc disease), cervical       Past Medical History:  Past Medical History:   Diagnosis Date    Asthma     Degeneration of intervertebral disc at L5-S1 level     Depression     Family history of colon cancer 3/27/2019    Added automatically from request for surgery [id number]    Full incontinence of feces     Generalized anxiety disorder     Hepatitis     at age 4 yrs    Herpes zoster     last assessed: 6/20/13    History of colon polyps     Hypercholesterolemia     Hyperlipidemia     [facility]     MS (multiple sclerosis) ([facility] )     Osteoporosis     Seasonal allergies     Tumor of breast     benign, right breast    Urinary incontinence        Past Surgical History:  Past Surgical History:   Procedure Laterality Date    BREAST BIOPSY Right     benign    BREAST SURGERY      right breast fibroid tumor resection    COLONOSCOPY      11/2013 - due in 2018 - Dr Crisostomo    FL LUMBAR PUNCTURE DIAGNOSTIC  10/1/2018    ROTATOR CUFF REPAIR      Bilateral    ROTATOR CUFF REPAIR Bilateral     SEPTOPLASTY         Family History:  Family History   Problem Relation Age of Onset    Arthritis Mother     Osteoporosis Mother     COPD Father     No Known Problems Maternal Grandmother     No Known Problems Maternal Grandfather     No Known Problems Paternal Grandmother     No Known Problems Paternal Grandfather     Arthritis Family     COPD Family     Emphysema Family     Heart disease Family     Hyperthyroidism Family     Multiple sclerosis Family     Osteoarthritis Family     No Known Problems Sister     No Known Problems Sister     Colon cancer Brother 72    No Known Problems Brother     No Known Problems Brother     No Known Problems Maternal Aunt     No Known Problems Maternal Aunt     No Known Problems Paternal Aunt        Social History:  Social History     Socioeconomic History    Marital status: /Civil Union     Spouse name: Not on file    Number of children: Not on file    Years of education: Not on file    Highest education level: Not on file   Occupational History    Occupation: Retired   Social Needs    Financial resource strain: Not on file    Food insecurity     Worry: Not on file     Inability: Not on file   [employer] needs     Medical: Not on file     Non-medical: Not on file   Tobacco Use    Smoking status: Current Every Day Smoker     Packs/day: 0 50     Years: 40 00     Pack years: 20 00     Types: Cigarettes     Start date: 1982    Smokeless tobacco: Never Used   Substance and Sexual Activity    Alcohol use: Yes     Frequency: Monthly or less     Drinks per session: 1 or 2     Binge frequency: Never     Comment: occasional; [employer] also states Never drank alcohol    Drug use: No    Sexual activity: Yes     Partners: Male     Comment: denied any risk of AIDS   Lifestyle    Physical activity     Days per week: Not on file     Minutes per session: Not on file    Stress: Not on file   Relationships    Social connections Talks on phone: Not on file     Gets together: Not on file     Attends Jainism service: Not on file     Active member of club or organization: Not on file     Attends meetings of clubs or organizations: Not on file     Relationship status: Not on file    Intimate partner violence     Fear of current or ex partner: Not on file     Emotionally abused: Not on file     Physically abused: Not on file     Forced sexual activity: Not on file   Other Topics Concern    Not on file   Social History Narrative               Objective     Vitals:    03/18/21 1410 03/18/21 1515   BP: 140/80 122/72   Pulse: 64    Resp: 16    Temp: (!) 97 4 °F (36 3 °C)    TempSrc: Temporal    SpO2: 96%    Weight: 70 8 kg (156 lb)    Height: 5' 4" (1 626 m)      Wt Readings from Last 3 Encounters:   03/18/21 70 8 kg (156 lb)   02/08/21 71 2 kg (157 lb)   11/11/20 70 8 kg (156 lb)       Physical Exam  Vitals signs and nursing note reviewed  Constitutional:       General: She is not in acute distress  Appearance: Normal appearance  She is well-developed  She is not ill-appearing  HENT:      Head: Normocephalic and atraumatic  Eyes:      Conjunctiva/sclera: Conjunctivae normal    Neck:      Musculoskeletal: Neck supple  Thyroid: No thyromegaly  Vascular: No carotid bruit  Cardiovascular:      Rate and Rhythm: Normal rate and regular rhythm  Heart sounds: Normal heart sounds  No murmur  Pulmonary:      Effort: Pulmonary effort is normal  No respiratory distress  Breath sounds: Normal breath sounds  No wheezing  Abdominal:      General: There is no abdominal bruit  Musculoskeletal: Normal range of motion  Neurological:      Mental Status: She is alert and oriented to person, place, and time  Cranial Nerves: No cranial nerve deficit        Coordination: Coordination normal    Psychiatric:         Behavior: Behavior normal          Pertinent Laboratory/Diagnostic Studies:  Lab Results   Component Value Date GLUCOSE 107 12/15/2015    BUN 13 03/02/2021    CREATININE 0 66 03/02/2021    CALCIUM 9 2 03/02/2021     12/15/2015    K 4 1 03/02/2021    CO2 30 03/02/2021     03/02/2021     Lab Results   Component Value Date    ALT 34 03/02/2021    AST 14 03/02/2021    ALKPHOS 90 03/02/2021    BILITOT 0 30 12/15/2015       Lab Results   Component Value Date    WBC 7 67 03/02/2021    HGB 14 3 03/02/2021    HCT 42 5 03/02/2021    MCV 89 03/02/2021     03/02/2021       No results found for: TSH    Lab Results   Component Value Date    CHOL 169 07/21/2015     Lab Results   Component Value Date    TRIG 245 (H) 03/02/2021     Lab Results   Component Value Date    HDL 44 03/02/2021     Lab Results   Component Value Date    LDLCALC 107 (H) 03/02/2021     Lab Results   Component Value Date    HGBA1C 5 8 (H) 10/20/2015       Results for orders placed or performed in visit on 03/02/21   Lipid Panel with Direct LDL reflex   Result Value Ref Range    Cholesterol 200 50 - 200 mg/dL    Triglycerides 245 (H) <=150 mg/dL    HDL, Direct 44 >=40 mg/dL    LDL Calculated 107 (H) 0 - 100 mg/dL   Bilirubin, direct   Result Value Ref Range    Bilirubin, Direct 0 08 0 00 - 0 20 mg/dL     *Note: Due to a large number of results and/or encounters for the requested time period, some results have not been displayed  A complete set of results can be found in Results Review         Orders Placed This Encounter   Procedures    Comprehensive metabolic panel    Lipid Panel with Direct LDL reflex       ALLERGIES:  Allergies   Allergen Reactions    Bupropion     Cefprozil     Conj Estrog-Medroxyprogest Ace Other (See Comments)     Other      Povidone Iodine      Patient unsure of what this is    Sulfamethoxazole-Trimethoprim Other (See Comments)     Reaction Date: 11Aug2011;     Levofloxacin     Gadobutrol GI Intolerance    Medroxyprogesterone Other (See Comments)     Reaction Date: 11Aug2011;     Nortriptyline Other (See Comments) n/a    Pamelor [Nortriptyline Hcl]     Provera [Medroxyprogesterone Acetate]        Current Medications     Current Outpatient Medications   Medication Sig Dispense Refill    albuterol (2 5 mg/3 mL) 0 083 % nebulizer solution Take 1 vial (2 5 mg total) by nebulization every 4 (four) hours as needed for wheezing or shortness of breath (cough) 120 vial 2    albuterol (ProAir HFA) 90 mcg/act inhaler Inhale 2 puffs every 4 (four) hours as needed for wheezing or shortness of breath 3 Inhaler 3    aspirin (ECOTRIN LOW STRENGTH) 81 mg EC tablet Take 1 tablet by mouth daily      Breo Ellipta 200-25 MCG/INH inhaler INHALE 1 INHALATION BY  MOUTH DAILY, RINSE MOUTH  AFTER USE  180 each 3    budesonide (PULMICORT) 0 5 mg/2 mL nebulizer solution inhale contents of 1 vial in nebulizer twice a day  0    CANNABIDIOL PO Take 300 mg by mouth Daily or twice daily PRN back pain      ergocalciferol (VITAMIN D2) 50,000 units Take 1 capsule once a week x 8 weeks then take 1 capsule every other week 8 capsule 0    fluticasone (FLONASE) 50 mcg/act nasal spray 2 sprays into each nostril daily 48 g 3    gabapentin (NEURONTIN) 300 mg capsule TAKE 1 CAPSULE BY MOUTH  TWICE DAILY 180 capsule 3    levocetirizine (XYZAL) 5 MG tablet TAKE 1 TABLET BY MOUTH  EVERY EVENING 90 tablet 1    montelukast (SINGULAIR) 10 mg tablet TAKE 1 TABLET BY MOUTH  DAILY AT BEDTIME 90 tablet 3    omega-3-acid ethyl esters (LOVAZA) 1 g capsule Take 2 capsules (2 g total) by mouth 2 (two) times a day 360 capsule 3    omeprazole (PriLOSEC) 40 MG capsule Take 40 mg by mouth daily      rosuvastatin (CRESTOR) 20 MG tablet Take 1 tablet (20 mg total) by mouth daily 90 tablet 0    sertraline (ZOLOFT) 100 mg tablet TAKE 1 TABLET BY MOUTH  DAILY 90 tablet 3     No current facility-administered medications for this visit          Medications Discontinued During This Encounter   Medication Reason    ergocalciferol (VITAMIN D2) 70,112 units Duplicate order Health Maintenance     Health Maintenance   Topic Date Due    DTaP,Tdap,and Td Vaccines (1 - Tdap) 09/28/1975    PT PLAN OF CARE  10/09/2019    SLP PLAN OF CARE  11/08/2019    Pneumococcal Vaccine: 65+ Years (2 of 2 - PPSV23) 09/29/2020    Medicare Annual Wellness Visit (AWV)  03/16/2021    COVID-19 Vaccine (2 - Pfizer 2-dose series) 04/10/2021    Fall Risk  03/18/2022    BMI: Followup Plan  03/18/2022    BMI: Adult  03/18/2022    Depression Remission PHQ  03/18/2022    Colonoscopy Surveillance  06/12/2022    Cervical Cancer Screening  08/13/2023    Colorectal Cancer Screening  06/12/2029    Hepatitis C Screening  Completed    Influenza Vaccine  Completed    HIB Vaccine  Aged Out    Hepatitis B Vaccine  Aged Out    IPV Vaccine  Aged Out    Hepatitis A Vaccine  Aged Out    Meningococcal ACWY Vaccine  Aged Out    HPV Vaccine  Aged Out       Immunization History   Administered Date(s) Administered    INFLUENZA 09/26/2011    Influenza Quadrivalent Preservative Free 3 years and older IM 09/29/2015, 09/22/2016, 09/08/2017    Influenza, high dose seasonal 0 7 mL 09/30/2019, 09/17/2020    Influenza, recombinant, quadrivalent,injectable, preservative free 09/13/2018    Influenza, seasonal, injectable 01/17/2013, 09/03/2013, 09/23/2014    Pneumococcal Conjugate 13-Valent 10/22/2018    Pneumococcal Polysaccharide PPV23 10/01/2007, 09/29/2015    SARS-CoV-2 / COVID-19 mRNA IM ([employer]) 03/20/2021    Td (adult), adsorbed 06/02/2016       Rima Justin MD

## 2021-03-18 NOTE — PROGRESS NOTES
Assessment and Plan:     Problem List Items Addressed This Visit        Respiratory    COPD without exacerbation ([facility] [address] ) - Primary (Chronic)       Nervous and Auditory    Multiple sclerosis, relapsing/remitting (Chronic)       Other    Amnesia/memory disorder      Other Visit Diagnoses     Encounter for Medicare annual wellness exam            BMI Counseling: Body mass index is 26 78 kg/m²  The BMI is above normal  Nutrition recommendations include encouraging healthy choices of fruits and vegetables, consuming healthier snacks, moderation in carbohydrate intake and reducing intake of cholesterol  Exercise recommendations include exercising 3-5 times per week  No pharmacotherapy was ordered  Preventive health issues were discussed with patient, and age appropriate screening tests were ordered as noted in patient's After Visit Summary  Personalized health advice and appropriate referrals for health education or preventive services given if needed, as noted in patient's After Visit Summary       History of Present Illness:     Patient presents for Medicare Annual Wellness visit    Patient Care Team:  Kory Mercado MD as PCP - General  DO Michelle Ch MD Rene Bel, CRNP Clance Hoes, MD Pryor Abrahams, MD Wendelin Brow, MD Columbus Beth, MD (Ophthalmology)     Problem List:     Patient Active Problem List   Diagnosis    Multiple sclerosis, relapsing/remitting    Muscle spasm    Hyperlipidemia    Osteoporosis    Ambulatory dysfunction    COPD without exacerbation ([facility] [address] )    Vitamin D deficiency    Major depression, recurrent, chronic (HCC)    Allergic rhinitis    Amnesia/memory disorder    Shoulder impingement syndrome, left    Abnormal CT of the chest    Urinary incontinence    Balance disorder    Gastro-esophageal reflux disease with esophagitis    Collagenous colitis    Immunosuppressed status ([facility] [address] )    Intervertebral disc disorders with radiculopathy, lumbar region   [facility] DDD (degenerative disc disease), cervical      Past Medical and Surgical History:     Past Medical History:   Diagnosis Date    Asthma     Degeneration of intervertebral disc at L5-S1 level     Depression     Family history of colon cancer 3/27/2019    Added automatically from request for surgery [id number]    Full incontinence of feces     Generalized anxiety disorder     Hepatitis     at age 4 yrs    Herpes zoster     last assessed: 6/20/13    History of colon polyps     Hypercholesterolemia     Hyperlipidemia     [facility]) [facility] [address] [facility]     MS (multiple sclerosis) ([facility] [address] )     Osteoporosis     Seasonal allergies     Tumor of breast     benign, right breast    Urinary incontinence      Past Surgical History:   Procedure Laterality Date    BREAST BIOPSY Right     benign    BREAST SURGERY      right breast fibroid tumor resection    COLONOSCOPY      11/2013 - due in 2018 - Dr Crisostomo    FL LUMBAR PUNCTURE DIAGNOSTIC  10/1/2018    ROTATOR CUFF REPAIR      Bilateral    ROTATOR CUFF REPAIR Bilateral     SEPTOPLASTY        Family History:     Family History   Problem Relation Age of Onset    Arthritis Mother     Osteoporosis Mother     COPD Father     No Known Problems Maternal Grandmother     No Known Problems Maternal Grandfather     No Known Problems Paternal Grandmother     No Known Problems Paternal Grandfather     Arthritis Family     COPD Family     Emphysema Family     Heart disease Family     Hyperthyroidism Family     Multiple sclerosis Family     Osteoarthritis Family     No Known Problems Sister     No Known Problems Sister     Colon cancer Brother 72    No Known Problems Brother     No Known Problems Brother     No Known Problems Maternal Aunt     No Known Problems Maternal Aunt     No Known Problems Paternal Aunt       Social History:        Social History     Socioeconomic History    Marital status: /Civil Union     Spouse name: None    Number of children: None    Years of education: None    Highest education level: None   Occupational History    Occupation: Retired   Social Needs    Financial resource strain: None    Food insecurity     Worry: None     Inability: None    Transportation needs     Medical: None     Non-medical: None   Tobacco Use    Smoking status: Current Every Day Smoker     Packs/day: 0 50     Years: 40 00     Pack years: 20 00     Types: Cigarettes     Start date: 1982    Smokeless tobacco: Never Used   Substance and Sexual Activity    Alcohol use: Yes     Frequency: Monthly or less     Drinks per session: 1 or 2     Binge frequency: Never     Comment: occasional; [employer] also states Never drank alcohol    Drug use: No    Sexual activity: Yes     Partners: Male     Comment: denied any risk of AIDS   Lifestyle    Physical activity     Days per week: None     Minutes per session: None    Stress: None   Relationships    Social connections     Talks on phone: None     Gets together: None     Attends Anabaptism service: None     Active member of club or organization: None     Attends meetings of clubs or organizations: None     Relationship status: None    Intimate partner violence     Fear of current or ex partner: None     Emotionally abused: None     Physically abused: None     Forced sexual activity: None   Other Topics Concern    None   Social History Narrative          Medications and Allergies:     Current Outpatient Medications   Medication Sig Dispense Refill    albuterol (2 5 mg/3 mL) 0 083 % nebulizer solution Take 1 vial (2 5 mg total) by nebulization every 4 (four) hours as needed for wheezing or shortness of breath (cough) 120 vial 2    albuterol (ProAir HFA) 90 mcg/act inhaler Inhale 2 puffs every 4 (four) hours as needed for wheezing or shortness of breath 3 Inhaler 3    aspirin (ECOTRIN LOW STRENGTH) 81 mg EC tablet Take 1 tablet by mouth daily      Breo Ellipta 200-25 MCG/INH inhaler INHALE 1 INHALATION BY  MOUTH DAILY, RINSE MOUTH  AFTER USE  180 each 3    budesonide (PULMICORT) 0 5 mg/2 mL nebulizer solution inhale contents of 1 vial in nebulizer twice a day  0    CANNABIDIOL PO Take 300 mg by mouth Daily or twice daily PRN back pain      ergocalciferol (VITAMIN D2) 50,000 units Take 1 capsule once a week x 8 weeks then take 1 capsule every other week 8 capsule 0    fluticasone (FLONASE) 50 mcg/act nasal spray 2 sprays into each nostril daily 48 g 3    gabapentin (NEURONTIN) 300 mg capsule TAKE 1 CAPSULE BY MOUTH  TWICE DAILY 180 capsule 3    levocetirizine (XYZAL) 5 MG tablet TAKE 1 TABLET BY MOUTH  EVERY EVENING 90 tablet 1    montelukast (SINGULAIR) 10 mg tablet TAKE 1 TABLET BY MOUTH  DAILY AT BEDTIME 90 tablet 3    omega-3-acid ethyl esters (LOVAZA) 1 g capsule Take 2 capsules (2 g total) by mouth 2 (two) times a day 360 capsule 3    omeprazole (PriLOSEC) 40 MG capsule Take 40 mg by mouth daily      rosuvastatin (CRESTOR) 20 MG tablet Take 1 tablet (20 mg total) by mouth daily 90 tablet 0    sertraline (ZOLOFT) 100 mg tablet TAKE 1 TABLET BY MOUTH  DAILY 90 tablet 3    [START ON 3/26/2021] ergocalciferol (VITAMIN D2) 50,000 units Take 1 capsule (50,000 Units total) by mouth every 14 (fourteen) days 6 capsule 3     No current facility-administered medications for this visit        Allergies   Allergen Reactions    Bupropion     Cefprozil     Conj Estrog-Medroxyprogest Ace Other (See Comments)     Other      Povidone Iodine      Patient unsure of what this is    Sulfamethoxazole-Trimethoprim Other (See Comments)     Reaction Date: 11Aug2011;     Levofloxacin     Gadobutrol GI Intolerance    Medroxyprogesterone Other (See Comments)     Reaction Date: 11Aug2011;     Nortriptyline Other (See Comments)     n/a    Pamelor [Nortriptyline Hcl]     Provera [Medroxyprogesterone Acetate]       Immunizations:     Immunization History   Administered Date(s) Administered    INFLUENZA 09/26/2011    Influenza Quadrivalent Preservative Free 3 years and older IM 09/29/2015, 09/22/2016, 09/08/2017    Influenza, high dose seasonal 0 7 mL 09/30/2019, 09/17/2020    Influenza, recombinant, quadrivalent,injectable, preservative free 09/13/2018    Influenza, seasonal, injectable 01/17/2013, 09/03/2013, 09/23/2014    Pneumococcal Conjugate 13-Valent 10/22/2018    Pneumococcal Polysaccharide PPV23 10/01/2007, 09/29/2015    Td (adult), adsorbed 06/02/2016      Health Maintenance:         Topic Date Due    Colonoscopy Surveillance  06/12/2022    Cervical Cancer Screening  08/13/2023    Colorectal Cancer Screening  06/12/2029    Hepatitis C Screening  Completed         Topic Date Due    COVID-19 Vaccine (1) Never done    DTaP,Tdap,and Td Vaccines (1 - Tdap) 09/28/1975    Pneumococcal Vaccine: 65+ Years (2 of 2 - PPSV23) 09/29/2020      Medicare Health Risk Assessment:     /80   Pulse 64   Temp (!) 97 4 °F (36 3 °C) (Temporal)   Resp 16   Ht 5' 4" (1 626 m)   Wt 70 8 kg (156 lb)   LMP  (LMP Unknown)   SpO2 96%   BMI 26 78 kg/m²      Gary Peoples is here for her Subsequent Wellness visit  Health Risk Assessment:   Patient rates overall health as good  Patient feels that their physical health rating is same  Patient is very satisfied with their life  Eyesight was rated as slightly worse  Hearing was rated as same  Patient feels that their emotional and mental health rating is same  Patients states they are sometimes angry  Patient states they are sometimes unusually tired/fatigued  Pain experienced in the last 7 days has been some  Patient's pain rating has been 1/10  Patient states that she has experienced no weight loss or gain in last 6 months  Depression Screening:   PHQ-2 Score: 0  PHQ-9 Score: 0      Fall Risk Screening:    In the past year, patient has experienced: no history of falling in past year      Urinary Incontinence Screening:   Patient has leaked urine accidently in the last six months  Home Safety:  Patient does not have trouble with stairs inside or outside of their home  Patient has working smoke alarms and has working carbon monoxide detector  Home safety hazards include: none  Nutrition:   Current diet is Regular  Medications:   Patient is currently taking over-the-counter supplements  OTC medications include: see medication list  Patient is able to manage medications  Activities of Daily Living (ADLs)/Instrumental Activities of Daily Living (IADLs):   Walk and transfer into and out of bed and chair?: Yes  Dress and groom yourself?: Yes    Bathe or shower yourself?: Yes    Feed yourself?  Yes  Do your laundry/housekeeping?: Yes  Manage your money, pay your bills and track your expenses?: Yes  Make your own meals?: Yes    Do your own shopping?: Yes    Previous Hospitalizations:   Any hospitalizations or ED visits within the last 12 months?: No      Advance Care Planning:   Living will: No    Advanced directive: Yes      Cognitive Screening:   Provider or family/friend/caregiver concerned regarding cognition?: No    PREVENTIVE SCREENINGS      Cardiovascular Screening:    General: Screening Not Indicated and History Lipid Disorder      Diabetes Screening:     General: Screening Current      Colorectal Cancer Screening:     General: Screening Current      Breast Cancer Screening:     General: Screening Current      Cervical Cancer Screening:    General: Screening Not Indicated      Osteoporosis Screening:    General: Screening Not Indicated and History Osteoporosis      Abdominal Aortic Aneurysm (AAA) Screening:        General: Screening Not Indicated      Lung Cancer Screening:     General: Screening Not Indicated and Screening Current      Hepatitis C Screening:    General: Screening Current    Screening, Brief Intervention, and Referral to Treatment (SBIRT)    Screening    Typical number of drinks in a week: 1    AUDIT-C Screenin) How often did you have a drink containing alcohol in the past year? monthly or less  2) How many drinks did you have on a typical day when you were drinking in the past year? 1 to 2  3) How often did you have 6 or more drinks on one occasion in the past year? never    AUDIT-C Score: 1  Interpretation: Score 0-2 (female): Negative screen for alcohol misuse    Single Item Drug Screening:  How often have you used an illegal drug (including marijuana) or a prescription medication for non-medical reasons in the past year? never    Single Item Drug Screen Score: 0  Interpretation: Negative screen for possible drug use disorder    Brief Intervention  Alcohol & drug use screenings were reviewed  No concerns regarding substance use disorder identified  Other Counseling Topics:   Regular weightbearing exercise         Edvin Devlin MD

## 2021-03-20 ENCOUNTER — IMMUNIZATIONS (OUTPATIENT)
Dept: FAMILY MEDICINE CLINIC | Facility: HOSPITAL | Age: 66
End: 2021-03-20

## 2021-03-20 DIAGNOSIS — Z23 ENCOUNTER FOR IMMUNIZATION: Primary | ICD-10-CM

## 2021-03-20 PROCEDURE — 0001A SARS-COV-2 / COVID-19 MRNA VACCINE (PFIZER-BIONTECH) 30 MCG: CPT

## 2021-03-20 PROCEDURE — 91300 SARS-COV-2 / COVID-19 MRNA VACCINE (PFIZER-BIONTECH) 30 MCG: CPT

## 2021-03-22 PROBLEM — D84.9 IMMUNOSUPPRESSED STATUS (HCC): Status: RESOLVED | Noted: 2020-08-13 | Resolved: 2021-03-22

## 2021-03-22 NOTE — ASSESSMENT & PLAN NOTE
11/2020 CT chest  Interval resolution of previously identified right middle lobe groundglass  No new areas of consolidation identified  No new suspicious pulmonary nodules identified    ·  patient remains under care of [facility] pul[facility], she is still unfortunately smoking half a pack to 1 pack daily, patient is well aware was counseled on numerous occasions regarding serious risks of ongoing tobacco use

## 2021-03-22 NOTE — ASSESSMENT & PLAN NOTE
·  Patient has switched her care to [facility]  ·  She has completed 2 year course of  Cammie Salaam  is currently not on any therapy

## 2021-03-22 NOTE — ASSESSMENT & PLAN NOTE
·  No daily inhaler use  · Patient denies symptoms of dyspnea, cough or wheezing  · She is still unfortunately smoking  · Repeat CT chest November 2020: Stable, no concerning findings      · Patient is under care of [facility]

## 2021-03-22 NOTE — ASSESSMENT & PLAN NOTE
·  Patient remains under care of Gastroenterology, Dr Leslie Zepeda  · Most recent EGD July 2020      · Patient is doing well on omeprazole 40 mg daily

## 2021-04-12 ENCOUNTER — IMMUNIZATIONS (OUTPATIENT)
Dept: FAMILY MEDICINE CLINIC | Facility: HOSPITAL | Age: 66
End: 2021-04-12

## 2021-04-12 DIAGNOSIS — Z23 ENCOUNTER FOR IMMUNIZATION: Primary | ICD-10-CM

## 2021-04-12 PROCEDURE — 0002A SARS-COV-2 / COVID-19 MRNA VACCINE (PFIZER-BIONTECH) 30 MCG: CPT

## 2021-04-12 PROCEDURE — 91300 SARS-COV-2 / COVID-19 MRNA VACCINE (PFIZER-BIONTECH) 30 MCG: CPT

## 2021-04-15 DIAGNOSIS — E78.2 MIXED HYPERLIPIDEMIA: Chronic | ICD-10-CM

## 2021-04-15 RX ORDER — ROSUVASTATIN CALCIUM 20 MG/1
20 TABLET, COATED ORAL DAILY
Qty: 90 TABLET | Refills: 1 | Status: SHIPPED | OUTPATIENT
Start: 2021-04-15 | End: 2021-07-08 | Stop reason: SDUPTHER

## 2021-05-04 ENCOUNTER — OFFICE VISIT (OUTPATIENT)
Dept: FAMILY MEDICINE CLINIC | Facility: CLINIC | Age: 66
End: 2021-05-04
Payer: MEDICARE

## 2021-05-04 VITALS
HEIGHT: 64 IN | SYSTOLIC BLOOD PRESSURE: 128 MMHG | BODY MASS INDEX: 26.98 KG/M2 | HEART RATE: 68 BPM | DIASTOLIC BLOOD PRESSURE: 82 MMHG | RESPIRATION RATE: 16 BRPM | TEMPERATURE: 97.4 F | WEIGHT: 158 LBS | OXYGEN SATURATION: 97 %

## 2021-05-04 DIAGNOSIS — L65.9 HAIR LOSS: Primary | ICD-10-CM

## 2021-05-04 DIAGNOSIS — E55.9 VITAMIN D DEFICIENCY: Chronic | ICD-10-CM

## 2021-05-04 PROCEDURE — 99213 OFFICE O/P EST LOW 20 MIN: CPT | Performed by: FAMILY MEDICINE

## 2021-05-04 RX ORDER — MAG HYDROX/ALUMINUM HYD/SIMETH 400-400-40
1 SUSPENSION, ORAL (FINAL DOSE FORM) ORAL DAILY
COMMUNITY

## 2021-05-04 RX ORDER — CLOBETASOL PROPIONATE 0.05 MG/G
GEL TOPICAL
Qty: 60 G | Refills: 1 | Status: SHIPPED | OUTPATIENT
Start: 2021-05-04

## 2021-05-04 NOTE — PROGRESS NOTES
FAMILY PRACTICE OFFICE VISIT       NAME: Marya Kaufman  AGE: 76 y o  SEX: female       : 1955        MRN: [id number]        Assessment and Plan     Problem List Items Addressed This Visit        Other    Vitamin D deficiency (Chronic)     CURRENTLY AT 5,000 iu DAILY          Relevant Orders    Vitamin D 25 hydroxy      Other Visit Diagnoses     Hair loss    -  Primary    Relevant Medications    clobetasol (TEMOVATE) 0 05 % GEL    Other Relevant Orders    TSH, 3rd generation    Testosterone, free, total      Patient presents for evaluation of hair thinning and loss  Exam reveals male type balding  Patient will proceed with blood work including TSH, testosterone and vitamin-D level  She remains on vitamin D3 5000 International Units daily  No new medications  Patient admits to ongoing stress  I advised her to start high potency topical corticosteroid at the hairline to promote hair growth  Patient is well aware not to use clobetasol on her face  Follow-up pending lab results  There are no Patient Instructions on file for this visit  Discussed with the patient and all questioned fully answered  She will call me if any problems arise  M*Modal software was used to dictate this note  It may contain errors with dictating incorrect words/spelling  Please contact provider directly with any questions  Chief Complaint     Chief Complaint   Patient presents with    Hair/Scalp Problem     hair loss        History of Present Illness     Patient presents for evaluation of hair loss within past 3 months  No new medications  She completed course of Ocrevus, last infusion was in 2021  Patient denies any rash or scalp itching  No bald spot/ alopecia  Patient is concerned about progressive hair thinning and loss  She admits being under ongoing stress but Zoloft 100 mg daily works very well for her symptoms  Longstanding history of vitamin-D deficiency    Patient remains on vitamin D3 5000 units every day  Review of Systems   Review of Systems   Constitutional: Negative  HENT: Negative  Eyes: Negative  Respiratory: Negative  Cardiovascular: Negative  Gastrointestinal: Negative  Endocrine: Negative  Musculoskeletal: Positive for arthralgias  Skin:        Hair thinning and loss   Allergic/Immunologic: Negative  Hematological: Negative          Active Problem List     Patient Active Problem List   Diagnosis    Multiple sclerosis, relapsing/remitting    Muscle spasm    Hyperlipidemia    Osteoporosis    Ambulatory dysfunction    COPD without exacerbation ([facility]    Vitamin D deficiency    Major depression, recurrent, chronic (HCC)    Allergic rhinitis    Amnesia/memory disorder    Shoulder impingement syndrome, left    Abnormal CT of the chest    Urinary incontinence    Balance disorder    Gastro-esophageal reflux disease with esophagitis    Collagenous colitis    Intervertebral disc disorders with radiculopathy, lumbar region    DDD (degenerative disc disease), cervical       Past Medical History:  Past Medical History:   Diagnosis Date    Asthma     Degeneration of intervertebral disc at L5-S1 level     Depression     Family history of colon cancer 3/27/2019    Added automatically from request for surgery [id number]    Full incontinence of feces     Generalized anxiety disorder     Hepatitis     at age 4 yrs    Herpes zoster     last assessed: 6/20/13    History of colon polyps     Hypercholesterolemia     Hyperlipidemia     [facility]     [facility]     Osteoporosis     Seasonal allergies     Tumor of breast     benign, right breast    Urinary incontinence        Past Surgical History:  Past Surgical History:   Procedure Laterality Date    BREAST BIOPSY Right     benign    BREAST SURGERY      right breast fibroid tumor resection    COLONOSCOPY      11/2013 - due in 2018 - Dr Crisostomo    FL LUMBAR PUNCTURE DIAGNOSTIC  10/1/2018    ROTATOR CUFF REPAIR      Bilateral    ROTATOR CUFF REPAIR Bilateral     SEPTOPLASTY         Family History:  Family History   Problem Relation Age of Onset    Arthritis Mother     Osteoporosis Mother     COPD Father     No Known Problems Maternal Grandmother     No Known Problems Maternal Grandfather     No Known Problems Paternal Grandmother     No Known Problems Paternal Grandfather     Arthritis Family     COPD Family     Emphysema Family     Heart disease Family     Hyperthyroidism Family     Multiple sclerosis Family     Osteoarthritis Family     No Known Problems Sister     No Known Problems Sister     Colon cancer Brother 72    No Known Problems Brother     No Known Problems Brother     No Known Problems Maternal Aunt     No Known Problems Maternal Aunt     No Known Problems Paternal Aunt        Social History:  Social History     Socioeconomic History    Marital status: /Civil Union     Spouse name: Not on file    Number of children: Not on file    Years of education: Not on file    Highest education level: Not on file   Occupational History    Occupation: Retired   Social Needs    Financial resource strain: Not on file    Food insecurity     Worry: Not on file     Inability: Not on file   [employer] needs     Medical: Not on file     Non-medical: Not on file   Tobacco Use    Smoking status: Current Every Day Smoker     Packs/day: 0 50     Years: 40 00     Pack years: 20 00     Types: Cigarettes     Start date: 1982    Smokeless tobacco: Never Used   Substance and Sexual Activity    Alcohol use: Yes     Frequency: Monthly or less     Drinks per session: 1 or 2     Binge frequency: Never     Comment: occasional; [employer] also states Never drank alcohol    Drug use: No    Sexual activity: Yes     Partners: Male     Comment: denied any risk of AIDS   Lifestyle    Physical activity     Days per week: Not on file Minutes per session: Not on file    Stress: Not on file   Relationships    Social connections     Talks on phone: Not on file     Gets together: Not on file     Attends L[employer]-day service: Not on file     Active member of club or organization: Not on file     Attends meetings of clubs or organizations: Not on file     Relationship status: Not on file    Intimate partner violence     Fear of current or ex partner: Not on file     Emotionally abused: Not on file     Physically abused: Not on file     Forced sexual activity: Not on file   Other Topics Concern    Not on file   Social History Narrative               Objective     Vitals:    05/04/21 1807   BP: 128/82   BP Location: Left arm   Patient Position: Sitting   Cuff Size: Adult   Pulse: 68   Resp: 16   Temp: (!) 97 4 °F (36 3 °C)   TempSrc: Temporal   SpO2: 97%   Weight: 71 7 kg (158 lb)   Height: 5' 4" (1 626 m)     Wt Readings from Last 3 Encounters:   05/04/21 71 7 kg (158 lb)   03/18/21 70 8 kg (156 lb)   02/08/21 71 2 kg (157 lb)       Physical Exam  Vitals signs reviewed  Constitutional:       Appearance: Normal appearance  Skin:     Comments: Scalp: No rashes  Hair thinning, generalized  Male type balding pattern/  frontal areas, no alopecia  Neurological:      General: No focal deficit present  Mental Status: She is alert and oriented to person, place, and time  Psychiatric:         Mood and Affect: Mood normal          Behavior: Behavior normal          Thought Content:  Thought content normal          Pertinent Laboratory/Diagnostic Studies:  Lab Results   Component Value Date    GLUCOSE 107 12/15/2015    BUN 13 03/02/2021    CREATININE 0 66 03/02/2021    CALCIUM 9 2 03/02/2021     12/15/2015    K 4 1 03/02/2021    CO2 30 03/02/2021     03/02/2021     Lab Results   Component Value Date    ALT 34 03/02/2021    AST 14 03/02/2021    ALKPHOS 90 03/02/2021    BILITOT 0 30 12/15/2015       Lab Results   Component Value Date WBC 7 67 03/02/2021    HGB 14 3 03/02/2021    HCT 42 5 03/02/2021    MCV 89 03/02/2021     03/02/2021       No results found for: TSH    Lab Results   Component Value Date    CHOL 169 07/21/2015     Lab Results   Component Value Date    TRIG 245 (H) 03/02/2021     Lab Results   Component Value Date    HDL 44 03/02/2021     Lab Results   Component Value Date    LDLCALC 107 (H) 03/02/2021     Lab Results   Component Value Date    HGBA1C 5 8 (H) 10/20/2015       Results for orders placed or performed in visit on 03/02/21   Lipid Panel with Direct LDL reflex   Result Value Ref Range    Cholesterol 200 50 - 200 mg/dL    Triglycerides 245 (H) <=150 mg/dL    HDL, Direct 44 >=40 mg/dL    LDL Calculated 107 (H) 0 - 100 mg/dL   Bilirubin, direct   Result Value Ref Range    Bilirubin, Direct 0 08 0 00 - 0 20 mg/dL     *Note: Due to a large number of results and/or encounters for the requested time period, some results have not been displayed  A complete set of results can be found in Results Review         Orders Placed This Encounter   Procedures    Vitamin D 25 hydroxy    TSH, 3rd generation    Testosterone, free, total       ALLERGIES:  Allergies   Allergen Reactions    Bupropion     Cefprozil     Conj Estrog-Medroxyprogest Ace Other (See Comments)     Other      Povidone Iodine      Patient unsure of what this is    Sulfamethoxazole-Trimethoprim Other (See Comments)     Reaction Date: 11Aug2011;     Levofloxacin     Gadobutrol GI Intolerance    Medroxyprogesterone Other (See Comments)     Reaction Date: 11Aug2011;     Nortriptyline Other (See Comments)     n/a    Pamelor [Nortriptyline Hcl]     Provera [Medroxyprogesterone Acetate]        Current Medications     Current Outpatient Medications   Medication Sig Dispense Refill    albuterol (2 5 mg/3 mL) 0 083 % nebulizer solution Take 1 vial (2 5 mg total) by nebulization every 4 (four) hours as needed for wheezing or shortness of breath (cough) 120 vial 2    albuterol (ProAir HFA) 90 mcg/act inhaler Inhale 2 puffs every 4 (four) hours as needed for wheezing or shortness of breath 3 Inhaler 3    aspirin (ECOTRIN LOW STRENGTH) 81 mg EC tablet Take 1 tablet by mouth daily      budesonide (PULMICORT) 0 5 mg/2 mL nebulizer solution inhale contents of 1 vial in nebulizer twice a day  0    CANNABIDIOL PO Take 300 mg by mouth Daily or twice daily PRN back pain      Cholecalciferol (Vitamin D3) 125 MCG (5000 UT) CAPS Take 1 capsule by mouth daily      fluticasone (FLONASE) 50 mcg/act nasal spray 2 sprays into each nostril daily 48 g 3    gabapentin (NEURONTIN) 300 mg capsule TAKE 1 CAPSULE BY MOUTH  TWICE DAILY 180 capsule 3    levocetirizine (XYZAL) 5 MG tablet TAKE 1 TABLET BY MOUTH  EVERY EVENING 90 tablet 1    montelukast (SINGULAIR) 10 mg tablet TAKE 1 TABLET BY MOUTH  DAILY AT BEDTIME 90 tablet 3    omega-3-acid ethyl esters (LOVAZA) 1 g capsule Take 2 capsules (2 g total) by mouth 2 (two) times a day 360 capsule 3    omeprazole (PriLOSEC) 40 MG capsule Take 40 mg by mouth daily      rosuvastatin (CRESTOR) 20 MG tablet Take 1 tablet (20 mg total) by mouth daily 90 tablet 1    sertraline (ZOLOFT) 100 mg tablet TAKE 1 TABLET BY MOUTH  DAILY 90 tablet 3    clobetasol (TEMOVATE) 0 05 % GEL APPLY TO AFFECTED AREAS TWICE A DAY 60 g 1     No current facility-administered medications for this visit          Medications Discontinued During This Encounter   Medication Reason    Breo Ellipta 200-25 MCG/INH inhaler     ergocalciferol (VITAMIN D2) 50,000 units        Health Maintenance     Health Maintenance   Topic Date Due    DTaP,Tdap,and Td Vaccines (1 - Tdap) 09/28/1975    PT PLAN OF CARE  10/09/2019    SLP PLAN OF CARE  11/08/2019    Pneumococcal Vaccine: 65+ Years (2 of 2 - PPSV23) 09/29/2020    Fall Risk  03/18/2022    Medicare Annual Wellness Visit (AWV)  03/18/2022    BMI: Followup Plan  03/18/2022    Depression Remission PHQ  03/18/2022    BMI: Adult  05/04/2022    Colonoscopy Surveillance  06/12/2022    Cervical Cancer Screening  08/13/2023    Colorectal Cancer Screening  06/12/2029    Hepatitis C Screening  Completed    Influenza Vaccine  Completed    COVID-19 Vaccine  Completed    HIB Vaccine  Aged Out    Hepatitis B Vaccine  Aged Out    IPV Vaccine  Aged Out    Hepatitis A Vaccine  Aged Out    Meningococcal ACWY Vaccine  Aged Out    HPV Vaccine  Aged Out       Immunization History   Administered Date(s) Administered    INFLUENZA 09/26/2011    Influenza Quadrivalent Preservative Free 3 years and older IM 09/29/2015, 09/22/2016, 09/08/2017    Influenza, high dose seasonal 0 7 mL 09/30/2019, 09/17/2020    Influenza, recombinant, quadrivalent,injectable, preservative free 09/13/2018    Influenza, seasonal, injectable 01/17/2013, 09/03/2013, 09/23/2014    Pneumococcal Conjugate 13-Valent 10/22/2018    Pneumococcal Polysaccharide PPV23 10/01/2007, 09/29/2015    SARS-CoV-2 / COVID-19 mRNA IM ([employer]) 03/20/2021, 04/12/2021    Td (adult), adsorbed 06/02/2016       Duc Van MD

## 2021-05-06 ENCOUNTER — TRANSCRIBE ORDERS (OUTPATIENT)
Dept: LAB | Facility: CLINIC | Age: 66
End: 2021-05-06

## 2021-05-06 ENCOUNTER — APPOINTMENT (OUTPATIENT)
Dept: LAB | Facility: CLINIC | Age: 66
End: 2021-05-06
Payer: MEDICARE

## 2021-05-06 DIAGNOSIS — L65.9 HAIR LOSS: ICD-10-CM

## 2021-05-06 DIAGNOSIS — E55.9 VITAMIN D DEFICIENCY: Chronic | ICD-10-CM

## 2021-05-06 LAB
25(OH)D3 SERPL-MCNC: 33 NG/ML (ref 30–100)
TSH SERPL DL<=0.05 MIU/L-ACNC: 0.93 UIU/ML (ref 0.36–3.74)

## 2021-05-06 PROCEDURE — 82306 VITAMIN D 25 HYDROXY: CPT

## 2021-05-06 PROCEDURE — 84403 ASSAY OF TOTAL TESTOSTERONE: CPT

## 2021-05-06 PROCEDURE — 84402 ASSAY OF FREE TESTOSTERONE: CPT

## 2021-05-06 PROCEDURE — 84443 ASSAY THYROID STIM HORMONE: CPT

## 2021-05-06 PROCEDURE — 36415 COLL VENOUS BLD VENIPUNCTURE: CPT

## 2021-05-07 LAB
TESTOST FREE SERPL-MCNC: 0.9 PG/ML (ref 0–4.2)
TESTOST SERPL-MCNC: <3 NG/DL (ref 3–41)

## 2021-05-13 ENCOUNTER — TELEPHONE (OUTPATIENT)
Dept: FAMILY MEDICINE CLINIC | Facility: CLINIC | Age: 66
End: 2021-05-13

## 2021-05-13 DIAGNOSIS — L65.9 HAIR LOSS: Primary | ICD-10-CM

## 2021-05-13 NOTE — TELEPHONE ENCOUNTER
Please contact patient  I reviewed her blood work  Normal thyroid test, normal vitamin-D level  Her testosterone is actually slightly decreased  I was concerned about possibility of "too much testosterone" that could have caused her hair loss but this is not the case! I would recommend further evaluation with [facility]  Patient should continue with steroid gel that I have prescribed      Thank you

## 2021-05-14 NOTE — TELEPHONE ENCOUNTER
Patient made aware of result note and providers instructions  Patient verbalized understanding       Referral information given to patient

## 2021-06-30 ENCOUNTER — CONSULT (OUTPATIENT)
Dept: DERMATOLOGY | Facility: CLINIC | Age: 66
End: 2021-06-30
Payer: MEDICARE

## 2021-06-30 VITALS — HEIGHT: 64 IN | TEMPERATURE: 97.9 F | BODY MASS INDEX: 26.63 KG/M2 | WEIGHT: 156 LBS

## 2021-06-30 DIAGNOSIS — E83.59 OTHER DISORDERS OF CALCIUM METABOLISM: ICD-10-CM

## 2021-06-30 DIAGNOSIS — L65.9 HAIR LOSS: ICD-10-CM

## 2021-06-30 PROCEDURE — 99203 OFFICE O/P NEW LOW 30 MIN: CPT | Performed by: STUDENT IN AN ORGANIZED HEALTH CARE EDUCATION/TRAINING PROGRAM

## 2021-06-30 NOTE — PATIENT INSTRUCTIONS
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

## 2021-06-30 NOTE — PROGRESS NOTES
[facility] Note     Patient Name: Shara Ledesma  Encounter Date: 6/30/2021     Have you been cared for by a [facility] in the last 3 years and, if so, which one? No    · Have you traveled outside of the [address] [place] [address] in the past 3 months or outside of the [facility] area in the last 2 weeks? No     May we call your Preferred Phone number to discuss your specific medical information? Yes     May we leave a detailed message that includes your specific medical information? Yes      Today's Chief Concerns:   Concern #1:  Hair loss   Concern #2:      Past Medical History:  Have you personally ever had or currently have any of the following? · Skin cancer (such as Melanoma, Basal Cell Carcinoma, Squamous Cell Carcinoma? (If Yes, please provide more detail)- No  · Eczema: No  · Psoriasis: No  · HIV/AIDS: No  · Hepatitis B or C: No  · Tuberculosis: No  · Systemic Immunosuppression such as Diabetes, Biologic or Immunotherapy, Chemotherapy, Organ Transplantation, Bone Marrow Transplantation (If YES, please provide more detail): No  · Radiation Treatment (If YES, please provide more detail): No  · Any other major medical conditions/concerns? (If Yes, which types)- No    Social History:     What is/was your primary occupation? Retired     What are your hobbies/past-times? Family History:  Have any of your "first degree relatives" (parent, brother, sister, or child) had any of the following       · Skin cancer such as Melanoma or Merkel Cell Carcinoma or Pancreatic Cancer? No  · Eczema, Asthma, Hay Fever or Seasonal Allergies: No  · Psoriasis or Psoriatic Arthritis: No  · Do any other medical conditions seem to run in your family? If Yes, what condition and which relatives?   No    Current Medications:   (please update all dermatological medications before printing patient's AVS!)      Current Outpatient Medications:     albuterol (2 5 mg/3 mL) 0 083 % nebulizer solution, Take 1 vial (2 5 mg total) by nebulization every 4 (four) hours as needed for wheezing or shortness of breath (cough), Disp: 120 vial, Rfl: 2    albuterol (ProAir HFA) 90 mcg/act inhaler, Inhale 2 puffs every 4 (four) hours as needed for wheezing or shortness of breath, Disp: 3 Inhaler, Rfl: 3    aspirin (ECOTRIN LOW STRENGTH) 81 mg EC tablet, Take 1 tablet by mouth daily, Disp: , Rfl:     budesonide (PULMICORT) 0 5 mg/2 mL nebulizer solution, inhale contents of 1 vial in nebulizer twice a day, Disp: , Rfl: 0    CANNABIDIOL PO, Take 300 mg by mouth Daily or twice daily PRN back pain, Disp: , Rfl:     Cholecalciferol (Vitamin D3) 125 MCG (5000 UT) CAPS, Take 1 capsule by mouth daily, Disp: , Rfl:     clobetasol (TEMOVATE) 0 05 % GEL, APPLY TO AFFECTED AREAS TWICE A DAY, Disp: 60 g, Rfl: 1    fluticasone (FLONASE) 50 mcg/act nasal spray, 2 sprays into each nostril daily, Disp: 48 g, Rfl: 3    gabapentin (NEURONTIN) 300 mg capsule, TAKE 1 CAPSULE BY MOUTH  TWICE DAILY, Disp: 180 capsule, Rfl: 3    levocetirizine (XYZAL) 5 MG tablet, TAKE 1 TABLET BY MOUTH  EVERY EVENING, Disp: 90 tablet, Rfl: 1    montelukast (SINGULAIR) 10 mg tablet, TAKE 1 TABLET BY MOUTH  DAILY AT BEDTIME, Disp: 90 tablet, Rfl: 3    omega-3-acid ethyl esters (LOVAZA) 1 g capsule, Take 2 capsules (2 g total) by mouth 2 (two) times a day, Disp: 360 capsule, Rfl: 3    omeprazole (PriLOSEC) 40 MG capsule, Take 40 mg by mouth daily, Disp: , Rfl:     rosuvastatin (CRESTOR) 20 MG tablet, Take 1 tablet (20 mg total) by mouth daily, Disp: 90 tablet, Rfl: 1    sertraline (ZOLOFT) 100 mg tablet, TAKE 1 TABLET BY MOUTH  DAILY, Disp: 90 tablet, Rfl: 3      Review of Systems:  Have you recently had or currently have any of the following? If YES, what are you doing for the problem?     · Fever, chills or unintended weight loss: No  · Sudden loss or change in your vision: No  · Nausea, vomiting or blood in your stool: No  · Painful or swollen joints: No  · Wheezing or cough: No  · Changing mole or non-healing wound: No  · Nosebleeds: No  · Excessive sweating: No  · Easy or prolonged bleeding? No  · Over the last 2 weeks, how often have you been bothered by the following problems? · Taking little interest or pleasure in doing things: 1 - Not at All  · Feeling down, depressed, or hopeless: 1 - Not at All  · Rapid heartbeat with epinephrine:  No    · FEMALES ONLY:    · Are you pregnant or planning to become pregnant? No  · Are you currently or planning to be nursing or breast feeding? No    · Any known allergies? Allergies   Allergen Reactions    Bupropion     Cefprozil     Conj Estrog-Medroxyprogest Ace Other (See Comments)     Other      Povidone Iodine      Patient unsure of what this is    Sulfamethoxazole-Trimethoprim Other (See Comments)     Reaction Date: 11Aug2011;     Levofloxacin     Gadobutrol GI Intolerance    Medroxyprogesterone Other (See Comments)     Reaction Date: 11Aug2011;     Nortriptyline Other (See Comments)     n/a    Pamelor [Nortriptyline Hcl]     Provera [Medroxyprogesterone Acetate]          Physical Exam:     Was a chaperone (Derm Clinical Assistant) present throughout the entire Physical Exam? Yes     Did the Dermatology Team specifically  the patient on the importance of a Full Skin Exam to be sure that nothing is missed clinically?  Yes}  o Did the patient ultimately request or accept a Full Skin Exam?  NO  o Did the patient specifically refuse to have the areas "under-the-bra" examined by the Dermatologist? No  o Did the patient specifically refuse to have the areas "under-the-underwear" examined by the Dermatologist? No    CONSTITUTIONAL:   Vitals:    06/30/21 0931   Temp: 97 9 °F (36 6 °C)   Weight: 70 8 kg (156 lb)   Height: 5' 4" (1 626 m)       PSYCH: Normal mood and affect  EYES: Normal conjunctiva  ENT: Normal lips and oral mucosa  CARDIOVASCULAR: No edema  RESPIRATORY: Normal respirations  HEME/LYMPH/IMMUNO:  No ostensilbe subQ swelling except as noted below in "ASSESSMENT AND PLAN BY DIAGNOSIS"    SKIN:  FULL ORGAN SYSTEM EXAM   Hair, scalp Normal except as noted below in Assessment        Assessment and Plan by Diagnosis:    History of Present Condition:     Duration:  How long has this been an issue for you?    o  Year   Location Affected:  Where on the body is this affecting you?    o  Scalp   Quality:  Is there any bleeding, pain, itch, burning/irritation, or redness associated with the skin lesion?    o  Itch, dandruff   Severity:  Describe any bleeding, pain, itch, burning/irritation, or redness on a scale of 1 to 10 (with 10 being the worst)  o  8   Timing:  Does this condition seem to be there pretty constantly or do you notice it more at specific times throughout the day?    o  Constant   Context:  Have you ever noticed that this condition seems to be associated with specific activities you do?    o  Denies   Modifying Factors:    o Anything that seems to make the condition worse?    -  Denies  o What have you tried to do to make the condition better? 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    Physical Exam:   Anatomic Location Affected:  Scalp   Morphological Description:  Increased variability of hairs under dermoscscopy   Pertinent Positives:   Pertinent Negatives: Negative pull test    Additional History of Present Condition:  Patient states she starting losing her a year ago  She states she has had hair come out in clumps  She also states she suffers from dandruff  She is currently using a dandruff shampoo and Clobetasol on her thin areas of her scalp      Assessment and Plan:  Based on a thorough discussion of this condition and the management approach to it (including a comprehensive discussion of the known risks, side effects and potential benefits of treatment), the patient (family) agrees to implement the following specific plan:   Have labs performed (TSH, vitamin D 25, ferritin)   Start using over the counter Rogaine 5% foam   Apply topically to scalp where hair is thinning in the PM   Leave on overnight and wash off in the morning  Do not put on face   If patient does not care for the foam we will start Spironolactone 50 mg twice daily (not childbearing potential)   Follow up in 4 months                        Scribe Attestation    I,:  Chris Escobar am acting as a scribe while in the presence of the attending physician :       I,:  Kristi Nevarez MD personally performed the services described in this documentation    as scribed in my presence :

## 2021-07-06 ENCOUNTER — APPOINTMENT (OUTPATIENT)
Dept: LAB | Facility: CLINIC | Age: 66
End: 2021-07-06
Payer: MEDICARE

## 2021-07-06 ENCOUNTER — OFFICE VISIT (OUTPATIENT)
Dept: FAMILY MEDICINE CLINIC | Facility: CLINIC | Age: 66
End: 2021-07-06
Payer: MEDICARE

## 2021-07-06 VITALS
WEIGHT: 158.4 LBS | BODY MASS INDEX: 27.04 KG/M2 | HEART RATE: 66 BPM | DIASTOLIC BLOOD PRESSURE: 80 MMHG | TEMPERATURE: 97.6 F | HEIGHT: 64 IN | RESPIRATION RATE: 18 BRPM | SYSTOLIC BLOOD PRESSURE: 116 MMHG | OXYGEN SATURATION: 96 %

## 2021-07-06 DIAGNOSIS — Z01.818 PRE-OP EXAM: Primary | ICD-10-CM

## 2021-07-06 DIAGNOSIS — F33.9 MAJOR DEPRESSION, RECURRENT, CHRONIC (HCC): Chronic | ICD-10-CM

## 2021-07-06 DIAGNOSIS — E78.2 MIXED HYPERLIPIDEMIA: Chronic | ICD-10-CM

## 2021-07-06 DIAGNOSIS — J44.9 COPD WITHOUT EXACERBATION (HCC): Chronic | ICD-10-CM

## 2021-07-06 DIAGNOSIS — G35 MULTIPLE SCLEROSIS (HCC): Chronic | ICD-10-CM

## 2021-07-06 DIAGNOSIS — E78.2 MIXED HYPERLIPIDEMIA: ICD-10-CM

## 2021-07-06 DIAGNOSIS — H25.9 AGE-RELATED CATARACT OF BOTH EYES, UNSPECIFIED AGE-RELATED CATARACT TYPE: ICD-10-CM

## 2021-07-06 LAB
ALBUMIN SERPL BCP-MCNC: 4 G/DL (ref 3.5–5)
ALP SERPL-CCNC: 95 U/L (ref 46–116)
ALT SERPL W P-5'-P-CCNC: 31 U/L (ref 12–78)
ANION GAP SERPL CALCULATED.3IONS-SCNC: 1 MMOL/L (ref 4–13)
AST SERPL W P-5'-P-CCNC: 16 U/L (ref 5–45)
BILIRUB SERPL-MCNC: 0.41 MG/DL (ref 0.2–1)
BUN SERPL-MCNC: 17 MG/DL (ref 5–25)
CALCIUM SERPL-MCNC: 9.1 MG/DL (ref 8.3–10.1)
CHLORIDE SERPL-SCNC: 106 MMOL/L (ref 100–108)
CHOLEST SERPL-MCNC: 238 MG/DL (ref 50–200)
CO2 SERPL-SCNC: 30 MMOL/L (ref 21–32)
CREAT SERPL-MCNC: 0.76 MG/DL (ref 0.6–1.3)
GFR SERPL CREATININE-BSD FRML MDRD: 82 ML/MIN/1.73SQ M
GLUCOSE P FAST SERPL-MCNC: 110 MG/DL (ref 65–99)
HDLC SERPL-MCNC: 44 MG/DL
LDLC SERPL CALC-MCNC: 154 MG/DL (ref 0–100)
POTASSIUM SERPL-SCNC: 3.9 MMOL/L (ref 3.5–5.3)
PROT SERPL-MCNC: 7.1 G/DL (ref 6.4–8.2)
SODIUM SERPL-SCNC: 137 MMOL/L (ref 136–145)
TRIGL SERPL-MCNC: 202 MG/DL

## 2021-07-06 PROCEDURE — 93000 ELECTROCARDIOGRAM COMPLETE: CPT | Performed by: FAMILY MEDICINE

## 2021-07-06 PROCEDURE — 80053 COMPREHEN METABOLIC PANEL: CPT

## 2021-07-06 PROCEDURE — 99214 OFFICE O/P EST MOD 30 MIN: CPT | Performed by: FAMILY MEDICINE

## 2021-07-06 PROCEDURE — 36415 COLL VENOUS BLD VENIPUNCTURE: CPT

## 2021-07-06 PROCEDURE — 80061 LIPID PANEL: CPT

## 2021-07-06 RX ORDER — ALBUTEROL SULFATE 2.5 MG/3ML
2.5 SOLUTION RESPIRATORY (INHALATION) EVERY 4 HOURS PRN
Qty: 120 ML | Refills: 3 | Status: SHIPPED | OUTPATIENT
Start: 2021-07-06

## 2021-07-06 RX ORDER — ALBUTEROL SULFATE 90 UG/1
2 AEROSOL, METERED RESPIRATORY (INHALATION) EVERY 4 HOURS PRN
Qty: 54 G | Refills: 3 | Status: SHIPPED | OUTPATIENT
Start: 2021-07-06

## 2021-07-06 RX ORDER — FLUTICASONE PROPIONATE AND SALMETEROL 113; 14 UG/1; UG/1
1 POWDER, METERED RESPIRATORY (INHALATION) 2 TIMES DAILY
Qty: 1 EACH | Refills: 3 | Status: SHIPPED | OUTPATIENT
Start: 2021-07-06 | End: 2022-05-15

## 2021-07-06 NOTE — PROGRESS NOTES
FAMILY PRACTICE OFFICE VISIT       NAME: Priscilla Kaufman  AGE: 76 y o  SEX: female       : 1955        MRN: [id number]        Assessment and Plan     1  Pre-op exam  Comments:   EKG performed in the office today is stable tracing, no acute ischemic changes  Orders:  -     POCT ECG    2  Mixed hyperlipidemia  Assessment & Plan:   Crestor 20 mg daily  Patient uses fish oil/Fatty 3 Omega supplements  Pending blood work today including CMP and lipid panel  3  COPD without exacerbation ([facility] )  Assessment & Plan:   [facility] pulmonology follows  Patient has not been using Breo due to high cost     She will try AirDuo RespiClick 1 puff b i d  along with p r n  albuterol  I strongly advised her to quit tobacco    Orders:  -     albuterol (2 5 mg/3 mL) 0 083 % nebulizer solution; Take 3 mL (2 5 mg total) by nebulization every 4 (four) hours as needed for wheezing or shortness of breath (cough)  -     albuterol (Ventolin HFA) 90 mcg/act inhaler; Inhale 2 puffs every 4 (four) hours as needed for wheezing or shortness of breath  -     fluticasone-salmeterol (AirDuo RespiClick 544/04) 998-86 mcg/act dry powder inhaler; Inhale 1 puff 2 (two) times a day ,rinse mouth after use  4  Age-related cataract of both eyes, unspecified age-related cataract type    5  Major depression, recurrent, chronic ([facility] )  Assessment & Plan:  Patient reports that symptoms are well controlled on Zoloft 100 mg daily      6  Multiple sclerosis, relapsing/remitting  Assessment & Plan:     Patient is under care of [facility]         Patient is acceptable risk for planned cataract surgeries on  and   There are no Patient Instructions on file for this visit  No follow-ups on file  Discussed with the patient and all questioned fully answered  She will call me if any problems arise  M*Modal software was used to dictate this note   It may contain errors with dictating incorrect words/spelling  Please contact provider directly with any questions  Chief Complaint     Chief Complaint   Patient presents with    Pre-op Exam     DOS: 07/13th: [facility] surgery w/ Dr Corinne Sanchez, [address]th  History of Present Illness     Patient presents for preop evaluation prior to bilateral cataract surgery, [facility]  OD 7/13  OS 8/3  No problems with anesthesia  Patient denies symptoms of chest pain, palpitations or dizziness  She has mild chronic shortness of breath due to COPD, unchanged, at baseline  Patient is still unfortunately smoking half a pack to 1 pack daily  She remains under ongoing surveillance of [facility] pulmonology  Patient has not been using Breo on a daily basis due to high cost   She remains on albuterol inhaler on an as-needed basis  Patient remains on daily medications for hyperlipidemia, depression and anxiety  She will proceed with repeat blood work today  Symptoms of GED/depression are well controlled on Zoloft 100 mg daily  Hair loss: Recent blood work was unremarkable  Patient was evaluated by Dermatology and will start using Rogaine over-the-counter  Review of Systems   Review of Systems   Constitutional: Negative  HENT: Negative  Eyes: Positive for visual disturbance (  Bilateral cataracts)  Respiratory: Negative  Cardiovascular: Negative  Negative for chest pain, palpitations and leg swelling  Gastrointestinal: Negative  Endocrine: Negative  Genitourinary: Negative  Musculoskeletal: Positive for arthralgias and back pain  Skin: Negative  Allergic/Immunologic: Negative  Neurological: Negative  Negative for headaches  Hematological: Negative  Psychiatric/Behavioral: The patient is nervous/anxious          Active Problem List     Patient Active Problem List   Diagnosis    Multiple sclerosis, relapsing/remitting    Muscle spasm    Hyperlipidemia    Osteoporosis    Ambulatory dysfunction    Chronic obstructive pulmonary disease (HCC)    Vitamin D deficiency    Major depression, recurrent, chronic (HCC)    Allergic rhinitis    Amnesia/memory disorder    Shoulder impingement syndrome, left    Abnormal CT of the chest    Urinary incontinence    Balance disorder    Gastro-esophageal reflux disease with esophagitis    Collagenous colitis    Intervertebral disc disorders with radiculopathy, lumbar region    DDD (degenerative disc disease), cervical       Past Medical History:  Past Medical History:   Diagnosis Date    Asthma     Degeneration of intervertebral disc at L5-S1 level     Depression     Family history of colon cancer 3/27/2019    Added automatically from request for surgery [id number]    Full incontinence of feces     Generalized anxiety disorder     Hepatitis     at age 4 yrs    Herpes zoster     last assessed: 6/20/13    History of colon polyps     Hypercholesterolemia     Hyperlipidemia     [facility]     MS (multiple sclerosis) ([facility] )     Osteoporosis     Seasonal allergies     Tumor of breast     benign, right breast    Urinary incontinence        Past Surgical History:  Past Surgical History:   Procedure Laterality Date    BREAST BIOPSY Right     benign    BREAST SURGERY      right breast fibroid tumor resection    COLONOSCOPY      11/2013 - due in 2018 - Dr Crisostomo    FL LUMBAR PUNCTURE DIAGNOSTIC  10/1/2018    ROTATOR CUFF REPAIR      Bilateral    ROTATOR CUFF REPAIR Bilateral     SEPTOPLASTY         Family History:  Family History   Problem Relation Age of Onset    Arthritis Mother     Osteoporosis Mother     COPD Father     No Known Problems Maternal Grandmother     No Known Problems Maternal Grandfather     No Known Problems Paternal Grandmother     No Known Problems Paternal Grandfather     Arthritis Family     COPD Family     Emphysema Family     Heart disease Family     Hyperthyroidism Family     Multiple sclerosis Family     Osteoarthritis Family     No Known Problems Sister     No Known Problems Sister     Colon cancer Brother 72    Multiple sclerosis Brother     No Known Problems Brother     No Known Problems Brother     No Known Problems Maternal Aunt     No Known Problems Maternal Aunt     No Known Problems Paternal Aunt        Social History:  Social History     Socioeconomic History    Marital status: /Civil Union     Spouse name: Not on file    Number of children: Not on file    Years of education: Not on file    Highest education level: Not on file   Occupational History    Occupation: Retired   Tobacco Use    Smoking status: Current Every Day Smoker     Packs/day: 0 50     Years: 40 00     Pack years: 20 00     Types: Cigarettes     Start date: 1982    Smokeless tobacco: Never Used   Vaping Use    Vaping Use: Never used   Substance and Sexual Activity    Alcohol use: Yes     Comment: occasional; [employer] also states Never drank alcohol    Drug use: No    Sexual activity: Yes     Partners: Male     Comment: denied any risk of AIDS   Other Topics Concern    Not on file   Social History Narrative         Social Determinants of Health     Financial Resource Strain:     Difficulty of Paying Living Expenses:    Food Insecurity:     Worried About Running Out of Food in the Last Year:     Ran Out of Food in the Last Year:    Transportation Needs:     Lack of Transportation (Medical):      Lack of Transportation (Non-Medical):    Physical Activity:     Days of Exercise per Week:     Minutes of Exercise per Session:    Stress:     Feeling of Stress :    Social Connections:     Frequency of Communication with Friends and Family:     Frequency of Social Gatherings with Friends and Family:     Attends [employer] Services:     Active Member of Clubs or Organizations:     Attends Club or Organization Meetings:     Marital Status:    Intimate Partner Violence:     Fear of Current or Ex-Partner:     Emotionally Abused:     Physically Abused:     Sexually Abused:          Objective     Vitals:    07/06/21 1001 07/06/21 1033   BP: 132/68 116/80   BP Location: Left arm    Patient Position: Sitting    Cuff Size: Standard    Pulse: 66    Resp: 18    Temp: 97 6 °F (36 4 °C)    TempSrc: Temporal    SpO2: 96%    Weight: 71 8 kg (158 lb 6 4 oz)    Height: 5' 4" (1 626 m)        Wt Readings from Last 3 Encounters:   07/06/21 71 8 kg (158 lb 6 4 oz)   06/30/21 70 8 kg (156 lb)   05/04/21 71 7 kg (158 lb)       Physical Exam  Vitals and nursing note reviewed  Constitutional:       General: She is not in acute distress  Appearance: Normal appearance  She is well-developed  She is not ill-appearing  HENT:      Head: Normocephalic and atraumatic  Eyes:      Conjunctiva/sclera: Conjunctivae normal    Neck:      Thyroid: No thyromegaly  Vascular: No carotid bruit  Cardiovascular:      Rate and Rhythm: Normal rate and regular rhythm  Heart sounds: Normal heart sounds  No murmur heard  Pulmonary:      Effort: Pulmonary effort is normal  No respiratory distress  Breath sounds: Normal breath sounds  No wheezing  Abdominal:      General: There is no abdominal bruit  Musculoskeletal:         General: Normal range of motion  Cervical back: Neck supple  Right lower leg: No edema  Left lower leg: No edema  Neurological:      Mental Status: She is alert and oriented to person, place, and time  Cranial Nerves: No cranial nerve deficit  Coordination: Coordination normal    Psychiatric:         Mood and Affect: Mood normal          Behavior: Behavior normal          Thought Content:  Thought content normal           Results for orders placed or performed in visit on 07/06/21   POCT ECG    Narrative    See MD's OV notes for results    Impression     Normal sinus rhythm, 61 beats per minute, nonspecific ST -T-wave changes in leads 1 and aVL  No acute ischemic changes    No changes from previous study in October of 2018       Pertinent Laboratory/Diagnostic Studies:    Lab Results   Component Value Date    WBC 7 67 03/02/2021    HGB 14 3 03/02/2021    HCT 42 5 03/02/2021    MCV 89 03/02/2021     03/02/2021       No results found for: TSH    Lab Results   Component Value Date    CHOL 169 07/21/2015     Lab Results   Component Value Date    TRIG 202 (H) 07/06/2021     Lab Results   Component Value Date    HDL 44 07/06/2021     Lab Results   Component Value Date    LDLCALC 154 (H) 07/06/2021     Lab Results   Component Value Date    HGBA1C 5 8 (H) 10/20/2015     Lab Results   Component Value Date    SODIUM 137 07/06/2021    K 3 9 07/06/2021     07/06/2021    CO2 30 07/06/2021    ANIONGAP 8 12/15/2015    AGAP 1 (L) 07/06/2021    BUN 17 07/06/2021    CREATININE 0 76 07/06/2021    GLUC 105 08/07/2020    GLUF 110 (H) 07/06/2021    CALCIUM 9 1 07/06/2021    AST 16 07/06/2021    ALT 31 07/06/2021    ALKPHOS 95 07/06/2021    PROT 7 2 12/15/2015    TP 7 1 07/06/2021    BILITOT 0 30 12/15/2015    TBILI 0 41 07/06/2021    EGFR 82 07/06/2021       Orders Placed This Encounter   Procedures    POCT ECG       ALLERGIES:  Allergies   Allergen Reactions    Bupropion     Cefprozil     Conj Estrog-Medroxyprogest Ace Other (See Comments)     Other      Povidone Iodine      Patient unsure of what this is    Sulfamethoxazole-Trimethoprim Other (See Comments)     Reaction Date: 11Aug2011;     Levofloxacin     Gadobutrol GI Intolerance    Medroxyprogesterone Other (See Comments)     Reaction Date: 11Aug2011;     Nortriptyline Other (See Comments)     n/a    Pamelor [Nortriptyline Hcl]     Provera [Medroxyprogesterone Acetate]        Current Medications     Current Outpatient Medications   Medication Sig Dispense Refill    albuterol (2 5 mg/3 mL) 0 083 % nebulizer solution Take 3 mL (2 5 mg total) by nebulization every 4 (four) hours as needed for wheezing or shortness of breath (cough) 120 mL 3    aspirin (ECOTRIN LOW STRENGTH) 81 mg EC tablet Take 1 tablet by mouth daily      CANNABIDIOL PO Take 300 mg by mouth Daily or twice daily PRN back pain      Cholecalciferol (Vitamin D3) 125 MCG (5000 UT) CAPS Take 1 capsule by mouth daily      clobetasol (TEMOVATE) 0 05 % GEL APPLY TO AFFECTED AREAS TWICE A DAY 60 g 1    gabapentin (NEURONTIN) 300 mg capsule TAKE 1 CAPSULE BY MOUTH  TWICE DAILY 180 capsule 3    levocetirizine (XYZAL) 5 MG tablet TAKE 1 TABLET BY MOUTH  EVERY EVENING 90 tablet 1    montelukast (SINGULAIR) 10 mg tablet TAKE 1 TABLET BY MOUTH  DAILY AT BEDTIME 90 tablet 3    omega-3-acid ethyl esters (LOVAZA) 1 g capsule Take 2 capsules (2 g total) by mouth 2 (two) times a day 360 capsule 3    omeprazole (PriLOSEC) 40 MG capsule Take 40 mg by mouth daily      rosuvastatin (CRESTOR) 20 MG tablet Take 1 tablet (20 mg total) by mouth daily 90 tablet 1    sertraline (ZOLOFT) 100 mg tablet TAKE 1 TABLET BY MOUTH  DAILY 90 tablet 3    albuterol (Ventolin HFA) 90 mcg/act inhaler Inhale 2 puffs every 4 (four) hours as needed for wheezing or shortness of breath 54 g 3    fluticasone (FLONASE) 50 mcg/act nasal spray USE 2 SPRAYS IN BOTH  NOSTRILS DAILY 48 g 3    fluticasone-salmeterol (AirDuo RespiClick 630/46) 437-96 mcg/act dry powder inhaler Inhale 1 puff 2 (two) times a day ,rinse mouth after use  1 each 3     No current facility-administered medications for this visit         Medications Discontinued During This Encounter   Medication Reason    albuterol (ProAir HFA) 90 mcg/act inhaler     albuterol (2 5 mg/3 mL) 0 083 % nebulizer solution Reorder    budesonide (PULMICORT) 0 5 mg/2 mL nebulizer solution        Health Maintenance     Health Maintenance   Topic Date Due    DTaP,Tdap,and Td Vaccines (1 - Tdap) 09/28/1975    PT PLAN OF CARE  10/09/2019    SLP PLAN OF CARE  11/08/2019    Influenza Vaccine (1) 09/01/2021    Fall Risk  03/18/2022    Medicare Annual Wellness Visit (AWV)  03/18/2022    BMI: Followup Plan  03/18/2022    Depression Remission PHQ  03/18/2022    Colorectal Cancer Screening  06/12/2022    BMI: Adult  07/06/2022    Cervical Cancer Screening  08/13/2023    Pneumococcal Vaccine: 65+ Years (2 of 2 - PPSV23) 10/22/2023    Hepatitis C Screening  Completed    COVID-19 Vaccine  Completed    HIB Vaccine  Aged Out    Hepatitis B Vaccine  Aged Out    IPV Vaccine  Aged Out    Hepatitis A Vaccine  Aged Out    Meningococcal ACWY Vaccine  Aged Out    HPV Vaccine  Aged Out       Immunization History   Administered Date(s) Administered    INFLUENZA 09/26/2011    Influenza Quadrivalent Preservative Free 3 years and older IM 09/29/2015, 09/22/2016, 09/08/2017    Influenza, high dose seasonal 0 7 mL 09/30/2019, 09/17/2020    Influenza, recombinant, quadrivalent,injectable, preservative free 09/13/2018    Influenza, seasonal, injectable 01/17/2013, 09/03/2013, 09/23/2014    Pneumococcal Conjugate 13-Valent 10/22/2018    Pneumococcal Polysaccharide PPV23 10/01/2007, 09/29/2015    SARS-CoV-2 / COVID-19 mRNA IM (Pfizer-BioNTech) 03/20/2021, 04/12/2021    Td (adult), adsorbed 06/02/2016       Jose M Lopez MD

## 2021-07-07 DIAGNOSIS — J30.9 ALLERGIC RHINITIS, UNSPECIFIED SEASONALITY, UNSPECIFIED TRIGGER: ICD-10-CM

## 2021-07-07 PROBLEM — J44.9 CHRONIC OBSTRUCTIVE PULMONARY DISEASE (HCC): Status: ACTIVE | Noted: 2017-04-17

## 2021-07-07 RX ORDER — FLUTICASONE PROPIONATE 50 MCG
SPRAY, SUSPENSION (ML) NASAL
Qty: 48 G | Refills: 3 | Status: SHIPPED | OUTPATIENT
Start: 2021-07-07

## 2021-07-07 NOTE — ASSESSMENT & PLAN NOTE
[facility] pulmonology follows  Patient has not been using Breo due to high cost     She will try AirDuo RespiClick 1 puff b i d  along with p r n  albuterol      I strongly advised her to quit tobacco

## 2021-07-07 NOTE — ASSESSMENT & PLAN NOTE
Crestor 20 mg daily  Patient uses fish oil/Fatty 3 Omega supplements  Pending blood work today including CMP and lipid panel

## 2021-07-08 ENCOUNTER — TELEPHONE (OUTPATIENT)
Dept: FAMILY MEDICINE CLINIC | Facility: CLINIC | Age: 66
End: 2021-07-08

## 2021-07-08 DIAGNOSIS — E78.2 MIXED HYPERLIPIDEMIA: Chronic | ICD-10-CM

## 2021-07-08 RX ORDER — ROSUVASTATIN CALCIUM 40 MG/1
40 TABLET, COATED ORAL DAILY
Qty: 90 TABLET | Refills: 1 | Status: SHIPPED | OUTPATIENT
Start: 2021-07-08 | End: 2021-09-20 | Stop reason: SDUPTHER

## 2021-07-08 NOTE — TELEPHONE ENCOUNTER
Please contact patient  Her cholesterol is elevated  Rest of the blood work is normal   ·   Patient should increase dose of rosuvastatin ( cholesterol medication) from 20 mg once a day to 40 mg daily, I did send prescription to her pharmacy  · Patient should repeat blood work  prior to her next office visit in fall  Orders placed   Thank you

## 2021-07-21 ENCOUNTER — PREPPED CHART (OUTPATIENT)
Dept: URBAN - METROPOLITAN AREA CLINIC 6 | Facility: CLINIC | Age: 66
End: 2021-07-21

## 2021-07-22 ENCOUNTER — LAB (OUTPATIENT)
Dept: LAB | Facility: CLINIC | Age: 66
End: 2021-07-22
Payer: MEDICARE

## 2021-07-22 DIAGNOSIS — L65.9 HAIR LOSS: ICD-10-CM

## 2021-07-22 DIAGNOSIS — E83.59 OTHER DISORDERS OF CALCIUM METABOLISM: ICD-10-CM

## 2021-07-22 LAB
25(OH)D3 SERPL-MCNC: 34.5 NG/ML (ref 30–100)
FERRITIN SERPL-MCNC: 100 NG/ML (ref 8–388)
TSH SERPL DL<=0.05 MIU/L-ACNC: 1.4 UIU/ML (ref 0.36–3.74)

## 2021-07-22 PROCEDURE — 82306 VITAMIN D 25 HYDROXY: CPT

## 2021-07-22 PROCEDURE — 82728 ASSAY OF FERRITIN: CPT

## 2021-07-22 PROCEDURE — 36415 COLL VENOUS BLD VENIPUNCTURE: CPT

## 2021-07-22 PROCEDURE — 84443 ASSAY THYROID STIM HORMONE: CPT

## 2021-07-26 NOTE — RESULT ENCOUNTER NOTE
Mirella Rodriguez: please inform patient that her blood tests (ferritin, TSH, vitamin D) were normal, which is good news  Thank you  She does not have deficiencies of iron, vitamin D, and thyroid hormone  These can cause hair loss when low

## 2021-07-28 ENCOUNTER — TELEPHONE (OUTPATIENT)
Dept: DERMATOLOGY | Facility: CLINIC | Age: 66
End: 2021-07-28

## 2021-07-28 NOTE — TELEPHONE ENCOUNTER
----- Message from Devi Montague MD sent at 7/26/2021 12:45 PM EDT -----  Karolina Mcguire: please inform patient that her blood tests (ferritin, TSH, vitamin D) were normal, which is good news  Thank you  She does not have deficiencies of iron, vitamin D, and thyroid hormone  These can cause hair loss when low

## 2021-07-28 NOTE — TELEPHONE ENCOUNTER
Tried to contact the patient in regards to her lab test results  I was unable to leave a voicemail  I will try again later

## 2021-07-30 NOTE — TELEPHONE ENCOUNTER
I have tried to call the patient in regards to her lab test results and I was unable to leave a voicemail  I will try again later

## 2021-08-02 NOTE — TELEPHONE ENCOUNTER
I have tried to call the patient again in regards to her lab test results and I am unable to leave a voicemail  Third attempt

## 2021-08-05 ENCOUNTER — SURGERY/PROCEDURE (OUTPATIENT)
Dept: URBAN - METROPOLITAN AREA SURGICAL CENTER 6 | Facility: SURGICAL CENTER | Age: 66
End: 2021-08-05

## 2021-08-05 DIAGNOSIS — H25.812: ICD-10-CM

## 2021-08-05 PROCEDURE — 0356T INSERTION OF DRUG-ELUTING IMPLANT (INCLUDING PUNCTAL DILATION AND IMPLANT REMOVAL WHEN PERFORMED) INTO LACRIMAL CANALICULUS, EACH: CPT

## 2021-08-05 PROCEDURE — 66984 XCAPSL CTRC RMVL W/O ECP: CPT | Mod: 79,LT

## 2021-08-05 PROCEDURE — MISCMFIOL MISCMFIOL

## 2021-08-05 PROCEDURE — FEMTO FEMTO LASER

## 2021-08-06 ENCOUNTER — POST-OP CHECK (OUTPATIENT)
Dept: URBAN - METROPOLITAN AREA CLINIC 6 | Facility: CLINIC | Age: 66
End: 2021-08-06

## 2021-08-06 DIAGNOSIS — Z96.1: ICD-10-CM

## 2021-08-06 PROCEDURE — 99024 POSTOP FOLLOW-UP VISIT: CPT

## 2021-08-06 ASSESSMENT — VISUAL ACUITY
OS_SC: 20/50
OD_SC: 20/30-1
OD_PH: 20/25-2
OS_PH: 20/40

## 2021-08-06 ASSESSMENT — TONOMETRY
OD_IOP_MMHG: 12
OS_IOP_MMHG: 13

## 2021-08-12 ENCOUNTER — POST-OP CHECK (OUTPATIENT)
Dept: URBAN - METROPOLITAN AREA CLINIC 6 | Facility: CLINIC | Age: 66
End: 2021-08-12

## 2021-08-12 DIAGNOSIS — Z96.1: ICD-10-CM

## 2021-08-12 PROCEDURE — 99024 POSTOP FOLLOW-UP VISIT: CPT

## 2021-08-12 ASSESSMENT — TONOMETRY
OS_IOP_MMHG: 17
OD_IOP_MMHG: 16

## 2021-08-12 ASSESSMENT — VISUAL ACUITY
OD_PH: 20/30
OS_SC: 20/50
OS_PH: 20/40-1
OD_SC: 20/40

## 2021-08-27 DIAGNOSIS — J30.9 ALLERGIC RHINITIS, UNSPECIFIED SEASONALITY, UNSPECIFIED TRIGGER: ICD-10-CM

## 2021-08-28 RX ORDER — MONTELUKAST SODIUM 10 MG/1
TABLET ORAL
Qty: 90 TABLET | Refills: 3 | Status: SHIPPED | OUTPATIENT
Start: 2021-08-28 | End: 2022-07-17

## 2021-08-30 ENCOUNTER — 3 WEEK POST-OP (OUTPATIENT)
Dept: URBAN - METROPOLITAN AREA CLINIC 6 | Facility: CLINIC | Age: 66
End: 2021-08-30

## 2021-08-30 DIAGNOSIS — G35: ICD-10-CM

## 2021-08-30 DIAGNOSIS — Z96.1: ICD-10-CM

## 2021-08-30 DIAGNOSIS — H35.3130: ICD-10-CM

## 2021-08-30 PROCEDURE — 92014 COMPRE OPH EXAM EST PT 1/>: CPT | Mod: 24

## 2021-08-30 PROCEDURE — 92083 EXTENDED VISUAL FIELD XM: CPT

## 2021-08-30 ASSESSMENT — TONOMETRY
OD_IOP_MMHG: 15
OS_IOP_MMHG: 11

## 2021-08-30 ASSESSMENT — VISUAL ACUITY
OD_SC: 20/30+2
OS_SC: 20/30+2

## 2021-08-31 ENCOUNTER — OFFICE VISIT (OUTPATIENT)
Dept: OBGYN CLINIC | Facility: CLINIC | Age: 66
End: 2021-08-31
Payer: MEDICARE

## 2021-08-31 VITALS
HEIGHT: 64 IN | SYSTOLIC BLOOD PRESSURE: 134 MMHG | DIASTOLIC BLOOD PRESSURE: 90 MMHG | BODY MASS INDEX: 26.46 KG/M2 | WEIGHT: 155 LBS

## 2021-08-31 DIAGNOSIS — N95.2 VAGINAL ATROPHY: ICD-10-CM

## 2021-08-31 DIAGNOSIS — Z78.0 POSTMENOPAUSAL: Primary | ICD-10-CM

## 2021-08-31 DIAGNOSIS — Z12.31 ENCOUNTER FOR SCREENING MAMMOGRAM FOR BREAST CANCER: ICD-10-CM

## 2021-08-31 PROCEDURE — 99213 OFFICE O/P EST LOW 20 MIN: CPT | Performed by: PHYSICIAN ASSISTANT

## 2021-08-31 NOTE — PROGRESS NOTES
Assessment/Plan:    No problem-specific Assessment & Plan notes found for this encounter  Diagnoses and all orders for this visit:    Postmenopausal    Vaginal atrophy    Encounter for screening mammogram for breast cancer  -     Mammo screening bilateral w 3d & cad; Future          Subjective:      Patient ID: Kalin Kruger is a 76 y o  female  Pt presents for her followup atrophy today--  She has no complaints  She has no bleeding or pelvic pain==postmeno  Bowel and bladder are regular  Colonoscopy--utd  No breast concerns today  Last mammo--2/21      No pap today  rx mammo  Daily ca, d      The following portions of the patient's history were reviewed and updated as appropriate: allergies, current medications, past family history, past medical history, past social history, past surgical history and problem list     Review of Systems   Constitutional: Negative for chills, fever and unexpected weight change  Gastrointestinal: Negative for abdominal pain, blood in stool, constipation and diarrhea  Genitourinary: Negative  Objective:      /90   Ht 5' 4" (1 626 m)   Wt 70 3 kg (155 lb)   LMP  (LMP Unknown)   Breastfeeding No   BMI 26 61 kg/m²          Physical Exam  Vitals and nursing note reviewed  Constitutional:       Appearance: She is well-developed  HENT:      Head: Normocephalic and atraumatic  Chest:      Breasts:         Right: No inverted nipple, mass, nipple discharge or skin change  Left: No inverted nipple, mass, nipple discharge or skin change  Abdominal:      Palpations: Abdomen is soft  Genitourinary:     Exam position: Supine  Labia:         Right: No rash, tenderness or lesion  Left: No rash, tenderness or lesion  Vagina: Normal       Cervix: No cervical motion tenderness, discharge or friability  Adnexa:         Right: No mass, tenderness or fullness  Left: No mass, tenderness or fullness       Musculoskeletal: Cervical back: Normal range of motion  Lymphadenopathy:      Lower Body: No right inguinal adenopathy  No left inguinal adenopathy

## 2021-08-31 NOTE — PROGRESS NOTES
Patient is here for a follow up visit  Patient has no bleeding or pelvic pain  No breast concerns and B&B ok  Patient is not due for a pap smear at this visit  8/13/20 Normal Pap  Negative HPV    2/8/21 Normal Mammo

## 2021-09-08 ENCOUNTER — PROBLEM (OUTPATIENT)
Dept: URBAN - METROPOLITAN AREA CLINIC 6 | Facility: CLINIC | Age: 66
End: 2021-09-08

## 2021-09-08 DIAGNOSIS — H02.34: ICD-10-CM

## 2021-09-08 DIAGNOSIS — H02.31: ICD-10-CM

## 2021-09-08 PROCEDURE — 92012 INTRM OPH EXAM EST PATIENT: CPT | Mod: 24

## 2021-09-08 ASSESSMENT — TONOMETRY
OD_IOP_MMHG: 14
OS_IOP_MMHG: 10

## 2021-09-08 ASSESSMENT — VISUAL ACUITY
OD_SC: 20/30
OS_SC: 20/30

## 2021-09-10 ENCOUNTER — APPOINTMENT (OUTPATIENT)
Dept: LAB | Facility: CLINIC | Age: 66
End: 2021-09-10
Payer: MEDICARE

## 2021-09-10 ENCOUNTER — PROBLEM (OUTPATIENT)
Dept: URBAN - METROPOLITAN AREA CLINIC 6 | Facility: CLINIC | Age: 66
End: 2021-09-10

## 2021-09-10 DIAGNOSIS — E78.2 MIXED HYPERLIPIDEMIA: Chronic | ICD-10-CM

## 2021-09-10 DIAGNOSIS — Z96.1: ICD-10-CM

## 2021-09-10 DIAGNOSIS — H02.844: ICD-10-CM

## 2021-09-10 LAB
ALBUMIN SERPL BCP-MCNC: 4 G/DL (ref 3.5–5)
ALP SERPL-CCNC: 103 U/L (ref 46–116)
ALT SERPL W P-5'-P-CCNC: 40 U/L (ref 12–78)
ANION GAP SERPL CALCULATED.3IONS-SCNC: 8 MMOL/L (ref 4–13)
AST SERPL W P-5'-P-CCNC: 19 U/L (ref 5–45)
BASOPHILS # BLD AUTO: 0.05 THOUSANDS/ΜL (ref 0–0.1)
BASOPHILS NFR BLD AUTO: 1 % (ref 0–1)
BILIRUB SERPL-MCNC: 0.37 MG/DL (ref 0.2–1)
BUN SERPL-MCNC: 16 MG/DL (ref 5–25)
CALCIUM SERPL-MCNC: 9.2 MG/DL (ref 8.3–10.1)
CHLORIDE SERPL-SCNC: 102 MMOL/L (ref 100–108)
CHOLEST SERPL-MCNC: 274 MG/DL (ref 50–200)
CO2 SERPL-SCNC: 29 MMOL/L (ref 21–32)
CREAT SERPL-MCNC: 0.73 MG/DL (ref 0.6–1.3)
EOSINOPHIL # BLD AUTO: 0.2 THOUSAND/ΜL (ref 0–0.61)
EOSINOPHIL NFR BLD AUTO: 2 % (ref 0–6)
ERYTHROCYTE [DISTWIDTH] IN BLOOD BY AUTOMATED COUNT: 11.9 % (ref 11.6–15.1)
GFR SERPL CREATININE-BSD FRML MDRD: 86 ML/MIN/1.73SQ M
GLUCOSE P FAST SERPL-MCNC: 108 MG/DL (ref 65–99)
HCT VFR BLD AUTO: 41.3 % (ref 34.8–46.1)
HDLC SERPL-MCNC: 44 MG/DL
HGB BLD-MCNC: 14 G/DL (ref 11.5–15.4)
IMM GRANULOCYTES # BLD AUTO: 0.02 THOUSAND/UL (ref 0–0.2)
IMM GRANULOCYTES NFR BLD AUTO: 0 % (ref 0–2)
LDLC SERPL CALC-MCNC: 162 MG/DL (ref 0–100)
LYMPHOCYTES # BLD AUTO: 3.21 THOUSANDS/ΜL (ref 0.6–4.47)
LYMPHOCYTES NFR BLD AUTO: 37 % (ref 14–44)
MCH RBC QN AUTO: 30.3 PG (ref 26.8–34.3)
MCHC RBC AUTO-ENTMCNC: 33.9 G/DL (ref 31.4–37.4)
MCV RBC AUTO: 89 FL (ref 82–98)
MONOCYTES # BLD AUTO: 0.55 THOUSAND/ΜL (ref 0.17–1.22)
MONOCYTES NFR BLD AUTO: 6 % (ref 4–12)
NEUTROPHILS # BLD AUTO: 4.63 THOUSANDS/ΜL (ref 1.85–7.62)
NEUTS SEG NFR BLD AUTO: 54 % (ref 43–75)
NRBC BLD AUTO-RTO: 0 /100 WBCS
PLATELET # BLD AUTO: 239 THOUSANDS/UL (ref 149–390)
PMV BLD AUTO: 9.6 FL (ref 8.9–12.7)
POTASSIUM SERPL-SCNC: 4.1 MMOL/L (ref 3.5–5.3)
PROT SERPL-MCNC: 7.2 G/DL (ref 6.4–8.2)
RBC # BLD AUTO: 4.62 MILLION/UL (ref 3.81–5.12)
SODIUM SERPL-SCNC: 139 MMOL/L (ref 136–145)
TRIGL SERPL-MCNC: 342 MG/DL
TSH SERPL DL<=0.05 MIU/L-ACNC: 1.2 UIU/ML (ref 0.36–3.74)
WBC # BLD AUTO: 8.66 THOUSAND/UL (ref 4.31–10.16)

## 2021-09-10 PROCEDURE — 36415 COLL VENOUS BLD VENIPUNCTURE: CPT

## 2021-09-10 PROCEDURE — 80061 LIPID PANEL: CPT

## 2021-09-10 PROCEDURE — 92012 INTRM OPH EXAM EST PATIENT: CPT | Mod: 24

## 2021-09-10 PROCEDURE — 80053 COMPREHEN METABOLIC PANEL: CPT

## 2021-09-10 PROCEDURE — G8427 DOCREV CUR MEDS BY ELIG CLIN: HCPCS

## 2021-09-10 PROCEDURE — 85025 COMPLETE CBC W/AUTO DIFF WBC: CPT

## 2021-09-10 PROCEDURE — 84443 ASSAY THYROID STIM HORMONE: CPT

## 2021-09-10 ASSESSMENT — TONOMETRY
OS_IOP_MMHG: 10
OD_IOP_MMHG: 13

## 2021-09-10 ASSESSMENT — VISUAL ACUITY
OD_SC: 20/40-1
OS_SC: 20/30-1

## 2021-09-20 ENCOUNTER — OFFICE VISIT (OUTPATIENT)
Dept: FAMILY MEDICINE CLINIC | Facility: CLINIC | Age: 66
End: 2021-09-20
Payer: MEDICARE

## 2021-09-20 VITALS
OXYGEN SATURATION: 95 % | RESPIRATION RATE: 18 BRPM | BODY MASS INDEX: 27.21 KG/M2 | HEIGHT: 64 IN | HEART RATE: 68 BPM | WEIGHT: 159.38 LBS | DIASTOLIC BLOOD PRESSURE: 84 MMHG | SYSTOLIC BLOOD PRESSURE: 110 MMHG | TEMPERATURE: 97.6 F

## 2021-09-20 DIAGNOSIS — E78.2 MIXED HYPERLIPIDEMIA: Primary | Chronic | ICD-10-CM

## 2021-09-20 DIAGNOSIS — Z23 FLU VACCINE NEED: ICD-10-CM

## 2021-09-20 DIAGNOSIS — F33.9 MAJOR DEPRESSION, RECURRENT, CHRONIC (HCC): ICD-10-CM

## 2021-09-20 DIAGNOSIS — J44.9 CHRONIC OBSTRUCTIVE PULMONARY DISEASE, UNSPECIFIED COPD TYPE (HCC): ICD-10-CM

## 2021-09-20 DIAGNOSIS — G35 MULTIPLE SCLEROSIS (HCC): Chronic | ICD-10-CM

## 2021-09-20 DIAGNOSIS — R41.3 AMNESIA/MEMORY DISORDER: ICD-10-CM

## 2021-09-20 DIAGNOSIS — J32.9 SINUSITIS, UNSPECIFIED CHRONICITY, UNSPECIFIED LOCATION: ICD-10-CM

## 2021-09-20 PROCEDURE — 99214 OFFICE O/P EST MOD 30 MIN: CPT | Performed by: FAMILY MEDICINE

## 2021-09-20 PROCEDURE — 90662 IIV NO PRSV INCREASED AG IM: CPT

## 2021-09-20 PROCEDURE — G0008 ADMIN INFLUENZA VIRUS VAC: HCPCS

## 2021-09-20 RX ORDER — ROSUVASTATIN CALCIUM 40 MG/1
40 TABLET, COATED ORAL DAILY
Qty: 90 TABLET | Refills: 1 | Status: SHIPPED | OUTPATIENT
Start: 2021-09-20 | End: 2022-02-02

## 2021-09-20 RX ORDER — SERTRALINE HYDROCHLORIDE 25 MG/1
TABLET, FILM COATED ORAL
Qty: 90 TABLET | Refills: 3 | Status: SHIPPED | OUTPATIENT
Start: 2021-09-20 | End: 2021-12-20

## 2021-09-20 NOTE — PROGRESS NOTES
FAMILY PRACTICE OFFICE VISIT       NAME: Elmer Kaufman  AGE: 76 y o  SEX: female       : 1955        MRN: [id number]        Assessment and Plan     1  Mixed hyperlipidemia  Assessment & Plan:  High cholesterol at 274  Doubt compliance, patient admits to forgetfulness in taking her medication on a daily basis  She will restart Crestor 40 mg once a day as directed and will recheck blood work in 3 months      Orders:  -     rosuvastatin (CRESTOR) 40 MG tablet; Take 1 tablet (40 mg total) by mouth daily  -     Comprehensive metabolic panel; Future; Expected date: 12/10/2021  -     Lipid Panel with Direct LDL reflex; Future; Expected date: 12/10/2021    2  Multiple sclerosis, relapsing/remitting  Assessment & Plan:  Rosi rodriguez, [facility] -annual eval   no medications      3  Major depression, recurrent, chronic (HCC)  Assessment & Plan:  Zoloft  will be increased to  125 mg daily    Orders:  -     sertraline (ZOLOFT) 25 mg tablet; Take 1 tablet once a day along with sertraline 100 mg tablets for the total dose of 125 mg daily    4  Chronic obstructive pulmonary disease, unspecified COPD type [facility])  Assessment & Plan:   Most recent CT chest 2020 consistent with emphysema  Patient is past due pulmonary follow-up   She still smokes 1 ppd, she was counseled on numerous occasions about significant risks of ongoing tobacco use  Patient has not used any inhalers lately  I strongly advised her to schedule follow-up with [facility] pulmonology, Dr Gwenda Merlin        5  Flu vaccine need  -     influenza vaccine, high-dose, PF 0 7 mL (FLUZONE HIGH-DOSE)    6  Sinusitis, unspecified chronicity, unspecified location  -     predniSONE 10 mg tablet; Take 30 mg (3 tabs) daily x 3 days; then 20 mg (2 tabs) daily x 3 days then 10 mg (1 tabs) daily x 3 days  -     doxycycline hyclate (VIBRAMYCIN) 100 mg capsule;  Take 1 capsule (100 mg total) by mouth 2 (two) times daily after meals for 10 days    7  Amnesia/memory disorder  Assessment & Plan:   Stable, due to MS     Patient presents for follow-up  Assessment and plan as outlined above  Will start treatment for acute/recurrent sinusitis  I advised her to proceed with pulmonology follow-up  She will schedule routine checkup in 3 months with preceding blood work  I requested that patient brings all her medications for review during next office visit  There are no Patient Instructions on file for this visit  Return in about 3 months (around 12/20/2021) for follow up  Discussed with the patient and all questioned fully answered  She will call me if any problems arise  M*Yoop[employer] software was used to dictate this note  It may contain errors with dictating incorrect words/spelling  Please contact provider directly with any questions  Chief Complaint     Chief Complaint   Patient presents with    Follow-up     6 month        History of Present Illness      Patient presents for follow-up of chronic medical conditions  She is here accompanied by her   Patient reports being under ongoing stress  She uses Zoloft 100 mg daily as directed but feels somewhat sad and discouraged at times  No suicidal homicidal ideation or thought  Patient states that anxiety symptoms are well controlled  Patient is grieving recent loss of her aunt and her beloved dog  Multiple sclerosis:   reports chronic and stable memory changes   is concerned that patient might not be taking her regular medications on a daily basis   patient is under care of Neurology at [address] [place] [address] Drive check up, no medications  Labs -all normal aside from chol 274    Patient states that she has been using Crestor 40 mg daily for quite a while but is not sure if he has been taking medication lately  Reportedly she was doubling 20 mg tablets by taking 2 a day but might have ran out of them faster       Patient remains under annual care of Ophthalmology and Neurology  GYN-UTD, leena -UTD    DR Sheets-  Last CT chest   Was performed in November 2020  Patient is still unfortunately smoking 1 pack daily  She denies symptoms of cough or dyspnea  She is not using any inhalers  She was counseled regarding significant serious risks of smoking on numerous occasions  Previous evaluation included series of CT chest and bronchoscopy  Patient is past due follow-up with pulmonology, she was supposed to schedule her checkup in spring of 2021  I strongly advised her to proceed  s/p  catarct surgery -Dr Tinsley , surgery went well      Ongoing sinus pressure- frontal and maxillary    Rx with cefxil  By DR Tinsley-  felt better, symptoms did not resolve completely  Patient is still experiencing ongoing sinus congestion, pressure and upper and lower eyelid edema  Ophthalmologist did examine her and did not believe that her symptoms were related to recent cataract surgery  Patient denies symptoms of cough, dyspnea, sore throat or fever  Patient denies symptoms of chest pain, palpitations, shortness of breath, leg edema dizziness  Review of Systems   Review of Systems   Constitutional: Negative  HENT: Positive for congestion, postnasal drip and sinus pressure  Eyes: Negative  Respiratory: Negative  Cardiovascular: Negative  Gastrointestinal: Negative  Endocrine: Negative  Genitourinary: Positive for frequency (chronic)  Allergic/Immunologic: Negative  Neurological: Negative for dizziness and headaches  Psychiatric/Behavioral: Positive for decreased concentration and dysphoric mood  The patient is nervous/anxious           As per HPI       Active Problem List     Patient Active Problem List   Diagnosis    Multiple sclerosis, relapsing/remitting    Muscle spasm    Hyperlipidemia    Osteoporosis    Ambulatory dysfunction    Chronic obstructive pulmonary disease (HCC)    Vitamin D deficiency    Major depression, recurrent, chronic (HCC)    Allergic rhinitis    Amnesia/memory disorder    Shoulder impingement syndrome, left    Abnormal CT of the chest    Urinary incontinence    Balance disorder    Gastro-esophageal reflux disease with esophagitis    Collagenous colitis    Intervertebral disc disorders with radiculopathy, lumbar region    DDD (degenerative disc disease), cervical       Past Medical History:  Past Medical History:   Diagnosis Date    Asthma     Degeneration of intervertebral disc at L5-S1 level     Depression     Family history of colon cancer 3/27/2019    Added automatically from request for surgery [id number]    Full incontinence of feces     Generalized anxiety disorder     Hepatitis     at age 4 yrs    Herpes zoster     last assessed: 6/20/13    History of colon polyps     Hypercholesterolemia     Hyperlipidemia     [facility] [address] [facility]     MS (multiple sclerosis) [facility] [address] [facility]     Osteoporosis     Seasonal allergies     Tumor of breast     benign, right breast    Urinary incontinence        Past Surgical History:  Past Surgical History:   Procedure Laterality Date    BREAST BIOPSY Right     benign    BREAST SURGERY      right breast fibroid tumor resection    COLONOSCOPY      11/2013 - due in 2018 - Dr Crisostomo    FL LUMBAR PUNCTURE DIAGNOSTIC  10/1/2018    ROTATOR CUFF REPAIR      Bilateral    ROTATOR CUFF REPAIR Bilateral     SEPTOPLASTY         Family History:  Family History   Problem Relation Age of Onset    Arthritis Mother     Osteoporosis Mother     COPD Father     No Known Problems Maternal Grandmother     No Known Problems Maternal Grandfather     No Known Problems Paternal Grandmother     No Known Problems Paternal Grandfather     Arthritis Family     COPD Family     Emphysema Family     Heart disease Family     Hyperthyroidism Family     Multiple sclerosis Family     Osteoarthritis Family     No Known Problems Sister     No Known Problems Sister     Colon cancer Brother 72    Multiple sclerosis Brother     No Known Problems Brother     No Known Problems Brother     No Known Problems Maternal Aunt     No Known Problems Maternal Aunt     No Known Problems Paternal Aunt        Social History:  Social History     Socioeconomic History    Marital status: /Civil Union     Spouse name: Not on file    Number of children: Not on file    Years of education: Not on file    Highest education level: Not on file   Occupational History    Occupation: Retired   Tobacco Use    Smoking status: Current Every Day Smoker     Packs/day: 0 50     Years: 40 00     Pack years: 20 00     Types: Cigarettes     Start date: 1982    Smokeless tobacco: Never Used   Vaping Use    Vaping Use: Never used   Substance and Sexual Activity    Alcohol use: Yes     Comment: occasional; [employer] also states Never drank alcohol    Drug use: No    Sexual activity: Yes     Partners: Male     Comment: denied any risk of AIDS   Other Topics Concern    Not on file   Social History Narrative         Social Determinants of Health     Financial Resource Strain:     Difficulty of Paying Living Expenses:    Food Insecurity:     Worried About Running Out of Food in the Last Year:     Ran Out of Food in the Last Year:    Transportation Needs:     Lack of Transportation (Medical):      Lack of Transportation (Non-Medical):    Physical Activity:     Days of Exercise per Week:     Minutes of Exercise per Session:    Stress:     Feeling of Stress :    Social Connections:     Frequency of Communication with Friends and Family:     Frequency of Social Gatherings with Friends and Family:     Attends Yazidi Services:     Active Member of Clubs or Organizations:     Attends Club or Organization Meetings:     Marital Status:    Intimate Partner Violence:     Fear of Current or Ex-Partner:     Emotionally Abused:     Physically Abused:     Sexually Abused: Objective     Vitals:    09/20/21 1301   BP: 110/84   Pulse: 68   Resp: 18   Temp: 97 6 °F (36 4 °C)   SpO2: 95%   Weight: 72 3 kg (159 lb 6 oz)   Height: 5' 4" (1 626 m)       Wt Readings from Last 3 Encounters:   09/20/21 72 3 kg (159 lb 6 oz)   08/31/21 70 3 kg (155 lb)   07/06/21 71 8 kg (158 lb 6 4 oz)       Physical Exam  Vitals and nursing note reviewed  Constitutional:       General: She is not in acute distress  Appearance: Normal appearance  She is well-developed  She is not ill-appearing  HENT:      Head: Normocephalic and atraumatic  Right Ear: Tympanic membrane normal       Left Ear: Tympanic membrane normal       Nose:      Comments: Reproducible frontal and maxillary tenderness bilaterally  Mild eyelid edema, upper and lower, bilaterally, no discoloration, no stye  Eyes:      Conjunctiva/sclera: Conjunctivae normal    Neck:      Thyroid: No thyromegaly  Vascular: No carotid bruit  Cardiovascular:      Rate and Rhythm: Normal rate and regular rhythm  Heart sounds: Normal heart sounds  No murmur heard  Pulmonary:      Effort: Pulmonary effort is normal  No respiratory distress  Breath sounds: Normal breath sounds  No wheezing  Abdominal:      General: Bowel sounds are normal  There is no abdominal bruit  Palpations: Abdomen is soft  Musculoskeletal:         General: Normal range of motion  Cervical back: Neck supple  Right lower leg: No edema  Left lower leg: No edema  Lymphadenopathy:      Cervical: Cervical adenopathy (  Mild submandibular) present  Skin:     General: Skin is warm  Neurological:      Mental Status: She is alert and oriented to person, place, and time  Cranial Nerves: No cranial nerve deficit  Coordination: Coordination normal    Psychiatric:         Attention and Perception: Attention normal          Mood and Affect: Mood is depressed  Affect is flat           Behavior: Behavior normal          Thought Content:  Thought content normal           Pertinent Laboratory/Diagnostic Studies:    Lab Results   Component Value Date    WBC 8 66 09/10/2021    HGB 14 0 09/10/2021    HCT 41 3 09/10/2021    MCV 89 09/10/2021     09/10/2021       No results found for: TSH    Lab Results   Component Value Date    CHOL 169 07/21/2015     Lab Results   Component Value Date    TRIG 342 (H) 09/10/2021     Lab Results   Component Value Date    HDL 44 09/10/2021     Lab Results   Component Value Date    LDLCALC 162 (H) 09/10/2021     Lab Results   Component Value Date    HGBA1C 5 8 (H) 10/20/2015     Lab Results   Component Value Date    SODIUM 139 09/10/2021    K 4 1 09/10/2021     09/10/2021    CO2 29 09/10/2021    ANIONGAP 8 12/15/2015    AGAP 8 09/10/2021    BUN 16 09/10/2021    CREATININE 0 73 09/10/2021    GLUC 105 08/07/2020    GLUF 108 (H) 09/10/2021    CALCIUM 9 2 09/10/2021    AST 19 09/10/2021    ALT 40 09/10/2021    ALKPHOS 103 09/10/2021    PROT 7 2 12/15/2015    TP 7 2 09/10/2021    BILITOT 0 30 12/15/2015    TBILI 0 37 09/10/2021    EGFR 86 09/10/2021       Orders Placed This Encounter   Procedures    influenza vaccine, high-dose, PF 0 7 mL (FLUZONE HIGH-DOSE)    Comprehensive metabolic panel    Lipid Panel with Direct LDL reflex       ALLERGIES:  Allergies   Allergen Reactions    Bupropion     Cefprozil     Conj Estrog-Medroxyprogest Ace Other (See Comments)     Other      Povidone Iodine      Patient unsure of what this is    Sulfamethoxazole-Trimethoprim Other (See Comments)     Reaction Date: 11Aug2011;     Levofloxacin     Gadobutrol GI Intolerance    Medroxyprogesterone Other (See Comments)     Reaction Date: 11Aug2011;     Nortriptyline Other (See Comments)     n/a    Pamelor [Nortriptyline Hcl]     Provera [Medroxyprogesterone Acetate]        Current Medications     Current Outpatient Medications   Medication Sig Dispense Refill    albuterol (2 5 mg/3 mL) 0 083 % nebulizer solution Take 3 mL (2 5 mg total) by nebulization every 4 (four) hours as needed for wheezing or shortness of breath (cough) 120 mL 3    albuterol (Ventolin HFA) 90 mcg/act inhaler Inhale 2 puffs every 4 (four) hours as needed for wheezing or shortness of breath 54 g 3    aspirin (ECOTRIN LOW STRENGTH) 81 mg EC tablet Take 1 tablet by mouth daily      fluticasone (FLONASE) 50 mcg/act nasal spray USE 2 SPRAYS IN BOTH  NOSTRILS DAILY 48 g 3    gabapentin (NEURONTIN) 300 mg capsule TAKE 1 CAPSULE BY MOUTH  TWICE DAILY 180 capsule 3    levocetirizine (XYZAL) 5 MG tablet TAKE 1 TABLET BY MOUTH  EVERY EVENING 90 tablet 1    montelukast (SINGULAIR) 10 mg tablet TAKE 1 TABLET BY MOUTH  DAILY AT BEDTIME 90 tablet 3    omega-3-acid ethyl esters (LOVAZA) 1 g capsule Take 2 capsules (2 g total) by mouth 2 (two) times a day 360 capsule 3    omeprazole (PriLOSEC) 40 MG capsule Take 40 mg by mouth daily      rosuvastatin (CRESTOR) 40 MG tablet Take 1 tablet (40 mg total) by mouth daily 90 tablet 1    sertraline (ZOLOFT) 100 mg tablet TAKE 1 TABLET BY MOUTH  DAILY 90 tablet 3    CANNABIDIOL PO Take 300 mg by mouth Daily or twice daily PRN back pain (Patient not taking: Reported on 8/31/2021)      Cholecalciferol (Vitamin D3) 125 MCG (5000 UT) CAPS Take 1 capsule by mouth daily      clobetasol (TEMOVATE) 0 05 % GEL APPLY TO AFFECTED AREAS TWICE A DAY (Patient not taking: Reported on 8/31/2021) 60 g 1    doxycycline hyclate (VIBRAMYCIN) 100 mg capsule Take 1 capsule (100 mg total) by mouth 2 (two) times daily after meals for 10 days 20 capsule 0    fluticasone-salmeterol (AirDuo RespiClick 989/87) 316-72 mcg/act dry powder inhaler Inhale 1 puff 2 (two) times a day ,rinse mouth after use   (Patient not taking: Reported on 8/31/2021) 1 each 3    predniSONE 10 mg tablet Take 30 mg (3 tabs) daily x 3 days; then 20 mg (2 tabs) daily x 3 days then 10 mg (1 tabs) daily x 3 days 18 tablet 0    sertraline (ZOLOFT) 25 mg tablet Take 1 tablet once a day along with sertraline 100 mg tablets for the total dose of 125 mg daily 90 tablet 3     No current facility-administered medications for this visit         Medications Discontinued During This Encounter   Medication Reason    rosuvastatin (CRESTOR) 40 MG tablet Reorder       Health Maintenance     Health Maintenance   Topic Date Due    DTaP,Tdap,and Td Vaccines (1 - Tdap) 09/28/1975    PT PLAN OF CARE  10/09/2019    SLP PLAN OF CARE  11/08/2019    BMI: Followup Plan  03/18/2022    Fall Risk  03/18/2022    Medicare Annual Wellness Visit (AWV)  03/18/2022    Depression Remission PHQ  03/18/2022    Colorectal Cancer Screening  06/12/2022    BMI: Adult  09/20/2022    Breast Cancer Screening: Mammogram  02/08/2023    Cervical Cancer Screening  08/13/2023    Pneumococcal Vaccine: 65+ Years (2 of 2 - PPSV23) 10/22/2023    Hepatitis C Screening  Completed    Influenza Vaccine  Completed    COVID-19 Vaccine  Completed    HIB Vaccine  Aged Out    Hepatitis B Vaccine  Aged Out    IPV Vaccine  Aged Out    Hepatitis A Vaccine  Aged Out    Meningococcal ACWY Vaccine  Aged Out    HPV Vaccine  Aged Out       Immunization History   Administered Date(s) Administered    INFLUENZA 09/26/2011    Influenza Quadrivalent Preservative Free 3 years and older IM 09/29/2015, 09/22/2016, 09/08/2017    Influenza, high dose seasonal 0 7 mL 09/30/2019, 09/17/2020, 09/20/2021    Influenza, recombinant, quadrivalent,injectable, preservative free 09/13/2018    Influenza, seasonal, injectable 01/17/2013, 09/03/2013, 09/23/2014    Pneumococcal Conjugate 13-Valent 10/22/2018    Pneumococcal Polysaccharide PPV23 10/01/2007, 09/29/2015    SARS-CoV-2 / COVID-19 mRNA IM (Pfizer-BioNTech) 03/20/2021, 04/12/2021    Td (adult), adsorbed 06/02/2016       Madonna Barton MD

## 2021-09-20 NOTE — ASSESSMENT & PLAN NOTE
High cholesterol at 274  Doubt compliance, patient admits to forgetfulness in taking her medication on a daily basis      She will restart Crestor 40 mg once a day as directed and will recheck blood work in 3 months

## 2021-09-20 NOTE — ASSESSMENT & PLAN NOTE
Most recent CT chest November 2020 consistent with emphysema  Patient is past due pulmonary follow-up   She still smokes 1 ppd, she was counseled on numerous occasions about significant risks of ongoing tobacco use  Patient has not used any inhalers lately    I strongly advised her to schedule follow-up with [facility] pulmonology, Dr Solitario Arroyo

## 2021-09-22 ENCOUNTER — TELEPHONE (OUTPATIENT)
Dept: FAMILY MEDICINE CLINIC | Facility: CLINIC | Age: 66
End: 2021-09-22

## 2021-09-22 RX ORDER — PREDNISONE 10 MG/1
TABLET ORAL
Qty: 18 TABLET | Refills: 0 | Status: SHIPPED | OUTPATIENT
Start: 2021-09-22 | End: 2021-12-20

## 2021-09-22 RX ORDER — DOXYCYCLINE HYCLATE 100 MG/1
100 CAPSULE ORAL
Qty: 20 CAPSULE | Refills: 0 | Status: SHIPPED | OUTPATIENT
Start: 2021-09-22 | End: 2021-10-02

## 2021-09-22 NOTE — TELEPHONE ENCOUNTER
Prescription for prednisone and antibiotic for treatment of sinusitis sent to the pharmacy    Thank you

## 2021-09-27 ENCOUNTER — POST-OP CHECK (OUTPATIENT)
Dept: URBAN - METROPOLITAN AREA CLINIC 6 | Facility: CLINIC | Age: 66
End: 2021-09-27

## 2021-09-27 DIAGNOSIS — Z96.1: ICD-10-CM

## 2021-09-27 DIAGNOSIS — H43.813: ICD-10-CM

## 2021-09-27 DIAGNOSIS — H04.123: ICD-10-CM

## 2021-09-27 PROCEDURE — 99024 POSTOP FOLLOW-UP VISIT: CPT

## 2021-09-27 ASSESSMENT — TONOMETRY
OS_IOP_MMHG: 15
OD_IOP_MMHG: 15

## 2021-09-27 ASSESSMENT — VISUAL ACUITY
OD_SC: 20/25
OS_SC: 20/25

## 2021-11-12 DIAGNOSIS — G35 MULTIPLE SCLEROSIS (HCC): Chronic | ICD-10-CM

## 2021-11-12 DIAGNOSIS — F33.9 MAJOR DEPRESSION, RECURRENT, CHRONIC (HCC): ICD-10-CM

## 2021-11-15 RX ORDER — GABAPENTIN 300 MG/1
CAPSULE ORAL
Qty: 180 CAPSULE | Refills: 3 | Status: SHIPPED | OUTPATIENT
Start: 2021-11-15

## 2021-11-15 RX ORDER — SERTRALINE HYDROCHLORIDE 100 MG/1
TABLET, FILM COATED ORAL
Qty: 90 TABLET | Refills: 3 | Status: SHIPPED | OUTPATIENT
Start: 2021-11-15 | End: 2021-12-20

## 2021-12-19 ENCOUNTER — APPOINTMENT (OUTPATIENT)
Dept: LAB | Facility: CLINIC | Age: 66
End: 2021-12-19
Payer: MEDICARE

## 2021-12-19 DIAGNOSIS — E78.2 MIXED HYPERLIPIDEMIA: Chronic | ICD-10-CM

## 2021-12-19 LAB
ALBUMIN SERPL BCP-MCNC: 4.2 G/DL (ref 3.5–5)
ALP SERPL-CCNC: 91 U/L (ref 46–116)
ALT SERPL W P-5'-P-CCNC: 33 U/L (ref 12–78)
ANION GAP SERPL CALCULATED.3IONS-SCNC: 10 MMOL/L (ref 4–13)
AST SERPL W P-5'-P-CCNC: 15 U/L (ref 5–45)
BILIRUB SERPL-MCNC: 0.28 MG/DL (ref 0.2–1)
BUN SERPL-MCNC: 13 MG/DL (ref 5–25)
CALCIUM SERPL-MCNC: 8.7 MG/DL (ref 8.3–10.1)
CHLORIDE SERPL-SCNC: 104 MMOL/L (ref 100–108)
CHOLEST SERPL-MCNC: 190 MG/DL
CO2 SERPL-SCNC: 27 MMOL/L (ref 21–32)
CREAT SERPL-MCNC: 0.67 MG/DL (ref 0.6–1.3)
GFR SERPL CREATININE-BSD FRML MDRD: 91 ML/MIN/1.73SQ M
GLUCOSE P FAST SERPL-MCNC: 111 MG/DL (ref 65–99)
HDLC SERPL-MCNC: 49 MG/DL
LDLC SERPL CALC-MCNC: 113 MG/DL (ref 0–100)
POTASSIUM SERPL-SCNC: 4.3 MMOL/L (ref 3.5–5.3)
PROT SERPL-MCNC: 6.9 G/DL (ref 6.4–8.2)
SODIUM SERPL-SCNC: 141 MMOL/L (ref 136–145)
TRIGL SERPL-MCNC: 141 MG/DL

## 2021-12-19 PROCEDURE — 80053 COMPREHEN METABOLIC PANEL: CPT

## 2021-12-19 PROCEDURE — 80061 LIPID PANEL: CPT

## 2021-12-19 PROCEDURE — 36415 COLL VENOUS BLD VENIPUNCTURE: CPT

## 2021-12-20 ENCOUNTER — TELEPHONE (OUTPATIENT)
Dept: NEUROLOGY | Facility: CLINIC | Age: 66
End: 2021-12-20

## 2021-12-20 ENCOUNTER — OFFICE VISIT (OUTPATIENT)
Dept: FAMILY MEDICINE CLINIC | Facility: CLINIC | Age: 66
End: 2021-12-20
Payer: MEDICARE

## 2021-12-20 VITALS
HEIGHT: 64 IN | TEMPERATURE: 97.8 F | OXYGEN SATURATION: 98 % | WEIGHT: 158 LBS | BODY MASS INDEX: 26.98 KG/M2 | DIASTOLIC BLOOD PRESSURE: 60 MMHG | SYSTOLIC BLOOD PRESSURE: 130 MMHG | RESPIRATION RATE: 16 BRPM | HEART RATE: 74 BPM

## 2021-12-20 DIAGNOSIS — E55.9 VITAMIN D DEFICIENCY: ICD-10-CM

## 2021-12-20 DIAGNOSIS — F33.9 MAJOR DEPRESSION, RECURRENT, CHRONIC (HCC): Chronic | ICD-10-CM

## 2021-12-20 DIAGNOSIS — J02.9 ACUTE PHARYNGITIS, UNSPECIFIED ETIOLOGY: ICD-10-CM

## 2021-12-20 DIAGNOSIS — E78.2 MIXED HYPERLIPIDEMIA: Primary | Chronic | ICD-10-CM

## 2021-12-20 DIAGNOSIS — G35 MULTIPLE SCLEROSIS (HCC): Chronic | ICD-10-CM

## 2021-12-20 DIAGNOSIS — J44.9 CHRONIC OBSTRUCTIVE PULMONARY DISEASE, UNSPECIFIED COPD TYPE (HCC): Chronic | ICD-10-CM

## 2021-12-20 PROCEDURE — U0005 INFEC AGEN DETEC AMPLI PROBE: HCPCS | Performed by: FAMILY MEDICINE

## 2021-12-20 PROCEDURE — U0003 INFECTIOUS AGENT DETECTION BY NUCLEIC ACID (DNA OR RNA); SEVERE ACUTE RESPIRATORY SYNDROME CORONAVIRUS 2 (SARS-COV-2) (CORONAVIRUS DISEASE [COVID-19]), AMPLIFIED PROBE TECHNIQUE, MAKING USE OF HIGH THROUGHPUT TECHNOLOGIES AS DESCRIBED BY CMS-2020-01-R: HCPCS | Performed by: FAMILY MEDICINE

## 2021-12-20 PROCEDURE — 99214 OFFICE O/P EST MOD 30 MIN: CPT | Performed by: FAMILY MEDICINE

## 2021-12-20 RX ORDER — SERTRALINE HYDROCHLORIDE 100 MG/1
TABLET, FILM COATED ORAL
Qty: 135 TABLET | Refills: 3 | Status: SHIPPED | OUTPATIENT
Start: 2021-12-20

## 2021-12-20 RX ORDER — AZITHROMYCIN 250 MG/1
TABLET, FILM COATED ORAL
Qty: 6 TABLET | Refills: 0 | Status: SHIPPED | OUTPATIENT
Start: 2021-12-20 | End: 2021-12-25

## 2021-12-21 LAB — SARS-COV-2 RNA RESP QL NAA+PROBE: NEGATIVE

## 2021-12-24 PROBLEM — M62.838 MUSCLE SPASM: Status: RESOLVED | Noted: 2017-02-02 | Resolved: 2021-12-24

## 2022-01-08 ENCOUNTER — IMMUNIZATIONS (OUTPATIENT)
Dept: FAMILY MEDICINE CLINIC | Facility: HOSPITAL | Age: 67
End: 2022-01-08

## 2022-01-08 DIAGNOSIS — Z23 ENCOUNTER FOR IMMUNIZATION: Primary | ICD-10-CM

## 2022-01-08 PROCEDURE — 91300 COVID-19 PFIZER VACC 0.3 ML: CPT

## 2022-01-08 PROCEDURE — 0001A COVID-19 PFIZER VACC 0.3 ML: CPT

## 2022-01-24 ENCOUNTER — FOLLOW UP (OUTPATIENT)
Dept: URBAN - METROPOLITAN AREA CLINIC 6 | Facility: CLINIC | Age: 67
End: 2022-01-24

## 2022-01-24 DIAGNOSIS — G35: ICD-10-CM

## 2022-01-24 DIAGNOSIS — H35.3131: ICD-10-CM

## 2022-01-24 DIAGNOSIS — H04.123: ICD-10-CM

## 2022-01-24 DIAGNOSIS — H43.813: ICD-10-CM

## 2022-01-24 DIAGNOSIS — Z96.1: ICD-10-CM

## 2022-01-24 PROCEDURE — 92014 COMPRE OPH EXAM EST PT 1/>: CPT

## 2022-01-24 ASSESSMENT — VISUAL ACUITY
OD_SC: 20/30-1
OU_SC: J1
OS_PH: 20/40
OS_SC: 20/40-1

## 2022-01-24 ASSESSMENT — TONOMETRY
OS_IOP_MMHG: 12
OD_IOP_MMHG: 14

## 2022-01-24 NOTE — TELEPHONE ENCOUNTER
Patient presented to [place] with chest pain that started while sleeping.  Trops elevated to a peak of 8.992.  EKG with changes in inferior leads which are new.  Patient was started on IV Heparin and NTG patch and transferred to [facility] for Cardiology intervention.  Patient s/p [facility] this morning:  LAD: Proximal stent patent. LCX: Proximal 80%. LCX: Dominant. Moderate disease. LCX: EHVER 2.75 x 18 mm. Distal dissection. HEVER 2.75 x 22 mm.  Continue with medical therapy. Stable to transfer to the floor.   Please follow up in early part of next week with dr martino office  Need an appt over this next month( ie October) before starting new therapy  We will be on look out for lab christiano jcv [facility]

## 2022-02-01 DIAGNOSIS — E78.2 MIXED HYPERLIPIDEMIA: Chronic | ICD-10-CM

## 2022-02-02 DIAGNOSIS — J30.89 ALLERGIC RHINITIS DUE TO FUNGAL SPORES, UNSPECIFIED SEASONALITY: ICD-10-CM

## 2022-02-02 RX ORDER — LEVOCETIRIZINE DIHYDROCHLORIDE 5 MG/1
TABLET, FILM COATED ORAL
Qty: 90 TABLET | Refills: 1 | Status: SHIPPED | OUTPATIENT
Start: 2022-02-02

## 2022-02-02 RX ORDER — ROSUVASTATIN CALCIUM 40 MG/1
TABLET, COATED ORAL
Qty: 90 TABLET | Refills: 3 | Status: SHIPPED | OUTPATIENT
Start: 2022-02-02

## 2022-02-28 ENCOUNTER — VBI (OUTPATIENT)
Dept: ADMINISTRATIVE | Facility: OTHER | Age: 67
End: 2022-02-28

## 2022-03-07 ENCOUNTER — HOSPITAL ENCOUNTER (OUTPATIENT)
Dept: RADIOLOGY | Age: 67
Discharge: HOME | End: 2022-03-07
Payer: MEDICARE

## 2022-03-07 VITALS — HEIGHT: 64 IN | WEIGHT: 155 LBS | BODY MASS INDEX: 26.46 KG/M2

## 2022-03-07 DIAGNOSIS — Z12.31 ENCOUNTER FOR SCREENING MAMMOGRAM FOR BREAST CANCER: ICD-10-CM

## 2022-03-07 PROCEDURE — 77067 SCR MAMMO BI INCL CAD: CPT

## 2022-03-07 PROCEDURE — 77063 BREAST TOMOSYNTHESIS BI: CPT

## 2022-03-28 ENCOUNTER — VBI (OUTPATIENT)
Dept: ADMINISTRATIVE | Facility: OTHER | Age: 67
End: 2022-03-28

## 2022-04-25 ENCOUNTER — APPOINTMENT (OUTPATIENT)
Dept: LAB | Facility: AMBULARY SURGERY CENTER | Age: 67
End: 2022-04-25
Payer: MEDICARE

## 2022-04-25 DIAGNOSIS — E55.9 VITAMIN D DEFICIENCY: ICD-10-CM

## 2022-04-25 DIAGNOSIS — E78.2 MIXED HYPERLIPIDEMIA: Chronic | ICD-10-CM

## 2022-04-25 LAB
25(OH)D3 SERPL-MCNC: 28.6 NG/ML (ref 30–100)
ALBUMIN SERPL BCP-MCNC: 4 G/DL (ref 3.5–5)
ALP SERPL-CCNC: 86 U/L (ref 46–116)
ALT SERPL W P-5'-P-CCNC: 32 U/L (ref 12–78)
ANION GAP SERPL CALCULATED.3IONS-SCNC: 5 MMOL/L (ref 4–13)
AST SERPL W P-5'-P-CCNC: 17 U/L (ref 5–45)
BASOPHILS # BLD AUTO: 0.05 THOUSANDS/ΜL (ref 0–0.1)
BASOPHILS NFR BLD AUTO: 1 % (ref 0–1)
BILIRUB SERPL-MCNC: 0.48 MG/DL (ref 0.2–1)
BUN SERPL-MCNC: 22 MG/DL (ref 5–25)
CALCIUM SERPL-MCNC: 9.3 MG/DL (ref 8.3–10.1)
CHLORIDE SERPL-SCNC: 107 MMOL/L (ref 100–108)
CHOLEST SERPL-MCNC: 200 MG/DL
CO2 SERPL-SCNC: 26 MMOL/L (ref 21–32)
CREAT SERPL-MCNC: 0.8 MG/DL (ref 0.6–1.3)
EOSINOPHIL # BLD AUTO: 0.13 THOUSAND/ΜL (ref 0–0.61)
EOSINOPHIL NFR BLD AUTO: 2 % (ref 0–6)
ERYTHROCYTE [DISTWIDTH] IN BLOOD BY AUTOMATED COUNT: 11.9 % (ref 11.6–15.1)
GFR SERPL CREATININE-BSD FRML MDRD: 76 ML/MIN/1.73SQ M
GLUCOSE P FAST SERPL-MCNC: 119 MG/DL (ref 65–99)
HCT VFR BLD AUTO: 43.1 % (ref 34.8–46.1)
HDLC SERPL-MCNC: 49 MG/DL
HGB BLD-MCNC: 14.7 G/DL (ref 11.5–15.4)
IMM GRANULOCYTES # BLD AUTO: 0.02 THOUSAND/UL (ref 0–0.2)
IMM GRANULOCYTES NFR BLD AUTO: 0 % (ref 0–2)
LDLC SERPL CALC-MCNC: 120 MG/DL (ref 0–100)
LYMPHOCYTES # BLD AUTO: 2.88 THOUSANDS/ΜL (ref 0.6–4.47)
LYMPHOCYTES NFR BLD AUTO: 37 % (ref 14–44)
MCH RBC QN AUTO: 29.9 PG (ref 26.8–34.3)
MCHC RBC AUTO-ENTMCNC: 34.1 G/DL (ref 31.4–37.4)
MCV RBC AUTO: 88 FL (ref 82–98)
MONOCYTES # BLD AUTO: 0.54 THOUSAND/ΜL (ref 0.17–1.22)
MONOCYTES NFR BLD AUTO: 7 % (ref 4–12)
NEUTROPHILS # BLD AUTO: 4.19 THOUSANDS/ΜL (ref 1.85–7.62)
NEUTS SEG NFR BLD AUTO: 53 % (ref 43–75)
NRBC BLD AUTO-RTO: 0 /100 WBCS
PLATELET # BLD AUTO: 234 THOUSANDS/UL (ref 149–390)
PMV BLD AUTO: 10.2 FL (ref 8.9–12.7)
POTASSIUM SERPL-SCNC: 4.2 MMOL/L (ref 3.5–5.3)
PROT SERPL-MCNC: 7.4 G/DL (ref 6.4–8.2)
RBC # BLD AUTO: 4.92 MILLION/UL (ref 3.81–5.12)
SODIUM SERPL-SCNC: 138 MMOL/L (ref 136–145)
TRIGL SERPL-MCNC: 157 MG/DL
TSH SERPL DL<=0.05 MIU/L-ACNC: 0.97 UIU/ML (ref 0.45–4.5)
WBC # BLD AUTO: 7.81 THOUSAND/UL (ref 4.31–10.16)

## 2022-04-25 PROCEDURE — 82306 VITAMIN D 25 HYDROXY: CPT

## 2022-04-25 PROCEDURE — 84443 ASSAY THYROID STIM HORMONE: CPT

## 2022-04-25 PROCEDURE — 80053 COMPREHEN METABOLIC PANEL: CPT

## 2022-04-25 PROCEDURE — 36415 COLL VENOUS BLD VENIPUNCTURE: CPT

## 2022-04-25 PROCEDURE — 80061 LIPID PANEL: CPT

## 2022-04-25 PROCEDURE — 85025 COMPLETE CBC W/AUTO DIFF WBC: CPT

## 2022-05-06 ENCOUNTER — RA CDI HCC (OUTPATIENT)
Dept: OTHER | Facility: HOSPITAL | Age: 67
End: 2022-05-06

## 2022-05-09 ENCOUNTER — TELEPHONE (OUTPATIENT)
Dept: UROLOGY | Facility: CLINIC | Age: 67
End: 2022-05-09

## 2022-05-09 NOTE — TELEPHONE ENCOUNTER
Called pt to R/s left  for pt to call back and R/s to any availability non urgent 2 year follow up per daniel's last note

## 2022-05-12 ENCOUNTER — OFFICE VISIT (OUTPATIENT)
Dept: FAMILY MEDICINE CLINIC | Facility: CLINIC | Age: 67
End: 2022-05-12
Payer: MEDICARE

## 2022-05-12 VITALS
HEIGHT: 64 IN | OXYGEN SATURATION: 97 % | DIASTOLIC BLOOD PRESSURE: 70 MMHG | BODY MASS INDEX: 26.98 KG/M2 | WEIGHT: 158 LBS | TEMPERATURE: 97.8 F | RESPIRATION RATE: 18 BRPM | SYSTOLIC BLOOD PRESSURE: 130 MMHG | HEART RATE: 73 BPM

## 2022-05-12 DIAGNOSIS — Z00.00 ENCOUNTER FOR MEDICARE ANNUAL WELLNESS EXAM: ICD-10-CM

## 2022-05-12 DIAGNOSIS — G35 MULTIPLE SCLEROSIS (HCC): ICD-10-CM

## 2022-05-12 DIAGNOSIS — E78.2 MIXED HYPERLIPIDEMIA: Primary | Chronic | ICD-10-CM

## 2022-05-12 DIAGNOSIS — R73.01 ELEVATED FASTING GLUCOSE: ICD-10-CM

## 2022-05-12 DIAGNOSIS — M51.16 INTERVERTEBRAL DISC DISORDERS WITH RADICULOPATHY, LUMBAR REGION: Chronic | ICD-10-CM

## 2022-05-12 DIAGNOSIS — J06.9 ACUTE URI: ICD-10-CM

## 2022-05-12 DIAGNOSIS — Z87.891 PERSONAL HISTORY OF NICOTINE DEPENDENCE: ICD-10-CM

## 2022-05-12 DIAGNOSIS — J44.9 CHRONIC OBSTRUCTIVE PULMONARY DISEASE, UNSPECIFIED COPD TYPE (HCC): ICD-10-CM

## 2022-05-12 DIAGNOSIS — K63.5 POLYP OF COLON, UNSPECIFIED PART OF COLON, UNSPECIFIED TYPE: ICD-10-CM

## 2022-05-12 DIAGNOSIS — F33.9 MAJOR DEPRESSION, RECURRENT, CHRONIC (HCC): Chronic | ICD-10-CM

## 2022-05-12 PROCEDURE — G0439 PPPS, SUBSEQ VISIT: HCPCS | Performed by: FAMILY MEDICINE

## 2022-05-12 PROCEDURE — 99214 OFFICE O/P EST MOD 30 MIN: CPT | Performed by: FAMILY MEDICINE

## 2022-05-12 PROCEDURE — 1123F ACP DISCUSS/DSCN MKR DOCD: CPT | Performed by: FAMILY MEDICINE

## 2022-05-12 RX ORDER — AZITHROMYCIN 250 MG/1
TABLET, FILM COATED ORAL
Qty: 6 TABLET | Refills: 0 | Status: SHIPPED | OUTPATIENT
Start: 2022-05-12 | End: 2022-05-16

## 2022-05-12 NOTE — PROGRESS NOTES
Assessment and Plan:     Problem List Items Addressed This Visit        Digestive    Polyp of colon    Relevant Orders    Ambulatory referral to Colorectal Surgery       Respiratory    Chronic obstructive pulmonary disease ([facility] [address] ) (Chronic)     Patient is still unfortunately smoking  She was counseled on numerous occasions by her pulmonologist and primary care physician  She uses albuterol as needed  Last CT chest 2020  Patient should proceed with annual CT screening study                 Nervous and Auditory    Multiple sclerosis, relapsing/remitting (Chronic)     Dr Escalera- follows, no RX           Intervertebral disc disorders with radiculopathy, lumbar region (Chronic)     Patient reports intermittent symptoms of low back pain with right sciatica, usually worse after prolonged sitting  She is reluctant to pursue physical therapy but will contact her chiropractor  Previous imaging included MRI of lumbar spine in 2018   If patient's back pain will not improve after chiropractic care-she will contact me and will pursue further evaluation and treatment  Patient remains on gabapentin              Other    Hyperlipidemia - Primary (Chronic)     Continue Crestor 40 mg daily  Continue monitoring labs  Long discussion with patient and  about importance of low-fat low-cholesterol diet  I of lipid panel is still suboptimal next time-I will be adding Zetia to her regimen  Relevant Orders    CBC and differential    Comprehensive metabolic panel    Lipid Panel with Direct LDL reflex    TSH, 3rd generation    Major depression, recurrent, chronic (HCC) (Chronic)     Zoloft 150 mg daily, symptoms are stably controlled           Elevated fasting glucose     Blood work performed in April 2022 reveals fasting blood glucose of 119  We discussed importance of low-carbohydrate diet, low-impact physical activity and weight loss      Continue monitoring, add hemoglobin A1c to next blood draw              Relevant Orders    Hemoglobin A1C    Personal history of nicotine dependence     Relevant Orders    CT lung screening program      Other Visit Diagnoses     Encounter for Medicare annual wellness exam        Acute URI        Relevant Medications    azithromycin (ZITHROMAX) 250 mg tablet        BMI Counseling: Body mass index is 27 12 kg/m²  The BMI is above normal  Nutrition recommendations include encouraging healthy choices of fruits and vegetables, consuming healthier snacks, moderation in carbohydrate intake, reducing intake of saturated and trans fat and reducing intake of cholesterol  Exercise recommendations include exercising 3-5 times per week  No pharmacotherapy was ordered  Patient referred to PCP  Rationale for BMI follow-up plan is due to patient being overweight or obese  Preventive health issues were discussed with patient, and age appropriate screening tests were ordered as noted in patient's After Visit Summary  Personalized health advice and appropriate referrals for health education or preventive services given if needed, as noted in patient's After Visit Summary       History of Present Illness:     Patient presents for Medicare Annual Wellness visit    Patient Care Team:  John Fernandez MD as PCP - General  DO Cristian Olsen MD Loise Smothers, CRNP Carrie Parks, MD Jolly Alm, MD Janina Frohlich, MD Enid Pert, MD (Ophthalmology)     Problem List:     Patient Active Problem List   Diagnosis    Multiple sclerosis, relapsing/remitting    Hyperlipidemia    Osteoporosis    Ambulatory dysfunction    Chronic obstructive pulmonary disease ([facility] [address] )    Vitamin D deficiency    Major depression, recurrent, chronic (HCC)    Allergic rhinitis    Amnesia/memory disorder    Shoulder impingement syndrome, left    Urinary incontinence    Balance disorder    Gastro-esophageal reflux disease with esophagitis    Collagenous colitis    Intervertebral disc disorders with radiculopathy, lumbar region    DDD (degenerative disc disease), cervical    Polyp of colon    Elevated fasting glucose    Personal history of nicotine dependence       Past Medical and Surgical History:     Past Medical History:   Diagnosis Date    Asthma     Degeneration of intervertebral disc at L5-S1 level     Depression     Family history of colon cancer 3/27/2019    Added automatically from request for surgery [id number]    Full incontinence of feces     Generalized anxiety disorder     Hepatitis     at age 4 yrs    Herpes zoster     last assessed: 6/20/13    History of colon polyps     Hypercholesterolemia     Hyperlipidemia     [facility])     MS (multiple sclerosis) ([facility] [address] )     Osteoporosis     Seasonal allergies     Tumor of breast     benign, right breast    Urinary incontinence      Past Surgical History:   Procedure Laterality Date    BREAST BIOPSY Right     benign    BREAST SURGERY      right breast fibroid tumor resection    COLONOSCOPY      11/2013 - due in 2018 - Dr Crisostomo    [place] LUMBAR PUNCTURE DIAGNOSTIC  10/1/2018    ROTATOR CUFF REPAIR      Bilateral    ROTATOR CUFF REPAIR Bilateral     SEPTOPLASTY        Family History:     Family History   Problem Relation Age of Onset    Arthritis Mother     Osteoporosis Mother     COPD Father     No Known Problems Maternal Grandmother     No Known Problems Maternal Grandfather     No Known Problems Paternal Grandmother     No Known Problems Paternal Grandfather     Arthritis Family     COPD Family     Emphysema Family     Heart disease Family     Hyperthyroidism Family     Multiple sclerosis Family     Osteoarthritis Family     No Known Problems Sister     No Known Problems Sister     Colon cancer Brother 72    Multiple sclerosis Brother     No Known Problems Brother     No Known Problems Brother     No Known Problems Maternal Aunt     No Known Problems Maternal Aunt     No Known Problems Paternal Aunt       Social History:     Social History     Socioeconomic History    Marital status: /Civil Union     Spouse name: None    Number of children: None    Years of education: None    Highest education level: None   Occupational History    Occupation: Retired   Tobacco Use    Smoking status: Current Every Day Smoker     Packs/day: 0 50     Years: 40 00     Pack years: 20 00     Types: Cigarettes     Start date: 1982    Smokeless tobacco: Never Used   Vaping Use    Vaping Use: Never used   Substance and Sexual Activity    Alcohol use: Yes     Comment: occasional; [employer] also states Never drank alcohol    Drug use: No    Sexual activity: Yes     Partners: Male     Comment: denied any risk of AIDS   Other Topics Concern    None   Social History Narrative         Social Determinants of Health     Financial Resource Strain: Not on file   Food Insecurity: Not on file   Transportation Needs: Not on file   Physical Activity: Not on file   Stress: Not on file   Social Connections: Not on file   Intimate Partner Violence: Not on file   Housing Stability: Not on file      Medications and Allergies:     Current Outpatient Medications   Medication Sig Dispense Refill    albuterol (2 5 mg/3 mL) 0 083 % nebulizer solution Take 3 mL (2 5 mg total) by nebulization every 4 (four) hours as needed for wheezing or shortness of breath (cough) 120 mL 3    albuterol (Ventolin HFA) 90 mcg/act inhaler Inhale 2 puffs every 4 (four) hours as needed for wheezing or shortness of breath 54 g 3    aspirin (ECOTRIN LOW STRENGTH) 81 mg EC tablet Take 1 tablet by mouth daily      azithromycin (ZITHROMAX) 250 mg tablet Take 2 tablets today then 1 tablet daily x 4 days 6 tablet 0    Cholecalciferol (Vitamin D3) 125 MCG (5000 UT) CAPS Take 1 capsule by mouth daily      fluticasone (FLONASE) 50 mcg/act nasal spray USE 2 SPRAYS IN BOTH  NOSTRILS DAILY 48 g 3    gabapentin (NEURONTIN) 300 mg capsule TAKE 1 CAPSULE BY MOUTH  TWICE DAILY 180 capsule 3    levocetirizine (XYZAL) 5 MG tablet TAKE 1 TABLET BY MOUTH IN  THE EVENING 90 tablet 1    omeprazole (PriLOSEC) 40 MG capsule Take 40 mg by mouth daily      rosuvastatin (CRESTOR) 40 MG tablet TAKE 1 TABLET BY MOUTH  DAILY 90 tablet 3    sertraline (ZOLOFT) 100 mg tablet Take 1 5  Tablets (150 mg )  daily 135 tablet 3    clobetasol (TEMOVATE) 0 05 % GEL APPLY TO AFFECTED AREAS TWICE A DAY (Patient not taking: No sig reported) 60 g 1    montelukast (SINGULAIR) 10 mg tablet TAKE 1 TABLET BY MOUTH  DAILY AT BEDTIME (Patient not taking: Reported on 5/12/2022) 90 tablet 3     No current facility-administered medications for this visit       Allergies   Allergen Reactions    Bupropion     Cefprozil     Conj Estrog-Medroxyprogest Ace Other (See Comments)     Other      Povidone Iodine      Patient unsure of what this is    Sulfamethoxazole-Trimethoprim Other (See Comments)     Reaction Date: 11Aug2011;     Levofloxacin     Gadobutrol GI Intolerance    Medroxyprogesterone Other (See Comments)     Reaction Date: 11Aug2011;     Nortriptyline Other (See Comments)     n/a    Pamelor [Nortriptyline Hcl]     Provera [Medroxyprogesterone Acetate]       Immunizations:     Immunization History   Administered Date(s) Administered    COVID-19 PFIZER VACCINE 0 3 ML IM 03/20/2021, 04/12/2021, 01/08/2022    INFLUENZA 09/26/2011    Influenza Quadrivalent Preservative Free 3 years and older IM 09/29/2015, 09/22/2016, 09/08/2017    Influenza, high dose seasonal 0 7 mL 09/30/2019, 09/17/2020, 09/20/2021    Influenza, recombinant, quadrivalent,injectable, preservative free 09/13/2018    Influenza, seasonal, injectable 01/17/2013, 09/03/2013, 09/23/2014    Pneumococcal Conjugate 13-Valent 10/22/2018    Pneumococcal Polysaccharide PPV23 10/01/2007, 09/29/2015    Td (adult), adsorbed 06/02/2016      Health Maintenance:         Topic Date Due    Colorectal Cancer Screening  06/12/2022    Cervical Cancer Screening  08/13/2023    Breast Cancer Screening: Mammogram  03/07/2024    Hepatitis C Screening  Completed         Topic Date Due    DTaP,Tdap,and Td Vaccines (1 - Tdap) 06/02/2016    Pneumococcal Vaccine: 65+ Years (3 - PPSV23 or PCV20) 09/29/2020    COVID-19 Vaccine (4 - Booster for [employer] series) 05/08/2022      Medicare Health Risk Assessment:     /70 (BP Location: Right arm, Patient Position: Sitting, Cuff Size: Standard)   Pulse 73   Temp 97 8 °F (36 6 °C) (Temporal)   Resp 18   Ht 5' 4" (1 626 m)   Wt 71 7 kg (158 lb)   LMP  (LMP Unknown)   SpO2 97%   BMI 27 12 kg/m²      Aman Fuentes is here for her Subsequent Wellness visit  Last Medicare Wellness visit information reviewed, patient interviewed and updates made to the record today  Health Risk Assessment:   Patient rates overall health as good  Patient feels that their physical health rating is same  Patient is satisfied with their life  Eyesight was rated as same  Hearing was rated as same  Patient feels that their emotional and mental health rating is same  Patients states they are never, rarely angry  Patient states they are sometimes unusually tired/fatigued  Pain experienced in the last 7 days has been a lot  Patient's pain rating has been 9/10  Patient states that she has experienced no weight loss or gain in last 6 months  Depression Screening:   PHQ-9 Score: 5      Fall Risk Screening: In the past year, patient has experienced: history of falling in past year    Number of falls: 1  Injured during fall?: Yes    Feels unsteady when standing or walking?: No    Worried about falling?: No      Urinary Incontinence Screening:   Patient has not leaked urine accidently in the last six months  Home Safety:  Patient does not have trouble with stairs inside or outside of their home  Patient has working smoke alarms and has working carbon monoxide detector  Home safety hazards include: none  Nutrition:   Current diet is Regular  Medications:   Patient is currently taking over-the-counter supplements  OTC medications include: see medication list  Patient is able to manage medications  Activities of Daily Living (ADLs)/Instrumental Activities of Daily Living (IADLs):   Walk and transfer into and out of bed and chair?: Yes  Dress and groom yourself?: Yes    Bathe or shower yourself?: Yes    Feed yourself?  Yes  Do your laundry/housekeeping?: Yes  Manage your money, pay your bills and track your expenses?: Yes  Make your own meals?: Yes    Do your own shopping?: Yes    Previous Hospitalizations:   Any hospitalizations or ED visits within the last 12 months?: No      Advance Care Planning:   Living will: No    Durable POA for healthcare: No    Advanced directive: No    Advanced directive counseling given: Yes    Five wishes given: Yes      Cognitive Screening:   Provider or family/friend/caregiver concerned regarding cognition?: No    PREVENTIVE SCREENINGS      Cardiovascular Screening:    General: Screening Not Indicated and History Lipid Disorder      Diabetes Screening:     General: Screening Current      Colorectal Cancer Screening:     General: Screening Current    Due for: Colonoscopy - High Risk      Breast Cancer Screening:     General: Screening Current      Cervical Cancer Screening:    General: Screening Not Indicated and Screening Current      Osteoporosis Screening:    General: Screening Not Indicated and History Osteoporosis      Abdominal Aortic Aneurysm (AAA) Screening:        General: Screening Not Indicated      Lung Cancer Screening:     General: Screening Not Indicated    Due for: Low Dose CT (LDCT)      Hepatitis C Screening:    General: Screening Current    Screening, Brief Intervention, and Referral to Treatment (SBIRT)    Screening    Typical number of drinks in a week: 0    Single Item Drug Screening:  How often have you used an illegal drug (including marijuana) or a prescription medication for non-medical reasons in the past year? never    Single Item Drug Screen Score: 0  Interpretation: Negative screen for possible drug use disorder    Brief Intervention  Alcohol & drug use screenings were reviewed  No concerns regarding substance use disorder identified  Other Counseling Topics:   Regular weightbearing exercise         Frida Gross MD

## 2022-05-12 NOTE — PATIENT INSTRUCTIONS
Medicare Preventive Visit Patient Instructions  Thank you for completing your Welcome to Medicare Visit or Medicare Annual Wellness Visit today  Your next wellness visit will be due in one year (5/13/2023)  The screening/preventive services that you may require over the next 5-10 years are detailed below  Some tests may not apply to you based off risk factors and/or age  Screening tests ordered at today's visit but not completed yet may show as past due  Also, please note that scanned in results may not display below  Preventive Screenings:  Service Recommendations Previous Testing/Comments   Colorectal Cancer Screening  * Colonoscopy    * Fecal Occult Blood Test (FOBT)/Fecal Immunochemical Test (FIT)  * Fecal DNA/Cologuard Test  * Flexible Sigmoidoscopy Age: 54-65 years old   Colonoscopy: every 10 years (may be performed more frequently if at higher risk)  OR  FOBT/FIT: every 1 year  OR  Cologuard: every 3 years  OR  Sigmoidoscopy: every 5 years  Screening may be recommended earlier than age 48 if at higher risk for colorectal cancer  Also, an individualized decision between you and your healthcare provider will decide whether screening between the ages of 74-80 would be appropriate  Colonoscopy: 06/12/2019  FOBT/FIT: Not on file  Cologuard: Not on file  Sigmoidoscopy: Not on file    Screening Current     Breast Cancer Screening Age: 36 years old  Frequency: every 1-2 years  Not required if history of left and right mastectomy Mammogram: 03/07/2022    Screening Current   Cervical Cancer Screening Between the ages of 21-29, pap smear recommended once every 3 years  Between the ages of 33-67, can perform pap smear with HPV co-testing every 5 years     Recommendations may differ for women with a history of total hysterectomy, cervical cancer, or abnormal pap smears in past  Pap Smear: 08/13/2020    Screening Not Indicated   Hepatitis C Screening Once for adults born between 1945 and 1965  More frequently in patients at high risk for Hepatitis C Hep C Antibody: Not on file    Screening Current   Diabetes Screening 1-2 times per year if you're at risk for diabetes or have pre-diabetes Fasting glucose: 119 mg/dL   A1C: No results in last 5 years    Screening Current   Cholesterol Screening Once every 5 years if you don't have a lipid disorder  May order more often based on risk factors  Lipid panel: 04/25/2022    Screening Not Indicated  History Lipid Disorder     Other Preventive Screenings Covered by Medicare:  1  Abdominal Aortic Aneurysm (AAA) Screening: covered once if your at risk  You're considered to be at risk if you have a family history of AAA  2  Lung Cancer Screening: covers low dose CT scan once per year if you meet all of the following conditions: (1) Age 50-69; (2) No signs or symptoms of lung cancer; (3) Current smoker or have quit smoking within the last 15 years; (4) You have a tobacco smoking history of at least 30 pack years (packs per day multiplied by number of years you smoked); (5) You get a written order from a healthcare provider  3  Glaucoma Screening: covered annually if you're considered high risk: (1) You have diabetes OR (2) Family history of glaucoma OR (3)  aged 48 and older OR (3)  American aged 72 and older  3  Osteoporosis Screening: covered every 2 years if you meet one of the following conditions: (1) You're estrogen deficient and at risk for osteoporosis based off medical history and other findings; (2) Have a vertebral abnormality; (3) On glucocorticoid therapy for more than 3 months; (4) Have primary hyperparathyroidism; (5) On osteoporosis medications and need to assess response to drug therapy  · Last bone density test (DXA Scan): 07/26/2016   5  HIV Screening: covered annually if you're between the age of 15-65  Also covered annually if you are younger than 13 and older than 72 with risk factors for HIV infection   For pregnant patients, it is covered up to 3 times per pregnancy  Immunizations:  Immunization Recommendations   Influenza Vaccine Annual influenza vaccination during flu season is recommended for all persons aged >= 6 months who do not have contraindications   Pneumococcal Vaccine (Prevnar and Pneumovax)  * Prevnar = PCV13  * Pneumovax = PPSV23   Adults 25-60 years old: 1-3 doses may be recommended based on certain risk factors  Adults 72 years old: Prevnar (PCV13) vaccine recommended followed by Pneumovax (PPSV23) vaccine  If already received PPSV23 since turning 65, then PCV13 recommended at least one year after PPSV23 dose  Hepatitis B Vaccine 3 dose series if at intermediate or high risk (ex: diabetes, end stage renal disease, liver disease)   Tetanus (Td) Vaccine - COST NOT COVERED BY MEDICARE PART B Following completion of primary series, a booster dose should be given every 10 years to maintain immunity against tetanus  Td may also be given as tetanus wound prophylaxis  Tdap Vaccine - COST NOT COVERED BY MEDICARE PART B Recommended at least once for all adults  For pregnant patients, recommended with each pregnancy  Shingles Vaccine (Shingrix) - COST NOT COVERED BY MEDICARE PART B  2 shot series recommended in those aged 48 and above     Health Maintenance Due:      Topic Date Due    Colorectal Cancer Screening  06/12/2022    Cervical Cancer Screening  08/13/2023    Breast Cancer Screening: Mammogram  03/07/2024    Hepatitis C Screening  Completed     Immunizations Due:      Topic Date Due    DTaP,Tdap,and Td Vaccines (1 - Tdap) 06/02/2016    Pneumococcal Vaccine: 65+ Years (3 - PPSV23 or PCV20) 09/29/2020    COVID-19 Vaccine (4 - Booster for [employer] series) 05/08/2022     Advance Directives   What are advance directives? Advance directives are legal documents that state your wishes and plans for medical care  These plans are made ahead of time in case you lose your ability to make decisions for yourself   Advance directives can apply to any medical decision, such as the treatments you want, and if you want to donate organs  What are the types of advance directives? There are many types of advance directives, and each state has rules about how to use them  You may choose a combination of any of the following:  · Living will: This is a written record of the treatment you want  You can also choose which treatments you do not want, which to limit, and which to stop at a certain time  This includes surgery, medicine, IV fluid, and tube feedings  · Durable power of  for healthcare [facility] This is a written record that states who you want to make healthcare choices for you when you are unable to make them for yourself  This person, called a proxy, is usually a family member or a friend  You may choose more than 1 proxy  · Do not resuscitate (DNR) order:  A DNR order is used in case your heart stops beating or you stop breathing  It is a request not to have certain forms of treatment, such as CPR  A DNR order may be included in other types of advance directives  · Medical directive: This covers the care that you want if you are in a coma, near death, or unable to make decisions for yourself  You can list the treatments you want for each condition  Treatment may include pain medicine, surgery, blood transfusions, dialysis, IV or tube feedings, and a ventilator (breathing machine)  · Values history: This document has questions about your views, beliefs, and how you feel and think about life  This information can help others choose the care that you would choose  Why are advance directives important? An advance directive helps you control your care  Although spoken wishes may be used, it is better to have your wishes written down  Spoken wishes can be misunderstood, or not followed  Treatments may be given even if you do not want them   An advance directive may make it easier for your family to make difficult choices about your care    Fall Prevention    Fall prevention  includes ways to make your home and other areas safer  It also includes ways you can move more carefully to prevent a fall  Health conditions that cause changes in your blood pressure, vision, or muscle strength and coordination may increase your risk for falls  Medicines may also increase your risk for falls if they make you dizzy, weak, or sleepy  Fall prevention tips:   · Stand or sit up slowly  · Use assistive devices as directed  · Wear shoes that fit well and have soles that   · Wear a personal alarm  · Stay active  · Manage your medical conditions  Home Safety Tips:  · Add items to prevent falls in the bathroom  · Keep paths clear  · Install bright lights in your home  · Keep items you use often on shelves within reach  · Paint or place reflective tape on the edges of your stairs  Cigarette Smoking and Your Health   Risks to your health if you smoke:  Nicotine and other chemicals found in tobacco damage every cell in your body  Even if you are a light smoker, you have an increased risk for cancer, heart disease, and lung disease  If you are pregnant or have diabetes, smoking increases your risk for complications  Benefits to your health if you stop smoking:   · You decrease respiratory symptoms such as coughing, wheezing, and shortness of breath  · You reduce your risk for cancers of the lung, mouth, throat, kidney, bladder, pancreas, stomach, and cervix  If you already have cancer, you increase the benefits of chemotherapy  You also reduce your risk for cancer returning or a second cancer from developing  · You reduce your risk for heart disease, blood clots, heart attack, and stroke  · You reduce your risk for lung infections, and diseases such as pneumonia, asthma, chronic bronchitis, and emphysema  · Your circulation improves  More oxygen can be delivered to your body   If you have diabetes, you lower your risk for complications, such as kidney, artery, and eye diseases  You also lower your risk for nerve damage  Nerve damage can lead to amputations, poor vision, and blindness  · You improve your body's ability to heal and to fight infections  For more information and support to stop smoking:   · [employer]  Phone: 1- 089 - [phone]  Web Address: [employer]  Weight Management   Why it is important to manage your weight:  Being overweight increases your risk of health conditions such as heart disease, high blood pressure, type 2 diabetes, and certain types of cancer  It can also increase your risk for osteoarthritis, sleep apnea, and other respiratory problems  Aim for a slow, steady weight loss  Even a small amount of weight loss can lower your risk of health problems  How to lose weight safely:  A safe and healthy way to lose weight is to eat fewer calories and get regular exercise  You can lose up about 1 pound a week by decreasing the number of calories you eat by 500 calories each day  Healthy meal plan for weight management:  A healthy meal plan includes a variety of foods, contains fewer calories, and helps you stay healthy  A healthy meal plan includes the following:  · Eat whole-grain foods more often  A healthy meal plan should contain fiber  Fiber is the part of grains, fruits, and vegetables that is not broken down by your body  Whole-grain foods are healthy and provide extra fiber in your diet  Some examples of whole-grain foods are whole-wheat breads and pastas, oatmeal, brown rice, and bulgur  · Eat a variety of vegetables every day  Include dark, leafy greens such as spinach, kale, letitia greens, and mustard greens  Eat yellow and orange vegetables such as carrots, sweet potatoes, and winter squash  · Eat a variety of fruits every day  Choose fresh or canned fruit (canned in its own juice or light syrup) instead of juice  Fruit juice has very little or no fiber  · Eat low-fat dairy foods  Drink fat-free (skim) milk or 1% milk  Eat fat-free yogurt and low-fat cottage cheese  Try low-fat cheeses such as mozzarella and other reduced-fat cheeses  · Choose meat and other protein foods that are low in fat  Choose beans or other legumes such as split peas or lentils  Choose fish, skinless poultry (chicken or turkey), or lean cuts of red meat (beef or pork)  Before you cook meat or poultry, cut off any visible fat  · Use less fat and oil  Try baking foods instead of frying them  Add less fat, such as margarine, sour cream, regular salad dressing and mayonnaise to foods  Eat fewer high-fat foods  Some examples of high-fat foods include french fries, doughnuts, ice cream, and cakes  · Eat fewer sweets  Limit foods and drinks that are high in sugar  This includes candy, cookies, regular soda, and sweetened drinks  Exercise:  Exercise at least 30 minutes per day on most days of the week  Some examples of exercise include walking, biking, dancing, and swimming  You can also fit in more physical activity by taking the stairs instead of the elevator or parking farther away from stores  Ask your healthcare provider about the best exercise plan for you  © Copyright [employer] 2018 Information is for End User's use only and may not be sold, redistributed or otherwise used for commercial purposes   All illustrations and images included in CareNotes® are the copyrighted property of A [employer] M [employer]  or [address] [place] [address] [facility]

## 2022-05-12 NOTE — PROGRESS NOTES
FAMILY PRACTICE OFFICE VISIT       NAME: Michi Kaufman  AGE: 78 y o  SEX: female       : 1955        MRN: [id number]        Assessment and Plan     1  Mixed hyperlipidemia  Assessment & Plan:  Continue Crestor 40 mg daily  Continue monitoring labs  Long discussion with patient and  about importance of low-fat low-cholesterol diet  I of lipid panel is still suboptimal next time-I will be adding Zetia to her regimen  Orders:  -     CBC and differential; Future  -     Comprehensive metabolic panel; Future  -     Lipid Panel with Direct LDL reflex; Future  -     TSH, 3rd generation; Future    2  Major depression, recurrent, chronic (HCC)  Assessment & Plan:  Zoloft 150 mg daily, symptoms are stably controlled      3  Encounter for Medicare annual wellness exam    4  Polyp of colon, unspecified part of colon, unspecified type  -     Ambulatory referral to Colorectal Surgery; Future    5  Multiple sclerosis ([facility] )  Assessment & Plan:  Dr Escalera- follows, no RX      6  Chronic obstructive pulmonary disease, unspecified COPD type ([facility] )  Assessment & Plan:  Patient is still unfortunately smoking  She was counseled on numerous occasions by her pulmonologist and primary care physician  She uses albuterol as needed  Last CT chest   Patient should proceed with annual CT screening study         7  Elevated fasting glucose  Assessment & Plan:  Blood work performed in 2022 reveals fasting blood glucose of 119  We discussed importance of low-carbohydrate diet, low-impact physical activity and weight loss  Continue monitoring, add hemoglobin A1c to next blood draw  Orders:  -     Hemoglobin A1C; Future    8  Acute URI  -     azithromycin (ZITHROMAX) 250 mg tablet; Take 2 tablets today then 1 tablet daily x 4 days    9   Intervertebral disc disorders with radiculopathy, lumbar region  Assessment & Plan:  Patient reports intermittent symptoms of low back pain with right sciatica, usually worse after prolonged sitting  She is reluctant to pursue physical therapy but will contact her chiropractor  Previous imaging included MRI of lumbar spine in 2018   If patient's back pain will not improve after chiropractic care-she will contact me and will pursue further evaluation and treatment  Patient remains on gabapentin      10  Personal history of nicotine dependence   -     CT lung screening program; Future; Expected date: 05/15/2022       Patient presents for follow-up/Medicare wellness exam   Assessment and plan as outlined above  She is due for colonoscopy in June and should proceed with annual CT chest due to ongoing tobacco use  Patient presents with discoloration of right big toenail, I recommend her to start soaking in half water have vinegar for the next 4 weeks  If symptoms are not improving-patient will pursue further evaluation by Podiatry or Dermatology  Patient Instructions     Medicare Preventive Visit Patient Instructions  Thank you for completing your Welcome to Medicare Visit or Medicare Annual Wellness Visit today  Your next wellness visit will be due in one year (5/13/2023)  The screening/preventive services that you may require over the next 5-10 years are detailed below  Some tests may not apply to you based off risk factors and/or age  Screening tests ordered at today's visit but not completed yet may show as past due  Also, please note that scanned in results may not display below    Preventive Screenings:  Service Recommendations Previous Testing/Comments   Colorectal Cancer Screening  * Colonoscopy    * Fecal Occult Blood Test (FOBT)/Fecal Immunochemical Test (FIT)  * Fecal DNA/Cologuard Test  * Flexible Sigmoidoscopy Age: 54-65 years old   Colonoscopy: every 10 years (may be performed more frequently if at higher risk)  OR  FOBT/FIT: every 1 year  OR  Cologuard: every 3 years  OR  Sigmoidoscopy: every 5 years  Screening may be recommended earlier than age 48 if at higher risk for colorectal cancer  Also, an individualized decision between you and your healthcare provider will decide whether screening between the ages of 74-80 would be appropriate  Colonoscopy: 06/12/2019  FOBT/FIT: Not on file  Cologuard: Not on file  Sigmoidoscopy: Not on file    Screening Current     Breast Cancer Screening Age: 36 years old  Frequency: every 1-2 years  Not required if history of left and right mastectomy Mammogram: 03/07/2022    Screening Current   Cervical Cancer Screening Between the ages of 21-29, pap smear recommended once every 3 years  Between the ages of 33-67, can perform pap smear with HPV co-testing every 5 years  Recommendations may differ for women with a history of total hysterectomy, cervical cancer, or abnormal pap smears in past  Pap Smear: 08/13/2020    Screening Not Indicated   Hepatitis C Screening Once for adults born between 1945 and 1965  More frequently in patients at high risk for Hepatitis C Hep C Antibody: Not on file    Screening Current   Diabetes Screening 1-2 times per year if you're at risk for diabetes or have pre-diabetes Fasting glucose: 119 mg/dL   A1C: No results in last 5 years    Screening Current   Cholesterol Screening Once every 5 years if you don't have a lipid disorder  May order more often based on risk factors  Lipid panel: 04/25/2022    Screening Not Indicated  History Lipid Disorder     Other Preventive Screenings Covered by Medicare:  Abdominal Aortic Aneurysm (AAA) Screening: covered once if your at risk  You're considered to be at risk if you have a family history of AAA    Lung Cancer Screening: covers low dose CT scan once per year if you meet all of the following conditions: (1) Age 50-69; (2) No signs or symptoms of lung cancer; (3) Current smoker or have quit smoking within the last 15 years; (4) You have a tobacco smoking history of at least 30 pack years (packs per day multiplied by number of years you smoked); (5) You get a written order from a healthcare provider  Glaucoma Screening: covered annually if you're considered high risk: (1) You have diabetes OR (2) Family history of glaucoma OR (3)  aged 48 and older OR (3)  American aged 72 and older  Osteoporosis Screening: covered every 2 years if you meet one of the following conditions: (1) You're estrogen deficient and at risk for osteoporosis based off medical history and other findings; (2) Have a vertebral abnormality; (3) On glucocorticoid therapy for more than 3 months; (4) Have primary hyperparathyroidism; (5) On osteoporosis medications and need to assess response to drug therapy  Last bone density test (DXA Scan): 07/26/2016  HIV Screening: covered annually if you're between the age of 12-76  Also covered annually if you are younger than 13 and older than 72 with risk factors for HIV infection  For pregnant patients, it is covered up to 3 times per pregnancy  Immunizations:  Immunization Recommendations   Influenza Vaccine Annual influenza vaccination during flu season is recommended for all persons aged >= 6 months who do not have contraindications   Pneumococcal Vaccine (Prevnar and Pneumovax)  * Prevnar = PCV13  * Pneumovax = PPSV23   Adults 25-60 years old: 1-3 doses may be recommended based on certain risk factors  Adults 72 years old: Prevnar (PCV13) vaccine recommended followed by Pneumovax (PPSV23) vaccine  If already received PPSV23 since turning 65, then PCV13 recommended at least one year after PPSV23 dose  Hepatitis B Vaccine 3 dose series if at intermediate or high risk (ex: diabetes, end stage renal disease, liver disease)   Tetanus (Td) Vaccine - COST NOT COVERED BY MEDICARE PART B Following completion of primary series, a booster dose should be given every 10 years to maintain immunity against tetanus  Td may also be given as tetanus wound prophylaxis     Tdap Vaccine - COST NOT COVERED BY MEDICARE PART B Recommended at least once for all adults  For pregnant patients, recommended with each pregnancy  Shingles Vaccine (Shingrix) - COST NOT COVERED BY MEDICARE PART B  2 shot series recommended in those aged 48 and above     Health Maintenance Due:      Topic Date Due    Colorectal Cancer Screening  06/12/2022    Cervical Cancer Screening  08/13/2023    Breast Cancer Screening: Mammogram  03/07/2024    Hepatitis C Screening  Completed     Immunizations Due:      Topic Date Due    DTaP,Tdap,and Td Vaccines (1 - Tdap) 06/02/2016    Pneumococcal Vaccine: 65+ Years (3 - PPSV23 or PCV20) 09/29/2020    COVID-19 Vaccine (4 - Booster for [place] series) 05/08/2022     Advance Directives   What are advance directives? Advance directives are legal documents that state your wishes and plans for medical care  These plans are made ahead of time in case you lose your ability to make decisions for yourself  Advance directives can apply to any medical decision, such as the treatments you want, and if you want to donate organs  What are the types of advance directives? There are many types of advance directives, and each state has rules about how to use them  You may choose a combination of any of the following:  Living will: This is a written record of the treatment you want  You can also choose which treatments you do not want, which to limit, and which to stop at a certain time  This includes surgery, medicine, IV fluid, and tube feedings  Durable power of  for healthcare [facility]): This is a written record that states who you want to make healthcare choices for you when you are unable to make them for yourself  This person, called a proxy, is usually a family member or a friend  You may choose more than 1 proxy  Do not resuscitate (DNR) order:  A DNR order is used in case your heart stops beating or you stop breathing  It is a request not to have certain forms of treatment, such as CPR   A DNR order may be included in other types of advance directives  Medical directive: This covers the care that you want if you are in a coma, near death, or unable to make decisions for yourself  You can list the treatments you want for each condition  Treatment may include pain medicine, surgery, blood transfusions, dialysis, IV or tube feedings, and a ventilator (breathing machine)  Values history: This document has questions about your views, beliefs, and how you feel and think about life  This information can help others choose the care that you would choose  Why are advance directives important? An advance directive helps you control your care  Although spoken wishes may be used, it is better to have your wishes written down  Spoken wishes can be misunderstood, or not followed  Treatments may be given even if you do not want them  An advance directive may make it easier for your family to make difficult choices about your care  Fall Prevention    Fall prevention  includes ways to make your home and other areas safer  It also includes ways you can move more carefully to prevent a fall  Health conditions that cause changes in your blood pressure, vision, or muscle strength and coordination may increase your risk for falls  Medicines may also increase your risk for falls if they make you dizzy, weak, or sleepy  Fall prevention tips:   Stand or sit up slowly  Use assistive devices as directed  Wear shoes that fit well and have soles that   Wear a personal alarm  Stay active  Manage your medical conditions  Home Safety Tips:  Add items to prevent falls in the bathroom  Keep paths clear  Install bright lights in your home  Keep items you use often on shelves within reach  [place] or place reflective tape on the edges of your stairs  Cigarette Smoking and Your Health   Risks to your health if you smoke:  Nicotine and other chemicals found in tobacco damage every cell in your body   Even if you are a light smoker, you have an increased risk for cancer, heart disease, and lung disease  If you are pregnant or have diabetes, smoking increases your risk for complications  Benefits to your health if you stop smoking: You decrease respiratory symptoms such as coughing, wheezing, and shortness of breath  You reduce your risk for cancers of the lung, mouth, throat, kidney, bladder, pancreas, stomach, and cervix  If you already have cancer, you increase the benefits of chemotherapy  You also reduce your risk for cancer returning or a second cancer from developing  You reduce your risk for heart disease, blood clots, heart attack, and stroke  You reduce your risk for lung infections, and diseases such as pneumonia, asthma, chronic bronchitis, and emphysema  Your circulation improves  More oxygen can be delivered to your body  If you have diabetes, you lower your risk for complications, such as kidney, artery, and eye diseases  You also lower your risk for nerve damage  Nerve damage can lead to amputations, poor vision, and blindness  You improve your body's ability to heal and to fight infections  For more information and support to stop smoking:   [employer]  Phone: 9- 348 - [phone]  Web Address: www [place]  Weight Management   Why it is important to manage your weight:  Being overweight increases your risk of health conditions such as heart disease, high blood pressure, type 2 diabetes, and certain types of cancer  It can also increase your risk for osteoarthritis, sleep apnea, and other respiratory problems  Aim for a slow, steady weight loss  Even a small amount of weight loss can lower your risk of health problems  How to lose weight safely:  A safe and healthy way to lose weight is to eat fewer calories and get regular exercise  You can lose up about 1 pound a week by decreasing the number of calories you eat by 500 calories each day     Healthy meal plan for weight management:  A healthy meal plan includes a variety of foods, contains fewer calories, and helps you stay healthy  A healthy meal plan includes the following:  Eat whole-grain foods more often  A healthy meal plan should contain fiber  Fiber is the part of grains, fruits, and vegetables that is not broken down by your body  Whole-grain foods are healthy and provide extra fiber in your diet  Some examples of whole-grain foods are whole-wheat breads and pastas, oatmeal, brown rice, and bulgur  Eat a variety of vegetables every day  Include dark, leafy greens such as spinach, kale, letitia greens, and mustard greens  Eat yellow and orange vegetables such as carrots, sweet potatoes, and winter squash  Eat a variety of fruits every day  Choose fresh or canned fruit (canned in its own juice or light syrup) instead of juice  Fruit juice has very little or no fiber  Eat low-fat dairy foods  Drink fat-free (skim) milk or 1% milk  Eat fat-free yogurt and low-fat cottage cheese  Try low-fat cheeses such as mozzarella and other reduced-fat cheeses  Choose meat and other protein foods that are low in fat  Choose beans or other legumes such as split peas or lentils  Choose fish, skinless poultry (chicken or turkey), or lean cuts of red meat (beef or pork)  Before you cook meat or poultry, cut off any visible fat  Use less fat and oil  Try baking foods instead of frying them  Add less fat, such as margarine, sour cream, regular salad dressing and mayonnaise to foods  Eat fewer high-fat foods  Some examples of high-fat foods include french fries, doughnuts, ice cream, and cakes  Eat fewer sweets  Limit foods and drinks that are high in sugar  This includes candy, cookies, regular soda, and sweetened drinks  Exercise:  Exercise at least 30 minutes per day on most days of the week  Some examples of exercise include walking, biking, dancing, and swimming   You can also fit in more physical activity by taking the stairs instead of the elevator or parking farther away from stores  Ask your healthcare provider about the best exercise plan for you  © Copyright [employer] 2018 Information is for End User's use only and may not be sold, redistributed or otherwise used for commercial purposes  All illustrations and images included in CareNotes® are the copyrighted property of A D A M , Inc  or [address]      Return in about 5 months (around 10/12/2022) for follow up  Discussed with the patient and all questioned fully answered  She will call me if any problems arise  M*Modal software was used to dictate this note  It may contain errors with dictating incorrect words/spelling  Please contact provider directly with any questions  Chief Complaint     Chief Complaint   Patient presents with    Medicare Wellness Visit    Sinus Problem     Sore throat with spots  PND  cough    Buttocks/Leg Pain     Starts right sided and radiates to the leg  X1 month        History of Present Illness     Patient presents for follow-up of chronic medical conditions  He is here today accompanied by her   Patient reports cold symptoms  Sinus congestion x 1 week  No fever  Chest feels tight  Mucus is yellow  No cough at night     Right sided low back pain- radiates to the right lower thigh   no preceding fall or injury, patient with known lumbar sacral disc disease, history of low back pain with sciatica  Back pain is worse after prolonged sitting and at night , improves with walking and strestching  No saddle paresthesias  No unusual weakness  Mri LS spine 2018:  Distal cord is normal, no indication of demyelinating disease  Spondylosis and osteoarthritis with grade 1 anterolisthesis of L5-S1 and right greater than left lateral recess stenosis  Correlate for right S1 radiculitis  Last colonscopy 6/2019 - due 6/2022    Results of recent blood work reviewed  Patient is on high dose of Crestor 40 mg once a day      Her cholesterol is 200 with LDL of 140  Fasting blood glucose 119  Patient admits that she has been enjoying desserts especially ice cream lately  She remains under ongoing care of Neurology for surveillance of multiple sclerosis  No medications  No recent CNS imaging  Sinus Problem  Associated symptoms include congestion and coughing  Pertinent negatives include no headaches or shortness of breath  Review of Systems   Review of Systems   Constitutional: Negative  HENT: Positive for congestion and postnasal drip  Eyes: Negative  Respiratory: Positive for cough and chest tightness  Negative for shortness of breath and wheezing  Cardiovascular: Negative  Gastrointestinal: Negative  Endocrine: Negative  Genitourinary: Negative  Musculoskeletal: Positive for arthralgias and back pain  Skin: Negative  Neurological: Negative for dizziness and headaches  Hematological: Negative  Psychiatric/Behavioral: Positive for decreased concentration         Active Problem List     Patient Active Problem List   Diagnosis    Multiple sclerosis, relapsing/remitting    Hyperlipidemia    Osteoporosis    Ambulatory dysfunction    Chronic obstructive pulmonary disease (HCC)    Vitamin D deficiency    Major depression, recurrent, chronic (HCC)    Allergic rhinitis    Amnesia/memory disorder    Shoulder impingement syndrome, left    Urinary incontinence    Balance disorder    Gastro-esophageal reflux disease with esophagitis    Collagenous colitis    Intervertebral disc disorders with radiculopathy, lumbar region    DDD (degenerative disc disease), cervical    Polyp of colon    Elevated fasting glucose    Personal history of nicotine dependence        Past Medical History:  Past Medical History:   Diagnosis Date    Asthma     Degeneration of intervertebral disc at L5-S1 level     Depression     Family history of colon cancer 3/27/2019    Added automatically from request for surgery [id number]    Full incontinence of feces     Generalized anxiety disorder     Hepatitis     at age 4 yrs    Herpes zoster     last assessed: 6/20/13    History of colon polyps     Hypercholesterolemia     Hyperlipidemia     MS (multiple sclerosis) (HCC)     [facility] sclerosis) [facility] [address] [facility] )     Osteoporosis     Seasonal allergies     Tumor of breast     benign, right breast    Urinary incontinence        Past Surgical History:  Past Surgical History:   Procedure Laterality Date    BREAST BIOPSY Right     benign    BREAST SURGERY      right breast fibroid tumor resection    COLONOSCOPY      11/2013 - due in 2018 - Dr Crisostomo    FL LUMBAR PUNCTURE DIAGNOSTIC  10/1/2018    ROTATOR CUFF REPAIR      Bilateral    ROTATOR CUFF REPAIR Bilateral     SEPTOPLASTY         Family History:  Family History   Problem Relation Age of Onset    Arthritis Mother     Osteoporosis Mother     COPD Father     No Known Problems Maternal Grandmother     No Known Problems Maternal Grandfather     No Known Problems Paternal Grandmother     No Known Problems Paternal Grandfather     Arthritis Family     COPD Family     Emphysema Family     Heart disease Family     Hyperthyroidism Family     Multiple sclerosis Family     Osteoarthritis Family     No Known Problems Sister     No Known Problems Sister     Colon cancer Brother 72    Multiple sclerosis Brother     No Known Problems Brother     No Known Problems Brother     No Known Problems Maternal Aunt     No Known Problems Maternal Aunt     No Known Problems Paternal Aunt        Social History:  Social History     Socioeconomic History    Marital status: /Civil Union     Spouse name: Not on file    Number of children: Not on file    Years of education: Not on file    Highest education level: Not on file   Occupational History    Occupation: Retired   Tobacco Use    Smoking status: Current Every Day Smoker     Packs/day: 0 50 Years: 40 00     Pack years: 20 00     Types: Cigarettes     Start date: 17    Smokeless tobacco: Never Used   Vaping Use    Vaping Use: Never used   Substance and Sexual Activity    Alcohol use: Yes     Comment: occasional; [employer] also states Never drank alcohol    Drug use: No    Sexual activity: Yes     Partners: Male     Comment: denied any risk of AIDS   Other Topics Concern    Not on file   Social History Narrative         Social Determinants of Health     Financial Resource Strain: Not on file   Food Insecurity: Not on file   Transportation Needs: Not on file   Physical Activity: Not on file   Stress: Not on file   Social Connections: Not on file   Intimate Partner Violence: Not on file   Housing Stability: Not on file         Objective     Vitals:    05/12/22 1304   BP: 130/70   BP Location: Right arm   Patient Position: Sitting   Cuff Size: Standard   Pulse: 73   Resp: 18   Temp: 97 8 °F (36 6 °C)   TempSrc: Temporal   SpO2: 97%   Weight: 71 7 kg (158 lb)   Height: 5' 4" (1 626 m)       Wt Readings from Last 3 Encounters:   05/12/22 71 7 kg (158 lb)   03/07/22 70 3 kg (155 lb)   12/20/21 71 7 kg (158 lb)       Physical Exam  Vitals and nursing note reviewed  Constitutional:       General: She is not in acute distress  Appearance: Normal appearance  She is well-developed  She is not ill-appearing  HENT:      Head: Normocephalic and atraumatic  Right Ear: Tympanic membrane normal       Left Ear: Tympanic membrane normal       Nose: Congestion present  Eyes:      Conjunctiva/sclera: Conjunctivae normal    Neck:      Thyroid: No thyromegaly  Vascular: No carotid bruit  Cardiovascular:      Rate and Rhythm: Normal rate and regular rhythm  Heart sounds: Normal heart sounds  No murmur heard  Pulmonary:      Effort: Pulmonary effort is normal  No respiratory distress  Breath sounds: Normal breath sounds  No wheezing     Abdominal:      General: Bowel sounds are normal  There is no abdominal bruit  Palpations: Abdomen is soft  Musculoskeletal:         General: Normal range of motion  Cervical back: Neck supple  Right lower leg: No edema  Left lower leg: No edema  Skin:     Comments: Right big toe-  Thickening yellow/ brown discoloration - medial surface and base , lower 1/3 only  No other toenails are affected  Neurological:      General: No focal deficit present  Mental Status: She is alert and oriented to person, place, and time  Cranial Nerves: No cranial nerve deficit  Coordination: Coordination normal    Psychiatric:         Mood and Affect: Mood normal          Behavior: Behavior normal          Thought Content:  Thought content normal           Pertinent Laboratory/Diagnostic Studies:    Lab Results   Component Value Date    WBC 7 81 04/25/2022    HGB 14 7 04/25/2022    HCT 43 1 04/25/2022    MCV 88 04/25/2022     04/25/2022       No results found for: TSH    Lab Results   Component Value Date    CHOL 169 07/21/2015     Lab Results   Component Value Date    TRIG 157 (H) 04/25/2022     Lab Results   Component Value Date    HDL 49 (L) 04/25/2022     Lab Results   Component Value Date    LDLCALC 120 (H) 04/25/2022     Lab Results   Component Value Date    HGBA1C 5 8 (H) 10/20/2015     Lab Results   Component Value Date    SODIUM 138 04/25/2022    K 4 2 04/25/2022     04/25/2022    CO2 26 04/25/2022    ANIONGAP 8 12/15/2015    AGAP 5 04/25/2022    BUN 22 04/25/2022    CREATININE 0 80 04/25/2022    GLUC 105 08/07/2020    GLUF 119 (H) 04/25/2022    CALCIUM 9 3 04/25/2022    AST 17 04/25/2022    ALT 32 04/25/2022    ALKPHOS 86 04/25/2022    PROT 7 2 12/15/2015    TP 7 4 04/25/2022    BILITOT 0 30 12/15/2015    TBILI 0 48 04/25/2022    EGFR 76 04/25/2022       Orders Placed This Encounter   Procedures    CT lung screening program    CBC and differential    Comprehensive metabolic panel    Hemoglobin A1C    Lipid Panel with Direct LDL reflex    TSH, 3rd generation    Ambulatory referral to Colorectal Surgery       ALLERGIES:  Allergies   Allergen Reactions    Bupropion     Cefprozil     Conj Estrog-Medroxyprogest Ace Other (See Comments)     Other      Povidone Iodine      Patient unsure of what this is    Sulfamethoxazole-Trimethoprim Other (See Comments)     Reaction Date: 11Aug2011;     Levofloxacin     Gadobutrol GI Intolerance    Medroxyprogesterone Other (See Comments)     Reaction Date: 11Aug2011;     Nortriptyline Other (See Comments)     n/a    Pamelor [Nortriptyline Hcl]     Provera [Medroxyprogesterone Acetate]        Current Medications     Current Outpatient Medications   Medication Sig Dispense Refill    albuterol (2 5 mg/3 mL) 0 083 % nebulizer solution Take 3 mL (2 5 mg total) by nebulization every 4 (four) hours as needed for wheezing or shortness of breath (cough) 120 mL 3    albuterol (Ventolin HFA) 90 mcg/act inhaler Inhale 2 puffs every 4 (four) hours as needed for wheezing or shortness of breath 54 g 3    aspirin (ECOTRIN LOW STRENGTH) 81 mg EC tablet Take 1 tablet by mouth daily      azithromycin (ZITHROMAX) 250 mg tablet Take 2 tablets today then 1 tablet daily x 4 days 6 tablet 0    Cholecalciferol (Vitamin D3) 125 MCG (5000 UT) CAPS Take 1 capsule by mouth daily      fluticasone (FLONASE) 50 mcg/act nasal spray USE 2 SPRAYS IN BOTH  NOSTRILS DAILY 48 g 3    gabapentin (NEURONTIN) 300 mg capsule TAKE 1 CAPSULE BY MOUTH  TWICE DAILY 180 capsule 3    levocetirizine (XYZAL) 5 MG tablet TAKE 1 TABLET BY MOUTH IN  THE EVENING 90 tablet 1    omeprazole (PriLOSEC) 40 MG capsule Take 40 mg by mouth daily      rosuvastatin (CRESTOR) 40 MG tablet TAKE 1 TABLET BY MOUTH  DAILY 90 tablet 3    sertraline (ZOLOFT) 100 mg tablet Take 1 5  Tablets (150 mg )  daily 135 tablet 3    clobetasol (TEMOVATE) 0 05 % GEL APPLY TO AFFECTED AREAS TWICE A DAY (Patient not taking: No sig reported) 60 g 1    montelukast (SINGULAIR) 10 mg tablet TAKE 1 TABLET BY MOUTH  DAILY AT BEDTIME (Patient not taking: Reported on 5/12/2022) 90 tablet 3     No current facility-administered medications for this visit         Medications Discontinued During This Encounter   Medication Reason    fluticasone-salmeterol (AirDuo RespiClick 711/06) 383-94 mcg/act dry powder inhaler        Health Maintenance     Health Maintenance   Topic Date Due    DTaP,Tdap,and Td Vaccines (1 - Tdap) 06/02/2016    PT PLAN OF CARE  10/09/2019    SLP PLAN OF CARE  11/08/2019    Pneumococcal Vaccine: 65+ Years (3 - PPSV23 or PCV20) 09/29/2020    COVID-19 Vaccine (4 - Booster for Stimatix GI series) 05/08/2022    Colorectal Cancer Screening  06/12/2022    Fall Risk  05/12/2023    Medicare Annual Wellness Visit (AWV)  05/12/2023    BMI: Adult  05/12/2023    Depression Remission PHQ  05/12/2023    BMI: Followup Plan  05/15/2023    Cervical Cancer Screening  08/13/2023    Breast Cancer Screening: Mammogram  03/07/2024    Hepatitis C Screening  Completed    Osteoporosis Screening  Completed    Influenza Vaccine  Completed    HIB Vaccine  Aged Out    Hepatitis B Vaccine  Aged Out    IPV Vaccine  Aged Out    Hepatitis A Vaccine  Aged Out    Meningococcal ACWY Vaccine  Aged Out    HPV Vaccine  Aged Out       Immunization History   Administered Date(s) Administered    COVID-19 PFIZER VACCINE 0 3 ML IM 03/20/2021, 04/12/2021, 01/08/2022    INFLUENZA 09/26/2011    Influenza Quadrivalent Preservative Free 3 years and older IM 09/29/2015, 09/22/2016, 09/08/2017    Influenza, high dose seasonal 0 7 mL 09/30/2019, 09/17/2020, 09/20/2021    Influenza, recombinant, quadrivalent,injectable, preservative free 09/13/2018    Influenza, seasonal, injectable 01/17/2013, 09/03/2013, 09/23/2014    Pneumococcal Conjugate 13-Valent 10/22/2018    Pneumococcal Polysaccharide PPV23 10/01/2007, 09/29/2015    Td (adult), adsorbed 06/02/2016       Leata Peach Raquel Lincoln MD

## 2022-05-15 ENCOUNTER — TELEPHONE (OUTPATIENT)
Dept: FAMILY MEDICINE CLINIC | Facility: CLINIC | Age: 67
End: 2022-05-15

## 2022-05-15 PROBLEM — R73.01 ELEVATED FASTING GLUCOSE: Status: ACTIVE | Noted: 2022-05-15

## 2022-05-15 PROBLEM — R93.89 ABNORMAL CT OF THE CHEST: Chronic | Status: RESOLVED | Noted: 2019-03-17 | Resolved: 2022-05-15

## 2022-05-15 PROBLEM — Z87.891 PERSONAL HISTORY OF NICOTINE DEPENDENCE: Status: ACTIVE | Noted: 2022-05-15

## 2022-05-15 NOTE — TELEPHONE ENCOUNTER
Please contact patient or her   I placed an order for surveillance CT chest   She has not had CT done since fall of 2020  I believe she should be monitored due to ongoing smoking  Please mail her the order and ask her to set up      Thank you

## 2022-05-15 NOTE — ASSESSMENT & PLAN NOTE
Patient is still unfortunately smoking  She was counseled on numerous occasions by her pulmonologist and primary care physician  She uses albuterol as needed  Last CT chest 2020      Patient should proceed with annual CT screening study

## 2022-05-15 NOTE — ASSESSMENT & PLAN NOTE
Continue Crestor 40 mg daily  Continue monitoring labs  Long discussion with patient and  about importance of low-fat low-cholesterol diet  I of lipid panel is still suboptimal next time-I will be adding Zetia to her regimen

## 2022-05-15 NOTE — ASSESSMENT & PLAN NOTE
Patient reports intermittent symptoms of low back pain with right sciatica, usually worse after prolonged sitting  She is reluctant to pursue physical therapy but will contact her chiropractor  Previous imaging included MRI of lumbar spine in 2018   If patient's back pain will not improve after chiropractic care-she will contact me and will pursue further evaluation and treatment    Patient remains on gabapentin

## 2022-05-15 NOTE — ASSESSMENT & PLAN NOTE
Blood work performed in April 2022 reveals fasting blood glucose of 119  We discussed importance of low-carbohydrate diet, low-impact physical activity and weight loss  Continue monitoring, add hemoglobin A1c to next blood draw

## 2022-07-07 ENCOUNTER — LAB REQUISITION (OUTPATIENT)
Dept: LAB | Facility: HOSPITAL | Age: 67
End: 2022-07-07
Payer: MEDICARE

## 2022-07-07 DIAGNOSIS — K63.5 POLYP OF COLON: ICD-10-CM

## 2022-07-07 DIAGNOSIS — Z86.010 PERSONAL HISTORY OF COLONIC POLYPS: ICD-10-CM

## 2022-07-07 PROCEDURE — 88305 TISSUE EXAM BY PATHOLOGIST: CPT | Performed by: PATHOLOGY

## 2022-07-14 ENCOUNTER — TELEPHONE (OUTPATIENT)
Dept: ADMINISTRATIVE | Facility: OTHER | Age: 67
End: 2022-07-14

## 2022-07-14 NOTE — TELEPHONE ENCOUNTER
----- Message from Jackson Mann, [address] [place] sent at 7/14/2022 10:27 AM EDT -----  Regarding: care gap request-Colonoscopy  07/14/22 10:27 AM    Hello, our patient attached above has had CRC: Colonoscopy completed/performed  Please assist in updating the patient chart by making an External outreach to [facility], facility located in [place]  The date of service is 06/30/2022 by Dr José Luis Tran       Thank you,  Jackson Mann MA   ARLINE SWAN

## 2022-07-14 NOTE — TELEPHONE ENCOUNTER
Upon review of the In Basket request we were able to locate, review, and update the patient chart as requested for CRC: Colonoscopy  Any additional questions or concerns should be emailed to the Practice Liaisons via [email or web address] email, please do not reply via In Basket      Thank you  Cesar Chaudhry

## 2022-07-17 DIAGNOSIS — J30.9 ALLERGIC RHINITIS, UNSPECIFIED SEASONALITY, UNSPECIFIED TRIGGER: ICD-10-CM

## 2022-07-17 RX ORDER — MONTELUKAST SODIUM 10 MG/1
TABLET ORAL
Qty: 90 TABLET | Refills: 3 | Status: SHIPPED | OUTPATIENT
Start: 2022-07-17

## 2022-08-12 ENCOUNTER — TELEPHONE (OUTPATIENT)
Dept: FAMILY MEDICINE CLINIC | Facility: CLINIC | Age: 67
End: 2022-08-12

## 2022-08-12 DIAGNOSIS — M51.16 INTERVERTEBRAL DISC DISORDERS WITH RADICULOPATHY, LUMBAR REGION: Primary | ICD-10-CM

## 2022-08-12 NOTE — TELEPHONE ENCOUNTER
----- Message from Rhesa Fothergill Romig sent at 8/12/2022 12:03 PM EDT -----  Regarding: X-ray of my lower back  My chiropractor, Dr Gonzalez Christianson, wanted me to get an x-ray of my lower back for to see changes that may be causing my back pain  He aligns but goes out the next day  I've been pain since I seen you in March  I said I'd go to my Chiropractor  I would appreciate it if you could send me a prescription for it either thru myChart or mail it    Thank you in advance    Dr Gonzalez Christianson, [address] [address]

## 2022-08-18 ENCOUNTER — HOSPITAL ENCOUNTER (OUTPATIENT)
Dept: RADIOLOGY | Facility: HOSPITAL | Age: 67
Discharge: HOME | End: 2022-08-18
Payer: MEDICARE

## 2022-08-18 DIAGNOSIS — M51.16 INTERVERTEBRAL DISC DISORDERS WITH RADICULOPATHY, LUMBAR REGION: ICD-10-CM

## 2022-08-18 PROCEDURE — 72110 X-RAY EXAM L-2 SPINE 4/>VWS: CPT

## 2022-08-26 ENCOUNTER — TELEPHONE (OUTPATIENT)
Dept: FAMILY MEDICINE CLINIC | Facility: CLINIC | Age: 67
End: 2022-08-26

## 2022-08-26 NOTE — TELEPHONE ENCOUNTER
Please contact patient  I reviewed results of lumbar spine x-ray performed as per request to her chiropractor, Dr Macias  It reveals chronic arthritic changes between L4-L5 and L5-S1, radiologist reports that disc degeneration has worsened compared to previous study in 2017  · Patient should continue follow up with her chiropractor  ·  If symptoms of low back pain persist-she may require further evaluation with MRI  She will need to schedule follow-up with myself so we can order this study  I would not be able to do it over the phone      Thank you

## 2022-09-07 ENCOUNTER — ANNUAL EXAM (OUTPATIENT)
Dept: GYNECOLOGY | Facility: CLINIC | Age: 67
End: 2022-09-07
Payer: MEDICARE

## 2022-09-07 VITALS
BODY MASS INDEX: 26.12 KG/M2 | SYSTOLIC BLOOD PRESSURE: 124 MMHG | DIASTOLIC BLOOD PRESSURE: 64 MMHG | HEIGHT: 64 IN | WEIGHT: 153 LBS

## 2022-09-07 DIAGNOSIS — Z01.411 ENCOUNTER FOR GYNECOLOGICAL EXAMINATION WITH ABNORMAL FINDING: ICD-10-CM

## 2022-09-07 DIAGNOSIS — Z13.820 SCREENING FOR OSTEOPOROSIS: ICD-10-CM

## 2022-09-07 DIAGNOSIS — Z12.31 SCREENING MAMMOGRAM FOR BREAST CANCER: Primary | ICD-10-CM

## 2022-09-07 DIAGNOSIS — M85.88 OTHER SPECIFIED DISORDERS OF BONE DENSITY AND STRUCTURE, OTHER SITE: ICD-10-CM

## 2022-09-07 DIAGNOSIS — N95.2 VAGINAL ATROPHY: ICD-10-CM

## 2022-09-07 DIAGNOSIS — Z01.419 ROUTINE GYNECOLOGICAL EXAMINATION: ICD-10-CM

## 2022-09-07 PROCEDURE — G0101 CA SCREEN;PELVIC/BREAST EXAM: HCPCS | Performed by: OBSTETRICS & GYNECOLOGY

## 2022-09-07 PROCEDURE — G0143 SCR C/V CYTO,THINLAYER,RESCR: HCPCS | Performed by: OBSTETRICS & GYNECOLOGY

## 2022-09-07 NOTE — PROGRESS NOTES
Assessment/Plan:    NORMAL BREAST EXAM  VAGINAL ATROPHY  NORMAL PAP SMEAR AND NEGATIVE HPV AUGUST 2020  COLONOSCOPY JANUARY 2022 WITH POLYPS  DUE FOR REPEAT AT 2 YEARS  NORMAL MAMMOGRAM [facility] 2022  MS    Plan:  Rx DEXA scan Rx mammogram check Pap smear  Continue healthy diet exercise  Follow-up with colonoscopy      Subjective: G0     Patient ID: Dmitry White is a 78 y o  female presents for annual exam with no complaints  Patient denies any pelvic pain vaginal bleeding breast bowel or bladder issues  Her MS has been stable  No change in family history  Brother (colon cancer and dementia)  Patient did get COVID vaccine in booster  Medications reviewed        Review of Systems   Constitutional: Negative  Negative for fatigue, fever and unexpected weight change  HENT: Negative  Eyes: Negative  Respiratory: Negative  Negative for chest tightness, shortness of breath, wheezing and stridor  Cardiovascular: Negative  Negative for chest pain, palpitations and leg swelling  Gastrointestinal: Negative  Negative for abdominal pain, blood in stool, diarrhea, nausea, rectal pain and vomiting  Endocrine: Negative  Genitourinary: Negative for dysuria, frequency, vaginal bleeding, vaginal discharge and vaginal pain  Musculoskeletal: Negative  Skin: Negative  Allergic/Immunologic: Negative  Neurological: Negative  Hematological: Negative  Psychiatric/Behavioral: Negative  All other systems reviewed and are negative  Objective:      /64   Ht 5' 4" (1 626 m)   Wt 69 4 kg (153 lb)   LMP  (LMP Unknown)   BMI 26 26 kg/m²          Physical Exam  Constitutional:       Appearance: She is well-developed  HENT:      Head: Normocephalic and atraumatic  Neck:      Thyroid: No thyromegaly  Trachea: No tracheal deviation  Cardiovascular:      Rate and Rhythm: Normal rate and regular rhythm  Heart sounds: Normal heart sounds     Pulmonary:      Effort: Pulmonary effort is normal  No respiratory distress  Breath sounds: Normal breath sounds  No stridor  No wheezing or rales  Chest:      Chest wall: No tenderness  Breasts: Breasts are symmetrical       Right: No inverted nipple, mass, nipple discharge, skin change or tenderness  Left: No inverted nipple, mass, nipple discharge, skin change or tenderness  Abdominal:      General: Bowel sounds are normal  There is no distension  Palpations: Abdomen is soft  There is no mass  Tenderness: There is no abdominal tenderness  There is no guarding or rebound  Hernia: No hernia is present  There is no hernia in the left inguinal area  Genitourinary:     Labia:         Right: No rash, tenderness, lesion or injury  Left: No rash, tenderness, lesion or injury  Vagina: Normal  No signs of injury and foreign body  No vaginal discharge, erythema, tenderness or bleeding  Cervix: No cervical motion tenderness, discharge or friability  Uterus: Not deviated, not enlarged, not fixed and not tender  Adnexa:         Right: No mass, tenderness or fullness  Left: No mass, tenderness or fullness  Rectum: No mass, anal fissure, external hemorrhoid or internal hemorrhoid  Musculoskeletal:      Cervical back: Normal range of motion and neck supple  Lymphadenopathy:      Lower Body: No right inguinal adenopathy  No left inguinal adenopathy  Skin:     General: Skin is warm and dry  Neurological:      Mental Status: She is alert and oriented to person, place, and time  Psychiatric:         Behavior: Behavior normal          Thought Content:  Thought content normal          Judgment: Judgment normal

## 2022-09-13 LAB
LAB AP GYN PRIMARY INTERPRETATION: NORMAL
Lab: NORMAL

## 2022-09-17 ENCOUNTER — APPOINTMENT (OUTPATIENT)
Dept: LAB | Facility: CLINIC | Age: 67
End: 2022-09-17
Payer: MEDICARE

## 2022-09-17 DIAGNOSIS — R73.01 ELEVATED FASTING GLUCOSE: ICD-10-CM

## 2022-09-17 DIAGNOSIS — E78.2 MIXED HYPERLIPIDEMIA: Chronic | ICD-10-CM

## 2022-09-17 LAB
ALBUMIN SERPL BCP-MCNC: 4.4 G/DL (ref 3.5–5)
ALP SERPL-CCNC: 78 U/L (ref 34–104)
ALT SERPL W P-5'-P-CCNC: 29 U/L (ref 7–52)
ANION GAP SERPL CALCULATED.3IONS-SCNC: 6 MMOL/L (ref 4–13)
AST SERPL W P-5'-P-CCNC: 24 U/L (ref 13–39)
BASOPHILS # BLD AUTO: 0.03 THOUSANDS/ΜL (ref 0–0.1)
BASOPHILS NFR BLD AUTO: 0 % (ref 0–1)
BILIRUB SERPL-MCNC: 0.39 MG/DL (ref 0.2–1)
BUN SERPL-MCNC: 18 MG/DL (ref 5–25)
CALCIUM SERPL-MCNC: 9.4 MG/DL (ref 8.4–10.2)
CHLORIDE SERPL-SCNC: 106 MMOL/L (ref 96–108)
CHOLEST SERPL-MCNC: 239 MG/DL
CO2 SERPL-SCNC: 30 MMOL/L (ref 21–32)
CREAT SERPL-MCNC: 0.69 MG/DL (ref 0.6–1.3)
EOSINOPHIL # BLD AUTO: 0.19 THOUSAND/ΜL (ref 0–0.61)
EOSINOPHIL NFR BLD AUTO: 2 % (ref 0–6)
ERYTHROCYTE [DISTWIDTH] IN BLOOD BY AUTOMATED COUNT: 11.7 % (ref 11.6–15.1)
EST. AVERAGE GLUCOSE BLD GHB EST-MCNC: 120 MG/DL
GFR SERPL CREATININE-BSD FRML MDRD: 90 ML/MIN/1.73SQ M
GLUCOSE P FAST SERPL-MCNC: 101 MG/DL (ref 65–99)
HBA1C MFR BLD: 5.8 %
HCT VFR BLD AUTO: 41.6 % (ref 34.8–46.1)
HDLC SERPL-MCNC: 43 MG/DL
HGB BLD-MCNC: 14.2 G/DL (ref 11.5–15.4)
IMM GRANULOCYTES # BLD AUTO: 0.03 THOUSAND/UL (ref 0–0.2)
IMM GRANULOCYTES NFR BLD AUTO: 0 % (ref 0–2)
LDLC SERPL CALC-MCNC: 144 MG/DL (ref 0–100)
LYMPHOCYTES # BLD AUTO: 3.37 THOUSANDS/ΜL (ref 0.6–4.47)
LYMPHOCYTES NFR BLD AUTO: 38 % (ref 14–44)
MCH RBC QN AUTO: 30.2 PG (ref 26.8–34.3)
MCHC RBC AUTO-ENTMCNC: 34.1 G/DL (ref 31.4–37.4)
MCV RBC AUTO: 89 FL (ref 82–98)
MONOCYTES # BLD AUTO: 0.62 THOUSAND/ΜL (ref 0.17–1.22)
MONOCYTES NFR BLD AUTO: 7 % (ref 4–12)
NEUTROPHILS # BLD AUTO: 4.67 THOUSANDS/ΜL (ref 1.85–7.62)
NEUTS SEG NFR BLD AUTO: 53 % (ref 43–75)
NRBC BLD AUTO-RTO: 0 /100 WBCS
PLATELET # BLD AUTO: 257 THOUSANDS/UL (ref 149–390)
PMV BLD AUTO: 9.4 FL (ref 8.9–12.7)
POTASSIUM SERPL-SCNC: 4.3 MMOL/L (ref 3.5–5.3)
PROT SERPL-MCNC: 6.9 G/DL (ref 6.4–8.4)
RBC # BLD AUTO: 4.7 MILLION/UL (ref 3.81–5.12)
SODIUM SERPL-SCNC: 142 MMOL/L (ref 135–147)
TRIGL SERPL-MCNC: 258 MG/DL
TSH SERPL DL<=0.05 MIU/L-ACNC: 1.17 UIU/ML (ref 0.45–4.5)
WBC # BLD AUTO: 8.91 THOUSAND/UL (ref 4.31–10.16)

## 2022-09-17 PROCEDURE — 36415 COLL VENOUS BLD VENIPUNCTURE: CPT

## 2022-09-17 PROCEDURE — 80061 LIPID PANEL: CPT

## 2022-09-17 PROCEDURE — 83036 HEMOGLOBIN GLYCOSYLATED A1C: CPT

## 2022-09-17 PROCEDURE — 84443 ASSAY THYROID STIM HORMONE: CPT

## 2022-09-17 PROCEDURE — 85025 COMPLETE CBC W/AUTO DIFF WBC: CPT

## 2022-09-17 PROCEDURE — 80053 COMPREHEN METABOLIC PANEL: CPT

## 2022-09-19 ENCOUNTER — OFFICE VISIT (OUTPATIENT)
Dept: FAMILY MEDICINE CLINIC | Facility: CLINIC | Age: 67
End: 2022-09-19
Payer: MEDICARE

## 2022-09-19 VITALS
WEIGHT: 153 LBS | DIASTOLIC BLOOD PRESSURE: 70 MMHG | BODY MASS INDEX: 26.12 KG/M2 | RESPIRATION RATE: 16 BRPM | TEMPERATURE: 97.6 F | HEIGHT: 64 IN | OXYGEN SATURATION: 98 % | SYSTOLIC BLOOD PRESSURE: 130 MMHG | HEART RATE: 68 BPM

## 2022-09-19 DIAGNOSIS — E78.2 MIXED HYPERLIPIDEMIA: Chronic | ICD-10-CM

## 2022-09-19 DIAGNOSIS — G35 MULTIPLE SCLEROSIS (HCC): Chronic | ICD-10-CM

## 2022-09-19 DIAGNOSIS — Z23 NEED FOR INFLUENZA VACCINATION: ICD-10-CM

## 2022-09-19 DIAGNOSIS — M51.16 INTERVERTEBRAL DISC DISORDERS WITH RADICULOPATHY, LUMBAR REGION: Primary | Chronic | ICD-10-CM

## 2022-09-19 DIAGNOSIS — F33.9 MAJOR DEPRESSION, RECURRENT, CHRONIC (HCC): Chronic | ICD-10-CM

## 2022-09-19 PROCEDURE — 99214 OFFICE O/P EST MOD 30 MIN: CPT | Performed by: FAMILY MEDICINE

## 2022-09-19 PROCEDURE — G0008 ADMIN INFLUENZA VIRUS VAC: HCPCS

## 2022-09-19 PROCEDURE — 90662 IIV NO PRSV INCREASED AG IM: CPT

## 2022-09-19 RX ORDER — MELOXICAM 15 MG/1
TABLET ORAL
Qty: 30 TABLET | Refills: 3 | Status: SHIPPED | OUTPATIENT
Start: 2022-09-19

## 2022-09-19 RX ORDER — EZETIMIBE 10 MG/1
10 TABLET ORAL DAILY
Qty: 90 TABLET | Refills: 3 | Status: SHIPPED | OUTPATIENT
Start: 2022-09-19

## 2022-09-19 NOTE — PROGRESS NOTES
FAMILY PRACTICE OFFICE VISIT       NAME: Isabel Kaufman  AGE: 78 y o  SEX: female       : 1955        MRN: [id number]        Assessment and Plan     1  Intervertebral disc disorders with radiculopathy, lumbar region  Assessment & Plan:  Patient with history of lumbar disc disease presents with worsening of low back pain primarily left sided  She reports left lower extremity weakness and reported episodes of stumbling  She has been under care of chiropractor since , treatments provide only partial and temporary relief  Recent x-ray reviewed reports worsening of lumbar spine arthritic changes  Proceed with MRI of lumbar spine  Continue gabapentin and chiropractic care      Orders:  -     MRI lumbar spine wo contrast; Future; Expected date: 2022  -     meloxicam (Mobic) 15 mg tablet; Tablet once a day after food as needed for back pain    2  Mixed hyperlipidemia  Assessment & Plan:  Suboptimal lipid panel on max dose of Crestor 40 mg daily  Total cholesterol is 239 with LDL of 144  Patient reports compliance with med regimen  Continue low-fat low-cholesterol diet  Start Zetia 10 mg daily  Reassess labs in 4 to 6 months    Orders:  -     ezetimibe (ZETIA) 10 mg tablet; Take 1 tablet (10 mg total) by mouth daily  -     CBC and differential; Future  -     Comprehensive metabolic panel; Future  -     Lipid Panel with Direct LDL reflex; Future  -     TSH, 3rd generation; Future    3  Need for influenza vaccination  -     influenza vaccine, high-dose, PF 0 7 mL (FLUZONE HIGH-DOSE)    4  Multiple sclerosis, relapsing/remitting  Assessment & Plan:  Patient is under care of [facility]      5  Major depression, recurrent, chronic (HCC)  Assessment & Plan:  Continue Zoloft            There are no Patient Instructions on file for this visit  No follow-ups on file  Discussed with the patient and all questioned fully answered  She will call me if any problems arise       M*Modal software was used to dictate this note  It may contain errors with dictating incorrect words/spelling  Please contact provider directly with any questions  Chief Complaint     Chief Complaint   Patient presents with    Back Pain     Post Chiropractor follow up       History of Present Illness      Persistent low back pain  for at least 3 months   Sees chiropractor who has requested X rays to be repeated  h/o Lumbar DDD   Left sided low back pain   no sciatica  Patient denies symptoms of bowel or bladder dysfunction  Patient reports that her left leg feels weak and than the right  She has noticed few episodes of stumbling, no falls  She remains on gabapentin as directed  Pain is worse after prolonged sitting or with certain ROM   Chiropractor treatments provide only temporary and incomplete relief   Dr Reed has been following up with patient's since June  Reviewed results of recent lumbar x-rays  Xray  LS spine 8/2022: There are 5 non rib bearing lumbar vertebral bodies     There is no evidence of acute fracture or destructive osseous lesion    Exaggerated lumbar lordosis   Loss of disc height at L4-L5 and L5-S1 with interval worsening  Facet sclerosis  Anterolisthesis of L5 over S1 unchanged    The pedicles appear intact  Soft tissues are unremarkable    IMPRESSION: Lower lumbar spine degenerative changes with interval worsening     We reviewed results of recent blood work  Cholesterol is elevated  Patient states that she has been compliant with Crestor 40 mg daily as directed  Patient has been making an effort to reduce tobacco use and is planning to quit eventually  Back Pain  Associated symptoms include weakness (Left lower extremity)  Review of Systems   Review of Systems   Constitutional: Positive for fatigue  HENT: Negative  Respiratory: Negative  Cardiovascular: Negative  Gastrointestinal: Negative  Endocrine: Negative  Genitourinary: Negative  Musculoskeletal: Positive for back pain  Allergic/Immunologic: Negative  Neurological: Positive for weakness (Left lower extremity)  Hematological: Negative  Psychiatric/Behavioral: Negative          Active Problem List     Patient Active Problem List   Diagnosis    Multiple sclerosis, relapsing/remitting    Hyperlipidemia    Osteoporosis    Ambulatory dysfunction    Chronic obstructive pulmonary disease (HCC)    Vitamin D deficiency    Major depression, recurrent, chronic (HCC)    Allergic rhinitis    Amnesia/memory disorder    Shoulder impingement syndrome, left    Urinary incontinence    Balance disorder    Gastro-esophageal reflux disease with esophagitis    Collagenous colitis    Intervertebral disc disorders with radiculopathy, lumbar region    DDD (degenerative disc disease), cervical    Polyp of colon    Elevated fasting glucose    Personal history of nicotine dependence        Past Medical History:  Past Medical History:   Diagnosis Date    Asthma     Degeneration of intervertebral disc at L5-S1 level     Depression     Family history of colon cancer 3/27/2019    Added automatically from request for surgery [id number]    Full incontinence of feces     Generalized anxiety disorder     Hepatitis     at age 4 yrs    Herpes zoster     last assessed: 6/20/13    History of colon polyps     Hypercholesterolemia     Hyperlipidemia     MS (multiple sclerosis) ([address]     MS (multiple sclerosis) [facility] [address]     Osteoporosis     Seasonal allergies     Tumor of breast     benign, right breast    Urinary incontinence        Past Surgical History:  Past Surgical History:   Procedure Laterality Date    BREAST BIOPSY Right     benign    BREAST SURGERY      right breast fibroid tumor resection    COLONOSCOPY      11/2013 - due in 2018 - Dr Crisostomo    COLONOSCOPY W/ POLYPECTOMY  07/07/2022    normal-repeat in 2 years    FL LUMBAR PUNCTURE DIAGNOSTIC  10/01/2018    ROTATOR CUFF REPAIR Bilateral    ROTATOR CUFF REPAIR Bilateral     SEPTOPLASTY         Family History:  Family History   Problem Relation Age of Onset    Arthritis Mother     Osteoporosis Mother     COPD Father     No Known Problems Maternal Grandmother     No Known Problems Maternal Grandfather     No Known Problems Paternal Grandmother     No Known Problems Paternal Grandfather     Arthritis Family     COPD Family     Emphysema Family     Heart disease Family     Hyperthyroidism Family     Multiple sclerosis Family     Osteoarthritis Family     No Known Problems Sister     No Known Problems Sister     Colon cancer Brother 72    Multiple sclerosis Brother     No Known Problems Brother     No Known Problems Brother     No Known Problems Maternal Aunt     No Known Problems Maternal Aunt     No Known Problems Paternal Aunt        Social History:  Social History     Socioeconomic History    Marital status: /Civil Union     Spouse name: Not on file    Number of children: Not on file    Years of education: Not on file    Highest education level: Not on file   Occupational History    Occupation: Retired   Tobacco Use    Smoking status: Current Every Day Smoker     Packs/day: 1 00     Years: 40 00     Pack years: 40 00     Types: Cigarettes     Start date: 1982    Smokeless tobacco: Never Used   Vaping Use    Vaping Use: Never used   Substance and Sexual Activity    Alcohol use: Yes     Comment: occasional; [employer] also states Never drank alcohol    Drug use: No    Sexual activity: Not Currently     Partners: Male     Comment: denied any risk of AIDS   Other Topics Concern    Not on file   Social History Narrative         Social Determinants of Health     Financial Resource Strain: Not on file   Food Insecurity: Not on file   Transportation Needs: Not on file   Physical Activity: Not on file   Stress: Not on file   Social Connections: Not on file   Intimate Partner Violence: Not on file Housing Stability: Not on file         Objective     Vitals:    09/19/22 1604   BP: 130/70   BP Location: Left arm   Patient Position: Sitting   Cuff Size: Standard   Pulse: 68   Resp: 16   Temp: 97 6 °F (36 4 °C)   TempSrc: Temporal   SpO2: 98%   Weight: 69 4 kg (153 lb)   Height: 5' 4" (1 626 m)       Wt Readings from Last 3 Encounters:   09/19/22 69 4 kg (153 lb)   09/07/22 69 4 kg (153 lb)   05/12/22 71 7 kg (158 lb)       Physical Exam  Vitals and nursing note reviewed  Constitutional:       General: She is not in acute distress  Appearance: Normal appearance  She is well-developed  She is not ill-appearing  HENT:      Head: Normocephalic and atraumatic  Eyes:      Conjunctiva/sclera: Conjunctivae normal    Neck:      Thyroid: No thyromegaly  Vascular: No carotid bruit  Cardiovascular:      Rate and Rhythm: Normal rate and regular rhythm  Heart sounds: Normal heart sounds  No murmur heard  Pulmonary:      Effort: Pulmonary effort is normal  No respiratory distress  Breath sounds: Normal breath sounds  No wheezing  Abdominal:      General: There is no abdominal bruit  Musculoskeletal:         General: Normal range of motion  Cervical back: Neck supple  No rigidity  Right lower leg: No edema  Left lower leg: No edema  Comments: Paraspinal tenderness L4-L5 L5-S1 left more than right  Positive straight leg rising on the left  Muscle strength 3/5 left, 4/ 5 right   Skin:     General: Skin is warm  Neurological:      General: No focal deficit present  Mental Status: She is alert and oriented to person, place, and time  Cranial Nerves: No cranial nerve deficit        Coordination: Coordination normal    Psychiatric:         Mood and Affect: Mood normal          Behavior: Behavior normal           Pertinent Laboratory/Diagnostic Studies:    Lab Results   Component Value Date    WBC 8 91 09/17/2022    HGB 14 2 09/17/2022    HCT 41 6 09/17/2022    MCV 89 09/17/2022     09/17/2022       No results found for: TSH    Lab Results   Component Value Date    CHOL 169 07/21/2015     Lab Results   Component Value Date    TRIG 258 (H) 09/17/2022     Lab Results   Component Value Date    HDL 43 (L) 09/17/2022     Lab Results   Component Value Date    LDLCALC 144 (H) 09/17/2022     Lab Results   Component Value Date    HGBA1C 5 8 (H) 09/17/2022     Lab Results   Component Value Date    SODIUM 142 09/17/2022    K 4 3 09/17/2022     09/17/2022    CO2 30 09/17/2022    ANIONGAP 8 12/15/2015    AGAP 6 09/17/2022    BUN 18 09/17/2022    CREATININE 0 69 09/17/2022    GLUC 105 08/07/2020    GLUF 101 (H) 09/17/2022    CALCIUM 9 4 09/17/2022    AST 24 09/17/2022    ALT 29 09/17/2022    ALKPHOS 78 09/17/2022    PROT 7 2 12/15/2015    TP 6 9 09/17/2022    BILITOT 0 30 12/15/2015    TBILI 0 39 09/17/2022    EGFR 90 09/17/2022       Orders Placed This Encounter   Procedures    MRI lumbar spine wo contrast    influenza vaccine, high-dose, PF 0 7 mL (FLUZONE HIGH-DOSE)    CBC and differential    Comprehensive metabolic panel    Lipid Panel with Direct LDL reflex    TSH, 3rd generation       ALLERGIES:  Allergies   Allergen Reactions    Bupropion     Cefprozil     Conj Estrog-Medroxyprogest Ace Other (See Comments)     Other      Povidone Iodine      Patient unsure of what this is    Sulfamethoxazole-Trimethoprim Other (See Comments)     Reaction Date: 11Aug2011;     Levofloxacin     Gadobutrol GI Intolerance    Medroxyprogesterone Other (See Comments)     Reaction Date: 11Aug2011;     Nortriptyline Other (See Comments)     n/a    Pamelor [Nortriptyline Hcl]     Provera [Medroxyprogesterone Acetate]        Current Medications     Current Outpatient Medications   Medication Sig Dispense Refill    albuterol (2 5 mg/3 mL) 0 083 % nebulizer solution Take 3 mL (2 5 mg total) by nebulization every 4 (four) hours as needed for wheezing or shortness of breath (cough) 120 mL 3    albuterol (Ventolin HFA) 90 mcg/act inhaler Inhale 2 puffs every 4 (four) hours as needed for wheezing or shortness of breath 54 g 3    aspirin (ECOTRIN LOW STRENGTH) 81 mg EC tablet Take 1 tablet by mouth daily      Cholecalciferol (Vitamin D3) 125 MCG (5000 UT) CAPS Take 1 capsule by mouth daily      ezetimibe (ZETIA) 10 mg tablet Take 1 tablet (10 mg total) by mouth daily 90 tablet 3    fluticasone (FLONASE) 50 mcg/act nasal spray USE 2 SPRAYS IN BOTH  NOSTRILS DAILY 48 g 3    gabapentin (NEURONTIN) 300 mg capsule TAKE 1 CAPSULE BY MOUTH  TWICE DAILY 180 capsule 3    levocetirizine (XYZAL) 5 MG tablet TAKE 1 TABLET BY MOUTH IN  THE EVENING 90 tablet 1    meloxicam (Mobic) 15 mg tablet Tablet once a day after food as needed for back pain 30 tablet 3    montelukast (SINGULAIR) 10 mg tablet TAKE 1 TABLET BY MOUTH  DAILY AT BEDTIME 90 tablet 3    omeprazole (PriLOSEC) 40 MG capsule Take 40 mg by mouth daily      rosuvastatin (CRESTOR) 40 MG tablet TAKE 1 TABLET BY MOUTH  DAILY 90 tablet 3    sertraline (ZOLOFT) 100 mg tablet Take 1 5  Tablets (150 mg )  daily 135 tablet 3     No current facility-administered medications for this visit         Medications Discontinued During This Encounter   Medication Reason    clobetasol (TEMOVATE) 0 05 % GEL Therapy completed       Health Maintenance     Health Maintenance   Topic Date Due    Falls: Plan of Care  Never done    PT PLAN OF CARE  10/09/2019    SLP PLAN OF CARE  11/08/2019    Pneumococcal Vaccine: 65+ Years (3 - PPSV23 or PCV20) 09/29/2020    Lung Cancer Screening  11/09/2021    Fall Risk  05/12/2023    Urinary Incontinence Screening  05/12/2023    Medicare Annual Wellness Visit (AWV)  05/12/2023    Depression Remission PHQ  05/12/2023    BMI: Followup Plan  05/15/2023    BMI: Adult  09/19/2023    Breast Cancer Screening: Mammogram  03/07/2024    Colorectal Cancer Screening  07/07/2024    Cervical Cancer Screening  09/07/2025    Hepatitis C Screening  Completed    Osteoporosis Screening  Completed    Influenza Vaccine  Completed    COVID-19 Vaccine  Completed    HIB Vaccine  Aged Out    Hepatitis B Vaccine  Aged Out    IPV Vaccine  Aged Out    Hepatitis A Vaccine  Aged Out    Meningococcal ACWY Vaccine  Aged Out    HPV Vaccine  Aged Out       Immunization History   Administered Date(s) Administered    COVID-19 PFIZER VACCINE 0 3 ML IM 03/20/2021, 04/12/2021, 01/08/2022, 05/07/2022    INFLUENZA 09/26/2011, 09/19/2022    Influenza Quadrivalent Preservative Free 3 years and older IM 09/29/2015, 09/22/2016, 09/08/2017    Influenza, high dose seasonal 0 7 mL 09/30/2019, 09/17/2020, 09/20/2021, 09/19/2022    Influenza, recombinant, quadrivalent,injectable, preservative free 09/13/2018    Influenza, seasonal, injectable 01/17/2013, 09/03/2013, 09/23/2014    Pneumococcal Conjugate 13-Valent 10/22/2018    Pneumococcal Polysaccharide PPV23 10/01/2007, 09/29/2015    Td (adult), adsorbed 06/02/2016       Melva Schaumann, MD

## 2022-09-21 NOTE — ASSESSMENT & PLAN NOTE
Suboptimal lipid panel on max dose of Crestor 40 mg daily  Total cholesterol is 239 with LDL of 144  Patient reports compliance with med regimen  Continue low-fat low-cholesterol diet  Start Zetia 10 mg daily    Reassess labs in 4 to 6 months

## 2022-09-21 NOTE — ASSESSMENT & PLAN NOTE
Patient with history of lumbar disc disease presents with worsening of low back pain primarily left sided  She reports left lower extremity weakness and reported episodes of stumbling  She has been under care of chiropractor since June, treatments provide only partial and temporary relief  Recent x-ray reviewed reports worsening of lumbar spine arthritic changes  Proceed with MRI of lumbar spine    Continue gabapentin and chiropractic care

## 2022-09-21 NOTE — ASSESSMENT & PLAN NOTE
I commended patient on reducing tobacco consumption  Lung exam is clear  I strongly advised her to quit  Patient should proceed with CT chest/screening protocol as ordered back in May

## 2022-10-04 DIAGNOSIS — G35 MULTIPLE SCLEROSIS (HCC): Chronic | ICD-10-CM

## 2022-10-05 RX ORDER — GABAPENTIN 300 MG/1
CAPSULE ORAL
Qty: 180 CAPSULE | Refills: 3 | Status: SHIPPED | OUTPATIENT
Start: 2022-10-05

## 2022-10-07 ENCOUNTER — OFFICE VISIT (OUTPATIENT)
Dept: FAMILY MEDICINE CLINIC | Facility: CLINIC | Age: 67
End: 2022-10-07
Payer: MEDICARE

## 2022-10-07 VITALS
DIASTOLIC BLOOD PRESSURE: 60 MMHG | HEIGHT: 64 IN | BODY MASS INDEX: 25.95 KG/M2 | RESPIRATION RATE: 16 BRPM | WEIGHT: 152 LBS | OXYGEN SATURATION: 95 % | TEMPERATURE: 98 F | HEART RATE: 76 BPM | SYSTOLIC BLOOD PRESSURE: 112 MMHG

## 2022-10-07 DIAGNOSIS — J32.9 SINUSITIS, UNSPECIFIED CHRONICITY, UNSPECIFIED LOCATION: Primary | ICD-10-CM

## 2022-10-07 PROCEDURE — 99213 OFFICE O/P EST LOW 20 MIN: CPT | Performed by: NURSE PRACTITIONER

## 2022-10-07 RX ORDER — AMOXICILLIN AND CLAVULANATE POTASSIUM 875; 125 MG/1; MG/1
1 TABLET, FILM COATED ORAL EVERY 12 HOURS SCHEDULED
Qty: 14 TABLET | Refills: 0 | Status: SHIPPED | OUTPATIENT
Start: 2022-10-07 | End: 2022-10-14

## 2022-10-07 NOTE — PROGRESS NOTES
FAMILY PRACTICE OFFICE VISIT       NAME: Zach Kaufman  AGE: 75 y o  SEX: female       : 1955        MRN: [id number]    Assessment and Plan   1  Sinusitis, unspecified chronicity, unspecified location  -     amoxicillin-clavulanate (AUGMENTIN) 875-125 mg per tablet; Take 1 tablet by mouth every 12 (twelve) hours for 7 days     Start Augmentin one tablet daily for 7 days with food  Continue supportive care: Xyzal, Flonase, warm salt water gargle, albuterol, Mucinex, tylenol as needed  Call for persisting or worsening of symptoms  Chief Complaint     Chief Complaint   Patient presents with    Cough    Headache       History of Present Illness     Rashad Quevedo is a 75year old female presenting today for URI symptoms for one week now  Sore throat  Dry cough  Chills, sweats, no fever  +headaches  +bodyaches and fatigue    +congested   Chest tight, no wheezing  Not short of breath  No strength to do anything  Lots of mucous, especially when lying down  No known sick contacts  Started with allergies, and just getting worse now  Home COVID-19 swab yesterday was negative  Current every day smoker  Albuterol inhaler using, but not helping  Xyzal, flonase and warm salt water gargles helping  Review of Systems   Review of Systems   Constitutional: Positive for chills, diaphoresis and fatigue  Negative for fever  HENT: Positive for congestion, postnasal drip and sore throat  Respiratory: Positive for cough and chest tightness  Negative for shortness of breath and wheezing  Musculoskeletal: Positive for myalgias  Neurological: Positive for headaches  I have reviewed the patient's medical history in detail; there are no changes to the history as noted in the electronic medical record      Objective     Vitals:    10/07/22 1538   BP: 112/60   Pulse: 76   Resp: 16   Temp: 98 °F (36 7 °C)   SpO2: 95%   Weight: 68 9 kg (152 lb)   Height: 5' 4" (1 626 m) Wt Readings from Last 3 Encounters:   10/07/22 68 9 kg (152 lb)   09/19/22 69 4 kg (153 lb)   09/07/22 69 4 kg (153 lb)     Physical Exam  Vitals and nursing note reviewed  Constitutional:       General: She is not in acute distress  Appearance: Normal appearance  She is not ill-appearing  HENT:      Head: Atraumatic  Right Ear: Tympanic membrane normal       Left Ear: Tympanic membrane normal       Nose: Congestion and rhinorrhea present  Mouth/Throat:      Mouth: Mucous membranes are moist       Pharynx: Oropharynx is clear  Eyes:      Conjunctiva/sclera: Conjunctivae normal    Cardiovascular:      Rate and Rhythm: Normal rate and regular rhythm  Heart sounds: No murmur heard  Pulmonary:      Effort: Pulmonary effort is normal  No respiratory distress  Breath sounds: Normal breath sounds  No wheezing or rales  Musculoskeletal:      Cervical back: Normal range of motion and neck supple  Right lower leg: No edema  Left lower leg: No edema  Lymphadenopathy:      Cervical: No cervical adenopathy  Neurological:      Mental Status: She is alert  Psychiatric:         Mood and Affect: Mood normal          Tobacco Cessation Counseling: Tobacco cessation counseling was provided   The patient is sincerely urged to quit consumption of tobacco  She is not ready to quit tobacco          ALLERGIES:  Allergies   Allergen Reactions    Bupropion     Cefprozil     Conj Estrog-Medroxyprogest Ace Other (See Comments)     Other      Povidone Iodine      Patient unsure of what this is    Sulfamethoxazole-Trimethoprim Other (See Comments)     Reaction Date: 11Aug2011;     Levofloxacin     Gadobutrol GI Intolerance    Medroxyprogesterone Other (See Comments)     Reaction Date: 11Aug2011;     Nortriptyline Other (See Comments)     n/a    Pamelor [Nortriptyline Hcl]     Provera [Medroxyprogesterone Acetate]        Current Medications     Current Outpatient Medications Medication Sig Dispense Refill    albuterol (2 5 mg/3 mL) 0 083 % nebulizer solution Take 3 mL (2 5 mg total) by nebulization every 4 (four) hours as needed for wheezing or shortness of breath (cough) 120 mL 3    albuterol (Ventolin HFA) 90 mcg/act inhaler Inhale 2 puffs every 4 (four) hours as needed for wheezing or shortness of breath 54 g 3    amoxicillin-clavulanate (AUGMENTIN) 875-125 mg per tablet Take 1 tablet by mouth every 12 (twelve) hours for 7 days 14 tablet 0    aspirin (ECOTRIN LOW STRENGTH) 81 mg EC tablet Take 1 tablet by mouth daily      Cholecalciferol (Vitamin D3) 125 MCG (5000 UT) CAPS Take 1 capsule by mouth daily      ezetimibe (ZETIA) 10 mg tablet Take 1 tablet (10 mg total) by mouth daily 90 tablet 3    fluticasone (FLONASE) 50 mcg/act nasal spray USE 2 SPRAYS IN BOTH  NOSTRILS DAILY 48 g 3    gabapentin (NEURONTIN) 300 mg capsule TAKE 1 CAPSULE BY MOUTH  TWICE DAILY 180 capsule 3    levocetirizine (XYZAL) 5 MG tablet TAKE 1 TABLET BY MOUTH IN  THE EVENING 90 tablet 1    meloxicam (Mobic) 15 mg tablet Tablet once a day after food as needed for back pain 30 tablet 3    montelukast (SINGULAIR) 10 mg tablet TAKE 1 TABLET BY MOUTH  DAILY AT BEDTIME 90 tablet 3    omeprazole (PriLOSEC) 40 MG capsule Take 40 mg by mouth daily      rosuvastatin (CRESTOR) 40 MG tablet TAKE 1 TABLET BY MOUTH  DAILY 90 tablet 3    sertraline (ZOLOFT) 100 mg tablet Take 1 5  Tablets (150 mg )  daily 135 tablet 3     No current facility-administered medications for this visit           Health Maintenance     Health Maintenance   Topic Date Due    Falls: Plan of Care  Never done    PT PLAN OF CARE  10/09/2019    SLP PLAN OF CARE  11/08/2019    Pneumococcal Vaccine: 65+ Years (3 - PPSV23 or PCV20) 09/29/2020    Lung Cancer Screening  11/09/2021    Fall Risk  05/12/2023    Urinary Incontinence Screening  05/12/2023    Medicare Annual Wellness Visit (AWV)  05/12/2023    Depression Remission PHQ 05/12/2023    BMI: Followup Plan  05/15/2023    BMI: Adult  10/07/2023    Breast Cancer Screening: Mammogram  03/07/2024    Colorectal Cancer Screening  07/07/2024    Cervical Cancer Screening  09/07/2025    Hepatitis C Screening  Completed    Osteoporosis Screening  Completed    Influenza Vaccine  Completed    COVID-19 Vaccine  Completed    HIB Vaccine  Aged Out    Hepatitis B Vaccine  Aged Out    IPV Vaccine  Aged Out    Hepatitis A Vaccine  Aged Out    Meningococcal ACWY Vaccine  Aged Out    HPV Vaccine  Aged Out     Immunization History   Administered Date(s) Administered    COVID-19 PFIZER VACCINE 0 3 ML IM 03/20/2021, 04/12/2021, 01/08/2022, 05/07/2022    INFLUENZA 09/26/2011, 09/19/2022    Influenza Quadrivalent Preservative Free 3 years and older IM 09/29/2015, 09/22/2016, 09/08/2017    Influenza, high dose seasonal 0 7 mL 09/30/2019, 09/17/2020, 09/20/2021, 09/19/2022    Influenza, recombinant, quadrivalent,injectable, preservative free 09/13/2018    Influenza, seasonal, injectable 01/17/2013, 09/03/2013, 09/23/2014    Pneumococcal Conjugate 13-Valent 10/22/2018    Pneumococcal Polysaccharide PPV23 10/01/2007, 09/29/2015    Td (adult), adsorbed 06/02/2016       Sita Lora

## 2022-10-22 ENCOUNTER — HOSPITAL ENCOUNTER (OUTPATIENT)
Dept: MRI IMAGING | Facility: HOSPITAL | Age: 67
Discharge: HOME | End: 2022-10-22
Payer: MEDICARE

## 2022-10-22 DIAGNOSIS — M51.16 INTERVERTEBRAL DISC DISORDERS WITH RADICULOPATHY, LUMBAR REGION: Chronic | ICD-10-CM

## 2022-10-22 PROCEDURE — G1004 CDSM NDSC: HCPCS

## 2022-10-22 PROCEDURE — 72148 MRI LUMBAR SPINE W/O DYE: CPT

## 2022-10-28 ENCOUNTER — TELEPHONE (OUTPATIENT)
Dept: FAMILY MEDICINE CLINIC | Facility: CLINIC | Age: 67
End: 2022-10-28

## 2022-10-28 DIAGNOSIS — M51.16 INTERVERTEBRAL DISC DISORDERS WITH RADICULOPATHY, LUMBAR REGION: Primary | Chronic | ICD-10-CM

## 2022-10-28 NOTE — TELEPHONE ENCOUNTER
Please contact patient  I reviewed results of recent low back MRI  · Worsening of chronic disc disease between L4-L5 which is likely causing patient's symptoms  · New disc herniation between T12 and L1, discus herniated to the right, patient's pain is left-sided so most likely this is more of an incidental finding  · Stable changes between L5-S1    I recommend to schedule evaluation by [facility]    I placed a referral for Dr Agnes Manning    Thank you

## 2022-10-28 NOTE — TELEPHONE ENCOUNTER
Results of LS MRI reviewed    New right paramedian protrusion type disc herniation T12-L1 resulting in mild central stenosis      Advanced facet degenerative change at L5-S1 results in anterolisthesis and uncovering the posterior disc margin with mild right foraminal narrowing and minimal central stenosis  Findings are similar to the prior study      Spondylotic degenerative changes at L4-5 results in progressive central stenosis which is now moderate to severe in degree with moderate bilateral lateral recess stenosis

## 2022-10-28 NOTE — TELEPHONE ENCOUNTER
Attempted to reach patient via telephone  Unable to leave a message  Sent physicians recommendations via [employer] message

## 2022-11-14 DIAGNOSIS — F33.9 MAJOR DEPRESSION, RECURRENT, CHRONIC (HCC): Chronic | ICD-10-CM

## 2022-11-14 RX ORDER — SERTRALINE HYDROCHLORIDE 100 MG/1
TABLET, FILM COATED ORAL
Qty: 135 TABLET | Refills: 3 | Status: SHIPPED | OUTPATIENT
Start: 2022-11-14

## 2022-11-28 ENCOUNTER — HOSPITAL ENCOUNTER (OUTPATIENT)
Dept: RADIOLOGY | Age: 67
Discharge: HOME | End: 2022-11-28

## 2022-11-28 DIAGNOSIS — Z87.891 PERSONAL HISTORY OF NICOTINE DEPENDENCE: ICD-10-CM

## 2022-11-29 DIAGNOSIS — J30.89 ALLERGIC RHINITIS DUE TO FUNGAL SPORES, UNSPECIFIED SEASONALITY: ICD-10-CM

## 2022-11-29 RX ORDER — LEVOCETIRIZINE DIHYDROCHLORIDE 5 MG/1
TABLET, FILM COATED ORAL
Qty: 90 TABLET | Refills: 1 | Status: SHIPPED | OUTPATIENT
Start: 2022-11-29

## 2022-12-01 DIAGNOSIS — R91.1 PULMONARY NODULE: Primary | ICD-10-CM

## 2022-12-12 ENCOUNTER — OFFICE VISIT (OUTPATIENT)
Dept: FAMILY MEDICINE CLINIC | Facility: CLINIC | Age: 67
End: 2022-12-12

## 2022-12-12 VITALS
HEIGHT: 64 IN | DIASTOLIC BLOOD PRESSURE: 88 MMHG | TEMPERATURE: 97.6 F | SYSTOLIC BLOOD PRESSURE: 122 MMHG | RESPIRATION RATE: 16 BRPM | HEART RATE: 74 BPM | WEIGHT: 154.4 LBS | BODY MASS INDEX: 26.36 KG/M2 | OXYGEN SATURATION: 98 %

## 2022-12-12 DIAGNOSIS — J44.9 COPD WITHOUT EXACERBATION (HCC): Chronic | ICD-10-CM

## 2022-12-12 DIAGNOSIS — M77.8 TENDINITIS OF LEFT FOREARM: ICD-10-CM

## 2022-12-12 DIAGNOSIS — M77.12 LATERAL EPICONDYLITIS OF LEFT ELBOW: Primary | ICD-10-CM

## 2022-12-12 RX ORDER — ALBUTEROL SULFATE 90 UG/1
2 AEROSOL, METERED RESPIRATORY (INHALATION) EVERY 4 HOURS PRN
Qty: 54 G | Refills: 3 | Status: SHIPPED | OUTPATIENT
Start: 2022-12-12

## 2022-12-12 RX ORDER — MELOXICAM 15 MG/1
TABLET ORAL
Qty: 30 TABLET | Refills: 5 | Status: SHIPPED | OUTPATIENT
Start: 2022-12-12

## 2022-12-12 NOTE — PROGRESS NOTES
Name: Francisco Cueto      : 1955      MRN: [id number]  Encounter Provider: Edward Barbosa MD  Encounter Date: 2022   Encounter department: [address] [place] [address] Dr Gonzales  Lateral epicondylitis of left elbow  Assessment & Plan:  Likely due to overuse  Patient is left  Handed  Start OT  Brace   Meloxicam daily for 2-3 weeks then PRN    Orders:  -     meloxicam (Mobic) 15 mg tablet; Take 1 tablet once a day after food as needed for joint pain/ back pain  -     Ambulatory referral to Occupational Therapy; Future    2  Tendinitis of left forearm  -     Ambulatory referral to Occupational Therapy; Future           Subjective     Left mid forearm pain- radiates to the left mid arm   Walks the dog - who is pulling   No neck pain or radiculopathy  Pain is worse with internal or external arm rotation  No CP or SOB  Patient is Left handed    Review of Systems   Constitutional: Negative  Respiratory: Negative  Cardiovascular: Negative  Neurological: Negative  Negative for numbness         Past Medical History:   Diagnosis Date   • Asthma    • Degeneration of intervertebral disc at L5-S1 level    • Depression    • Family history of colon cancer 3/27/2019    Added automatically from request for surgery [id number]   • Full incontinence of feces    • Generalized anxiety disorder    • Hepatitis     at age 4 yrs   • Herpes zoster     last assessed: 13   • History of colon polyps    • Hypercholesterolemia    • Hyperlipidemia    • MS (multiple sclerosis) (HCC)    • MS (multiple sclerosis) (HCC)    • Osteoporosis    • Seasonal allergies    • Tumor of breast     benign, right breast   • Urinary incontinence      Past Surgical History:   Procedure Laterality Date   • BREAST BIOPSY Right     benign   • BREAST SURGERY      right breast fibroid tumor resection   • COLONOSCOPY      2013 - due in 2018 - Dr Crisostomo   • COLONOSCOPY W/ POLYPECTOMY  2022    normal-repeat in 2 years   • FL LUMBAR PUNCTURE DIAGNOSTIC  10/01/2018   • ROTATOR CUFF REPAIR      Bilateral   • ROTATOR CUFF REPAIR Bilateral    • SEPTOPLASTY       Family History   Problem Relation Age of Onset   • Arthritis Mother    • Osteoporosis Mother    • COPD Father    • No Known Problems Maternal Grandmother    • No Known Problems Maternal Grandfather    • No Known Problems Paternal Grandmother    • No Known Problems Paternal Grandfather    • Arthritis Family    • COPD Family    • Emphysema Family    • Heart disease Family    • Hyperthyroidism Family    • Multiple sclerosis Family    • Osteoarthritis Family    • No Known Problems Sister    • No Known Problems Sister    • Colon cancer Brother 72   • Multiple sclerosis Brother    • No Known Problems Brother    • No Known Problems Brother    • No Known Problems Maternal Aunt    • No Known Problems Maternal Aunt    • No Known Problems Paternal Aunt      Social History     Socioeconomic History   • Marital status: /Civil Union     Spouse name: None   • Number of children: None   • Years of education: None   • Highest education level: None   Occupational History   • Occupation: Retired   Tobacco Use   • Smoking status: Every Day     Packs/day: 1 00     Years: 40 00     Pack years: 40 00     Types: Cigarettes     Start date: 1982   • Smokeless tobacco: Never   Vaping Use   • Vaping Use: Never used   Substance and Sexual Activity   • Alcohol use: Yes     Comment: occasional; [employer] also states Never drank alcohol   • Drug use: No   • Sexual activity: Not Currently     Partners: Male     Comment: denied any risk of AIDS   Other Topics Concern   • None   Social History Narrative         Social Determinants of Health     Financial Resource Strain: Not on file   Food Insecurity: Not on file   Transportation Needs: Not on file   Physical Activity: Not on file   Stress: Not on file   Social Connections: Not on file   Intimate Partner Violence: Not on file   Housing Stability: Not on file     Current Outpatient Medications on File Prior to Visit   Medication Sig   • albuterol (2 5 mg/3 mL) 0 083 % nebulizer solution Take 3 mL (2 5 mg total) by nebulization every 4 (four) hours as needed for wheezing or shortness of breath (cough)   • albuterol (Ventolin HFA) 90 mcg/act inhaler Inhale 2 puffs every 4 (four) hours as needed for wheezing or shortness of breath   • aspirin (ECOTRIN LOW STRENGTH) 81 mg EC tablet Take 1 tablet by mouth daily   • Cholecalciferol (Vitamin D3) 125 MCG (5000 UT) CAPS Take 1 capsule by mouth daily   • ezetimibe (ZETIA) 10 mg tablet Take 1 tablet (10 mg total) by mouth daily   • fluticasone (FLONASE) 50 mcg/act nasal spray USE 2 SPRAYS IN BOTH  NOSTRILS DAILY   • gabapentin (NEURONTIN) 300 mg capsule TAKE 1 CAPSULE BY MOUTH  TWICE DAILY   • levocetirizine (XYZAL) 5 MG tablet TAKE 1 TABLET BY MOUTH IN  THE EVENING   • montelukast (SINGULAIR) 10 mg tablet TAKE 1 TABLET BY MOUTH  DAILY AT BEDTIME   • omeprazole (PriLOSEC) 40 MG capsule Take 40 mg by mouth daily   • rosuvastatin (CRESTOR) 40 MG tablet TAKE 1 TABLET BY MOUTH  DAILY   • sertraline (ZOLOFT) 100 mg tablet TAKE 1 AND 1/2 TABLETS BY  MOUTH DAILY   • [DISCONTINUED] meloxicam (Mobic) 15 mg tablet Tablet once a day after food as needed for back pain     Allergies   Allergen Reactions   • Bupropion    • Cefprozil    • Conj Estrog-Medroxyprogest Ace Other (See Comments)     Other     • Povidone Iodine      Patient unsure of what this is   • Sulfamethoxazole-Trimethoprim Other (See Comments)     Reaction Date: 11Aug2011;    • Levofloxacin    • Gadobutrol GI Intolerance   • Medroxyprogesterone Other (See Comments)     Reaction Date: 11Aug2011;    • Nortriptyline Other (See Comments)     n/a   • Pamelor [Nortriptyline Hcl]    • Provera [Medroxyprogesterone Acetate]      Immunization History   Administered Date(s) Administered   • COVID-19 PFIZER VACCINE 0 3 ML IM 03/20/2021, 04/12/2021, 01/08/2022, 05/07/2022   • INFLUENZA 09/26/2011, 09/19/2022   • Influenza Quadrivalent Preservative Free 3 years and older IM 09/29/2015, 09/22/2016, 09/08/2017   • Influenza, high dose seasonal 0 7 mL 09/30/2019, 09/17/2020, 09/20/2021, 09/19/2022   • Influenza, recombinant, quadrivalent,injectable, preservative free 09/13/2018   • Influenza, seasonal, injectable 01/17/2013, 09/03/2013, 09/23/2014   • Pneumococcal Conjugate 13-Valent 10/22/2018   • Pneumococcal Polysaccharide PPV23 10/01/2007, 09/29/2015   • Td (adult), adsorbed 06/02/2016       Objective     /88 (BP Location: Left arm, Patient Position: Sitting, Cuff Size: Standard)   Pulse 74   Temp 97 6 °F (36 4 °C) (Temporal)   Resp 16   Ht 5' 4" (1 626 m)   Wt 70 kg (154 lb 6 4 oz)   LMP  (LMP Unknown)   SpO2 98%   BMI 26 50 kg/m²     Physical Exam  Vitals and nursing note reviewed  Constitutional:       Appearance: Normal appearance  Musculoskeletal:      Left elbow: No deformity or effusion  Tenderness present in lateral epicondyle  Left forearm: Tenderness (with ROM) present  No swelling, edema or deformity  Neurological:      General: No focal deficit present  Mental Status: She is alert and oriented to person, place, and time  Psychiatric:         Mood and Affect: Mood normal          Behavior: Behavior normal          Thought Content:  Thought content normal        Fernando Bolivar MD

## 2022-12-12 NOTE — ASSESSMENT & PLAN NOTE
Likely due to overuse  Patient is left  Handed  Start OT  Brace   Meloxicam daily for 2-3 weeks then PRN

## 2022-12-28 DIAGNOSIS — E78.2 MIXED HYPERLIPIDEMIA: Chronic | ICD-10-CM

## 2022-12-28 RX ORDER — ROSUVASTATIN CALCIUM 40 MG/1
TABLET, COATED ORAL
Qty: 90 TABLET | Refills: 3 | Status: SHIPPED | OUTPATIENT
Start: 2022-12-28

## 2023-01-17 ENCOUNTER — OFFICE VISIT (OUTPATIENT)
Dept: FAMILY MEDICINE CLINIC | Facility: CLINIC | Age: 68
End: 2023-01-17

## 2023-01-17 VITALS
OXYGEN SATURATION: 97 % | HEIGHT: 64 IN | SYSTOLIC BLOOD PRESSURE: 140 MMHG | BODY MASS INDEX: 26.29 KG/M2 | RESPIRATION RATE: 18 BRPM | WEIGHT: 154 LBS | DIASTOLIC BLOOD PRESSURE: 70 MMHG | TEMPERATURE: 97.6 F | HEART RATE: 70 BPM

## 2023-01-17 DIAGNOSIS — H10.9 CONJUNCTIVITIS OF RIGHT EYE, UNSPECIFIED CONJUNCTIVITIS TYPE: Primary | ICD-10-CM

## 2023-01-17 RX ORDER — TOBRAMYCIN AND DEXAMETHASONE 3; 1 MG/ML; MG/ML
2 SUSPENSION/ DROPS OPHTHALMIC 4 TIMES DAILY
Qty: 2.5 ML | Refills: 1 | Status: SHIPPED | OUTPATIENT
Start: 2023-01-17

## 2023-01-17 NOTE — PROGRESS NOTES
FAMILY PRACTICE OFFICE VISIT       NAME: Ezequiel Kaufman  AGE: 75 y o  SEX: female       : 1955        MRN: [id number]    DATE: 2023  TIME: 5:42 PM    Assessment and Plan     Problem List Items Addressed This Visit        Other    Conjunctivitis of right eye - Primary     Conjunctivitis  Patient given prescription for TobraDex ophthalmic drops to use 4 drops 4 times daily to right eye for 5 days  She may use warm compresses to remove any crusting in the mornings and cool compresses to assist with swelling throughout the day for 5 minutes 3 times daily  Patient will call if symptoms persist after medication completed         Relevant Medications    tobramycin-dexamethasone (TOBRADEX) ophthalmic suspension           Chief Complaint     Chief Complaint   Patient presents with   • Blepharitis     R , itchy , burning x 3 days        History of Present Illness     Patient in the office with 3-day history of her right periorbital area being swollen and her eye having tearing and itching  He denies any recent illness  Additionally she does not recall any triggers for current symptoms  She denies any changes in visual acuity  Review of Systems   Review of Systems   Constitutional: Negative  Eyes: Positive for photophobia, discharge, redness and itching  Negative for pain and visual disturbance         Active Problem List     Patient Active Problem List   Diagnosis   • Multiple sclerosis, relapsing/remitting   • Hyperlipidemia   • Osteoporosis   • Ambulatory dysfunction   • Chronic obstructive pulmonary disease (HCC)   • Vitamin D deficiency   • Major depression, recurrent, chronic (HCC)   • Allergic rhinitis   • Amnesia/memory disorder   • Shoulder impingement syndrome, left   • Urinary incontinence   • Balance disorder   • Gastro-esophageal reflux disease with esophagitis   • Collagenous colitis   • Intervertebral disc disorders with radiculopathy, lumbar region   • DDD (degenerative disc disease), cervical   • Polyp of colon   • Elevated fasting glucose   • Personal history of nicotine dependence    • Pulmonary nodule   • Lateral epicondylitis of left elbow   • Tendinitis of left forearm   • Conjunctivitis of right eye       Past Medical History:  Past Medical History:   Diagnosis Date   • Asthma    • Degeneration of intervertebral disc at L5-S1 level    • Depression    • Family history of colon cancer 3/27/2019    Added automatically from request for surgery [id number]   • Full incontinence of feces    • Generalized anxiety disorder    • Hepatitis     at age 4 yrs   • Herpes zoster     last assessed: 6/20/13   • History of colon polyps    • Hypercholesterolemia    • Hyperlipidemia    • MS (multiple sclerosis) (HCC)    • MS (multiple sclerosis) ([facility] [address] )    • Osteoporosis    • Seasonal allergies    • Tumor of breast     benign, right breast   • Urinary incontinence        Past Surgical History:  Past Surgical History:   Procedure Laterality Date   • BREAST BIOPSY Right     benign   • BREAST SURGERY      right breast fibroid tumor resection   • COLONOSCOPY      11/2013 - due in 2018 - Dr Crisostomo   • COLONOSCOPY W/ POLYPECTOMY  07/07/2022    normal-repeat in 2 years   • FL LUMBAR PUNCTURE DIAGNOSTIC  10/01/2018   • ROTATOR CUFF REPAIR      Bilateral   • ROTATOR CUFF REPAIR Bilateral    • SEPTOPLASTY         Family History:  Family History   Problem Relation Age of Onset   • Arthritis Mother    • Osteoporosis Mother    • COPD Father    • No Known Problems Maternal Grandmother    • No Known Problems Maternal Grandfather    • No Known Problems Paternal Grandmother    • No Known Problems Paternal Grandfather    • Arthritis Family    • COPD Family    • Emphysema Family    • Heart disease Family    • Hyperthyroidism Family    • Multiple sclerosis Family    • Osteoarthritis Family    • No Known Problems Sister    • No Known Problems Sister    • Colon cancer Brother 72   • Multiple sclerosis Brother    • No Known Problems Brother    • No Known Problems Brother    • No Known Problems Maternal Aunt    • No Known Problems Maternal Aunt    • No Known Problems Paternal Aunt        Social History:  Social History     Socioeconomic History   • Marital status: /Civil Union     Spouse name: Not on file   • Number of children: Not on file   • Years of education: Not on file   • Highest education level: Not on file   Occupational History   • Occupation: Retired   Tobacco Use   • Smoking status: Every Day     Packs/day: 1 00     Years: 40 00     Pack years: 40 00     Types: Cigarettes     Start date: 1982   • Smokeless tobacco: Never   Vaping Use   • Vaping Use: Never used   Substance and Sexual Activity   • Alcohol use: Yes     Comment: occasional; Allscripts also states Never drank alcohol   • Drug use: No   • Sexual activity: Not Currently     Partners: Male     Comment: denied any risk of AIDS   Other Topics Concern   • Not on file   Social History Narrative         Social Determinants of Health     Financial Resource Strain: Not on file   Food Insecurity: Not on file   Transportation Needs: Not on file   Physical Activity: Not on file   Stress: Not on file   Social Connections: Not on file   Intimate Partner Violence: Not on file   Housing Stability: Not on file       Objective     Vitals:    01/17/23 1438   BP: 140/70   Pulse: 70   Resp: 18   Temp: 97 6 °F (36 4 °C)   SpO2: 97%     Wt Readings from Last 3 Encounters:   01/17/23 69 9 kg (154 lb)   12/12/22 70 kg (154 lb 6 4 oz)   10/07/22 68 9 kg (152 lb)       Physical Exam  Constitutional:       Appearance: Normal appearance  HENT:      Right Ear: Tympanic membrane, ear canal and external ear normal  There is no impacted cerumen  Left Ear: Tympanic membrane, ear canal and external ear normal  There is no impacted cerumen  Eyes:      General: No scleral icterus  Right eye: Discharge present  Left eye: No discharge        Extraocular Movements: Extraocular movements intact  Pupils: Pupils are equal, round, and reactive to light  Comments: Sclera of right eye injected and also has ear tearing  Upper and lower eyelids appear pink and swollen  Lymphadenopathy:      Cervical: No cervical adenopathy  Neurological:      Mental Status: She is alert           Pertinent Laboratory/Diagnostic Studies:  Lab Results   Component Value Date    GLUCOSE 107 12/15/2015    BUN 18 09/17/2022    CREATININE 0 69 09/17/2022    CALCIUM 9 4 09/17/2022     12/15/2015    K 4 3 09/17/2022    CO2 30 09/17/2022     09/17/2022     Lab Results   Component Value Date    ALT 29 09/17/2022    AST 24 09/17/2022    ALKPHOS 78 09/17/2022    BILITOT 0 30 12/15/2015       Lab Results   Component Value Date    WBC 8 91 09/17/2022    HGB 14 2 09/17/2022    HCT 41 6 09/17/2022    MCV 89 09/17/2022     09/17/2022       No results found for: TSH    Lab Results   Component Value Date    CHOL 169 07/21/2015     Lab Results   Component Value Date    TRIG 258 (H) 09/17/2022     Lab Results   Component Value Date    HDL 43 (L) 09/17/2022     Lab Results   Component Value Date    LDLCALC 144 (H) 09/17/2022     Lab Results   Component Value Date    HGBA1C 5 8 (H) 09/17/2022       Results for orders placed or performed in visit on 09/17/22   CBC and differential   Result Value Ref Range    WBC 8 91 4 31 - 10 16 Thousand/uL    RBC 4 70 3 81 - 5 12 Million/uL    Hemoglobin 14 2 11 5 - 15 4 g/dL    Hematocrit 41 6 34 8 - 46 1 %    MCV 89 82 - 98 fL    MCH 30 2 26 8 - 34 3 pg    MCHC 34 1 31 4 - 37 4 g/dL    RDW 11 7 11 6 - 15 1 %    MPV 9 4 8 9 - 12 7 fL    Platelets 265 072 - 499 Thousands/uL    nRBC 0 /100 WBCs    Neutrophils Relative 53 43 - 75 %    Immat GRANS % 0 0 - 2 %    Lymphocytes Relative 38 14 - 44 %    Monocytes Relative 7 4 - 12 %    Eosinophils Relative 2 0 - 6 %    Basophils Relative 0 0 - 1 %    Neutrophils Absolute 4 67 1 85 - 7 62 Thousands/µL    Immature Grans Absolute 0  03 0 00 - 0 20 Thousand/uL    Lymphocytes Absolute 3 37 0 60 - 4 47 Thousands/µL    Monocytes Absolute 0 62 0 17 - 1 22 Thousand/µL    Eosinophils Absolute 0 19 0 00 - 0 61 Thousand/µL    Basophils Absolute 0 03 0 00 - 0 10 Thousands/µL   Comprehensive metabolic panel   Result Value Ref Range    Sodium 142 135 - 147 mmol/L    Potassium 4 3 3 5 - 5 3 mmol/L    Chloride 106 96 - 108 mmol/L    CO2 30 21 - 32 mmol/L    ANION GAP 6 4 - 13 mmol/L    BUN 18 5 - 25 mg/dL    Creatinine 0 69 0 60 - 1 30 mg/dL    Glucose, Fasting 101 (H) 65 - 99 mg/dL    Calcium 9 4 8 4 - 10 2 mg/dL    AST 24 13 - 39 U/L    ALT 29 7 - 52 U/L    Alkaline Phosphatase 78 34 - 104 U/L    Total Protein 6 9 6 4 - 8 4 g/dL    Albumin 4 4 3 5 - 5 0 g/dL    Total Bilirubin 0 39 0 20 - 1 00 mg/dL    eGFR 90 ml/min/1 73sq m   Hemoglobin A1C   Result Value Ref Range    Hemoglobin A1C 5 8 (H) Normal 3 8-5 6%; PreDiabetic 5 7-6 4%; Diabetic >=6 5%; Glycemic control for adults with diabetes <7 0% %     mg/dl   Lipid Panel with Direct LDL reflex   Result Value Ref Range    Cholesterol 239 (H) See Comment mg/dL    Triglycerides 258 (H) See Comment mg/dL    HDL, Direct 43 (L) >=50 mg/dL    LDL Calculated 144 (H) 0 - 100 mg/dL   TSH, 3rd generation   Result Value Ref Range    TSH 3RD GENERATON 1 165 0 450 - 4 500 uIU/mL     *Note: Due to a large number of results and/or encounters for the requested time period, some results have not been displayed  A complete set of results can be found in Results Review  No orders of the defined types were placed in this encounter        ALLERGIES:  Allergies   Allergen Reactions   • Bupropion    • Cefprozil    • Conj Estrog-Medroxyprogest Ace Other (See Comments)     Other     • Povidone Iodine      Patient unsure of what this is   • Sulfamethoxazole-Trimethoprim Other (See Comments)     Reaction Date: 11Aug2011;    • Levofloxacin    • Gadobutrol GI Intolerance   • Medroxyprogesterone Other (See Comments) Reaction Date: 11Aug2011;    • Nortriptyline Other (See Comments)     n/a   • Pamelor [Nortriptyline Hcl]    • Provera [Medroxyprogesterone Acetate]        Current Medications     Current Outpatient Medications   Medication Sig Dispense Refill   • albuterol (2 5 mg/3 mL) 0 083 % nebulizer solution Take 3 mL (2 5 mg total) by nebulization every 4 (four) hours as needed for wheezing or shortness of breath (cough) 120 mL 3   • albuterol (Ventolin HFA) 90 mcg/act inhaler Inhale 2 puffs every 4 (four) hours as needed for wheezing or shortness of breath 54 g 3   • aspirin (ECOTRIN LOW STRENGTH) 81 mg EC tablet Take 1 tablet by mouth daily     • Cholecalciferol (Vitamin D3) 125 MCG (5000 UT) CAPS Take 1 capsule by mouth daily     • ezetimibe (ZETIA) 10 mg tablet Take 1 tablet (10 mg total) by mouth daily 90 tablet 3   • fluticasone (FLONASE) 50 mcg/act nasal spray USE 2 SPRAYS IN BOTH  NOSTRILS DAILY 48 g 3   • gabapentin (NEURONTIN) 300 mg capsule TAKE 1 CAPSULE BY MOUTH  TWICE DAILY 180 capsule 3   • levocetirizine (XYZAL) 5 MG tablet TAKE 1 TABLET BY MOUTH IN  THE EVENING 90 tablet 1   • meloxicam (Mobic) 15 mg tablet Take 1 tablet once a day after food as needed for joint pain/ back pain 30 tablet 5   • montelukast (SINGULAIR) 10 mg tablet TAKE 1 TABLET BY MOUTH  DAILY AT BEDTIME 90 tablet 3   • omeprazole (PriLOSEC) 40 MG capsule Take 40 mg by mouth daily     • rosuvastatin (CRESTOR) 40 MG tablet TAKE 1 TABLET BY MOUTH  DAILY 90 tablet 3   • sertraline (ZOLOFT) 100 mg tablet TAKE 1 AND 1/2 TABLETS BY  MOUTH DAILY 135 tablet 3   • tobramycin-dexamethasone (TOBRADEX) ophthalmic suspension Administer 2 drops to the right eye 4 (four) times a day 2 5 mL 1     No current facility-administered medications for this visit           Health Maintenance     Health Maintenance   Topic Date Due   • Falls: Plan of Care  Never done   • PT PLAN OF CARE  10/09/2019   • SLP PLAN OF CARE  11/08/2019   • Pneumococcal Vaccine: 65+ Years (3 - PPSV23 if available, else PCV20) 09/29/2020   • COVID-19 Vaccine (5 - Booster for Pfizer series) 07/02/2022   • BMI: Followup Plan  05/15/2023   • Fall Risk  05/12/2023   • Urinary Incontinence Screening  05/12/2023   • Medicare Annual Wellness Visit (AWV)  05/12/2023   • Depression Remission PHQ  07/17/2023   • Lung Cancer Screening  11/28/2023   • BMI: Adult  01/17/2024   • Breast Cancer Screening: Mammogram  03/07/2024   • Colorectal Cancer Screening  07/07/2024   • Cervical Cancer Screening  09/07/2025   • Hepatitis C Screening  Completed   • Osteoporosis Screening  Completed   • Influenza Vaccine  Completed   • HIB Vaccine  Aged Out   • IPV Vaccine  Aged Out   • Hepatitis A Vaccine  Aged Out   • Meningococcal ACWY Vaccine  Aged Out   • HPV Vaccine  Aged Out     Immunization History   Administered Date(s) Administered   • COVID-19 PFIZER VACCINE 0 3 ML IM 03/20/2021, 04/12/2021, 01/08/2022, 05/07/2022   • INFLUENZA 09/26/2011, 09/19/2022   • Influenza Quadrivalent Preservative Free 3 years and older IM 09/29/2015, 09/22/2016, 09/08/2017   • Influenza, high dose seasonal 0 7 mL 09/30/2019, 09/17/2020, 09/20/2021, 09/19/2022   • Influenza, recombinant, quadrivalent,injectable, preservative free 09/13/2018   • Influenza, seasonal, injectable 01/17/2013, 09/03/2013, 09/23/2014   • Pneumococcal Conjugate 13-Valent 10/22/2018   • Pneumococcal Polysaccharide PPV23 10/01/2007, 09/29/2015   • Td (adult), adsorbed 14/43/8359       Santo Clay MD

## 2023-01-17 NOTE — ASSESSMENT & PLAN NOTE
Conjunctivitis  Patient given prescription for TobraDex ophthalmic drops to use 4 drops 4 times daily to right eye for 5 days  She may use warm compresses to remove any crusting in the mornings and cool compresses to assist with swelling throughout the day for 5 minutes 3 times daily    Patient will call if symptoms persist after medication completed

## 2023-01-23 ENCOUNTER — EVALUATION (OUTPATIENT)
Dept: OCCUPATIONAL THERAPY | Age: 68
End: 2023-01-23

## 2023-01-23 DIAGNOSIS — M77.8 TENDINITIS OF LEFT FOREARM: ICD-10-CM

## 2023-01-23 DIAGNOSIS — M77.12 LATERAL EPICONDYLITIS OF LEFT ELBOW: ICD-10-CM

## 2023-01-23 NOTE — PROGRESS NOTES
OT Evaluation     Today's date: 2023  Patient name: Alicia Maharaj  : 1955  MRN: [id number]  Referring provider: Sherine Cassidy MD  Dx:   Encounter Diagnosis     ICD-10-CM    1  Lateral epicondylitis of left elbow  M77 12 Ambulatory referral to Occupational Therapy      2  Tendinitis of left forearm  M77 8 Ambulatory referral to Occupational Therapy                     Assessment  Assessment details: Pt is a 74 y/o female who presents to skilled OT tx with moderate L FA pain  Pt reports pain at her elbow/proximal FA that can radiate down to mid forearm and even up to mild arm  Pt noted with inc tenderness at [facility] and lateral epicondyle  Pt reported and noted with inc pain during supination, and resisted wrist extension  Pt + for middle finger resisted test and Cozens test which symptoms can be + for lateral epicondylitis  Pt edu on movements to avoid, provided with cock up orthosis as well as wrist extensor stretching for HEP  Pt would benefit from continued skilled OT tx to dec pain, inc ROM, inc strength,and inc QOL for returning to leisure activities of sewing  Impairments: abnormal or restricted ROM, activity intolerance, impaired physical strength, lacks appropriate home exercise program and pain with function    Symptom irritability: moderateUnderstanding of Dx/Px/POC: good   Prognosis: good    Goals  STGs:  1  Pt will report 50% dec pain at rest and with activities  2  Pt will be independent with HEP  3  Pt will inc FA ROM + 10 degrees  LTGs:  1  Pt will report no pain with activities  2  Pt will inc  and LUE strength to WFLs  3  Pt will inc FOTO score  4   Pt will be independent with strengthening HEP    Plan  Patient would benefit from: custom splinting and skilled occupational therapy  Planned modality interventions: ultrasound, unattended electrical stimulation, thermotherapy: hydrocollator packs, cryotherapy and fluidotherapy  Planned therapy interventions: joint mobilization, manual therapy, massage, neuromuscular re-education, orthotic fitting/training, patient education, strengthening, stretching, therapeutic activities, therapeutic exercise, functional ROM exercises and home exercise program  Frequency: 1-2x per week  Duration in weeks: 6  Treatment plan discussed with: patient        Subjective Evaluation    History of Present Illness  Onset date: 2022  Mechanism of injury: trauma  Mechanism of injury: Pt is a 74 y/o female, who reports L elbow pain started in 2022, and has progressively gotten worse  Pt reports repetitive activities such as sewing and walking her dog/being pulled by her dog  Not a recurrent problem   Quality of life: poor    Pain  Current pain ratin  At best pain ratin  At worst pain rating: 10  Quality: throbbing, burning and radiating  Relieving factors: rest, support and heat  Progression: worsening    Hand dominance: left    Treatments  Current treatment: occupational therapy  Patient Goals  Patient goals for therapy: decreased pain, increased motion, increased strength, independence with ADLs/IADLs and return to sport/leisure activities          Objective     Palpation   Left   Tenderness of the extensor carpi radialis brevis and extensor carpi radialis longus  Tenderness     Left Elbow   Tenderness in the lateral epicondyle  Left Wrist/Hand   Tenderness in the lateral epicondyle       Neurological Testing     Sensation     Wrist/Hand   Left   Intact: light touch, pin prick and sharp/dull discrimination    Active Range of Motion     Left Elbow   Forearm supination: 40 degrees with pain  Forearm pronation: 65 degrees with pain    Left Wrist   Wrist flexion: 55 degrees   Wrist extension: 55 degrees     Right Wrist   Normal active range of motion    Strength/Myotome Testing     Left Elbow   Forearm supination: 3  Forearm pronation: 3    Left Wrist/Hand   Wrist extension: 4-  Wrist flexion: 4-  Radial deviation: 4-  Ulnar deviation: 4-     (2nd hand position)     Trial 1: 30 4    Right Wrist/Hand   Wrist extension: 5  Wrist flexion: 5  Ulnar deviation: 5     (2nd hand position)     Trial 1: 46 4    Additional Strength Details  L  strength with EE and pronated- 25 7    Tests     Left Elbow   Positive Cozen's       Additional Tests Details  + pain for Resisted middle finger test             Precautions: Universal; h/o MS  HEP- wrist orthosis, extensor stretching, CFM ([place] book HEP)      Manuals             STM/IASTM                          KT             Orthosis mgmt             Neuro Re-Ed             Joint mob, radial head             Eccentric strength             Isometric strength                                                                 Ther Ex             Wrist extensor stretch             Wrist strenthening/ROM             FA ROM/strengthening                                                                              Ther Activity                                       Gait Training                                       Modalities             CP/MP

## 2023-01-25 ENCOUNTER — ESTABLISHED COMPREHENSIVE EXAM (OUTPATIENT)
Dept: URBAN - METROPOLITAN AREA CLINIC 6 | Facility: CLINIC | Age: 68
End: 2023-01-25

## 2023-01-25 DIAGNOSIS — H43.813: ICD-10-CM

## 2023-01-25 DIAGNOSIS — H04.123: ICD-10-CM

## 2023-01-25 DIAGNOSIS — Z96.1: ICD-10-CM

## 2023-01-25 DIAGNOSIS — G35: ICD-10-CM

## 2023-01-25 DIAGNOSIS — H35.3131: ICD-10-CM

## 2023-01-25 DIAGNOSIS — H46.9: ICD-10-CM

## 2023-01-25 PROCEDURE — 92133 CPTRZD OPH DX IMG PST SGM ON: CPT

## 2023-01-25 PROCEDURE — 92134 CPTRZ OPH DX IMG PST SGM RTA: CPT

## 2023-01-25 PROCEDURE — 92083 EXTENDED VISUAL FIELD XM: CPT

## 2023-01-25 PROCEDURE — 92014 COMPRE OPH EXAM EST PT 1/>: CPT

## 2023-01-25 ASSESSMENT — VISUAL ACUITY
OU_CC: J1
OS_SC: 20/40-1
OD_SC: 20/40-1

## 2023-01-25 ASSESSMENT — TONOMETRY
OD_IOP_MMHG: 13
OS_IOP_MMHG: 15

## 2023-01-26 ENCOUNTER — APPOINTMENT (OUTPATIENT)
Dept: OCCUPATIONAL THERAPY | Age: 68
End: 2023-01-26

## 2023-01-30 ENCOUNTER — OFFICE VISIT (OUTPATIENT)
Dept: OCCUPATIONAL THERAPY | Age: 68
End: 2023-01-30

## 2023-01-30 DIAGNOSIS — M77.12 LATERAL EPICONDYLITIS OF LEFT ELBOW: Primary | ICD-10-CM

## 2023-01-30 DIAGNOSIS — M77.8 TENDINITIS OF LEFT FOREARM: ICD-10-CM

## 2023-01-30 NOTE — PROGRESS NOTES
Daily Note     Today's date: 2023  Patient name: Sue Ryan  : 1955  MRN: [id number]  Referring provider: Shanice Clark MD  Dx:   Encounter Diagnosis     ICD-10-CM    1  Lateral epicondylitis of left elbow  M77 12       2  Tendinitis of left forearm  M77 8                      Subjective: "The pain isn't as intense "      Objective: See treatment diary below      Assessment: Tolerated treatment well  Tolerated STM and cupping well  Tenderness and tightness at supinator noted  Tolerate wrist extension stretching, and eccentric strengthening without c/o pain  Assess how patient felt from tx today for Thursday if eccentric strengthening was too much or not  Patient demonstrated fatigue post treatment and would benefit from continued OT      Plan: Continue per plan of care        Precautions: Universal; h/o MS  HEP- wrist orthosis, extensor stretching, CFM ([place] book HEP)      Manuals             STM/IASTM/cupping 25'                         KT perf            Orthosis mgmt             Neuro Re-Ed             Joint mob, radial head             Eccentric strength 1# 10x 3-5sec hold; RB 8x 3 sec            Isometric strength                                                                 Ther Ex             Wrist extensor stretch 3x 30 sec            Wrist strenthening/ROM             FA ROM/strengthening A/P 2x10; BH 2x10             Towel  10x 2 sets                                                                Ther Activity                                       Gait Training                                       Modalities             CP/MP

## 2023-02-02 ENCOUNTER — OFFICE VISIT (OUTPATIENT)
Dept: OCCUPATIONAL THERAPY | Age: 68
End: 2023-02-02

## 2023-02-02 DIAGNOSIS — M77.12 LATERAL EPICONDYLITIS OF LEFT ELBOW: Primary | ICD-10-CM

## 2023-02-02 DIAGNOSIS — M77.8 TENDINITIS OF LEFT FOREARM: ICD-10-CM

## 2023-02-02 NOTE — PROGRESS NOTES
Daily Note     Today's date: 2023  Patient name: Sue Ryan  : 1955  MRN: [id number]  Referring provider: Shanice Clark MD  Dx:   Encounter Diagnosis     ICD-10-CM    1  Lateral epicondylitis of left elbow  M77 12       2  Tendinitis of left forearm  M77 8                      Subjective: "I was sore yesterday but not terrible "      Objective: See treatment diary below      Assessment: Tolerated treatment well  Inc strength and activity tolerance  Progress as tolerated for pt  Needs max cueing for patricia twists  Patient demonstrated fatigue post treatment and would benefit from continued OT      Plan: Continue per plan of care        Precautions: Universal; h/o MS  HEP- wrist orthosis, extensor stretching, CFM ([place] book HEP)      Manuals  2           STM/IASTM/cupping 25' 15'                        KT perf            Orthosis mgmt             Neuro Re-Ed             Joint mob, radial head             Eccentric strength 1# 10x 3-5sec hold; RB 8x 3 sec 1# 10x 3-5sec hold; RB 8x 3 sec           Isometric strength                                                                 Ther Ex             Wrist extensor stretch 3x 30 sec 3x 30 sec           Wrist strenthening/ROM             FA ROM/strengthening A/P 2x10; BH 2x10 red Patricia twist 2 sets 5            Towel  10x 2 sets Towel  10x 2 sets                                                               Ther Activity                                       Gait Training                                       Modalities             CP/MP

## 2023-02-06 ENCOUNTER — OFFICE VISIT (OUTPATIENT)
Dept: OCCUPATIONAL THERAPY | Age: 68
End: 2023-02-06

## 2023-02-06 DIAGNOSIS — M77.12 LATERAL EPICONDYLITIS OF LEFT ELBOW: Primary | ICD-10-CM

## 2023-02-06 DIAGNOSIS — M77.8 TENDINITIS OF LEFT FOREARM: ICD-10-CM

## 2023-02-06 NOTE — PROGRESS NOTES
Daily Note     Today's date: 2023  Patient name: Matthew Limon  : 1955  MRN: [id number]  Referring provider: Anibal Ledezma MD  Dx:   Encounter Diagnosis     ICD-10-CM    1  Lateral epicondylitis of left elbow  M77 12       2  Tendinitis of left forearm  M77 8                      Subjective: "I feel fine, but my hands hurt a little today "      Objective: See treatment diary below      Assessment: Tolerated treatment well  No tightness or pain at elbow/FA today  F ability in strengthening exercises  Inc to 2#  Rest breaks needed and cues for proper techniques  Patient demonstrated fatigue post treatment and would benefit from continued OT      Plan: Continue per plan of care    Inc strengthening HEP NV     Precautions: Universal; h/o MS  HEP- wrist orthosis, extensor stretching, CFM ([place] book HEP)      Manuals           STM/IASTM/cupping 25' 15' 10'                       KT perf            Orthosis mgmt             Neuro Re-Ed             Joint mob, radial head             Eccentric strength 1# 10x 3-5sec hold; RB 8x 3 sec 1# 10x 3-5sec hold; RB 8x 3 sec 2# 10x 3-5sec hold; yellow gummy 10x 3 sec; red flex bar 2 sets 10 3 sec holds          Isometric strength                                                                 Ther Ex             Wrist extensor stretch 3x 30 sec 3x 30 sec 3x30 sec          Wrist strenthening/ROM   2# 10x we/wf; red flex bar 2 sets 10          FA ROM/strengthening A/P 2x10; BH 2x10 red Patricia twist 2 sets 5 BH 10x           Towel  10x 2 sets Towel  10x 2 sets           Arm bike   5'                                                 Ther Activity                                       Gait Training                                       Modalities             CP/MP

## 2023-02-09 ENCOUNTER — OFFICE VISIT (OUTPATIENT)
Dept: OCCUPATIONAL THERAPY | Age: 68
End: 2023-02-09

## 2023-02-09 DIAGNOSIS — M77.8 TENDINITIS OF LEFT FOREARM: ICD-10-CM

## 2023-02-09 DIAGNOSIS — M77.12 LATERAL EPICONDYLITIS OF LEFT ELBOW: Primary | ICD-10-CM

## 2023-02-09 NOTE — PROGRESS NOTES
Daily Note     Today's date: 2023  Patient name: Matthew Limon  : 1955  MRN: [id number]  Referring provider: Anibal Ledezma MD  Dx:   Encounter Diagnosis     ICD-10-CM    1  Lateral epicondylitis of left elbow  M77 12       2  Tendinitis of left forearm  M77 8                      Subjective: "I'm doing well "      Objective: See treatment diary below      Assessment: Tolerated treatment well  Provided upgraded HEP with red thera band, and blue foam   edu to perf everywhere day right now to access endurance and breaks needed for her MS  Patient demonstrated fatigue post treatment and would benefit from continued OT      Plan: Continue per plan of care         Precautions: Universal; h/o MS  HEP- wrist orthosis, extensor stretching, CFM ([place] book HEP)      Manuals          STM/IASTM/cupping 25' 15' 10' 10'                      KT perf            Orthosis mgmt             Neuro Re-Ed             Joint mob, radial head             Eccentric strength 1# 10x 3-5sec hold; RB 8x 3 sec 1# 10x 3-5sec hold; RB 8x 3 sec 2# 10x 3-5sec hold; yellow gummy 10x 3 sec; red flex bar 2 sets 10 3 sec holds thera band rows 2 sets 10 3 sec counts; 2# and red thera band 10x each we/wf 3 sec holds         Isometric strength                                                                 Ther Ex             Wrist extensor stretch 3x 30 sec 3x 30 sec 3x30 sec 3x 30 sec         Wrist strenthening/ROM   2# 10x we/wf; red flex bar 2 sets 10          FA ROM/strengthening A/P 2x10; BH 2x10 red Patricia twist 2 sets 5 BH 10x BH 10x; red flex bar 2 sets 10          Towel  10x 2 sets Towel  10x 2 sets  Towel and blue foams 10x         Arm bike   5' 5'                                                Ther Activity                                       Gait Training                                       Modalities             CP/MP

## 2023-02-16 ENCOUNTER — OFFICE VISIT (OUTPATIENT)
Dept: OCCUPATIONAL THERAPY | Age: 68
End: 2023-02-16

## 2023-02-16 DIAGNOSIS — M77.12 LATERAL EPICONDYLITIS OF LEFT ELBOW: Primary | ICD-10-CM

## 2023-02-16 DIAGNOSIS — M77.8 TENDINITIS OF LEFT FOREARM: ICD-10-CM

## 2023-02-16 NOTE — PROGRESS NOTES
Daily Note     Today's date: 2023  Patient name: Kiley Stewart  : 1955  MRN: [id number]  Referring provider: Liset Mai MD  Dx:   Encounter Diagnosis     ICD-10-CM    1  Lateral epicondylitis of left elbow  M77 12       2  Tendinitis of left forearm  M77 8                      Subjective: "I''ve been feeling sore and mild pain "      Objective: See treatment diary below      Assessment: Tolerated treatment fair  Inc tightness at flexors and supinator today  Tx focused on flexor stretching and supinator stretching  Dec tenderness and pain at end of session  Patient demonstrated fatigue post treatment and would benefit from continued OT      Plan: Continue per plan of care  Potential discharge next visit        Precautions: Universal; h/o MS  HEP- wrist orthosis, extensor stretching, CFM ([place] book HEP)      Manuals         STM/IASTM/cupping 25' 15' 10' 10' 10'                     KT perf            Orthosis mgmt             Neuro Re-Ed             Joint mob, radial head     5'        Eccentric strength 1# 10x 3-5sec hold; RB 8x 3 sec 1# 10x 3-5sec hold; RB 8x 3 sec 2# 10x 3-5sec hold; yellow gummy 10x 3 sec; red flex bar 2 sets 10 3 sec holds thera band rows 2 sets 10 3 sec counts; 2# and red thera band 10x each we/wf 3 sec holds 2# wrist extension holds with weight for flexor stretch 2 sets 10 10 sec; YH 2 sets 10 10 sec        Isometric strength                                                                 Ther Ex             Wrist extensor stretch 3x 30 sec 3x 30 sec 3x30 sec 3x 30 sec E/F 3x30 sec        Wrist strenthening/ROM   2# 10x we/wf; red flex bar 2 sets 10          FA ROM/strengthening A/P 2x10; BH 2x10 red Patricia twist 2 sets 5 BH 10x BH 10x; red flex bar 2 sets 10          Towel  10x 2 sets Towel  10x 2 sets  Towel and blue foams 10x         Arm bike   5' 5' 5'                                               Ther Activity Gait Training                                       Modalities             CP/MP

## 2023-02-20 ENCOUNTER — OFFICE VISIT (OUTPATIENT)
Dept: OCCUPATIONAL THERAPY | Age: 68
End: 2023-02-20

## 2023-02-20 DIAGNOSIS — M77.12 LATERAL EPICONDYLITIS OF LEFT ELBOW: Primary | ICD-10-CM

## 2023-02-20 DIAGNOSIS — M77.8 TENDINITIS OF LEFT FOREARM: ICD-10-CM

## 2023-02-20 NOTE — PROGRESS NOTES
OT Re-Evaluation     Today's date: 2023  Patient name: Debbie Meyer  : 1955  MRN: [id number]  Referring provider: Danny Lee MD  Dx:   Encounter Diagnosis     ICD-10-CM    1  Lateral epicondylitis of left elbow  M77 12       2  Tendinitis of left forearm  M77 8                      Assessment  Assessment details: 23- Pt has been participating and progressing in skilled OT tx sessions  Pt has report dec pain and tightness to wrist extensors  Pt noted with dec tenderness and dec TTP  Pt testing negative for lateral epicondylitis  Pt had been doing well, but over the past week, pt has reported inc pain in elbow and FA  After testing, pt noted + of Yergason and Perez test which demo medial epicondylitis and bicep tendonitis along with inc tenderness to wrist flexors and bicep tendon  Pt would beenfit from continued skilled OT tx sessions to address her bicep tendonitis and medial epicondylitis for inc QOL and pain free ADLs  Pt is a 74 y/o female who presents to skilled OT tx with moderate L FA pain  Pt reports pain at her elbow/proximal FA that can radiate down to mid forearm and even up to mild arm  Pt noted with inc tenderness at [facility] and lateral epicondyle  Pt reported and noted with inc pain during supination, and resisted wrist extension  Pt + for middle finger resisted test and Cozens test which symptoms can be + for lateral epicondylitis  Pt edu on movements to avoid, provided with cock up orthosis as well as wrist extensor stretching for HEP  Pt would benefit from continued skilled OT tx to dec pain, inc ROM, inc strength,and inc QOL for returning to leisure activities of sewing  Impairments: abnormal or restricted ROM, activity intolerance, impaired physical strength, lacks appropriate home exercise program and pain with function    Symptom irritability: moderateUnderstanding of Dx/Px/POC: good   Prognosis: good    Goals  STGs:  1   Pt will report 50% dec pain at rest and with activities progressing  2  Pt will be independent with HEP MET  3  Pt will inc FA ROM + 10 degrees MET  LTGs:  1  Pt will report no pain with activities  2  Pt will inc  and LUE strength to WFLs  3  Pt will inc FOTO score  4  Pt will be independent with strengthening HEP    Plan  Patient would benefit from: custom splinting and skilled occupational therapy  Planned modality interventions: ultrasound, unattended electrical stimulation, thermotherapy: hydrocollator packs, cryotherapy and fluidotherapy  Planned therapy interventions: joint mobilization, manual therapy, massage, neuromuscular re-education, orthotic fitting/training, patient education, strengthening, stretching, therapeutic activities, therapeutic exercise, functional ROM exercises and home exercise program  Frequency: 1-2x per week  Duration in weeks: 4  Treatment plan discussed with: patient        Subjective Evaluation    History of Present Illness  Onset date: 2022  Mechanism of injury: trauma  Mechanism of injury: Pt is a 74 y/o female, who reports L elbow pain started in 2022, and has progressively gotten worse  Pt reports repetitive activities such as sewing and walking her dog/being pulled by her dog  Not a recurrent problem   Quality of life: poor    Pain  Current pain rating: 3  At best pain rating: 3  At worst pain ratin  Quality: throbbing, burning and radiating  Relieving factors: rest, support and heat  Progression: worsening    Hand dominance: left    Treatments  Current treatment: occupational therapy  Patient Goals  Patient goals for therapy: decreased pain, increased motion, increased strength, independence with ADLs/IADLs and return to sport/leisure activities          Objective     Palpation   Left   Tenderness of the flexor carpi radialis, flexor carpi ulnaris, flexor digitorum profundus and flexor digitorum superficialis       Tenderness     Left Elbow   Tenderness in the distal biceps tendon and medial epicondyle  No tenderness in the lateral epicondyle  Left Wrist/Hand   Tenderness in the distal biceps tendon and medial epicondyle  No tenderness in the lateral epicondyle  Additional Tenderness Details  2/20- Pt demo signs of medial epi/flexor tendonitis and bicep tendonitis    Neurological Testing     Sensation     Wrist/Hand   Left   Intact: light touch, pin prick and sharp/dull discrimination    Active Range of Motion     Left Elbow   Forearm supination: 65 degrees with pain  Forearm pronation: 65 degrees with pain    Left Wrist   Wrist flexion: 50 degrees   Wrist extension: 60 degrees     Right Wrist   Normal active range of motion    Strength/Myotome Testing     Left Elbow   Flexion: 4+  Extension: 4+  Forearm supination: 4  Forearm pronation: 4    Left Wrist/Hand   Wrist extension: 4+  Wrist flexion: 4+  Radial deviation: 4+  Ulnar deviation: 4+     (2nd hand position)     Trial 1: 46 5    Right Wrist/Hand   Wrist extension: 5  Wrist flexion: 5  Ulnar deviation: 5     (2nd hand position)     Trial 1: 46 4    Additional Strength Details  2/20- F inc in  strength  L  strength with EE and pronated- 25 7    Tests     Left Elbow   Positive Mill's and passive medial epicondylitis  Negative Cozen's and Tinel's sign (cubital tunnel)  Left Wrist/Hand   Negative resisted middle finger, Tinel's sign (medial nerve) and Tinel's sign (radial tunnel)       Additional Tests Details  + Yergason test  2/20- negative for tennis elbow  + pain for Resisted middle finger test             Precautions: Universal; h/o MS  HEP- wrist orthosis, extensor stretching, CFM ([place] book HEP)    Manuals 1/30 2/2 2/6 2/9 2/16 2/20           STM/IASTM/cupping 25' 15' 10' 10' 10'  biceps tendon and flexors 25'                                   KT perf          perf           Orthosis mgmt                       Neuro Re-Ed                       Joint mob, radial head         5'             Eccentric strength 1# 10x 3-5sec hold; RB 8x 3 sec 1# 10x 3-5sec hold; RB 8x 3 sec 2# 10x 3-5sec hold; yellow gummy 10x 3 sec; red flex bar 2 sets 10 3 sec holds thera band rows 2 sets 10 3 sec counts; 2# and red thera band 10x each we/wf 3 sec holds 2# wrist extension holds with weight for flexor stretch 2 sets 10 10 sec; YH 2 sets 10 10 sec             Isometric strength                                                                                                                       Ther Ex                       Wrist extensor stretch 3x 30 sec 3x 30 sec 3x30 sec 3x 30 sec E/F 3x30 sec  wrist flexors and bicep stretch 3 sets 30 sec           Wrist strenthening/ROM     2# 10x we/wf; red flex bar 2 sets 10                 FA ROM/strengthening A/P 2x10; BH 2x10 red Patricia twist 2 sets 5 BH 10x BH 10x; red flex bar 2 sets 10    BH 3x 10            Towel  10x 2 sets Towel  10x 2 sets   Towel and blue foams 10x               Arm bike     5' 5' 5'  10'                                                                                   Ther Activity                                                                       Gait Training                                                                       Modalities                       CP/MP

## 2023-02-28 ENCOUNTER — OFFICE VISIT (OUTPATIENT)
Dept: OCCUPATIONAL THERAPY | Age: 68
End: 2023-02-28

## 2023-02-28 DIAGNOSIS — M77.8 TENDINITIS OF LEFT FOREARM: ICD-10-CM

## 2023-02-28 DIAGNOSIS — M77.12 LATERAL EPICONDYLITIS OF LEFT ELBOW: Primary | ICD-10-CM

## 2023-02-28 NOTE — PROGRESS NOTES
Daily Note     Today's date: 2023  Patient name: Kvng Jackman  : 1955  MRN: [id number]  Referring provider: Monique Arreguin MD  Dx:   Encounter Diagnosis     ICD-10-CM    1  Lateral epicondylitis of left elbow  M77 12       2  Tendinitis of left forearm  M77 8                      Subjective: "It is sore, but it feels better after I stretch "      Objective: See treatment diary below      Assessment: Tolerated treatment well  Pt noted with inc tenderness to pronator teres and medial epic; but denied pain with palpation or resisted wrist flexion  Therapist adjusted wrist cockup for medial epiconylitis to 10 degree wrist flexion  Pt perf medial epi and pronator stretching well, with mod cues for proper placement and technique  KT applied for pain mgmt    Patient demonstrated fatigue post treatment and would benefit from continued OT      Plan: Continue per plan of care        Precautions: Universal; h/o MS  HEP- wrist orthosis, extensor stretching, CFM ([place] book HEP)    Manuals          STM/IASTM/cupping 25' 15' 10' 10' 10'  biceps tendon and flexors 25'  15' medial epi, pronator                                 KT perf          perf  perf         Orthosis mgmt                       Neuro Re-Ed                       Joint mob, radial head         5'             Eccentric strength 1# 10x 3-5sec hold; RB 8x 3 sec 1# 10x 3-5sec hold; RB 8x 3 sec 2# 10x 3-5sec hold; yellow gummy 10x 3 sec; red flex bar 2 sets 10 3 sec holds thera band rows 2 sets 10 3 sec counts; 2# and red thera band 10x each we/wf 3 sec holds 2# wrist extension holds with weight for flexor stretch 2 sets 10 10 sec; YH 2 sets 10 10 sec    2# 10x 10 sec; thera bar reverse sharan twist 10x         Isometric strength                                                                                                                       Ther Ex                       Wrist extensor stretch 3x 30 sec 3x 30 sec 3x30 sec 3x 30 sec E/F 3x30 sec  wrist flexors and bicep stretch 3 sets 30 sec  wrist flexor stretch 3x 30 sec         Wrist strenthening/ROM     2# 10x we/wf; red flex bar 2 sets 10                 FA ROM/strengthening A/P 2x10; BH 2x10 red Patricia twist 2 sets 5 BH 10x BH 10x; red flex bar 2 sets 10    BH 3x 10  YH pronator stretch 10x 5 sce holds          Towel  10x 2 sets Towel  10x 2 sets   Towel and blue foams 10x               Arm bike     5' 5' 5'  10'  8'                                                                                 Ther Activity                                                                       Gait Training                                                                       Modalities                       CP/MP

## 2023-03-06 ENCOUNTER — OFFICE VISIT (OUTPATIENT)
Dept: OCCUPATIONAL THERAPY | Age: 68
End: 2023-03-06

## 2023-03-06 DIAGNOSIS — M77.12 LATERAL EPICONDYLITIS OF LEFT ELBOW: Primary | ICD-10-CM

## 2023-03-06 DIAGNOSIS — M77.8 TENDINITIS OF LEFT FOREARM: ICD-10-CM

## 2023-03-06 NOTE — PROGRESS NOTES
Daily Note     Today's date: 3/6/2023  Patient name: Matthew Limon  : 1955  MRN: [id number]  Referring provider: Anibal Ledezma MD  Dx:   Encounter Diagnosis     ICD-10-CM    1  Lateral epicondylitis of left elbow  M77 12       2  Tendinitis of left forearm  M77 8                      Subjective: "My bicep is sore, I don't know what I did to it "  Pt reported dec pain after tx  Objective: See treatment diary below      Assessment: Tolerated treatment well  Pt noted with conitnued tenderness to pronator teres and medial epic; but denied pain with palpation or resisted wrist flexion  Pt reports relief with stretching and graston tool technique  KT for pain relief  Tolerated well  Edu pt on the importance of continued HEP and home stretching and strengthening   Patient demonstrated fatigue post treatment and would benefit from continued OT      Plan: Continue per plan of care        Precautions: Universal; h/o MS  HEP- wrist orthosis, extensor stretching, CFM ([facility] book HEP)    Manuals 1/30 2/2 2/6 2/9 2/16  2/20  3/6         STM/IASTM/cupping 25' 15' 10' 10' 10'  biceps tendon and flexors 25'  15' medial epi, pronator                                 KT perf          perf  perf         Orthosis mgmt                       Neuro Re-Ed                       Joint mob, radial head         5'             Eccentric strength 1# 10x 3-5sec hold; RB 8x 3 sec 1# 10x 3-5sec hold; RB 8x 3 sec 2# 10x 3-5sec hold; yellow gummy 10x 3 sec; red flex bar 2 sets 10 3 sec holds thera band rows 2 sets 10 3 sec counts; 2# and red thera band 10x each we/wf 3 sec holds 2# wrist extension holds with weight for flexor stretch 2 sets 10 10 sec; YH 2 sets 10 10 sec    2# 10x 10 sec; thera bar reverse sharan twist 10x         Isometric strength                                                                                                                       Ther Ex                       Wrist extensor stretch 3x 30 sec 3x 30 sec 3x30 sec 3x 30 sec E/F 3x30 sec  wrist flexors and bicep stretch 3 sets 30 sec  wrist flexor stretch 3x 30 sec         Wrist strenthening/ROM     2# 10x we/wf; red flex bar 2 sets 10                 FA ROM/strengthening A/P 2x10; BH 2x10 red Patricia twist 2 sets 5 BH 10x BH 10x; red flex bar 2 sets 10    BH 3x 10  YH pronator stretch 10x 5 sce holds          Towel  10x 2 sets Towel  10x 2 sets   Towel and blue foams 10x               Arm bike     5' 5' 5'  10'  8'                                                                                 Ther Activity                                                                       Gait Training                                                                       Modalities                       CP/MP

## 2023-03-08 ENCOUNTER — HOSPITAL ENCOUNTER (OUTPATIENT)
Dept: MAMMOGRAPHY | Facility: HOSPITAL | Age: 68
Discharge: HOME | End: 2023-03-08
Attending: OBSTETRICS & GYNECOLOGY

## 2023-03-08 VITALS — HEIGHT: 64 IN | WEIGHT: 154 LBS | BODY MASS INDEX: 26.29 KG/M2

## 2023-03-08 DIAGNOSIS — Z12.31 SCREENING MAMMOGRAM FOR BREAST CANCER: ICD-10-CM

## 2023-03-09 ENCOUNTER — OFFICE VISIT (OUTPATIENT)
Dept: OCCUPATIONAL THERAPY | Age: 68
End: 2023-03-09

## 2023-03-09 DIAGNOSIS — M77.12 LATERAL EPICONDYLITIS OF LEFT ELBOW: ICD-10-CM

## 2023-03-09 DIAGNOSIS — M77.8 TENDINITIS OF LEFT FOREARM: Primary | ICD-10-CM

## 2023-03-09 NOTE — PROGRESS NOTES
Daily Note     Today's date: 3/9/2023  Patient name: Marcelo Brown  : 1955  MRN: [id number]  Referring provider: Donta Gao MD  Dx:   Encounter Diagnosis     ICD-10-CM    1  Tendinitis of left forearm  M77 8       2  Lateral epicondylitis of left elbow  M77 12                      Subjective: "I fell off my high stool at home and my arm and neck are sore "    Objective: See treatment diary below      Assessment: Tolerated treatment well  Dec tenderness to distal bicep tendon noted  Reports G relief after stretching and CFM  Inc strengthening as tolerated   Patient demonstrated fatigue post treatment and would benefit from continued OT      Plan: Continue per plan of care        Precautions: Universal; h/o MS  HEP- wrist orthosis, extensor stretching, CFM ([employer] book HEP)    Manuals  3/9+  +   2/2 2/6 2/9 2/16  2/20  3/6         STM/IASTM/cupping 15' medial epi, pronator, biceps tendon 15' 10' 10' 10'  biceps tendon and flexors 25'  15' medial epi, pronator                                 KT perf          perf  perf         Orthosis mgmt                       Neuro Re-Ed                       Joint mob, radial head         5'             Eccentric strength 3# 10x 3-5sec hold; YH 10 sec 2x10 1# 10x 3-5sec hold; RB 8x 3 sec 2# 10x 3-5sec hold; yellow gummy 10x 3 sec; red flex bar 2 sets 10 3 sec holds thera band rows 2 sets 10 3 sec counts; 2# and red thera band 10x each we/wf 3 sec holds 2# wrist extension holds with weight for flexor stretch 2 sets 10 10 sec; YH 2 sets 10 10 sec    2# 10x 10 sec; thera bar reverse sharan twist 10x         Isometric strength                                                                                                                       Ther Ex                       Wrist extensor stretch 3x 30 sec flexor, bicep stretch 5x 30sec 3x 30 sec 3x30 sec 3x 30 sec E/F 3x30 sec  wrist flexors and bicep stretch 3 sets 30 sec  wrist flexor stretch 3x 30 sec         Wrist strenthening/ROM     2# 10x we/wf; red flex bar 2 sets 10                 FA ROM/strengthening Red flex bar 2 sets 10x 3 sec holds red Patricia twist 2 sets 5 BH 10x BH 10x; red flex bar 2 sets 10    BH 3x 10  YH pronator stretch 10x 5 sce holds          Towel  10x 2 sets Towel  10x 2 sets   Towel and blue foams 10x               Arm bike  5'   5' 5' 5'  10'  8'                                                                                 Ther Activity                                                                       Gait Training                                                                       Modalities                       CP/MP

## 2023-03-13 ENCOUNTER — OFFICE VISIT (OUTPATIENT)
Dept: OCCUPATIONAL THERAPY | Age: 68
End: 2023-03-13

## 2023-03-13 DIAGNOSIS — M77.8 TENDINITIS OF LEFT FOREARM: Primary | ICD-10-CM

## 2023-03-13 DIAGNOSIS — M77.12 LATERAL EPICONDYLITIS OF LEFT ELBOW: ICD-10-CM

## 2023-03-13 NOTE — PROGRESS NOTES
Daily Note     Today's date: 3/13/2023  Patient name: Heide Monte  : 1955  MRN: [id number]  Referring provider: Esther Young MD  Dx:   Encounter Diagnosis     ICD-10-CM    1  Tendinitis of left forearm  M77 8       2  Lateral epicondylitis of left elbow  M77 12                      Subjective: "It isnt getting better"    Objective: See treatment diary below      Assessment: Tolerated treatment poor  Pt reports inc pain to L FA and elbow  Pt noted with moderate to severe tenderness to pronator teres  Pt denies pain with ROM of FA, wrist or elbow, as well as deny's pain for muscle resistance, other then in pronation  Therapist edu pt to call MD 2* worsening pain and recent fall for further evaluation  Pt edu to continue with heat, compression, stretching, and CFM  Di Hoist Patient demonstrated fatigue post treatment and would benefit from continued OT      Plan: Continue per plan of care        Precautions: Universal; h/o MS  HEP- wrist orthosis, extensor stretching, CFM ([employer] book HEP)    Manuals  3/9+  +   3/13 2/6 2/9 2/16  2/20  3/6         STM/IASTM/cupping 15' medial epi, pronator, biceps tendon 15' medial epi, pronator, biceps tendon 10' 10' 10'  biceps tendon and flexors 25'  15' medial epi, pronator                                 KT perf          perf  perf         Orthosis mgmt                       Neuro Re-Ed                       Joint mob, radial head         5'             Eccentric strength 3# 10x 3-5sec hold; YH 10 sec 2x10  2# 10x 3-5sec hold; yellow gummy 10x 3 sec; red flex bar 2 sets 10 3 sec holds thera band rows 2 sets 10 3 sec counts; 2# and red thera band 10x each we/wf 3 sec holds 2# wrist extension holds with weight for flexor stretch 2 sets 10 10 sec; YH 2 sets 10 10 sec    2# 10x 10 sec; thera bar reverse sharan twist 10x         Isometric strength                                                                                                                       Ther Ex                       Wrist extensor stretch 3x 30 sec flexor, bicep stretch 5x 30sec  bicep stretch 5x 10 sec, wrist flexior 3x30 sec 3x30 sec 3x 30 sec E/F 3x30 sec  wrist flexors and bicep stretch 3 sets 30 sec  wrist flexor stretch 3x 30 sec         Wrist strenthening/ROM     2# 10x we/wf; red flex bar 2 sets 10                 FA ROM/strengthening Red flex bar 2 sets 10x 3 sec holds red Patricia twist 2 sets 5 BH 10x BH 10x; red flex bar 2 sets 10    BH 3x 10  YH pronator stretch 10x 5 sce holds          Towel  10x 2 sets Towel  10x 2 sets   Towel and blue foams 10x               Arm bike  5'   5' 5' 5'  10'  8'                                                                                 Ther Activity                                                                       Gait Training                                                                       Modalities                       CP/MP

## 2023-03-16 ENCOUNTER — APPOINTMENT (OUTPATIENT)
Dept: OCCUPATIONAL THERAPY | Age: 68
End: 2023-03-16

## 2023-03-17 ENCOUNTER — APPOINTMENT (OUTPATIENT)
Dept: LAB | Facility: CLINIC | Age: 68
End: 2023-03-17

## 2023-03-17 ENCOUNTER — OFFICE VISIT (OUTPATIENT)
Dept: FAMILY MEDICINE CLINIC | Facility: CLINIC | Age: 68
End: 2023-03-17

## 2023-03-17 VITALS
BODY MASS INDEX: 26.89 KG/M2 | HEIGHT: 64 IN | TEMPERATURE: 97.6 F | DIASTOLIC BLOOD PRESSURE: 64 MMHG | OXYGEN SATURATION: 97 % | SYSTOLIC BLOOD PRESSURE: 122 MMHG | RESPIRATION RATE: 18 BRPM | WEIGHT: 157.5 LBS | HEART RATE: 74 BPM

## 2023-03-17 DIAGNOSIS — M77.12 LATERAL EPICONDYLITIS OF LEFT ELBOW: Primary | ICD-10-CM

## 2023-03-17 DIAGNOSIS — E78.2 MIXED HYPERLIPIDEMIA: Chronic | ICD-10-CM

## 2023-03-17 LAB
ALBUMIN SERPL BCP-MCNC: 4.1 G/DL (ref 3.5–5)
ALP SERPL-CCNC: 65 U/L (ref 46–116)
ALT SERPL W P-5'-P-CCNC: 49 U/L (ref 12–78)
ANION GAP SERPL CALCULATED.3IONS-SCNC: 4 MMOL/L (ref 4–13)
AST SERPL W P-5'-P-CCNC: 21 U/L (ref 5–45)
BASOPHILS # BLD AUTO: 0.06 THOUSANDS/ÂΜL (ref 0–0.1)
BASOPHILS NFR BLD AUTO: 1 % (ref 0–1)
BILIRUB SERPL-MCNC: 0.53 MG/DL (ref 0.2–1)
BUN SERPL-MCNC: 27 MG/DL (ref 5–25)
CALCIUM SERPL-MCNC: 9.3 MG/DL (ref 8.3–10.1)
CHLORIDE SERPL-SCNC: 107 MMOL/L (ref 96–108)
CHOLEST SERPL-MCNC: 197 MG/DL
CO2 SERPL-SCNC: 27 MMOL/L (ref 21–32)
CREAT SERPL-MCNC: 0.7 MG/DL (ref 0.6–1.3)
EOSINOPHIL # BLD AUTO: 0.23 THOUSAND/ÂΜL (ref 0–0.61)
EOSINOPHIL NFR BLD AUTO: 3 % (ref 0–6)
ERYTHROCYTE [DISTWIDTH] IN BLOOD BY AUTOMATED COUNT: 11.9 % (ref 11.6–15.1)
GFR SERPL CREATININE-BSD FRML MDRD: 89 ML/MIN/1.73SQ M
GLUCOSE P FAST SERPL-MCNC: 104 MG/DL (ref 65–99)
HCT VFR BLD AUTO: 41.4 % (ref 34.8–46.1)
HDLC SERPL-MCNC: 49 MG/DL
HGB BLD-MCNC: 13.8 G/DL (ref 11.5–15.4)
IMM GRANULOCYTES # BLD AUTO: 0.02 THOUSAND/UL (ref 0–0.2)
IMM GRANULOCYTES NFR BLD AUTO: 0 % (ref 0–2)
LDLC SERPL CALC-MCNC: 102 MG/DL (ref 0–100)
LYMPHOCYTES # BLD AUTO: 3.62 THOUSANDS/ÂΜL (ref 0.6–4.47)
LYMPHOCYTES NFR BLD AUTO: 39 % (ref 14–44)
MCH RBC QN AUTO: 29.5 PG (ref 26.8–34.3)
MCHC RBC AUTO-ENTMCNC: 33.3 G/DL (ref 31.4–37.4)
MCV RBC AUTO: 89 FL (ref 82–98)
MONOCYTES # BLD AUTO: 0.74 THOUSAND/ÂΜL (ref 0.17–1.22)
MONOCYTES NFR BLD AUTO: 8 % (ref 4–12)
NEUTROPHILS # BLD AUTO: 4.71 THOUSANDS/ÂΜL (ref 1.85–7.62)
NEUTS SEG NFR BLD AUTO: 49 % (ref 43–75)
NRBC BLD AUTO-RTO: 0 /100 WBCS
PLATELET # BLD AUTO: 224 THOUSANDS/UL (ref 149–390)
PMV BLD AUTO: 10.1 FL (ref 8.9–12.7)
POTASSIUM SERPL-SCNC: 4.5 MMOL/L (ref 3.5–5.3)
PROT SERPL-MCNC: 6.8 G/DL (ref 6.4–8.4)
RBC # BLD AUTO: 4.68 MILLION/UL (ref 3.81–5.12)
SODIUM SERPL-SCNC: 138 MMOL/L (ref 135–147)
TRIGL SERPL-MCNC: 228 MG/DL
TSH SERPL DL<=0.05 MIU/L-ACNC: 2.3 UIU/ML (ref 0.45–4.5)
WBC # BLD AUTO: 9.38 THOUSAND/UL (ref 4.31–10.16)

## 2023-03-17 RX ORDER — PREDNISONE 20 MG/1
TABLET ORAL
Qty: 15 TABLET | Refills: 0 | Status: SHIPPED | OUTPATIENT
Start: 2023-03-17

## 2023-03-18 PROBLEM — H10.9 CONJUNCTIVITIS OF RIGHT EYE: Status: RESOLVED | Noted: 2023-01-17 | Resolved: 2023-03-18

## 2023-03-18 NOTE — PROGRESS NOTES
FAMILY PRACTICE OFFICE VISIT       NAME: En Kaufman  AGE: 75 y o  SEX: female       : 1955        MRN: [id number]    DATE: 3/19/2023  TIME: 9:36 AM    Assessment and Plan     Problem List Items Addressed This Visit        Musculoskeletal and Integument    Lateral epicondylitis of left elbow - Primary     Lateral epicondylitis  Patient given prescription for prednisone to take 40 mg next 3 days, 30 mg for 3 days, 20 mg, 10 mg for 3 days  Patient will call if symptoms persist after medication completed  Relevant Medications    predniSONE 20 mg tablet           Chief Complaint     Chief Complaint   Patient presents with   • Arm Pain     L getting worse        History of Present Illness     Patient reports several weeks history of left arm pain on the left lateral elbow area  She denies any recent trauma  She has been doing physical therapy for symptoms relief in symptoms  Review of Systems   Review of Systems   Constitutional: Negative  Musculoskeletal: Positive for arthralgias         Active Problem List     Patient Active Problem List   Diagnosis   • Multiple sclerosis, relapsing/remitting   • Hyperlipidemia   • Osteoporosis   • Ambulatory dysfunction   • Chronic obstructive pulmonary disease (HCC)   • Vitamin D deficiency   • Major depression, recurrent, chronic ([facility])   • Allergic rhinitis   • Amnesia/memory disorder   • Shoulder impingement syndrome, left   • Urinary incontinence   • Balance disorder   • Gastro-esophageal reflux disease with esophagitis   • Collagenous colitis   • Intervertebral disc disorders with radiculopathy, lumbar region   • DDD (degenerative disc disease), cervical   • Polyp of colon   • Elevated fasting glucose   • Personal history of nicotine dependence    • Pulmonary nodule   • Lateral epicondylitis of left elbow   • Tendinitis of left forearm       Past Medical History:  Past Medical History:   Diagnosis Date   • Asthma    • Degeneration of intervertebral disc at L5-S1 level    • Depression    • Family history of colon cancer 3/27/2019    Added automatically from request for surgery [id number]   • Full incontinence of feces    • Generalized anxiety disorder    • Hepatitis     at age 4 yrs   • Herpes zoster     last assessed: 6/20/13   • History of colon polyps    • Hypercholesterolemia    • Hyperlipidemia    • MS (multiple sclerosis) (HCC)    • MS (multiple sclerosis) ([address] )    • Osteoporosis    • Seasonal allergies    • Tumor of breast     benign, right breast   • Urinary incontinence        Past Surgical History:  Past Surgical History:   Procedure Laterality Date   • BREAST BIOPSY Right 1998    unsure exact year, benign   • BREAST SURGERY      right breast fibroid tumor resection   • COLONOSCOPY      11/2013 - due in 2018 - Dr Crisostomo   • COLONOSCOPY W/ POLYPECTOMY  07/07/2022    normal-repeat in 2 years   • FL LUMBAR PUNCTURE DIAGNOSTIC  10/01/2018   • ROTATOR CUFF REPAIR      Bilateral   • ROTATOR CUFF REPAIR Bilateral    • SEPTOPLASTY         Family History:  Family History   Problem Relation Age of Onset   • Arthritis Mother    • Osteoporosis Mother    • COPD Father    • No Known Problems Maternal Grandmother    • No Known Problems Maternal Grandfather    • No Known Problems Paternal Grandmother    • No Known Problems Paternal Grandfather    • Arthritis Family    • COPD Family    • Emphysema Family    • Heart disease Family    • Hyperthyroidism Family    • Multiple sclerosis Family    • Osteoarthritis Family    • No Known Problems Sister    • No Known Problems Sister    • Colon cancer Brother 72   • Multiple sclerosis Brother    • No Known Problems Brother    • No Known Problems Brother    • No Known Problems Maternal Aunt    • No Known Problems Maternal Aunt    • No Known Problems Paternal Aunt        Social History:  Social History     Socioeconomic History   • Marital status: /Civil Union     Spouse name: Not on file   • Number of children: Not on file   • Years of education: Not on file   • Highest education level: Not on file   Occupational History   • Occupation: Retired   Tobacco Use   • Smoking status: Every Day     Packs/day: 1 00     Years: 40 00     Pack years: 40 00     Types: Cigarettes     Start date: 1982   • Smokeless tobacco: Never   Vaping Use   • Vaping Use: Never used   Substance and Sexual Activity   • Alcohol use: Yes     Comment: occasional; [employer] also states Never drank alcohol   • Drug use: No   • Sexual activity: Not Currently     Partners: Male     Comment: denied any risk of AIDS   Other Topics Concern   • Not on file   Social History Narrative         Social Determinants of Health     Financial Resource Strain: Not on file   Food Insecurity: Not on file   Transportation Needs: Not on file   Physical Activity: Not on file   Stress: Not on file   Social Connections: Not on file   Intimate Partner Violence: Not on file   Housing Stability: Not on file       Objective     Vitals:    03/17/23 0841   BP: 122/64   Pulse: 74   Resp: 18   Temp: 97 6 °F (36 4 °C)   SpO2: 97%     Wt Readings from Last 3 Encounters:   03/17/23 71 4 kg (157 lb 8 oz)   03/08/23 69 9 kg (154 lb)   01/17/23 69 9 kg (154 lb)       Physical Exam  Constitutional:       Appearance: Normal appearance  Musculoskeletal:         General: Tenderness present  No swelling, deformity or signs of injury  Comments: Patient with tenderness along left lateral elbow  Range of motion  Increased pain with internal and external rotation   Neurological:      Mental Status: She is alert           Pertinent Laboratory/Diagnostic Studies:  Lab Results   Component Value Date    GLUCOSE 107 12/15/2015    BUN 27 (H) 03/17/2023    CREATININE 0 70 03/17/2023    CALCIUM 9 3 03/17/2023     12/15/2015    K 4 5 03/17/2023    CO2 27 03/17/2023     03/17/2023     Lab Results   Component Value Date    ALT 49 03/17/2023    AST 21 03/17/2023    ALKPHOS 65 03/17/2023    BILITOT 0 30 12/15/2015       Lab Results   Component Value Date    WBC 9 38 03/17/2023    HGB 13 8 03/17/2023    HCT 41 4 03/17/2023    MCV 89 03/17/2023     03/17/2023       No results found for: TSH    Lab Results   Component Value Date    CHOL 169 07/21/2015     Lab Results   Component Value Date    TRIG 228 (H) 03/17/2023     Lab Results   Component Value Date    HDL 49 (L) 03/17/2023     Lab Results   Component Value Date    LDLCALC 102 (H) 03/17/2023     Lab Results   Component Value Date    HGBA1C 5 8 (H) 09/17/2022       Results for orders placed or performed in visit on 09/17/22   CBC and differential   Result Value Ref Range    WBC 8 91 4 31 - 10 16 Thousand/uL    RBC 4 70 3 81 - 5 12 Million/uL    Hemoglobin 14 2 11 5 - 15 4 g/dL    Hematocrit 41 6 34 8 - 46 1 %    MCV 89 82 - 98 fL    MCH 30 2 26 8 - 34 3 pg    MCHC 34 1 31 4 - 37 4 g/dL    RDW 11 7 11 6 - 15 1 %    MPV 9 4 8 9 - 12 7 fL    Platelets 826 170 - 587 Thousands/uL    nRBC 0 /100 WBCs    Neutrophils Relative 53 43 - 75 %    Immat GRANS % 0 0 - 2 %    Lymphocytes Relative 38 14 - 44 %    Monocytes Relative 7 4 - 12 %    Eosinophils Relative 2 0 - 6 %    Basophils Relative 0 0 - 1 %    Neutrophils Absolute 4 67 1 85 - 7 62 Thousands/µL    Immature Grans Absolute 0 03 0 00 - 0 20 Thousand/uL    Lymphocytes Absolute 3 37 0 60 - 4 47 Thousands/µL    Monocytes Absolute 0 62 0 17 - 1 22 Thousand/µL    Eosinophils Absolute 0 19 0 00 - 0 61 Thousand/µL    Basophils Absolute 0 03 0 00 - 0 10 Thousands/µL   Comprehensive metabolic panel   Result Value Ref Range    Sodium 142 135 - 147 mmol/L    Potassium 4 3 3 5 - 5 3 mmol/L    Chloride 106 96 - 108 mmol/L    CO2 30 21 - 32 mmol/L    ANION GAP 6 4 - 13 mmol/L    BUN 18 5 - 25 mg/dL    Creatinine 0 69 0 60 - 1 30 mg/dL    Glucose, Fasting 101 (H) 65 - 99 mg/dL    Calcium 9 4 8 4 - 10 2 mg/dL    AST 24 13 - 39 U/L    ALT 29 7 - 52 U/L    Alkaline Phosphatase 78 34 - 104 U/L    Total Protein 6 9 6 4 - 8 4 g/dL Albumin 4 4 3 5 - 5 0 g/dL    Total Bilirubin 0 39 0 20 - 1 00 mg/dL    eGFR 90 ml/min/1 73sq m   Hemoglobin A1C   Result Value Ref Range    Hemoglobin A1C 5 8 (H) Normal 3 8-5 6%; PreDiabetic 5 7-6 4%; Diabetic >=6 5%; Glycemic control for adults with diabetes <7 0% %     mg/dl   Lipid Panel with Direct LDL reflex   Result Value Ref Range    Cholesterol 239 (H) See Comment mg/dL    Triglycerides 258 (H) See Comment mg/dL    HDL, Direct 43 (L) >=50 mg/dL    LDL Calculated 144 (H) 0 - 100 mg/dL   TSH, 3rd generation   Result Value Ref Range    TSH 3RD GENERATON 1 165 0 450 - 4 500 uIU/mL     *Note: Due to a large number of results and/or encounters for the requested time period, some results have not been displayed  A complete set of results can be found in Results Review  No orders of the defined types were placed in this encounter        ALLERGIES:  Allergies   Allergen Reactions   • Bupropion    • Cefprozil    • Conj Estrog-Medroxyprogest Ace Other (See Comments)     Other     • Povidone Iodine      Patient unsure of what this is   • Sulfamethoxazole-Trimethoprim Other (See Comments)     Reaction Date: 11Aug2011;    • Levofloxacin    • Gadobutrol GI Intolerance   • Medroxyprogesterone Other (See Comments)     Reaction Date: 11Aug2011;    • Nortriptyline Other (See Comments)     n/a   • Pamelor [Nortriptyline Hcl]    • Provera [Medroxyprogesterone Acetate]        Current Medications     Current Outpatient Medications   Medication Sig Dispense Refill   • albuterol (2 5 mg/3 mL) 0 083 % nebulizer solution Take 3 mL (2 5 mg total) by nebulization every 4 (four) hours as needed for wheezing or shortness of breath (cough) 120 mL 3   • albuterol (Ventolin HFA) 90 mcg/act inhaler Inhale 2 puffs every 4 (four) hours as needed for wheezing or shortness of breath 54 g 3   • aspirin (ECOTRIN LOW STRENGTH) 81 mg EC tablet Take 1 tablet by mouth daily     • Cholecalciferol (Vitamin D3) 125 MCG (5000 UT) CAPS Take 1 capsule by mouth daily     • ezetimibe (ZETIA) 10 mg tablet Take 1 tablet (10 mg total) by mouth daily 90 tablet 3   • fluticasone (FLONASE) 50 mcg/act nasal spray USE 2 SPRAYS IN BOTH  NOSTRILS DAILY 48 g 3   • gabapentin (NEURONTIN) 300 mg capsule TAKE 1 CAPSULE BY MOUTH  TWICE DAILY 180 capsule 3   • levocetirizine (XYZAL) 5 MG tablet TAKE 1 TABLET BY MOUTH IN  THE EVENING 90 tablet 1   • meloxicam (Mobic) 15 mg tablet Take 1 tablet once a day after food as needed for joint pain/ back pain 30 tablet 5   • montelukast (SINGULAIR) 10 mg tablet TAKE 1 TABLET BY MOUTH  DAILY AT BEDTIME 90 tablet 3   • omeprazole (PriLOSEC) 40 MG capsule Take 40 mg by mouth daily     • predniSONE 20 mg tablet 2 tabs daily X 3 days, 1 5 tabs daily X 3 days,  1 0 tab daily X 3 days, 0 5mg daily X 3 days 15 tablet 0   • rosuvastatin (CRESTOR) 40 MG tablet TAKE 1 TABLET BY MOUTH  DAILY 90 tablet 3   • sertraline (ZOLOFT) 100 mg tablet TAKE 1 AND 1/2 TABLETS BY  MOUTH DAILY 135 tablet 3   • tobramycin-dexamethasone (TOBRADEX) ophthalmic suspension Administer 2 drops to the right eye 4 (four) times a day (Patient not taking: Reported on 3/17/2023) 2 5 mL 1     No current facility-administered medications for this visit           Health Maintenance     Health Maintenance   Topic Date Due   • Falls: Plan of Care  Never done   • PT PLAN OF CARE  10/09/2019   • SLP PLAN OF CARE  11/08/2019   • Pneumococcal Vaccine: 65+ Years (3 - PPSV23 if available, else PCV20) 09/29/2020   • COVID-19 Vaccine (5 - Booster for Mcghee Peter series) 07/02/2022   • OT PLAN OF CARE  03/22/2023   • Medicare Annual Wellness Visit (AWV)  05/12/2023   • BMI: Followup Plan  05/15/2023   • Urinary Incontinence Screening  05/12/2023   • Depression Remission PHQ  07/17/2023   • Lung Cancer Screening  11/28/2023   • Fall Risk  03/17/2024   • BMI: Adult  03/17/2024   • Colorectal Cancer Screening  07/07/2024   • Breast Cancer Screening: Mammogram  03/08/2025   • Cervical Cancer Screening  09/07/2025   • Hepatitis C Screening  Completed   • Osteoporosis Screening  Completed   • Influenza Vaccine  Completed   • HIB Vaccine  Aged Out   • IPV Vaccine  Aged Out   • Hepatitis A Vaccine  Aged Out   • Meningococcal ACWY Vaccine  Aged Out   • HPV Vaccine  Aged Out     Immunization History   Administered Date(s) Administered   • COVID-19 PFIZER VACCINE 0 3 ML IM 03/20/2021, 04/12/2021, 01/08/2022, 05/07/2022   • INFLUENZA 09/26/2011, 09/19/2022   • Influenza Quadrivalent Preservative Free 3 years and older IM 09/29/2015, 09/22/2016, 09/08/2017   • Influenza, high dose seasonal 0 7 mL 09/30/2019, 09/17/2020, 09/20/2021, 09/19/2022   • Influenza, recombinant, quadrivalent,injectable, preservative free 09/13/2018   • Influenza, seasonal, injectable 01/17/2013, 09/03/2013, 09/23/2014   • Pneumococcal Conjugate 13-Valent 10/22/2018   • Pneumococcal Polysaccharide PPV23 10/01/2007, 09/29/2015   • Td (adult), adsorbed 52/62/0331       Zo Limon MD

## 2023-03-19 NOTE — ASSESSMENT & PLAN NOTE
Lateral epicondylitis  Patient given prescription for prednisone to take 40 mg next 3 days, 30 mg for 3 days, 20 mg, 10 mg for 3 days  Patient will call if symptoms persist after medication completed

## 2023-03-20 ENCOUNTER — APPOINTMENT (OUTPATIENT)
Dept: OCCUPATIONAL THERAPY | Age: 68
End: 2023-03-20

## 2023-03-23 ENCOUNTER — APPOINTMENT (OUTPATIENT)
Dept: OCCUPATIONAL THERAPY | Age: 68
End: 2023-03-23

## 2023-05-16 ENCOUNTER — OFFICE VISIT (OUTPATIENT)
Dept: FAMILY MEDICINE CLINIC | Facility: CLINIC | Age: 68
End: 2023-05-16

## 2023-05-16 VITALS
WEIGHT: 156.6 LBS | DIASTOLIC BLOOD PRESSURE: 80 MMHG | TEMPERATURE: 97.8 F | HEART RATE: 68 BPM | RESPIRATION RATE: 16 BRPM | BODY MASS INDEX: 26.73 KG/M2 | SYSTOLIC BLOOD PRESSURE: 124 MMHG | HEIGHT: 64 IN | OXYGEN SATURATION: 97 %

## 2023-05-16 DIAGNOSIS — R91.1 PULMONARY NODULE: ICD-10-CM

## 2023-05-16 DIAGNOSIS — J44.9 CHRONIC OBSTRUCTIVE PULMONARY DISEASE, UNSPECIFIED COPD TYPE (HCC): ICD-10-CM

## 2023-05-16 DIAGNOSIS — Z00.00 MEDICARE ANNUAL WELLNESS VISIT, SUBSEQUENT: Primary | ICD-10-CM

## 2023-05-16 DIAGNOSIS — G35 MULTIPLE SCLEROSIS (HCC): ICD-10-CM

## 2023-05-16 DIAGNOSIS — M81.0 AGE-RELATED OSTEOPOROSIS WITHOUT CURRENT PATHOLOGICAL FRACTURE: Chronic | ICD-10-CM

## 2023-05-16 DIAGNOSIS — F33.9 MAJOR DEPRESSION, RECURRENT, CHRONIC (HCC): ICD-10-CM

## 2023-05-16 DIAGNOSIS — Z23 NEED FOR PNEUMOCOCCAL VACCINATION: ICD-10-CM

## 2023-05-16 DIAGNOSIS — E78.2 MIXED HYPERLIPIDEMIA: Chronic | ICD-10-CM

## 2023-05-16 NOTE — PROGRESS NOTES
Assessment and Plan:     Problem List Items Addressed This Visit        Respiratory    Chronic obstructive pulmonary disease (HCC) (Chronic)     No daily inhalers, no complaints of dyspnea, cough or wheezing  Pending CT chest in early June  Patient is still unfortunately smoking half a pack to 1 pack daily, importance of smoking cessation emphasized again  Pulmonary nodule     CT 11/2022: 6 mm groundglass nodule in the right lower lobe, developing since 8/8/2020  Lung-RADS 3, probably benign  Short-term followup suggested with six-month LDCT  Pending repeat CT early June            Nervous and Auditory    Multiple sclerosis, relapsing/remitting (Chronic)     No current Rx due to age  Patient follows up with [facility] neurology  No new symptoms  Musculoskeletal and Integument    Osteoporosis (Chronic)     Pending DEXA scan in July  Other    Hyperlipidemia (Chronic)     Good control, continue Zetia and Crestor 40 mg daily         Relevant Orders    CBC and differential    Comprehensive metabolic panel    Lipid Panel with Direct LDL reflex    TSH, 3rd generation    Major depression, recurrent, chronic (HCC) (Chronic)     Doing well on Zoloft 150 mg daily        Other Visit Diagnoses     Medicare annual wellness visit, subsequent    -  Primary    Need for pneumococcal vaccination        Relevant Orders    Pneumococcal Conjugate Vaccine 20-valent (Pcv20) (Completed)        We discussed and I have recommended Shingrix vaccination  Prevnar 20 was administered today  Patient will proceed with routine blood work in September  Follow-up in 6 months  Preventive health issues were discussed with patient, and age appropriate screening tests were ordered as noted in patient's After Visit Summary  Personalized health advice and appropriate referrals for health education or preventive services given if needed, as noted in patient's After Visit Summary       History of Present Illness: Patient presents for a Medicare Wellness Visit    Follow up /Medicare wellness  Feels stably overall  Pending CT chest 6/1/23 surveillance of pulmonary nodule  Patient is still unfortunately smoking, approximately half pack to 1 pack daily  No daily inhalers, she keeps albuterol on hand  pending DEXA 7/2023  UTD with mammo and GYN    She is no longer following up with urology, chronic urinary incontinence, patient uses a pad at all times  No recent follow-up with neurology at [facility], no treatment for MS due to age  Results of recent blood work reviewed  Patient has been compliant with all daily Rx noting Crestor and Zetia  Symptoms of depression have been well controlled on Zoloft 150 mg daily     Patient Care Team:  Gage Donis MD as PCP - Grant Navarrete, DO Jeffery Ply, MD Rankin Dess, CRNP Marty Rubinstein, MD Melvinia Landsman, MD Donia Pick, MD Milas Melena, MD (Ophthalmology)     Review of Systems:     Review of Systems   Constitutional: Positive for fatigue  HENT: Negative  Eyes: Negative  Respiratory: Negative  Cardiovascular: Negative  Gastrointestinal: Negative  Endocrine: Negative  Genitourinary: Positive for frequency  As per HPI   Musculoskeletal: Negative  Skin: Negative  Allergic/Immunologic: Negative  Neurological: Negative  Hematological: Negative  Psychiatric/Behavioral: Positive for decreased concentration          Problem List:     Patient Active Problem List   Diagnosis   • Multiple sclerosis, relapsing/remitting   • Hyperlipidemia   • Osteoporosis   • Ambulatory dysfunction   • Chronic obstructive pulmonary disease (HCC)   • Vitamin D deficiency   • Major depression, recurrent, chronic (HCC)   • Allergic rhinitis   • Amnesia/memory disorder   • Shoulder impingement syndrome, left   • Urinary incontinence   • Gastro-esophageal reflux disease with esophagitis   • Collagenous colitis   • Intervertebral disc disorders with radiculopathy, lumbar region   • DDD (degenerative disc disease), cervical   • Polyp of colon   • Personal history of nicotine dependence    • Pulmonary nodule   • Lateral epicondylitis of left elbow   • Tendinitis of left forearm      Past Medical and Surgical History:     Past Medical History:   Diagnosis Date   • Asthma    • Degeneration of intervertebral disc at L5-S1 level    • Depression    • Family history of colon cancer 3/27/2019    Added automatically from request for surgery [id number]   • Full incontinence of feces    • Generalized anxiety disorder    • Hepatitis     at age 4 yrs   • Herpes zoster     last assessed: 6/20/13   • History of colon polyps    • Hypercholesterolemia    • Hyperlipidemia    • MS (multiple sclerosis) (HCC)    • MS (multiple sclerosis) ([facility] [address] )    • Osteoporosis    • Seasonal allergies    • Tumor of breast     benign, right breast   • Urinary incontinence      Past Surgical History:   Procedure Laterality Date   • BREAST BIOPSY Right 1998    unsure exact year, benign   • BREAST SURGERY      right breast fibroid tumor resection   • COLONOSCOPY      11/2013 - due in 2018 - Dr Crisostomo   • COLONOSCOPY W/ POLYPECTOMY  07/07/2022    normal-repeat in 2 years   • FL LUMBAR PUNCTURE DIAGNOSTIC  10/01/2018   • ROTATOR CUFF REPAIR      Bilateral   • ROTATOR CUFF REPAIR Bilateral    • SEPTOPLASTY        Family History:     Family History   Problem Relation Age of Onset   • Arthritis Mother    • Osteoporosis Mother    • COPD Father    • No Known Problems Maternal Grandmother    • No Known Problems Maternal Grandfather    • No Known Problems Paternal Grandmother    • No Known Problems Paternal Grandfather    • Arthritis Family    • COPD Family    • Emphysema Family    • Heart disease Family    • Hyperthyroidism Family    • Multiple sclerosis Family    • Osteoarthritis Family    • No Known Problems Sister    • No Known Problems Sister    • Colon cancer Brother 72   • Multiple sclerosis Brother    • No Known Problems Brother    • No Known Problems Brother    • No Known Problems Maternal Aunt    • No Known Problems Maternal Aunt    • No Known Problems Paternal Aunt       Social History:     Social History     Socioeconomic History   • Marital status: /Civil Union     Spouse name: None   • Number of children: None   • Years of education: None   • Highest education level: None   Occupational History   • Occupation: Retired   Tobacco Use   • Smoking status: Every Day     Packs/day: 1 00     Years: 40 00     Pack years: 40 00     Types: Cigarettes     Start date: 1982   • Smokeless tobacco: Never   Vaping Use   • Vaping Use: Never used   Substance and Sexual Activity   • Alcohol use: Yes     Comment: occasional; [employer] also states Never drank alcohol   • Drug use: No   • Sexual activity: Not Currently     Partners: Male     Comment: denied any risk of AIDS   Other Topics Concern   • None   Social History Narrative         Social Determinants of Health     Financial Resource Strain: Unknown   • Difficulty of Paying Living Expenses: Patient refused   Food Insecurity: Not on file   Transportation Needs: No Transportation Needs   • Lack of Transportation (Medical): No   • Lack of Transportation (Non-Medical):  No   Physical Activity: Not on file   Stress: Not on file   Social Connections: Not on file   Intimate Partner Violence: Not on file   Housing Stability: Not on file      Medications and Allergies:     Current Outpatient Medications   Medication Sig Dispense Refill   • albuterol (2 5 mg/3 mL) 0 083 % nebulizer solution Take 3 mL (2 5 mg total) by nebulization every 4 (four) hours as needed for wheezing or shortness of breath (cough) 120 mL 3   • albuterol (Ventolin HFA) 90 mcg/act inhaler Inhale 2 puffs every 4 (four) hours as needed for wheezing or shortness of breath 54 g 3   • aspirin (ECOTRIN LOW STRENGTH) 81 mg EC tablet Take 1 tablet by mouth daily     • Cholecalciferol (Vitamin D3) 125 MCG (5000 UT) CAPS Take 1 capsule by mouth daily     • ezetimibe (ZETIA) 10 mg tablet Take 1 tablet (10 mg total) by mouth daily 90 tablet 3   • fluticasone (FLONASE) 50 mcg/act nasal spray USE 2 SPRAYS IN BOTH  NOSTRILS DAILY 48 g 3   • gabapentin (NEURONTIN) 300 mg capsule TAKE 1 CAPSULE BY MOUTH  TWICE DAILY 180 capsule 3   • levocetirizine (XYZAL) 5 MG tablet TAKE 1 TABLET BY MOUTH IN  THE EVENING 90 tablet 1   • meloxicam (Mobic) 15 mg tablet Take 1 tablet once a day after food as needed for joint pain/ back pain 30 tablet 5   • montelukast (SINGULAIR) 10 mg tablet TAKE 1 TABLET BY MOUTH  DAILY AT BEDTIME 90 tablet 3   • omeprazole (PriLOSEC) 40 MG capsule Take 40 mg by mouth daily     • rosuvastatin (CRESTOR) 40 MG tablet TAKE 1 TABLET BY MOUTH  DAILY 90 tablet 3   • sertraline (ZOLOFT) 100 mg tablet TAKE 1 AND 1/2 TABLETS BY  MOUTH DAILY 135 tablet 3     No current facility-administered medications for this visit       Allergies   Allergen Reactions   • Bupropion    • Cefprozil    • Conj Estrog-Medroxyprogest Ace Other (See Comments)     Other     • Povidone Iodine      Patient unsure of what this is   • Sulfamethoxazole-Trimethoprim Other (See Comments)     Reaction Date: 11Aug2011;    • Levofloxacin    • Gadobutrol GI Intolerance   • Medroxyprogesterone Other (See Comments)     Reaction Date: 11Aug2011;    • Nortriptyline Other (See Comments)     n/a   • Pamelor [Nortriptyline Hcl]    • Provera [Medroxyprogesterone Acetate]       Immunizations:     Immunization History   Administered Date(s) Administered   • COVID-19 PFIZER VACCINE 0 3 ML IM 03/20/2021, 04/12/2021, 01/08/2022, 05/07/2022, 12/02/2022   • INFLUENZA 09/26/2011, 09/19/2022   • Influenza Quadrivalent Preservative Free 3 years and older IM 09/29/2015, 09/22/2016, 09/08/2017   • Influenza, high dose seasonal 0 7 mL 09/30/2019, 09/17/2020, 09/20/2021, 09/19/2022   • Influenza, recombinant, quadrivalent,injectable, preservative free 09/13/2018   • Influenza, seasonal, injectable 01/17/2013, 09/03/2013, 09/23/2014   • Pneumococcal Conjugate 13-Valent 10/22/2018   • Pneumococcal Conjugate Vaccine 20-valent (Pcv20), Polysace 05/16/2023   • Pneumococcal Polysaccharide PPV23 10/01/2007, 09/29/2015   • Td (adult), adsorbed 06/02/2016      Health Maintenance:         Topic Date Due   • Lung Cancer Screening  11/28/2023   • Colorectal Cancer Screening  07/07/2024   • Breast Cancer Screening: Mammogram  03/08/2025   • Cervical Cancer Screening  09/07/2025   • Hepatitis C Screening  Completed     There are no preventive care reminders to display for this patient  Medicare Screening Tests and Risk Assessments:     Jacinta Vail is here for her Subsequent Wellness visit  Last Medicare Wellness visit information reviewed, patient interviewed and updates made to the record today  Health Risk Assessment:   Patient rates overall health as fair  Patient feels that their physical health rating is slightly worse  Patient is satisfied with their life  Eyesight was rated as same  Hearing was rated as same  Patient feels that their emotional and mental health rating is same  Patients states they are never, rarely angry  Patient states they are sometimes unusually tired/fatigued  Pain experienced in the last 7 days has been a lot  Patient's pain rating has been 5/10  Patient states that she has experienced no weight loss or gain in last 6 months  Depression Screening:   PHQ-9 Score: 1      Fall Risk Screening: In the past year, patient has experienced: no history of falling in past year      Urinary Incontinence Screening:   Patient has leaked urine accidently in the last six months  Home Safety:  Patient does not have trouble with stairs inside or outside of their home  Patient has working smoke alarms and has working carbon monoxide detector  Home safety hazards include: none  Nutrition:   Current diet is Regular       Medications:   Patient is currently taking over-the-counter supplements  OTC medications include: see medication list  Patient is able to manage medications  Activities of Daily Living (ADLs)/Instrumental Activities of Daily Living (IADLs):   Walk and transfer into and out of bed and chair?: Yes  Dress and groom yourself?: Yes    Bathe or shower yourself?: Yes    Feed yourself?  Yes  Do your laundry/housekeeping?: Yes  Manage your money, pay your bills and track your expenses?: Yes  Make your own meals?: Yes    Do your own shopping?: Yes    Previous Hospitalizations:   Any hospitalizations or ED visits within the last 12 months?: No      Advance Care Planning:   Living will: No    Durable POA for healthcare: No    Advanced directive: No    Advanced directive counseling given: Yes    Five wishes given: Yes      Cognitive Screening:   Provider or family/friend/caregiver concerned regarding cognition?: No    PREVENTIVE SCREENINGS      Cardiovascular Screening:    General: Screening Not Indicated and History Lipid Disorder      Diabetes Screening:     General: Screening Current      Colorectal Cancer Screening:     General: Screening Current      Breast Cancer Screening:     General: Screening Current      Cervical Cancer Screening:    General: Screening Not Indicated and Screening Current      Osteoporosis Screening:    General: Screening Not Indicated and History Osteoporosis    Due for: DXA Axial and DXA Appendicular      Abdominal Aortic Aneurysm (AAA) Screening:        General: Screening Not Indicated      Lung Cancer Screening:     General: Screening Current    Due for: Low Dose CT (LDCT)      Hepatitis C Screening:    General: Screening Current    Screening, Brief Intervention, and Referral to Treatment (SBIRT)    Screening    Typical number of drinks in a week: 0    Single Item Drug Screening:  How often have you used an illegal drug (including marijuana) or a prescription medication for non-medical reasons in the past year? never    Single Item Drug Screen Score: "0  Interpretation: Negative screen for possible drug use disorder    Brief Intervention  Alcohol & drug use screenings were reviewed  No concerns regarding substance use disorder identified  Other Counseling Topics:   Regular weightbearing exercise  No results found  Physical Exam:     /80 (BP Location: Left arm, Patient Position: Sitting)   Pulse 68   Temp 97 8 °F (36 6 °C) (Temporal)   Resp 16   Ht 5' 4\" (1 626 m)   Wt 71 kg (156 lb 9 6 oz)   LMP  (LMP Unknown)   SpO2 97%   BMI 26 88 kg/m²     Physical Exam  Vitals and nursing note reviewed  Constitutional:       General: She is not in acute distress  Appearance: Normal appearance  She is well-developed  She is not ill-appearing  HENT:      Head: Normocephalic and atraumatic  Eyes:      General: No scleral icterus  Conjunctiva/sclera: Conjunctivae normal    Neck:      Vascular: No carotid bruit  Cardiovascular:      Rate and Rhythm: Normal rate and regular rhythm  Heart sounds: Normal heart sounds  No murmur heard  Pulmonary:      Effort: Pulmonary effort is normal  No respiratory distress  Breath sounds: Normal breath sounds  Abdominal:      General: Bowel sounds are normal  There is no distension or abdominal bruit  Palpations: Abdomen is soft  Tenderness: There is no abdominal tenderness  Hernia: No hernia is present  Musculoskeletal:      Cervical back: Neck supple  No rigidity  Right lower leg: No edema  Left lower leg: No edema  Skin:     General: Skin is warm  Neurological:      General: No focal deficit present  Mental Status: She is alert and oriented to person, place, and time  Psychiatric:         Mood and Affect: Mood normal          Behavior: Behavior normal          Thought Content:  Thought content normal           Ananda Leon MD  "

## 2023-05-16 NOTE — PATIENT INSTRUCTIONS
Medicare Preventive Visit Patient Instructions  Thank you for completing your Welcome to Medicare Visit or Medicare Annual Wellness Visit today  Your next wellness visit will be due in one year (5/16/2024)  The screening/preventive services that you may require over the next 5-10 years are detailed below  Some tests may not apply to you based off risk factors and/or age  Screening tests ordered at today's visit but not completed yet may show as past due  Also, please note that scanned in results may not display below  Preventive Screenings:  Service Recommendations Previous Testing/Comments   Colorectal Cancer Screening  * Colonoscopy    * Fecal Occult Blood Test (FOBT)/Fecal Immunochemical Test (FIT)  * Fecal DNA/Cologuard Test  * Flexible Sigmoidoscopy Age: 39-70 years old   Colonoscopy: every 10 years (may be performed more frequently if at higher risk)  OR  FOBT/FIT: every 1 year  OR  Cologuard: every 3 years  OR  Sigmoidoscopy: every 5 years  Screening may be recommended earlier than age 39 if at higher risk for colorectal cancer  Also, an individualized decision between you and your healthcare provider will decide whether screening between the ages of 74-80 would be appropriate  Colonoscopy: 07/07/2022  FOBT/FIT: Not on file  Cologuard: Not on file  Sigmoidoscopy: Not on file    Screening Current  Due for Colonoscopy - High Risk     Breast Cancer Screening Age: 36 years old  Frequency: every 1-2 years  Not required if history of left and right mastectomy Mammogram: 03/08/2023    Screening Current   Cervical Cancer Screening Between the ages of 21-29, pap smear recommended once every 3 years  Between the ages of 33-67, can perform pap smear with HPV co-testing every 5 years     Recommendations may differ for women with a history of total hysterectomy, cervical cancer, or abnormal pap smears in past  Pap Smear: 09/07/2022    Screening Not Indicated   Hepatitis C Screening Once for adults born between 1945 and 1965  More frequently in patients at high risk for Hepatitis C Hep C Antibody: Not on file    Screening Current   Diabetes Screening 1-2 times per year if you're at risk for diabetes or have pre-diabetes Fasting glucose: 104 mg/dL (3/17/2023)  A1C: 5 8 % (9/17/2022)  Screening Current   Cholesterol Screening Once every 5 years if you don't have a lipid disorder  May order more often based on risk factors  Lipid panel: 03/17/2023    Screening Not Indicated  History Lipid Disorder     Other Preventive Screenings Covered by Medicare:  1  Abdominal Aortic Aneurysm (AAA) Screening: covered once if your at risk  You're considered to be at risk if you have a family history of AAA  2  Lung Cancer Screening: covers low dose CT scan once per year if you meet all of the following conditions: (1) Age 50-69; (2) No signs or symptoms of lung cancer; (3) Current smoker or have quit smoking within the last 15 years; (4) You have a tobacco smoking history of at least 20 pack years (packs per day multiplied by number of years you smoked); (5) You get a written order from a healthcare provider  3  Glaucoma Screening: covered annually if you're considered high risk: (1) You have diabetes OR (2) Family history of glaucoma OR (3)  aged 48 and older OR (3)  American aged 72 and older  3  Osteoporosis Screening: covered every 2 years if you meet one of the following conditions: (1) You're estrogen deficient and at risk for osteoporosis based off medical history and other findings; (2) Have a vertebral abnormality; (3) On glucocorticoid therapy for more than 3 months; (4) Have primary hyperparathyroidism; (5) On osteoporosis medications and need to assess response to drug therapy  · Last bone density test (DXA Scan): 07/26/2016   5  HIV Screening: covered annually if you're between the age of 15-65  Also covered annually if you are younger than 13 and older than 72 with risk factors for HIV infection   For pregnant patients, it is covered up to 3 times per pregnancy  Immunizations:  Immunization Recommendations   Influenza Vaccine Annual influenza vaccination during flu season is recommended for all persons aged >= 6 months who do not have contraindications   Pneumococcal Vaccine   * Pneumococcal conjugate vaccine = PCV13 (Prevnar 13), PCV15 (Vaxneuvance), PCV20 (Prevnar 20)  * Pneumococcal polysaccharide vaccine = PPSV23 (Pneumovax) Adults 25-60 years old: 1-3 doses may be recommended based on certain risk factors  Adults 72 years old: 1-2 doses may be recommended based off what pneumonia vaccine you previously received   Hepatitis B Vaccine 3 dose series if at intermediate or high risk (ex: diabetes, end stage renal disease, liver disease)   Tetanus (Td) Vaccine - COST NOT COVERED BY MEDICARE PART B Following completion of primary series, a booster dose should be given every 10 years to maintain immunity against tetanus  Td may also be given as tetanus wound prophylaxis  Tdap Vaccine - COST NOT COVERED BY MEDICARE PART B Recommended at least once for all adults  For pregnant patients, recommended with each pregnancy  Shingles Vaccine (Shingrix) - COST NOT COVERED BY MEDICARE PART B  2 shot series recommended in those aged 48 and above     Health Maintenance Due:      Topic Date Due   • Lung Cancer Screening  11/28/2023   • Colorectal Cancer Screening  07/07/2024   • Breast Cancer Screening: Mammogram  03/08/2025   • Cervical Cancer Screening  09/07/2025   • Hepatitis C Screening  Completed     Immunizations Due:      Topic Date Due   • Pneumococcal Vaccine: 65+ Years (3 - PPSV23 if available, else PCV20) 09/29/2020   • COVID-19 Vaccine (5 - Booster for Mcghee Peter series) 07/02/2022     Advance Directives   What are advance directives? Advance directives are legal documents that state your wishes and plans for medical care   These plans are made ahead of time in case you lose your ability to make decisions for yourself  Advance directives can apply to any medical decision, such as the treatments you want, and if you want to donate organs  What are the types of advance directives? There are many types of advance directives, and each state has rules about how to use them  You may choose a combination of any of the following:  · Living will: This is a written record of the treatment you want  You can also choose which treatments you do not want, which to limit, and which to stop at a certain time  This includes surgery, medicine, IV fluid, and tube feedings  · Durable power of  for healthcare [facility]): This is a written record that states who you want to make healthcare choices for you when you are unable to make them for yourself  This person, called a proxy, is usually a family member or a friend  You may choose more than 1 proxy  · Do not resuscitate (DNR) order:  A DNR order is used in case your heart stops beating or you stop breathing  It is a request not to have certain forms of treatment, such as CPR  A DNR order may be included in other types of advance directives  · Medical directive: This covers the care that you want if you are in a coma, near death, or unable to make decisions for yourself  You can list the treatments you want for each condition  Treatment may include pain medicine, surgery, blood transfusions, dialysis, IV or tube feedings, and a ventilator (breathing machine)  · Values history: This document has questions about your views, beliefs, and how you feel and think about life  This information can help others choose the care that you would choose  Why are advance directives important? An advance directive helps you control your care  Although spoken wishes may be used, it is better to have your wishes written down  Spoken wishes can be misunderstood, or not followed  Treatments may be given even if you do not want them   An advance directive may make it easier for your family to make difficult choices about your care  Fall Prevention    Fall prevention  includes ways to make your home and other areas safer  It also includes ways you can move more carefully to prevent a fall  Health conditions that cause changes in your blood pressure, vision, or muscle strength and coordination may increase your risk for falls  Medicines may also increase your risk for falls if they make you dizzy, weak, or sleepy  Fall prevention tips:   · Stand or sit up slowly  · Use assistive devices as directed  · Wear shoes that fit well and have soles that   · Wear a personal alarm  · Stay active  · Manage your medical conditions  Home Safety Tips:  · Add items to prevent falls in the bathroom  · Keep paths clear  · Install bright lights in your home  · Keep items you use often on shelves within reach  · Paint or place reflective tape on the edges of your stairs  Cigarette Smoking and Your Health   Risks to your health if you smoke:  Nicotine and other chemicals found in tobacco damage every cell in your body  Even if you are a light smoker, you have an increased risk for cancer, heart disease, and lung disease  If you are pregnant or have diabetes, smoking increases your risk for complications  Benefits to your health if you stop smoking:   · You decrease respiratory symptoms such as coughing, wheezing, and shortness of breath  · You reduce your risk for cancers of the lung, mouth, throat, kidney, bladder, pancreas, stomach, and cervix  If you already have cancer, you increase the benefits of chemotherapy  You also reduce your risk for cancer returning or a second cancer from developing  · You reduce your risk for heart disease, blood clots, heart attack, and stroke  · You reduce your risk for lung infections, and diseases such as pneumonia, asthma, chronic bronchitis, and emphysema  · Your circulation improves  More oxygen can be delivered to your body   If you have diabetes, you lower your risk for complications, such as kidney, artery, and eye diseases  You also lower your risk for nerve damage  Nerve damage can lead to amputations, poor vision, and blindness  · You improve your body's ability to heal and to fight infections  For more information and support to stop smoking:   · [employer]  Phone: 3- 249 - 024-2828  Web Address: [employer]  Weight Management   Why it is important to manage your weight:  Being overweight increases your risk of health conditions such as heart disease, high blood pressure, type 2 diabetes, and certain types of cancer  It can also increase your risk for osteoarthritis, sleep apnea, and other respiratory problems  Aim for a slow, steady weight loss  Even a small amount of weight loss can lower your risk of health problems  How to lose weight safely:  A safe and healthy way to lose weight is to eat fewer calories and get regular exercise  You can lose up about 1 pound a week by decreasing the number of calories you eat by 500 calories each day  Healthy meal plan for weight management:  A healthy meal plan includes a variety of foods, contains fewer calories, and helps you stay healthy  A healthy meal plan includes the following:  · Eat whole-grain foods more often  A healthy meal plan should contain fiber  Fiber is the part of grains, fruits, and vegetables that is not broken down by your body  Whole-grain foods are healthy and provide extra fiber in your diet  Some examples of whole-grain foods are whole-wheat breads and pastas, oatmeal, brown rice, and bulgur  · Eat a variety of vegetables every day  Include dark, leafy greens such as spinach, kale, letitia greens, and mustard greens  Eat yellow and orange vegetables such as carrots, sweet potatoes, and winter squash  · Eat a variety of fruits every day  Choose fresh or canned fruit (canned in its own juice or light syrup) instead of juice   Fruit juice has very little or no fiber   · Eat low-fat dairy foods  Drink fat-free (skim) milk or 1% milk  Eat fat-free yogurt and low-fat cottage cheese  Try low-fat cheeses such as mozzarella and other reduced-fat cheeses  · Choose meat and other protein foods that are low in fat  Choose beans or other legumes such as split peas or lentils  Choose fish, skinless poultry (chicken or turkey), or lean cuts of red meat (beef or pork)  Before you cook meat or poultry, cut off any visible fat  · Use less fat and oil  Try baking foods instead of frying them  Add less fat, such as margarine, sour cream, regular salad dressing and mayonnaise to foods  Eat fewer high-fat foods  Some examples of high-fat foods include french fries, doughnuts, ice cream, and cakes  · Eat fewer sweets  Limit foods and drinks that are high in sugar  This includes candy, cookies, regular soda, and sweetened drinks  Exercise:  Exercise at least 30 minutes per day on most days of the week  Some examples of exercise include walking, biking, dancing, and swimming  You can also fit in more physical activity by taking the stairs instead of the elevator or parking farther away from stores  Ask your healthcare provider about the best exercise plan for you  © Copyright [employer] 2018 Information is for End User's use only and may not be sold, redistributed or otherwise used for commercial purposes   All illustrations and images included in CareNotes® are the copyrighted property of [employer]  or [address] [place] [address]

## 2023-05-16 NOTE — ASSESSMENT & PLAN NOTE
CT 11/2022: 6 mm groundglass nodule in the right lower lobe, developing since 8/8/2020  Lung-RADS 3, probably benign  Short-term followup suggested with six-month LDCT      Pending repeat CT early June

## 2023-05-20 PROBLEM — R26.89 BALANCE DISORDER: Status: RESOLVED | Noted: 2019-08-01 | Resolved: 2023-05-20

## 2023-05-20 PROBLEM — R73.01 ELEVATED FASTING GLUCOSE: Status: RESOLVED | Noted: 2022-05-15 | Resolved: 2023-05-20

## 2023-05-20 NOTE — ASSESSMENT & PLAN NOTE
· No daily inhalers, no complaints of dyspnea, cough or wheezing  · Pending CT chest in early June  · Patient is still unfortunately smoking half a pack to 1 pack daily, importance of smoking cessation emphasized again

## 2023-05-20 NOTE — ASSESSMENT & PLAN NOTE
No current Rx due to age  Patient follows up with [facility] neurology  No new symptoms

## 2023-06-01 ENCOUNTER — HOSPITAL ENCOUNTER (OUTPATIENT)
Dept: CT IMAGING | Facility: HOSPITAL | Age: 68
Discharge: HOME | End: 2023-06-01

## 2023-06-01 DIAGNOSIS — R91.1 PULMONARY NODULE: ICD-10-CM

## 2023-06-19 ENCOUNTER — TELEPHONE (OUTPATIENT)
Dept: FAMILY MEDICINE CLINIC | Facility: CLINIC | Age: 68
End: 2023-06-19

## 2023-06-19 DIAGNOSIS — J30.9 ALLERGIC RHINITIS, UNSPECIFIED SEASONALITY, UNSPECIFIED TRIGGER: ICD-10-CM

## 2023-06-19 DIAGNOSIS — J44.9 CHRONIC OBSTRUCTIVE PULMONARY DISEASE, UNSPECIFIED COPD TYPE (HCC): Primary | Chronic | ICD-10-CM

## 2023-06-19 DIAGNOSIS — R93.89 ABNORMAL CHEST CT: ICD-10-CM

## 2023-06-19 RX ORDER — MONTELUKAST SODIUM 10 MG/1
TABLET ORAL
Qty: 90 TABLET | Refills: 3 | Status: SHIPPED | OUTPATIENT
Start: 2023-06-19

## 2023-06-19 NOTE — TELEPHONE ENCOUNTER
"CT chest June 5, 2023:  Resolution of the 6 mm groundglass nodule  Resume annual lung cancer screening  Please request \"lung cancer screening  \"     New patchy groundglass peripheral opacity in the anterior left lower lobe  Optional short-term follow-up CT can be performed in 3 months    "

## 2023-06-19 NOTE — TELEPHONE ENCOUNTER
Please contact patient  I reviewed results of recent CT chest   Previous lung nodule has resolved but radiologist has noticed new inflammatory change left lower lung that should be followed  I recommend to repeat CT chest in 3 months  Thank you  Order placed

## 2023-07-18 ENCOUNTER — HOSPITAL ENCOUNTER (OUTPATIENT)
Dept: RADIOLOGY | Age: 68
Discharge: HOME | End: 2023-07-18
Payer: MEDICARE

## 2023-07-18 DIAGNOSIS — Z13.820 SCREENING FOR OSTEOPOROSIS: ICD-10-CM

## 2023-07-18 DIAGNOSIS — M85.88 OTHER SPECIFIED DISORDERS OF BONE DENSITY AND STRUCTURE, OTHER SITE: ICD-10-CM

## 2023-07-18 PROCEDURE — 77080 DXA BONE DENSITY AXIAL: CPT

## 2023-09-07 DIAGNOSIS — G35 MULTIPLE SCLEROSIS (HCC): Chronic | ICD-10-CM

## 2023-09-07 RX ORDER — GABAPENTIN 300 MG/1
CAPSULE ORAL
Qty: 180 CAPSULE | Refills: 3 | Status: SHIPPED | OUTPATIENT
Start: 2023-09-07

## 2023-09-25 DIAGNOSIS — E78.2 MIXED HYPERLIPIDEMIA: Chronic | ICD-10-CM

## 2023-09-25 RX ORDER — EZETIMIBE 10 MG/1
10 TABLET ORAL DAILY
Qty: 90 TABLET | Refills: 3 | Status: SHIPPED | OUTPATIENT
Start: 2023-09-25

## 2023-09-29 ENCOUNTER — OFFICE VISIT (OUTPATIENT)
Dept: FAMILY MEDICINE CLINIC | Facility: CLINIC | Age: 68
End: 2023-09-29
Payer: MEDICARE

## 2023-09-29 VITALS
HEIGHT: 64 IN | SYSTOLIC BLOOD PRESSURE: 140 MMHG | WEIGHT: 152.2 LBS | DIASTOLIC BLOOD PRESSURE: 78 MMHG | TEMPERATURE: 98.2 F | BODY MASS INDEX: 25.99 KG/M2 | RESPIRATION RATE: 18 BRPM | HEART RATE: 88 BPM | OXYGEN SATURATION: 95 %

## 2023-09-29 DIAGNOSIS — J01.90 ACUTE SINUSITIS, RECURRENCE NOT SPECIFIED, UNSPECIFIED LOCATION: Primary | ICD-10-CM

## 2023-09-29 DIAGNOSIS — M79.7 FIBROMYALGIA: ICD-10-CM

## 2023-09-29 PROCEDURE — 99213 OFFICE O/P EST LOW 20 MIN: CPT | Performed by: FAMILY MEDICINE

## 2023-09-29 RX ORDER — AMITRIPTYLINE HYDROCHLORIDE 25 MG/1
25 TABLET, FILM COATED ORAL
Qty: 80 TABLET | Refills: 0 | Status: SHIPPED | OUTPATIENT
Start: 2023-09-29 | End: 2023-09-29

## 2023-09-29 RX ORDER — AZITHROMYCIN 250 MG/1
TABLET, FILM COATED ORAL
Qty: 6 TABLET | Refills: 0 | Status: SHIPPED | OUTPATIENT
Start: 2023-09-29 | End: 2023-10-04

## 2023-09-29 RX ORDER — AMITRIPTYLINE HYDROCHLORIDE 25 MG/1
25 TABLET, FILM COATED ORAL
Qty: 80 TABLET | Refills: 0 | Status: SHIPPED | OUTPATIENT
Start: 2023-09-29 | End: 2023-09-29 | Stop reason: CLARIF

## 2023-09-29 NOTE — PROGRESS NOTES
Name: Samantha Aguiar      : 1955      MRN: [id number]  Encounter Provider: Monse Lopez DO  Encounter Date: 2023   Encounter department: [address] [place] [address]     1. Acute sinusitis, recurrence not specified, unspecified location  -     azithromycin (Zithromax) 250 mg tablet; Take 2 tablets (500 mg total) by mouth daily for 1 day, THEN 1 tablet (250 mg total) daily for 4 days. 2. Fibromyalgia           Subjective      HPI   Recently went to [place], returned home . Has sore throat, congestion, headache, cough. COVID test negative. Has excessive sinus tenderness, some difficulty breathing, aching. Has not had a fever. Has been taking Xyzal which does not help significantly.     Review of Systems as in HPI    Current Outpatient Medications on File Prior to Visit   Medication Sig   • albuterol (2.5 mg/3 mL) 0.083 % nebulizer solution Take 3 mL (2.5 mg total) by nebulization every 4 (four) hours as needed for wheezing or shortness of breath (cough)   • albuterol (Ventolin HFA) 90 mcg/act inhaler Inhale 2 puffs every 4 (four) hours as needed for wheezing or shortness of breath   • aspirin (ECOTRIN LOW STRENGTH) 81 mg EC tablet Take 1 tablet by mouth daily   • Cholecalciferol (Vitamin D3) 125 MCG (5000 UT) CAPS Take 1 capsule by mouth daily   • ezetimibe (ZETIA) 10 mg tablet TAKE 1 TABLET BY MOUTH  DAILY   • fluticasone (FLONASE) 50 mcg/act nasal spray USE 2 SPRAYS IN BOTH  NOSTRILS DAILY   • gabapentin (NEURONTIN) 300 mg capsule TAKE 1 CAPSULE BY MOUTH  TWICE DAILY   • levocetirizine (XYZAL) 5 MG tablet TAKE 1 TABLET BY MOUTH IN  THE EVENING   • meloxicam (Mobic) 15 mg tablet Take 1 tablet once a day after food as needed for joint pain/ back pain   • montelukast (SINGULAIR) 10 mg tablet TAKE 1 TABLET BY MOUTH  DAILY AT BEDTIME   • omeprazole (PriLOSEC) 40 MG capsule Take 40 mg by mouth daily   • rosuvastatin (CRESTOR) 40 MG tablet TAKE 1 TABLET BY MOUTH  DAILY   • sertraline (ZOLOFT) 100 mg tablet TAKE 1 AND 1/2 TABLETS BY  MOUTH DAILY       Objective     /78 (BP Location: Left arm, Patient Position: Sitting, Cuff Size: Standard)   Pulse 88   Temp 98.2 °F (36.8 °C) (Temporal)   Resp 18   Ht 5' 4" (1.626 m)   Wt 69 kg (152 lb 3.2 oz)   LMP  (LMP Unknown)   SpO2 95%   BMI 26.13 kg/m²     Physical Exam  Vitals reviewed. Constitutional:       Appearance: She is ill-appearing. HENT:      Head: Normocephalic. Right Ear: External ear normal.      Left Ear: External ear normal.      Nose: Congestion present. Cardiovascular:      Rate and Rhythm: Normal rate and regular rhythm. Pulmonary:      Effort: Pulmonary effort is normal.      Breath sounds: Normal breath sounds. Abdominal:      General: Abdomen is flat. Skin:     General: Skin is warm and dry. Capillary Refill: Capillary refill takes less than 2 seconds. Neurological:      Mental Status: She is alert.    Psychiatric:         Mood and Affect: Mood normal.         Behavior: Behavior normal.       Maria G Gruber, DO

## 2023-10-05 DIAGNOSIS — F33.9 MAJOR DEPRESSION, RECURRENT, CHRONIC (HCC): Chronic | ICD-10-CM

## 2023-10-06 RX ORDER — SERTRALINE HYDROCHLORIDE 100 MG/1
TABLET, FILM COATED ORAL
Qty: 135 TABLET | Refills: 3 | Status: SHIPPED | OUTPATIENT
Start: 2023-10-06

## 2023-11-10 ENCOUNTER — APPOINTMENT (OUTPATIENT)
Dept: LAB | Facility: CLINIC | Age: 68
End: 2023-11-10
Payer: MEDICARE

## 2023-11-10 DIAGNOSIS — E78.2 MIXED HYPERLIPIDEMIA: Chronic | ICD-10-CM

## 2023-11-10 LAB
ALBUMIN SERPL BCP-MCNC: 4.9 G/DL (ref 3.5–5)
ALP SERPL-CCNC: 72 U/L (ref 34–104)
ALT SERPL W P-5'-P-CCNC: 20 U/L (ref 7–52)
ANION GAP SERPL CALCULATED.3IONS-SCNC: 3 MMOL/L
AST SERPL W P-5'-P-CCNC: 15 U/L (ref 13–39)
BASOPHILS # BLD AUTO: 0.06 THOUSANDS/ÂΜL (ref 0–0.1)
BASOPHILS NFR BLD AUTO: 1 % (ref 0–1)
BILIRUB SERPL-MCNC: 0.63 MG/DL (ref 0.2–1)
BUN SERPL-MCNC: 23 MG/DL (ref 5–25)
CALCIUM SERPL-MCNC: 9.4 MG/DL (ref 8.4–10.2)
CHLORIDE SERPL-SCNC: 106 MMOL/L (ref 96–108)
CHOLEST SERPL-MCNC: 223 MG/DL
CO2 SERPL-SCNC: 30 MMOL/L (ref 21–32)
CREAT SERPL-MCNC: 0.72 MG/DL (ref 0.6–1.3)
EOSINOPHIL # BLD AUTO: 0.21 THOUSAND/ÂΜL (ref 0–0.61)
EOSINOPHIL NFR BLD AUTO: 3 % (ref 0–6)
ERYTHROCYTE [DISTWIDTH] IN BLOOD BY AUTOMATED COUNT: 11.6 % (ref 11.6–15.1)
GFR SERPL CREATININE-BSD FRML MDRD: 85 ML/MIN/1.73SQ M
GLUCOSE P FAST SERPL-MCNC: 110 MG/DL (ref 65–99)
HCT VFR BLD AUTO: 44.5 % (ref 34.8–46.1)
HDLC SERPL-MCNC: 56 MG/DL
HGB BLD-MCNC: 15.3 G/DL (ref 11.5–15.4)
IMM GRANULOCYTES # BLD AUTO: 0.01 THOUSAND/UL (ref 0–0.2)
IMM GRANULOCYTES NFR BLD AUTO: 0 % (ref 0–2)
LDLC SERPL CALC-MCNC: 128 MG/DL (ref 0–100)
LYMPHOCYTES # BLD AUTO: 3.54 THOUSANDS/ÂΜL (ref 0.6–4.47)
LYMPHOCYTES NFR BLD AUTO: 42 % (ref 14–44)
MCH RBC QN AUTO: 30.8 PG (ref 26.8–34.3)
MCHC RBC AUTO-ENTMCNC: 34.4 G/DL (ref 31.4–37.4)
MCV RBC AUTO: 90 FL (ref 82–98)
MONOCYTES # BLD AUTO: 0.6 THOUSAND/ÂΜL (ref 0.17–1.22)
MONOCYTES NFR BLD AUTO: 7 % (ref 4–12)
NEUTROPHILS # BLD AUTO: 3.99 THOUSANDS/ÂΜL (ref 1.85–7.62)
NEUTS SEG NFR BLD AUTO: 47 % (ref 43–75)
NRBC BLD AUTO-RTO: 0 /100 WBCS
PLATELET # BLD AUTO: 220 THOUSANDS/UL (ref 149–390)
PMV BLD AUTO: 9.5 FL (ref 8.9–12.7)
POTASSIUM SERPL-SCNC: 3.9 MMOL/L (ref 3.5–5.3)
PROT SERPL-MCNC: 7.4 G/DL (ref 6.4–8.4)
RBC # BLD AUTO: 4.97 MILLION/UL (ref 3.81–5.12)
SODIUM SERPL-SCNC: 139 MMOL/L (ref 135–147)
TRIGL SERPL-MCNC: 193 MG/DL
TSH SERPL DL<=0.05 MIU/L-ACNC: 1.35 UIU/ML (ref 0.45–4.5)
WBC # BLD AUTO: 8.41 THOUSAND/UL (ref 4.31–10.16)

## 2023-11-10 PROCEDURE — 85025 COMPLETE CBC W/AUTO DIFF WBC: CPT

## 2023-11-10 PROCEDURE — 80061 LIPID PANEL: CPT

## 2023-11-10 PROCEDURE — 36415 COLL VENOUS BLD VENIPUNCTURE: CPT

## 2023-11-10 PROCEDURE — 80053 COMPREHEN METABOLIC PANEL: CPT

## 2023-11-10 PROCEDURE — 84443 ASSAY THYROID STIM HORMONE: CPT

## 2023-11-16 ENCOUNTER — OFFICE VISIT (OUTPATIENT)
Dept: FAMILY MEDICINE CLINIC | Facility: CLINIC | Age: 68
End: 2023-11-16
Payer: MEDICARE

## 2023-11-16 VITALS
HEART RATE: 66 BPM | DIASTOLIC BLOOD PRESSURE: 72 MMHG | BODY MASS INDEX: 25.88 KG/M2 | OXYGEN SATURATION: 95 % | HEIGHT: 64 IN | WEIGHT: 151.6 LBS | TEMPERATURE: 98 F | RESPIRATION RATE: 16 BRPM | SYSTOLIC BLOOD PRESSURE: 116 MMHG

## 2023-11-16 DIAGNOSIS — G35 MULTIPLE SCLEROSIS (HCC): Chronic | ICD-10-CM

## 2023-11-16 DIAGNOSIS — J30.89 ALLERGIC RHINITIS DUE TO FUNGAL SPORES, UNSPECIFIED SEASONALITY: ICD-10-CM

## 2023-11-16 DIAGNOSIS — E78.2 MIXED HYPERLIPIDEMIA: Chronic | ICD-10-CM

## 2023-11-16 DIAGNOSIS — R91.1 PULMONARY NODULE: ICD-10-CM

## 2023-11-16 DIAGNOSIS — J01.00 SUBACUTE MAXILLARY SINUSITIS: ICD-10-CM

## 2023-11-16 DIAGNOSIS — Z12.31 ENCOUNTER FOR SCREENING MAMMOGRAM FOR BREAST CANCER: ICD-10-CM

## 2023-11-16 DIAGNOSIS — D22.9 SKIN MOLE: ICD-10-CM

## 2023-11-16 DIAGNOSIS — Z23 NEED FOR INFLUENZA VACCINATION: ICD-10-CM

## 2023-11-16 DIAGNOSIS — G44.52 NDPH (NEW DAILY PERSISTENT HEADACHE): Primary | ICD-10-CM

## 2023-11-16 DIAGNOSIS — F33.9 MAJOR DEPRESSION, RECURRENT, CHRONIC (HCC): Chronic | ICD-10-CM

## 2023-11-16 DIAGNOSIS — M85.89 OSTEOPENIA OF MULTIPLE SITES: ICD-10-CM

## 2023-11-16 PROCEDURE — G0008 ADMIN INFLUENZA VIRUS VAC: HCPCS

## 2023-11-16 PROCEDURE — 90662 IIV NO PRSV INCREASED AG IM: CPT

## 2023-11-16 PROCEDURE — 99215 OFFICE O/P EST HI 40 MIN: CPT | Performed by: FAMILY MEDICINE

## 2023-11-16 RX ORDER — AMOXICILLIN AND CLAVULANATE POTASSIUM 875; 125 MG/1; MG/1
1 TABLET, FILM COATED ORAL 2 TIMES DAILY
Qty: 14 TABLET | Refills: 0 | Status: SHIPPED | OUTPATIENT
Start: 2023-11-16 | End: 2023-11-23

## 2023-11-16 RX ORDER — METHYLPREDNISOLONE 4 MG/1
TABLET ORAL
Qty: 21 EACH | Refills: 0 | Status: SHIPPED | OUTPATIENT
Start: 2023-11-16

## 2023-11-16 RX ORDER — LEVOCETIRIZINE DIHYDROCHLORIDE 5 MG/1
5 TABLET, FILM COATED ORAL EVERY EVENING
Qty: 90 TABLET | Refills: 3 | Status: SHIPPED | OUTPATIENT
Start: 2023-11-16

## 2023-11-16 NOTE — PATIENT INSTRUCTIONS
Proceed with CT chest now, we need to follow-up on new lung nodule  Please start Augmentin and Medrol Dosepak for symptoms of sinusitis.   If headache will not improve after this regimen-please proceed with brain MRI then

## 2023-11-16 NOTE — PROGRESS NOTES
Name: Jose Ramos      : 1955      MRN: [id number]  Encounter Provider: Anuj Silverio MD  Encounter Date: 2023   Encounter department: [address] [place] [address]     1. NDPH (new daily persistent headache)  -     MRI brain w wo contrast; Future; Expected date: 2023    2. Need for influenza vaccination  -     influenza vaccine, high-dose, PF 0.7 mL (FLUZONE HIGH-DOSE)    3. Multiple sclerosis, relapsing/remitting  Assessment & Plan:  No Rx due to age  Patient follows with neurology at [address]    Orders:  -     MRI brain w wo contrast; Future; Expected date: 2023    4. Pulmonary nodule  Assessment & Plan:  Proceed with repeat CT chest now. 5. Osteopenia of multiple sites  Assessment & Plan:  DEXA 2023 was stable, consistent with osteopenia, followed by GYN        6. Major depression, recurrent, chronic (HCC)  Assessment & Plan:  Symptoms are well controlled on Zoloft      7. Encounter for screening mammogram for breast cancer  -     Mammo screening bilateral w 3d & cad; Future; Expected date: 2024    8. Mixed hyperlipidemia  Assessment & Plan:  Suboptimal control of cholesterol in spite of max therapy with Crestor 40 mg daily and Zetia 10 mg daily. We had long discussion today regarding low-fat low-cholesterol diet. We will hold off Rx changes for now, reassess in 6 months. Current cholesterol is 223 with LDL of 128. Orders:  -     CBC; Future  -     Comprehensive metabolic panel; Future  -     Lipid Panel with Direct LDL reflex; Future  -     TSH, 3rd generation; Future    9. Subacute maxillary sinusitis  Comments:  Recurrent/acute sinusitis. Start Augmentin and Medrol Dosepak. Headache could be triggered by sinus congestion. If headache persists-proceed with MRI  Orders:  -     amoxicillin-clavulanate (AUGMENTIN) 875-125 mg per tablet;  Take 1 tablet by mouth 2 (two) times a day for 7 days  -     methylPREDNISolone 4 MG tablet therapy pack; Use as directed on package    10. Allergic rhinitis due to fungal spores, unspecified seasonality  -     levocetirizine (XYZAL) 5 MG tablet; Take 1 tablet (5 mg total) by mouth every evening    11. Skin mole  -     Ambulatory referral to Dermatology; Future      I have spent a total time of 40 minutes on 11/16/23 in caring for this patient including Diagnostic results, Prognosis, Risks and benefits of tx options, Instructions for management, Patient and family education, Importance of tx compliance, Risk factor reductions, Impressions, Counseling / Coordination of care, Documenting in the medical record, Reviewing / ordering tests, medicine, procedures  , and Obtaining or reviewing history  . Patient Instructions   Proceed with CT chest now, we need to follow-up on new lung nodule  Please start Augmentin and Medrol Dosepak for symptoms of sinusitis. If headache will not improve after this regimen-please proceed with brain MRI then    We discussed and I recommended Shingrix vaccination. Patient will consider and proceed through the local pharmacy. Return in about 6 months (around 5/16/2024) for Medicare wellness, follow up. Subjective     FOLLOW UP chronic medical conditions. Results of recent blood work reviewed. Cholesterol is slightly elevated. Patient remains on Crestor 40 mg once a day and Zetia 10 mg daily. She reports compliance with med regimen. Patient follows generally healthy diet. Patient is UTD with eye exam-      Patient reports developing headaches after hitting her head against the kitchen cabinet approximately 2 months ago. She was subsequently evaluated by her neurologist, Dr. Jose Ramon, who recommended twice a day gabapentin, Rx caused fatigue and patient decrease dose back to once a day. She denies symptoms of dizziness or lightheadedness. No unusual visual changes. Persistent parietal and frontal headaches are still persisting.   Patient also reports symptoms of nasal congestion and occasional cough. Sinus symptoms started approximately few weeks ago. Patient reports occasional mucus expectoration, clear was tinged yellow. She is afebrile, no chest tightness, dyspnea or wheezing. Patient is a smoker. CT chest June 2023: "Resolution of the 6 mm groundglass nodule. Resume annual lung cancer screening. Please request "lung cancer screening. New patchy groundglass peripheral opacity in the anterior left lower lobe. Optional short-term follow-up CT can be performed in 3 months". Review of Systems   Constitutional:  Positive for fatigue. HENT:  Positive for congestion, postnasal drip and sinus pressure. Eyes: Negative. Respiratory:  Positive for cough. Negative for chest tightness and shortness of breath. Cardiovascular: Negative. Gastrointestinal: Negative. Endocrine: Negative. Musculoskeletal:  Positive for arthralgias. Allergic/Immunologic: Negative. Neurological:  Positive for headaches. Negative for tremors, syncope, facial asymmetry, light-headedness and numbness. Psychiatric/Behavioral:  Positive for decreased concentration.         Past Medical History:   Diagnosis Date   • Asthma    • Degeneration of intervertebral disc at L5-S1 level    • Depression    • Family history of colon cancer 3/27/2019    Added automatically from request for surgery [id number]   • Full incontinence of feces    • Generalized anxiety disorder    • Hepatitis     at age 4 yrs   • Herpes zoster     last assessed: 6/20/13   • History of colon polyps    • Hypercholesterolemia    • Hyperlipidemia    • MS (multiple sclerosis) (HCC)    • MS (multiple sclerosis) (HCC)    • Osteoporosis    • Seasonal allergies    • Tumor of breast     benign, right breast   • Urinary incontinence      Past Surgical History:   Procedure Laterality Date   • BREAST BIOPSY Right 1998    unsure exact year, benign   • BREAST SURGERY      right breast fibroid tumor resection   • COLONOSCOPY      11/2013 - due in 2018 -    • COLONOSCOPY W/ POLYPECTOMY  07/07/2022    normal-repeat in 2 years   • FL LUMBAR PUNCTURE DIAGNOSTIC  10/01/2018   • ROTATOR CUFF REPAIR      Bilateral   • ROTATOR CUFF REPAIR Bilateral    • SEPTOPLASTY       Family History   Problem Relation Age of Onset   • Arthritis Mother    • Osteoporosis Mother    • COPD Father    • No Known Problems Maternal Grandmother    • No Known Problems Maternal Grandfather    • No Known Problems Paternal Grandmother    • No Known Problems Paternal Grandfather    • Arthritis Family    • COPD Family    • Emphysema Family    • Heart disease Family    • Hyperthyroidism Family    • Multiple sclerosis Family    • Osteoarthritis Family    • No Known Problems Sister    • No Known Problems Sister    • Colon cancer Brother 72   • Multiple sclerosis Brother    • No Known Problems Brother    • No Known Problems Brother    • No Known Problems Maternal Aunt    • No Known Problems Maternal Aunt    • No Known Problems Paternal Aunt      Social History     Socioeconomic History   • Marital status: /Civil Union     Spouse name: None   • Number of children: None   • Years of education: None   • Highest education level: None   Occupational History   • Occupation: Retired   Tobacco Use   • Smoking status: Every Day     Packs/day: 1.00     Years: 40.00     Total pack years: 40.00     Types: Cigarettes     Start date: 1982   • Smokeless tobacco: Never   Vaping Use   • Vaping Use: Never used   Substance and Sexual Activity   • Alcohol use: Yes     Comment: occasional; [employer] also states Never drank alcohol   • Drug use: No   • Sexual activity: Not Currently     Partners: Male     Comment: denied any risk of AIDS   Other Topics Concern   • None   Social History Narrative         Social Determinants of Health     Financial Resource Strain: Unknown (5/16/2023)    Overall Financial Resource Strain (CARDIA)    • Difficulty of Paying Living Expenses: Patient refused   Food Insecurity: Not on file   Transportation Needs: No Transportation Needs (5/16/2023)    PRAPARE - Transportation    • Lack of Transportation (Medical): No    • Lack of Transportation (Non-Medical):  No   Physical Activity: Not on file   Stress: Not on file   Social Connections: Not on file   Intimate Partner Violence: Not on file   Housing Stability: Not on file     Current Outpatient Medications on File Prior to Visit   Medication Sig   • albuterol (2.5 mg/3 mL) 0.083 % nebulizer solution Take 3 mL (2.5 mg total) by nebulization every 4 (four) hours as needed for wheezing or shortness of breath (cough)   • albuterol (Ventolin HFA) 90 mcg/act inhaler Inhale 2 puffs every 4 (four) hours as needed for wheezing or shortness of breath   • aspirin (ECOTRIN LOW STRENGTH) 81 mg EC tablet Take 1 tablet by mouth daily   • Cholecalciferol (Vitamin D3) 125 MCG (5000 UT) CAPS Take 1 capsule by mouth daily   • ezetimibe (ZETIA) 10 mg tablet TAKE 1 TABLET BY MOUTH  DAILY   • fluticasone (FLONASE) 50 mcg/act nasal spray USE 2 SPRAYS IN BOTH  NOSTRILS DAILY   • gabapentin (NEURONTIN) 300 mg capsule TAKE 1 CAPSULE BY MOUTH  TWICE DAILY   • meloxicam (Mobic) 15 mg tablet Take 1 tablet once a day after food as needed for joint pain/ back pain   • montelukast (SINGULAIR) 10 mg tablet TAKE 1 TABLET BY MOUTH  DAILY AT BEDTIME   • omeprazole (PriLOSEC) 40 MG capsule Take 40 mg by mouth daily   • rosuvastatin (CRESTOR) 40 MG tablet TAKE 1 TABLET BY MOUTH  DAILY   • sertraline (ZOLOFT) 100 mg tablet TAKE 1 AND 1/2 TABLETS BY MOUTH  DAILY     Allergies   Allergen Reactions   • Bupropion    • Cefprozil    • Conj Estrog-Medroxyprogest Ace Other (See Comments)     Other     • Povidone Iodine      Patient unsure of what this is   • Sulfamethoxazole-Trimethoprim Other (See Comments)     Reaction Date: 11Aug2011;    • Levofloxacin    • Gadobutrol GI Intolerance   • Medroxyprogesterone Other (See Comments)     Reaction Date: [id number];    • Nortriptyline Other (See Comments)     n/a   • Pamelor [Nortriptyline Hcl]    • Provera [Medroxyprogesterone Acetate]      Immunization History   Administered Date(s) Administered   • COVID-19 PFIZER VACCINE 0.3 ML IM 03/20/2021, 04/12/2021, 01/08/2022, 05/07/2022, 12/02/2022   • INFLUENZA 09/26/2011, 09/19/2022   • Influenza Quadrivalent Preservative Free 3 years and older IM 09/29/2015, 09/22/2016, 09/08/2017   • Influenza, high dose seasonal 0.7 mL 09/30/2019, 09/17/2020, 09/20/2021, 09/19/2022, 11/16/2023   • Influenza, recombinant, quadrivalent,injectable, preservative free 09/13/2018   • Influenza, seasonal, injectable 01/17/2013, 09/03/2013, 09/23/2014   • Pneumococcal Conjugate 13-Valent 10/22/2018   • Pneumococcal Conjugate Vaccine 20-valent (Pcv20), Polysace 05/16/2023   • Pneumococcal Polysaccharide PPV23 10/01/2007, 09/29/2015   • Td (adult), adsorbed 06/02/2016       Objective     /72   Pulse 66   Temp 98 °F (36.7 °C) (Temporal)   Resp 16   Ht 5' 4" (1.626 m)   Wt 68.8 kg (151 lb 9.6 oz)   LMP  (LMP Unknown)   SpO2 95%   BMI 26.02 kg/m²     Physical Exam  Vitals and nursing note reviewed. Constitutional:       General: She is not in acute distress. Appearance: Normal appearance. She is well-developed. She is not ill-appearing. HENT:      Head: Normocephalic and atraumatic. Right Ear: Tympanic membrane normal.      Left Ear: Tympanic membrane normal.      Nose: Congestion present. Mouth/Throat:      Mouth: Mucous membranes are moist.      Pharynx: Posterior oropharyngeal erythema present. Comments: Postnasal drip  Eyes:      Conjunctiva/sclera: Conjunctivae normal.   Neck:      Thyroid: No thyromegaly. Vascular: No carotid bruit. Cardiovascular:      Rate and Rhythm: Normal rate and regular rhythm. Heart sounds: Normal heart sounds. No murmur heard. Pulmonary:      Effort: Pulmonary effort is normal. No respiratory distress.       Breath sounds: Normal breath sounds. No wheezing. Abdominal:      General: There is no abdominal bruit. Musculoskeletal:         General: Normal range of motion. Cervical back: Neck supple. Skin:     Comments: Small hyperplastic skin mole right wrist.  Pigmented. Smooth. 3 to 4 mm. Neurological:      General: No focal deficit present. Mental Status: She is alert and oriented to person, place, and time. Cranial Nerves: No cranial nerve deficit.       Coordination: Coordination normal.   Psychiatric:         Mood and Affect: Mood normal.         Behavior: Behavior normal.       Theresa Villarreal MD

## 2023-11-19 PROBLEM — R91.1 PULMONARY NODULE: Chronic | Status: ACTIVE | Noted: 2022-12-01

## 2023-11-19 NOTE — ASSESSMENT & PLAN NOTE
Suboptimal control of cholesterol in spite of max therapy with Crestor 40 mg daily and Zetia 10 mg daily. We had long discussion today regarding low-fat low-cholesterol diet. We will hold off Rx changes for now, reassess in 6 months. Current cholesterol is 223 with LDL of 128.

## 2023-11-19 NOTE — ASSESSMENT & PLAN NOTE
No Rx due to age  Patient follows with neurology at [facility]

## 2023-11-27 ENCOUNTER — HOSPITAL ENCOUNTER (OUTPATIENT)
Dept: CT IMAGING | Facility: HOSPITAL | Age: 68
Discharge: HOME | End: 2023-11-27
Payer: MEDICARE

## 2023-11-27 DIAGNOSIS — R93.89 ABNORMAL CHEST CT: ICD-10-CM

## 2023-11-27 DIAGNOSIS — J44.9 CHRONIC OBSTRUCTIVE PULMONARY DISEASE, UNSPECIFIED COPD TYPE (HCC): Chronic | ICD-10-CM

## 2023-11-27 PROCEDURE — G1004 CDSM NDSC: HCPCS

## 2023-11-27 PROCEDURE — 71250 CT THORAX DX C-: CPT

## 2023-11-28 DIAGNOSIS — E78.2 MIXED HYPERLIPIDEMIA: Chronic | ICD-10-CM

## 2023-11-28 RX ORDER — ROSUVASTATIN CALCIUM 40 MG/1
TABLET, COATED ORAL
Qty: 90 TABLET | Refills: 3 | Status: SHIPPED | OUTPATIENT
Start: 2023-11-28

## 2023-12-07 ENCOUNTER — TELEPHONE (OUTPATIENT)
Dept: FAMILY MEDICINE CLINIC | Facility: CLINIC | Age: 68
End: 2023-12-07

## 2023-12-07 DIAGNOSIS — J44.9 CHRONIC OBSTRUCTIVE PULMONARY DISEASE, UNSPECIFIED COPD TYPE (HCC): Primary | Chronic | ICD-10-CM

## 2023-12-07 DIAGNOSIS — J30.81 CAT ALLERGIES: ICD-10-CM

## 2023-12-07 DIAGNOSIS — J30.89 ALLERGIC RHINITIS DUE TO FUNGAL SPORES, UNSPECIFIED SEASONALITY: ICD-10-CM

## 2023-12-07 NOTE — TELEPHONE ENCOUNTER
Results of CT lung reviewed. Mucus along the anterior aspect of the trachea, left main bronchus, and left upper lobe bronchi with new groundglass opacities in the left upper lobe, likely infectious/inflammatory in etiology. Optional short-term follow-up noncontrast chest CT in 3   months may be considered. Interval resolution of previously seen groundglass opacities in the inferior lingula and anterior left lower lobe, consistent with a resolved infectious/inflammatory etiology.

## 2023-12-07 NOTE — TELEPHONE ENCOUNTER
Please contact patient regarding results of recent CT chest.    Previous area of concern has improved but there is a new area of inflammation and mucous plugging, again in the left lung. Radiologist states that we can repeat another CT in 3 months but I would like to avoid excessive radiation for this patient. I strongly recommend that she schedules follow-up with [facility]'s pulmonology. She used to follow-up with Dr. Alis Owen. She should make an appointment and discuss recent CT findings with him. New referral placed.

## 2023-12-07 NOTE — TELEPHONE ENCOUNTER
Called and spoke with patient in regards of provider message/instructions. Patient verbalized understanding. Patient states that  She has a Cat that  rescued from the streets and the cat likes to go in and out of the house. Patient asked if she can get Allergy Shots, she states it can be beneficial for her. Please review and advise.  Thanks

## 2023-12-30 ENCOUNTER — HOSPITAL ENCOUNTER (OUTPATIENT)
Dept: MRI IMAGING | Facility: HOSPITAL | Age: 68
Discharge: HOME | End: 2023-12-30
Payer: MEDICARE

## 2023-12-30 DIAGNOSIS — G44.52 NDPH (NEW DAILY PERSISTENT HEADACHE): ICD-10-CM

## 2023-12-30 DIAGNOSIS — G35 MULTIPLE SCLEROSIS (HCC): Chronic | ICD-10-CM

## 2023-12-30 PROCEDURE — 70553 MRI BRAIN STEM W/O & W/DYE: CPT

## 2023-12-30 PROCEDURE — A9585 GADOBUTROL INJECTION: HCPCS | Performed by: FAMILY MEDICINE

## 2023-12-30 PROCEDURE — G1004 CDSM NDSC: HCPCS

## 2023-12-30 RX ORDER — GADOBUTROL 604.72 MG/ML
7 INJECTION INTRAVENOUS
Status: COMPLETED | OUTPATIENT
Start: 2023-12-30 | End: 2023-12-30

## 2023-12-30 RX ADMIN — GADOBUTROL 7 ML: 604.72 INJECTION INTRAVENOUS at 11:40

## 2024-01-10 ENCOUNTER — TELEPHONE (OUTPATIENT)
Dept: FAMILY MEDICINE CLINIC | Facility: CLINIC | Age: 69
End: 2024-01-10

## 2024-01-10 NOTE — TELEPHONE ENCOUNTER
----- Message from Maria E Almeida MD sent at 1/9/2024  7:04 PM EST -----  Please contact patient.  Brain MRI is reported as stable. No changes since previous study in July 2020.  Thank you

## 2024-02-05 ENCOUNTER — ESTABLISHED COMPREHENSIVE EXAM (OUTPATIENT)
Dept: URBAN - METROPOLITAN AREA CLINIC 6 | Facility: CLINIC | Age: 69
End: 2024-02-05

## 2024-02-05 DIAGNOSIS — G35: ICD-10-CM

## 2024-02-05 DIAGNOSIS — H35.3131: ICD-10-CM

## 2024-02-05 DIAGNOSIS — H18.593: ICD-10-CM

## 2024-02-05 DIAGNOSIS — H46.9: ICD-10-CM

## 2024-02-05 DIAGNOSIS — H04.123: ICD-10-CM

## 2024-02-05 DIAGNOSIS — H43.813: ICD-10-CM

## 2024-02-05 DIAGNOSIS — H26.493: ICD-10-CM

## 2024-02-05 DIAGNOSIS — H35.371: ICD-10-CM

## 2024-02-05 PROCEDURE — 92134 CPTRZ OPH DX IMG PST SGM RTA: CPT

## 2024-02-05 PROCEDURE — 92014 COMPRE OPH EXAM EST PT 1/>: CPT

## 2024-02-05 PROCEDURE — 92083 EXTENDED VISUAL FIELD XM: CPT

## 2024-02-05 ASSESSMENT — VISUAL ACUITY
OS_PH: 20/30-1
OD_PH: 20/40
OD_SC: 20/70
OS_SC: 20/40

## 2024-02-05 ASSESSMENT — TONOMETRY
OD_IOP_MMHG: 13
OS_IOP_MMHG: 12

## 2024-03-25 ENCOUNTER — HOSPITAL ENCOUNTER (OUTPATIENT)
Dept: MAMMOGRAPHY | Facility: HOSPITAL | Age: 69
Discharge: HOME | End: 2024-03-25
Payer: MEDICARE

## 2024-03-25 VITALS — HEIGHT: 64 IN | WEIGHT: 154 LBS | BODY MASS INDEX: 26.29 KG/M2

## 2024-03-25 DIAGNOSIS — Z12.31 ENCOUNTER FOR SCREENING MAMMOGRAM FOR BREAST CANCER: ICD-10-CM

## 2024-03-25 PROCEDURE — 77067 SCR MAMMO BI INCL CAD: CPT

## 2024-03-25 PROCEDURE — 77063 BREAST TOMOSYNTHESIS BI: CPT

## 2024-04-19 ENCOUNTER — OFFICE VISIT (OUTPATIENT)
Dept: FAMILY MEDICINE CLINIC | Facility: CLINIC | Age: 69
End: 2024-04-19
Payer: MEDICARE

## 2024-04-19 VITALS
SYSTOLIC BLOOD PRESSURE: 120 MMHG | TEMPERATURE: 97.8 F | RESPIRATION RATE: 18 BRPM | DIASTOLIC BLOOD PRESSURE: 70 MMHG | WEIGHT: 151.25 LBS | HEIGHT: 64 IN | BODY MASS INDEX: 25.82 KG/M2 | HEART RATE: 81 BPM | OXYGEN SATURATION: 97 %

## 2024-04-19 DIAGNOSIS — M25.561 ACUTE PAIN OF RIGHT KNEE: Primary | ICD-10-CM

## 2024-04-19 PROCEDURE — 99213 OFFICE O/P EST LOW 20 MIN: CPT | Performed by: FAMILY MEDICINE

## 2024-04-19 PROCEDURE — G2211 COMPLEX E/M VISIT ADD ON: HCPCS | Performed by: FAMILY MEDICINE

## 2024-04-19 NOTE — PROGRESS NOTES
FAMILY PRACTICE OFFICE VISIT       NAME: Machelle Kaufman  AGE: 68 y.o. SEX: female       : 1955        MRN: [id number]    DATE: 2024  TIME: 12:44 PM    Assessment and Plan     Problem List Items Addressed This Visit       Acute pain of right knee - Primary     Knee pain.  Patient given prescription for Voltaren  mg to take once daily with food.  If symptoms do not improve within 1 week she will obtain x-ray of knee for further evaluation.  If symptoms are improving she may complete 2-week course.  She may continue with applying ice to the affected area 2-3 times daily         Relevant Medications    Diclofenac Sodium  MG 24 hr tablet    Other Relevant Orders    XR knee 3 vw right non injury           Chief Complaint     Chief Complaint   Patient presents with    Knee Pain     From 3 fall        History of Present Illness     Patient in the office with a 3-week history of right knee pain after falling from standing on her bed.  She fell onto the floor with carpet.  She states she continues to have discomfort along medial aspect of right knee.  She did not notice any swelling since the accident but has been applying ice to the affected area several times a day.    Knee Pain         Review of Systems   Review of Systems   Constitutional: Negative.    Musculoskeletal:  Positive for arthralgias.       Active Problem List     Patient Active Problem List   Diagnosis    Multiple sclerosis, relapsing/remitting    Hyperlipidemia    Osteopenia of multiple sites    Ambulatory dysfunction    Chronic obstructive pulmonary disease (HCC)    Vitamin D deficiency    Major depression, recurrent, chronic (HCC)    Allergic rhinitis    Amnesia/memory disorder    Shoulder impingement syndrome, left    Urinary incontinence    Gastro-esophageal reflux disease with esophagitis    Collagenous colitis    Intervertebral disc disorders with radiculopathy, lumbar region    DDD (degenerative disc disease), cervical     Polyp of colon    Personal history of nicotine dependence     Pulmonary nodule    Lateral epicondylitis of left elbow    Tendinitis of left forearm    Acute pain of right knee       Past Medical History:  Past Medical History:   Diagnosis Date    Asthma     Degeneration of intervertebral disc at L5-S1 level     Depression     Family history of colon cancer 3/27/2019    Added automatically from request for surgery [id number]    Full incontinence of feces     Generalized anxiety disorder     Hepatitis     at age 8 yrs    Herpes zoster     last assessed: 6/20/13    History of colon polyps     Hypercholesterolemia     Hyperlipidemia     MS (multiple sclerosis) (HCC)     MS (multiple sclerosis) (HCC)     Osteoporosis     Seasonal allergies     Tumor of breast     benign, right breast    Urinary incontinence        Past Surgical History:  Past Surgical History:   Procedure Laterality Date    BREAST EXCISIONAL BIOPSY Right 1998    unsure exact year, benign    BREAST SURGERY      right breast fibroid tumor resection    COLONOSCOPY      11/2013 - due in 2018 -     COLONOSCOPY W/ POLYPECTOMY  07/07/2022    normal-repeat in 2 years    FL LUMBAR PUNCTURE DIAGNOSTIC  10/01/2018    ROTATOR CUFF REPAIR      Bilateral    ROTATOR CUFF REPAIR Bilateral     SEPTOPLASTY         Family History:  Family History   Problem Relation Age of Onset    Arthritis Mother     Osteoporosis Mother     COPD Father     No Known Problems Sister     Dementia Sister     No Known Problems Maternal Grandmother     No Known Problems Maternal Grandfather     No Known Problems Paternal Grandmother     No Known Problems Paternal Grandfather     Colon cancer Brother 65    Multiple sclerosis Brother     Diabetes Brother     No Known Problems Brother     No Known Problems Maternal Aunt     No Known Problems Maternal Aunt     No Known Problems Paternal Aunt        Social History:  Social History     Socioeconomic History    Marital status: /Civil Union      Spouse name: Not on file    Number of children: Not on file    Years of education: Not on file    Highest education level: Not on file   Occupational History    Occupation: Retired   Tobacco Use    Smoking status: Every Day     Current packs/day: 1.00     Average packs/day: 1 pack/day for 42.3 years (42.3 ttl pk-yrs)     Types: Cigarettes     Start date: 1982    Smokeless tobacco: Never   Vaping Use    Vaping status: Never Used   Substance and Sexual Activity    Alcohol use: Yes     Comment: occasional; [employer] also states Never drank alcohol    Drug use: No    Sexual activity: Not Currently     Partners: Male     Comment: denied any risk of AIDS   Other Topics Concern    Not on file   Social History Narrative    What type of home do you live in: Single house    Age of your home: 68 yrs    How long have you been living there: 32 yrs    Type of heat: Forced hot air    Type of fuel: Gas    What type of samantha is in your bedroom: Hardwood floor    Do you have the following in or near your home:    Air products: Central air, Air , and Humidifier    Pests: Other ants    Pets: Dog and Cat    Basement: None and Crawl space    Exposure to second hand smoke: Yes     Social Determinants of Health     Financial Resource Strain: Patient Declined (5/16/2023)    Overall Financial Resource Strain (CARDIA)     Difficulty of Paying Living Expenses: Patient declined   Food Insecurity: Not on file   Transportation Needs: No Transportation Needs (5/16/2023)    PRAPARE - Transportation     Lack of Transportation (Medical): No     Lack of Transportation (Non-Medical): No   Physical Activity: Unknown (2/28/2024)    Exercise Vital Sign     Days of Exercise per Week: 0 days     Minutes of Exercise per Session: Not on file   Stress: Not on file   Social Connections: Not on file   Intimate Partner Violence: Not on file   Housing Stability: Not on file       Objective     Vitals:    04/19/24 1148   BP: 120/70   Pulse: 81  "  Resp: 18   Temp: 97.8 °F (36.6 °C)   SpO2: 97%     Wt Readings from Last 3 Encounters:   04/19/24 68.6 kg (151 lb 4 oz)   03/25/24 69.9 kg (154 lb)   02/28/24 70.2 kg (154 lb 12.8 oz)       Physical Exam  Constitutional:       General: She is not in acute distress.     Appearance: Normal appearance. She is not ill-appearing.   Musculoskeletal:         General: Tenderness and signs of injury present. No swelling or deformity.      Right lower leg: No edema.      Left lower leg: No edema.      Comments: Patient complains of discomfort along medial right knee with flexion and extension.  She is able to bear weight on her leg.  There is no redness or swelling.  Negative Lachman's or Bernardo's testing.  Negative anterior drawer test.   Neurological:      Mental Status: She is alert.         Pertinent Laboratory/Diagnostic Studies:  Lab Results   Component Value Date    GLUCOSE 107 12/15/2015    BUN 23 11/10/2023    CREATININE 0.72 11/10/2023    CALCIUM 9.4 11/10/2023     12/15/2015    K 3.9 11/10/2023    CO2 30 11/10/2023     11/10/2023     Lab Results   Component Value Date    ALT 20 11/10/2023    AST 15 11/10/2023    ALKPHOS 72 11/10/2023    BILITOT 0.30 12/15/2015       Lab Results   Component Value Date    WBC 8.41 11/10/2023    HGB 15.3 11/10/2023    HCT 44.5 11/10/2023    MCV 90 11/10/2023     11/10/2023       No results found for: \"TSH\"    Lab Results   Component Value Date    CHOL 169 07/21/2015     Lab Results   Component Value Date    TRIG 193 (H) 11/10/2023     Lab Results   Component Value Date    HDL 56 11/10/2023     Lab Results   Component Value Date    LDLCALC 128 (H) 11/10/2023     Lab Results   Component Value Date    HGBA1C 5.8 (H) 09/17/2022       Results for orders placed or performed in visit on 11/10/23   CBC and differential   Result Value Ref Range    WBC 8.41 4.31 - 10.16 Thousand/uL    RBC 4.97 3.81 - 5.12 Million/uL    Hemoglobin 15.3 11.5 - 15.4 g/dL    Hematocrit 44.5 " 34.8 - 46.1 %    MCV 90 82 - 98 fL    MCH 30.8 26.8 - 34.3 pg    MCHC 34.4 31.4 - 37.4 g/dL    RDW 11.6 11.6 - 15.1 %    MPV 9.5 8.9 - 12.7 fL    Platelets 220 149 - 390 Thousands/uL    nRBC 0 /100 WBCs    Segmented % 47 43 - 75 %    Immature Grans % 0 0 - 2 %    Lymphocytes % 42 14 - 44 %    Monocytes % 7 4 - 12 %    Eosinophils Relative 3 0 - 6 %    Basophils Relative 1 0 - 1 %    Absolute Neutrophils 3.99 1.85 - 7.62 Thousands/µL    Absolute Immature Grans 0.01 0.00 - 0.20 Thousand/uL    Absolute Lymphocytes 3.54 0.60 - 4.47 Thousands/µL    Absolute Monocytes 0.60 0.17 - 1.22 Thousand/µL    Eosinophils Absolute 0.21 0.00 - 0.61 Thousand/µL    Basophils Absolute 0.06 0.00 - 0.10 Thousands/µL   Comprehensive metabolic panel   Result Value Ref Range    Sodium 139 135 - 147 mmol/L    Potassium 3.9 3.5 - 5.3 mmol/L    Chloride 106 96 - 108 mmol/L    CO2 30 21 - 32 mmol/L    ANION GAP 3 mmol/L    BUN 23 5 - 25 mg/dL    Creatinine 0.72 0.60 - 1.30 mg/dL    Glucose, Fasting 110 (H) 65 - 99 mg/dL    Calcium 9.4 8.4 - 10.2 mg/dL    AST 15 13 - 39 U/L    ALT 20 7 - 52 U/L    Alkaline Phosphatase 72 34 - 104 U/L    Total Protein 7.4 6.4 - 8.4 g/dL    Albumin 4.9 3.5 - 5.0 g/dL    Total Bilirubin 0.63 0.20 - 1.00 mg/dL    eGFR 85 ml/min/1.73sq m   Lipid Panel with Direct LDL reflex   Result Value Ref Range    Cholesterol 223 (H) See Comment mg/dL    Triglycerides 193 (H) See Comment mg/dL    HDL, Direct 56 >=50 mg/dL    LDL Calculated 128 (H) 0 - 100 mg/dL   TSH, 3rd generation   Result Value Ref Range    TSH 3RD GENERATON 1.353 0.450 - 4.500 uIU/mL     *Note: Due to a large number of results and/or encounters for the requested time period, some results have not been displayed. A complete set of results can be found in Results Review.       Orders Placed This Encounter   Procedures    XR knee 3 vw right non injury       ALLERGIES:  Allergies   Allergen Reactions    Bupropion     Cefprozil     Conj Estrog-Medroxyprogest Ace  Other (See Comments)     Other      Povidone Iodine      Patient unsure of what this is    Sulfamethoxazole-Trimethoprim Other (See Comments)     Reaction Date: 11Aug2011;     Levofloxacin     Gadobutrol GI Intolerance    Medroxyprogesterone Other (See Comments)     Reaction Date: 11Aug2011;     Nortriptyline Other (See Comments)     n/a    Pamelor [Nortriptyline Hcl]     Provera [Medroxyprogesterone Acetate]        Current Medications     Current Outpatient Medications   Medication Sig Dispense Refill    albuterol (2.5 mg/3 mL) 0.083 % nebulizer solution Take 3 mL (2.5 mg total) by nebulization every 4 (four) hours as needed for wheezing or shortness of breath (cough) 120 mL 3    albuterol (Ventolin HFA) 90 mcg/act inhaler Inhale 2 puffs every 4 (four) hours as needed for wheezing or shortness of breath 54 g 3    aspirin (ECOTRIN LOW STRENGTH) 81 mg EC tablet Take 1 tablet by mouth daily      Cholecalciferol (Vitamin D3) 125 MCG (5000 UT) CAPS Take 1 capsule by mouth daily      Diclofenac Sodium  MG 24 hr tablet Take 1 tablet (100 mg total) by mouth daily 15 tablet 0    fluticasone (FLONASE) 50 mcg/act nasal spray USE 2 SPRAYS IN BOTH  NOSTRILS DAILY 48 g 3    gabapentin (NEURONTIN) 300 mg capsule TAKE 1 CAPSULE BY MOUTH  TWICE DAILY 180 capsule 3    levocetirizine (XYZAL) 5 MG tablet Take 1 tablet (5 mg total) by mouth every evening 90 tablet 3    montelukast (SINGULAIR) 10 mg tablet TAKE 1 TABLET BY MOUTH  DAILY AT BEDTIME 90 tablet 3    omeprazole (PriLOSEC) 40 MG capsule Take 40 mg by mouth daily      rosuvastatin (CRESTOR) 40 MG tablet TAKE 1 TABLET BY MOUTH DAILY 90 tablet 3    sertraline (ZOLOFT) 100 mg tablet TAKE 1 AND 1/2 TABLETS BY MOUTH  DAILY 135 tablet 3    ezetimibe (ZETIA) 10 mg tablet TAKE 1 TABLET BY MOUTH  DAILY 90 tablet 3     No current facility-administered medications for this visit.         Health Maintenance     Health Maintenance   Topic Date Due    Zoster Vaccine (1 of 2) Never done     Falls: Plan of Care  Never done    PT PLAN OF CARE  10/09/2019    SLP PLAN OF CARE  11/08/2019    COVID-19 Vaccine (7 - 2023-24 season) 02/02/2024    Medicare Annual Wellness Visit (AWV)  05/16/2024    Colorectal Cancer Screening  07/07/2024    Urinary Incontinence Screening  05/16/2024    Depression Screening  11/16/2024    Lung Cancer Screening  11/27/2024    Fall Risk  04/19/2025    Cervical Cancer Screening  09/07/2025    Breast Cancer Screening: Mammogram  03/25/2026    Hepatitis C Screening  Completed    Osteoporosis Screening  Completed    Pneumococcal Vaccine: 65+ Years  Completed    Influenza Vaccine  Completed    HIB Vaccine  Aged Out    IPV Vaccine  Aged Out    Hepatitis A Vaccine  Aged Out    Meningococcal ACWY Vaccine  Aged Out    HPV Vaccine  Aged Out     Immunization History   Administered Date(s) Administered    COVID-19 PFIZER VACCINE 0.3 ML IM 03/20/2021, 04/12/2021, 01/08/2022, 05/07/2022, 12/02/2022    COVID-19 Pfizer mRNA vacc PF kim-sucrose 12 yr and older (Comirnaty) 12/08/2023    INFLUENZA 09/26/2011, 09/19/2022    Influenza Quadrivalent Preservative Free 3 years and older IM 09/29/2015, 09/22/2016, 09/08/2017    Influenza, high dose seasonal 0.7 mL 09/30/2019, 09/17/2020, 09/20/2021, 09/19/2022, 11/16/2023    Influenza, recombinant, quadrivalent,injectable, preservative free 09/13/2018    Influenza, seasonal, injectable 01/17/2013, 09/03/2013, 09/23/2014    Pneumococcal Conjugate 13-Valent 10/22/2018    Pneumococcal Conjugate Vaccine 20-valent (Pcv20), Polysace 05/16/2023    Pneumococcal Polysaccharide PPV23 10/01/2007, 09/29/2015    Td (adult), adsorbed 06/02/2016       Bk Bolton MD

## 2024-04-19 NOTE — ASSESSMENT & PLAN NOTE
Knee pain.  Patient given prescription for Voltaren  mg to take once daily with food.  If symptoms do not improve within 1 week she will obtain x-ray of knee for further evaluation.  If symptoms are improving she may complete 2-week course.  She may continue with applying ice to the affected area 2-3 times daily

## 2024-05-15 ENCOUNTER — APPOINTMENT (OUTPATIENT)
Dept: LAB | Facility: CLINIC | Age: 69
End: 2024-05-15
Payer: MEDICARE

## 2024-05-15 ENCOUNTER — HOSPITAL ENCOUNTER (OUTPATIENT)
Dept: RADIOLOGY | Facility: HOSPITAL | Age: 69
Discharge: HOME | End: 2024-05-15
Payer: MEDICARE

## 2024-05-15 DIAGNOSIS — J30.9 ALLERGIC RHINITIS, UNSPECIFIED SEASONALITY, UNSPECIFIED TRIGGER: ICD-10-CM

## 2024-05-15 DIAGNOSIS — E78.2 MIXED HYPERLIPIDEMIA: Chronic | ICD-10-CM

## 2024-05-15 DIAGNOSIS — M25.561 ACUTE PAIN OF RIGHT KNEE: ICD-10-CM

## 2024-05-15 LAB
ALBUMIN SERPL BCP-MCNC: 4.6 G/DL (ref 3.5–5)
ALP SERPL-CCNC: 73 U/L (ref 34–104)
ALT SERPL W P-5'-P-CCNC: 20 U/L (ref 7–52)
ANION GAP SERPL CALCULATED.3IONS-SCNC: 7 MMOL/L (ref 4–13)
AST SERPL W P-5'-P-CCNC: 15 U/L (ref 13–39)
BILIRUB SERPL-MCNC: 0.59 MG/DL (ref 0.2–1)
BUN SERPL-MCNC: 18 MG/DL (ref 5–25)
CALCIUM SERPL-MCNC: 9.3 MG/DL (ref 8.4–10.2)
CHLORIDE SERPL-SCNC: 103 MMOL/L (ref 96–108)
CHOLEST SERPL-MCNC: 173 MG/DL
CO2 SERPL-SCNC: 29 MMOL/L (ref 21–32)
CREAT SERPL-MCNC: 0.66 MG/DL (ref 0.6–1.3)
ERYTHROCYTE [DISTWIDTH] IN BLOOD BY AUTOMATED COUNT: 11.6 % (ref 11.6–15.1)
GFR SERPL CREATININE-BSD FRML MDRD: 90 ML/MIN/1.73SQ M
GLUCOSE P FAST SERPL-MCNC: 100 MG/DL (ref 65–99)
HCT VFR BLD AUTO: 42.2 % (ref 34.8–46.1)
HDLC SERPL-MCNC: 48 MG/DL
HGB BLD-MCNC: 14.5 G/DL (ref 11.5–15.4)
LDLC SERPL CALC-MCNC: 81 MG/DL (ref 0–100)
MCH RBC QN AUTO: 30.6 PG (ref 26.8–34.3)
MCHC RBC AUTO-ENTMCNC: 34.4 G/DL (ref 31.4–37.4)
MCV RBC AUTO: 89 FL (ref 82–98)
PLATELET # BLD AUTO: 217 THOUSANDS/UL (ref 149–390)
PMV BLD AUTO: 10 FL (ref 8.9–12.7)
POTASSIUM SERPL-SCNC: 4 MMOL/L (ref 3.5–5.3)
PROT SERPL-MCNC: 7.1 G/DL (ref 6.4–8.4)
RBC # BLD AUTO: 4.74 MILLION/UL (ref 3.81–5.12)
SODIUM SERPL-SCNC: 139 MMOL/L (ref 135–147)
TRIGL SERPL-MCNC: 220 MG/DL
TSH SERPL DL<=0.05 MIU/L-ACNC: 0.92 UIU/ML (ref 0.45–4.5)
WBC # BLD AUTO: 9.57 THOUSAND/UL (ref 4.31–10.16)

## 2024-05-15 PROCEDURE — 84443 ASSAY THYROID STIM HORMONE: CPT

## 2024-05-15 PROCEDURE — 85027 COMPLETE CBC AUTOMATED: CPT

## 2024-05-15 PROCEDURE — 80053 COMPREHEN METABOLIC PANEL: CPT

## 2024-05-15 PROCEDURE — 80061 LIPID PANEL: CPT

## 2024-05-15 PROCEDURE — 73562 X-RAY EXAM OF KNEE 3: CPT

## 2024-05-15 PROCEDURE — 36415 COLL VENOUS BLD VENIPUNCTURE: CPT

## 2024-05-16 RX ORDER — MONTELUKAST SODIUM 10 MG/1
TABLET ORAL
Qty: 90 TABLET | Refills: 1 | Status: SHIPPED | OUTPATIENT
Start: 2024-05-16

## 2024-05-22 ENCOUNTER — OFFICE VISIT (OUTPATIENT)
Dept: FAMILY MEDICINE CLINIC | Facility: CLINIC | Age: 69
End: 2024-05-22
Payer: MEDICARE

## 2024-05-22 VITALS
RESPIRATION RATE: 16 BRPM | DIASTOLIC BLOOD PRESSURE: 70 MMHG | BODY MASS INDEX: 25.61 KG/M2 | HEART RATE: 65 BPM | WEIGHT: 150 LBS | HEIGHT: 64 IN | OXYGEN SATURATION: 96 % | SYSTOLIC BLOOD PRESSURE: 120 MMHG | TEMPERATURE: 97.6 F

## 2024-05-22 DIAGNOSIS — K63.5 POLYP OF COLON, UNSPECIFIED PART OF COLON, UNSPECIFIED TYPE: ICD-10-CM

## 2024-05-22 DIAGNOSIS — J44.9 CHRONIC OBSTRUCTIVE PULMONARY DISEASE, UNSPECIFIED COPD TYPE (HCC): ICD-10-CM

## 2024-05-22 DIAGNOSIS — G89.29 CHRONIC PAIN OF RIGHT KNEE: ICD-10-CM

## 2024-05-22 DIAGNOSIS — G35 MULTIPLE SCLEROSIS (HCC): ICD-10-CM

## 2024-05-22 DIAGNOSIS — K21.00 GASTROESOPHAGEAL REFLUX DISEASE WITH ESOPHAGITIS, UNSPECIFIED WHETHER HEMORRHAGE: Chronic | ICD-10-CM

## 2024-05-22 DIAGNOSIS — F33.9 MAJOR DEPRESSION, RECURRENT, CHRONIC (HCC): Chronic | ICD-10-CM

## 2024-05-22 DIAGNOSIS — M25.561 CHRONIC PAIN OF RIGHT KNEE: ICD-10-CM

## 2024-05-22 DIAGNOSIS — E78.2 MIXED HYPERLIPIDEMIA: Chronic | ICD-10-CM

## 2024-05-22 DIAGNOSIS — J30.9 ALLERGIC RHINITIS, UNSPECIFIED SEASONALITY, UNSPECIFIED TRIGGER: ICD-10-CM

## 2024-05-22 DIAGNOSIS — Z00.00 MEDICARE ANNUAL WELLNESS VISIT, SUBSEQUENT: Primary | ICD-10-CM

## 2024-05-22 PROCEDURE — 99214 OFFICE O/P EST MOD 30 MIN: CPT | Performed by: FAMILY MEDICINE

## 2024-05-22 PROCEDURE — G0439 PPPS, SUBSEQ VISIT: HCPCS | Performed by: FAMILY MEDICINE

## 2024-05-22 RX ORDER — FLUTICASONE PROPIONATE 50 MCG
2 SPRAY, SUSPENSION (ML) NASAL DAILY
Qty: 48 G | Refills: 3 | Status: SHIPPED | OUTPATIENT
Start: 2024-05-22

## 2024-05-22 RX ORDER — ALBUTEROL SULFATE 2.5 MG/3ML
2.5 SOLUTION RESPIRATORY (INHALATION) EVERY 4 HOURS PRN
Qty: 720 ML | Refills: 3 | Status: SHIPPED | OUTPATIENT
Start: 2024-05-22

## 2024-05-22 RX ORDER — ALBUTEROL SULFATE 90 UG/1
2 AEROSOL, METERED RESPIRATORY (INHALATION) EVERY 4 HOURS PRN
Qty: 54 G | Refills: 3 | Status: SHIPPED | OUTPATIENT
Start: 2024-05-22

## 2024-05-22 RX ORDER — SERTRALINE HYDROCHLORIDE 100 MG/1
100 TABLET, FILM COATED ORAL DAILY
Qty: 90 TABLET | Refills: 3 | Status: SHIPPED | OUTPATIENT
Start: 2024-05-22

## 2024-05-22 NOTE — PATIENT INSTRUCTIONS
Medicare Preventive Visit Patient Instructions  Thank you for completing your Welcome to Medicare Visit or Medicare Annual Wellness Visit today. Your next wellness visit will be due in one year (5/23/2025).  The screening/preventive services that you may require over the next 5-10 years are detailed below. Some tests may not apply to you based off risk factors and/or age. Screening tests ordered at today's visit but not completed yet may show as past due. Also, please note that scanned in results may not display below.  Preventive Screenings:  Service Recommendations Previous Testing/Comments   Colorectal Cancer Screening  * Colonoscopy    * Fecal Occult Blood Test (FOBT)/Fecal Immunochemical Test (FIT)  * Fecal DNA/Cologuard Test  * Flexible Sigmoidoscopy Age: 45-75 years old   Colonoscopy: every 10 years (may be performed more frequently if at higher risk)  OR  FOBT/FIT: every 1 year  OR  Cologuard: every 3 years  OR  Sigmoidoscopy: every 5 years  Screening may be recommended earlier than age 45 if at higher risk for colorectal cancer. Also, an individualized decision between you and your healthcare provider will decide whether screening between the ages of 76-85 would be appropriate. Colonoscopy: 07/07/2022  FOBT/FIT: Not on file  Cologuard: Not on file  Sigmoidoscopy: Not on file    Screening Current     Breast Cancer Screening Age: 40+ years old  Frequency: every 1-2 years  Not required if history of left and right mastectomy Mammogram: 03/25/2024    Screening Current   Cervical Cancer Screening Between the ages of 21-29, pap smear recommended once every 3 years.   Between the ages of 30-65, can perform pap smear with HPV co-testing every 5 years.   Recommendations may differ for women with a history of total hysterectomy, cervical cancer, or abnormal pap smears in past. Pap Smear: 09/07/2022    Screening Not Indicated  Screening Current   Hepatitis C Screening Once for adults born between 1945 and 1965  More  frequently in patients at high risk for Hepatitis C Hep C Antibody: Not on file    Screening Current   Diabetes Screening 1-2 times per year if you're at risk for diabetes or have pre-diabetes Fasting glucose: 100 mg/dL (5/15/2024)  A1C: 5.8 % (9/17/2022)  Screening Current   Cholesterol Screening Once every 5 years if you don't have a lipid disorder. May order more often based on risk factors. Lipid panel: 05/15/2024    Screening Not Indicated  History Lipid Disorder     Other Preventive Screenings Covered by Medicare:  Abdominal Aortic Aneurysm (AAA) Screening: covered once if your at risk. You're considered to be at risk if you have a family history of AAA.  Lung Cancer Screening: covers low dose CT scan once per year if you meet all of the following conditions: (1) Age 55-77; (2) No signs or symptoms of lung cancer; (3) Current smoker or have quit smoking within the last 15 years; (4) You have a tobacco smoking history of at least 20 pack years (packs per day multiplied by number of years you smoked); (5) You get a written order from a healthcare provider.  Glaucoma Screening: covered annually if you're considered high risk: (1) You have diabetes OR (2) Family history of glaucoma OR (3)  aged 50 and older OR (4)  American aged 65 and older  Osteoporosis Screening: covered every 2 years if you meet one of the following conditions: (1) You're estrogen deficient and at risk for osteoporosis based off medical history and other findings; (2) Have a vertebral abnormality; (3) On glucocorticoid therapy for more than 3 months; (4) Have primary hyperparathyroidism; (5) On osteoporosis medications and need to assess response to drug therapy.   Last bone density test (DXA Scan): 07/18/2023.  HIV Screening: covered annually if you're between the age of 15-65. Also covered annually if you are younger than 15 and older than 65 with risk factors for HIV infection. For pregnant patients, it is covered up  to 3 times per pregnancy.    Immunizations:  Immunization Recommendations   Influenza Vaccine Annual influenza vaccination during flu season is recommended for all persons aged >= 6 months who do not have contraindications   Pneumococcal Vaccine   * Pneumococcal conjugate vaccine = PCV13 (Prevnar 13), PCV15 (Vaxneuvance), PCV20 (Prevnar 20)  * Pneumococcal polysaccharide vaccine = PPSV23 (Pneumovax) Adults 19-65 yo with certain risk factors or if 65+ yo  If never received any pneumonia vaccine: recommend Prevnar 20 (PCV20)  Give PCV20 if previously received 1 dose of PCV13 or PPSV23   Hepatitis B Vaccine 3 dose series if at intermediate or high risk (ex: diabetes, end stage renal disease, liver disease)   Respiratory syncytial virus (RSV) Vaccine - COVERED BY MEDICARE PART D  * RSVPreF3 (Arexvy) CDC recommends that adults 60 years of age and older may receive a single dose of RSV vaccine using shared clinical decision-making (SCDM)   Tetanus (Td) Vaccine - COST NOT COVERED BY MEDICARE PART B Following completion of primary series, a booster dose should be given every 10 years to maintain immunity against tetanus. Td may also be given as tetanus wound prophylaxis.   Tdap Vaccine - COST NOT COVERED BY MEDICARE PART B Recommended at least once for all adults. For pregnant patients, recommended with each pregnancy.   Shingles Vaccine (Shingrix) - COST NOT COVERED BY MEDICARE PART B  2 shot series recommended in those 19 years and older who have or will have weakened immune systems or those 50 years and older     Health Maintenance Due:      Topic Date Due   • Colorectal Cancer Screening  07/07/2024   • Lung Cancer Screening  11/27/2024   • Cervical Cancer Screening  09/07/2025   • Breast Cancer Screening: Mammogram  03/25/2026   • Hepatitis C Screening  Completed     Immunizations Due:  There are no preventive care reminders to display for this patient.  Advance Directives   What are advance directives?  Advance  directives are legal documents that state your wishes and plans for medical care. These plans are made ahead of time in case you lose your ability to make decisions for yourself. Advance directives can apply to any medical decision, such as the treatments you want, and if you want to donate organs.   What are the types of advance directives?  There are many types of advance directives, and each state has rules about how to use them. You may choose a combination of any of the following:  Living will:  This is a written record of the treatment you want. You can also choose which treatments you do not want, which to limit, and which to stop at a certain time. This includes surgery, medicine, IV fluid, and tube feedings.   Durable power of  for healthcare (DPAHC):  This is a written record that states who you want to make healthcare choices for you when you are unable to make them for yourself. This person, called a proxy, is usually a family member or a friend. You may choose more than 1 proxy.  Do not resuscitate (DNR) order:  A DNR order is used in case your heart stops beating or you stop breathing. It is a request not to have certain forms of treatment, such as CPR. A DNR order may be included in other types of advance directives.  Medical directive:  This covers the care that you want if you are in a coma, near death, or unable to make decisions for yourself. You can list the treatments you want for each condition. Treatment may include pain medicine, surgery, blood transfusions, dialysis, IV or tube feedings, and a ventilator (breathing machine).  Values history:  This document has questions about your views, beliefs, and how you feel and think about life. This information can help others choose the care that you would choose.  Why are advance directives important?  An advance directive helps you control your care. Although spoken wishes may be used, it is better to have your wishes written down. Spoken  wishes can be misunderstood, or not followed. Treatments may be given even if you do not want them. An advance directive may make it easier for your family to make difficult choices about your care.   Urinary Incontinence   Urinary incontinence (UI)  is when you lose control of your bladder. UI develops because your bladder cannot store or empty urine properly. The 3 most common types of UI are stress incontinence, urge incontinence, or both.  Medicines:   May be given to help strengthen your bladder control. Report any side effects of medication to your healthcare provider.  Do pelvic muscle exercises often:  Your pelvic muscles help you stop urinating. Squeeze these muscles tight for 5 seconds, then relax for 5 seconds. Gradually work up to squeezing for 10 seconds. Do 3 sets of 15 repetitions a day, or as directed. This will help strengthen your pelvic muscles and improve bladder control.  Train your bladder:  Go to the bathroom at set times, such as every 2 hours, even if you do not feel the urge to go. You can also try to hold your urine when you feel the urge to go. For example, hold your urine for 5 minutes when you feel the urge to go. As that becomes easier, hold your urine for 10 minutes.   Self-care:   Keep a UI record.  Write down how often you leak urine and how much you leak. Make a note of what you were doing when you leaked urine.  Drink liquids as directed. You may need to limit the amount of liquid you drink to help control your urine leakage. Do not drink any liquid right before you go to bed. Limit or do not have drinks that contain caffeine or alcohol.   Prevent constipation.  Eat a variety of high-fiber foods. Good examples are high-fiber cereals, beans, vegetables, and whole-grain breads. Walking is the best way to trigger your intestines to have a bowel movement.  Exercise regularly and maintain a healthy weight.  Weight loss and exercise will decrease pressure on your bladder and help you  control your leakage.   Use a catheter as directed  to help empty your bladder. A catheter is a tiny, plastic tube that is put into your bladder to drain your urine.   Go to behavior therapy as directed.  Behavior therapy may be used to help you learn to control your urge to urinate.    Cigarette Smoking and Your Health   Risks to your health if you smoke:  Nicotine and other chemicals found in tobacco damage every cell in your body. Even if you are a light smoker, you have an increased risk for cancer, heart disease, and lung disease. If you are pregnant or have diabetes, smoking increases your risk for complications.   Benefits to your health if you stop smoking:   You decrease respiratory symptoms such as coughing, wheezing, and shortness of breath.   You reduce your risk for cancers of the lung, mouth, throat, kidney, bladder, pancreas, stomach, and cervix. If you already have cancer, you increase the benefits of chemotherapy. You also reduce your risk for cancer returning or a second cancer from developing.   You reduce your risk for heart disease, blood clots, heart attack, and stroke.   You reduce your risk for lung infections, and diseases such as pneumonia, asthma, chronic bronchitis, and emphysema.  Your circulation improves. More oxygen can be delivered to your body. If you have diabetes, you lower your risk for complications, such as kidney, artery, and eye diseases. You also lower your risk for nerve damage. Nerve damage can lead to amputations, poor vision, and blindness.  You improve your body's ability to heal and to fight infections.  For more information and support to stop smoking:   [employer]  Phone: 2- 720 - 604-7518  Web Address: www.[employer]  Weight Management   Why it is important to manage your weight:  Being overweight increases your risk of health conditions such as heart disease, high blood pressure, type 2 diabetes, and certain types of cancer. It can also increase your risk for  osteoarthritis, sleep apnea, and other respiratory problems. Aim for a slow, steady weight loss. Even a small amount of weight loss can lower your risk of health problems.  How to lose weight safely:  A safe and healthy way to lose weight is to eat fewer calories and get regular exercise. You can lose up about 1 pound a week by decreasing the number of calories you eat by 500 calories each day.   Healthy meal plan for weight management:  A healthy meal plan includes a variety of foods, contains fewer calories, and helps you stay healthy. A healthy meal plan includes the following:  Eat whole-grain foods more often.  A healthy meal plan should contain fiber. Fiber is the part of grains, fruits, and vegetables that is not broken down by your body. Whole-grain foods are healthy and provide extra fiber in your diet. Some examples of whole-grain foods are whole-wheat breads and pastas, oatmeal, brown rice, and bulgur.  Eat a variety of vegetables every day.  Include dark, leafy greens such as spinach, kale, letitia greens, and mustard greens. Eat yellow and orange vegetables such as carrots, sweet potatoes, and winter squash.   Eat a variety of fruits every day.  Choose fresh or canned fruit (canned in its own juice or light syrup) instead of juice. Fruit juice has very little or no fiber.  Eat low-fat dairy foods.  Drink fat-free (skim) milk or 1% milk. Eat fat-free yogurt and low-fat cottage cheese. Try low-fat cheeses such as mozzarella and other reduced-fat cheeses.  Choose meat and other protein foods that are low in fat.  Choose beans or other legumes such as split peas or lentils. Choose fish, skinless poultry (chicken or turkey), or lean cuts of red meat (beef or pork). Before you cook meat or poultry, cut off any visible fat.   Use less fat and oil.  Try baking foods instead of frying them. Add less fat, such as margarine, sour cream, regular salad dressing and mayonnaise to foods. Eat fewer high-fat foods. Some  examples of high-fat foods include french fries, doughnuts, ice cream, and cakes.  Eat fewer sweets.  Limit foods and drinks that are high in sugar. This includes candy, cookies, regular soda, and sweetened drinks.  Exercise:  Exercise at least 30 minutes per day on most days of the week. Some examples of exercise include walking, biking, dancing, and swimming. You can also fit in more physical activity by taking the stairs instead of the elevator or parking farther away from stores. Ask your healthcare provider about the best exercise plan for you.      © Copyright [employer] 2018 Information is for End User's use only and may not be sold, redistributed or otherwise used for commercial purposes. All illustrations and images included in CareNotes® are the copyrighted property of [employer] or [employer]

## 2024-05-22 NOTE — PROGRESS NOTES
Ambulatory Visit  Name: Machelle Kaufman      : 1955      MRN: [id number]  Encounter Provider: Maria E Almeida MD  Encounter Date: 2024   Encounter department: [facility]    Assessment & Plan   1. Medicare annual wellness visit, subsequent  2. Gastroesophageal reflux disease with esophagitis, unspecified whether hemorrhage  Assessment & Plan:  No need for PPi x 6 months, will keep on hand for PRN use  3. Major depression, recurrent, chronic (HCC)  Assessment & Plan:  Zoloft 100 mg daily - doing well on lower dose  Orders:  -     sertraline (ZOLOFT) 100 mg tablet; Take 1 tablet (100 mg total) by mouth daily  4. Multiple sclerosis (HCC)  Assessment & Plan:  The patient is no longer following up with neurology.  Chronic symptoms of decreased memory and urinary incontinence.  Overall she has been feeling stably.  5. Chronic obstructive pulmonary disease, unspecified COPD type (HCC)  Assessment & Plan:  Patient is not on any daily inhalers.  Denies symptoms of chronic cough.  No chest tightness or wheezing.  Patient is requesting PRN refill of albuterol inhaler and nebulized solution to keep on hand.  She remains under care of [facility] pulmonology  Orders:  -     albuterol (2.5 mg/3 mL) 0.083 % nebulizer solution; Take 3 mL (2.5 mg total) by nebulization every 4 (four) hours as needed for wheezing or shortness of breath (cough)  -     albuterol (Ventolin HFA) 90 mcg/act inhaler; Inhale 2 puffs every 4 (four) hours as needed for wheezing or shortness of breath  6. Allergic rhinitis, unspecified seasonality, unspecified trigger  -     fluticasone (FLONASE) 50 mcg/act nasal spray; 2 sprays into each nostril daily  7. Chronic pain of right knee  Assessment & Plan:  Persistent right medial knee pain after fall over a months ago.  Suspect medial meniscus sprain/tear.  Referral to orthopedic surgery.  Continue diclofenac PRN  Orders:  -     Ambulatory Referral to Orthopedic Surgery;  Future  -     Diclofenac Sodium  MG 24 hr tablet; Take 1 tablet once a day as needed for knee pain  8. Mixed hyperlipidemia  Assessment & Plan:  Patient has been compliant with Rx.  Continue Zetia 10 mg daily and Crestor 40 mg daily.  Follow low-fat low-cholesterol diet.  Total cholesterol is 173, triglycerides 220, LDL is 81.  Overall good control on max dose of statin along with Zetia.  Orders:  -     Comprehensive metabolic panel; Future  -     Lipid Panel with Direct LDL reflex; Future  9. Polyp of colon, unspecified part of colon, unspecified type  Assessment & Plan:  The patient is due for colonoscopy in July 2024.  Orders:  -     Ambulatory referral to Colorectal Surgery; Future      Depression Screening and Follow-up Plan: Patient was screened for depression during today's encounter. They screened negative with a PHQ-9 score of 0.    Tobacco Cessation Counseling: Tobacco cessation counseling was provided. The patient is sincerely urged to quit consumption of tobacco. She is not ready to quit tobacco. Medication options not discussed.     Discussed and I recommended Shingrix vaccination.  Patient will proceed at the local pharmacy.    Preventive health issues were discussed with patient, and age appropriate screening tests were ordered as noted in patient's After Visit Summary. Personalized health advice and appropriate referrals for health education or preventive services given if needed, as noted in patient's After Visit Summary.    Return in about 6 months (around 11/18/2024) for follow up.      History of Present Illness     The patient presents for follow-up and Medicare wellness.  She is here today accompanied by her .  She is complaining of persistent pain in her right knee after she twisted it for a few weeks ago.  She was seen in the office by Dr. Bolton and was prescribed diclofenac ER.  Rx helps but pain has been recurrent.  Patient reports persistent throbbing in her knee, difficulty  positioning her knee in bed.  Symptoms of anxiety depression have been well-controlled.  Patient reduce dose of Zoloft to 100 mg daily and is doing well on this lower maintenance therapy.  Symptoms of reflux have been well-controlled.  Patient did not require omeprazole for quite a while.  She keeps Rx on hand for PRN use.  She will be due for her surveillance colonoscopy in July 2024.     We discussed results of recent blood work which is overall normal and stable.  Patient has been compliant with Rx for chronic medical conditions.       Patient Care Team:  Maria E Almeida MD as PCP - General  Galindo Edwards, MD Bianca Lambert, MD Silas Angulo MD Dang Zhang, MD Ja Pearson MD (Ophthalmology)    Review of Systems   Constitutional: Negative.    HENT: Negative.     Eyes: Negative.    Respiratory: Negative.     Cardiovascular: Negative.    Gastrointestinal: Negative.    Endocrine: Negative.    Genitourinary:  Positive for frequency.        Chronic urinary frequency   Musculoskeletal:  Positive for arthralgias.        Knee pain and instability   Allergic/Immunologic: Negative.    Neurological: Negative.    Psychiatric/Behavioral:  Positive for decreased concentration.      Medical History Reviewed by provider this encounter:  Tobacco  Allergies  Meds  Problems  Med Hx  Surg Hx  Fam Hx       Annual Wellness Visit Questionnaire   Machelle is here for her Subsequent Wellness visit. Last Medicare Wellness visit information reviewed, patient interviewed and updates made to the record today.      Health Risk Assessment:   Patient rates overall health as fair. Patient feels that their physical health rating is same. Patient is satisfied with their life. Eyesight was rated as same. Hearing was rated as same. Patient feels that their emotional and mental health rating is same. Patients states they are never, rarely angry. Patient states they are sometimes unusually  tired/fatigued. Pain experienced in the last 7 days has been a lot. Patient's pain rating has been 4/10. Patient states that she has experienced no weight loss or gain in last 6 months.     Depression Screening:   PHQ-9 Score: 0      Fall Risk Screening:   In the past year, patient has experienced: no history of falling in past year      Urinary Incontinence Screening:   Patient has leaked urine accidently in the last six months.     Home Safety:  Patient does not have trouble with stairs inside or outside of their home. Patient has working smoke alarms and has working carbon monoxide detector. Home safety hazards include: none.     Nutrition:   Current diet is Regular.     Medications:   Patient is currently taking over-the-counter supplements. OTC medications include: see medication list. Patient is able to manage medications.     Activities of Daily Living (ADLs)/Instrumental Activities of Daily Living (IADLs):   Walk and transfer into and out of bed and chair?: Yes  Dress and groom yourself?: Yes    Bathe or shower yourself?: Yes    Feed yourself? Yes  Do your laundry/housekeeping?: Yes  Manage your money, pay your bills and track your expenses?: Yes  Make your own meals?: Yes    Do your own shopping?: Yes    Previous Hospitalizations:   Any hospitalizations or ED visits within the last 12 months?: No      Advance Care Planning:   Living will: No    Durable POA for healthcare: No    Advanced directive: No    Advanced directive counseling given: Yes    ACP document given: Yes      Cognitive Screening:   Provider or family/friend/caregiver concerned regarding cognition?: No    PREVENTIVE SCREENINGS      Cardiovascular Screening:    General: Screening Not Indicated and History Lipid Disorder      Diabetes Screening:     General: Screening Current      Colorectal Cancer Screening:     General: Screening Current    Due for: Colonoscopy - High Risk      Breast Cancer Screening:     General: Screening Current       "Cervical Cancer Screening:    General: Screening Not Indicated and Screening Current      Osteoporosis Screening:    General: Screening Not Indicated and Screening Current      Abdominal Aortic Aneurysm (AAA) Screening:        General: Screening Not Indicated      Lung Cancer Screening:     General: Screening Current      Hepatitis C Screening:    General: Screening Current    Screening, Brief Intervention, and Referral to Treatment (SBIRT)    Screening    Typical number of drinks in a week: 0    Single Item Drug Screening:  How often have you used an illegal drug (including marijuana) or a prescription medication for non-medical reasons in the past year? never    Single Item Drug Screen Score: 0  Interpretation: Negative screen for possible drug use disorder    Brief Intervention  Alcohol & drug use screenings were reviewed. No concerns regarding substance use disorder identified.     Other Counseling Topics:   Regular weightbearing exercise.     Social Determinants of Health     Financial Resource Strain: Patient Declined (5/16/2023)    Overall Financial Resource Strain (CARDIA)    • Difficulty of Paying Living Expenses: Patient declined   Food Insecurity: No Food Insecurity (5/22/2024)    Hunger Vital Sign    • Worried About Running Out of Food in the Last Year: Never true    • Ran Out of Food in the Last Year: Never true   Transportation Needs: No Transportation Needs (5/22/2024)    PRAPARE - Transportation    • Lack of Transportation (Medical): No    • Lack of Transportation (Non-Medical): No   Housing Stability: Low Risk  (5/22/2024)    Housing Stability Vital Sign    • Unable to Pay for Housing in the Last Year: No    • Number of Times Moved in the Last Year: 1    • Homeless in the Last Year: No   Utilities: Not At Risk (5/22/2024)    [facility] Utilities    • Threatened with loss of utilities: No     No results found.    Objective     /70   Pulse 65   Temp 97.6 °F (36.4 °C) (Temporal)   Resp 16   Ht 5' 4\" " (1.626 m)   Wt 68 kg (150 lb)   LMP  (LMP Unknown)   SpO2 96%   BMI 25.75 kg/m²     Physical Exam  Vitals and nursing note reviewed.   Constitutional:       General: She is not in acute distress.     Appearance: Normal appearance. She is well-developed. She is not ill-appearing.   HENT:      Head: Normocephalic and atraumatic.   Eyes:      Conjunctiva/sclera: Conjunctivae normal.   Neck:      Thyroid: No thyromegaly.      Vascular: No carotid bruit.   Cardiovascular:      Rate and Rhythm: Normal rate and regular rhythm.      Heart sounds: Normal heart sounds. No murmur heard.  Pulmonary:      Effort: Pulmonary effort is normal. No respiratory distress.      Breath sounds: Normal breath sounds. No wheezing.   Abdominal:      General: There is no abdominal bruit.   Musculoskeletal:         General: Swelling present. Normal range of motion.      Cervical back: Neck supple.      Comments: Mild right knee effusion.  Mild warmth.  No discoloration.  Tenderness at medial meniscus surface..  No calf edema.   Neurological:      General: No focal deficit present.      Mental Status: She is alert and oriented to person, place, and time.      Cranial Nerves: No cranial nerve deficit.      Coordination: Coordination normal.   Psychiatric:         Mood and Affect: Mood normal.         Behavior: Behavior normal.       Administrative Statements

## 2024-05-22 NOTE — ASSESSMENT & PLAN NOTE
Persistent right medial knee pain after fall over a months ago.  Suspect medial meniscus sprain/tear.  Referral to orthopedic surgery.  Continue diclofenac PRN

## 2024-05-26 ENCOUNTER — TELEPHONE (OUTPATIENT)
Dept: FAMILY MEDICINE CLINIC | Facility: CLINIC | Age: 69
End: 2024-05-26

## 2024-05-26 DIAGNOSIS — J44.9 CHRONIC OBSTRUCTIVE PULMONARY DISEASE, UNSPECIFIED COPD TYPE (HCC): Primary | Chronic | ICD-10-CM

## 2024-05-26 DIAGNOSIS — R93.89 ABNORMAL CHEST CT: ICD-10-CM

## 2024-05-26 PROBLEM — M25.561 ACUTE PAIN OF RIGHT KNEE: Status: RESOLVED | Noted: 2024-04-19 | Resolved: 2024-05-26

## 2024-05-26 NOTE — ASSESSMENT & PLAN NOTE
The patient is no longer following up with neurology.  Chronic symptoms of decreased memory and urinary incontinence.  Overall she has been feeling stably.

## 2024-05-26 NOTE — ASSESSMENT & PLAN NOTE
Patient is not on any daily inhalers.  Denies symptoms of chronic cough.  No chest tightness or wheezing.  Patient is requesting PRN refill of albuterol inhaler and nebulized solution to keep on hand.  She remains under care of [facility] pulmonology

## 2024-05-26 NOTE — ASSESSMENT & PLAN NOTE
Patient has been compliant with Rx.  Continue Zetia 10 mg daily and Crestor 40 mg daily.  Follow low-fat low-cholesterol diet.  Total cholesterol is 173, triglycerides 220, LDL is 81.  Overall good control on max dose of statin along with Zetia.

## 2024-05-26 NOTE — TELEPHONE ENCOUNTER
Please contact patient.  I would like to remind her that she should be following up with [facility] pulmonology.  Recent CT chest was performed in November 2023 again reveald mucus collection and inflammation in her lungs. I advised that she schedules follow-up with  ( telephone note 12/7/23) , but I do not believe that patient has proceeded.  It is important for pulmonology to monitor her CT chest as well as lung function testing.  This patient has not seen pulmonology for quite a while I will place a new referral as well.    Thank you

## 2024-05-28 NOTE — TELEPHONE ENCOUNTER
Patient was returning a call back, that was left.     Patient will be going ahead and scheduling an appointment with Dr. Sheets, at this moment, to be seen as soon as possible

## 2024-05-29 ENCOUNTER — TELEPHONE (OUTPATIENT)
Age: 69
End: 2024-05-29

## 2024-05-31 ENCOUNTER — TELEPHONE (OUTPATIENT)
Age: 69
End: 2024-05-31

## 2024-05-31 NOTE — TELEPHONE ENCOUNTER
PA for albuterol    Submitted via    [][employer]M-KEY  [][employer]-Case ID # [id number]   []Faxed to plan   []Other website   []Phone call Case ID #     Office notes sent, clinical questions answered. Awaiting determination    Turnaround time for your insurance to make a decision on your Prior Authorization can take 7-21 business days.

## 2024-06-05 ENCOUNTER — TELEPHONE (OUTPATIENT)
Age: 69
End: 2024-06-05

## 2024-06-05 NOTE — TELEPHONE ENCOUNTER
PA for albuterol (2.5 mg/3 mL) 0.083 % nebulizer solution     Submitted via    [][employer]    [x][employer]-Case ID # [id number]   []Faxed to plan   []Other website    []Phone call Case ID #      Office notes sent, clinical questions answered. Awaiting determination    Turnaround time for your insurance to make a decision on your Prior Authorization can take 7-21 business days.

## 2024-06-07 NOTE — TELEPHONE ENCOUNTER
Forwarding to PA team for review.     Patient called stated [employer] told her she need authorization for her nebulizer solution, patient informed PA process have stated and is with our PA Team, advised the process takes about 7-21D, patient state can wait.

## 2024-06-07 NOTE — TELEPHONE ENCOUNTER
Forwarding to PA team for review.     Patient called stated [employer] told her she need authorization for her nebulizer solution and albuterol, patient informed PA process have stated and is with our PA Team, advised the process takes about 7-21D, patient state can wait.

## 2024-06-13 ENCOUNTER — OFFICE VISIT (OUTPATIENT)
Dept: OBGYN CLINIC | Facility: CLINIC | Age: 69
End: 2024-06-13
Payer: MEDICARE

## 2024-06-13 VITALS
BODY MASS INDEX: 25.61 KG/M2 | WEIGHT: 150 LBS | DIASTOLIC BLOOD PRESSURE: 77 MMHG | HEIGHT: 64 IN | SYSTOLIC BLOOD PRESSURE: 125 MMHG | HEART RATE: 76 BPM

## 2024-06-13 DIAGNOSIS — M70.51 PES ANSERINUS BURSITIS OF RIGHT KNEE: Primary | ICD-10-CM

## 2024-06-13 DIAGNOSIS — M25.561 CHRONIC PAIN OF RIGHT KNEE: ICD-10-CM

## 2024-06-13 DIAGNOSIS — G89.29 CHRONIC PAIN OF RIGHT KNEE: ICD-10-CM

## 2024-06-13 PROCEDURE — 20610 DRAIN/INJ JOINT/BURSA W/O US: CPT | Performed by: ORTHOPAEDIC SURGERY

## 2024-06-13 PROCEDURE — 99204 OFFICE O/P NEW MOD 45 MIN: CPT | Performed by: ORTHOPAEDIC SURGERY

## 2024-06-13 RX ORDER — TRIAMCINOLONE ACETONIDE 40 MG/ML
80 INJECTION, SUSPENSION INTRA-ARTICULAR; INTRAMUSCULAR
Status: COMPLETED | OUTPATIENT
Start: 2024-06-13 | End: 2024-06-13

## 2024-06-13 RX ORDER — BUPIVACAINE HYDROCHLORIDE 2.5 MG/ML
2 INJECTION, SOLUTION INFILTRATION; PERINEURAL
Status: COMPLETED | OUTPATIENT
Start: 2024-06-13 | End: 2024-06-13

## 2024-06-13 RX ORDER — METHYLPREDNISOLONE 4 MG/1
TABLET ORAL
Qty: 21 EACH | Refills: 0 | Status: SHIPPED | OUTPATIENT
Start: 2024-06-13

## 2024-06-13 RX ADMIN — TRIAMCINOLONE ACETONIDE 80 MG: 40 INJECTION, SUSPENSION INTRA-ARTICULAR; INTRAMUSCULAR at 14:15

## 2024-06-13 RX ADMIN — BUPIVACAINE HYDROCHLORIDE 2 ML: 2.5 INJECTION, SOLUTION INFILTRATION; PERINEURAL at 14:15

## 2024-06-13 NOTE — PROGRESS NOTES
Assessment:  1. Chronic pain of right knee  Ambulatory Referral to Orthopedic Surgery        Patient Active Problem List   Diagnosis    Multiple sclerosis, relapsing/remitting    Hyperlipidemia    Osteopenia of multiple sites    Ambulatory dysfunction    Chronic obstructive pulmonary disease (HCC)    Vitamin D deficiency    Major depression, recurrent, chronic (HCC)    Allergic rhinitis    Amnesia/memory disorder    Shoulder impingement syndrome, left    Urinary incontinence    Gastro-esophageal reflux disease with esophagitis    Collagenous colitis    Intervertebral disc disorders with radiculopathy, lumbar region    DDD (degenerative disc disease), cervical    Polyp of colon    Personal history of nicotine dependence     Pulmonary nodule    Lateral epicondylitis of left elbow    Tendinitis of left forearm    Chronic pain of right knee           Plan      68 y.o. female with right knee pes anserine bursitis, mild osteoarthritis  Diagnostics reviewed and physical exam performed.  Diagnosis, treatment options and associated risks were discussed with the patient including no treatment, nonsurgical treatment and potential for surgical intervention.  The patient was given the opportunity to ask questions regarding each.  Patient was offered, accepted, and received a cortisone injection of the right pes anserine bursa. Risks and benefits of CSI were discussed with the patient. The corticosteroid injection was administered without any immediate complication and was well tolerated by the patient. This was done under sterile technique. Post injection instructions and expectations were discussed. It was explained to the patient that cortisone injections can be repeated as early as every 3 months.   Order placed today for Medrol Dose [place] to be taken as prescribed if pain persists after CSI provided today  Spine and Pain referral as offered however patient declined at this time  Return for follow up on an as-needed basis  (PRN).          Subjective:     Patient ID: Machelle Kaufman 68 y.o. female    HPI    Patient presents today for initial evaluation of right knee pain that began one month ago after a fall onto her back. She states she felt knee pain rated 5/10 immediately after the fall, however did not have any swelling or bruising at this time. Her PCP prescribed her Diclofenac which does not provide relief. Pain has since remained 5/10, is intermittent in nature, and is aggravated by increased activity, laying on her right side. She has not tried ice or any topical creams.       The following portions of the patient's history were reviewed and updated as appropriate: allergies, current medications, past family history, past social history, past surgical history and problem list.      Objective:    Review of Systems  Pertinent items are noted in HPI.  All other systems were reviewed and are negative.    Past Medical History:   Diagnosis Date    Asthma     Degeneration of intervertebral disc at L5-S1 level     Depression     Family history of colon cancer 3/27/2019    Added automatically from request for surgery [id number]    Full incontinence of feces     Generalized anxiety disorder     Hepatitis     at age 8 yrs    Herpes zoster     last assessed: 6/20/13    History of colon polyps     Hypercholesterolemia     Hyperlipidemia     MS (multiple sclerosis) (HCC)     MS (multiple sclerosis) (HCC)     Osteoporosis     Seasonal allergies     Tumor of breast     benign, right breast    Urinary incontinence        Past Surgical History:   Procedure Laterality Date    BREAST EXCISIONAL BIOPSY Right 1998    unsure exact year, benign    BREAST SURGERY      right breast fibroid tumor resection    COLONOSCOPY      11/2013 - due in 2018 -     COLONOSCOPY W/ POLYPECTOMY  07/07/2022    normal-repeat in 2 years    FL LUMBAR PUNCTURE DIAGNOSTIC  10/01/2018    ROTATOR CUFF REPAIR      Bilateral    ROTATOR CUFF REPAIR Bilateral     SEPTOPLASTY          Family History   Problem Relation Age of Onset    Arthritis Mother     Osteoporosis Mother     COPD Father     No Known Problems Sister     Dementia Sister     No Known Problems Maternal Grandmother     No Known Problems Maternal Grandfather     No Known Problems Paternal Grandmother     No Known Problems Paternal Grandfather     Colon cancer Brother 65    Multiple sclerosis Brother     Diabetes Brother     No Known Problems Brother     No Known Problems Maternal Aunt     No Known Problems Maternal Aunt     No Known Problems Paternal Aunt        Social History     Occupational History    Occupation: Retired   Tobacco Use    Smoking status: Every Day     Current packs/day: 1.00     Average packs/day: 1 pack/day for 42.4 years (42.4 ttl pk-yrs)     Types: Cigarettes     Start date: 1982    Smokeless tobacco: Never   Vaping Use    Vaping status: Never Used   Substance and Sexual Activity    Alcohol use: Yes     Comment: occasional; [employer] also states Never drank alcohol    Drug use: No    Sexual activity: Not Currently     Partners: Male     Comment: denied any risk of AIDS         Current Outpatient Medications:     albuterol (2.5 mg/3 mL) 0.083 % nebulizer solution, Take 3 mL (2.5 mg total) by nebulization every 4 (four) hours as needed for wheezing or shortness of breath (cough), Disp: 720 mL, Rfl: 3    albuterol (Ventolin HFA) 90 mcg/act inhaler, Inhale 2 puffs every 4 (four) hours as needed for wheezing or shortness of breath, Disp: 54 g, Rfl: 3    aspirin (ECOTRIN LOW STRENGTH) 81 mg EC tablet, Take 1 tablet by mouth daily, Disp: , Rfl:     Cholecalciferol (Vitamin D3) 125 MCG (5000 UT) CAPS, Take 1 capsule by mouth daily, Disp: , Rfl:     Diclofenac Sodium  MG 24 hr tablet, Take 1 tablet once a day as needed for knee pain, Disp: , Rfl:     ezetimibe (ZETIA) 10 mg tablet, TAKE 1 TABLET BY MOUTH  DAILY, Disp: 90 tablet, Rfl: 3    fluticasone (FLONASE) 50 mcg/act nasal spray, 2 sprays into each  "nostril daily, Disp: 48 g, Rfl: 3    gabapentin (NEURONTIN) 300 mg capsule, TAKE 1 CAPSULE BY MOUTH  TWICE DAILY, Disp: 180 capsule, Rfl: 3    levocetirizine (XYZAL) 5 MG tablet, Take 1 tablet (5 mg total) by mouth every evening, Disp: 90 tablet, Rfl: 3    montelukast (SINGULAIR) 10 mg tablet, TAKE 1 TABLET BY MOUTH DAILY AT  BEDTIME, Disp: 90 tablet, Rfl: 1    omeprazole (PriLOSEC) 40 MG capsule, Take 40 mg by mouth daily, Disp: , Rfl:     rosuvastatin (CRESTOR) 40 MG tablet, TAKE 1 TABLET BY MOUTH DAILY, Disp: 90 tablet, Rfl: 3    sertraline (ZOLOFT) 100 mg tablet, Take 1 tablet (100 mg total) by mouth daily, Disp: 90 tablet, Rfl: 3    Allergies   Allergen Reactions    Bupropion     Cefprozil     Conj Estrog-Medroxyprogest Ace Other (See Comments)     Other      Povidone Iodine      Patient unsure of what this is    Sulfamethoxazole-Trimethoprim Other (See Comments)     Reaction Date: 11Aug2011;     Levofloxacin     Gadobutrol GI Intolerance    Medroxyprogesterone Other (See Comments)     Reaction Date: 11Aug2011;     Nortriptyline Other (See Comments)     n/a    Pamelor [Nortriptyline Hcl]     Provera [Medroxyprogesterone Acetate]        Physical Exam  /77   Pulse 76   Ht 5' 4\" (1.626 m)   Wt 68 kg (150 lb)   LMP  (LMP Unknown)   BMI 25.75 kg/m²   Cons: Appears well.  No apparent distress.  Psych: Alert. Oriented x3.  Mood and affect normal.  Eyes: PERRLA, EOMI  Resp: Normal effort.  No audible wheezing or stridor.  CV: Palpable pulse.  No discernable arrhythmia.  No LE edema.  Lymph:  No palpable cervical, axillary, or inguinal lymphadenopathy.  Skin: Warm.  No palpable masses.  No visible lesions.  Neuro: Normal muscle tone.  Normal and symmetric DTR's.    Right Knee Exam     Tenderness   The patient is experiencing tenderness in the pes anserinus.    Range of Motion   The patient has normal right knee ROM.    Tests   Bernardo:  Medial - negative Lateral - negative  Varus: negative Valgus: " "negative  Lachman:  Anterior - negative      Drawer:  Anterior - negative      Pivot shift: negative  Patellar apprehension: negative    Other   Erythema: absent  Scars: absent  Sensation: normal  Pulse: present  Swelling: none  Effusion: no effusion present    Comments:    + SLUMP dorsiflexion causes pain  - Bounce  TTP semitendinosis at insertion, anterior joint line  No TTP medial gastroc head, medial joint line  + Thessaly for anteromedial pain                Large joint arthrocentesis: R pes anserine bursa  Universal Protocol:  Consent: Verbal consent obtained.  Risks and benefits: risks, benefits and alternatives were discussed  Consent given by: patient  Time out: Immediately prior to procedure a \"time out\" was called to verify the correct patient, procedure, equipment, support staff and site/side marked as required.  Patient understanding: patient states understanding of the procedure being performed  Site marked: the operative site was marked  Patient identity confirmed: verbally with patient  Supporting Documentation  Indications: pain and diagnostic evaluation   Procedure Details  Location: knee - R pes anserine bursa  Preparation: Patient was prepped and draped in the usual sterile fashion  Needle size: 22 G  Ultrasound guidance: no  Medications administered: 2 mL bupivacaine 0.25 %; 80 mg triamcinolone acetonide 40 mg/mL    Patient tolerance: patient tolerated the procedure well with no immediate complications  Dressing:  Sterile dressing applied               I have personally reviewed pertinent films in PACS and my interpretation is mild tricompartmental osteoarthritis, most notable in the patellofemoral compartment.      Scribe Attestation      I,:  Judy Rushing am acting as a scribe while in the presence of the attending physician.:       I,:  Galindo Edwards DO personally performed the services described in this documentation    as scribed in my presence.:                Portions of the record may " "have been created with voice recognition software.  Occasional wrong word or \"sound a like\" substitutions may have occurred due to the inherent limitations of voice recognition software.  Read the chart carefully and recognize, using context, where substitutions have occurred.  "

## 2024-08-08 DIAGNOSIS — E78.2 MIXED HYPERLIPIDEMIA: Chronic | ICD-10-CM

## 2024-08-08 DIAGNOSIS — G35 MULTIPLE SCLEROSIS (HCC): Chronic | ICD-10-CM

## 2024-08-09 RX ORDER — EZETIMIBE 10 MG/1
10 TABLET ORAL DAILY
Qty: 100 TABLET | Refills: 1 | Status: SHIPPED | OUTPATIENT
Start: 2024-08-09

## 2024-08-09 RX ORDER — GABAPENTIN 300 MG/1
CAPSULE ORAL
Qty: 200 CAPSULE | Refills: 1 | Status: SHIPPED | OUTPATIENT
Start: 2024-08-09

## 2024-08-23 ENCOUNTER — OFFICE VISIT (OUTPATIENT)
Dept: FAMILY MEDICINE CLINIC | Facility: CLINIC | Age: 69
End: 2024-08-23
Payer: MEDICARE

## 2024-08-23 VITALS
OXYGEN SATURATION: 98 % | TEMPERATURE: 98.4 F | SYSTOLIC BLOOD PRESSURE: 144 MMHG | RESPIRATION RATE: 18 BRPM | DIASTOLIC BLOOD PRESSURE: 78 MMHG | HEART RATE: 71 BPM | WEIGHT: 145.8 LBS | BODY MASS INDEX: 25.03 KG/M2

## 2024-08-23 DIAGNOSIS — M79.605 PAIN OF LEFT LOWER EXTREMITY: Primary | ICD-10-CM

## 2024-08-23 PROCEDURE — G2211 COMPLEX E/M VISIT ADD ON: HCPCS | Performed by: FAMILY MEDICINE

## 2024-08-23 PROCEDURE — 99213 OFFICE O/P EST LOW 20 MIN: CPT | Performed by: FAMILY MEDICINE

## 2024-08-23 RX ORDER — PREDNISONE 20 MG/1
TABLET ORAL
Qty: 20 TABLET | Refills: 0 | Status: SHIPPED | OUTPATIENT
Start: 2024-08-23

## 2024-08-23 NOTE — PROGRESS NOTES
FAMILY PRACTICE OFFICE VISIT       NAME: Machelle Kaufman  AGE: 68 y.o. SEX: female       : 1955        MRN: [id number]    DATE: 2024  TIME: 4:20 PM    Assessment and Plan     Problem List Items Addressed This Visit       Pain of left lower extremity - Primary     Leg pain.  Patient given prescription for prednisone tapering dose consisting of 60 mg x 3 days, 40 mg x 3 days, 20 mg x 3 days, 10 mg x 4 days.  Patient will call if symptoms persist after medication completed.         Relevant Medications    predniSONE 20 mg tablet           Chief Complaint     Chief Complaint   Patient presents with    Left Lower Limb pain     Radiates to groin and down to foot        History of Present Illness     Patient in the office with 2-month history of left leg pain.  She describes discomfort that radiates into her left groin area.  Symptoms tend to get worse throughout the day as she moves around her home.  She denies any significant radiation of symptoms down her leg or her feet.  Patient has not tried any over-the-counter medications on a regular basis for the symptoms        Review of Systems   Review of Systems   Constitutional: Negative.    Respiratory: Negative.     Cardiovascular: Negative.    Gastrointestinal: Negative.    Genitourinary: Negative.    Musculoskeletal:  Positive for myalgias.   Psychiatric/Behavioral: Negative.         Active Problem List     Patient Active Problem List   Diagnosis    Multiple sclerosis, relapsing/remitting    Hyperlipidemia    Osteopenia of multiple sites    Ambulatory dysfunction    Chronic obstructive pulmonary disease (HCC)    Vitamin D deficiency    Major depression, recurrent, chronic (HCC)    Allergic rhinitis    Amnesia/memory disorder    Shoulder impingement syndrome, left    Urinary incontinence    Gastro-esophageal reflux disease with esophagitis    Collagenous colitis    Intervertebral disc disorders with radiculopathy, lumbar region    DDD (degenerative disc  disease), cervical    Polyp of colon    Personal history of nicotine dependence     Pulmonary nodule    Lateral epicondylitis of left elbow    Tendinitis of left forearm    Chronic pain of right knee    Pain of left lower extremity       Past Medical History:  Past Medical History:   Diagnosis Date    Asthma     Degeneration of intervertebral disc at L5-S1 level     Depression     Family history of colon cancer 3/27/2019    Added automatically from request for surgery [id number]    Full incontinence of feces     Generalized anxiety disorder     Hepatitis     at age 8 yrs    Herpes zoster     last assessed: 6/20/13    History of colon polyps     Hypercholesterolemia     Hyperlipidemia     MS (multiple sclerosis) (HCC)     MS (multiple sclerosis) (HCC)     Osteoporosis     Seasonal allergies     Tumor of breast     benign, right breast    Urinary incontinence        Past Surgical History:  Past Surgical History:   Procedure Laterality Date    BREAST EXCISIONAL BIOPSY Right 1998    unsure exact year, benign    BREAST SURGERY      right breast fibroid tumor resection    COLONOSCOPY      11/2013 - due in 2018 -     COLONOSCOPY W/ POLYPECTOMY  07/07/2022    normal-repeat in 2 years    FL LUMBAR PUNCTURE DIAGNOSTIC  10/01/2018    ROTATOR CUFF REPAIR      Bilateral    ROTATOR CUFF REPAIR Bilateral     SEPTOPLASTY         Family History:  Family History   Problem Relation Age of Onset    Arthritis Mother     Osteoporosis Mother     COPD Father     No Known Problems Sister     Dementia Sister     No Known Problems Maternal Grandmother     No Known Problems Maternal Grandfather     No Known Problems Paternal Grandmother     No Known Problems Paternal Grandfather     Colon cancer Brother 65    Multiple sclerosis Brother     Diabetes Brother     No Known Problems Brother     No Known Problems Maternal Aunt     No Known Problems Maternal Aunt     No Known Problems Paternal Aunt        Social History:  Social History      Socioeconomic History    Marital status: /Civil Union     Spouse name: Not on file    Number of children: Not on file    Years of education: Not on file    Highest education level: Not on file   Occupational History    Occupation: Retired   Tobacco Use    Smoking status: Every Day     Current packs/day: 1.00     Average packs/day: 1 pack/day for 42.6 years (42.6 ttl pk-yrs)     Types: Cigarettes     Start date: 1982    Smokeless tobacco: Never   Vaping Use    Vaping status: Never Used   Substance and Sexual Activity    Alcohol use: Yes     Comment: occasional; [employer] also states Never drank alcohol    Drug use: No    Sexual activity: Not Currently     Partners: Male     Comment: denied any risk of AIDS   Other Topics Concern    Not on file   Social History Narrative    What type of home do you live in: Single house    Age of your home: 68 yrs    How long have you been living there: 32 yrs    Type of heat: Forced hot air    Type of fuel: Gas    What type of samantha is in your bedroom: Hardwood floor    Do you have the following in or near your home:    Air products: Central air, Air , and Humidifier    Pests: Other ants    Pets: Dog and Cat    Basement: None and Crawl space    Exposure to second hand smoke: Yes     Social Determinants of Health     Financial Resource Strain: Patient Declined (5/16/2023)    Overall Financial Resource Strain (CARDIA)     Difficulty of Paying Living Expenses: Patient declined   Food Insecurity: No Food Insecurity (5/22/2024)    Hunger Vital Sign     Worried About Running Out of Food in the Last Year: Never true     Ran Out of Food in the Last Year: Never true   Transportation Needs: No Transportation Needs (5/22/2024)    PRAPARE - Transportation     Lack of Transportation (Medical): No     Lack of Transportation (Non-Medical): No   Physical Activity: Unknown (2/28/2024)    Exercise Vital Sign     Days of Exercise per Week: 0 days     Minutes of Exercise per  Session: Not on file   Stress: Not on file   Social Connections: Not on file   Intimate Partner Violence: Not on file   Housing Stability: Low Risk  (5/22/2024)    Housing Stability Vital Sign     Unable to Pay for Housing in the Last Year: No     Number of Times Moved in the Last Year: 1     Homeless in the Last Year: No       Objective     Vitals:    08/23/24 1406   BP: 144/78   Pulse: 71   Resp: 18   Temp: 98.4 °F (36.9 °C)   SpO2: 98%     Wt Readings from Last 3 Encounters:   08/23/24 66.1 kg (145 lb 12.8 oz)   06/13/24 68 kg (150 lb)   05/22/24 68 kg (150 lb)       Physical Exam  Constitutional:       General: She is not in acute distress.     Appearance: Normal appearance. She is not ill-appearing.   HENT:      Head: Normocephalic and atraumatic.   Eyes:      General:         Right eye: No discharge.         Left eye: No discharge.      Conjunctiva/sclera: Conjunctivae normal.      Pupils: Pupils are equal, round, and reactive to light.   Neck:      Vascular: No carotid bruit.   Cardiovascular:      Rate and Rhythm: Normal rate and regular rhythm.      Heart sounds: Normal heart sounds. No murmur heard.  Pulmonary:      Effort: Pulmonary effort is normal.      Breath sounds: Normal breath sounds. No wheezing, rhonchi or rales.   Abdominal:      General: Abdomen is flat. Bowel sounds are normal. There is no distension.      Palpations: Abdomen is soft.      Tenderness: There is no abdominal tenderness. There is no guarding or rebound.   Musculoskeletal:      Right lower leg: No edema.      Left lower leg: No edema.      Comments: Negative Homans' sign    Patient has more discomfort with forcefully flexing her hip flexor muscles on left leg.  Negative point tenderness to palpation.  Normal range of motion of left hip.  Muscle strength +5/5 lower extremities.  Patient ambulating with slight limp due to discomfort.  Negative straight leg raising   Lymphadenopathy:      Cervical: No cervical adenopathy.   Skin:      "Findings: No rash.   Neurological:      General: No focal deficit present.      Mental Status: She is alert and oriented to person, place, and time.      Cranial Nerves: No cranial nerve deficit.      Gait: Gait abnormal.   Psychiatric:         Mood and Affect: Mood normal.         Behavior: Behavior normal.         Thought Content: Thought content normal.         Judgment: Judgment normal.         Pertinent Laboratory/Diagnostic Studies:  Lab Results   Component Value Date    GLUCOSE 107 12/15/2015    BUN 18 05/15/2024    CREATININE 0.66 05/15/2024    CALCIUM 9.3 05/15/2024     12/15/2015    K 4.0 05/15/2024    CO2 29 05/15/2024     05/15/2024     Lab Results   Component Value Date    ALT 20 05/15/2024    AST 15 05/15/2024    ALKPHOS 73 05/15/2024    BILITOT 0.30 12/15/2015       Lab Results   Component Value Date    WBC 9.57 05/15/2024    HGB 14.5 05/15/2024    HCT 42.2 05/15/2024    MCV 89 05/15/2024     05/15/2024       No results found for: \"TSH\"    Lab Results   Component Value Date    CHOL 169 07/21/2015     Lab Results   Component Value Date    TRIG 220 (H) 05/15/2024     Lab Results   Component Value Date    HDL 48 (L) 05/15/2024     Lab Results   Component Value Date    LDLCALC 81 05/15/2024     Lab Results   Component Value Date    HGBA1C 5.8 (H) 09/17/2022       Results for orders placed or performed in visit on 05/15/24   CBC   Result Value Ref Range    WBC 9.57 4.31 - 10.16 Thousand/uL    RBC 4.74 3.81 - 5.12 Million/uL    Hemoglobin 14.5 11.5 - 15.4 g/dL    Hematocrit 42.2 34.8 - 46.1 %    MCV 89 82 - 98 fL    MCH 30.6 26.8 - 34.3 pg    MCHC 34.4 31.4 - 37.4 g/dL    RDW 11.6 11.6 - 15.1 %    Platelets 217 149 - 390 Thousands/uL    MPV 10.0 8.9 - 12.7 fL   Comprehensive metabolic panel   Result Value Ref Range    Sodium 139 135 - 147 mmol/L    Potassium 4.0 3.5 - 5.3 mmol/L    Chloride 103 96 - 108 mmol/L    CO2 29 21 - 32 mmol/L    ANION GAP 7 4 - 13 mmol/L    BUN 18 5 - 25 mg/dL    " Creatinine 0.66 0.60 - 1.30 mg/dL    Glucose, Fasting 100 (H) 65 - 99 mg/dL    Calcium 9.3 8.4 - 10.2 mg/dL    AST 15 13 - 39 U/L    ALT 20 7 - 52 U/L    Alkaline Phosphatase 73 34 - 104 U/L    Total Protein 7.1 6.4 - 8.4 g/dL    Albumin 4.6 3.5 - 5.0 g/dL    Total Bilirubin 0.59 0.20 - 1.00 mg/dL    eGFR 90 ml/min/1.73sq m   Lipid Panel with Direct LDL reflex   Result Value Ref Range    Cholesterol 173 See Comment mg/dL    Triglycerides 220 (H) See Comment mg/dL    HDL, Direct 48 (L) >=50 mg/dL    LDL Calculated 81 0 - 100 mg/dL   TSH, 3rd generation   Result Value Ref Range    TSH 3RD GENERATON 0.921 0.450 - 4.500 uIU/mL     *Note: Due to a large number of results and/or encounters for the requested time period, some results have not been displayed. A complete set of results can be found in Results Review.       No orders of the defined types were placed in this encounter.      ALLERGIES:  Allergies   Allergen Reactions    Bupropion     Cefprozil     Conj Estrog-Medroxyprogest Ace Other (See Comments)     Other      Povidone Iodine      Patient unsure of what this is    Sulfamethoxazole-Trimethoprim Other (See Comments)     Reaction Date: 11Aug2011;     Levofloxacin     Gadobutrol GI Intolerance    Medroxyprogesterone Other (See Comments)     Reaction Date: 11Aug2011;     Nortriptyline Other (See Comments)     n/a    Pamelor [Nortriptyline Hcl]     Provera [Medroxyprogesterone Acetate]        Current Medications     Current Outpatient Medications   Medication Sig Dispense Refill    albuterol (2.5 mg/3 mL) 0.083 % nebulizer solution Take 3 mL (2.5 mg total) by nebulization every 4 (four) hours as needed for wheezing or shortness of breath (cough) 720 mL 3    albuterol (Ventolin HFA) 90 mcg/act inhaler Inhale 2 puffs every 4 (four) hours as needed for wheezing or shortness of breath 54 g 3    aspirin (ECOTRIN LOW STRENGTH) 81 mg EC tablet Take 1 tablet by mouth daily      Cholecalciferol (Vitamin D3) 125 MCG (5000  UT) CAPS Take 1 capsule by mouth daily      ezetimibe (ZETIA) 10 mg tablet TAKE 1 TABLET BY MOUTH DAILY 100 tablet 1    fluticasone (FLONASE) 50 mcg/act nasal spray 2 sprays into each nostril daily 48 g 3    gabapentin (NEURONTIN) 300 mg capsule TAKE 1 CAPSULE BY MOUTH TWICE  DAILY 200 capsule 1    levocetirizine (XYZAL) 5 MG tablet Take 1 tablet (5 mg total) by mouth every evening 90 tablet 3    methylPREDNISolone 4 MG tablet therapy pack Use as directed on package 21 each 0    montelukast (SINGULAIR) 10 mg tablet TAKE 1 TABLET BY MOUTH DAILY AT  BEDTIME 90 tablet 1    omeprazole (PriLOSEC) 40 MG capsule Take 40 mg by mouth daily      predniSONE 20 mg tablet 3 tabs daily X 3 days, 2 tabs daily X 3 days, 1 tab daily X 3 days, 1/2 tab daily x 4 days 20 tablet 0    rosuvastatin (CRESTOR) 40 MG tablet TAKE 1 TABLET BY MOUTH DAILY 90 tablet 3    sertraline (ZOLOFT) 100 mg tablet Take 1 tablet (100 mg total) by mouth daily 90 tablet 3     No current facility-administered medications for this visit.         Health Maintenance     Health Maintenance   Topic Date Due    Zoster Vaccine (1 of 2) Never done    RSV Vaccine Age 60+ Years (1 - 1-dose 60+ series) Never done    Falls: Plan of Care  Never done    PT PLAN OF CARE  10/09/2019    SLP PLAN OF CARE  11/08/2019    Colorectal Cancer Screening  07/07/2024    Influenza Vaccine (1) 09/01/2024    Lung Cancer Screening  11/27/2024    Fall Risk  05/22/2025    Depression Screening  05/22/2025    Urinary Incontinence Screening  05/22/2025    Medicare Annual Wellness Visit (AWV)  05/22/2025    Breast Cancer Screening: Mammogram  03/25/2026    Hepatitis C Screening  Completed    Osteoporosis Screening  Completed    Pneumococcal Vaccine: 65+ Years  Completed    COVID-19 Vaccine  Completed    RSV Vaccine age 0-20 Months  Aged Out    HIB Vaccine  Aged Out    IPV Vaccine  Aged Out    Hepatitis A Vaccine  Aged Out    Meningococcal ACWY Vaccine  Aged Out    HPV Vaccine  Aged Out     Cervical Cancer Screening  Discontinued     Immunization History   Administered Date(s) Administered    COVID-19 PFIZER VACCINE 0.3 ML IM 03/20/2021, 04/12/2021, 01/08/2022, 05/07/2022, 12/02/2022    COVID-19 Pfizer mRNA vacc PF kim-sucrose 12 yr and older (Comirnaty) 12/08/2023    INFLUENZA 09/26/2011, 09/19/2022    Influenza Quadrivalent Preservative Free 3 years and older IM 09/29/2015, 09/22/2016, 09/08/2017    Influenza, high dose seasonal 0.7 mL 09/30/2019, 09/17/2020, 09/20/2021, 09/19/2022, 11/16/2023    Influenza, recombinant, quadrivalent,injectable, preservative free 09/13/2018    Influenza, seasonal, injectable 01/17/2013, 09/03/2013, 09/23/2014    Pneumococcal Conjugate 13-Valent 10/22/2018    Pneumococcal Conjugate Vaccine 20-valent (Pcv20), Polysace 05/16/2023    Pneumococcal Polysaccharide PPV23 10/01/2007, 09/29/2015    Td (adult), adsorbed 06/02/2016       Bk Bolton MD

## 2024-08-24 NOTE — ASSESSMENT & PLAN NOTE
Leg pain.  Patient given prescription for prednisone tapering dose consisting of 60 mg x 3 days, 40 mg x 3 days, 20 mg x 3 days, 10 mg x 4 days.  Patient will call if symptoms persist after medication completed.

## 2024-08-30 ENCOUNTER — TELEPHONE (OUTPATIENT)
Age: 69
End: 2024-08-30

## 2024-08-30 DIAGNOSIS — M79.605 PAIN OF LEFT LOWER EXTREMITY: Primary | ICD-10-CM

## 2024-08-30 NOTE — TELEPHONE ENCOUNTER
Per Dr. Bolton's last office visit, if pt was still having pain in left leg he was going to send her for a scan. Pt is still having pain in leg.      Her Chiropractor suggests an xray back and hip. Can that order be placed?    Please call pt when order in chart so patient can schedule her appt [phone]    Thanks

## 2024-08-30 NOTE — TELEPHONE ENCOUNTER
Called and spoke with patient's  and relay provider instructions. Patient  verbalized understanding

## 2024-08-30 NOTE — TELEPHONE ENCOUNTER
Orders for x-rays placed in [facility] as requested.  Patient may go to any [facility]'s facility for x-rays without an appointment

## 2024-09-04 ENCOUNTER — HOSPITAL ENCOUNTER (OUTPATIENT)
Dept: RADIOLOGY | Facility: HOSPITAL | Age: 69
Discharge: HOME | End: 2024-09-04
Payer: MEDICARE

## 2024-09-04 DIAGNOSIS — M79.605 PAIN OF LEFT LOWER EXTREMITY: ICD-10-CM

## 2024-09-04 PROCEDURE — 73502 X-RAY EXAM HIP UNI 2-3 VIEWS: CPT

## 2024-09-04 PROCEDURE — 72110 X-RAY EXAM L-2 SPINE 4/>VWS: CPT

## 2024-09-05 DIAGNOSIS — M47.816 OSTEOARTHRITIS OF LUMBAR SPINE, UNSPECIFIED SPINAL OSTEOARTHRITIS COMPLICATION STATUS: ICD-10-CM

## 2024-09-05 DIAGNOSIS — M79.605 PAIN OF LEFT LOWER EXTREMITY: Primary | ICD-10-CM

## 2024-09-11 ENCOUNTER — OFFICE VISIT (OUTPATIENT)
Dept: FAMILY MEDICINE CLINIC | Facility: CLINIC | Age: 69
End: 2024-09-11
Payer: MEDICARE

## 2024-09-11 VITALS
SYSTOLIC BLOOD PRESSURE: 120 MMHG | HEIGHT: 64 IN | RESPIRATION RATE: 18 BRPM | TEMPERATURE: 98 F | WEIGHT: 147.38 LBS | BODY MASS INDEX: 25.16 KG/M2 | DIASTOLIC BLOOD PRESSURE: 70 MMHG | HEART RATE: 67 BPM | OXYGEN SATURATION: 98 %
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
## 2024-09-11 PROCEDURE — 99213 OFFICE O/P EST LOW 20 MIN: CPT | Performed by: FAMILY MEDICINE

## 2024-09-11 PROCEDURE — G2211 COMPLEX E/M VISIT ADD ON: HCPCS | Performed by: FAMILY MEDICINE

## 2024-09-11 NOTE — PROGRESS NOTES
"Ambulatory Visit  Name: Machelle Kaufman      : 1955      MRN: [id number]  Encounter Provider: Hermes Son DO  Encounter Date: 2024   Encounter department: [facility], [place], operated by [facility]    Assessment & Plan  Cat bite, initial encounter  Cat bite on the left lower leg one week ago, developed redness and swelling and pain around the bite site. Start Augmentin BID for one week and follow up if not improved.  Orders:    amoxicillin-clavulanate (AUGMENTIN) 875-125 mg per tablet; Take 1 tablet by mouth every 12 (twelve) hours for 10 days       History of Present Illness     HPI  Accidentally stepped on her cat's tail a week ago. Now has redness and pain in the left lower leg where she was bitten by the cat. Has felt tired lately but no fevers or systemic signs of illness otherwise.      Review of Systems        Objective     /70   Pulse 67   Temp 98 °F (36.7 °C)   Resp 18   Ht 5' 4\" (1.626 m)   Wt 66.8 kg (147 lb 6 oz)   LMP  (LMP Unknown)   SpO2 98%   BMI 25.30 kg/m²     Physical Exam        "

## 2024-10-10 DIAGNOSIS — E78.2 MIXED HYPERLIPIDEMIA: Chronic | ICD-10-CM

## 2024-10-10 DIAGNOSIS — J30.9 ALLERGIC RHINITIS, UNSPECIFIED SEASONALITY, UNSPECIFIED TRIGGER: ICD-10-CM

## 2024-10-11 ENCOUNTER — APPOINTMENT (EMERGENCY)
Dept: RADIOLOGY | Facility: HOSPITAL | Age: 69
End: 2024-10-11
Payer: MEDICARE

## 2024-10-11 ENCOUNTER — HOSPITAL ENCOUNTER (EMERGENCY)
Facility: HOSPITAL | Age: 69
Discharge: HOME | End: 2024-10-11
Attending: EMERGENCY MEDICINE
Payer: MEDICARE

## 2024-10-11 ENCOUNTER — NURSE TRIAGE (OUTPATIENT)
Age: 69
End: 2024-10-11

## 2024-10-11 VITALS
DIASTOLIC BLOOD PRESSURE: 73 MMHG | RESPIRATION RATE: 20 BRPM | TEMPERATURE: 97.8 F | HEART RATE: 83 BPM | SYSTOLIC BLOOD PRESSURE: 141 MMHG | OXYGEN SATURATION: 97 %
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
## 2024-10-11 DIAGNOSIS — S92.353A FRACTURE OF 5TH METATARSAL: Primary | ICD-10-CM

## 2024-10-11 PROCEDURE — 73630 X-RAY EXAM OF FOOT: CPT

## 2024-10-11 PROCEDURE — 99284 EMERGENCY DEPT VISIT MOD MDM: CPT

## 2024-10-11 PROCEDURE — 99284 EMERGENCY DEPT VISIT MOD MDM: CPT | Performed by: EMERGENCY MEDICINE

## 2024-10-11 RX ORDER — ROSUVASTATIN CALCIUM 40 MG/1
TABLET, COATED ORAL
Qty: 90 TABLET | Refills: 1 | Status: SHIPPED | OUTPATIENT
Start: 2024-10-11

## 2024-10-11 RX ORDER — MONTELUKAST SODIUM 10 MG/1
TABLET ORAL
Qty: 90 TABLET | Refills: 1 | Status: SHIPPED | OUTPATIENT
Start: 2024-10-11

## 2024-10-11 NOTE — ED ATTENDING ATTESTATION
10/11/2024  IAries, , saw and evaluated the patient. I have discussed the patient with the resident/non-physician practitioner and agree with the resident's/non-physician practitioner's findings, Plan of Care, and MDM as documented in the resident's/non-physician practitioner's note, except where noted. All available labs and Radiology studies were reviewed.  I was present for key portions of any procedure(s) performed by the resident/non-physician practitioner and I was immediately available to provide assistance.       At this point I agree with the current assessment done in the Emergency Department.  I have conducted an independent evaluation of this patient a history and physical is as follows:          1. Fracture of 5th metatarsal    2. Fall, initial encounter              Time reflects when diagnosis was documented in both MDM as applicable and the Disposition within this note       Time User Action Codes Description Comment    10/11/2024  4:45 PM Suzanne Anguiano [W19.XXXA] Fall, initial encounter     10/11/2024  4:45 PM Suzanne Anguiano [S92.353A] Fracture of 5th metatarsal     10/11/2024 10:44 PM Aries Stanley Modify [W19.XXXA] Fall, initial encounter     10/11/2024 10:44 PM Aries Stanley Modify [S92.353A] Fracture of 5th metatarsal           ED Disposition       ED Disposition   Discharge    Condition   Stable    Date/Time   Fri Oct 11, 2024  4:43 PM    Comment   Machelle Kaufman discharge to home/self care.                   Follow-up Information    None                           Chief Complaint   Patient presents with    Fall     Pt states she fell of her bar stool a week ago, onto her right side. -Head strike, -thinners, -LOC. Pt c/o of  L 4th/5th digit toe pain. +edema, sensation/cap refill intact              69-year-old female, fall 1 week ago, having pain in her foot since.,  Pain over the left outer edge of the foot.,  Able to ambulate although with discomfort.  No  other areas of injury.                        Physical Exam  Vitals reviewed.   Constitutional:       General: She is not in acute distress.     Appearance: She is well-developed.   HENT:      Head: Atraumatic.      Nose: Nose normal.   Eyes:      General: No scleral icterus.        Right eye: No discharge.         Left eye: No discharge.      Conjunctiva/sclera: Conjunctivae normal.   Neck:      Trachea: No tracheal deviation.   Pulmonary:      Effort: Pulmonary effort is normal. No respiratory distress.      Breath sounds: No stridor.   Musculoskeletal:         General: Swelling, tenderness and signs of injury present. No deformity.      Cervical back: Normal range of motion.      Comments: Tenderness swelling and ecchymosis over left fifth metatarsal and fourth metatarsal, no ankle tenderness   Skin:     General: Skin is warm and dry.      Coloration: Skin is not pale.      Findings: No erythema or rash.   Neurological:      Mental Status: She is alert.      Motor: No abnormal muscle tone.      Coordination: Coordination normal.               Medications - No data to display          Labs Reviewed - No data to display      XR foot 3+ views LEFT   ED Interpretation   Spiral fracture of the 5th metatarsal      Final Result      Nondisplaced distal fifth metatarsal shaft fracture.      Findings concur with the preliminary report by the referring clinician already in PACS and/or our electronic record EPIC.         Computerized Assisted Algorithm (CAA) may have been used to analyze all applicable images.         Workstation performed: [id number]                      Procedures                            MDM  Number of Diagnoses or Management Options  Fall, initial encounter  Fracture of 5th metatarsal  Diagnosis management comments:       Initial ED assessment:   69-year-old female, right foot pain after a fall, been walking on it for about a week    Pathology at risk for includes but is not limited to:   Fracture of  the fifth possibly fourth metatarsal, sprain    Initial ED plan:   X-ray        Final ED summary/disposition:   After evaluation and workup in the emergency department, x-ray showing fracture discharge and Ortho shoe and crutches will follow with orthopedics

## 2024-10-11 NOTE — ED PROVIDER NOTES
Time reflects when diagnosis was documented in both MDM as applicable and the Disposition within this note       Time User Action Codes Description Comment    10/11/2024  4:45 PM Suzanne Anguiano Add [W19.XXXA] Fall, initial encounter     10/11/2024  4:45 PM Suzanne Anguiano Add [S92.353A] Fracture of 5th metatarsal           ED Disposition       ED Disposition   Discharge    Condition   Stable    Date/Time   Fri Oct 11, 2024  4:43 PM    Comment   Machelle Kaufman discharge to home/self care.                   Assessment & Plan       Medical Decision Making  Machelle is a 69-year-old female with no pertinent past medical history who presents with foot pain.    DDx includes but is not limited to: sprain, fracture, contusion of the LLE    Provided with a surgical shoe, and podiatry follow-up.  Informed patient that she can take Tylenol/Motrin as needed for pain as she has not been requiring anything as of yet.    Results shared with patient. Return precautions given and patient expresses understanding and is comfortable with discharge.       Amount and/or Complexity of Data Reviewed  Radiology: ordered and independent interpretation performed.        ED Course as of 10/11/24 1718   Fri Oct 11, 2024   1614 Patient declined pain medication at this time.       Medications - No data to display    ED Risk Strat Scores                                               History of Present Illness       Chief Complaint   Patient presents with    Fall     Pt states she fell of her bar stool a week ago, onto her right side. -Head strike, -thinners, -LOC. Pt c/o of  L 4th/5th digit toe pain. +edema, sensation/cap refill intact        Past Medical History:   Diagnosis Date    Asthma     Degeneration of intervertebral disc at L5-S1 level     Depression     Family history of colon cancer 3/27/2019    Added automatically from request for surgery [id number]    Full incontinence of feces     Generalized anxiety disorder     Hepatitis     at age 8  yrs    Herpes zoster     last assessed: 6/20/13    History of colon polyps     Hypercholesterolemia     Hyperlipidemia     MS (multiple sclerosis) (HCC)     MS (multiple sclerosis) (HCC)     Osteoporosis     Seasonal allergies     Tumor of breast     benign, right breast    Urinary incontinence       Past Surgical History:   Procedure Laterality Date    BREAST EXCISIONAL BIOPSY Right 1998    unsure exact year, benign    BREAST SURGERY      right breast fibroid tumor resection    COLONOSCOPY      11/2013 - due in 2018 -     COLONOSCOPY W/ POLYPECTOMY  07/07/2022    normal-repeat in 2 years    FL LUMBAR PUNCTURE DIAGNOSTIC  10/01/2018    ROTATOR CUFF REPAIR      Bilateral    ROTATOR CUFF REPAIR Bilateral     SEPTOPLASTY        Family History   Problem Relation Age of Onset    Arthritis Mother     Osteoporosis Mother     COPD Father     No Known Problems Sister     Dementia Sister     No Known Problems Maternal Grandmother     No Known Problems Maternal Grandfather     No Known Problems Paternal Grandmother     No Known Problems Paternal Grandfather     Colon cancer Brother 65    Multiple sclerosis Brother     Diabetes Brother     No Known Problems Brother     No Known Problems Maternal Aunt     No Known Problems Maternal Aunt     No Known Problems Paternal Aunt       Social History     Tobacco Use    Smoking status: Every Day     Current packs/day: 1.00     Average packs/day: 1 pack/day for 42.8 years (42.8 ttl pk-yrs)     Types: Cigarettes     Start date: 1982    Smokeless tobacco: Never   Vaping Use    Vaping status: Never Used   Substance Use Topics    Alcohol use: Yes     Comment: occasional; [employer] also states Never drank alcohol    Drug use: No      E-Cigarette/Vaping    E-Cigarette Use Never User       E-Cigarette/Vaping Substances    Nicotine No     THC No     CBD No     Flavoring No     Other No     Unknown No       I have reviewed and agree with the history as documented.     Machelle is a  69-year-old female with no pertinent past medical history who presents with foot pain.  Patient reports that roughly a week ago she was working at her computer, she sits at a barstool when she does this.  Reports that her right leg fell asleep, but she did not realize it.  When she went to step on it she felt a sharp pain and fell to the floor.  She denies head strike, loss of consciousness, or pain anywhere about the right foot.  She reports that the distal lateral dorsal aspect of her foot has continued to have pain since then.  She also notes some swelling.  She reports that she has been icing it to help alleviate the pain.  She reports that she has not taken any Tylenol or Motrin.        Review of Systems   Constitutional:  Negative for appetite change and diaphoresis.   HENT:  Negative for congestion and facial swelling.    Eyes:  Negative for discharge and redness.   Respiratory:  Negative for apnea and cough.    Cardiovascular:  Negative for leg swelling.   Gastrointestinal:  Negative for abdominal distention and vomiting.   Skin:  Negative for rash and wound.   Neurological:  Negative for facial asymmetry and speech difficulty.           Objective       ED Triage Vitals [10/11/24 1501]   Temperature Pulse Blood Pressure Respirations SpO2 Patient Position - Orthostatic VS   97.8 °F (36.6 °C) 83 141/73 20 97 % Sitting      Temp src Heart Rate Source BP Location FiO2 (%) Pain Score    -- Monitor Right arm -- --      Vitals      Date and Time Temp Pulse SpO2 Resp BP Pain Score FACES Pain Rating User   10/11/24 1501 97.8 °F (36.6 °C) 83 97 % 20 141/73 -- -- TS            Physical Exam  Vitals and nursing note reviewed.   Constitutional:       Appearance: Normal appearance.   HENT:      Head: Normocephalic and atraumatic.      Right Ear: External ear normal.      Left Ear: External ear normal.      Nose: Nose normal.      Mouth/Throat:      Mouth: Mucous membranes are moist.      Pharynx: Oropharynx is clear.    Eyes:      Extraocular Movements: Extraocular movements intact.      Conjunctiva/sclera: Conjunctivae normal.   Cardiovascular:      Rate and Rhythm: Normal rate.   Pulmonary:      Effort: Pulmonary effort is normal.   Abdominal:      General: Abdomen is flat.   Musculoskeletal:         General: Normal range of motion.      Cervical back: Normal range of motion and neck supple.      Comments: Patient has full range of motion of the left ankle compared to the right.  Some notable swelling of the left ankle, extending down into the lateral aspect of the left foot.  Patient is able to wiggle all of her toes, and is neurovascularly intact   Skin:     General: Skin is warm and dry.   Neurological:      General: No focal deficit present.      Mental Status: She is alert and oriented to person, place, and time.   Psychiatric:         Mood and Affect: Mood normal.         Behavior: Behavior normal.         Results Reviewed       None            XR foot 3+ views LEFT   ED Interpretation by Suzanne Anguiano MD (10/11 1644)   Spiral fracture of the 5th metatarsal      Final Interpretation by Lilly Kaur MD (10/11 1651)      Nondisplaced distal fifth metatarsal shaft fracture.      Findings concur with the preliminary report by the referring clinician already in PACS and/or our electronic record EPIC.         Computerized Assisted Algorithm (CAA) may have been used to analyze all applicable images.         Workstation performed: [id number]             Procedures    ED Medication and Procedure Management   Prior to Admission Medications   Prescriptions Last Dose Informant Patient Reported? Taking?   Cholecalciferol (Vitamin D3) 125 MCG (5000 UT) CAPS  Self Yes No   Sig: Take 1 capsule by mouth daily   albuterol (2.5 mg/3 mL) 0.083 % nebulizer solution  Self No No   Sig: Take 3 mL (2.5 mg total) by nebulization every 4 (four) hours as needed for wheezing or shortness of breath (cough)   albuterol (Ventolin HFA) 90  mcg/act inhaler  Self No No   Sig: Inhale 2 puffs every 4 (four) hours as needed for wheezing or shortness of breath   aspirin (ECOTRIN LOW STRENGTH) 81 mg EC tablet  Self Yes No   Sig: Take 1 tablet by mouth daily   ezetimibe (ZETIA) 10 mg tablet  Self No No   Sig: TAKE 1 TABLET BY MOUTH DAILY   fluticasone (FLONASE) 50 mcg/act nasal spray  Self No No   Si sprays into each nostril daily   gabapentin (NEURONTIN) 300 mg capsule  Self No No   Sig: TAKE 1 CAPSULE BY MOUTH TWICE  DAILY   levocetirizine (XYZAL) 5 MG tablet  Self No No   Sig: Take 1 tablet (5 mg total) by mouth every evening   methylPREDNISolone 4 MG tablet therapy pack  Self No No   Sig: Use as directed on package   montelukast (SINGULAIR) 10 mg tablet   No No   Sig: TAKE 1 TABLET BY MOUTH DAILY AT  BEDTIME   omeprazole (PriLOSEC) 40 MG capsule  Self Yes No   Sig: Take 40 mg by mouth daily   predniSONE 20 mg tablet   No No   Sig: 3 tabs daily X 3 days, 2 tabs daily X 3 days, 1 tab daily X 3 days, 1/2 tab daily x 4 days   rosuvastatin (CRESTOR) 40 MG tablet   No No   Sig: TAKE 1 TABLET BY MOUTH DAILY   sertraline (ZOLOFT) 100 mg tablet  Self No No   Sig: Take 1 tablet (100 mg total) by mouth daily      Facility-Administered Medications: None     Discharge Medication List as of 10/11/2024  4:48 PM        CONTINUE these medications which have NOT CHANGED    Details   albuterol (2.5 mg/3 mL) 0.083 % nebulizer solution Take 3 mL (2.5 mg total) by nebulization every 4 (four) hours as needed for wheezing or shortness of breath (cough), Starting 2024, Normal      albuterol (Ventolin HFA) 90 mcg/act inhaler Inhale 2 puffs every 4 (four) hours as needed for wheezing or shortness of breath, Starting 2024, Normal      aspirin (ECOTRIN LOW STRENGTH) 81 mg EC tablet Take 1 tablet by mouth daily, Starting 2014, Historical Med      Cholecalciferol (Vitamin D3) 125 MCG (5000 UT) CAPS Take 1 capsule by mouth daily, Historical Med       ezetimibe (ZETIA) 10 mg tablet TAKE 1 TABLET BY MOUTH DAILY, Starting Fri 8/9/2024, Normal      fluticasone (FLONASE) 50 mcg/act nasal spray 2 sprays into each nostril daily, Starting Wed 5/22/2024, Normal      gabapentin (NEURONTIN) 300 mg capsule TAKE 1 CAPSULE BY MOUTH TWICE  DAILY, Normal      levocetirizine (XYZAL) 5 MG tablet Take 1 tablet (5 mg total) by mouth every evening, Starting Thu 11/16/2023, Normal      methylPREDNISolone 4 MG tablet therapy pack Use as directed on package, Normal      montelukast (SINGULAIR) 10 mg tablet TAKE 1 TABLET BY MOUTH DAILY AT  BEDTIME, Normal      omeprazole (PriLOSEC) 40 MG capsule Take 40 mg by mouth daily, Historical Med      predniSONE 20 mg tablet 3 tabs daily X 3 days, 2 tabs daily X 3 days, 1 tab daily X 3 days, 1/2 tab daily x 4 days, Normal      rosuvastatin (CRESTOR) 40 MG tablet TAKE 1 TABLET BY MOUTH DAILY, Normal      sertraline (ZOLOFT) 100 mg tablet Take 1 tablet (100 mg total) by mouth daily, Starting Wed 5/22/2024, Normal             ED SEPSIS DOCUMENTATION   Time reflects when diagnosis was documented in both MDM as applicable and the Disposition within this note       Time User Action Codes Description Comment    10/11/2024  4:45 PM Suzanne Anguiano [W19.XXXA] Fall, initial encounter     10/11/2024  4:45 PM Suzanne Anguiano [S92.353A] Fracture of 5th metatarsal                  Suzanne Anguiano MD  10/11/24 1655       Suzanne Anguiano MD  10/11/24 1718

## 2024-10-11 NOTE — TELEPHONE ENCOUNTER
"Pt called with pain in her L foot. Pt states that she did have a fall and she thinks she might have broken something in her foot. She was thinking the last 2 toes on her L foot but then talking about it was unsure because the pain is coming from the whole foot on the side. Pt states the whole foot is swollen but denies any bruising. Rates the pain 7/10. Pt states this injury happened last week.     Recommended pt go to ED for xrays. Pt agreed and said she would go to AN ED.           Reason for Disposition   Looks like a broken bone or dislocated joint (e.g., crooked or deformed)    Answer Assessment - Initial Assessment Questions  1. MECHANISM: \"How did the injury happen?\"       Pt said she was sitting and went to get up and she fell and she thinks she broke her toes   2. ONSET: \"When did the injury happen?\" (Minutes or hours ago)       Last week   3. LOCATION: \"What part of the toe is injured?\" \"Is the nail damaged?\"       Pinky toe and the one next to it on L foot   4. APPEARANCE of TOE INJURY: \"What does the injury look like?\"       Toes look okay, but the pain is coming on from the side of the foot and its swollen   5. SEVERITY: \"Can you use the foot normally?\" \"Can you walk?\"       Yes but limps because of the pain   6. SIZE: For cuts, bruises, or swelling, ask: \"How large is it?\" (e.g., inches or centimeters;  entire toe)       The foot is swollen, denies bruising   7. PAIN: \"Is there pain?\" If Yes, ask: \"How bad is the pain?\"   (e.g., Scale 1-10; or mild, moderate, severe)      7/10   8.9. DIABETES: \"Do you have a history of diabetes or poor circulation in the feet?\"      Denies   10. OTHER SYMPTOMS: \"Do you have any other symptoms?\"         Denies    Protocols used: Toe Injury-ADULT-[place]    "

## 2024-10-16 ENCOUNTER — OFFICE VISIT (OUTPATIENT)
Dept: OBGYN CLINIC | Facility: CLINIC | Age: 69
End: 2024-10-16
Payer: MEDICARE

## 2024-10-16 VITALS — WEIGHT: 142 LBS | HEIGHT: 63 IN | BODY MASS INDEX: 25.16 KG/M2

## 2024-10-16 DIAGNOSIS — S92.352A CLOSED DISPLACED FRACTURE OF FIFTH METATARSAL BONE OF LEFT FOOT, INITIAL ENCOUNTER: Primary | ICD-10-CM

## 2024-10-16 DIAGNOSIS — S92.353A FRACTURE OF 5TH METATARSAL: ICD-10-CM

## 2024-10-16 PROCEDURE — 99214 OFFICE O/P EST MOD 30 MIN: CPT | Performed by: ORTHOPAEDIC SURGERY

## 2024-10-16 PROCEDURE — 28470 CLTX METATARSAL FX WO MNP EA: CPT | Performed by: ORTHOPAEDIC SURGERY

## 2024-10-16 NOTE — PROGRESS NOTES
James R Lachman, M.D.  Attending, Orthopaedic Surgery  Foot and Ankle  [facility]      ORTHOPAEDIC FOOT AND ANKLE CLINIC VISIT     Assessment:     Encounter Diagnoses   Name Primary?    Fracture of 5th metatarsal     Closed displaced fracture of fifth metatarsal bone of left foot, initial encounter Yes        Plan:   The patient verbalized understanding of exam findings and treatment plan. We engaged in the shared decision-making process and treatment options were discussed at length with the patient. Surgical and conservative management discussed today along with risks and benefits.  We discussed conservative management today including weightbearing as tolerated in postop shoe  Vitamin D3 and calcium   ASA 81mg Daily to prevent blood clots  Compression stockings  Recommended smoking cessation  Return in about 6 weeks (around 11/27/2024). Will require Xrays at that time    Fracture / Dislocation Treatment    Date/Time: 10/16/2024 2:00 PM    Performed by: James R Lachman, MD  Authorized by: James R Lachman, MD    Patient Location:  [facility]  [place] Protocol:  Consent: Verbal consent obtained.  Consent given by: patient  Patient understanding: patient states understanding of the procedure being performed  Site marked: the operative site was marked  Patient identity confirmed: verbally with patient    Injury location:  Foot  Location details:  Left foot  Injury type:  Fracture  Fracture type: fifth metatarsal    Distal perfusion: normal    Range of motion: reduced    Local anesthesia used?: No    Manipulation performed?: No    Immobilization:  Brace  Splint type:  Short leg  Neurovascular status: Neurovascularly intact    Range of motion: unchanged    Patient tolerance:  Patient tolerated the procedure well with no immediate complications          History of Present Illness:   Chief Complaint:   Chief Complaint   Patient presents with    Left Foot - Pain, Fracture     In a walking shoe.  Broken on the outside of her foot. Happened 2 weeks ago. Just got up off her chair and stepped wrong foot was a sleep.      Machelle Kaufman is a 69 y.o. female who is being seen for left foot pain.  Patient said that she fell out of a barstool and got her foot caught and injured it.  Pain is localized at distal fifth metatarsal with minimal radiating and described as sharp and severe. Patient denies numbness, tingling or radicular pain.  Denies history of neuropathy.  Patient does smoke, does not have diabetes and does not take blood thinners.  Patient was seen by the ER on October 11, 2024 and found to have fracture of the distal fifth metatarsal and given a hard soled shoe and referral to orthopedics.     Pain/symptom timing:  Worse during the day when active  Pain/symptom context:  Worse with activites and work  Pain/symptom modifying factors:  Rest makes better, activities make worse  Pain/symptom associated signs/symptoms: none    Prior treatment   NSAIDsNo   Injections No   Bracing/Orthotics No    Physical Therapy No     Orthopedic Surgical History:       Past Medical, Surgical and Social History:  Past Medical History:  has a past medical history of Asthma, Degeneration of intervertebral disc at L5-S1 level, Depression, Family history of colon cancer (3/27/2019), Full incontinence of feces, Generalized anxiety disorder, Hepatitis, Herpes zoster, History of colon polyps, Hypercholesterolemia, Hyperlipidemia, MS (multiple sclerosis) (HCC), MS (multiple sclerosis) (HCC), Osteoporosis, Seasonal allergies, Tumor of breast, and Urinary incontinence.  Problem List: does not have any pertinent problems on file.  Past Surgical History:  has a past surgical history that includes Rotator cuff repair; Rotator cuff repair (Bilateral); Breast surgery; Colonoscopy; Septoplasty; [place] lumbar puncture diagnostic (10/01/2018); Breast excisional biopsy (Right, 1998); and Colonoscopy w/ polypectomy (07/07/2022).  Family History:  "family history includes Arthritis in her mother; COPD in her father; Colon cancer (age of onset: 65) in her brother; Dementia in her sister; Diabetes in her brother; Multiple sclerosis in her brother; No Known Problems in her brother, maternal aunt, maternal aunt, maternal grandfather, maternal grandmother, paternal aunt, paternal grandfather, paternal grandmother, and sister; Osteoporosis in her mother.  Social History:  reports that she has been smoking cigarettes. She started smoking about 42 years ago. She has a 42.8 pack-year smoking history. She has never used smokeless tobacco. She reports current alcohol use. She reports that she does not use drugs.  Current Medications: has a current medication list which includes the following prescription(s): albuterol, albuterol, aspirin, vitamin d3, ezetimibe, fluticasone, gabapentin, levocetirizine, methylprednisolone, montelukast, omeprazole, prednisone, rosuvastatin, and sertraline.  Allergies: is allergic to bupropion, cefprozil, conj estrog-medroxyprogest ace, povidone iodine, sulfamethoxazole-trimethoprim, levofloxacin, gadobutrol, medroxyprogesterone, nortriptyline, pamelor [nortriptyline hcl], and provera [medroxyprogesterone acetate].     Review of Systems:  General- denies fever/chills  HEENT- denies hearing loss or sore throat  Eyes- denies eye pain or visual disturbances, denies red eyes  Respiratory- denies cough or SOB  Cardio- denies chest pain or palpitations  GI- denies abdominal pain  Endocrine- denies urinary frequency  Urinary- denies pain with urination  Musculoskeletal- Negative except noted above  Skin- denies rashes or wounds  Neurological- denies dizziness or headache  Psychiatric- denies anxiety or difficulty concentrating    Physical Exam:   Ht 5' 3\" (1.6 m)   Wt 64.4 kg (142 lb)   LMP  (LMP Unknown)   BMI 25.15 kg/m²   General/Constitutional: No apparent distress: well-nourished and well developed.  Eyes: normal ocular motion  Cardio: RRR, " Normal S1S2, No m/r/g  Lymphatic: No appreciable lymphadenopathy  Respiratory: Non-labored breathing, CTA b/l no w/c/r  Vascular: No edema, swelling or tenderness, except as noted in detailed exam.  Integumentary: No impressive skin lesions present, except as noted in detailed exam.  Neuro: No ataxia or tremors noted  Psych: Normal mood and affect, oriented to person, place and time. Appropriate affect.  Musculoskeletal: Normal, except as noted in detailed exam and in HPI.    Examination    Left    Gait Antalgic   Musculoskeletal Tender to palpation at distal fifth metatarsal    Skin Normal.      Nails Normal    Range of Motion  20 degrees dorsiflexion, 30 degrees plantarflexion  Subtalar motion: normal    Stability Stable    Muscle Strength 5/5 tibialis anterior  5/5 gastrocnemius-soleus  5/5 posterior tibialis  5/5 peroneal/eversion strength  5/5 EHL  5/5 FHL    Neurologic Normal    Sensation Intact to light touch throughout sural, saphenous, superficial peroneal, deep peroneal and medial/lateral plantar nerve distributions.  [place]ja 5.07 filament (10g) testing  deferred.    Cardiovascular Brisk capillary refill < 2 seconds,intact DP and PT pulses    Special Tests None      Imaging Studies:   3 views of the left foot were taken, reviewed and interpreted independently that demonstrate oblique fracture of distal shaft of fifth metatarsal. Reviewed by me personally.        James R. Lachman, MD  Foot & Ankle Surgery   Department of Orthopaedic Surgery  [facility]      I personally performed the service.    James R. Lachman, MD

## 2024-10-16 NOTE — PATIENT INSTRUCTIONS
Weightbearing as tolerated in boot  Do not need to wear the boot for sleep or showering but should wear it any time you are walking on it.    Recommend taking the following supplements: Vitamin D3- 4000 units per day and Calcium 1200 mg per day. This will help with bone healing.     Avoid NSAIDs like Motrin/Aleve/Ibuprofen.  Avoid steroids/nicotine products/ rheumatoid medications like DMARDs if possible.    Aspirin 81mg daily for blood clot prevention.      Knee high compression stocking 20-30mm HG of pressure

## 2024-10-18 ENCOUNTER — APPOINTMENT (EMERGENCY)
Dept: CT IMAGING | Facility: HOSPITAL | Age: 69
End: 2024-10-18
Payer: MEDICARE

## 2024-10-18 ENCOUNTER — APPOINTMENT (EMERGENCY)
Dept: RADIOLOGY | Facility: HOSPITAL | Age: 69
End: 2024-10-18
Payer: MEDICARE

## 2024-10-18 ENCOUNTER — HOSPITAL ENCOUNTER (EMERGENCY)
Facility: HOSPITAL | Age: 69
Discharge: HOME | End: 2024-10-18
Attending: EMERGENCY MEDICINE
Payer: MEDICARE

## 2024-10-18 VITALS
WEIGHT: 148.37 LBS | HEART RATE: 60 BPM | TEMPERATURE: 97.7 F | SYSTOLIC BLOOD PRESSURE: 138 MMHG | RESPIRATION RATE: 16 BRPM | DIASTOLIC BLOOD PRESSURE: 65 MMHG | BODY MASS INDEX: 26.28 KG/M2 | OXYGEN SATURATION: 97 %

## 2024-10-18 DIAGNOSIS — S30.0XXA CONTUSION OF COCCYX, INITIAL ENCOUNTER: ICD-10-CM

## 2024-10-18 DIAGNOSIS — S09.90XA TRAUMATIC INJURY OF HEAD, INITIAL ENCOUNTER: ICD-10-CM
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
## 2024-10-18 DIAGNOSIS — S92.354D CLOSED NONDISPLACED FRACTURE OF FIFTH METATARSAL BONE OF RIGHT FOOT WITH ROUTINE HEALING, SUBSEQUENT ENCOUNTER: ICD-10-CM

## 2024-10-18 LAB
ABO GROUP BLD: NORMAL
BLD GP AB SCN SERPL QL: NEGATIVE
RH BLD: NEGATIVE
SPECIMEN EXPIRATION DATE: NORMAL

## 2024-10-18 PROCEDURE — 99285 EMERGENCY DEPT VISIT HI MDM: CPT | Performed by: EMERGENCY MEDICINE

## 2024-10-18 PROCEDURE — 86901 BLOOD TYPING SEROLOGIC RH(D): CPT

## 2024-10-18 PROCEDURE — 72070 X-RAY EXAM THORAC SPINE 2VWS: CPT

## 2024-10-18 PROCEDURE — 99284 EMERGENCY DEPT VISIT MOD MDM: CPT

## 2024-10-18 PROCEDURE — 86900 BLOOD TYPING SEROLOGIC ABO: CPT

## 2024-10-18 PROCEDURE — 36415 COLL VENOUS BLD VENIPUNCTURE: CPT

## 2024-10-18 PROCEDURE — 72125 CT NECK SPINE W/O DYE: CPT

## 2024-10-18 PROCEDURE — 86850 RBC ANTIBODY SCREEN: CPT

## 2024-10-18 PROCEDURE — 73630 X-RAY EXAM OF FOOT: CPT

## 2024-10-18 PROCEDURE — 70450 CT HEAD/BRAIN W/O DYE: CPT

## 2024-10-18 PROCEDURE — 72220 X-RAY EXAM SACRUM TAILBONE: CPT

## 2024-10-18 RX ORDER — ACETAMINOPHEN 325 MG/1
975 TABLET ORAL ONCE
Status: COMPLETED | OUTPATIENT
Start: 2024-10-18 | End: 2024-10-18

## 2024-10-18 RX ADMIN — ACETAMINOPHEN 975 MG: 325 TABLET ORAL at 15:15

## 2024-10-18 NOTE — DISCHARGE INSTRUCTIONS
You may take 650mg of Tylenol every four to six hours, not exceeding 3,000mg daily, for the management of your discomfort.

## 2024-10-18 NOTE — ED PROVIDER NOTES
Emergency Department Trauma Note  Machelle Kaufman 69 y.o. female MRN: [id number]  Unit/Bed#: ED-26/ED-26 Encounter: [id number]      Trauma Alert: Trauma Acuity: C  Model of Arrival: Mode of Arrival: Direct from scene via    Trauma Team: Current Providers  Attending Provider: Tommy Bliss DO  Registered Nurse: Daniela Fierro, RN  Resident: Cassie Youngblood MD  Medical Student: Jas Ace  Consultants:     None      History of Present Illness     Chief Complaint:   Chief Complaint   Patient presents with    Fall     HPI:  Machelle Kaufman is a 69 y.o. female who presents with fall.  Mechanism:Details of Incident: pt tripped and hit head and buttocks Injury Date: 10/18/24        HPI  (Machelle Kaufman) Machelle Kaufman is a 69 y.o. female with recent left foot fracture on 10/11/2024 presents to the emergency department on October 18, 2024 for fall onset 9:30 AM.  Patient reports she was walking backwards, when she trippedon her a walking boot, falling backwards.  She landed on her buttock and hit the back of her head on the ground.  Denies LOC, nausea/vomiting, and  at bedside declines mental status changes.  Patient reports left foot pain, back pain, coccyx pain.  She takes 81 mg aspirin daily, and took an extra aspirin after the fall. Patient denies chest pain, shortness of breath, abdominal pain, weakness/numbness of extremities, or any other complaint at this time.      Allergies include:  Allergies   Allergen Reactions    Bupropion     Cefprozil     Conj Estrog-Medroxyprogest Ace Other (See Comments)     Other      Povidone Iodine      Patient unsure of what this is    Sulfamethoxazole-Trimethoprim Other (See Comments)     Reaction Date: 11Aug2011;     Levofloxacin     Gadobutrol GI Intolerance    Medroxyprogesterone Other (See Comments)     Reaction Date: 11Aug2011;     Nortriptyline Other (See Comments)     n/a    Pamelor [Nortriptyline Hcl]     Provera [Medroxyprogesterone Acetate]           Immunizations:  Immunization History   Administered Date(s) Administered    COVID-19 PFIZER VACCINE 0.3 ML IM 03/20/2021, 04/12/2021, 01/08/2022, 05/07/2022, 12/02/2022    COVID-19 Pfizer mRNA vacc PF kim-sucrose 12 yr and older (Comirnaty) 12/08/2023    INFLUENZA 09/26/2011, 09/19/2022    Influenza Quadrivalent Preservative Free 3 years and older IM 09/29/2015, 09/22/2016, 09/08/2017    Influenza, high dose seasonal 0.7 mL 09/30/2019, 09/17/2020, 09/20/2021, 09/19/2022, 11/16/2023    Influenza, recombinant, quadrivalent,injectable, preservative free 09/13/2018    Influenza, seasonal, injectable 01/17/2013, 09/03/2013, 09/23/2014    Pneumococcal Conjugate 13-Valent 10/22/2018    Pneumococcal Conjugate Vaccine 20-valent (Pcv20), Polysace 05/16/2023    Pneumococcal Polysaccharide PPV23 10/01/2007, 09/29/2015    Td (adult), adsorbed 06/02/2016     Immunizations Reviewed.    Review of Systems   Constitutional:  Negative for activity change, fever and unexpected weight change.   Respiratory:  Negative for cough, chest tightness and shortness of breath.    Cardiovascular:  Negative for chest pain and palpitations.   Gastrointestinal:  Negative for abdominal pain, diarrhea, nausea and vomiting.   Genitourinary:  Negative for dysuria and hematuria.   Musculoskeletal:  Positive for arthralgias and back pain. Negative for neck pain.   Skin:  Negative for wound.   Allergic/Immunologic: Negative for immunocompromised state.   Neurological:  Negative for syncope.   All other systems reviewed and are negative.      Historical Information     Immunizations:   Immunization History   Administered Date(s) Administered    COVID-19 PFIZER VACCINE 0.3 ML IM 03/20/2021, 04/12/2021, 01/08/2022, 05/07/2022, 12/02/2022    COVID-19 Pfizer mRNA vacc PF kim-sucrose 12 yr and older (Comirnaty) 12/08/2023    INFLUENZA 09/26/2011, 09/19/2022    Influenza Quadrivalent Preservative Free 3 years and older IM 09/29/2015, 09/22/2016,  09/08/2017    Influenza, high dose seasonal 0.7 mL 09/30/2019, 09/17/2020, 09/20/2021, 09/19/2022, 11/16/2023    Influenza, recombinant, quadrivalent,injectable, preservative free 09/13/2018    Influenza, seasonal, injectable 01/17/2013, 09/03/2013, 09/23/2014    Pneumococcal Conjugate 13-Valent 10/22/2018    Pneumococcal Conjugate Vaccine 20-valent (Pcv20), Polysace 05/16/2023    Pneumococcal Polysaccharide PPV23 10/01/2007, 09/29/2015    Td (adult), adsorbed 06/02/2016       Past Medical History:   Diagnosis Date    Asthma     Degeneration of intervertebral disc at L5-S1 level     Depression     Family history of colon cancer 3/27/2019    Added automatically from request for surgery [id number]    Full incontinence of feces     Generalized anxiety disorder     Hepatitis     at age 8 yrs    Herpes zoster     last assessed: 6/20/13    History of colon polyps     Hypercholesterolemia     Hyperlipidemia     MS (multiple sclerosis) (HCC)     MS (multiple sclerosis) (HCC)     Osteoporosis     Seasonal allergies     Tumor of breast     benign, right breast    Urinary incontinence        Family History   Problem Relation Age of Onset    Arthritis Mother     Osteoporosis Mother     COPD Father     No Known Problems Sister     Dementia Sister     No Known Problems Maternal Grandmother     No Known Problems Maternal Grandfather     No Known Problems Paternal Grandmother     No Known Problems Paternal Grandfather     Colon cancer Brother 65    Multiple sclerosis Brother     Diabetes Brother     No Known Problems Brother     No Known Problems Maternal Aunt     No Known Problems Maternal Aunt     No Known Problems Paternal Aunt      Past Surgical History:   Procedure Laterality Date    BREAST EXCISIONAL BIOPSY Right 1998    unsure exact year, benign    BREAST SURGERY      right breast fibroid tumor resection    COLONOSCOPY      11/2013 - due in 2018 -     COLONOSCOPY W/ POLYPECTOMY  07/07/2022    normal-repeat in 2 years     FL LUMBAR PUNCTURE DIAGNOSTIC  10/01/2018    ROTATOR CUFF REPAIR      Bilateral    ROTATOR CUFF REPAIR Bilateral     SEPTOPLASTY       Social History     Tobacco Use    Smoking status: Every Day     Current packs/day: 1.00     Average packs/day: 1 pack/day for 42.8 years (42.8 ttl pk-yrs)     Types: Cigarettes     Start date:     Smokeless tobacco: Never   Vaping Use    Vaping status: Never Used   Substance Use Topics    Alcohol use: Yes     Comment: occasional; [employer] also states Never drank alcohol    Drug use: No     E-Cigarette/Vaping    E-Cigarette Use Never User      E-Cigarette/Vaping Substances    Nicotine No     THC No     CBD No     Flavoring No     Other No     Unknown No        Family History: non-contributory    Meds/Allergies   Prior to Admission Medications   Prescriptions Last Dose Informant Patient Reported? Taking?   Cholecalciferol (Vitamin D3) 125 MCG (5000 UT) CAPS  Self Yes No   Sig: Take 1 capsule by mouth daily   albuterol (2.5 mg/3 mL) 0.083 % nebulizer solution  Self No No   Sig: Take 3 mL (2.5 mg total) by nebulization every 4 (four) hours as needed for wheezing or shortness of breath (cough)   albuterol (Ventolin HFA) 90 mcg/act inhaler  Self No No   Sig: Inhale 2 puffs every 4 (four) hours as needed for wheezing or shortness of breath   aspirin (ECOTRIN LOW STRENGTH) 81 mg EC tablet  Self Yes No   Sig: Take 1 tablet by mouth daily   ezetimibe (ZETIA) 10 mg tablet  Self No No   Sig: TAKE 1 TABLET BY MOUTH DAILY   fluticasone (FLONASE) 50 mcg/act nasal spray  Self No No   Si sprays into each nostril daily   gabapentin (NEURONTIN) 300 mg capsule  Self No No   Sig: TAKE 1 CAPSULE BY MOUTH TWICE  DAILY   levocetirizine (XYZAL) 5 MG tablet  Self No No   Sig: Take 1 tablet (5 mg total) by mouth every evening   methylPREDNISolone 4 MG tablet therapy pack  Self No No   Sig: Use as directed on package   montelukast (SINGULAIR) 10 mg tablet   No No   Sig: TAKE 1 TABLET BY MOUTH DAILY AT   BEDTIME   omeprazole (PriLOSEC) 40 MG capsule  Self Yes No   Sig: Take 40 mg by mouth daily   predniSONE 20 mg tablet   No No   Sig: 3 tabs daily X 3 days, 2 tabs daily X 3 days, 1 tab daily X 3 days, 1/2 tab daily x 4 days   rosuvastatin (CRESTOR) 40 MG tablet   No No   Sig: TAKE 1 TABLET BY MOUTH DAILY   sertraline (ZOLOFT) 100 mg tablet  Self No No   Sig: Take 1 tablet (100 mg total) by mouth daily      Facility-Administered Medications: None       Allergies   Allergen Reactions    Bupropion     Cefprozil     Conj Estrog-Medroxyprogest Ace Other (See Comments)     Other      Povidone Iodine      Patient unsure of what this is    Sulfamethoxazole-Trimethoprim Other (See Comments)     Reaction Date: 11Aug2011;     Levofloxacin     Gadobutrol GI Intolerance    Medroxyprogesterone Other (See Comments)     Reaction Date: 11Aug2011;     Nortriptyline Other (See Comments)     n/a    Pamelor [Nortriptyline Hcl]     Provera [Medroxyprogesterone Acetate]        PHYSICAL EXAM    PE limited by: none    Objective   Vitals:   First set: Temperature: 97.7 °F (36.5 °C) (10/18/24 1450)  Pulse: 81 (10/18/24 1450)  Respirations: 16 (10/18/24 1450)  Blood Pressure: 119/68 (10/18/24 1453)  SpO2: 97 % (10/18/24 1450)    Primary Survey:   (A) Airway: intact  (B) Breathing: bilateral breath sounds  (C) Circulation: Pulses:   pedal  2/4 and radial  2/4  (D) Disabliity:  GCS Total:  15  (E) Expose:  Completed    Secondary Survey: (Click on Physical Exam tab above)  Physical Exam  Vitals and nursing note reviewed.   Constitutional:       Appearance: She is not toxic-appearing or diaphoretic.   HENT:      Head: Normocephalic and atraumatic.      Right Ear: External ear normal.      Left Ear: External ear normal.      Nose: Nose normal.      Mouth/Throat:      Mouth: Mucous membranes are moist.   Eyes:      General: No scleral icterus.     Extraocular Movements: Extraocular movements intact.      Conjunctiva/sclera: Conjunctivae normal.    Neck:      Comments: No C-spine tenderness  Cardiovascular:      Rate and Rhythm: Normal rate and regular rhythm.      Pulses: Normal pulses.           Radial pulses are 2+ on the right side.        Dorsalis pedis pulses are 2+ on the right side and 2+ on the left side.      Heart sounds: Normal heart sounds, S1 normal and S2 normal. No murmur heard.  Pulmonary:      Effort: Pulmonary effort is normal. No respiratory distress.      Breath sounds: Normal breath sounds. No stridor. No wheezing.   Abdominal:      Palpations: Abdomen is soft.      Tenderness: There is no abdominal tenderness.   Musculoskeletal:         General: Normal range of motion.      Cervical back: Normal range of motion.   Skin:     General: Skin is warm and dry.   Neurological:      General: No focal deficit present.      Mental Status: She is alert and oriented to person, place, and time.   Psychiatric:         Mood and Affect: Mood normal.         Cervical spine cleared by clinical criteria? Yes     Invasive Devices       Drain  Duration             Urethral Catheter 2017 days                    Lab Results:   Results Reviewed       None                   Imaging Studies:   Direct to CT: Yes  CT follow up   Final Result by Hai Murray DO (10/18 1643)      Subtle hyperdensity posterior right temporal region on previous study has essentially resolved. No convincing intracranial hemorrhage.      Workstation performed: [id number]         XR foot 3+ views LEFT   ED Interpretation by Cassie Youngblood MD (10/18 1631)   Fracture of the fifth metatarsal, unchanged from previous x-ray on 10/11/2024.  No new worsening fracture or displacement.  Independently interpreted by myself.      Final Result by Mack Toth MD (10/18 1655)      Slight worsening of the distal fifth metatarsal fracture..         Computerized Assisted Algorithm (CAA) may have been used to analyze all applicable images.         Workstation performed: [id number]          XR thoracic spine 2 views   ED Interpretation by Cassie Youngblood MD (10/18 1632)   No acute fracture.  Independently interpreted by myself.      Final Result by Mack Toth MD (10/18 1651)      No acute osseous abnormality.         Computerized Assisted Algorithm (CAA) may have been used to analyze all applicable images.         Workstation performed: [id number]         XR sacrum and coccyx   ED Interpretation by Cassie Youngblood MD (10/18 1637)   No acute fracture.  Independently interpreted by myself.      Final Result by Mack Toth MD (10/18 1654)      No acute osseous abnormality.         Computerized Assisted Algorithm (CAA) may have been used to analyze all applicable images.         Workstation performed: [id number]         TRAUMA - CT head wo contrast   Final Result by Hai Murray DO (10/18 1614)      Linear hyperdensity posterior right temporal region could represent trace subarachnoid hemorrhage versus minimally prominent vessel as opposed to artifact. Recommend repeat CT examination with slight change in the gantry angulation recommended.      I personally discussed this study with Dr. Cassie Youngblood   on 10/18/2024 at 4:12 PM.               Workstation performed: [id number]         TRAUMA - CT spine cervical wo contrast   Final Result by Hai Murray DO (10/18 1609)      No cervical spine fracture or traumatic malalignment.                  Workstation performed: [id number]               Procedures  Procedures         ED Course  ED Course as of 10/18/24 2213   Fri Oct 18, 2024   1600 Differential diagnosis including but not limited to: sprain, strain, fracture, dislocation, contusion,    1612 TRAUMA - CT spine cervical wo contrast  No cervical spine fracture or traumatic malalignment.   1628 Spoke with Dr. Murray, recommends repeat CT imaging. Artifact vs trace SAH. Updated patient and spoke with CT techs   1629 TRAUMA - CT head wo contrast  Linear hyperdensity  posterior right temporal region could represent trace subarachnoid hemorrhage versus minimally prominent vessel as opposed to artifact. Recommend repeat CT examination with slight change in the gantry angulation recommended.        1631 XR foot 3+ views LEFT  Fracture of the fifth metatarsal, unchanged from previous x-ray on 10/11/2024.  No new worsening fracture or displacement.  Independently interpreted by myself.   1637 XR sacrum and coccyx  No acute fracture.  Independently interpreted by myself.   1637 XR thoracic spine 2 views  No acute fracture.  Independently interpreted by myself.   1728 CT follow up  Subtle hyperdensity posterior right temporal region on previous study has essentially resolved. No convincing intracranial hemorrhage.   1731 No acute findings on workup.  Patient will need to follow-up with PCP for further management.  Will instruct patient to monitor closely for any mental status changes, confusion, acute onset of sharp headache.  Recommend Tylenol for pain.  Patient has been voiced understanding agree with plan.  All questions and concerns addressed at this time.           Medical Decision Making  Amount and/or Complexity of Data Reviewed  Labs: ordered.  Radiology: ordered and independent interpretation performed. Decision-making details documented in ED Course.    Risk  OTC drugs.      See ED course for MDM.            Disposition  [facility] Transfer: No  Final diagnoses:   Fall, initial encounter   Traumatic injury of head, initial encounter   Contusion of coccyx, initial encounter   Closed nondisplaced fracture of fifth metatarsal bone of right foot with routine healing, subsequent encounter     Time reflects when diagnosis was documented in both MDM as applicable and the Disposition within this note       Time User Action Codes Description Comment    10/18/2024  5:29 PM Cassie Youngblood [W19.XXXA] Fall, initial encounter     10/18/2024  5:30 PM Cassie Youngblood [S09.90XA] Traumatic  injury of head, initial encounter     10/18/2024  5:30 PM Pardeep Youngbloodbie Add [S20.229A] Back contusion     10/18/2024  5:30 PM Ford Cassie Remove [S20.229A] Back contusion     10/18/2024  5:30 PM Ford, Cassie Add [S30.0XXA] Contusion of coccyx, initial encounter     10/18/2024  5:31 PM Ford Cassie Add [S92.354D] Closed nondisplaced fracture of fifth metatarsal bone of right foot with routine healing, subsequent encounter           ED Disposition       ED Disposition   Discharge    Condition   Stable    Date/Time   Fri Oct 18, 2024  5:29 PM    Comment   Machelle Kaufman discharge to home/self care.                   Follow-up Information       Follow up With Specialties Details Why Contact Info    Maria E Almeida MD Family Medicine In 2 days  [address] [address]  [phone]            Discharge Medication List as of 10/18/2024  5:31 PM        CONTINUE these medications which have NOT CHANGED    Details   albuterol (2.5 mg/3 mL) 0.083 % nebulizer solution Take 3 mL (2.5 mg total) by nebulization every 4 (four) hours as needed for wheezing or shortness of breath (cough), Starting Wed 5/22/2024, Normal      albuterol (Ventolin HFA) 90 mcg/act inhaler Inhale 2 puffs every 4 (four) hours as needed for wheezing or shortness of breath, Starting Wed 5/22/2024, Normal      aspirin (ECOTRIN LOW STRENGTH) 81 mg EC tablet Take 1 tablet by mouth daily, Starting Tue 9/23/2014, Historical Med      Cholecalciferol (Vitamin D3) 125 MCG (5000 UT) CAPS Take 1 capsule by mouth daily, Historical Med      ezetimibe (ZETIA) 10 mg tablet TAKE 1 TABLET BY MOUTH DAILY, Starting Fri 8/9/2024, Normal      fluticasone (FLONASE) 50 mcg/act nasal spray 2 sprays into each nostril daily, Starting Wed 5/22/2024, Normal      gabapentin (NEURONTIN) 300 mg capsule TAKE 1 CAPSULE BY MOUTH TWICE  DAILY, Normal      levocetirizine (XYZAL) 5 MG tablet Take 1 tablet (5 mg total) by mouth every evening, Starting Thu 11/16/2023, Normal       methylPREDNISolone 4 MG tablet therapy pack Use as directed on package, Normal      montelukast (SINGULAIR) 10 mg tablet TAKE 1 TABLET BY MOUTH DAILY AT  BEDTIME, Normal      omeprazole (PriLOSEC) 40 MG capsule Take 40 mg by mouth daily, Historical Med      predniSONE 20 mg tablet 3 tabs daily X 3 days, 2 tabs daily X 3 days, 1 tab daily X 3 days, 1/2 tab daily x 4 days, Normal      rosuvastatin (CRESTOR) 40 MG tablet TAKE 1 TABLET BY MOUTH DAILY, Normal      sertraline (ZOLOFT) 100 mg tablet Take 1 tablet (100 mg total) by mouth daily, Starting Wed 5/22/2024, Normal           No discharge procedures on file.    PDMP Review       None            ED Provider  Electronically Signed by           Cassie Younbglood MD  10/18/24 [id number]

## 2024-10-18 NOTE — ED ATTENDING ATTESTATION
10/18/2024  ITommy DO, saw and evaluated the patient. I have discussed the patient with the resident/non-physician practitioner and agree with the resident's/non-physician practitioner's findings, Plan of Care, and MDM as documented in the resident's/non-physician practitioner's note, except where noted. All available labs and Radiology studies were reviewed.  I was present for key portions of any procedure(s) performed by the resident/non-physician practitioner and I was immediately available to provide assistance.       At this point I agree with the current assessment done in the Emergency Department.  I have conducted an independent evaluation of this patient a history and physical is as follows:    Patient is a 69-year-old female with a history of multiple sclerosis, COPD who presents after a fall.  Patient states that she took a step backward in her home to try and close the cabinet while the cat was near her feet.  While stepping back with her postop shoe she fell backwards onto her buttock.  She denies any symptoms preceding the fall, including but not limited to dizziness, lightheadedness, palpitations, shortness of breath, chest pain.  She states that she also hit her head but denies loss of consciousness.  After a minute, she was able to get herself up.  She had progressive pain in the lower back and buttocks which prompted her visit to the emergency department this afternoon.  She denies headache, nausea, vomiting, blurred vision, double vision, other concerns.    On exam, patient is in no acute distress.  Heart is regular rate and rhythm.  Breath sounds normal.  Abdomen is soft, nontender, nondistended.  No rebound or guarding.  Hips nontender.  Pelvis stable.  Mild midline tenderness to palpation of C7 and proximal to mid thoracic region.  Tenderness to palpation of the left fifth metatarsal.  Normal motor and sensation in all extremities.    Initial CT head showed possible artifact  "versus small subarachnoid hemorrhage.  Patient was sent back for repeat CT head which did not show evidence of intracranial hemorrhage.  Patient is neurologically intact and does not complain of a headache.  I do not suspect significant intracranial injury.  CT cervical spine is unremarkable.  X-ray thoracic spine and coccyx with no acute fracture.  X-ray foot shows mild worsening of oblique fracture of the fifth metatarsal.  Patient will continue postop shoe and follow-up with orthopedic surgery.  Patient is appropriate for discharge and outpatient follow-up.  She was given strict return precautions.  Specifically, if she develops headache, vomiting, numbness, tingling, weakness, change in behavior or other concerning symptoms she should return to ED immediately by EMS.  Patient agrees and will follow-up with PCP on Monday.    Portions of the above record have been created with voice recognition software.  Occasional wrong word or \"sound alike\" substitutions may have occurred due to the inherent limitations of voice recognition software.  Read the chart carefully and recognize, using context, where substitutions may have occurred.      ED Course         Critical Care Time  Procedures      "

## 2024-10-23 ENCOUNTER — OFFICE VISIT (OUTPATIENT)
Dept: FAMILY MEDICINE CLINIC | Facility: CLINIC | Age: 69
End: 2024-10-23
Payer: MEDICARE

## 2024-10-23 VITALS
TEMPERATURE: 97.8 F | HEIGHT: 63 IN | DIASTOLIC BLOOD PRESSURE: 80 MMHG | BODY MASS INDEX: 25.8 KG/M2 | OXYGEN SATURATION: 97 % | RESPIRATION RATE: 16 BRPM | SYSTOLIC BLOOD PRESSURE: 124 MMHG | HEART RATE: 67 BPM | WEIGHT: 145.6 LBS

## 2024-10-23 DIAGNOSIS — Z87.891 PERSONAL HISTORY OF NICOTINE DEPENDENCE: ICD-10-CM

## 2024-10-23 DIAGNOSIS — J44.9 CHRONIC OBSTRUCTIVE PULMONARY DISEASE, UNSPECIFIED COPD TYPE (HCC): Chronic | ICD-10-CM

## 2024-10-23 DIAGNOSIS — M53.3 COCCYDYNIA: ICD-10-CM

## 2024-10-23 DIAGNOSIS — R29.6 MULTIPLE FALLS: Primary | ICD-10-CM

## 2024-10-23 PROCEDURE — 99214 OFFICE O/P EST MOD 30 MIN: CPT | Performed by: FAMILY MEDICINE

## 2024-10-23 PROCEDURE — G2211 COMPLEX E/M VISIT ADD ON: HCPCS | Performed by: FAMILY MEDICINE

## 2024-10-23 NOTE — PATIENT INSTRUCTIONS
Coccyx pain can take a while to get better.  Continue 2 extra strength Tylenol 3 times a day.  In addition, may take 2 Advil 3 times a day along with the Tylenol.  Finally, gabapentin could be increased to 3 or 4 times a day.  Suggest working on smoking cessation which she has probably heard before from other doctors.  Return as scheduled.

## 2024-10-23 NOTE — PROGRESS NOTES
Chief Complaint   Patient presents with    Follow-up     After er   Had 2 falls         HPI   Here for follow-up of 2 trips to the emergency room because of falls.  Had a fall on the 11th and fractured her left fifth metatarsal.  Says she tripped because of her cat.  Second fall 1 week later on the 18th also related to her cat.  Seen in the ER again.  CAT scan of the brain okay.  X-ray of coccyx was negative.  X-ray of the left foot showed the fracture to be unchanged from previous.  Taking Tylenol and using Lidoderm patch for her coccyx pain.    Past Medical History:   Diagnosis Date    Asthma     Degeneration of intervertebral disc at L5-S1 level     Depression     Family history of colon cancer 3/27/2019    Added automatically from request for surgery [id number]    Full incontinence of feces     Generalized anxiety disorder     Hepatitis     at age 8 yrs    Herpes zoster     last assessed: 6/20/13    History of colon polyps     Hypercholesterolemia     Hyperlipidemia     MS (multiple sclerosis) (HCC)     MS (multiple sclerosis) (HCC)     Osteoporosis     Seasonal allergies     Tumor of breast     benign, right breast    Urinary incontinence         Past Surgical History:   Procedure Laterality Date    BREAST EXCISIONAL BIOPSY Right 1998    unsure exact year, benign    BREAST SURGERY      right breast fibroid tumor resection    COLONOSCOPY      11/2013 - due in 2018 -     COLONOSCOPY W/ POLYPECTOMY  07/07/2022    normal-repeat in 2 years    FL LUMBAR PUNCTURE DIAGNOSTIC  10/01/2018    ROTATOR CUFF REPAIR      Bilateral    ROTATOR CUFF REPAIR Bilateral     SEPTOPLASTY         Social History     Tobacco Use    Smoking status: Every Day     Current packs/day: 1.00     Average packs/day: 1 pack/day for 42.8 years (42.8 ttl pk-yrs)     Types: Cigarettes     Start date: 1982    Smokeless tobacco: Never   Substance Use Topics    Alcohol use: Yes     Comment: occasional; Allscripts also states Never drank alcohol  "      Social History     Social History Narrative     since 1992.  No children.    Retired.  Was a .    Has been also retired.    Enjoys sewing.  Gardening.  Has a dog and cat.            What type of home do you live in: Single house    Age of your home: 69 yrs    How long have you been living there: 32 yrs    Type of heat: Forced hot air    Type of fuel: Gas    What type of samantha is in your bedroom: Hardwood floor    Do you have the following in or near your home:    Air products: Central air, Air , and Humidifier    Pests: Other ants    Pets: Dog and Cat    Basement: None and Crawl space    Exposure to second hand smoke: Yes        The following portions of the patient's history were reviewed and updated as appropriate: allergies, current medications, past family history, past medical history, past social history, past surgical history, and problem list.      Review of Systems       /80 (BP Location: Right arm, Patient Position: Sitting, Cuff Size: Standard)   Pulse 67   Temp 97.8 °F (36.6 °C) (Temporal)   Resp 16   Ht 5' 3\" (1.6 m)   Wt 66 kg (145 lb 9.6 oz)   LMP  (LMP Unknown)   SpO2 97%   BMI 25.79 kg/m²      Physical Exam   Pleasant female in no acute distress.  Significant tenderness on palpation of the coccyx.  Left foot in a postop shoe.  Able to walk taking slow steps.              Current Outpatient Medications:     albuterol (2.5 mg/3 mL) 0.083 % nebulizer solution, Take 3 mL (2.5 mg total) by nebulization every 4 (four) hours as needed for wheezing or shortness of breath (cough), Disp: 720 mL, Rfl: 3    albuterol (Ventolin HFA) 90 mcg/act inhaler, Inhale 2 puffs every 4 (four) hours as needed for wheezing or shortness of breath, Disp: 54 g, Rfl: 3    aspirin (ECOTRIN LOW STRENGTH) 81 mg EC tablet, Take 1 tablet by mouth daily, Disp: , Rfl:     Cholecalciferol (Vitamin D3) 125 MCG (5000 UT) CAPS, Take 1 capsule by mouth daily, Disp: , Rfl:     ezetimibe (ZETIA) " 10 mg tablet, TAKE 1 TABLET BY MOUTH DAILY, Disp: 100 tablet, Rfl: 1    fluticasone (FLONASE) 50 mcg/act nasal spray, 2 sprays into each nostril daily, Disp: 48 g, Rfl: 3    gabapentin (NEURONTIN) 300 mg capsule, TAKE 1 CAPSULE BY MOUTH TWICE  DAILY, Disp: 200 capsule, Rfl: 1    levocetirizine (XYZAL) 5 MG tablet, Take 1 tablet (5 mg total) by mouth every evening, Disp: 90 tablet, Rfl: 3    montelukast (SINGULAIR) 10 mg tablet, TAKE 1 TABLET BY MOUTH DAILY AT  BEDTIME, Disp: 90 tablet, Rfl: 1    omeprazole (PriLOSEC) 40 MG capsule, Take 40 mg by mouth daily, Disp: , Rfl:     rosuvastatin (CRESTOR) 40 MG tablet, TAKE 1 TABLET BY MOUTH DAILY, Disp: 90 tablet, Rfl: 1    sertraline (ZOLOFT) 100 mg tablet, Take 1 tablet (100 mg total) by mouth daily, Disp: 90 tablet, Rfl: 3     No problem-specific Assessment & Plan notes found for this encounter.       Diagnoses and all orders for this visit:    Multiple falls    Coccydynia    Chronic obstructive pulmonary disease, unspecified COPD type (HCC)    Personal history of nicotine dependence         Patient Instructions   Coccyx pain can take a while to get better.  Continue 2 extra strength Tylenol 3 times a day.  In addition, may take 2 Advil 3 times a day along with the Tylenol.  Finally, gabapentin could be increased to 3 or 4 times a day.  Suggest working on smoking cessation which she has probably heard before from other doctors.  Return as scheduled.

## 2024-11-13 ENCOUNTER — RA CDI HCC (OUTPATIENT)
Dept: OTHER | Facility: HOSPITAL | Age: 69
End: 2024-11-13

## 2024-11-19 ENCOUNTER — APPOINTMENT (OUTPATIENT)
Dept: LAB | Facility: CLINIC | Age: 69
End: 2024-11-19
Payer: MEDICARE

## 2024-11-19 DIAGNOSIS — E78.2 MIXED HYPERLIPIDEMIA: Chronic | ICD-10-CM

## 2024-11-19 LAB
ALBUMIN SERPL BCG-MCNC: 4.6 G/DL (ref 3.5–5)
ALP SERPL-CCNC: 95 U/L (ref 34–104)
ALT SERPL W P-5'-P-CCNC: 16 U/L (ref 7–52)
ANION GAP SERPL CALCULATED.3IONS-SCNC: 7 MMOL/L (ref 4–13)
AST SERPL W P-5'-P-CCNC: 14 U/L (ref 13–39)
BILIRUB SERPL-MCNC: 0.55 MG/DL (ref 0.2–1)
BUN SERPL-MCNC: 16 MG/DL (ref 5–25)
CALCIUM SERPL-MCNC: 9.5 MG/DL (ref 8.4–10.2)
CHLORIDE SERPL-SCNC: 102 MMOL/L (ref 96–108)
CHOLEST SERPL-MCNC: 183 MG/DL (ref ?–200)
CO2 SERPL-SCNC: 32 MMOL/L (ref 21–32)
CREAT SERPL-MCNC: 0.61 MG/DL (ref 0.6–1.3)
GFR SERPL CREATININE-BSD FRML MDRD: 92 ML/MIN/1.73SQ M
GLUCOSE P FAST SERPL-MCNC: 102 MG/DL (ref 65–99)
HDLC SERPL-MCNC: 47 MG/DL
LDLC SERPL CALC-MCNC: 74 MG/DL (ref 0–100)
POTASSIUM SERPL-SCNC: 3.9 MMOL/L (ref 3.5–5.3)
PROT SERPL-MCNC: 6.8 G/DL (ref 6.4–8.4)
SODIUM SERPL-SCNC: 141 MMOL/L (ref 135–147)
TRIGL SERPL-MCNC: 312 MG/DL (ref ?–150)

## 2024-11-19 PROCEDURE — 80053 COMPREHEN METABOLIC PANEL: CPT

## 2024-11-19 PROCEDURE — 36415 COLL VENOUS BLD VENIPUNCTURE: CPT

## 2024-11-19 PROCEDURE — 80061 LIPID PANEL: CPT

## 2024-11-21 ENCOUNTER — TELEPHONE (OUTPATIENT)
Age: 69
End: 2024-11-21

## 2024-11-21 ENCOUNTER — OFFICE VISIT (OUTPATIENT)
Dept: FAMILY MEDICINE CLINIC | Facility: CLINIC | Age: 69
End: 2024-11-21
Payer: MEDICARE

## 2024-11-21 VITALS
BODY MASS INDEX: 26.54 KG/M2 | RESPIRATION RATE: 16 BRPM | WEIGHT: 149.8 LBS | HEART RATE: 71 BPM | OXYGEN SATURATION: 97 % | SYSTOLIC BLOOD PRESSURE: 126 MMHG | TEMPERATURE: 98 F | HEIGHT: 63 IN | DIASTOLIC BLOOD PRESSURE: 82 MMHG

## 2024-11-21 DIAGNOSIS — E55.9 VITAMIN D DEFICIENCY: Chronic | ICD-10-CM

## 2024-11-21 DIAGNOSIS — J44.9 CHRONIC OBSTRUCTIVE PULMONARY DISEASE, UNSPECIFIED COPD TYPE (HCC): Primary | Chronic | ICD-10-CM

## 2024-11-21 DIAGNOSIS — Z12.31 ENCOUNTER FOR SCREENING MAMMOGRAM FOR BREAST CANCER: ICD-10-CM

## 2024-11-21 DIAGNOSIS — H35.30 AMD (AGE-RELATED MACULAR DEGENERATION), BILATERAL: ICD-10-CM

## 2024-11-21 DIAGNOSIS — M85.89 OSTEOPENIA OF MULTIPLE SITES: ICD-10-CM

## 2024-11-21 DIAGNOSIS — R91.1 PULMONARY NODULE: Chronic | ICD-10-CM

## 2024-11-21 DIAGNOSIS — R32 URINARY INCONTINENCE, UNSPECIFIED TYPE: Chronic | ICD-10-CM

## 2024-11-21 DIAGNOSIS — E78.2 MIXED HYPERLIPIDEMIA: Chronic | ICD-10-CM

## 2024-11-21 DIAGNOSIS — F33.9 MAJOR DEPRESSION, RECURRENT, CHRONIC (HCC): Chronic | ICD-10-CM

## 2024-11-21 PROCEDURE — G2211 COMPLEX E/M VISIT ADD ON: HCPCS | Performed by: FAMILY MEDICINE

## 2024-11-21 PROCEDURE — 99214 OFFICE O/P EST MOD 30 MIN: CPT | Performed by: FAMILY MEDICINE

## 2024-11-21 RX ORDER — ALBUTEROL SULFATE 0.83 MG/ML
2.5 SOLUTION RESPIRATORY (INHALATION) EVERY 4 HOURS PRN
Qty: 300 ML | Refills: 3 | Status: SHIPPED | OUTPATIENT
Start: 2024-11-21

## 2024-11-21 NOTE — TELEPHONE ENCOUNTER
PA for albuterol (2.5 mg/3 mL) 0.083 % nebulizer solution  NOT REQUIRED     Reason (screenshot if applicable)          Patient advised by          [] MyChart Message  [] Phone call   []LMOM  []L/M to call office as no active Communication consent on file  [x]PHARMACY INFORMED PT NOTED BELOW       Pharmacy advised by    SPOKE WITH PHARMACY AND A LETTER HAS BEEN SENT TO THE DOCTOR TO FILL OUT SO THEY CAN SUBMIT THIS MEDICATION UNDER HER PART B. PHARMACY IS WAITING FOR THE FORM TO BE SENT BACK FOR PT.    []Fax  [x]Phone call

## 2024-11-21 NOTE — PROGRESS NOTES
Name: Machelle Kaufman      : 1955      MRN: [id number]  Encounter Provider: Maria E Almeida MD  Encounter Date: 2024   Encounter department: [facility]    Assessment & Plan  Mixed hyperlipidemia  Continue Crestor 40 mg daily and Zetia 10 mg  Recent blood work reveals total cholesterol of 183, LDL 74 and triglycerides 312.  Orders:    CBC and differential; Future    Comprehensive metabolic panel; Future    Lipid Panel with Direct LDL reflex; Future    TSH, 3rd generation; Future    Pulmonary nodule    Orders:    CT chest wo contrast; Future    Chronic obstructive pulmonary disease, unspecified COPD type (HCC)  Patient with history of emphysema previously followed by [facility] pulmonology, Dr. Sheets     CT chest 2023:Mucus along the anterior aspect of the trachea, left main bronchus, and left upper lobe bronchi with new groundglass opacities in the left upper lobe, likely infectious/inflammatory in etiology. Optional short-term follow-up noncontrast chest CT in 3 months may be considered.    Patient did not proceed with pulmonary follow-up as recommended.  I advise her to schedule CT chest now.  Unfortunately she continues to smoke.     Orders:    CT chest wo contrast; Future    albuterol (2.5 mg/3 mL) 0.083 % nebulizer solution; Take 3 mL (2.5 mg total) by nebulization every 4 (four) hours as needed for wheezing or shortness of breath (cough)    Osteopenia of multiple sites  DEXA 2023-  osteopenia  Recent left fifth metatarsal fracture, patient follows with [facility] orthopedic surgery  Orders:    Vitamin D 25 hydroxy; Future    Vitamin D deficiency    Orders:    Vitamin D 25 hydroxy; Future    Encounter for screening mammogram for breast cancer    Orders:    Mammo screening bilateral w 3d and cad; Future    Urinary incontinence, unspecified type  Chronic symptoms of urinary incontinence.  Referral to uro-GYN.         AMD (age-related macular degeneration),  bilateral  Dry AMD  Patient uses [employer]  Patient reports worsening visual acuity lately         Major depression, recurrent, chronic (HCC)  Continue sertraline 100 mg daily            Patient Instructions   Please schedule CT chest [phone]  Please set up colonoscopy-    Low carbohydrate diet  Mammogram will be due in late March  Blood work in 6 months  COVID vaccine at the pharmacy  Eye exam  Albuterol  for nebulizer was refilled      Return in about 6 months (around 5/21/2025) for follow up, Medicare wellness.    History of Present Illness     Follow-up chronic medical conditions.  Patient is here today accompanied by her .    Recent left fifth metatarsal fracture, patient was seen after fall in emergency room on October 11, 2024.  She wears orthopedic shoe.  Second fall at home with head strike and coccyx contusion on 10/18/24. Patient had second ED visit at that time with negative CT brain and sacrum/ coccyx.Patient believes that her eyesight has been the cause of recent falling.  She was recently diagnosed with dry AMD.She uses eye drops and [employer].    Memory changes have been stable.  Decreased short-term, intact long-term.    Patient is still smoking.  Last CT chest November 2023.  Patient has not followed up with [facility] pulmonology that have recommended.    Results of recent blood work reviewed.  Medications updated      Review of Systems   Constitutional: Negative.    HENT: Negative.     Eyes:  Positive for visual disturbance.        As per HPI   Respiratory: Negative.     Cardiovascular: Negative.    Gastrointestinal: Negative.    Endocrine: Negative.    Genitourinary: Negative.    Musculoskeletal: Negative.    Allergic/Immunologic: Negative.    Neurological: Negative.    Hematological: Negative.    Psychiatric/Behavioral: Negative.       Past Medical History:   Diagnosis Date    Asthma     Degeneration of intervertebral disc at L5-S1 level     Depression     Family  history of colon cancer 3/27/2019    Added automatically from request for surgery [id number]    Full incontinence of feces     Generalized anxiety disorder     Hepatitis     at age 8 yrs    Herpes zoster     last assessed: 6/20/13    History of colon polyps     Hypercholesterolemia     Hyperlipidemia     MS (multiple sclerosis) (HCC)     MS (multiple sclerosis) (HCC)     Osteoporosis     Seasonal allergies     Tumor of breast     benign, right breast    Urinary incontinence      Past Surgical History:   Procedure Laterality Date    BREAST EXCISIONAL BIOPSY Right 1998    unsure exact year, benign    BREAST SURGERY      right breast fibroid tumor resection    COLONOSCOPY      11/2013 - due in 2018 -     COLONOSCOPY W/ POLYPECTOMY  07/07/2022    normal-repeat in 2 years    FL LUMBAR PUNCTURE DIAGNOSTIC  10/01/2018    ROTATOR CUFF REPAIR      Bilateral    ROTATOR CUFF REPAIR Bilateral     SEPTOPLASTY       Family History   Problem Relation Age of Onset    Arthritis Mother     Osteoporosis Mother     COPD Father     No Known Problems Sister     Dementia Sister     No Known Problems Maternal Grandmother     No Known Problems Maternal Grandfather     No Known Problems Paternal Grandmother     No Known Problems Paternal Grandfather     Colon cancer Brother 65    Multiple sclerosis Brother     Diabetes Brother     No Known Problems Brother     No Known Problems Maternal Aunt     No Known Problems Maternal Aunt     No Known Problems Paternal Aunt      Social History     Tobacco Use    Smoking status: Every Day     Current packs/day: 1.00     Average packs/day: 1 pack/day for 42.9 years (42.9 ttl pk-yrs)     Types: Cigarettes     Start date: 1982    Smokeless tobacco: Never   Vaping Use    Vaping status: Never Used   Substance and Sexual Activity    Alcohol use: Yes     Comment: occasional; Allscripts also states Never drank alcohol    Drug use: No    Sexual activity: Not Currently     Partners: Male     Comment: denied  any risk of AIDS     Current Outpatient Medications on File Prior to Visit   Medication Sig    albuterol (Ventolin HFA) 90 mcg/act inhaler Inhale 2 puffs every 4 (four) hours as needed for wheezing or shortness of breath    aspirin (ECOTRIN LOW STRENGTH) 81 mg EC tablet Take 1 tablet by mouth daily    Cholecalciferol (Vitamin D3) 125 MCG (5000 UT) CAPS Take 1 capsule by mouth daily    ezetimibe (ZETIA) 10 mg tablet TAKE 1 TABLET BY MOUTH DAILY    fluticasone (FLONASE) 50 mcg/act nasal spray 2 sprays into each nostril daily    gabapentin (NEURONTIN) 300 mg capsule TAKE 1 CAPSULE BY MOUTH TWICE  DAILY    levocetirizine (XYZAL) 5 MG tablet Take 1 tablet (5 mg total) by mouth every evening    montelukast (SINGULAIR) 10 mg tablet TAKE 1 TABLET BY MOUTH DAILY AT  BEDTIME    omeprazole (PriLOSEC) 40 MG capsule Take 40 mg by mouth daily    rosuvastatin (CRESTOR) 40 MG tablet TAKE 1 TABLET BY MOUTH DAILY    sertraline (ZOLOFT) 100 mg tablet Take 1 tablet (100 mg total) by mouth daily     Allergies   Allergen Reactions    Bupropion     Cefprozil     Conj Estrog-Medroxyprogest Ace Other (See Comments)     Other      Povidone Iodine      Patient unsure of what this is    Sulfamethoxazole-Trimethoprim Other (See Comments)     Reaction Date: 11Aug2011;     Levofloxacin     Gadobutrol GI Intolerance    Medroxyprogesterone Other (See Comments)     Reaction Date: 11Aug2011;     Nortriptyline Other (See Comments)     n/a    Pamelor [Nortriptyline Hcl]     Provera [Medroxyprogesterone Acetate]      Immunization History   Administered Date(s) Administered    COVID-19 PFIZER VACCINE 0.3 ML IM 03/20/2021, 04/12/2021, 01/08/2022, 05/07/2022, 12/02/2022    COVID-19 Pfizer mRNA vacc PF kim-sucrose 12 yr and older (Comirnaty) 12/08/2023    INFLUENZA 09/26/2011, 09/19/2022    Influenza Quadrivalent Preservative Free 3 years and older IM 09/29/2015, 09/22/2016, 09/08/2017    Influenza, high dose seasonal 0.7 mL 09/30/2019, 09/17/2020,  "09/20/2021, 09/19/2022, 11/16/2023    Influenza, recombinant, quadrivalent,injectable, preservative free 09/13/2018    Influenza, seasonal, injectable 01/17/2013, 09/03/2013, 09/23/2014    Pneumococcal Conjugate 13-Valent 10/22/2018    Pneumococcal Conjugate Vaccine 20-valent (Pcv20), Polysace 05/16/2023    Pneumococcal Polysaccharide PPV23 10/01/2007, 09/29/2015    Td (adult), adsorbed 06/02/2016     Objective   /82 (BP Location: Left arm, Patient Position: Sitting, Cuff Size: Standard)   Pulse 71   Temp 98 °F (36.7 °C) (Temporal)   Resp 16   Ht 5' 3\" (1.6 m)   Wt 67.9 kg (149 lb 12.8 oz)   LMP  (LMP Unknown)   SpO2 97%   BMI 26.54 kg/m²     Physical Exam  Vitals and nursing note reviewed.   Constitutional:       General: She is not in acute distress.     Appearance: Normal appearance. She is well-developed. She is not ill-appearing.   HENT:      Head: Normocephalic and atraumatic.   Eyes:      Conjunctiva/sclera: Conjunctivae normal.   Neck:      Thyroid: No thyromegaly.      Vascular: No carotid bruit.   Cardiovascular:      Rate and Rhythm: Normal rate and regular rhythm.      Heart sounds: Normal heart sounds. No murmur heard.  Pulmonary:      Effort: Pulmonary effort is normal. No respiratory distress.      Breath sounds: Normal breath sounds. No wheezing.   Abdominal:      General: There is no abdominal bruit.   Musculoskeletal:         General: Normal range of motion.      Cervical back: Neck supple.   Neurological:      General: No focal deficit present.      Mental Status: She is alert and oriented to person, place, and time.      Cranial Nerves: No cranial nerve deficit.      Coordination: Coordination normal.   Psychiatric:         Mood and Affect: Mood normal.         Behavior: Behavior normal.         "

## 2024-11-21 NOTE — ASSESSMENT & PLAN NOTE
DEXA 7/2023-  osteopenia  Recent left fifth metatarsal fracture, patient follows with [facility]'s orthopedic surgery  Orders:    Vitamin D 25 hydroxy; Future

## 2024-11-21 NOTE — ASSESSMENT & PLAN NOTE
Continue Crestor 40 mg daily and Zetia 10 mg  Recent blood work reveals total cholesterol of 183, LDL 74 and triglycerides 312.  Orders:    CBC and differential; Future    Comprehensive metabolic panel; Future    Lipid Panel with Direct LDL reflex; Future    TSH, 3rd generation; Future

## 2024-11-21 NOTE — PATIENT INSTRUCTIONS
Please schedule CT chest [phone]  Please set up colonoscopy-    Low carbohydrate diet  Mammogram will be due in late March  Blood work in 6 months  COVID vaccine at the pharmacy  Eye exam  Albuterol  for nebulizer was refilled

## 2024-11-21 NOTE — ASSESSMENT & PLAN NOTE
Patient with history of emphysema previously followed by [facility] pulmonology, Dr. Sheets     CT chest November 2023:Mucus along the anterior aspect of the trachea, left main bronchus, and left upper lobe bronchi with new groundglass opacities in the left upper lobe, likely infectious/inflammatory in etiology. Optional short-term follow-up noncontrast chest CT in 3 months may be considered.    Patient did not proceed with pulmonary follow-up as recommended.  I advise her to schedule CT chest now.  Unfortunately she continues to smoke.     Orders:    CT chest wo contrast; Future    albuterol (2.5 mg/3 mL) 0.083 % nebulizer solution; Take 3 mL (2.5 mg total) by nebulization every 4 (four) hours as needed for wheezing or shortness of breath (cough)

## 2024-11-24 PROBLEM — H35.30 AMD (AGE-RELATED MACULAR DEGENERATION), BILATERAL: Status: ACTIVE | Noted: 2024-11-24

## 2024-11-27 ENCOUNTER — OFFICE VISIT (OUTPATIENT)
Dept: OBGYN CLINIC | Facility: CLINIC | Age: 69
End: 2024-11-27

## 2024-11-27 ENCOUNTER — APPOINTMENT (OUTPATIENT)
Dept: RADIOLOGY | Facility: AMBULARY SURGERY CENTER | Age: 69
End: 2024-11-27
Attending: ORTHOPAEDIC SURGERY
Payer: MEDICARE

## 2024-11-27 VITALS — HEIGHT: 63 IN | BODY MASS INDEX: 25.69 KG/M2 | WEIGHT: 145 LBS

## 2024-11-27 DIAGNOSIS — Z01.89 ENCOUNTER FOR LOWER EXTREMITY COMPARISON IMAGING STUDY: ICD-10-CM

## 2024-11-27 DIAGNOSIS — S92.352A CLOSED DISPLACED FRACTURE OF FIFTH METATARSAL BONE OF LEFT FOOT, INITIAL ENCOUNTER: Primary | ICD-10-CM

## 2024-11-27 DIAGNOSIS — S92.352A CLOSED DISPLACED FRACTURE OF FIFTH METATARSAL BONE OF LEFT FOOT, INITIAL ENCOUNTER: ICD-10-CM

## 2024-11-27 PROCEDURE — 99024 POSTOP FOLLOW-UP VISIT: CPT | Performed by: ORTHOPAEDIC SURGERY

## 2024-11-27 PROCEDURE — 73630 X-RAY EXAM OF FOOT: CPT

## 2024-11-27 PROCEDURE — 73620 X-RAY EXAM OF FOOT: CPT

## 2024-11-27 NOTE — PATIENT INSTRUCTIONS
You may begin weaning your boot and transitioning to a sneaker (Today). It is important to do this gradually to avoid aggravating the healing process.    Today, you may come out of the boot into a sneaker for 1 hours.  2. Tomorrow, you may come out of the boot into a sneaker for 2 hours,  3. The next day, you may come out of the boot into a sneaker for 3 hours.  4. Continue this (adding 1 hours per day) as you tolerate. For example, if you do 6 hours out of the boot into a sneaker and your foot swells more than usual at night and it is difficult to control the discomfort, do not advance to 8 hours the next day, stay at 6 hours until you are able to tolerate it.    It is essential to follow this protocol and not modify this.  No matter when you begin weaning the boot, it will be difficult and there will be swelling and soreness.  If you spend more time than is recommended in the boot, this process becomes longer and more painful.    Elevation, Ice and tylenol and staying off of it at night will be important to aide in this transition out of the boot. Swelling and soreness are normal as you begin to do more with the injured leg.    May DC aspirin/lovenox, no longer needed.  Compression stocking (Knee high, 20-30mm Hg) to be worn at all times while awake.  Recommend taking the following supplements: Vitamin D3- 4000 units per day and Calcium 1200 mg per day. This will help with bone healing.

## 2024-11-27 NOTE — PROGRESS NOTES
James R Lachman, M.D.  Attending, Orthopaedic Surgery  Foot and Ankle  [facility]      ORTHOPAEDIC FOOT AND ANKLE CLINIC VISIT     Assessment:     Encounter Diagnoses   Name Primary?    Closed displaced fracture of fifth metatarsal bone of left foot, initial encounter Yes    Encounter for lower extremity comparison imaging study        Plan:   The patient verbalized understanding of exam findings and treatment plan. We engaged in the shared decision-making process and treatment options were discussed at length with the patient. Surgical and conservative management discussed today along with risks and benefits.  Patient sustained a left fifth metatarsal shaft fracture about 7 weeks ago  Managing non-operatively   Continue vit D and calcium   OTC pain medication, rest, elevation, compression stocking for pain and swelling control   Return in about 8 weeks (around 1/22/2025).      History of Present Illness:   Chief Complaint:   Chief Complaint   Patient presents with    Follow-up     Follow up of the left foot. Everything feeling ok.      Machelle Kaufman is a 69 y.o. female who is being seen in follow-up for left foot fracture. When we last saw she we recommended WBAT in post-op shoe.  Pain has improved. Residual pain is localized at fracture site with minimal radiating.      Pain/symptom timing:  Worse during the day when active  Pain/symptom context:  Worse with activites and work  Pain/symptom modifying factors:  Rest makes better, activities make worse  Pain/symptom associated signs/symptoms: none    Prior treatment   NSAIDsYes   Injections No   Bracing/Orthotics Yes  - post op shoe   Physical Therapy No     Orthopedic Surgical History:   See below     Past Medical, Surgical and Social History:  Past Medical History:  has a past medical history of Asthma, Degeneration of intervertebral disc at L5-S1 level, Depression, Family history of colon cancer (3/27/2019), Full incontinence of feces,  Generalized anxiety disorder, Hepatitis, Herpes zoster, History of colon polyps, Hypercholesterolemia, Hyperlipidemia, MS (multiple sclerosis) (HCC), MS (multiple sclerosis) (HCC), Osteoporosis, Seasonal allergies, Tumor of breast, and Urinary incontinence.  Problem List: does not have any pertinent problems on file.  Past Surgical History:  has a past surgical history that includes Rotator cuff repair; Rotator cuff repair (Bilateral); Breast surgery; Colonoscopy; Septoplasty; FL lumbar puncture diagnostic (10/01/2018); Breast excisional biopsy (Right, 1998); and Colonoscopy w/ polypectomy (07/07/2022).  Family History: family history includes Arthritis in her mother; COPD in her father; Colon cancer (age of onset: 65) in her brother; Dementia in her sister; Diabetes in her brother; Multiple sclerosis in her brother; No Known Problems in her brother, maternal aunt, maternal aunt, maternal grandfather, maternal grandmother, paternal aunt, paternal grandfather, paternal grandmother, and sister; Osteoporosis in her mother.  Social History:  reports that she has been smoking cigarettes. She started smoking about 42 years ago. She has a 42.9 pack-year smoking history. She has never used smokeless tobacco. She reports current alcohol use. She reports that she does not use drugs.  Current Medications: has a current medication list which includes the following prescription(s): albuterol, albuterol, aspirin, vitamin d3, ezetimibe, fluticasone, gabapentin, levocetirizine, montelukast, omeprazole, rosuvastatin, and sertraline.  Allergies: is allergic to bupropion, cefprozil, conj estrog-medroxyprogest ace, povidone iodine, sulfamethoxazole-trimethoprim, levofloxacin, gadobutrol, medroxyprogesterone, nortriptyline, pamelor [nortriptyline hcl], and provera [medroxyprogesterone acetate].     Review of Systems:  General- denies fever/chills  HEENT- denies hearing loss or sore throat  Eyes- denies eye pain or visual disturbances,  "denies red eyes  Respiratory- denies cough or SOB  Cardio- denies chest pain or palpitations  GI- denies abdominal pain  Endocrine- denies urinary frequency  Urinary- denies pain with urination  Musculoskeletal- Negative except noted above  Skin- denies rashes or wounds  Neurological- denies dizziness or headache  Psychiatric- denies anxiety or difficulty concentrating    Physical Exam:   Ht 5' 3\" (1.6 m)   Wt 65.8 kg (145 lb)   LMP  (LMP Unknown)   BMI 25.69 kg/m²   General/Constitutional: No apparent distress: well-nourished and well developed.  Eyes: normal ocular motion  Lymphatic: No appreciable lymphadenopathy  Respiratory: Non-labored breathing  Vascular: No edema, swelling or tenderness, except as noted in detailed exam.  Integumentary: No impressive skin lesions present, except as noted in detailed exam.  Neuro: No ataxia or tremors noted  Psych: Normal mood and affect, oriented to person, place and time. Appropriate affect.  Musculoskeletal: Normal, except as noted in detailed exam and in HPI.    Examination    Left    Gait Antalgic in post-op shoe   Musculoskeletal Tender to palpation at fracture site although mild    Skin Normal.      Nails Normal    Range of Motion  20 degrees dorsiflexion, 30 degrees plantarflexion  Subtalar motion: normal    Stability Stable    Muscle Strength 5/5 tibialis anterior  5/5 gastrocnemius-soleus  5/5 posterior tibialis  5/5 peroneal/eversion strength  5/5 EHL  5/5 FHL    Neurologic Normal    Sensation  Intact to light touch throughout sural, saphenous, superficial peroneal, deep peroneal and medial/lateral plantar nerve distributions.  [place]-Deja 5.07 filament (10g) testing  deferred.    Cardiovascular Brisk capillary refill < 2 seconds,intact DP and PT pulses    Special Tests None      Imaging Studies:   3 views of the Left foot were obtained, reviewed and interpreted independently which demonstrate interval healing without interval change in alignment. Reviewed " by me personally.        Scribe Attestation      I,:  Evie Hong PA-C am acting as a scribe while in the presence of the attending physician.:       I,:  James R Lachman, MD personally performed the services described in this documentation    as scribed in my presence.:               James R. Lachman, MD  Foot & Ankle Surgery   Department of Orthopaedic Surgery  [facility]      I personally performed the service.    James R. Lachman, MD

## 2024-12-28 DIAGNOSIS — E78.2 MIXED HYPERLIPIDEMIA: Chronic | ICD-10-CM

## 2024-12-28 DIAGNOSIS — G35 MULTIPLE SCLEROSIS (HCC): Chronic | ICD-10-CM

## 2024-12-29 RX ORDER — GABAPENTIN 300 MG/1
300 CAPSULE ORAL 2 TIMES DAILY
Qty: 180 CAPSULE | Refills: 3 | Status: SHIPPED | OUTPATIENT
Start: 2024-12-29

## 2024-12-29 RX ORDER — EZETIMIBE 10 MG/1
10 TABLET ORAL DAILY
Qty: 90 TABLET | Refills: 3 | Status: SHIPPED | OUTPATIENT
Start: 2024-12-29

## 2025-01-22 ENCOUNTER — OFFICE VISIT (OUTPATIENT)
Dept: OBGYN CLINIC | Facility: CLINIC | Age: 70
End: 2025-01-22
Payer: MEDICARE

## 2025-01-22 ENCOUNTER — APPOINTMENT (OUTPATIENT)
Dept: RADIOLOGY | Facility: AMBULARY SURGERY CENTER | Age: 70
End: 2025-01-22
Attending: ORTHOPAEDIC SURGERY
Payer: MEDICARE

## 2025-01-22 VITALS — WEIGHT: 148 LBS | BODY MASS INDEX: 26.22 KG/M2 | HEIGHT: 63 IN

## 2025-01-22 DIAGNOSIS — Z01.89 ENCOUNTER FOR LOWER EXTREMITY COMPARISON IMAGING STUDY: ICD-10-CM

## 2025-01-22 DIAGNOSIS — S92.352A CLOSED DISPLACED FRACTURE OF FIFTH METATARSAL BONE OF LEFT FOOT, INITIAL ENCOUNTER: Primary | ICD-10-CM

## 2025-01-22 DIAGNOSIS — S92.352A CLOSED DISPLACED FRACTURE OF FIFTH METATARSAL BONE OF LEFT FOOT, INITIAL ENCOUNTER: ICD-10-CM

## 2025-01-22 PROCEDURE — 73630 X-RAY EXAM OF FOOT: CPT

## 2025-01-22 PROCEDURE — 99213 OFFICE O/P EST LOW 20 MIN: CPT | Performed by: ORTHOPAEDIC SURGERY

## 2025-01-22 PROCEDURE — 73620 X-RAY EXAM OF FOOT: CPT

## 2025-01-22 NOTE — PROGRESS NOTES
James R Lachman, M.D.  Attending, Orthopaedic Surgery  Foot and Ankle  [facility]      ORTHOPAEDIC FOOT AND ANKLE CLINIC VISIT     Assessment:     Encounter Diagnoses   Name Primary?    Closed displaced fracture of fifth metatarsal bone of left foot, initial encounter Yes    Encounter for lower extremity comparison imaging study             Plan:   The patient verbalized understanding of exam findings and treatment plan. We engaged in the shared decision-making process and treatment options were discussed at length with the patient. Surgical and conservative management discussed today along with risks and benefits.  Patient is now about 4 months s/p LEFT 5th metatarsal shaft fracture, treating non operatively.   X Rays today show stable alignment and position of the fracture with callus/healing formation present.   May continue Vit D and Calcium  Rest, ice compression stocking for swelling/pain control  May gradually return to normal activities as tolerated with modifications to avoid pain. Wear supportive shoes  No follow-ups on file.      History of Present Illness:   Chief Complaint:   Chief Complaint   Patient presents with    Follow-up     Follow up of the left foot. Everything going well.      Machelle Kaufman is a 69 y.o. female who is being seen in follow-up for left foot 5th metatarsal fracture. When we last saw she we recommended WBAT in a supportive sneaker.  Pain has improved. Residual pain is localized at 5th metatarsal with minimal radiating and described as dull and aching.      Pain/symptom timing:  Worse during the day when active  Pain/symptom context:  Worse with activites and work  Pain/symptom modifying factors:  Rest makes better, activities make worse  Pain/symptom associated signs/symptoms: none    Prior treatment   NSAIDsYes   Injections No   Bracing/Orthotics Yes    Physical Therapy No     Orthopedic Surgical History:   See below    Past Medical, Surgical and Social  History:  Past Medical History:  has a past medical history of Asthma, Degeneration of intervertebral disc at L5-S1 level, Depression, Family history of colon cancer (3/27/2019), Full incontinence of feces, Generalized anxiety disorder, Hepatitis, Herpes zoster, History of colon polyps, Hypercholesterolemia, Hyperlipidemia, MS (multiple sclerosis) (HCC), MS (multiple sclerosis) (HCC), Osteoporosis, Seasonal allergies, Tumor of breast, and Urinary incontinence.  Problem List: does not have any pertinent problems on file.  Past Surgical History:  has a past surgical history that includes Rotator cuff repair; Rotator cuff repair (Bilateral); Breast surgery; Colonoscopy; Septoplasty; [place] lumbar puncture diagnostic (10/01/2018); Breast excisional biopsy (Right, 1998); and Colonoscopy w/ polypectomy (07/07/2022).  Family History: family history includes Arthritis in her mother; COPD in her father; Colon cancer (age of onset: 65) in her brother; Dementia in her sister; Diabetes in her brother; Multiple sclerosis in her brother; No Known Problems in her brother, maternal aunt, maternal aunt, maternal grandfather, maternal grandmother, paternal aunt, paternal grandfather, paternal grandmother, and sister; Osteoporosis in her mother.  Social History:  reports that she has been smoking cigarettes. She started smoking about 43 years ago. She has a 43.1 pack-year smoking history. She has never used smokeless tobacco. She reports current alcohol use. She reports that she does not use drugs.  Current Medications: has a current medication list which includes the following prescription(s): albuterol, albuterol, aspirin, vitamin d3, ezetimibe, fluticasone, gabapentin, levocetirizine, montelukast, omeprazole, rosuvastatin, and sertraline.  Allergies: is allergic to bupropion, cefprozil, conj estrog-medroxyprogest ace, povidone iodine, sulfamethoxazole-trimethoprim, levofloxacin, gadobutrol, medroxyprogesterone, nortriptyline, pamelor  "[nortriptyline hcl], and provera [medroxyprogesterone acetate].     Review of Systems:  General- denies fever/chills  HEENT- denies hearing loss or sore throat  Eyes- denies eye pain or visual disturbances, denies red eyes  Respiratory- denies cough or SOB  Cardio- denies chest pain or palpitations  GI- denies abdominal pain  Endocrine- denies urinary frequency  Urinary- denies pain with urination  Musculoskeletal- Negative except noted above  Skin- denies rashes or wounds  Neurological- denies dizziness or headache  Psychiatric- denies anxiety or difficulty concentrating    Physical Exam:   Ht 5' 3\" (1.6 m)   Wt 67.1 kg (148 lb)   LMP  (LMP Unknown)   BMI 26.22 kg/m²   General/Constitutional: No apparent distress: well-nourished and well developed.  Eyes: normal ocular motion  Lymphatic: No appreciable lymphadenopathy  Respiratory: Non-labored breathing  Vascular: No edema, swelling or tenderness, except as noted in detailed exam.  Integumentary: No impressive skin lesions present, except as noted in detailed exam.  Neuro: No ataxia or tremors noted  Psych: Normal mood and affect, oriented to person, place and time. Appropriate affect.  Musculoskeletal: Normal, except as noted in detailed exam and in HPI.    Examination    Left    Gait Normal   Musculoskeletal NTTP    Skin Normal.    Nails Normal    Range of Motion  20 degrees dorsiflexion, 30 degrees plantarflexion  Subtalar motion: normal    Stability Stable    Muscle Strength 5/5 tibialis anterior  5/5 gastrocnemius-soleus  5/5 posterior tibialis  5/5 peroneal/eversion strength  5/5 EHL  5/5 FHL    Neurologic Normal    Sensation Intact to light touch throughout sural, saphenous, superficial peroneal, deep peroneal and medial/lateral plantar nerve distributions.  [place]-Deja 5.07 filament (10g) testing deferred.    Cardiovascular Brisk capillary refill < 2 seconds,intact DP and PT pulses    Special Tests None      Imaging Studies:   3 views of the Left " foot were obtained, reviewed and interpreted independently which demonstrate 5th metatarsal shaft fracture that remains in stable alignment and position with callus/healing formation present. Reviewed by me personally.    James R. Lachman, MD  Foot & Ankle Surgery   Department of Orthopaedic Surgery  [facility]      I personally performed the service.    James R. Lachman, MD    Scribe Attestation      I,:  Edward Farias am acting as a scribe while in the presence of the attending physician.:       I,:  James R Lachman, MD personally performed the services described in this documentation    as scribed in my presence.:

## 2025-02-17 ENCOUNTER — ESTABLISHED COMPREHENSIVE EXAM (OUTPATIENT)
Dept: URBAN - METROPOLITAN AREA CLINIC 6 | Facility: CLINIC | Age: 70
End: 2025-02-17

## 2025-02-17 DIAGNOSIS — H35.371: ICD-10-CM

## 2025-02-17 DIAGNOSIS — H35.3131: ICD-10-CM

## 2025-02-17 DIAGNOSIS — H18.593: ICD-10-CM

## 2025-02-17 DIAGNOSIS — H43.813: ICD-10-CM

## 2025-02-17 DIAGNOSIS — H04.123: ICD-10-CM

## 2025-02-17 DIAGNOSIS — H46.9: ICD-10-CM

## 2025-02-17 DIAGNOSIS — H26.493: ICD-10-CM

## 2025-02-17 DIAGNOSIS — G35: ICD-10-CM

## 2025-02-17 PROCEDURE — 92083 EXTENDED VISUAL FIELD XM: CPT

## 2025-02-17 PROCEDURE — 92014 COMPRE OPH EXAM EST PT 1/>: CPT

## 2025-02-17 PROCEDURE — 92134 CPTRZ OPH DX IMG PST SGM RTA: CPT

## 2025-02-17 ASSESSMENT — VISUAL ACUITY
OS_SC: 20/25
OD_SC: 20/30-1
OU_SC: J1+

## 2025-02-17 ASSESSMENT — TONOMETRY
OS_IOP_MMHG: 14
OD_IOP_MMHG: 14

## 2025-03-23 DIAGNOSIS — E78.2 MIXED HYPERLIPIDEMIA: Chronic | ICD-10-CM

## 2025-03-23 DIAGNOSIS — J30.9 ALLERGIC RHINITIS, UNSPECIFIED SEASONALITY, UNSPECIFIED TRIGGER: ICD-10-CM

## 2025-03-25 RX ORDER — MONTELUKAST SODIUM 10 MG/1
10 TABLET ORAL
Qty: 90 TABLET | Refills: 1 | Status: SHIPPED | OUTPATIENT
Start: 2025-03-25

## 2025-03-25 RX ORDER — ROSUVASTATIN CALCIUM 40 MG/1
40 TABLET, COATED ORAL DAILY
Qty: 90 TABLET | Refills: 1 | Status: SHIPPED | OUTPATIENT
Start: 2025-03-25

## 2025-03-29 DIAGNOSIS — F33.9 MAJOR DEPRESSION, RECURRENT, CHRONIC (HCC): Chronic | ICD-10-CM

## 2025-03-30 RX ORDER — SERTRALINE HYDROCHLORIDE 100 MG/1
100 TABLET, FILM COATED ORAL DAILY
Qty: 90 TABLET | Refills: 3 | Status: SHIPPED | OUTPATIENT
Start: 2025-03-30

## 2025-04-11 ENCOUNTER — TELEPHONE (OUTPATIENT)
Age: 70
End: 2025-04-11

## 2025-04-11 ENCOUNTER — PREP FOR PROCEDURE (OUTPATIENT)
Age: 70
End: 2025-04-11

## 2025-04-11 DIAGNOSIS — K63.5 POLYP OF COLON, UNSPECIFIED PART OF COLON, UNSPECIFIED TYPE: Primary | ICD-10-CM

## 2025-04-11 NOTE — TELEPHONE ENCOUNTER
Scheduled date of colonoscopy (as of today): 07/07/25  Physician performing colonoscopy: Dr. Fry  Location of colonoscopy: [facility]  Bowel prep reviewed with patient: Francisco/Da via email  Instructions reviewed with patient by:Garcia  Clearances: n/a

## 2025-04-11 NOTE — TELEPHONE ENCOUNTER
Patients GI provider:  Dr. Fry    Number to return call: [phone]    Reason for call: The patient called in to schedule a colonoscopy procedure, and once we disconnected, I realized that the last procedure date was on 07/07/2022. She probably needs to be scheduled after that date this year to meet a three-year recall time frame. The doctor's note does not state a three-year recall; however, the colonoscopy from 2019 does. I tried to call her back to reschedule, but she did not answer, so I left a voicemail. Please confirm with the patient if the rescheduled date of 07/07/2025 (originally 06/09/25) will work; if not, reschedule her for after this date.    Scheduled procedure/appointment date if applicable: Procedure: 07/07/25

## 2025-04-11 NOTE — TELEPHONE ENCOUNTER
04/11/25  Screened by: Bianca Wilson    Referring Provider Dr. Almeida    Pre- Screening:     There is no height or weight on file to calculate BMI.  Has patient been referred for a routine screening Colonoscopy? yes  Is the patient between 45-75 years old? yes      Previous Colonoscopy yes   If yes:    Date: 07/07/2022    Facility:     Reason:       Does the patient want to see a Gastroenterologist prior to their procedure OR are they having any GI symptoms? no    Has the patient been hospitalized or had abdominal surgery in the past 6 months? no    Does the patient use supplemental oxygen? no    Does the patient take Coumadin, Lovenox, Plavix, Elliquis, Xarelto, or other blood thinning medication? no    Has the patient had a stroke, cardiac event, or stent placed in the past year? no      If patient is between 45yrs - 49yrs, please advise patient that we will have to confirm benefits & coverage with their insurance company for a routine screening colonoscopy.

## 2025-04-11 NOTE — TELEPHONE ENCOUNTER
Pt returned call but disconnected before I could relay the note below, she did not answer when I tried to call her back

## 2025-05-27 ENCOUNTER — APPOINTMENT (OUTPATIENT)
Dept: LAB | Facility: CLINIC | Age: 70
End: 2025-05-27
Attending: FAMILY MEDICINE
Payer: MEDICARE

## 2025-05-27 DIAGNOSIS — E78.2 MIXED HYPERLIPIDEMIA: Chronic | ICD-10-CM

## 2025-05-27 DIAGNOSIS — M85.89 OSTEOPENIA OF MULTIPLE SITES: ICD-10-CM

## 2025-05-27 DIAGNOSIS — E55.9 VITAMIN D DEFICIENCY: Chronic | ICD-10-CM

## 2025-05-27 LAB
25(OH)D3 SERPL-MCNC: 45.8 NG/ML (ref 30–100)
ALBUMIN SERPL BCG-MCNC: 4.5 G/DL (ref 3.5–5)
ALP SERPL-CCNC: 84 U/L (ref 34–104)
ALT SERPL W P-5'-P-CCNC: 19 U/L (ref 7–52)
ANION GAP SERPL CALCULATED.3IONS-SCNC: 7 MMOL/L (ref 4–13)
AST SERPL W P-5'-P-CCNC: 16 U/L (ref 13–39)
BASOPHILS # BLD AUTO: 0.04 THOUSANDS/ÂΜL (ref 0–0.1)
BASOPHILS NFR BLD AUTO: 1 % (ref 0–1)
BILIRUB SERPL-MCNC: 0.46 MG/DL (ref 0.2–1)
BUN SERPL-MCNC: 12 MG/DL (ref 5–25)
CALCIUM SERPL-MCNC: 9.4 MG/DL (ref 8.4–10.2)
CHLORIDE SERPL-SCNC: 104 MMOL/L (ref 96–108)
CHOLEST SERPL-MCNC: 222 MG/DL (ref ?–200)
CO2 SERPL-SCNC: 29 MMOL/L (ref 21–32)
CREAT SERPL-MCNC: 0.64 MG/DL (ref 0.6–1.3)
EOSINOPHIL # BLD AUTO: 0.21 THOUSAND/ÂΜL (ref 0–0.61)
EOSINOPHIL NFR BLD AUTO: 3 % (ref 0–6)
ERYTHROCYTE [DISTWIDTH] IN BLOOD BY AUTOMATED COUNT: 11.9 % (ref 11.6–15.1)
GFR SERPL CREATININE-BSD FRML MDRD: 90 ML/MIN/1.73SQ M
GLUCOSE P FAST SERPL-MCNC: 100 MG/DL (ref 65–99)
HCT VFR BLD AUTO: 42.2 % (ref 34.8–46.1)
HDLC SERPL-MCNC: 50 MG/DL
HGB BLD-MCNC: 14.4 G/DL (ref 11.5–15.4)
IMM GRANULOCYTES # BLD AUTO: 0.01 THOUSAND/UL (ref 0–0.2)
IMM GRANULOCYTES NFR BLD AUTO: 0 % (ref 0–2)
LDLC SERPL CALC-MCNC: 96 MG/DL (ref 0–100)
LYMPHOCYTES # BLD AUTO: 3.25 THOUSANDS/ÂΜL (ref 0.6–4.47)
LYMPHOCYTES NFR BLD AUTO: 40 % (ref 14–44)
MCH RBC QN AUTO: 30.5 PG (ref 26.8–34.3)
MCHC RBC AUTO-ENTMCNC: 34.1 G/DL (ref 31.4–37.4)
MCV RBC AUTO: 89 FL (ref 82–98)
MONOCYTES # BLD AUTO: 0.54 THOUSAND/ÂΜL (ref 0.17–1.22)
MONOCYTES NFR BLD AUTO: 7 % (ref 4–12)
NEUTROPHILS # BLD AUTO: 4.01 THOUSANDS/ÂΜL (ref 1.85–7.62)
NEUTS SEG NFR BLD AUTO: 49 % (ref 43–75)
NRBC BLD AUTO-RTO: 0 /100 WBCS
PLATELET # BLD AUTO: 251 THOUSANDS/UL (ref 149–390)
PMV BLD AUTO: 10 FL (ref 8.9–12.7)
POTASSIUM SERPL-SCNC: 4 MMOL/L (ref 3.5–5.3)
PROT SERPL-MCNC: 7 G/DL (ref 6.4–8.4)
RBC # BLD AUTO: 4.72 MILLION/UL (ref 3.81–5.12)
SODIUM SERPL-SCNC: 140 MMOL/L (ref 135–147)
TRIGL SERPL-MCNC: 381 MG/DL (ref ?–150)
TSH SERPL DL<=0.05 MIU/L-ACNC: 1.53 UIU/ML (ref 0.45–4.5)
WBC # BLD AUTO: 8.06 THOUSAND/UL (ref 4.31–10.16)

## 2025-05-27 PROCEDURE — 84443 ASSAY THYROID STIM HORMONE: CPT

## 2025-05-27 PROCEDURE — 85025 COMPLETE CBC W/AUTO DIFF WBC: CPT

## 2025-05-27 PROCEDURE — 36415 COLL VENOUS BLD VENIPUNCTURE: CPT

## 2025-05-27 PROCEDURE — 82306 VITAMIN D 25 HYDROXY: CPT

## 2025-05-27 PROCEDURE — 80061 LIPID PANEL: CPT

## 2025-05-27 PROCEDURE — 80053 COMPREHEN METABOLIC PANEL: CPT

## 2025-05-28 ENCOUNTER — RESULTS FOLLOW-UP (OUTPATIENT)
Dept: FAMILY MEDICINE CLINIC | Facility: CLINIC | Age: 70
End: 2025-05-28

## 2025-06-02 ENCOUNTER — OFFICE VISIT (OUTPATIENT)
Dept: FAMILY MEDICINE CLINIC | Facility: CLINIC | Age: 70
End: 2025-06-02
Payer: MEDICARE

## 2025-06-02 VITALS
BODY MASS INDEX: 26.75 KG/M2 | SYSTOLIC BLOOD PRESSURE: 132 MMHG | TEMPERATURE: 98.4 F | HEART RATE: 72 BPM | WEIGHT: 151 LBS | OXYGEN SATURATION: 97 % | DIASTOLIC BLOOD PRESSURE: 70 MMHG | HEIGHT: 63 IN | RESPIRATION RATE: 18 BRPM

## 2025-06-02 DIAGNOSIS — G35 MULTIPLE SCLEROSIS (HCC): ICD-10-CM

## 2025-06-02 DIAGNOSIS — J44.9 CHRONIC OBSTRUCTIVE PULMONARY DISEASE, UNSPECIFIED COPD TYPE (HCC): ICD-10-CM

## 2025-06-02 DIAGNOSIS — Z78.0 MENOPAUSE: ICD-10-CM

## 2025-06-02 DIAGNOSIS — M85.89 OSTEOPENIA OF MULTIPLE SITES: ICD-10-CM

## 2025-06-02 DIAGNOSIS — Z82.49 FAMILY HISTORY OF HEART DISEASE: ICD-10-CM

## 2025-06-02 DIAGNOSIS — Z00.00 MEDICARE ANNUAL WELLNESS VISIT, SUBSEQUENT: Primary | ICD-10-CM

## 2025-06-02 DIAGNOSIS — F33.9 MAJOR DEPRESSION, RECURRENT, CHRONIC (HCC): Chronic | ICD-10-CM

## 2025-06-02 DIAGNOSIS — E78.01 FAMILIAL HYPERCHOLESTEROLEMIA: Chronic | ICD-10-CM

## 2025-06-02 PROCEDURE — 99214 OFFICE O/P EST MOD 30 MIN: CPT | Performed by: FAMILY MEDICINE

## 2025-06-02 PROCEDURE — G2211 COMPLEX E/M VISIT ADD ON: HCPCS | Performed by: FAMILY MEDICINE

## 2025-06-02 PROCEDURE — G0439 PPPS, SUBSEQ VISIT: HCPCS | Performed by: FAMILY MEDICINE

## 2025-06-02 NOTE — PATIENT INSTRUCTIONS
Please schedule CT chest - [phone]  Total cholesterol is elevated but breakdown into the good cholesterol and bad cholesterol is normal.  Your triglycerides are elevated as well.  Please continue your current cholesterol medications and start fish oil or Krill oil capsules 2400 mg daily  I will place a referral to see a cardiologist due to persistent elevation of cholesterol and family history of heart disease (brother)  Please continue vitamin D3 twice a day  Blood work is ordered for fall 2025  DEXA scan after 8/1/25  Shingles vaccine at the pharmacy

## 2025-06-02 NOTE — PROGRESS NOTES
Name: Machelle Kaufman      : 1955      MRN: [id number]  Encounter Provider: Maria E Almeida MD  Encounter Date: 2025   Encounter department: [facility]  :  Assessment & Plan  Medicare annual wellness visit, subsequent         Familial hypercholesterolemia  Persistent elevation of cholesterol in spite of high potency statin therapy and Zetia  Current regimen includes Crestor 40 mg daily and Zetia 10 mg daily.  Patient reports compliance  Family history of CAD in multiple family members  Restart fatty 3 omega acids, fish oil or Krill oil, 2400 mg daily  Continue healthy low-fat low-cholesterol diet  Referral to [facility] cardiology  Orders:  •  Ambulatory Referral to Cardiology; Future  •  CBC; Future  •  Comprehensive metabolic panel; Future  •  Lipid Panel with Direct LDL reflex; Future  •  TSH, 3rd generation; Future    Family history of heart disease    Orders:  •  Ambulatory Referral to Cardiology; Future    Multiple sclerosis (HCC)  No current medications, patient is not under care of neurology       Chronic obstructive pulmonary disease, unspecified COPD type (HCC)  Patient offers no complaints of shortness of breath, cough or wheezing.  No daily inhaler use.  She is past due CT chest and I strongly advised her to proceed       Menopause  Patient will be due for DEXA scan in 2025  Orders:  •  DXA bone density spine hip and pelvis; Future    Osteopenia of multiple sites    Orders:  •  DXA bone density spine hip and pelvis; Future    Major depression, recurrent, chronic (HCC)  Symptoms are well-controlled, continue Zoloft 100 mg daily          Preventive health issues were discussed with patient, and age appropriate screening tests were ordered as noted in patient's After Visit Summary. Personalized health advice and appropriate referrals for health education or preventive services given if needed, as noted in patient's After Visit Summary.    Patient Instructions    Please schedule CT chest - [phone]  Total cholesterol is elevated but breakdown into the good cholesterol and bad cholesterol is normal.  Your triglycerides are elevated as well.  Please continue your current cholesterol medications and start fish oil or Krill oil capsules 2400 mg daily  I will place a referral to see a cardiologist due to persistent elevation of cholesterol and family history of heart disease (brother)  Please continue vitamin D3 twice a day  Blood work is ordered for fall 2025  DEXA scan after 8/1/25  Shingles vaccine at the pharmacy    Return in about 6 months (around 12/2/2025) for follow up.    History of Present Illness     Patient presents for follow-up and Medicare wellness.   She is here today accompanied by her .   Results of recent blood work reviewed.  All labs are normal aside persistent elevation of total cholesterol and triglycerides  Patient reports full compliance with rosuvastatin 40 mg daily and Zetia 10 mg daily  She reports strong family history of CAD: Brother, grandfather and mother.   Her mother had myocardial infarction in her late 80s.      Patient is still unfortunately smoking, slightly less than pack a day.  Denies symptoms of shortness of breath, chest tightness or wheezing.     She has never proceeded with CT chest that I have recommended on a few occasions.  Reports that she forgot.     Patient has completed care for displaced fracture of left fifth metatarsal, Dr. Lachman, January 2025    Last DEXA scan performed in July 2023    Trigger finger right second digit             Patient Care Team:  Maria E Almeida MD as PCP - General  DO Elizabeth Hoyt MD Nicole Marie Markovcy, CRNP John Hratko, MD Farooq Qureshi, MD Dang Zhang, MD Robert Kitei, MD (Ophthalmology)    Review of Systems   Constitutional: Negative.    HENT: Negative.     Eyes: Negative.    Respiratory: Negative.     Cardiovascular: Negative.    Gastrointestinal: Negative.     Endocrine: Negative.    Genitourinary: Negative.    Musculoskeletal:  Positive for arthralgias.   Allergic/Immunologic: Negative.    Neurological: Negative.    Hematological: Negative.    Psychiatric/Behavioral:  Positive for decreased concentration.      Medical History Reviewed by provider this encounter:  Tobacco  Allergies  Meds  Problems  Med Hx  Surg Hx  Fam Hx       Annual Wellness Visit Questionnaire   Machelle is here for her Subsequent Wellness visit. Last Medicare Wellness visit information reviewed, patient interviewed and updates made to the record today.      Health Risk Assessment:   Patient rates overall health as fair. Patient feels that their physical health rating is same. Patient is satisfied with their life. Eyesight was rated as same. Hearing was rated as same. Patient feels that their emotional and mental health rating is same. Patients states they are never, rarely angry. Patient states they are sometimes unusually tired/fatigued. Pain experienced in the last 7 days has been some. Patient's pain rating has been 4/10. Patient states that she has experienced no weight loss or gain in last 6 months.     Depression Screening:   PHQ-9 Score: 0      Fall Risk Screening:   In the past year, patient has experienced: no history of falling in past year      Urinary Incontinence Screening:   Patient has leaked urine accidently in the last six months.     Home Safety:  Patient does not have trouble with stairs inside or outside of their home. Patient has working smoke alarms and has working carbon monoxide detector. Home safety hazards include: none.     Nutrition:   Current diet is Regular.     Medications:   Patient is currently taking over-the-counter supplements. OTC medications include: see medication list. Patient is able to manage medications.     Activities of Daily Living (ADLs)/Instrumental Activities of Daily Living (IADLs):   Walk and transfer into and out of bed and chair?: Yes  Dress  and groom yourself?: Yes    Bathe or shower yourself?: Yes    Feed yourself? Yes  Do your laundry/housekeeping?: Yes  Manage your money, pay your bills and track your expenses?: Yes  Make your own meals?: Yes    Do your own shopping?: Yes    Previous Hospitalizations:   Any hospitalizations or ED visits within the last 12 months?: No      Advance Care Planning:   Living will: No    Durable POA for healthcare: No    Advanced directive: No    Advanced directive counseling given: Yes    ACP document given: Yes      Cognitive Screening:   Provider or family/friend/caregiver concerned regarding cognition?: No    Preventive Screenings      Cardiovascular Screening:    General: Screening Not Indicated and History Lipid Disorder      Diabetes Screening:     General: Screening Current      Colorectal Cancer Screening:     General: Screening Current    Due for: Colonoscopy - High Risk      Breast Cancer Screening:     General: Screening Current      Cervical Cancer Screening:    General: Screening Not Indicated and Screening Current      Osteoporosis Screening:    General: Screening Not Indicated and Screening Current      Abdominal Aortic Aneurysm (AAA) Screening:        General: Screening Not Indicated      Lung Cancer Screening:     General: Screening Current      Hepatitis C Screening:    General: Screening Current    Immunizations:  - Immunizations due: Zoster (Shingrix)  - Risks/benefits immunizations discussed      Screening, Brief Intervention, and Referral to Treatment (SBIRT)     Screening    Typical number of drinks in a week: 0    Single Item Drug Screening:  How often have you used an illegal drug (including marijuana) or a prescription medication for non-medical reasons in the past year? never    Single Item Drug Screen Score: 0  Interpretation: Negative screen for possible drug use disorder    Brief Intervention  Alcohol & drug use screenings were reviewed. No concerns regarding substance use disorder  "identified.     Other Counseling Topics:   Regular weightbearing exercise and calcium and vitamin D intake.     Social Drivers of Health     Financial Resource Strain: Patient Declined (5/16/2023)    Overall Financial Resource Strain (CARDIA)    • Difficulty of Paying Living Expenses: Patient declined   Food Insecurity: No Food Insecurity (6/2/2025)    Nursing - Inadequate Food Risk Classification    • Worried About Running Out of Food in the Last Year: Never true    • Ran Out of Food in the Last Year: Never true   Transportation Needs: No Transportation Needs (6/2/2025)    PRAPARE - Transportation    • Lack of Transportation (Medical): No    • Lack of Transportation (Non-Medical): No   Housing Stability: Low Risk  (6/2/2025)    Housing Stability Vital Sign    • Unable to Pay for Housing in the Last Year: No    • Number of Times Moved in the Last Year: 1    • Homeless in the Last Year: No   Utilities: Not At Risk (6/2/2025)    [facility] Utilities    • Threatened with loss of utilities: No     No results found.    Objective   /70 (BP Location: Left arm, Patient Position: Sitting, Cuff Size: Standard)   Pulse 72   Temp 98.4 °F (36.9 °C) (Temporal)   Resp 18   Ht 5' 3\" (1.6 m)   Wt 68.5 kg (151 lb)   LMP  (LMP Unknown)   SpO2 97%   BMI 26.75 kg/m²     Physical Exam  Vitals and nursing note reviewed.   Constitutional:       General: She is not in acute distress.     Appearance: Normal appearance. She is well-developed. She is not ill-appearing.   HENT:      Head: Normocephalic and atraumatic.     Eyes:      Conjunctiva/sclera: Conjunctivae normal.     Neck:      Thyroid: No thyromegaly.      Vascular: No carotid bruit.     Cardiovascular:      Rate and Rhythm: Normal rate and regular rhythm.      Heart sounds: Normal heart sounds. No murmur heard.  Pulmonary:      Effort: Pulmonary effort is normal. No respiratory distress.      Breath sounds: Normal breath sounds. No wheezing.   Abdominal:      General: There " is no abdominal bruit.     Musculoskeletal:         General: Normal range of motion.      Cervical back: Neck supple.     Neurological:      General: No focal deficit present.      Mental Status: She is alert and oriented to person, place, and time.      Cranial Nerves: No cranial nerve deficit.      Coordination: Coordination normal.     Psychiatric:         Mood and Affect: Mood normal.         Behavior: Behavior normal.

## 2025-06-02 NOTE — ASSESSMENT & PLAN NOTE
Patient offers no complaints of shortness of breath, cough or wheezing.  No daily inhaler use.  She is past due CT chest and I strongly advised her to proceed

## 2025-06-02 NOTE — ASSESSMENT & PLAN NOTE
Persistent elevation of cholesterol in spite of high potency statin therapy and Zetia  Current regimen includes Crestor 40 mg daily and Zetia 10 mg daily.  Patient reports compliance  Family history of CAD in multiple family members  Restart fatty 3 omega acids, fish oil or Krill oil, 2400 mg daily  Continue healthy low-fat low-cholesterol diet  Referral to [facility]'s cardiology  Orders:  •  Ambulatory Referral to Cardiology; Future  •  CBC; Future  •  Comprehensive metabolic panel; Future  •  Lipid Panel with Direct LDL reflex; Future  •  TSH, 3rd generation; Future

## 2025-06-04 ENCOUNTER — TELEPHONE (OUTPATIENT)
Dept: FAMILY MEDICINE CLINIC | Facility: CLINIC | Age: 70
End: 2025-06-04

## 2025-06-04 PROBLEM — M75.42 SHOULDER IMPINGEMENT SYNDROME, LEFT: Status: RESOLVED | Noted: 2018-11-16 | Resolved: 2025-06-04

## 2025-06-04 PROBLEM — M79.605 PAIN OF LEFT LOWER EXTREMITY: Status: RESOLVED | Noted: 2024-08-23 | Resolved: 2025-06-04

## 2025-06-04 PROBLEM — M77.8 TENDINITIS OF LEFT FOREARM: Status: RESOLVED | Noted: 2022-12-12 | Resolved: 2025-06-04

## 2025-06-04 PROBLEM — M77.12 LATERAL EPICONDYLITIS OF LEFT ELBOW: Status: RESOLVED | Noted: 2022-12-12 | Resolved: 2025-06-04

## 2025-06-04 NOTE — TELEPHONE ENCOUNTER
Please contact the patient.  I forgot to remind her that she is due for annual mammogram.  Order is in her chart.  She should schedule.  Thank you

## 2025-06-16 ENCOUNTER — HOSPITAL ENCOUNTER (OUTPATIENT)
Dept: RADIOLOGY | Age: 70
Discharge: HOME | End: 2025-06-16
Attending: FAMILY MEDICINE
Payer: MEDICARE

## 2025-06-16 DIAGNOSIS — J44.9 CHRONIC OBSTRUCTIVE PULMONARY DISEASE, UNSPECIFIED COPD TYPE (HCC): Chronic | ICD-10-CM

## 2025-06-16 DIAGNOSIS — J30.9 ALLERGIC RHINITIS, UNSPECIFIED SEASONALITY, UNSPECIFIED TRIGGER: ICD-10-CM

## 2025-06-16 DIAGNOSIS — J44.9 CHRONIC OBSTRUCTIVE PULMONARY DISEASE, UNSPECIFIED COPD TYPE (HCC): ICD-10-CM

## 2025-06-16 DIAGNOSIS — R91.1 PULMONARY NODULE: Chronic | ICD-10-CM

## 2025-06-16 PROCEDURE — 71250 CT THORAX DX C-: CPT

## 2025-06-16 RX ORDER — FLUTICASONE PROPIONATE 50 MCG
SPRAY, SUSPENSION (ML) NASAL
Qty: 48 G | Refills: 1 | Status: SHIPPED | OUTPATIENT
Start: 2025-06-16

## 2025-06-16 RX ORDER — ALBUTEROL SULFATE 90 UG/1
AEROSOL, METERED RESPIRATORY (INHALATION)
Qty: 108 G | Refills: 1 | Status: SHIPPED | OUTPATIENT
Start: 2025-06-16

## 2025-07-07 ENCOUNTER — ANESTHESIA (OUTPATIENT)
Dept: GASTROENTEROLOGY | Facility: HOSPITAL | Age: 70
End: 2025-07-07
Payer: MEDICARE

## 2025-07-07 ENCOUNTER — HOSPITAL ENCOUNTER (OUTPATIENT)
Dept: GASTROENTEROLOGY | Facility: HOSPITAL | Age: 70
Setting detail: OUTPATIENT SURGERY
Discharge: HOME | End: 2025-07-07
Attending: COLON & RECTAL SURGERY
Payer: MEDICARE

## 2025-07-07 ENCOUNTER — ANESTHESIA EVENT (OUTPATIENT)
Dept: GASTROENTEROLOGY | Facility: HOSPITAL | Age: 70
End: 2025-07-07
Payer: MEDICARE

## 2025-07-07 VITALS
OXYGEN SATURATION: 98 % | RESPIRATION RATE: 20 BRPM | HEART RATE: 67 BPM | SYSTOLIC BLOOD PRESSURE: 125 MMHG | DIASTOLIC BLOOD PRESSURE: 62 MMHG | TEMPERATURE: 98 F

## 2025-07-07 DIAGNOSIS — K63.5 POLYP OF COLON, UNSPECIFIED PART OF COLON, UNSPECIFIED TYPE: ICD-10-CM

## 2025-07-07 PROBLEM — F17.200 SMOKER: Status: ACTIVE | Noted: 2025-07-07

## 2025-07-07 PROCEDURE — 88305 TISSUE EXAM BY PATHOLOGIST: CPT | Performed by: PATHOLOGY

## 2025-07-07 PROCEDURE — 45385 COLONOSCOPY W/LESION REMOVAL: CPT | Performed by: COLON & RECTAL SURGERY

## 2025-07-07 RX ORDER — PROPOFOL 10 MG/ML
INJECTION, EMULSION INTRAVENOUS AS NEEDED
Status: DISCONTINUED | OUTPATIENT
Start: 2025-07-07 | End: 2025-07-07

## 2025-07-07 RX ORDER — SODIUM CHLORIDE 9 MG/ML
INJECTION, SOLUTION INTRAVENOUS CONTINUOUS PRN
Status: DISCONTINUED | OUTPATIENT
Start: 2025-07-07 | End: 2025-07-07

## 2025-07-07 RX ORDER — LIDOCAINE HYDROCHLORIDE 10 MG/ML
INJECTION, SOLUTION EPIDURAL; INFILTRATION; INTRACAUDAL; PERINEURAL AS NEEDED
Status: DISCONTINUED | OUTPATIENT
Start: 2025-07-07 | End: 2025-07-07

## 2025-07-07 RX ADMIN — PROPOFOL 50 MG: 10 INJECTION, EMULSION INTRAVENOUS at 08:51

## 2025-07-07 RX ADMIN — PROPOFOL 100 MG: 10 INJECTION, EMULSION INTRAVENOUS at 08:49

## 2025-07-07 RX ADMIN — PROPOFOL 50 MG: 10 INJECTION, EMULSION INTRAVENOUS at 08:54

## 2025-07-07 RX ADMIN — PROPOFOL 50 MG: 10 INJECTION, EMULSION INTRAVENOUS at 09:02

## 2025-07-07 RX ADMIN — LIDOCAINE HYDROCHLORIDE 50 MG: 10 INJECTION, SOLUTION EPIDURAL; INFILTRATION; INTRACAUDAL; PERINEURAL at 08:49

## 2025-07-07 RX ADMIN — PROPOFOL 50 MG: 10 INJECTION, EMULSION INTRAVENOUS at 08:57

## 2025-07-07 RX ADMIN — PROPOFOL 30 MG: 10 INJECTION, EMULSION INTRAVENOUS at 09:14

## 2025-07-07 RX ADMIN — SODIUM CHLORIDE: 0.9 INJECTION, SOLUTION INTRAVENOUS at 08:46

## 2025-07-07 NOTE — ANESTHESIA PREPROCEDURE EVALUATION
Procedure:  COLONOSCOPY    Relevant Problems   ANESTHESIA (within normal limits)      CARDIO   (+) Hyperlipidemia      ENDO (within normal limits)      GI/HEPATIC (within normal limits)      /RENAL (within normal limits)      HEMATOLOGY (within normal limits)      NEURO/PSYCH   (+) Major depression, recurrent, chronic (HCC)      PULMONARY   (+) Chronic obstructive pulmonary disease (HCC)      Behavioral Health   (+) Smoker      Ear/Nose/Throat   (+) Allergic rhinitis      Neurology/Sleep   (+) Amnesia/memory disorder   (+) Multiple sclerosis, relapsing/remitting      Care Coordination   (+) Ambulatory dysfunction      EKG 2017:  Normal sinus rhythm  Possible Left atrial enlargement  Poor R Wave Progression  Abnormal ECG  When compared with ECG of 02-FEB-2017 10:26,  No significant change was found    Stress Test 2014:  Normal study after pharmacologic vasodilation. Myocardial   perfusion imaging was normal at rest and with stress. Left ventricular    systolic   function was normal.     Lab Results   Component Value Date    WBC 8.06 05/27/2025    HGB 14.4 05/27/2025    HCT 42.2 05/27/2025    MCV 89 05/27/2025     05/27/2025     Lab Results   Component Value Date    SODIUM 140 05/27/2025    K 4.0 05/27/2025     05/27/2025    CO2 29 05/27/2025    BUN 12 05/27/2025    CREATININE 0.64 05/27/2025    GLUC 105 08/07/2020    CALCIUM 9.4 05/27/2025     Lab Results   Component Value Date    INR 0.97 10/01/2018    PROTIME 13.0 10/01/2018     Lab Results   Component Value Date    HGBA1C 5.8 (H) 09/17/2022            Physical Exam    Airway     Mallampati score: II  TM Distance: >3 FB  Neck ROM: full      Cardiovascular  Cardiovascular exam normal    Dental   No notable dental hx     Pulmonary  Pulmonary exam normal     Neurological  - normal exam    Other Findings  post-pubertal.      Anesthesia Plan  ASA Score- 3     Anesthesia Type- IV sedation with anesthesia with ASA Monitors.         Additional Monitors:      Airway Plan: natural airway.           Plan Factors-    Chart reviewed.   Existing labs reviewed. Patient summary reviewed.                  Induction- intravenous.    Postoperative Plan- .   Monitoring Plan - Monitoring plan - standard ASA monitoring          Informed Consent- Anesthetic plan and risks discussed with patient.  I personally reviewed this patient with the CRNA. Discussed and agreed on the Anesthesia Plan with the CRNA..      NPO Status:  No vitals data found for the desired time range.

## 2025-07-07 NOTE — H&P
History and Physical   Colon and Rectal Surgery   Machelle Kaufman 69 y.o. female MRN: [id number]  Unit/Bed#:  Encounter: [id number]  07/07/25   8:41 AM      CC:  History of polyps    History of Present Illness   HPI:  Machelle Kaufman is a 69 y.o. female with no GI symptoms.  Historical Information   Past Medical History[1]  Past Surgical History[2]    Meds/Allergies     Not in a hospital admission.    Current Medications[3]    Allergies[4]      Social History   Social History     Substance and Sexual Activity   Alcohol Use Yes    Comment: occasional; [employer] also states Never drank alcohol     Social History     Substance and Sexual Activity   Drug Use No     Tobacco Use History[5]      Family History: Family History[6]      Objective     Current Vitals:   Blood Pressure: 144/67 (07/07/25 0747)  Pulse: 80 (07/07/25 0747)  Temperature: (!) 96.1 °F (35.6 °C) (07/07/25 0747)  Temp Source: Tympanic (07/07/25 0747)  Respirations: 20 (07/07/25 0747)  SpO2: 97 % (07/07/25 0747)  No intake or output data in the 24 hours ending 07/07/25 0841    Physical Exam:  General:  Well nourished, no distress.  Neuro: Alert and oriented  Eyes:Sclera anicteric, conjunctiva pink.  Pulm: Clear to auscultation bilaterally. No respiratory Distress.   CV:  Regular rate and rhythm. No murmurs.  Abdomen:  Soft, flat, non-tender, without masses or hepatosplenomegaly.    Lab Results:       ASSESSMENT:  Machelle Kaufman is a 69 y.o. female for surveillance.  PLAN:  Colonoscopy.  Risks , including, but not limited to, bleeding, perforation, missed lesions, and potential need for surgery, were reviewed. Alternatives to colonoscopy were discussed.  LASHAY Fry MD       [1]   Past Medical History:  Diagnosis Date    Asthma     Degeneration of intervertebral disc at L5-S1 level     Depression     Family history of colon cancer 03/27/2019    Added automatically from request for surgery [id number]    Full incontinence of feces     Generalized anxiety  disorder     Hepatitis     at age 8 yrs    Herpes zoster     last assessed: 6/20/13    History of colon polyps     Hypercholesterolemia     Hyperlipidemia     Lateral epicondylitis of left elbow 12/12/2022    MS (multiple sclerosis) (HCC)     MS (multiple sclerosis) (HCC)     Osteoporosis     Seasonal allergies     Shoulder impingement syndrome, left 11/16/2018    Tumor of breast     benign, right breast    Urinary incontinence    [2]   Past Surgical History:  Procedure Laterality Date    BREAST EXCISIONAL BIOPSY Right 1998    unsure exact year, benign    BREAST SURGERY      right breast fibroid tumor resection    COLONOSCOPY      11/2013 - due in 2018 -     COLONOSCOPY W/ POLYPECTOMY  07/07/2022    normal-repeat in 2 years    FL LUMBAR PUNCTURE DIAGNOSTIC  10/01/2018    ROTATOR CUFF REPAIR      Bilateral    ROTATOR CUFF REPAIR Bilateral     SEPTOPLASTY     [3]   Current Outpatient Medications:     albuterol (2.5 mg/3 mL) 0.083 % nebulizer solution, Take 3 mL (2.5 mg total) by nebulization every 4 (four) hours as needed for wheezing or shortness of breath (cough), Disp: 300 mL, Rfl: 3    aspirin (ECOTRIN LOW STRENGTH) 81 mg EC tablet, Take 1 tablet by mouth in the morning., Disp: , Rfl:     Cholecalciferol (Vitamin D3) 125 MCG (5000 UT) CAPS, Take 1 capsule by mouth in the morning., Disp: , Rfl:     ezetimibe (ZETIA) 10 mg tablet, TAKE 1 TABLET BY MOUTH DAILY, Disp: 90 tablet, Rfl: 3    fluticasone (FLONASE) 50 mcg/act nasal spray, USE 2 SPRAYS IN BOTH NOSTRILS  DAILY, Disp: 48 g, Rfl: 1    gabapentin (NEURONTIN) 300 mg capsule, TAKE 1 CAPSULE BY MOUTH TWICE  DAILY, Disp: 180 capsule, Rfl: 3    levocetirizine (XYZAL) 5 MG tablet, Take 1 tablet (5 mg total) by mouth every evening, Disp: 90 tablet, Rfl: 3    montelukast (SINGULAIR) 10 mg tablet, TAKE 1 TABLET BY MOUTH DAILY AT  BEDTIME, Disp: 90 tablet, Rfl: 1    Multiple Vitamins-Minerals (PRESERVISION AREDS 2 PO), Take by mouth, Disp: , Rfl:     OMEGA-3  KRILL OIL PO, Take by mouth, Disp: , Rfl:     omeprazole (PriLOSEC) 40 MG capsule, Take 40 mg by mouth in the morning., Disp: , Rfl:     rosuvastatin (CRESTOR) 40 MG tablet, TAKE 1 TABLET BY MOUTH DAILY, Disp: 90 tablet, Rfl: 1    sertraline (ZOLOFT) 100 mg tablet, TAKE 1 TABLET BY MOUTH DAILY, Disp: 90 tablet, Rfl: 3    Ventolin  (90 Base) MCG/ACT inhaler, USE 2 INHALATIONS BY MOUTH EVERY 4 HOURS AS NEEDED FOR WHEEZING  OR SHORTNESS OF BREATH, Disp: 108 g, Rfl: 1  [4]   Allergies  Allergen Reactions    Bupropion     Cefprozil     Conj Estrog-Medroxyprogest Ace Other (See Comments)     Other      Povidone Iodine      Patient unsure of what this is    Sulfamethoxazole-Trimethoprim Other (See Comments)     Reaction Date: 11Aug2011;     Levofloxacin     Gadobutrol GI Intolerance    Medroxyprogesterone Other (See Comments)     Reaction Date: 11Aug2011;     Nortriptyline Other (See Comments)     n/a    Pamelor [Nortriptyline Hcl]     Provera [Medroxyprogesterone Acetate]    [5]   Social History  Tobacco Use   Smoking Status Every Day    Current packs/day: 1.00    Average packs/day: 1 pack/day for 43.5 years (43.5 ttl pk-yrs)    Types: Cigarettes    Start date: 1982   Smokeless Tobacco Never   [6]   Family History  Problem Relation Name Age of Onset    Arthritis Mother      Osteoporosis Mother      COPD Father      No Known Problems Sister      Dementia Sister      No Known Problems Maternal Grandmother      No Known Problems Maternal Grandfather      No Known Problems Paternal Grandmother      No Known Problems Paternal Grandfather      Colon cancer Brother  65    Multiple sclerosis Brother      Diabetes Brother      No Known Problems Brother      No Known Problems Maternal Aunt      No Known Problems Maternal Aunt      No Known Problems Paternal Aunt

## 2025-07-07 NOTE — ANESTHESIA POSTPROCEDURE EVALUATION
Post-Op Assessment Note    CV Status:  Stable    Pain management: adequate       Mental Status:  Awake   Hydration Status:  Euvolemic   PONV Controlled:  Controlled   Airway Patency:  Patent  Two or more mitigation strategies used for obstructive sleep apnea   Post Op Vitals Reviewed: Yes    No anethesia notable event occurred.    Staff: CRNA, Anesthesiologist           Last Filed PACU Vitals:  Vitals Value Taken Time   Temp 98 °F (36.7 °C) 07/07/25 09:20   Pulse 85 07/07/25 09:20   /67 07/07/25 09:20   Resp 24 07/07/25 09:20   SpO2 97 % 07/07/25 09:20

## 2025-07-10 PROCEDURE — 88305 TISSUE EXAM BY PATHOLOGIST: CPT | Performed by: PATHOLOGY

## (undated) RX ORDER — LOTEPREDNOL ETABONATE 5 MG/G: 1/4 OINTMENT OPHTHALMIC TWICE A DAY

## (undated) RX ORDER — CEPHALEXIN 500 MG/1: 1 CAPSULE ORAL